# Patient Record
Sex: FEMALE | Race: WHITE | NOT HISPANIC OR LATINO | Employment: PART TIME | URBAN - METROPOLITAN AREA
[De-identification: names, ages, dates, MRNs, and addresses within clinical notes are randomized per-mention and may not be internally consistent; named-entity substitution may affect disease eponyms.]

---

## 2017-01-16 ENCOUNTER — TRANSFERRED RECORDS (OUTPATIENT)
Dept: HEALTH INFORMATION MANAGEMENT | Facility: CLINIC | Age: 47
End: 2017-01-16

## 2017-01-20 ENCOUNTER — TRANSFERRED RECORDS (OUTPATIENT)
Dept: HEALTH INFORMATION MANAGEMENT | Facility: CLINIC | Age: 47
End: 2017-01-20

## 2017-02-27 ENCOUNTER — TRANSFERRED RECORDS (OUTPATIENT)
Dept: HEALTH INFORMATION MANAGEMENT | Facility: CLINIC | Age: 47
End: 2017-02-27

## 2017-03-10 ENCOUNTER — TRANSFERRED RECORDS (OUTPATIENT)
Dept: HEALTH INFORMATION MANAGEMENT | Facility: CLINIC | Age: 47
End: 2017-03-10

## 2017-03-13 ENCOUNTER — TRANSFERRED RECORDS (OUTPATIENT)
Dept: HEALTH INFORMATION MANAGEMENT | Facility: CLINIC | Age: 47
End: 2017-03-13

## 2017-04-21 ENCOUNTER — TRANSFERRED RECORDS (OUTPATIENT)
Dept: HEALTH INFORMATION MANAGEMENT | Facility: CLINIC | Age: 47
End: 2017-04-21

## 2017-05-03 ENCOUNTER — TRANSFERRED RECORDS (OUTPATIENT)
Dept: HEALTH INFORMATION MANAGEMENT | Facility: CLINIC | Age: 47
End: 2017-05-03

## 2017-07-18 ENCOUNTER — TRANSFERRED RECORDS (OUTPATIENT)
Dept: HEALTH INFORMATION MANAGEMENT | Facility: CLINIC | Age: 47
End: 2017-07-18

## 2017-07-20 ENCOUNTER — TRANSFERRED RECORDS (OUTPATIENT)
Dept: HEALTH INFORMATION MANAGEMENT | Facility: CLINIC | Age: 47
End: 2017-07-20

## 2017-08-29 ENCOUNTER — TRANSFERRED RECORDS (OUTPATIENT)
Dept: HEALTH INFORMATION MANAGEMENT | Facility: CLINIC | Age: 47
End: 2017-08-29

## 2017-11-16 ENCOUNTER — TRANSFERRED RECORDS (OUTPATIENT)
Dept: HEALTH INFORMATION MANAGEMENT | Facility: CLINIC | Age: 47
End: 2017-11-16

## 2017-11-27 ENCOUNTER — TRANSFERRED RECORDS (OUTPATIENT)
Dept: HEALTH INFORMATION MANAGEMENT | Facility: CLINIC | Age: 47
End: 2017-11-27

## 2018-07-10 ENCOUNTER — TRANSFERRED RECORDS (OUTPATIENT)
Dept: HEALTH INFORMATION MANAGEMENT | Facility: CLINIC | Age: 48
End: 2018-07-10

## 2018-07-17 ENCOUNTER — TRANSFERRED RECORDS (OUTPATIENT)
Dept: HEALTH INFORMATION MANAGEMENT | Facility: CLINIC | Age: 48
End: 2018-07-17

## 2018-08-03 ENCOUNTER — TRANSFERRED RECORDS (OUTPATIENT)
Dept: HEALTH INFORMATION MANAGEMENT | Facility: CLINIC | Age: 48
End: 2018-08-03

## 2018-08-13 ENCOUNTER — TRANSFERRED RECORDS (OUTPATIENT)
Dept: HEALTH INFORMATION MANAGEMENT | Facility: CLINIC | Age: 48
End: 2018-08-13

## 2018-08-15 ENCOUNTER — TRANSFERRED RECORDS (OUTPATIENT)
Dept: HEALTH INFORMATION MANAGEMENT | Facility: CLINIC | Age: 48
End: 2018-08-15

## 2018-08-17 ENCOUNTER — TRANSFERRED RECORDS (OUTPATIENT)
Dept: HEALTH INFORMATION MANAGEMENT | Facility: CLINIC | Age: 48
End: 2018-08-17

## 2018-08-27 ENCOUNTER — TRANSFERRED RECORDS (OUTPATIENT)
Dept: HEALTH INFORMATION MANAGEMENT | Facility: CLINIC | Age: 48
End: 2018-08-27

## 2018-08-29 NOTE — TELEPHONE ENCOUNTER
Date of appointment: 18 @ 1400   Diagnosis/reason for appointment: 2nd Opinion on Metastasis of Breast Cancer  Referring provider/facility: Dr. Eagle Brand  Who called: Carmen @ clinic  [phone]    Recent Studies  Imagin18 - US Bx Soft Tissue,  - CT Pelvis, 8/3/18 - PET Breast  Pathology: 18    Records requested from:   [facility] - Ph [phone]  Fax [phone]  [facility] - Ph [phone]  Dr. David Redd's Office  [facility] - [phone]

## 2018-08-30 NOTE — TELEPHONE ENCOUNTER
8/30/18 - Records received from [facility] (48 pages) and sent to scanning via [employer] @ 8:12AM

## 2018-08-30 NOTE — TELEPHONE ENCOUNTER
8/30/18 - Records received from [facility]/Anton[facility] Arellano (16 pages) and sent to scanning via [employer] @ 4:34PM.

## 2018-08-31 NOTE — TELEPHONE ENCOUNTER
8/31/18 - Records received from [facility] (48 pages) and sent to scanning via [employer] @ 8:12AM

## 2018-09-04 ENCOUNTER — CARE COORDINATION (OUTPATIENT)
Dept: ONCOLOGY | Facility: CLINIC | Age: 48
End: 2018-09-04

## 2018-09-04 ENCOUNTER — ONCOLOGY VISIT (OUTPATIENT)
Dept: ONCOLOGY | Facility: CLINIC | Age: 48
End: 2018-09-04
Attending: INTERNAL MEDICINE
Payer: COMMERCIAL

## 2018-09-04 ENCOUNTER — PRE VISIT (OUTPATIENT)
Dept: ONCOLOGY | Facility: CLINIC | Age: 48
End: 2018-09-04

## 2018-09-04 ENCOUNTER — ALLIED HEALTH/NURSE VISIT (OUTPATIENT)
Dept: ONCOLOGY | Facility: CLINIC | Age: 48
End: 2018-09-04

## 2018-09-04 VITALS
OXYGEN SATURATION: 92 % | HEART RATE: 71 BPM | DIASTOLIC BLOOD PRESSURE: 74 MMHG | RESPIRATION RATE: 16 BRPM | SYSTOLIC BLOOD PRESSURE: 115 MMHG | TEMPERATURE: 97.9 F | WEIGHT: 270.5 LBS

## 2018-09-04 DIAGNOSIS — E66.01 MORBID OBESITY (H): ICD-10-CM

## 2018-09-04 DIAGNOSIS — H53.2 DOUBLE VISION: ICD-10-CM

## 2018-09-04 DIAGNOSIS — Z71.9 VISIT FOR COUNSELING: Primary | ICD-10-CM

## 2018-09-04 DIAGNOSIS — C50.412 MALIGNANT NEOPLASM OF UPPER-OUTER QUADRANT OF LEFT BREAST IN FEMALE, ESTROGEN RECEPTOR POSITIVE (H): Primary | ICD-10-CM

## 2018-09-04 DIAGNOSIS — Z17.0 MALIGNANT NEOPLASM OF UPPER-OUTER QUADRANT OF LEFT BREAST IN FEMALE, ESTROGEN RECEPTOR POSITIVE (H): Primary | ICD-10-CM

## 2018-09-04 PROCEDURE — 99205 OFFICE O/P NEW HI 60 MIN: CPT | Mod: ZP | Performed by: INTERNAL MEDICINE

## 2018-09-04 PROCEDURE — G0463 HOSPITAL OUTPT CLINIC VISIT: HCPCS | Mod: ZF

## 2018-09-04 RX ORDER — SERTRALINE HYDROCHLORIDE 100 MG/1
TABLET, FILM COATED ORAL
COMMUNITY
Start: 2018-08-28 | End: 2019-04-30

## 2018-09-04 RX ORDER — LEVOTHYROXINE SODIUM 200 UG/1
200 TABLET ORAL
COMMUNITY
Start: 2018-01-24 | End: 2019-05-29

## 2018-09-04 RX ORDER — TRIAMCINOLONE ACETONIDE 55 UG/1
1 SPRAY, METERED NASAL DAILY
COMMUNITY

## 2018-09-04 RX ORDER — ALBUTEROL SULFATE 0.83 MG/ML
2.5 SOLUTION RESPIRATORY (INHALATION)
COMMUNITY
End: 2018-10-16

## 2018-09-04 ASSESSMENT — PAIN SCALES - GENERAL: PAINLEVEL: NO PAIN (0)

## 2018-09-04 NOTE — PROGRESS NOTES
Met with patient and gave business card to contact the Breast Center and discussed signing up for MyChart, but not to use for symptoms. Gave brochures for [employer], [employer] and [employer] for support.  Paged the  to meet with patient as she requested. Answered all patient's questions and verbalized understanding. Farheen Price RN, BSN.

## 2018-09-04 NOTE — PROGRESS NOTES
NEW PATIENT NOTE      HISTORY OF PRESENT ILLNESS:  Jade is a 48-year-old woman with metastatic breast cancer who comes to our clinic for recommendations for further treatment.  She is referred by Dr. Norberto Brand at [facility].  Jade would like to continue her care at the [facility].      Jade was diagnosed with breast cancer with a lump found in the upper-inner quadrant of the left breast.  She was diagnosed in 12/2013 in the [place] area and Dr. David Redd was her medical oncologist.  Biopsy of the tumor showed it to be ER positive, OH positive, and HER-2 negative.  We do not have any of the original pathology and we need to obtain those reports.  She underwent a lumpectomy performed by Dr. Tasha Segura who is a surgeon in the [place] area.  She also had a sentinel lymph node which was noted to be involved with tumor but there was no lymph node dissection done at that time.  TXN1MX.  She subsequently underwent adjuvant chemotherapy with dose-dense AC for 4 cycles and Taxol for 12 weeks, completed 09/11/2014.      She then had radiation therapy post lumpectomy to the left breast, which was completed 11/23/2014 by Dr. Manley.      She was then begun on an aromatase inhibitor, the name of which she does not remember.  The aromatase inhibitor therapy was begun in 11/2014.  She was then switched within about a month or so to anastrozole and remained on anastrozole from 11/2014-02/2017.  She has never received tamoxifen.      She then moved to [place], [place] in 03/2017.  She saw a gynecologist there and underwent a IRIS/BSO by Dr. Zendejas.  She then also saw Dr. Linares in [place], [place], who is an oncologist.  His interpretation of the pathology report was that she had triple-negative breast cancer.  It is possible that she may have had low estrogen receptor positivity, but the anastrozole was then discontinued in 02/2017.  She then underwent the IRIS/BSO in 03/2017.  This was done laparoscopically.       She then remained off of all hormonal therapy and in 09/2017 moved to [place].  She remained off hormonal therapy and did not have Medical Oncology followup initially.      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  She then went to see Dr. Norberto Brand who performed a PET/CT scan and the patient also underwent a brain MRI for staging.  The PET/CT scan performed 08/03/2018 showed in the neck there were several mildly metabolic active and large prominent left posterior cervical lymph nodes.  The largest is located posterior to the left sternocleidomastoid muscle and measures 1.5 x 1 cm in size.  This demonstrate modest FDG uptake.  The other lymph nodes are smaller and demonstrate mild FDG uptake.  The prominent prevascular and aortopulmonary lymph nodes were also seen on the contrast-enhanced scan were less well seen on the PET.  The largest lymph node visualized on the PET scan was 1.1 x 0.8 cm and demonstrated mild FDG uptake.  This was in the periaortic area just anterior to the aortic arch.  No lung nodules.  No lung infiltrates.  No pleural effusion.  There was no uptake of FDG in the bones.  In summary, there were mildly enlarged left posterior cervical lymph nodes, largest measuring 1.5 x 1.0 cm and mildly enlarged aorticopulmonary and prevascular lymph nodes that were noted on contrast-enhanced CT scan seen less well on the PET scan.  No evidence of metastatic disease in the abdomen and pelvis.  She also underwent a brain MRI that showed no evidence of intracranial metastatic disease.  She underwent a biopsy of one of the lymph nodes in the left neck which showed metastatic adenocarcinoma consistent with breast origin, which was estrogen-receptor positive.  The tumor cells were positive for CK7, ER and ME consistent with metastatic breast carcinoma.  Estrogen receptor showed strong average nuclear intensity in 95% of carcinoma cells, progesterone receptor  moderate average nuclear intensity in 70% of the carcinoma.  A GATA3 stain was apparently not performed.      Jade now comes to our clinic for recommendations.  She has mild pain in the left neck, moderate fatigue, moderate depression and has begun on sertraline, and she has moderate anxiety.      REVIEW OF SYSTEMS:  She has had no weight loss.  Diet has not changed.  No loss of energy.  She does not sleep during the day.  She can perform her household chores.  She has no breast lumps noted.  She has no fever, headache, cough, shortness of breath, hemoptysis or loss of appetite.  She does have occasional nonexertional chest pain and some dyspnea on exertion.  She has no nausea, vomiting, abdominal pain.  Occasional constipation, no diarrhea.  She has left hip pain.  She has no back pain, no muscle or joint complaints.  No numbness or tingling in the hands and feet, no hearing loss and she does have some depression.  The remainder of a 12-point review of systems is negative.      FAMILY HISTORY:  Positive for breast cancer in a maternal aunt at age 50.  The aunt is now 80 years old.  She also has a history of breast cancer in a paternal cousin.  There is a history of ovarian cancer in her paternal aunt's daughter and also possible history of ovarian cancer in her paternal grandmother.     PAST MEDICAL HISTORY:  She has had the previous breast surgery as described in the past and history of radiation as described in the past.  She has had no other tumor types.  No history of heart problems, heart attack, breathing problems, blood clots, seizures, peptic ulcer disease, osteoporosis, bone fractures.  She is not currently participating in a clinical trial and has not lost more than 20% of her body weight.  She has arthritis in her hands.  She also has an increased body mass index. History is also positive for hypothyroidism, depression.  History of asthma.      ALLERGIES:  Sulfa and penicillin, both of which cause hives.   No allergy to seafood, iodine or contrast dye.  She does not take aspirin.      PAST MENSTRUAL HISTORY:  Age of menarche was 12.  She has been pregnant once at age 15.  She was on oral contraceptives from age 15 to age 30.  Menopause began with chemotherapy in 2014 at age 44.  No history of hormone replacement therapy.  She was on oral contraceptives from approximately age 15 to age 30.      SOCIAL HISTORY:  She is a never smoker and does not drink alcohol.     TREATMENT HISTORY:  A.  Biopsy of left breast mass 2013 [place].  B.  TXNXMX.  We don't have the pathology reports.  [facility] [phone].  [phone].  C.   Adjuvant chemotherapy with dose-dense AC for 4 cycles and Taxol for 12 weeks, completed 09/11/2014.    D.  Radiation post lumpectomy which was completed 11/23/2014 by Dr. Manley.   E.  The aromatase inhibitor therapy was begun in 11/2014.  She was then switched within about a month or so to anastrozole and remained on anastrozole from 11/2014-02/2017.    F.  IRIS BSO in [place], [place] 03/2017.  Taken off all hormonal therapy because of an interpretation of the original pathology report that we don't have.   G.  Moved to [place] in 9/2017  H.  Presented with left neck lymph nodes July, 2018.  Left neck node biopsy showed adenocarcinoma consistent with breast ER+HER2- by IHC.      PHYSICAL EXAMINATION:   VITAL SIGNS:  Blood pressure 115/74, temperature 97.9, pulse 71, respirations 16, O2 sat 92% on room air, weight 122.7 kg.   GENERAL:  Jade appeared generally well.  She has no alopecia.   HEENT:  Oropharynx is without lesions.   LYMPH:  She has no palpable cervical, supraclavicular, subclavicular or axillary lymphadenopathy with the exception of left posterior cervical lymphadenopathy, largest lymph node measured about 1 cm in size.  These lymph nodes are in the left neck posteriorly at level 3, 4 and 5.     BREASTS:  Right breast reveals no masses.  Examination of the left breast reveals a  well-healed incision in the upper-inner quadrant of the left breast at the 11-o'clock position, 2 fingerbreadths from the nipple-areolar complex.  Incision is well healed without erythema or masses.  No masses in the left breast.  The left axillary incision is well healed without erythema or masses.   LUNGS:  Clear to percussion and auscultation.   HEART:  Regular rate and rhythm, S1, S2.   ABDOMEN:  Soft, nontender, consistent with an increased body mass index.   EXTREMITIES:  Without edema.     PSYCHIATRIC:  Mood and affect were normal.   NEUROLOGIC:  Cranial nerves II through XII are grossly intact.  Finger-to-nose was intact bilaterally. Deep tendon reflexes were 1+ in the upper and lower extremities bilaterally.  Toes were downgoing bilaterally.  Motor exam showed 5/5 strength in the upper and lower extremities bilaterally.      PATHOLOGY REVIEW:  Patient Name: ANAHI DOBSON   MR#: [id number]   Specimen #: [id number]   Collected: 9/6/2018   Received: 9/6/2018   Reported: 9/6/2018 18:20   Ordering Phy(s): NAN ALFARO   Additional Phy(s): [facility], Department of Pathology     For improved result formatting, select 'View Enhanced Report Format' under    Linked Documents section.     TEST(S):   7 slides, case #[id number]     FINAL DIAGNOSIS:   CASE FROM [place], [place] ([id number], OBTAINED   7/17/2018):   LYMPH NODE, LEFT LATERAL CERVICAL, ULTRASOUND-GUIDED NEEDLE BIOPSY:   - METASTATIC CARCINOMA CONSISTENT WITH BREAST PRIMARY.   - Tumor is estrogen receptor positive (>95% of nuclei, strong intensity)   and progesterone receptor positive   (approx. 70% of nuclei, moderate to strong intensity);  HER2/ellen gene   amplification study (by IHC) is negative   (score 0).     I have personally reviewed all specimens and/or slides, including the   listed special stains, and used them   with my medical judgement to determine or confirm the final diagnosis.     Electronically signed out  by:     Steff Phelps M.D., [facility]     CLINICAL HISTORY:   48-year-old woman with history of left breast cancer     GROSS:   Received from [facility] in [place], [place] are 8 stained   slides labeled [id number] (obtained   7/17/2018) and a copy of the referring pathologist's report with patient   identifying information.  All slides   are returned.     MICROSCOPIC:   Microscopic examination was performed. In addition to ER/HI expression,   the neoplastic cells are also   diffusely positive for CK7 (all immunostains performed at the outside   institution).     CPT Codes:   A: 69914-YMH1     TESTING LAB LOCATION:   [facility]   [facility] [place]   [address], [facility] [address]   [place]   [address] [phone]     COLLECTION SITE:   Client:  [facility]   Location:  [facility] (B)          ASSESSMENT AND PLAN:     1.  Jade Collins is a 48-year-old woman with history of a breast cancer of the left breast of unknown pathology TXN1M0 by report,  treated with lumpectomy, adjuvant chemotherapy with AC then taxol followed by radiation, followed by AI,  followed by IRIS BSO and AI was discontinued because, according to the patient, reported interpretation of her breast cancer pathology report in [place] as showing triple negative breast cancer.  Recent diagnosis with recurrent metastatic, ER-positive, HER-2 negative breast cancer involving the left posterior cervical chain of lymph nodes as well as the periaortic area in the chest.  She has no known bone metastases.  I have reviewed the PET/CT scan performed 1 month ago at [facility].  I think that a repeat PET/CT scan would be very helpful because of issues regarding the quality of the scan and questions about whether there could be bone involvement or not.    2.  Restaging.  I would do the following restaging.  I would perform a PET/CT scan at the [facility].  I would  obtain a brain MRI. Justification for the brain MRI is the patient's history of double vision.   3.  Treatment plan for metastatic ER+HER2- breast cancer.  If we find that the extent of disease is as previously described, my plan would be to offer palbociclib and letrozole or to consider a tamoxifen and palbociclib clinical trial which is a [employer] clinical trial.  Jade was given the consent form by Irma Russo, our clinical trials nurse.  Additionally, I would perform the brain MRI for restaging.  I do not recommend a neck dissection or local regional radiation to the left neck.  I do recommend that pathology be reviewed here at the [facility] and that the ER/AK and HER-2 staining be rechecked.   3.  Reported history of a blood clotting disorder in the patient's family.  The patient's father may have had a clot related to cancer diagnosis. The patient has never had a blood clotting disorder and I do not think that evaluation for factor V Leiden or factor II mutation is necessary at this time.   4.  Followup imaging could consist of a PET/CT scan at 8 weeks to determine whether there is a response to CDKI and aromatase inhibitor therapy.   5.  No bone targeting agent is needed at this time because there is no evidence of bone metastases.   6.  Discussion of diet and exercise.  I did recommend a healthful diet, Mediterranean style, and I do recommend 150 minutes of exercise per week.   7.  Bone health.  I recommend calcium and vitamin D to help maintain bone density. I recommend weight bearing exercise.   8.  Genetics referral. She is reported as being BRCA1/2 negative but review would be helpful given her young age and family history of ovarian cancer.   9.  Followup.  We will plan on obtaining records.  We do need to obtain the original pathology report and slides to be reviewed here at the [facility]. PET CT, brain MRI, follow up with me Thursday September 13 with CBC, CMP, CA27.29 and  CEA.       Thank you, Dr. Brand, for referring Jade to our clinic and allowing us to participate in her care.      Wilfredo Bhatt MD        [facility]     ADDENDUM:  September 15.  She reviewed the consent for the BIG10 tamoxifen and palbociclib study and does not want to participate.  We will see her next week to start palbociclib and letrozole.  Discussed in breast conference on September 14.     I spent 70 minutes with the patient more than 50% of which was in counseling and coordination of care.

## 2018-09-04 NOTE — LETTER
9/4/2018       RE: Jade Collins  [address]  [facility] [address]     Dear Colleague,    Thank you for referring your patient, Jade Collins, to the [facility]. Please see a copy of my visit note below.    NEW PATIENT NOTE      HISTORY OF PRESENT ILLNESS:  Jade is a 48-year-old woman with metastatic breast cancer who comes to our clinic for recommendations for further treatment.  She is referred by Dr. Norberto Brand at [facility].  Jade would like to continue her care at the [facility] ER.      Jade was diagnosed with breast cancer with a lump found in the upper-inner quadrant of the left breast.  She was diagnosed in 12/2013 in the [place] area and Dr. David Redd was her medical oncologist.  Biopsy of the tumor showed it to be ER positive, VA positive, and HER-2 negative.  We do not have any of the original pathology and we need to obtain those reports.  She underwent a lumpectomy performed by Dr. Tasha Segura who is a surgeon in the [place] area.  She also had a sentinel lymph node which was noted to be involved with tumor but there was no lymph node dissection done at that time.  TXN1MX.  She subsequently underwent adjuvant chemotherapy with dose-dense AC for 4 cycles and Taxol for 12 weeks, completed 09/11/2014.      She then had radiation therapy post lumpectomy to the left breast, which was completed 11/23/2014 by Dr. Manley.      She was then begun on an aromatase inhibitor, the name of which she does not remember.  The aromatase inhibitor therapy was begun in 11/2014.  She was then switched within about a month or so to anastrozole and remained on anastrozole from 11/2014-02/2017.  She has never received tamoxifen.      She then moved to [place], [place] in 03/2017.  She saw a gynecologist there and underwent a IRIS/BSO by Dr. Zendejas.  She then also saw Dr. Linares in [place], [place], who is an oncologist.  His interpretation of the pathology report was that she had  triple-negative breast cancer.  It is possible that she may have had low estrogen receptor positivity, but the anastrozole was then discontinued in 02/2017.  She then underwent the IRIS/BSO in 03/2017.  This was done laparoscopically.      She then remained off of all hormonal therapy and in 09/2017 moved to [place].  She remained off hormonal therapy and did not have Medical Oncology followup initially.      [place] and noticed lymph nodes in the left neck about 8 weeks ago.  She then went to see Dr. Norberto Brand who performed a PET/CT scan and the patient also underwent a brain MRI for staging.  The PET/CT scan performed 08/03/2018 showed in the neck there were several mildly metabolic active and large prominent left posterior cervical lymph nodes.  The largest is located posterior to the left sternocleidomastoid muscle and measures 1.5 x 1 cm in size.  This demonstrate modest FDG uptake.  The other lymph nodes are smaller and demonstrate mild FDG uptake.  The prominent prevascular and aortopulmonary lymph nodes were also seen on the contrast-enhanced scan were less well seen on the PET.  The largest lymph node visualized on the PET scan was 1.1 x 0.8 cm and demonstrated mild FDG uptake.  This was in the periaortic area just anterior to the aortic arch.  No lung nodules.  No lung infiltrates.  No pleural effusion.  There was no uptake of FDG in the bones.  In summary, there were mildly enlarged left posterior cervical lymph nodes, largest measuring 1.5 x 1.0 cm and mildly enlarged aorticopulmonary and prevascular lymph nodes that were noted on contrast-enhanced CT scan seen less well on the PET scan.  No evidence of metastatic disease in the abdomen and pelvis.  She also underwent a brain MRI that showed no evidence of intracranial metastatic disease.  She underwent a biopsy of one of the lymph nodes in the left neck which showed metastatic adenocarcinoma consistent with breast origin, which was  estrogen-receptor positive.  The tumor cells were positive for CK7, ER and KY consistent with metastatic breast carcinoma.  Estrogen receptor showed strong average nuclear intensity in 95% of carcinoma cells, progesterone receptor moderate average nuclear intensity in 70% of the carcinoma.  A GATA3 stain was apparently not performed.      Jade now comes to our clinic for recommendations.  She has mild pain in the left neck, moderate fatigue, moderate depression and has begun on sertraline, and she has moderate anxiety.      REVIEW OF SYSTEMS:  She has had no weight loss.  Diet has not changed.  No loss of energy.  She does not sleep during the day.  She can perform her household chores.  She has no breast lumps noted.  She has no fever, headache, cough, shortness of breath, hemoptysis or loss of appetite.  She does have occasional nonexertional chest pain and some dyspnea on exertion.  She has no nausea, vomiting, abdominal pain.  Occasional constipation, no diarrhea.  She has left hip pain.  She has no back pain, no muscle or joint complaints.  No numbness or tingling in the hands and feet, no hearing loss and she does have some depression.  The remainder of a 12-point review of systems is negative.      FAMILY HISTORY:  Positive for breast cancer in a maternal aunt at age 50.  The aunt is now 80 years old.  She also has a history of breast cancer in a paternal cousin.  There is a history of ovarian cancer in her paternal aunt's daughter and also possible history of ovarian cancer in her paternal grandmother.     PAST MEDICAL HISTORY:  She has had the previous breast surgery as described in the past and history of radiation as described in the past.  She has had no other tumor types.  No history of heart problems, heart attack, breathing problems, blood clots, seizures, peptic ulcer disease, osteoporosis, bone fractures.  She is not currently participating in a clinical trial and has not lost more than 20% of her  body weight.  She has arthritis in her hands.  She also has an increased body mass index. History is also positive for hypothyroidism, depression.  History of asthma.      ALLERGIES:  Sulfa and penicillin, both of which cause hives.  No allergy to seafood, iodine or contrast dye.  She does not take aspirin.      PAST MENSTRUAL HISTORY:  Age of menarche was 12.  She has been pregnant once at age 15.  She was on oral contraceptives from age 15 to age 30.  Menopause began with chemotherapy in 2014 at age 44.  No history of hormone replacement therapy.  She was on oral contraceptives from approximately age 15 to age 30.      SOCIAL HISTORY:  She is a never smoker and does not drink alcohol.     TREATMENT HISTORY:  A.  Biopsy of left breast mass 2013 [place].  B.  TXNXMX.  We don't have the pathology reports.  [facility] [phone].  [phone].  C.   Adjuvant chemotherapy with dose-dense AC for 4 cycles and Taxol for 12 weeks, completed 09/11/2014.    D.  Radiation post lumpectomy which was completed 11/23/2014 by Dr. Manley.   E.  The aromatase inhibitor therapy was begun in 11/2014.  She was then switched within about a month or so to anastrozole and remained on anastrozole from 11/2014-02/2017.    F.  IRIS BSO in [place], [place] 03/2017.  Taken off all hormonal therapy because of an interpretation of the original pathology report that we don't have.   G.  Moved to [place] in 9/2017  H.  Presented with left neck lymph nodes July, 2018.  Left neck node biopsy showed adenocarcinoma consistent with breast ER+HER2- by IHC.      PHYSICAL EXAMINATION:   VITAL SIGNS:  Blood pressure 115/74, temperature 97.9, pulse 71, respirations 16, O2 sat 92% on room air, weight 122.7 kg.   GENERAL:  Jade appeared generally well.  She has no alopecia.   HEENT:  Oropharynx is without lesions.   LYMPH:  She has no palpable cervical, supraclavicular, subclavicular or axillary lymphadenopathy with the exception of left posterior  cervical lymphadenopathy, largest lymph node measured about 1 cm in size.  These lymph nodes are in the left neck posteriorly at level 3, 4 and 5.     BREASTS:  Right breast reveals no masses.  Examination of the left breast reveals a well-healed incision in the upper-inner quadrant of the left breast at the 11-o'clock position, 2 fingerbreadths from the nipple-areolar complex.  Incision is well healed without erythema or masses.  No masses in the left breast.  The left axillary incision is well healed without erythema or masses.   LUNGS:  Clear to percussion and auscultation.   HEART:  Regular rate and rhythm, S1, S2.   ABDOMEN:  Soft, nontender, consistent with an increased body mass index.   EXTREMITIES:  Without edema.     PSYCHIATRIC:  Mood and affect were normal.   NEUROLOGIC:  Cranial nerves II through XII are grossly intact.  Finger-to-nose was intact bilaterally. Deep tendon reflexes were 1+ in the upper and lower extremities bilaterally.  Toes were downgoing bilaterally.  Motor exam showed 5/5 strength in the upper and lower extremities bilaterally.      PATHOLOGY REVIEW:  Patient Name: ANAHI DOBSON   MR#: [id number]   Specimen #: [id number]   Collected: 9/6/2018   Received: 9/6/2018   Reported: 9/6/2018 18:20   Ordering Phy(s): NAN ALFARO   Additional Phy(s): [facility], Department of Pathology     For improved result formatting, select 'View Enhanced Report Format' under    Linked Documents section.     TEST(S):   7 slides, case #[id number]     FINAL DIAGNOSIS:   CASE FROM [place], [place] ([id number], OBTAINED   7/17/2018):   LYMPH NODE, LEFT LATERAL CERVICAL, ULTRASOUND-GUIDED NEEDLE BIOPSY:   - METASTATIC CARCINOMA CONSISTENT WITH BREAST PRIMARY.   - Tumor is estrogen receptor positive (>95% of nuclei, strong intensity)   and progesterone receptor positive   (approx. 70% of nuclei, moderate to strong intensity);  HER2/ellen gene   amplification study (by IHC) is  negative   (score 0).     I have personally reviewed all specimens and/or slides, including the   listed special stains, and used them   with my medical judgement to determine or confirm the final diagnosis.     Electronically signed out by:     Steff Phelps M.D., [facility]     CLINICAL HISTORY:   48-year-old woman with history of left breast cancer     GROSS:   Received from [facility] in [place], [place] are 8 stained   slides labeled [id number] (obtained   7/17/2018) and a copy of the referring pathologist's report with patient   identifying information.  All slides   are returned.     MICROSCOPIC:   Microscopic examination was performed. In addition to ER/NY expression,   the neoplastic cells are also   diffusely positive for CK7 (all immunostains performed at the outside   institution).     CPT Codes:   A: 86407-ZUA4     TESTING LAB LOCATION:   [address], [address]   [address]   [phone]     COLLECTION SITE:   Client:  [facility]   Location:  [facility] (B)          ASSESSMENT AND PLAN:     1.  Jade Collins is a 48-year-old woman with history of a breast cancer of the left breast of unknown pathology TXN1M0 by report,  treated with lumpectomy, adjuvant chemotherapy with AC then taxol followed by radiation, followed by AI,  followed by IRIS BSO and AI was discontinued because, according to the patient, reported interpretation of her breast cancer pathology report in [place] as showing triple negative breast cancer.  Recent diagnosis with recurrent metastatic, ER-positive, HER-2 negative breast cancer involving the left posterior cervical chain of lymph nodes as well as the periaortic area in the chest.  She has no known bone metastases.  I have reviewed the PET/CT scan performed 1 month ago at [facility].  I think that a repeat PET/CT scan would be very helpful because of  issues regarding the quality of the scan and questions about whether there could be bone involvement or not.    2.  Restaging.  I would do the following restaging.  I would perform a PET/CT scan at the [facility].  I would obtain a brain MRI. Justification for the brain MRI is the patient's history of double vision.   3.  Treatment plan for metastatic ER+HER2- breast cancer.  If we find that the extent of disease is as previously described, my plan would be to offer palbociclib and letrozole or to consider a tamoxifen and palbociclib clinical trial which is a [employer] clinical trial.  Jade was given the consent form by Irma Russo, our clinical trials nurse.  Additionally, I would perform the brain MRI for restaging.  I do not recommend a neck dissection or local regional radiation to the left neck.  I do recommend that pathology be reviewed here at the [facility] and that the ER/NJ and HER-2 staining be rechecked.   3.  Reported history of a blood clotting disorder in the patient's family.  The patient's father may have had a clot related to cancer diagnosis. The patient has never had a blood clotting disorder and I do not think that evaluation for factor V Leiden or factor II mutation is necessary at this time.   4.  Followup imaging could consist of a PET/CT scan at 8 weeks to determine whether there is a response to CDKI and aromatase inhibitor therapy.   5.  No bone targeting agent is needed at this time because there is no evidence of bone metastases.   6.  Discussion of diet and exercise.  I did recommend a healthful diet, Mediterranean style, and I do recommend 150 minutes of exercise per week.   7.  Bone health.  I recommend calcium and vitamin D to help maintain bone density. I recommend weight bearing exercise.   8.  Genetics referral. She is reported as being BRCA1/2 negative but review would be helpful given her young age and family history of ovarian cancer.   9.  Followup.   We will plan on obtaining records.  We do need to obtain the original pathology report and slides to be reviewed here at the [facility]. PET CT, brain MRI, follow up with me Thursday September 13 with CBC, CMP, CA27.29 and CEA.       Thank you, Dr. Brand, for referring Jade to our clinic and allowing us to participate in her care.      Wilfredo Bhatt MD        [facility]     I spent 70 minutes with the patient more than 50% of which was in counseling and coordination of care.     Again, thank you for allowing me to participate in the care of your patient.      Sincerely,    Wilfreod Bhatt MD

## 2018-09-04 NOTE — MR AVS SNAPSHOT
After Visit Summary   9/4/2018    Jade Collins    MRN: [id number]           Patient Information     Date Of Birth          1970        Visit Information        Provider Department      9/4/2018 10:22 AM Han, Soo Yeon, MSW [facility]        Today's Diagnoses     Visit for counseling    -  1       Follow-ups after your visit        Who to contact     If you have questions or need follow up information about today's clinic visit or your schedule please contact [facility]CER [facility] directly at [phone].  Normal or non-critical lab and imaging results will be communicated to you by [employer], letter or phone within 4 business days after the clinic has received the results. If you do not hear from us within 7 days, please contact the clinic through [employer] or phone. If you have a critical or abnormal lab result, we will notify you by phone as soon as possible.  Submit refill requests through [employer] or call your pharmacy and they will forward the refill request to us. Please allow 3 business days for your refill to be completed.          Additional Information About Your Visit        MyChart Information     [employer] gives you secure access to your electronic health record. If you see a primary care provider, you can also send messages to your care team and make appointments. If you have questions, please call your primary care clinic.  If you do not have a primary care provider, please call [phone] and they will assist you.        Care EveryWhere ID     This is your Care EveryWhere ID. This could be used by other organizations to access your [place] medical records  [id number]         Blood Pressure from Last 3 Encounters:   09/04/18 115/74    Weight from Last 3 Encounters:   09/04/18 122.7 kg (270 lb 8 oz)              Today, you had the following     No orders found for display       Primary Care Provider Office Phone # Fax #    Dave CALIXTO Cousins [phone]  [phone]       [facility] [address]  [facility] [address]        Equal Access to Services     ESPERANZA DA SILVA : Hadii jp almeida gena Mayers, wasarahda wendy, catarinata kakeeleyda nando, cody rogeljae raiza. So [facility] [phone].    ATENCIÓN: Si habla español, tiene a ramos disposición servicios gratuitos de asistencia lingüística. Llame al [phone].    We comply with applicable federal civil rights laws and [place] laws. We do not discriminate on the basis of race, color, national origin, age, disability, sex, sexual orientation, or gender identity.            Thank you!     Thank you for choosing [facility]IN[facility]  for your care. Our goal is always to provide you with excellent care. Hearing back from our patients is one way we can continue to improve our services. Please take a few minutes to complete the written survey that you may receive in the mail after your visit with us. Thank you!             Your Updated Medication List - Protect others around you: Learn how to safely use, store and throw away your medicines at www.disposemymeds.org.          This list is accurate as of 9/4/18 11:59 PM.  Always use your most recent med list.                   Brand Name Dispense Instructions for use Diagnosis    albuterol (2.5 MG/3ML) 0.083% neb solution      Inhale 2.5 mg into the lungs        levothyroxine 200 MCG tablet    SYNTHROID/LEVOTHROID     Take 200 mcg by mouth        sertraline 100 MG tablet    ZOLOFT     TAKE 1 AND 1/2 TABLETS(150 MG) BY MOUTH EVERY DAY        triamcinolone 55 MCG/ACT inhaler    NASACORT     Spray 2 sprays into both nostrils daily

## 2018-09-04 NOTE — MR AVS SNAPSHOT
After Visit Summary   9/4/2018    Jade Collins    MRN: [id number]           Patient Information     Date Of Birth          1970        Visit Information        Provider Department      9/4/2018 2:00 PM Wilfredo hBatt MD [facility]        Today's Diagnoses     Malignant neoplasm of upper-outer quadrant of left breast in female, estrogen receptor positive (H)    -  1    Double vision        Morbid obesity (H)           Follow-ups after your visit        Who to contact     If you have questions or need follow up information about today's clinic visit or your schedule please contact [facility] directly at [phone].  Normal or non-critical lab and imaging results will be communicated to you by MyChart, letter or phone within 4 business days after the clinic has received the results. If you do not hear from us within 7 days, please contact the clinic through [employer] or phone. If you have a critical or abnormal lab result, we will notify you by phone as soon as possible.  Submit refill requests through [employer] or call your pharmacy and they will forward the refill request to us. Please allow 3 business days for your refill to be completed.          Additional Information About Your Visit        MyChart Information     [employer] gives you secure access to your electronic health record. If you see a primary care provider, you can also send messages to your care team and make appointments. If you have questions, please call your primary care clinic.  If you do not have a primary care provider, please call [phone] and they will assist you.        Care EveryWhere ID     This is your Care EveryWhere ID. This could be used by other organizations to access your [place] medical records  [id number]        Your Vitals Were     Pulse Temperature Respirations Pulse Oximetry          71 97.9  F (36.6  C) (Oral) 16 92%         Blood Pressure from Last 3  Encounters:   09/04/18 115/74    Weight from Last 3 Encounters:   09/04/18 122.7 kg (270 lb 8 oz)               Primary Care Provider Office Phone # Fax #    Dave Gallegos [phone]       [facility] [address]  [facility] [address]        Equal Access to Services     [facility] : Hadii aad ku hadasho Soomaali, waaxda luqadaha, qaybta kaalmada adeegyada, waxay idiin hayaan adeeg kharash la'aan . So [facility] [phone].    ATENCIÓN: Si habla español, tiene a ramos disposición servicios gratuitos de asistencia lingüística. Llame al [phone].    We comply with applicable federal civil rights laws and [place] laws. We do not discriminate on the basis of race, color, national origin, age, disability, sex, sexual orientation, or gender identity.            Thank you!     Thank you for choosing [facility]CER CLINIC  for your care. Our goal is always to provide you with excellent care. Hearing back from our patients is one way we can continue to improve our services. Please take a few minutes to complete the written survey that you may receive in the mail after your visit with us. Thank you!             Your Updated Medication List - Protect others around you: Learn how to safely use, store and throw away your medicines at www.disposemymeds.org.          This list is accurate as of 9/4/18 11:59 PM.  Always use your most recent med list.                   Brand Name Dispense Instructions for use Diagnosis    albuterol (2.5 MG/3ML) 0.083% neb solution      Inhale 2.5 mg into the lungs        levothyroxine 200 MCG tablet    SYNTHROID/LEVOTHROID     Take 200 mcg by mouth        sertraline 100 MG tablet    ZOLOFT     TAKE 1 AND 1/2 TABLETS(150 MG) BY MOUTH EVERY DAY        triamcinolone 55 MCG/ACT inhaler    NASACORT     Spray 2 sprays into both nostrils daily

## 2018-09-04 NOTE — NURSING NOTE
Oncology Rooming Note    September 4, 2018 2:14 PM   Jade Collins is a 48 year old female who presents for:    Chief Complaint   Patient presents with     Oncology Clinic Visit     new pt 2 nd opinion metastasis of breast cancer      Initial Vitals: /74 (BP Location: Right arm, Patient Position: Sitting, Cuff Size: Adult Regular)  Pulse 71  Temp 97.9  F (36.6  C) (Oral)  Resp 16  Wt 122.7 kg (270 lb 8 oz)  SpO2 92% There is no height or weight on file to calculate BMI. There is no height or weight on file to calculate BSA.  No Pain (0) Comment: Data Unavailable   No LMP recorded.  Allergies reviewed: Yes  Medications reviewed: Yes    Medications: Medication refills not needed today.  Pharmacy name entered into EPIC: Data Unavailable    Clinical concerns: none      8 minutes for nursing intake (face to face time)     Brisa NATY Barfield

## 2018-09-05 ENCOUNTER — HEALTH MAINTENANCE LETTER (OUTPATIENT)
Age: 48
End: 2018-09-05

## 2018-09-06 LAB — COPATH REPORT: NORMAL

## 2018-09-06 PROCEDURE — 00000346 ZZHCL STATISTIC REVIEW OUTSIDE SLIDES TC 88321: Performed by: OBSTETRICS & GYNECOLOGY

## 2018-09-06 NOTE — TELEPHONE ENCOUNTER
Received slides and path reports from [facility] and sent to [facility] path dept.

## 2018-09-06 NOTE — PROGRESS NOTES
"Social Work Follow-Up Encounter Visit  Oncology Clinic    Data/Intervention:  Patient Name:  Jade Collins  /Age:  1970 (48 year old)    Reason for Follow-Up:  Patient requested to meet with social work regarding questions about her job.    Collaborated With:    -Patient     Social work met with patient per her request.  Patient shared that she is currently working part time as a  for [employer].  Patient indicated this had helped her to generate some income but she expressed concerns about \"working too much\" as she is currently on a [place] based health [place] plan and wants to ensure that she is not at risk of losing her benefits.  SW briefly explained income limits on qualifying for medical assistance and provided brochure for Cancer Legal Line to also assist in specific details regarding allowed income.  Patient also asked shared that she is the sole income earner in her household and therefore is concerned about managing finances while not being able to work through treatment.  Patient asked about whether SW had any recommendations regarding specific jobs that \"are good for cancer patients.\" SW encouraged patient to explore different options based upon her individual interests, experience, and schedule availability.  SW explained that there are no jobs specifically advertising for cancer patient applicants but gave information about Cancer and Careers, an online resource center for employment information. Social work also asked about whether patient has applied for SSDI to which patient indicated she has not yet applied.  SW gave education about application process and patient's eligibility through compassionate allowance list based solely on her metastatic breast cancer diagnosis.  SW also discussed [employer], [employer] financial grants and Pay It Forward applications. Patient is not eligible at this time due to not having started treatment but indicated she will have follow up appointments soon to " determine treatment plan.  SW agreed to meet again with patient at future appointment to assist in completing applications. SW also gave information about local metastatic breast cancer support groups.  Patient voiced understanding of information given at today's encounters and all questions answered.  SW contact information provided for any other questions or concerns and plan made to meet again during future appointment.      Resources Provided:  Cancer Legal Line  Cancer and Careers  SSDI information  [facility]  Pay It Forward Fund  Support Groups    Assessment:  Patient appeared to be taking proactive steps to plan for financial needs particularly if she needs to take further time off from work.  Patient engaged in conversation appropriately and reported no other concerns at today's visit.     Plan:  SW provided patient/family with writer's contact information and availability.   SW will continue to follow and plan to see patient at future appointment for grants.    Soo Yeon Han, MSW, [facility] CenterSW  Pager: [phone]  Phone: [phone]

## 2018-09-10 ENCOUNTER — CARE COORDINATION (OUTPATIENT)
Dept: ONCOLOGY | Facility: CLINIC | Age: 48
End: 2018-09-10

## 2018-09-10 DIAGNOSIS — C50.919 METASTATIC BREAST CANCER: Primary | ICD-10-CM

## 2018-09-10 NOTE — PROGRESS NOTES
Faxed MAGDA to [facility] (Fax: [phone]) for the breast diagnostic Pathology slides ([id number]) to be sent to the Pathology Department at the [facility].  Faxed MAGDA to Dr Zendejas (Fax: [phone]) for the [facility] BSO pathology report to be faxed to Dr Humphrieswered all patient's questions and verbalized understanding. Farheen Price RN, BSN.

## 2018-09-13 ENCOUNTER — HOSPITAL ENCOUNTER (OUTPATIENT)
Dept: MRI IMAGING | Facility: CLINIC | Age: 48
Discharge: HOME | End: 2018-09-13
Attending: INTERNAL MEDICINE | Admitting: INTERNAL MEDICINE
Payer: COMMERCIAL

## 2018-09-13 ENCOUNTER — HOSPITAL ENCOUNTER (OUTPATIENT)
Dept: PET IMAGING | Facility: CLINIC | Age: 48
End: 2018-09-13
Attending: INTERNAL MEDICINE
Payer: COMMERCIAL

## 2018-09-13 DIAGNOSIS — H53.2 DOUBLE VISION: ICD-10-CM

## 2018-09-13 DIAGNOSIS — Z17.0 MALIGNANT NEOPLASM OF UPPER-OUTER QUADRANT OF LEFT BREAST IN FEMALE, ESTROGEN RECEPTOR POSITIVE (H): ICD-10-CM

## 2018-09-13 DIAGNOSIS — C50.412 MALIGNANT NEOPLASM OF UPPER-OUTER QUADRANT OF LEFT BREAST IN FEMALE, ESTROGEN RECEPTOR POSITIVE (H): ICD-10-CM

## 2018-09-13 LAB — GLUCOSE BLDC GLUCOMTR-MCNC: 95 MG/DL (ref 70–99)

## 2018-09-13 PROCEDURE — 70553 MRI BRAIN STEM W/O & W/DYE: CPT

## 2018-09-13 PROCEDURE — 36415 COLL VENOUS BLD VENIPUNCTURE: CPT

## 2018-09-13 PROCEDURE — 25000128 H RX IP 250 OP 636: Performed by: INTERNAL MEDICINE

## 2018-09-13 PROCEDURE — 82962 GLUCOSE BLOOD TEST: CPT

## 2018-09-13 PROCEDURE — 25500064 ZZH RX 255 OP 636: Performed by: INTERNAL MEDICINE

## 2018-09-13 PROCEDURE — 71260 CT THORAX DX C+: CPT

## 2018-09-13 PROCEDURE — 70491 CT SOFT TISSUE NECK W/DYE: CPT

## 2018-09-13 PROCEDURE — A9585 GADOBUTROL INJECTION: HCPCS | Performed by: INTERNAL MEDICINE

## 2018-09-13 PROCEDURE — G0463 HOSPITAL OUTPT CLINIC VISIT: HCPCS | Mod: 25

## 2018-09-13 PROCEDURE — A9552 F18 FDG: HCPCS | Performed by: INTERNAL MEDICINE

## 2018-09-13 PROCEDURE — 34300033 ZZH RX 343: Performed by: INTERNAL MEDICINE

## 2018-09-13 RX ORDER — IOPAMIDOL 755 MG/ML
1-135 INJECTION, SOLUTION INTRAVASCULAR ONCE
Status: COMPLETED | OUTPATIENT
Start: 2018-09-13 | End: 2018-09-13

## 2018-09-13 RX ORDER — GADOBUTROL 604.72 MG/ML
10 INJECTION INTRAVENOUS ONCE
Status: COMPLETED | OUTPATIENT
Start: 2018-09-13 | End: 2018-09-13

## 2018-09-13 RX ADMIN — FLUDEOXYGLUCOSE F-18 17 MCI.: 500 INJECTION, SOLUTION INTRAVENOUS at 14:01

## 2018-09-13 RX ADMIN — IOPAMIDOL 135 ML: 755 INJECTION, SOLUTION INTRAVENOUS at 15:04

## 2018-09-13 RX ADMIN — GADOBUTROL 10 ML: 604.72 INJECTION INTRAVENOUS at 17:24

## 2018-09-13 NOTE — TELEPHONE ENCOUNTER
Path reports received from [facility] (4 pages including MAGDA) and sent to scanning via [employer] @ 9:04AM

## 2018-09-14 NOTE — TELEPHONE ENCOUNTER
Path slides and report received from [facility] and sent to [facility] path dept.

## 2018-09-15 ENCOUNTER — TELEPHONE (OUTPATIENT)
Dept: ONCOLOGY | Facility: CLINIC | Age: 48
End: 2018-09-15

## 2018-09-15 NOTE — TELEPHONE ENCOUNTER
I called and left a message for her to call to follow up on results of tests.  We will see her in clinic on Tuesday.     Wilfredo Bhatt MD

## 2018-09-16 NOTE — PROGRESS NOTES
HISTORY OF PRESENT ILLNESS:  Jade is a 48-year-old woman with metastatic breast cancer who comes to our clinic for recommendations for further treatment.  She is referred by Dr. Norberto Brand at [facility].  Jade would like to continue her care at the [facility].       Jade was diagnosed with breast cancer with a lump found in the upper-inner quadrant of the left breast.  She was diagnosed in 12/2013 in the [place] area and Dr. David Redd was her medical oncologist.  Biopsy of the tumor showed it to be ER positive, NE positive, and HER-2 negative.  We do not have any of the original pathology and we need to obtain those reports.  She underwent a lumpectomy performed by Dr. Tasha Segura who is a surgeon in the [place] area.  She also had a sentinel lymph node which was noted to be involved with tumor but there was no lymph node dissection done at that time.  TXN1MX.  She subsequently underwent adjuvant chemotherapy with dose-dense AC for 4 cycles and Taxol for 12 weeks, completed 09/11/2014.       She then had radiation therapy post lumpectomy to the left breast, which was completed 11/23/2014 by Dr. Manley.       She was then begun on an aromatase inhibitor, the name of which she does not remember.  The aromatase inhibitor therapy was begun in 11/2014.  She was then switched within about a month or so to anastrozole and remained on anastrozole from 11/2014-02/2017.  She has never received tamoxifen.       She then moved to [place], [place] in 03/2017.  She saw a gynecologist there and underwent a IRIS/BSO by Dr. Zendejas.  She then also saw Dr. Linares in [place], [place], who is an oncologist.  His interpretation of the pathology report was that she had triple-negative breast cancer.  It is possible that she may have had low estrogen receptor positivity, but the anastrozole was then discontinued in 02/2017.  She then underwent the IRIS/BSO in 03/2017.  This was done laparoscopically.       She then  remained off of all hormonal therapy and in 09/2017 moved to [place].  She remained off hormonal therapy and did not have Medical Oncology followup initially.       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  She then went to see Dr. Norberto Brand who performed a PET/CT scan and the patient also underwent a brain MRI for staging.  The PET/CT scan performed 08/03/2018 showed in the neck there were several mildly metabolic active and large prominent left posterior cervical lymph nodes.  The largest is located posterior to the left sternocleidomastoid muscle and measures 1.5 x 1 cm in size.  This demonstrate modest FDG uptake.  The other lymph nodes are smaller and demonstrate mild FDG uptake.  The prominent prevascular and aortopulmonary lymph nodes were also seen on the contrast-enhanced scan were less well seen on the PET.  The largest lymph node visualized on the PET scan was 1.1 x 0.8 cm and demonstrated mild FDG uptake.  This was in the periaortic area just anterior to the aortic arch.  No lung nodules.  No lung infiltrates.  No pleural effusion.  There was no uptake of FDG in the bones.  In summary, there were mildly enlarged left posterior cervical lymph nodes, largest measuring 1.5 x 1.0 cm and mildly enlarged aorticopulmonary and prevascular lymph nodes that were noted on contrast-enhanced CT scan seen less well on the PET scan.  No evidence of metastatic disease in the abdomen and pelvis.  She also underwent a brain MRI that showed no evidence of intracranial metastatic disease.  She underwent a biopsy of one of the lymph nodes in the left neck which showed metastatic adenocarcinoma consistent with breast origin, which was estrogen-receptor positive.  The tumor cells were positive for CK7, ER and RI consistent with metastatic breast carcinoma.  Estrogen receptor showed strong average nuclear intensity in 95% of carcinoma cells, progesterone receptor moderate average  nuclear intensity in 70% of the carcinoma.  A GATA3 stain was apparently not performed.       Jade now comes to our clinic for recommendations.  She has mild pain in the left neck, moderate fatigue, moderate depression and has begun on sertraline, and she has moderate anxiety.       REVIEW OF SYSTEMS:  She has had no weight loss.  Diet has not changed.  No loss of energy.  She does not sleep during the day.  She can perform her household chores.  She has no breast lumps noted.  She has no fever, headache, cough, shortness of breath, hemoptysis or loss of appetite.  She does have occasional nonexertional chest pain and some dyspnea on exertion.  She has no nausea, vomiting, abdominal pain.  Occasional constipation, no diarrhea.  She has left hip pain.  She has no back pain, no muscle or joint complaints.  No numbness or tingling in the hands and feet, no hearing loss and she does have some depression.  The remainder of a 12-point review of systems is negative.       FAMILY HISTORY:  Positive for breast cancer in a maternal aunt at age 50.  The aunt is now 80 years old.  She also has a history of breast cancer in a paternal cousin.  There is a history of ovarian cancer in her paternal aunt's daughter and also possible history of ovarian cancer in her paternal grandmother. Mother's brother was diagnosed with bladder cancer and had a history of kidney stone.  Grandfather on mom's side had pancreatic cancer and kidney failure and .     GENETICS:  Jade genetic result from [facility] showed no significant mutations were found in a multigene panel which included BRCA1, BRCA2 and PALB2.      PAST MEDICAL HISTORY:  She has had the previous breast surgery as described in the past and history of radiation as described in the past.  She has had no other tumor types.  No history of heart problems, heart attack, breathing problems, blood clots, seizures, peptic ulcer disease, osteoporosis, bone fractures.  She is not currently  participating in a clinical trial and has not lost more than 20% of her body weight.  She has arthritis in her hands.  She also has an increased body mass index. History is also positive for hypothyroidism, depression.  History of asthma.       ALLERGIES:  Sulfa and penicillin, both of which cause hives.  No allergy to seafood, iodine or contrast dye.  She does not take aspirin.       PAST MENSTRUAL HISTORY:  Age of menarche was 12.  She has been pregnant once at age 15.  She was on oral contraceptives from age 15 to age 30.  Menopause began with chemotherapy in 2014 at age 44.  No history of hormone replacement therapy.  She was on oral contraceptives from approximately age 15 to age 30.       SOCIAL HISTORY:  She is a never smoker and does not drink alcohol.      TREATMENT HISTORY:  Revised with new information from Jade on 9-18-18.  A.  Biopsy of left breast mass 2013 [place].  B.  TXNXMX.  We don't have the pathology reports.  [facility] [phone].  [phone].  C.   Adjuvant chemotherapy with dose-dense AC for 4 cycles and Taxol for 12 weeks, completed 09/11/2014.    D.  Radiation post lumpectomy which was completed 11/23/2014 by Dr. Manley.   E.  She was on raloxifene 60 mg daily for two years beginnning in 11/2014.  She was then on anastrozole for 6 months through 02/2017.    F.  IRIS BSO in [place], [place] 03/2017.  Taken off all hormonal therapy because of an interpretation of the original pathology report that we don't have.   G.  Moved to [place] in 9/2017  H.  Presented with left neck lymph nodes July, 2018.  Left neck node biopsy showed adenocarcinoma consistent with breast ER+HER2- by IHC.    I.    9-17-18 ER and TX staining performed on original 2013 biopsy and ER+ in 11-20% of cells with strong staining and TX+ in 7%, HER2 FISH pending.     HISTORY OF PRESENT ILLNESS:  We saw Jade Collins in followup in our clinic to discuss palbociclib and letrozole recommendation and to go over the results  of the PET/CT scan and a brain MRI.  The PET/CT scan was consistent with the prior findings with left neck node involvement as well as anterior mediastinal lymph node involvement in the preaortic area.  She has no bone metastases.      Overall, Jade has been feeling reasonably well.  She does have some soreness about her right hip.  There are no findings on the PET/CT scan that would suggest that this is due to bone or other metastases.  She has no fatigue.  She does have some depression and no anxiety.  She does have a good social support network with friends and family in the [facility].      REVIEW OF SYSTEMS:  A 10-point review of systems is otherwise negative.      PHYSICAL EXAMINATION:   VITAL SIGNS:  Blood pressure 114/75, temperature 97, pulse 87, respirations 16, O2 sat 94% on room air.  Height 1.7 meters and weight 121 kg.   GENERAL:  Jade appears generally well.   HEENT:  She has no alopecia.  Examination of the oropharynx is without lesions.   LYMPH:  There is no palpable cervical, supraclavicular, subclavicular or axillary lymphadenopathy.  Breasts exam is not performed today.   LUNGS:  Clear to percussion and auscultation.   CARDIOVASCULAR:  Regular rate and rhythm, S1, S2.   ABDOMEN:  Soft, nontender, consistent with increased body mass index.   EXTREMITIES:  Without edema.     PSYCHIATRIC:  Mood and affect were normal.      LABORATORY DATA:  CMP is within normal limits.  Hemoglobin A1c was 5.6.  CBC was within normal limits.  Spot glucose 126.      IMAGING:  PET/CT as noted.  Brain MRI is noted.  No evidence of brain metastases.      ASSESSMENT AND PLAN:    1.  Jade Collins is a 48-year-old woman with a history of breast cancer of the left breast of unknown pathology TX N1 M0 by report.  The pathology slides have been requested from [place].  She was treated with lumpectomy, adjuvant chemotherapy with AC and then Taxol followed by radiation followed by raloxifene for 2 years followed by aromatase  inhibitor for 6 months and she then had IRIS and BSO.  The aromatase inhibitor was discontinued in [place], [place].  She was on the aromatase inhibitor for only 6 months.  Going over the history, it sounds that she was on the anastrozole through 01/2017 and then it was discontinued.  This means that she has been off of the aromatase inhibitor for about 18 months.  Therefore, I have no reservations about using palbociclib and letrozole as first-line therapy for recurrent, metastatic ER-positive, HER2 negative breast cancer.   2.  Treatment plan.  I did discuss with Jade palbociclib and letrozole.  We would start with palbociclib 125 mg daily, 21 days on and 7 days off in a 28-day cycle.  The letrozole would be 2.5 mg daily.  I discussed the risks and potential benefits of treatment.  I discussed that neutropenia or low white blood cell counts can happen and we will check the white blood cell counts on day 15 of each of the first 2 cycles.  I also discussed with her that she needs to have a thermometer at home and if she develops a fever to come to the emergency Room within 1 hour.  All her questions were answered.  We will also give her antiemetics as needed.  All of her questions were answered.  With the aromatase inhibitor, Jade does have experience with anastrozole for 6 months and knows about potential for joint discomfort and hot flashes.  All of her questions were answered.   3.  Bone health.  Bone targeting agents are not required because she has no evidence of bone metastases.   4.  Discussion of diet and exercise.  Recommended a diet low in saturated fat, not low in fat with less red and needed more fish and more fruits and vegetables.  We had a 10-minute discussion about a healthful diet and Jade would like a dietary referral and we can do that.  I also discussed with her that I recommend 150 minutes of exercise per week.  I showed her exercise videos as options and also walking is a reasonable option as well.   All of her questions were answered.   5.  Follow up.  We will see Jade in followup in our clinic in 2 weeks. Follow up with Evelina 10-2 with CBC, CMP and with me 10-16 with CBC, CMP, CEA, CA27.29.  HbA1c.        Sincerely,    Sebastian Alfaro M.D.      Division of Hematology, Oncology, Transplant   Department of Medicine   [employer] [facility]   [phone]       ADDENDUM:  9-17-18 ER and OK staining performed on original 2013 biopsy and ER+ in 11-20% of cells with strong staining and OK+ in 7%, HER2 FISH not amplified. I called no answer.      Patient Name: JADE DOBSON   MR#: [id number]   Specimen #: [id number]   Collected: 9/17/2018   Received: 9/17/2018   Reported: 9/24/2018 15:57   Ordering Phy(s): SEBASTIAN ALFARO   Copy To: [facility]   Ph:[phone]   Fax:[phone]     For improved result formatting, select 'View Enhanced Report Format' under    Linked Documents section.     TEST(S):   2 Slides, 1 block, case #[id number]     FINAL DIAGNOSIS:   CASE FROM [facility], [place], [place] ([id number], OBTAINED   12/27/2013):   LEFT BREAST, MASS AT 10:00 POSITION, 7-8 CM FROM THE NIPPLE, BIOPSY:   - INVASIVE MAMMARY CARCINOMA (NST/DUCTAL), Maulik grade 3 (of 3),   measuring at least 1.1 cm in linear   extent.   - DUCTAL CARCINOMA IN-SITU (DCIS), high nuclear grade, solid and   cribriform types.   - Performed in house on the outside material: invasive carcinoma is   estrogen receptor positive (>10% nuclei,   strong intensity) and progesterone receptor positive (approx. 7% of   nuclei, strong intensity); FISH study for   HER2/ellen amplification is in progress and result will be reported   separately.     Report Name: Breast Biomarkers        Status: Submitted Checklist Inst: 1      Last Updated By: Steff Phelps M.D., [facility], 09/24/2018   15:53:47   Part(s) Involved:   A: 2 Slides, 1 block, case #[id number]     Synoptic Report:     TEST(S)  PERFORMED     ER       Estrogen Receptor (ER) Status:           - Positive         Percentage of Cells With Nuclear Positivity:             - 11-20%         Average Intensity of Staining:             - Strong       Test Type:           - Food and Drug Administration (FDA) cleared: [place]       Primary Antibody:           - SP1       Scoring System:           - No separate scoring system used     PgR       Progesterone Receptor (PgR) Status:           - Positive         Percentage of Cells with Nuclear Positivity: 7%         Average Intensity of Staining:             - Strong       Test Type:           - Food and Drug Administration (FDA) cleared: [place]       Primary Antibody:           - 1E2       Scoring System:           - No separate scoring system used     METHODS     Cold Ischemia and Fixation Times:         - Cannot be determined: outside material collected on 12/27/2013     Fixative:         - Formalin     Image Analysis:         - Not performed     2017 June AJCC 8th Edition CAP Annual Release     I have personally reviewed all specimens and/or slides, including the   listed special stains, and used them   with my medical judgement to determine or confirm the final diagnosis.     Electronically signed out by:     Steff Phelps M.D., [facility]     CLINICAL HISTORY:   48-year-old female with history of left breast cancer     GROSS:   Received from [facility] in [place], [place] are 2 stained slides and 1    block labeled [id number] (obtained   12/27/2013) and a copy of the referring pathologist's report with patient   identifying information.  All slides   are returned.     MICROSCOPIC:   Microscopic examination was performed.     CPT Codes:   A: 26190-DQV8, 32644-CV, 89240-TT, HFISH, SOH     TESTING LAB LOCATION:   [address], [address]   [id number] [phone]     COLLECTION SITE:   Client:  [facility]  [facility]   Location:  [facility] ()     Patient Name: JADE COLLINS   MR#: [id number]   Specimen #: [id number]   Collected: 9/17/2018 00:00   Received: 9/24/2018 11:34   Reported: 9/25/2018 21:32   Ordering Phy(s): JOSÉ MIGUEL BATEMAN   Additional Phy(s): SEBASTIAN ALFARO     For improved result formatting, select 'View Enhanced Report Format' under    Linked Documents section.   __________________________________________     TEST(S) REQUESTED:   [employer] HER2 FISH     SPECIMEN DESCRIPTION:   Breast Tissue, Paraffin Embedded     CLINICAL COMMENTS:   [id number]     RESULTS:     Ratio of HER2/SHAAN-17 signals   Jade Collins:  1.2 (EDWARD Negative)                            Avg. number HER2    signals/nucleus:  1.8                                                              Avg. number   SHAAN-17 signals/nucleus:  1.4     **Interpretive guidelines per the American Society of Clinical   Oncology/College of American Pathologists   Clinical Practice Guideline Focused Update (Letha MCCABE et al, 2018, Arch   Pathol Lab Med   doi:10.5858/arpa.2388-6816-EF):     -- Group 1: HER2/SHAAN-17 ratio 2.0 or more -AND- avg. number HER2   signals/nucleus 4.0 or more (EDWARD Positive)   -- Group 2: HER2/SHAAN-17 ratio 2.0 or more -AND- avg. number HER2   signals/nucleus <4.0 (Additional work   required)   -- Group 3: HER2/SHAAN-17 ratio <2.0 -AND- avg. number HER2 signals/nucleus   6.0 or more (Additional work   required)   -- Group 4: HER2/SHAAN-17 ratio <2.0 -AND- avg. number HER2 signals/nucleus   4.0 or more and <6.0 (Additional   work required)   -- Group 5: HER2/SHAAN-17 ratio <2.0 -AND- avg. number HER2 signals/nucleus   <4.0 (EDWARD Negative)     INTERPRETATION:   Per the American Society of Clinical Oncology/College of American   Pathologists Clinical Practice Guideline   Focused Update (Letha MCCABE et al, 2018, Arch Pathol Lab Med     doi:10.5858/arpa.5066-2627-DW), the HER2/SHAAN 17   ratio of 1.2 and average number of HER2  signals/cell of 1.8 places this   specimen in Group 5 (EDWARD Negative).     Specimen:  Case [id number], Block 1   Reported formalin fixation time:  unknown   Number of cells scored: 60   Probe:   [employer]ako HER2/SHAAN-17 IQFISH pharmDx Probe Mix to HER2 (17q12) and to   the centromere region of chromosome   17     ADDITIONAL COMMENTS:   The IQFISH pharmDx test has been approved by the FDA for the evaluation of    HER2 (ERBB2) gene amplification   status in formalin-fixed, paraffin-embedded breast cancer tissue specimens    and gastric or gastroesophageal   junction adenocarcinoma.  It is intended for use as an adjunct to other   existing clinicopathologic information   used to evaluate patients with such tumors. This test was developed and   its performance characteristics   determined by the [facility]   Clinical Laboratories.     Electronically Signed Out By:   Mia Kenney M.D., [facility] Codes:   A: 47119-ZNR7, [id number]     TESTING LAB LOCATION:   [address] [address] [phone]     COLLECTION SITE:   Client:  [facility]   Location:  [facility] ([facility])   I spent 40 minutes with the patient more than 50% of which was in counseling and coordination of care.

## 2018-09-17 ENCOUNTER — HOSPITAL ENCOUNTER (OUTPATIENT)
Facility: CLINIC | Age: 48
Setting detail: SPECIMEN
Discharge: HOME | End: 2018-09-17
Admitting: INTERNAL MEDICINE
Payer: COMMERCIAL

## 2018-09-17 PROCEDURE — 88360 TUMOR IMMUNOHISTOCHEM/MANUAL: CPT | Performed by: INTERNAL MEDICINE

## 2018-09-17 PROCEDURE — 88377 M/PHMTRC ALYS ISHQUANT/SEMIQ: CPT

## 2018-09-18 ENCOUNTER — ONCOLOGY VISIT (OUTPATIENT)
Dept: ONCOLOGY | Facility: CLINIC | Age: 48
End: 2018-09-18

## 2018-09-18 ENCOUNTER — TELEPHONE (OUTPATIENT)
Dept: ONCOLOGY | Facility: CLINIC | Age: 48
End: 2018-09-18

## 2018-09-18 ENCOUNTER — APPOINTMENT (OUTPATIENT)
Dept: LAB | Facility: CLINIC | Age: 48
End: 2018-09-18
Attending: INTERNAL MEDICINE
Payer: COMMERCIAL

## 2018-09-18 ENCOUNTER — ONCOLOGY VISIT (OUTPATIENT)
Dept: ONCOLOGY | Facility: CLINIC | Age: 48
End: 2018-09-18
Attending: INTERNAL MEDICINE
Payer: COMMERCIAL

## 2018-09-18 VITALS
TEMPERATURE: 98.7 F | HEART RATE: 87 BPM | BODY MASS INDEX: 39.77 KG/M2 | RESPIRATION RATE: 16 BRPM | HEIGHT: 69 IN | OXYGEN SATURATION: 94 % | SYSTOLIC BLOOD PRESSURE: 114 MMHG | WEIGHT: 268.5 LBS | DIASTOLIC BLOOD PRESSURE: 75 MMHG

## 2018-09-18 DIAGNOSIS — C50.412 MALIGNANT NEOPLASM OF UPPER-OUTER QUADRANT OF LEFT BREAST IN FEMALE, ESTROGEN RECEPTOR POSITIVE (H): Primary | ICD-10-CM

## 2018-09-18 DIAGNOSIS — Z17.0 MALIGNANT NEOPLASM OF UPPER-OUTER QUADRANT OF LEFT BREAST IN FEMALE, ESTROGEN RECEPTOR POSITIVE (H): Primary | ICD-10-CM

## 2018-09-18 DIAGNOSIS — E66.01 MORBID OBESITY (H): ICD-10-CM

## 2018-09-18 LAB
ALBUMIN SERPL-MCNC: 3.5 G/DL (ref 3.4–5)
ALP SERPL-CCNC: 95 U/L (ref 40–150)
ALT SERPL W P-5'-P-CCNC: 23 U/L (ref 0–50)
ANION GAP SERPL CALCULATED.3IONS-SCNC: 7 MMOL/L (ref 3–14)
AST SERPL W P-5'-P-CCNC: 14 U/L (ref 0–45)
BASOPHILS # BLD AUTO: 0 10E9/L (ref 0–0.2)
BASOPHILS NFR BLD AUTO: 0.6 %
BILIRUB SERPL-MCNC: 0.3 MG/DL (ref 0.2–1.3)
BUN SERPL-MCNC: 13 MG/DL (ref 7–30)
CALCIUM SERPL-MCNC: 9 MG/DL (ref 8.5–10.1)
CANCER AG27-29 SERPL-ACNC: 10 U/ML (ref 0–39)
CEA SERPL-MCNC: <0.5 UG/L (ref 0–2.5)
CHLORIDE SERPL-SCNC: 103 MMOL/L (ref 94–109)
CO2 SERPL-SCNC: 27 MMOL/L (ref 20–32)
CREAT SERPL-MCNC: 0.78 MG/DL (ref 0.52–1.04)
DIFFERENTIAL METHOD BLD: NORMAL
EOSINOPHIL # BLD AUTO: 0.2 10E9/L (ref 0–0.7)
EOSINOPHIL NFR BLD AUTO: 5 %
ERYTHROCYTE [DISTWIDTH] IN BLOOD BY AUTOMATED COUNT: 12.6 % (ref 10–15)
GFR SERPL CREATININE-BSD FRML MDRD: 79 ML/MIN/1.7M2
GLUCOSE SERPL-MCNC: 126 MG/DL (ref 70–99)
HBA1C MFR BLD: 5.6 % (ref 0–5.6)
HCT VFR BLD AUTO: 40.6 % (ref 35–47)
HGB BLD-MCNC: 14 G/DL (ref 11.7–15.7)
IMM GRANULOCYTES # BLD: 0 10E9/L (ref 0–0.4)
IMM GRANULOCYTES NFR BLD: 0.2 %
LYMPHOCYTES # BLD AUTO: 1.3 10E9/L (ref 0.8–5.3)
LYMPHOCYTES NFR BLD AUTO: 27.8 %
MCH RBC QN AUTO: 30.1 PG (ref 26.5–33)
MCHC RBC AUTO-ENTMCNC: 34.5 G/DL (ref 31.5–36.5)
MCV RBC AUTO: 87 FL (ref 78–100)
MONOCYTES # BLD AUTO: 0.4 10E9/L (ref 0–1.3)
MONOCYTES NFR BLD AUTO: 7.7 %
NEUTROPHILS # BLD AUTO: 2.8 10E9/L (ref 1.6–8.3)
NEUTROPHILS NFR BLD AUTO: 58.7 %
NRBC # BLD AUTO: 0 10*3/UL
NRBC BLD AUTO-RTO: 0 /100
PLATELET # BLD AUTO: 188 10E9/L (ref 150–450)
POTASSIUM SERPL-SCNC: 3.7 MMOL/L (ref 3.4–5.3)
PROT SERPL-MCNC: 7.8 G/DL (ref 6.8–8.8)
RBC # BLD AUTO: 4.65 10E12/L (ref 3.8–5.2)
SODIUM SERPL-SCNC: 137 MMOL/L (ref 133–144)
WBC # BLD AUTO: 4.8 10E9/L (ref 4–11)

## 2018-09-18 PROCEDURE — 82378 CARCINOEMBRYONIC ANTIGEN: CPT | Performed by: INTERNAL MEDICINE

## 2018-09-18 PROCEDURE — G0463 HOSPITAL OUTPT CLINIC VISIT: HCPCS | Mod: ZF

## 2018-09-18 PROCEDURE — 86300 IMMUNOASSAY TUMOR CA 15-3: CPT | Performed by: INTERNAL MEDICINE

## 2018-09-18 PROCEDURE — 80053 COMPREHEN METABOLIC PANEL: CPT | Performed by: INTERNAL MEDICINE

## 2018-09-18 PROCEDURE — 83036 HEMOGLOBIN GLYCOSYLATED A1C: CPT | Performed by: INTERNAL MEDICINE

## 2018-09-18 PROCEDURE — 99214 OFFICE O/P EST MOD 30 MIN: CPT | Mod: ZP | Performed by: INTERNAL MEDICINE

## 2018-09-18 PROCEDURE — 85025 COMPLETE CBC W/AUTO DIFF WBC: CPT | Performed by: INTERNAL MEDICINE

## 2018-09-18 ASSESSMENT — PAIN SCALES - GENERAL: PAINLEVEL: EXTREME PAIN (9)

## 2018-09-18 NOTE — ORAL ONC MGMT
"Oral Chemotherapy Monitoring Program    Primary Oncologist: Dr. Bhatt  Primary Oncology Clinic: [facility]  Cancer Diagnosis: Breast Cancer     Drug: Ibrance  Start Date: As soon as available  Dose is appropriate for patient  Expected duration of therapy: Until disease progression or unacceptable toxicity    Drug Interaction Assessment: No drug interactions found at this time.    Drugs checked include: Ibrance -Letrozole -Levothyroxine -Sertraline -Triamcinolone      Lab Monitoring Plan  CBC Q2 weeks for first 2 months, then monthly  CMP monthly  Subjective/Objective:  Jade Collins is a 48 year old female seen in clinic for an initial visit for oral chemotherapy education.      ORAL CHEMOTHERAPY 9/18/2018   Drug Name Ibrance (Palbociclib)   Current Dosage 125mg   Current Schedule Daily   Cycle Details 3 weeks on 1 week off   Any new drug interactions? No   Is the dose as ordered appropriate for the patient? Yes     Vitals:  BP:   BP Readings from Last 1 Encounters:   09/18/18 114/75     Wt Readings from Last 1 Encounters:   09/18/18 121.8 kg (268 lb 8 oz)     Estimated body surface area is 2.43 meters squared as calculated from the following:    Height as of an earlier encounter on 9/18/18: 1.74 m (5' 8.5\").    Weight as of an earlier encounter on 9/18/18: 121.8 kg (268 lb 8 oz).      Labs:  _  Result Component Current Result Ref Range   Sodium 137 (9/18/2018) 133 - 144 mmol/L     _  Result Component Current Result Ref Range   Potassium 3.7 (9/18/2018) 3.4 - 5.3 mmol/L     _  Result Component Current Result Ref Range   Calcium 9.0 (9/18/2018) 8.5 - 10.1 mg/dL     No results found for Mag within last 30 days.     No results found for Phos within last 30 days.     _  Result Component Current Result Ref Range   Albumin 3.5 (9/18/2018) 3.4 - 5.0 g/dL     _  Result Component Current Result Ref Range   Urea Nitrogen 13 (9/18/2018) 7 - 30 mg/dL     _  Result Component Current Result Ref Range   Creatinine 0.78 " (9/18/2018) 0.52 - 1.04 mg/dL       _  Result Component Current Result Ref Range   AST 14 (9/18/2018) 0 - 45 U/L     _  Result Component Current Result Ref Range   ALT 23 (9/18/2018) 0 - 50 U/L     _  Result Component Current Result Ref Range   Bilirubin Total 0.3 (9/18/2018) 0.2 - 1.3 mg/dL       _  Result Component Current Result Ref Range   WBC 4.8 (9/18/2018) 4.0 - 11.0 10e9/L     _  Result Component Current Result Ref Range   Hemoglobin 14.0 (9/18/2018) 11.7 - 15.7 g/dL     _  Result Component Current Result Ref Range   Platelet Count 188 (9/18/2018) 150 - 450 10e9/L     _  Result Component Current Result Ref Range   Absolute Neutrophil 2.8 (9/18/2018) 1.6 - 8.3 10e9/L           Assessment:  Patient is appropriate to start therapy.    Plan:  Basic chemotherapy teaching was reviewed with the patient including indication, start date of therapy, dose, administration, adverse effects, missed doses, food and drug interactions, monitoring, side effect management, office contact information, and safe handling. Written materials were provided and all questions answered.    Follow-Up:  About one week after start of Ibrance.     Case Jade PharmD  [facility]  [phone]  September 18, 2018

## 2018-09-18 NOTE — MR AVS SNAPSHOT
After Visit Summary   9/18/2018    Jade Collins    MRN: [id number]           Patient Information     Date Of Birth          1970        Visit Information        Provider Department      9/18/2018 4:00 PM Wilfredo Bhatt MD [facility]        Today's Diagnoses     Malignant neoplasm of upper-outer quadrant of left breast in female, estrogen receptor positive (H)    -  1    Morbid obesity (H)           Follow-ups after your visit        Follow-up notes from your care team     Return in about 2 weeks (around 10/2/2018).      Your next 10 appointments already scheduled     Oct 01, 2018  3:15 PM CDT   [facility] Lab Draw with  [employer] LAB DRAW   [facility] Draw ([facility])    [address] [place] [address]  [address]  [facility] [address]   [phone]            Oct 01, 2018  3:40 PM CDT   (Arrive by 3:25 PM)   Return Visit with ROMULO Goode   [facility] ([facility])    [address]  [address]  [facility] [address]   [phone]            Oct 16, 2018  2:30 PM CDT   [facility] Lab Draw with  [employer] LAB DRAW   [facility] Lab Draw ([facility])    [address]  [address]  [facility] [address]   [phone]            Oct 16, 2018  3:00 PM CDT   (Arrive by 2:45 PM)   Return Visit with Wilfredo Bhatt MD   [facility] ([facility])    [address] [place] [address] Se  [address]  [facility] [address]   [phone]              Future tests that were ordered for you today     Open Standing Orders        Priority Remaining Interval Expires Ordered    Ca27.29  breast tumor marker Routine 51/52  12/16/2018 9/17/2018    CBC with platelets differential Routine 51/52 9/17/2019 9/17/2018    CEA Routine 51/52  12/16/2018 9/17/2018    Comprehensive metabolic panel Routine 51/52  9/17/2019 9/17/2018        "     Who to contact     If you have questions or need follow up information about today's clinic visit or your schedule please contact [facility] directly at [phone].  Normal or non-critical lab and imaging results will be communicated to you by MyChart, letter or phone within 4 business days after the clinic has received the results. If you do not hear from us within 7 days, please contact the clinic through MyChart or phone. If you have a critical or abnormal lab result, we will notify you by phone as soon as possible.  Submit refill requests through [employer] or call your pharmacy and they will forward the refill request to us. Please allow 3 business days for your refill to be completed.          Additional Information About Your Visit        App[employer]vo Information     [employer] gives you secure access to your electronic health record. If you see a primary care provider, you can also send messages to your care team and make appointments. If you have questions, please call your primary care clinic.  If you do not have a primary care provider, please call [phone] and they will assist you.        Care EveryWhere ID     This is your Care EveryWhere ID. This could be used by other organizations to access your [facility] medical records  [id number]        Your Vitals Were     Pulse Temperature Respirations Height Pulse Oximetry BMI (Body Mass Index)    87 98.7  F (37.1  C) (Oral) 16 1.74 m (5' 8.5\") 94% 40.23 kg/m2       Blood Pressure from Last 3 Encounters:   09/18/18 114/75   09/04/18 115/74    Weight from Last 3 Encounters:   09/18/18 121.8 kg (268 lb 8 oz)   09/04/18 122.7 kg (270 lb 8 oz)              We Performed the Following     Ca27.29  breast tumor marker     CBC with platelets differential     CEA     Comprehensive metabolic panel     Hemoglobin A1c        Primary Care Provider Office Phone # Fax #    Dave Gallegos [phone]       [facility] " [address]  [facility] [address]        Equal Access to Services     TOYABRIANA AVINASH : Hadii jp almeida gena Sostellaali, wasarahda luqadaha, qajesseta kaashish teresaana, cody mcnultyin hayaaopal mckeonlaura ester eastman. So [facility] [phone].    ATENCIÓN: Si habla español, tiene a ramos disposición servicios gratuitos de asistencia lingüística. Llame al [phone].    We comply with applicable federal civil rights laws and [place] laws. We do not discriminate on the basis of race, color, national origin, age, disability, sex, sexual orientation, or gender identity.            Thank you!     Thank you for choosing [facility]  for your care. Our goal is always to provide you with excellent care. Hearing back from our patients is one way we can continue to improve our services. Please take a few minutes to complete the written survey that you may receive in the mail after your visit with us. Thank you!             Your Updated Medication List - Protect others around you: Learn how to safely use, store and throw away your medicines at www.disposemymeds.org.          This list is accurate as of 9/18/18  5:26 PM.  Always use your most recent med list.                   Brand Name Dispense Instructions for use Diagnosis    albuterol (2.5 MG/3ML) 0.083% neb solution      Inhale 2.5 mg into the lungs        levothyroxine 200 MCG tablet    SYNTHROID/LEVOTHROID     Take 200 mcg by mouth        sertraline 100 MG tablet    ZOLOFT     TAKE 1 AND 1/2 TABLETS(150 MG) BY MOUTH EVERY DAY        triamcinolone 55 MCG/ACT inhaler    NASACORT     Spray 2 sprays into both nostrils daily

## 2018-09-18 NOTE — NURSING NOTE
Chief Complaint   Patient presents with     Blood Draw     pt here for venipuncture blood draw done b [place], Vitals completed and checked in for Appt     Brisa Lico BERMUDEZ

## 2018-09-18 NOTE — LETTER
9/18/2018      RE: Jade Collins  [address]  [facility] [address]       See Oral Onc Mgmt note.    Case Jade, [facility]

## 2018-09-18 NOTE — MR AVS SNAPSHOT
After Visit Summary   9/18/2018    Jade Collins    MRN: [id number]           Patient Information     Date Of Birth          1970        Visit Information        Provider Department      9/18/2018 5:19 PM Case Jade RPH [facility]        Today's Diagnoses     Malignant neoplasm of upper-outer quadrant of left breast in female, estrogen receptor positive (H)    -  1       Follow-ups after your visit        Your next 10 appointments already scheduled     Oct 01, 2018  3:15 PM CDT   [facility] Lab Draw with  [employer] LAB DRAW   [facility] ([facility])    [address]  [address]  [facility] [address]   [phone]            Oct 01, 2018  3:40 PM CDT   (Arrive by 3:25 PM)   Return Visit with ROMULO Goode   [facility] ([facility])    [address]  [address]  [facility] [address]   [phone]            Oct 16, 2018  2:30 PM CDT   [facility] Lab Draw with  [employer] LAB DRAW   [facility] Draw ([facility])    [address]  [address]  [facility] [address]   [phone]            Oct 16, 2018  3:00 PM CDT   (Arrive by 2:45 PM)   Return Visit with Wilfredo Bhatt MD   [facility] ([facility])    [address]  [address]  [facility] [address]   [phone]              Future tests that were ordered for you today     Open Standing Orders        Priority Remaining Interval Expires Ordered    Hemoglobin A1c Routine 3/3  9/18/2019 9/18/2018    Ca27.29  breast tumor marker Routine 51/52  12/16/2018 9/17/2018    CBC with platelets differential Routine 51/52  9/17/2019 9/17/2018    CEA Routine 51/52  12/16/2018 9/17/2018    Comprehensive metabolic panel Routine 51/52  9/17/2019 9/17/2018            Who to contact     If you have questions or need follow up  information about today's clinic visit or your schedule please contact [facility] directly at [phone].  Normal or non-critical lab and imaging results will be communicated to you by MyChart, letter or phone within 4 business days after the clinic has received the results. If you do not hear from us within 7 days, please contact the clinic through [employer] or phone. If you have a critical or abnormal lab result, we will notify you by phone as soon as possible.  Submit refill requests through [employer] or call your pharmacy and they will forward the refill request to us. Please allow 3 business days for your refill to be completed.          Additional Information About Your Visit        [employer]rew[employer]art Information     [employer] gives you secure access to your electronic health record. If you see a primary care provider, you can also send messages to your care team and make appointments. If you have questions, please call your primary care clinic.  If you do not have a primary care provider, please call [phone] and they will assist you.        Care EveryWhere ID     This is your Care EveryWhere ID. This could be used by other organizations to access your [facility] medical records  [id number]         Blood Pressure from Last 3 Encounters:   09/18/18 114/75   09/04/18 115/74    Weight from Last 3 Encounters:   09/18/18 121.8 kg (268 lb 8 oz)   09/04/18 122.7 kg (270 lb 8 oz)              Today, you had the following     No orders found for display       Primary Care Provider Office Phone # Fax #    Dave CALIXTO Cousins [phone]       [place] [address] [facility] [address]        Equal Access to Services     John Douglas French CenterMATHEW : Hadii aad ku hadasho Soomaali, waaxda luqadaha, qaybta kaalmada adeana, cody eastman. So [facility] [phone].    ATENCIÓN: Si habla español, tiene a ramos disposición servicios gratuitos de asistencia lingüística. Llame  al [phone].    We comply with applicable federal civil rights laws and [place] laws. We do not discriminate on the basis of race, color, national origin, age, disability, sex, sexual orientation, or gender identity.            Thank you!     Thank you for choosing [facility]  for your care. Our goal is always to provide you with excellent care. Hearing back from our patients is one way we can continue to improve our services. Please take a few minutes to complete the written survey that you may receive in the mail after your visit with us. Thank you!             Your Updated Medication List - Protect others around you: Learn how to safely use, store and throw away your medicines at www.disposemymeds.org.          This list is accurate as of 9/18/18 11:59 PM.  Always use your most recent med list.                   Brand Name Dispense Instructions for use Diagnosis    albuterol (2.5 MG/3ML) 0.083% neb solution      Inhale 2.5 mg into the lungs        levothyroxine 200 MCG tablet    SYNTHROID/LEVOTHROID     Take 200 mcg by mouth        sertraline 100 MG tablet    ZOLOFT     TAKE 1 AND 1/2 TABLETS(150 MG) BY MOUTH EVERY DAY        triamcinolone 55 MCG/ACT inhaler    NASACORT     Spray 2 sprays into both nostrils daily

## 2018-09-18 NOTE — NURSING NOTE
"Oncology Rooming Note    September 18, 2018 4:11 PM   Jade Collins is a 48 year old female who presents for:    Chief Complaint   Patient presents with     Blood Draw     pt here for venipuncture blood draw done ricarda BERMUDEZ, Vitals completed and checked in for Appt     Oncology Clinic Visit     Return : 2nd opinion metastasis of Breast Cancer     Initial Vitals: /75 (BP Location: Right arm, Patient Position: Sitting, Cuff Size: Adult Regular)  Pulse 87  Temp 98.7  F (37.1  C) (Oral)  Resp 16  Ht 1.74 m (5' 8.5\")  Wt 121.8 kg (268 lb 8 oz)  SpO2 94%  BMI 40.23 kg/m2 Estimated body mass index is 40.23 kg/(m^2) as calculated from the following:    Height as of this encounter: 1.74 m (5' 8.5\").    Weight as of this encounter: 121.8 kg (268 lb 8 oz). Body surface area is 2.43 meters squared.  Extreme Pain (9) Comment: lower right back    No LMP recorded. Patient is postmenopausal.  Allergies reviewed: Yes  Medications reviewed: Yes    Medications: Medication refills not needed today.  Pharmacy name entered into In[employer]: [facility] [address] [place] [address] [employer] [address] AT [facility] [address] [employer]    Clinical concerns: Patient states that last Thursday after her PET/CT scans, she started to have some pain in her right hip towards the back; it's an achy pain; heat and cold packs make the pain better, sitting too long, bending and lifting make it worse. She also states that she got a sore throat at the same time which lasted about 2-3 days; it was dry, painful, and felt like she had just had a breathing tube down her throat. At the same time she woke up with red, blood shot, painful eyes that lasted from Thursday until Sunday.    10 minutes for nursing intake (face to face time)     Leigh Miller CMA              "

## 2018-09-18 NOTE — LETTER
9/18/2018       RE: Jade Collins  [address]  [facility] [facility] [address]     Dear Colleague,    Thank you for referring your patient, Jade Collins, to the [facility]. Please see a copy of my visit note below.    See Oral Onc Mgmt note.    Again, thank you for allowing me to participate in the care of your patient.      Sincerely,    Case Jade RP

## 2018-09-18 NOTE — LETTER
9/18/2018       RE: Jade Collins  [address]  [facility] [address]     Dear Colleague,    Thank you for referring your patient, Jade Collins, to the [facility]. Please see a copy of my visit note below.    HISTORY OF PRESENT ILLNESS:  Jade is a 48-year-old woman with metastatic breast cancer who comes to our clinic for recommendations for further treatment.  She is referred by Dr. Norberto Brand at [facility].  Jade would like to continue her care at the [facility].       Jade was diagnosed with breast cancer with a lump found in the upper-inner quadrant of the left breast.  She was diagnosed in 12/2013 in the [place] area and Dr. David Redd was her medical oncologist.  Biopsy of the tumor showed it to be ER positive, ND positive, and HER-2 negative.  We do not have any of the original pathology and we need to obtain those reports.  She underwent a lumpectomy performed by Dr. Tasha Segura who is a surgeon in the [place] area.  She also had a sentinel lymph node which was noted to be involved with tumor but there was no lymph node dissection done at that time.  [id number].  She subsequently underwent adjuvant chemotherapy with dose-dense AC for 4 cycles and Taxol for 12 weeks, completed 09/11/2014.       She then had radiation therapy post lumpectomy to the left breast, which was completed 11/23/2014 by Dr. Manley.       She was then begun on an aromatase inhibitor, the name of which she does not remember.  The aromatase inhibitor therapy was begun in 11/2014.  She was then switched within about a month or so to anastrozole and remained on anastrozole from 11/2014-02/2017.  She has never received tamoxifen.       She then moved to [place], [place] in 03/2017.  She saw a gynecologist there and underwent a IRIS/BSO by Dr. Zendejas.  She then also saw Dr. Linares in [place], [place], who is an oncologist.  His interpretation of the pathology report was that she had triple-negative breast  cancer.  It is possible that she may have had low estrogen receptor positivity, but the anastrozole was then discontinued in 02/2017.  She then underwent the IRIS/BSO in 03/2017.  This was done laparoscopically.       She then remained off of all hormonal therapy and in 09/2017 moved to [place].  She remained off hormonal therapy and did not have Medical Oncology followup initially.       She was driving for [place] and noticed lymph nodes in the left neck about 8 weeks ago.  She then went to see Dr. Norberto Brand who performed a PET/CT scan and the patient also underwent a brain MRI for staging.  The PET/CT scan performed 08/03/2018 showed in the neck there were several mildly metabolic active and large prominent left posterior cervical lymph nodes.  The largest is located posterior to the left sternocleidomastoid muscle and measures 1.5 x 1 cm in size.  This demonstrate modest FDG uptake.  The other lymph nodes are smaller and demonstrate mild FDG uptake.  The prominent prevascular and aortopulmonary lymph nodes were also seen on the contrast-enhanced scan were less well seen on the PET.  The largest lymph node visualized on the PET scan was 1.1 x 0.8 cm and demonstrated mild FDG uptake.  This was in the periaortic area just anterior to the aortic arch.  No lung nodules.  No lung infiltrates.  No pleural effusion.  There was no uptake of FDG in the bones.  In summary, there were mildly enlarged left posterior cervical lymph nodes, largest measuring 1.5 x 1.0 cm and mildly enlarged aorticopulmonary and prevascular lymph nodes that were noted on contrast-enhanced CT scan seen less well on the PET scan.  No evidence of metastatic disease in the abdomen and pelvis.  She also underwent a brain MRI that showed no evidence of intracranial metastatic disease.  She underwent a biopsy of one of the lymph nodes in the left neck which showed metastatic adenocarcinoma consistent with breast origin, which was estrogen-receptor  positive.  The tumor cells were positive for CK7, ER and NJ consistent with metastatic breast carcinoma.  Estrogen receptor showed strong average nuclear intensity in 95% of carcinoma cells, progesterone receptor moderate average nuclear intensity in 70% of the carcinoma.  A GATA3 stain was apparently not performed.       Jade now comes to our clinic for recommendations.  She has mild pain in the left neck, moderate fatigue, moderate depression and has begun on sertraline, and she has moderate anxiety.       REVIEW OF SYSTEMS:  She has had no weight loss.  Diet has not changed.  No loss of energy.  She does not sleep during the day.  She can perform her household chores.  She has no breast lumps noted.  She has no fever, headache, cough, shortness of breath, hemoptysis or loss of appetite.  She does have occasional nonexertional chest pain and some dyspnea on exertion.  She has no nausea, vomiting, abdominal pain.  Occasional constipation, no diarrhea.  She has left hip pain.  She has no back pain, no muscle or joint complaints.  No numbness or tingling in the hands and feet, no hearing loss and she does have some depression.  The remainder of a 12-point review of systems is negative.       FAMILY HISTORY:  Positive for breast cancer in a maternal aunt at age 50.  The aunt is now 80 years old.  She also has a history of breast cancer in a paternal cousin.  There is a history of ovarian cancer in her paternal aunt's daughter and also possible history of ovarian cancer in her paternal grandmother. Mother's brother was diagnosed with bladder cancer and had a history of kidney stone.  Grandfather on mom's side had pancreatic cancer and kidney failure and .     GENETICS:  Jade genetic result from [facility] showed no significant mutations were found in a multigene panel which included BRCA1, BRCA2 and PALB2.      PAST MEDICAL HISTORY:  She has had the previous breast surgery as described in the past and history of  radiation as described in the past.  She has had no other tumor types.  No history of heart problems, heart attack, breathing problems, blood clots, seizures, peptic ulcer disease, osteoporosis, bone fractures.  She is not currently participating in a clinical trial and has not lost more than 20% of her body weight.  She has arthritis in her hands.  She also has an increased body mass index. History is also positive for hypothyroidism, depression.  History of asthma.       ALLERGIES:  Sulfa and penicillin, both of which cause hives.  No allergy to seafood, iodine or contrast dye.  She does not take aspirin.       PAST MENSTRUAL HISTORY:  Age of menarche was 12.  She has been pregnant once at age 15.  She was on oral contraceptives from age 15 to age 30.  Menopause began with chemotherapy in 2014 at age 44.  No history of hormone replacement therapy.  She was on oral contraceptives from approximately age 15 to age 30.       SOCIAL HISTORY:  She is a never smoker and does not drink alcohol.      TREATMENT HISTORY:  Revised with new information from Jade on 9-18-18.  A.  Biopsy of left breast mass 2013 [place].  B.  TXNXMX.  We don't have the pathology reports.  [facility] [phone].  [phone].  C.   Adjuvant chemotherapy with dose-dense AC for 4 cycles and Taxol for 12 weeks, completed 09/11/2014.    D.  Radiation post lumpectomy which was completed 11/23/2014 by Dr. Manley.   E.   She was on raloxifene 60 mg daily for two years beginnning in 11/2014.   She was then on anastrozole for 6 months through 02/2017.    F.  IRIS BSO in [place], [place] 03/2017.  Taken off all hormonal therapy because of an interpretation of the original pathology report that we don't have.   G.  Moved to [place] in 9/2017  H.  Presented with left neck lymph nodes July, 2018.  Left neck node biopsy showed adenocarcinoma consistent with breast ER+HER2- by IHC.      HISTORY OF PRESENT ILLNESS:  We saw Jade Collins in followup in our  clinic to discuss palbociclib and letrozole recommendation and to go over the results of the PET/CT scan and a brain MRI.  The PET/CT scan was consistent with the prior findings with left neck node involvement as well as anterior mediastinal lymph node involvement in the preaortic area.  She has no bone metastases.      Overall, Jade has been feeling reasonably well.  She does have some soreness about her right hip.  There are no findings on the PET/CT scan that would suggest that this is due to bone or other metastases.  She has no fatigue.  She does have some depression and no anxiety.  She does have a good social support network with friends and family in the [facility].      REVIEW OF SYSTEMS:  A 10-point review of systems is otherwise negative.      PHYSICAL EXAMINATION:   VITAL SIGNS:  Blood pressure 114/75, temperature 97, pulse 87, respirations 16, O2 sat 94% on room air.  Height 1.7 meters and weight 121 kg.   GENERAL:  Jade appears generally well.   HEENT:  She has no alopecia.  Examination of the oropharynx is without lesions.   LYMPH:  There is no palpable cervical, supraclavicular, subclavicular or axillary lymphadenopathy.  Breasts exam is not performed today.   LUNGS:  Clear to percussion and auscultation.   CARDIOVASCULAR:  Regular rate and rhythm, S1, S2.   ABDOMEN:  Soft, nontender, consistent with increased body mass index.   EXTREMITIES:  Without edema.     PSYCHIATRIC:  Mood and affect were normal.      LABORATORY DATA:  CMP is within normal limits.  Hemoglobin A1c was 5.6.  CBC was within normal limits.  Spot glucose 126.      IMAGING:  PET/CT as noted.  Brain MRI is noted.  No evidence of brain metastases.      ASSESSMENT AND PLAN:    1.  Jade Collins is a 48-year-old woman with a history of breast cancer of the left breast of unknown pathology TX N1 M0 by report.  The pathology slides have been requested from [place].  She was treated with lumpectomy, adjuvant chemotherapy with AC and then  Taxol followed by radiation followed by raloxifene for 2 years followed by aromatase inhibitor for 6 months and she then had IRIS and BSO.  The aromatase inhibitor was discontinued in [place], [place].  She was on the aromatase inhibitor for only 6 months.  Going over the history, it sounds that she was on the anastrozole through 01/2017 and then it was discontinued.  This means that she has been off of the aromatase inhibitor for about 18 months.  Therefore, I have no reservations about using palbociclib and letrozole as first-line therapy for recurrent, metastatic ER-positive, HER2 negative breast cancer.   2.  Treatment plan.  I did discuss with Jade palbociclib and letrozole.  We would start with palbociclib 125 mg daily, 21 days on and 7 days off in a 28-day cycle.  The letrozole would be 2.5 mg daily.  I discussed the risks and potential benefits of treatment.  I discussed that neutropenia or low white blood cell counts can happen and we will check the white blood cell counts on day 15 of each of the first 2 cycles.  I also discussed with her that she needs to have a thermometer at home and if she develops a fever to come to the emergency Room within 1 hour.  All her questions were answered.  We will also give her antiemetics as needed.  All of her questions were answered.  With the aromatase inhibitor, Jade does have experience with anastrozole for 6 months and knows about potential for joint discomfort and hot flashes.  All of her questions were answered.   3.  Bone health.  Bone targeting agents are not required because she has no evidence of bone metastases.   4.  Discussion of diet and exercise.  Recommended a diet low in saturated fat, not low in fat with less red and needed more fish and more fruits and vegetables.  We had a 10-minute discussion about a healthful diet and Jade would like a dietary referral and we can do that.  I also discussed with her that I recommend 150 minutes of exercise per week.  I  showed her exercise videos as options and also walking is a reasonable option as well.  All of her questions were answered.   5.  Follow up.  We will see Jade in followup in our clinic in 2 weeks. Follow up with Evelina 10-2 with CBC, CMP and with me 10-16 with CBC, CMP, CEA, CA27.29.  HbA1c.        Sincerely,    Wilfredo Bhatt M.D.      Division of Hematology, Oncology, Transplant   Department of Medicine   [facility]   [phone]       I spent 40 minutes with the patient more than 50% of which was in counseling and coordination of care.

## 2018-09-20 DIAGNOSIS — Z17.0 MALIGNANT NEOPLASM OF UPPER-OUTER QUADRANT OF LEFT BREAST IN FEMALE, ESTROGEN RECEPTOR POSITIVE (H): Primary | ICD-10-CM

## 2018-09-20 DIAGNOSIS — C50.412 MALIGNANT NEOPLASM OF UPPER-OUTER QUADRANT OF LEFT BREAST IN FEMALE, ESTROGEN RECEPTOR POSITIVE (H): Primary | ICD-10-CM

## 2018-09-20 RX ORDER — LETROZOLE 2.5 MG/1
2.5 TABLET, FILM COATED ORAL DAILY
Qty: 28 TABLET | Refills: 0 | Status: SHIPPED | OUTPATIENT
Start: 2018-09-20 | End: 2019-01-07

## 2018-09-20 NOTE — TELEPHONE ENCOUNTER
Prior Authorization Approval    Authorization Effective Date: 8/18/2018  Authorization Expiration Date: 3/18/2019  Medication: Ibrance prior authorization approval.  Approved Dose/Quantity: 125mg, 21/28 days  Reference #: [id number]   Insurance Company: [employer] - Phone [phone] Fax [phone]  Expected CoPay: $20     CoPay Card Available: No   Foundation Assistance Needed: N/A  Which Pharmacy is filling the prescription (Not needed for infusion/clinic administered): [facility] - [place], [place] - [facility] [address]   Pharmacy Notified: Yes  Patient Notified: Yes

## 2018-09-24 LAB — COPATH REPORT: NORMAL

## 2018-09-25 ENCOUNTER — TELEPHONE (OUTPATIENT)
Dept: ONCOLOGY | Facility: CLINIC | Age: 48
End: 2018-09-25

## 2018-09-25 LAB — COPATH REPORT: NORMAL

## 2018-09-25 NOTE — ORAL ONC MGMT
"Oral Chemotherapy Monitoring Program    Primary Oncologist: [facility]  [place] [facility]  Cancer Diagnosis: Breast    Therapy History:  Started Palbo/Letrazole on 9/21/18    Drug Interaction Assessment: No new medications    Subjective/Objective:  Jade Collins is a 48 year old female contacted by phone for a follow-up visit for oral chemotherapy.  She reports with joint pain, but no other issues.    ORAL CHEMOTHERAPY 9/18/2018 9/25/2018   Drug Name Ibrance (Palbociclib) Ibrance (Palbociclib)   Current Dosage 125mg 125mg   Current Schedule Daily Daily   Cycle Details 3 weeks on 1 week off 3 weeks on 1 week off   Start Date of Last Cycle - 9/21/2018   Adherence Assessment - Adherent   Adverse Effects - Arthralgias   Arthralgias - Grade 1   Pharmacist Intervention(arthralgias) - Yes   Intervention(s) - OTC recommendation   Any new drug interactions? No No   Is the dose as ordered appropriate for the patient? Yes Yes   Is the patient currently in pain? - Yes   Does the patient feel the pain is currently being managed by a provider? - Yes   Has the patient been assessed within the past 6 months for depression? - No       Last PHQ-2 Score on record:   PHQ-2 ( 1999 [employer]) 9/18/2018   Q1: Little interest or pleasure in doing things 1   Q2: Feeling down, depressed or hopeless 1   PHQ-2 Score 2             Vitals:  BP:   BP Readings from Last 1 Encounters:   09/18/18 114/75     Wt Readings from Last 1 Encounters:   09/18/18 121.8 kg (268 lb 8 oz)     Estimated body surface area is 2.43 meters squared as calculated from the following:    Height as of 9/18/18: 1.74 m (5' 8.5\").    Weight as of 9/18/18: 121.8 kg (268 lb 8 oz).    Labs:  _  Result Component Current Result Ref Range   Sodium 137 (9/18/2018) 133 - 144 mmol/L     _  Result Component Current Result Ref Range   Potassium 3.7 (9/18/2018) 3.4 - 5.3 mmol/L     _  Result Component Current Result Ref Range   Calcium 9.0 (9/18/2018) 8.5 - 10.1 mg/dL     No results " found for Mag within last 30 days.     No results found for Phos within last 30 days.     _  Result Component Current Result Ref Range   Albumin 3.5 (9/18/2018) 3.4 - 5.0 g/dL     _  Result Component Current Result Ref Range   Urea Nitrogen 13 (9/18/2018) 7 - 30 mg/dL     _  Result Component Current Result Ref Range   Creatinine 0.78 (9/18/2018) 0.52 - 1.04 mg/dL       _  Result Component Current Result Ref Range   AST 14 (9/18/2018) 0 - 45 U/L     _  Result Component Current Result Ref Range   ALT 23 (9/18/2018) 0 - 50 U/L     _  Result Component Current Result Ref Range   Bilirubin Total 0.3 (9/18/2018) 0.2 - 1.3 mg/dL       _  Result Component Current Result Ref Range   WBC 4.8 (9/18/2018) 4.0 - 11.0 10e9/L     _  Result Component Current Result Ref Range   Hemoglobin 14.0 (9/18/2018) 11.7 - 15.7 g/dL     _  Result Component Current Result Ref Range   Platelet Count 188 (9/18/2018) 150 - 450 10e9/L     _  Result Component Current Result Ref Range   Absolute Neutrophil 2.8 (9/18/2018) 1.6 - 8.3 10e9/L               Assessment:  Patient is tolerating Palbo/letrazole therapy with the expected joint pain.    Plan:  Recommend Ibuprofen 400mg TID as patient reports more MSK pain than arthritic-type pain.    Follow-Up:  Review next week's appt with Evelina Garcia.    Refill Due:  10/16    Daniele Norirs, Pharm D.  Clinical Oncology Pharmacist- Oral Chemotherapy Monitoring Program  [phone]    September 25, 2018

## 2018-10-01 ENCOUNTER — RADIANT APPOINTMENT (OUTPATIENT)
Dept: ULTRASOUND IMAGING | Facility: CLINIC | Age: 48
End: 2018-10-01
Attending: PHYSICIAN ASSISTANT
Payer: COMMERCIAL

## 2018-10-01 ENCOUNTER — APPOINTMENT (OUTPATIENT)
Dept: LAB | Facility: CLINIC | Age: 48
End: 2018-10-01
Attending: PHYSICIAN ASSISTANT
Payer: COMMERCIAL

## 2018-10-01 ENCOUNTER — ONCOLOGY VISIT (OUTPATIENT)
Dept: ONCOLOGY | Facility: CLINIC | Age: 48
End: 2018-10-01
Attending: INTERNAL MEDICINE
Payer: COMMERCIAL

## 2018-10-01 VITALS
WEIGHT: 268 LBS | SYSTOLIC BLOOD PRESSURE: 121 MMHG | HEART RATE: 62 BPM | TEMPERATURE: 98.2 F | BODY MASS INDEX: 40.16 KG/M2 | OXYGEN SATURATION: 97 % | DIASTOLIC BLOOD PRESSURE: 79 MMHG

## 2018-10-01 DIAGNOSIS — Z17.0 MALIGNANT NEOPLASM OF UPPER-OUTER QUADRANT OF LEFT BREAST IN FEMALE, ESTROGEN RECEPTOR POSITIVE (H): ICD-10-CM

## 2018-10-01 DIAGNOSIS — C50.412 MALIGNANT NEOPLASM OF UPPER-OUTER QUADRANT OF LEFT BREAST IN FEMALE, ESTROGEN RECEPTOR POSITIVE (H): ICD-10-CM

## 2018-10-01 DIAGNOSIS — M79.662 PAIN OF LEFT LOWER LEG: Primary | ICD-10-CM

## 2018-10-01 DIAGNOSIS — E66.01 MORBID OBESITY (H): ICD-10-CM

## 2018-10-01 DIAGNOSIS — M79.662 PAIN OF LEFT LOWER LEG: ICD-10-CM

## 2018-10-01 LAB
ALBUMIN SERPL-MCNC: 3.7 G/DL (ref 3.4–5)
ALP SERPL-CCNC: 81 U/L (ref 40–150)
ALT SERPL W P-5'-P-CCNC: 22 U/L (ref 0–50)
ANION GAP SERPL CALCULATED.3IONS-SCNC: 7 MMOL/L (ref 3–14)
AST SERPL W P-5'-P-CCNC: 16 U/L (ref 0–45)
BASOPHILS # BLD AUTO: 0.1 10E9/L (ref 0–0.2)
BASOPHILS NFR BLD AUTO: 1.7 %
BILIRUB SERPL-MCNC: 0.4 MG/DL (ref 0.2–1.3)
BUN SERPL-MCNC: 16 MG/DL (ref 7–30)
CALCIUM SERPL-MCNC: 9.4 MG/DL (ref 8.5–10.1)
CANCER AG27-29 SERPL-ACNC: 13 U/ML (ref 0–39)
CEA SERPL-MCNC: <0.5 UG/L (ref 0–2.5)
CHLORIDE SERPL-SCNC: 106 MMOL/L (ref 94–109)
CO2 SERPL-SCNC: 26 MMOL/L (ref 20–32)
CREAT SERPL-MCNC: 1.05 MG/DL (ref 0.52–1.04)
DIFFERENTIAL METHOD BLD: ABNORMAL
EOSINOPHIL # BLD AUTO: 0 10E9/L (ref 0–0.7)
EOSINOPHIL NFR BLD AUTO: 0.9 %
ERYTHROCYTE [DISTWIDTH] IN BLOOD BY AUTOMATED COUNT: 12.4 % (ref 10–15)
GFR SERPL CREATININE-BSD FRML MDRD: 56 ML/MIN/1.7M2
GLUCOSE SERPL-MCNC: 85 MG/DL (ref 70–99)
HBA1C MFR BLD: 5.4 % (ref 0–5.6)
HCT VFR BLD AUTO: 39.6 % (ref 35–47)
HGB BLD-MCNC: 13.7 G/DL (ref 11.7–15.7)
LYMPHOCYTES # BLD AUTO: 0.9 10E9/L (ref 0.8–5.3)
LYMPHOCYTES NFR BLD AUTO: 29.6 %
MCH RBC QN AUTO: 30 PG (ref 26.5–33)
MCHC RBC AUTO-ENTMCNC: 34.6 G/DL (ref 31.5–36.5)
MCV RBC AUTO: 87 FL (ref 78–100)
MONOCYTES # BLD AUTO: 0.1 10E9/L (ref 0–1.3)
MONOCYTES NFR BLD AUTO: 3.5 %
NEUTROPHILS # BLD AUTO: 2 10E9/L (ref 1.6–8.3)
NEUTROPHILS NFR BLD AUTO: 64.3 %
PLATELET # BLD AUTO: 197 10E9/L (ref 150–450)
PLATELET # BLD EST: ABNORMAL 10*3/UL
POTASSIUM SERPL-SCNC: 4.2 MMOL/L (ref 3.4–5.3)
PROT SERPL-MCNC: 8 G/DL (ref 6.8–8.8)
RBC # BLD AUTO: 4.57 10E12/L (ref 3.8–5.2)
RBC MORPH BLD: NORMAL
SODIUM SERPL-SCNC: 138 MMOL/L (ref 133–144)
WBC # BLD AUTO: 3.1 10E9/L (ref 4–11)

## 2018-10-01 PROCEDURE — G0008 ADMIN INFLUENZA VIRUS VAC: HCPCS

## 2018-10-01 PROCEDURE — 36415 COLL VENOUS BLD VENIPUNCTURE: CPT

## 2018-10-01 PROCEDURE — G0463 HOSPITAL OUTPT CLINIC VISIT: HCPCS | Mod: ZF

## 2018-10-01 PROCEDURE — 82378 CARCINOEMBRYONIC ANTIGEN: CPT | Performed by: INTERNAL MEDICINE

## 2018-10-01 PROCEDURE — 80053 COMPREHEN METABOLIC PANEL: CPT | Performed by: INTERNAL MEDICINE

## 2018-10-01 PROCEDURE — 99214 OFFICE O/P EST MOD 30 MIN: CPT | Mod: ZP | Performed by: PHYSICIAN ASSISTANT

## 2018-10-01 PROCEDURE — 86300 IMMUNOASSAY TUMOR CA 15-3: CPT | Performed by: INTERNAL MEDICINE

## 2018-10-01 PROCEDURE — 85025 COMPLETE CBC W/AUTO DIFF WBC: CPT | Performed by: INTERNAL MEDICINE

## 2018-10-01 PROCEDURE — 25000128 H RX IP 250 OP 636: Mod: ZF | Performed by: PHYSICIAN ASSISTANT

## 2018-10-01 PROCEDURE — 90686 IIV4 VACC NO PRSV 0.5 ML IM: CPT | Mod: ZF | Performed by: PHYSICIAN ASSISTANT

## 2018-10-01 PROCEDURE — 83036 HEMOGLOBIN GLYCOSYLATED A1C: CPT | Performed by: INTERNAL MEDICINE

## 2018-10-01 RX ADMIN — INFLUENZA A VIRUS A/MICHIGAN/45/2015 X-275 (H1N1) ANTIGEN (FORMALDEHYDE INACTIVATED), INFLUENZA A VIRUS A/SINGAPORE/INFIMH-16-0019/2016 IVR-186 (H3N2) ANTIGEN (FORMALDEHYDE INACTIVATED), INFLUENZA B VIRUS B/PHUKET/3073/2013 ANTIGEN (FORMALDEHYDE INACTIVATED), AND INFLUENZA B VIRUS B/MARYLAND/15/2016 BX-69A ANTIGEN (FORMALDEHYDE INACTIVATED) 0.5 ML: 15; 15; 15; 15 INJECTION, SUSPENSION INTRAMUSCULAR at 17:16

## 2018-10-01 ASSESSMENT — PAIN SCALES - GENERAL: PAINLEVEL: SEVERE PAIN (6)

## 2018-10-01 NOTE — NURSING NOTE
"Oncology Rooming Note    October 1, 2018 3:38 PM   Jade Collins is a 48 year old female who presents for:    Chief Complaint   Patient presents with     Blood Draw     pt here for  blood draw, vitals completed by MA, pt checked in to appt     Oncology Clinic Visit     [facility] RETURN- BREAST CA     Initial Vitals: /79 (BP Location: Right arm, Patient Position: Sitting, Cuff Size: Adult Small)  Pulse 62  Temp 98.2  F (36.8  C) (Oral)  Wt 121.6 kg (268 lb)  SpO2 97%  BMI 40.16 kg/m2 Estimated body mass index is 40.16 kg/(m^2) as calculated from the following:    Height as of 9/18/18: 1.74 m (5' 8.5\").    Weight as of this encounter: 121.6 kg (268 lb). Body surface area is 2.42 meters squared.  Severe Pain (6) Comment: pt states also having left lower leg pain - 9   No LMP recorded. Patient is postmenopausal.  Allergies reviewed: Yes  Medications reviewed: Yes    Medications: Medication refills not needed today.  Pharmacy name entered into [employer]:    [id number] - [place], [place] - [address] AT [facility] [address]  [facility] - [place], [place] - [address]    Clinical concerns: Patient wants to know if they can travel for 5-7 days to [place], left leg below calf area pain 10/10 comes and goes has had pain for the past two years but since starting  Chemo pill the pain has been unbearable. Hot to touch lump in the left calf.  Evelina was notified.    10 minutes for nursing intake (face to face time)     Lauri Vargas LPN            "

## 2018-10-01 NOTE — PROGRESS NOTES
Oncology/Hematology Visit Note  Oct 1, 2018    Reason for Visit: follow up of recurrent metastatic ER-positive HER2 negative breast cancer    History of Present Illness: Jade Collins is a 48 year old female with a history of left breast cancer diagnosed in 12/2013 in [place] area and Dr. David Redd was her medical oncologist. Biopsy showed tumor to be ER positive, SC positive and HER2 negative. She underwent lumpectomy and SLNB by Dr. Tasha Segura in [place]. [place] lymph node was noted to be involved with tumor but no lymph node dissection done at that time. She subsequently underwent adjuvant ddAC x 4 cycles and Taxol x 12 weeks, competed in 9/11/2014. She had post lumpectomy radiation completed in 11/23/2014 by Dr. Manley. She was begun on an AI in 11/2014, but does not recall name.     She was then switched within about a month or so to anastrozole and remained on anastrozole from 11/2014-02/2017.  She has never received tamoxifen. She then moved to [place], [place] in 03/2017.  She saw a gynecologist there and underwent a IRIS/BSO by Dr. Zendejas.  She then also saw Dr. Linares in [place], [place], who is an oncologist.  His interpretation of the pathology report was that she had triple-negative breast cancer.  It is possible that she may have had low estrogen receptor positivity, but the anastrozole was then discontinued in 02/2017.  She then underwent the IRIS/BSO in 03/2017.  This was done laparoscopically.        She then remained off of all hormonal therapy and in 09/2017 moved to [place].  She remained off hormonal therapy and did not have Medical Oncology followup initially.        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  She then went to see Dr. Norberto Brand who performed a PET/CT scan and the patient also underwent a brain MRI for staging.  The PET/CT scan performed 08/03/2018 which showed mildly enlarged left posterior cervical lymph nodes, largest measuring 1.5  x 1.0 cm and mildly enlarged aorticopulmonary and prevascular lymph nodes that were noted on contrast-enhanced CT scan seen less well on the PET scan.  No evidence of metastatic disease in the abdomen and pelvis.  She also underwent a brain MRI that showed no evidence of intracranial metastatic disease.  She underwent a biopsy of one of the lymph nodes in the left neck which showed metastatic adenocarcinoma consistent with breast origin, which was estrogen-receptor positive.  The tumor cells were positive for CK7, ER and AL consistent with metastatic breast carcinoma.  Estrogen receptor showed strong average nuclear intensity in 95% of carcinoma cells, progesterone receptor moderate average nuclear intensity in 70% of the carcinoma.  A GATA3 stain was apparently not performed.     She started Ibrance on 9/21 and letrozole on 9/23. Please see Dr. Bhatt's previous notes for further details on the patient's history. She comes in today for routine follow up.    Interval History:  She started ibrance on 9/21 and letrozole 9/23. She noticed some hotflashes/flushed feeling after taking Ibrance. She also has some nightsweats. She felt some wrist and ankle soreness. She called and was recommended she could ibuprofen. She has taken ibuprofen as needed which is relieving symptoms. She also felt a painful spot on left calf. Leg is not swollen but area on posterior calf is tender to touch. No redness. She tells me she has a bleeding/clotting disorder that was diagnosed on labs somewhere else. She has tried heat packs, ice, elevation without much relief, although tylenol does help. She has some fatigue.     She had a couple days of nausea. She did not take any nausea medication and this resolved. She had one episode of acid reflux, but no vomiting. No fevers, cough. Occasional dizziness at first in AM or at night when getting up. Some numbness/tingling in left foot that is not new and is stable. History of low back pain, but  none recently. Walks 30 minutes 3 times/week.     Review of Systems:  Patient denies any of the following except if noted above: fevers, chills, chest pain, dyspnea, cough, abdominal pain, diarrhea, constipation, dysuria, headaches.    Current Outpatient Prescriptions   Medication Sig Dispense Refill     albuterol (2.5 MG/3ML) 0.083% neb solution Inhale 2.5 mg into the lungs       letrozole (FEMARA) 2.5 MG tablet Take 1 tablet (2.5 mg) by mouth daily for 28 days 28 tablet 0     levothyroxine (SYNTHROID/LEVOTHROID) 200 MCG tablet Take 200 mcg by mouth       palbociclib (IBRANCE) 125 MG capsule CHEMO Take 1 capsule (125 mg) by mouth daily with food Take for 21 days, then 7 days off. Avoid grapefruit. Do not open/crush/chew capsule. 21 capsule 0     sertraline (ZOLOFT) 100 MG tablet TAKE 1 AND 1/2 TABLETS(150 MG) BY MOUTH EVERY DAY       triamcinolone (NASACORT) 55 MCG/ACT inhaler Spray 2 sprays into both nostrils daily       VITAMIN D, CHOLECALCIFEROL, PO Take 1,000 Units by mouth daily         Physical Examination:  General: The patient is a pleasant female in no acute distress.  /79 (BP Location: Right arm, Patient Position: Sitting, Cuff Size: Adult Small)  Pulse 62  Temp 98.2  F (36.8  C) (Oral)  Wt 121.6 kg (268 lb)  SpO2 97%  BMI 40.16 kg/m2  Wt Readings from Last 10 Encounters:   10/01/18 121.6 kg (268 lb)   09/18/18 121.8 kg (268 lb 8 oz)   09/04/18 122.7 kg (270 lb 8 oz)     HEENT: EOMI, PERRL. Sclerae are anicteric. Oral mucosa is pink and moist with no lesions or thrush.   Lymph: she has 3 palpable ~1cm left posterior/supraclavicular nodes. No other palpable cervical, supraclavicular or axillary nodes.  Heart: Regular rate and rhythm.   Lungs: Clear to auscultation bilaterally.   Abdomen: Bowel sounds present, soft, nontender with no palpable hepatosplenomegaly or masses.   Extremities: LLE with some TTP of posterior calf but no erythema, induration or swelling. No lower extremity edema noted  bilaterally.   Neuro: Cranial nerves II through XII are grossly intact.  Skin: No rashes, petechiae, or bruising noted on exposed skin.    Laboratory Data:  Results for orders placed or performed in visit on 10/01/18 (from the past 24 hour(s))   Hemoglobin A1c   Result Value Ref Range    Hemoglobin A1C 5.4 0 - 5.6 %   CBC with platelets differential   Result Value Ref Range    WBC 3.1 (L) 4.0 - 11.0 10e9/L    RBC Count 4.57 3.8 - 5.2 10e12/L    Hemoglobin 13.7 11.7 - 15.7 g/dL    Hematocrit 39.6 35.0 - 47.0 %    MCV 87 78 - 100 fl    MCH 30.0 26.5 - 33.0 pg    MCHC 34.6 31.5 - 36.5 g/dL    RDW 12.4 10.0 - 15.0 %    Platelet Count 197 150 - 450 10e9/L    Diff Method Manual Differential     % Neutrophils 64.3 %    % Lymphocytes 29.6 %    % Monocytes 3.5 %    % Eosinophils 0.9 %    % Basophils 1.7 %    Absolute Neutrophil 2.0 1.6 - 8.3 10e9/L    Absolute Lymphocytes 0.9 0.8 - 5.3 10e9/L    Absolute Monocytes 0.1 0.0 - 1.3 10e9/L    Absolute Eosinophils 0.0 0.0 - 0.7 10e9/L    Absolute Basophils 0.1 0.0 - 0.2 10e9/L    RBC Morphology Normal     Platelet Estimate Confirming automated cell count    Comprehensive metabolic panel   Result Value Ref Range    Sodium 138 133 - 144 mmol/L    Potassium 4.2 3.4 - 5.3 mmol/L    Chloride 106 94 - 109 mmol/L    Carbon Dioxide 26 20 - 32 mmol/L    Anion Gap 7 3 - 14 mmol/L    Glucose 85 70 - 99 mg/dL    Urea Nitrogen 16 7 - 30 mg/dL    Creatinine 1.05 (H) 0.52 - 1.04 mg/dL    GFR Estimate 56 (L) >60 mL/min/1.7m2    GFR Estimate If Black 68 >60 mL/min/1.7m2    Calcium 9.4 8.5 - 10.1 mg/dL    Bilirubin Total 0.4 0.2 - 1.3 mg/dL    Albumin 3.7 3.4 - 5.0 g/dL    Protein Total 8.0 6.8 - 8.8 g/dL    Alkaline Phosphatase 81 40 - 150 U/L    ALT 22 0 - 50 U/L    AST 16 0 - 45 U/L     Imaging:  EXAMINATION: DOPPLER VENOUS ULTRASOUND OF THE LEFT LOWER EXTREMITY,  10/1/2018 4:55 PM      COMPARISON: None available     HISTORY: 48-year-old female with metastatic breast cancer with left  lower leg  pain     TECHNIQUE:  Gray-scale evaluation with compression, spectral flow, and  color Doppler assessment of the deep venous system of the left leg  from groin to knee, and then at the ankle.     FINDINGS:  In the left lower extremity, the common femoral, femoral, popliteal  and posterior tibial veins demonstrate normal compressibility and  blood flow.            IMPRESSION:  1.  No evidence left lower extremity deep venous thrombosis.    Assessment and Plan:    Recurrent, metastatic ER-positive, HER2 negative breast cancer: History of left breast cancer s/p lumpectomy and SNLB in 2014. S/p 4 cycles ddAC and 12 weeks of Taxol. On AI from 11/2014-2/2017. Recurrence on PET/CT scan performed 08/03/2018 which showed mildly enlarged left posterior cervical lymph nodes, largest measuring 1.5 x 1.0 cm and mildly enlarged aorticopulmonary and prevascular lymph nodes that were noted on contrast-enhanced CT scan seen less well on the PET scan.  No evidence of metastatic disease in the abdomen and pelvis.  She also underwent a brain MRI that showed no evidence of intracranial metastatic disease.  She underwent a biopsy of one of the lymph nodes in the left neck which showed metastatic adenocarcinoma consistent with breast origin, which was estrogen-receptor positive.  The tumor cells were positive for CK7, ER and GA consistent with metastatic breast carcinoma.  Started Ibrance and letrozole on 9/21. Tolerating well aside from some joint aches and hotflashes/nightsweats. She is about Day 10 of ibrance. ANC 2.0  --Continue ibrance, letrozole  --Ok to take tylenol prn, but given increase in Cr, advised to avoid NSAIDs and increase oral hydration. She has no urinary symptoms.    LLE pain: US with preliminary report of no DVT.     Bone health: bone targeting agents are not required as she has no evidence of bone metastases    Discussion of diet and exercise: recommend diet low in saturated fat. She is exercising about 90  minutes/week. Encouraged 150 minutes/week.    Note: A1c 5.4 and normal.    Follow up on 10/16 with labs, Dr. Bhatt as scheduled.    Evelina Garcia PA-C  [facility]  [address] [place] [id number] [phone]

## 2018-10-01 NOTE — NURSING NOTE
"Injectable Influenza Immunization Documentation    1.  Has the patient received the information for the injectable influenza vaccine? YES     2. Is the patient 6 months of age or older? YES     3. Does the patient have any of the following contraindications?         Severe allergy to eggs? No     Severe allergic reaction to previous influenza vaccines? No   Severe allergy to latex? No       History of Guillain-Avon syndrome? No     Currently have a temperature greater than 100.4F? No        4.  Severely egg allergic patients should have flu vaccine eligibility assessed by an MD, RN, or pharmacist, and those who received flu vaccine should be observed for 15 min by an MD, RN, Pharmacist, Medical Technician, or member of clinic staff.\": YES    5. Latex-allergic patients should be given latex-free influenza vaccine N/A. Please reference the Vaccine latex table to determine if your clinic s product is latex-containing.       Vaccination given by Niurka Gerber MA          "

## 2018-10-01 NOTE — MR AVS SNAPSHOT
After Visit Summary   10/1/2018    Jade Collins    MRN: [id number]           Patient Information     Date Of Birth          1970        Visit Information        Provider Department      10/1/2018 3:40 PM Evelina Garcia PA [facility]        Today's Diagnoses     Pain of left lower leg    -  1    Morbid obesity (H)        Malignant neoplasm of upper-outer quadrant of left breast in female, estrogen receptor positive (H)           Follow-ups after your visit        Your next 10 appointments already scheduled     Oct 16, 2018  2:30 PM CDT   [facility] Lab Draw with  [employer] LAB DRAW   [facility] Lab Draw ([facility])    [address] [place] [address]  [address]  [facility] [address] [phone]            Oct 16, 2018  3:00 PM CDT   (Arrive by 2:45 PM)   Return Visit with Wilfredo Bhatt MD   [facility] ([facility])    [address] [place] [address]  [address]  [facility] [address] [phone]              Who to contact     If you have questions or need follow up information about today's clinic visit or your schedule please contact [facility] directly at [phone].  Normal or non-critical lab and imaging results will be communicated to you by MyChart, letter or phone within 4 business days after the clinic has received the results. If you do not hear from us within 7 days, please contact the clinic through [employer] or phone. If you have a critical or abnormal lab result, we will notify you by phone as soon as possible.  Submit refill requests through [employer] or call your pharmacy and they will forward the refill request to us. Please allow 3 business days for your refill to be completed.          Additional Information About Your Visit        [employer] Information     [employer] gives you secure access to your electronic health record. If you see a primary care provider, you can also  send messages to your care team and make appointments. If you have questions, please call your primary care clinic.  If you do not have a primary care provider, please call [phone] and they will assist you.        Care EveryWhere ID     This is your Care EveryWhere ID. This could be used by other organizations to access your [place] medical records  [id number]        Your Vitals Were     Pulse Temperature Pulse Oximetry BMI (Body Mass Index)          62 98.2  F (36.8  C) (Oral) 97% 40.16 kg/m2         Blood Pressure from Last 3 Encounters:   10/01/18 121/79   09/18/18 114/75   09/04/18 115/74    Weight from Last 3 Encounters:   10/01/18 121.6 kg (268 lb)   09/18/18 121.8 kg (268 lb 8 oz)   09/04/18 122.7 kg (270 lb 8 oz)              We Performed the Following     Ca27.29  breast tumor marker     CBC with platelets differential     CEA     Comprehensive metabolic panel     Hemoglobin A1c        Primary Care Provider Office Phone # Fax #    Dave Gallegos [phone]       [facility] [address]  [facility] [address]        Equal Access to Services     ESPERANZA DA SILVA : Hadii aad ku hadasho Sostellaali, waaxda luqadaha, qaybta kaalmada adeegyada, cody eastman. So [facility] [phone].    ATENCIÓN: Si habla español, tiene a ramos disposición servicios gratuitos de asistencia lingüística. [facility] [phone].    We comply with applicable federal civil rights laws and [place] laws. We do not discriminate on the basis of race, color, national origin, age, disability, sex, sexual orientation, or gender identity.            Thank you!     Thank you for choosing [facility]  for your care. Our goal is always to provide you with excellent care. Hearing back from our patients is one way we can continue to improve our services. Please take a few minutes to complete the written survey that you may receive in the mail after your visit with us.  Thank you!             Your Updated Medication List - Protect others around you: Learn how to safely use, store and throw away your medicines at www.disposemymeds.org.          This list is accurate as of 10/1/18  5:27 PM.  Always use your most recent med list.                   Brand Name Dispense Instructions for use Diagnosis    albuterol (2.5 MG/3ML) 0.083% neb solution      Inhale 2.5 mg into the lungs        letrozole 2.5 MG tablet    FEMARA    28 tablet    Take 1 tablet (2.5 mg) by mouth daily for 28 days    Malignant neoplasm of upper-outer quadrant of left breast in female, estrogen receptor positive (H)       levothyroxine 200 MCG tablet    SYNTHROID/LEVOTHROID     Take 200 mcg by mouth        palbociclib 125 MG capsule CHEMO    IBRANCE    21 capsule    Take 1 capsule (125 mg) by mouth daily with food Take for 21 days, then 7 days off. Avoid grapefruit. Do not open/crush/chew capsule.    Malignant neoplasm of upper-outer quadrant of left breast in female, estrogen receptor positive (H)       sertraline 100 MG tablet    ZOLOFT     TAKE 1 AND 1/2 TABLETS(150 MG) BY MOUTH EVERY DAY        triamcinolone 55 MCG/ACT inhaler    NASACORT     Spray 2 sprays into both nostrils daily        VITAMIN D (CHOLECALCIFEROL) PO      Take 1,000 Units by mouth daily

## 2018-10-01 NOTE — NURSING NOTE
Chief Complaint   Patient presents with     Blood Draw     pt here for  blood draw, vitals completed by MA, pt checked in to appt     Aruna Hernandez MA

## 2018-10-03 ENCOUNTER — TELEPHONE (OUTPATIENT)
Dept: PHARMACY | Facility: OTHER | Age: 48
End: 2018-10-03

## 2018-10-03 NOTE — TELEPHONE ENCOUNTER
MTM referral from: [facility] visit (referral by provider)    MTM referral outreach attempt #2 on October 3, 2018 at 2:37 PM      Outcome: Patient not reachable after several attempts, will route to MTM Pharmacist/Provider as an FYI. Thank you for the referral.    Pedro Sullivan, MTM Coordinator

## 2018-10-14 NOTE — PROGRESS NOTES
HISTORY OF PRESENT ILLNESS:  Jade is a 48-year-old woman with metastatic breast cancer who comes to our clinic for recommendations for further treatment.  She is referred by Dr. Norberto Brand at [facility].  Jade would like to continue her care at the [facility].       Jade was diagnosed with breast cancer with a lump found in the upper-inner quadrant of the left breast.  She was diagnosed in 12/2013 in the [place] area and Dr. David Redd was her medical oncologist.  Biopsy of the tumor showed it to be ER positive, MT positive, and HER-2 negative.  We do not have any of the original pathology and we need to obtain those reports.  She underwent a lumpectomy performed by Dr. Tasha Segura who is a surgeon in the [place] area.  She also had a sentinel lymph node which was noted to be involved with tumor but there was no lymph node dissection done at that time.  [id number].  She subsequently underwent adjuvant chemotherapy with dose-dense AC for 4 cycles and Taxol for 12 weeks, completed 09/11/2014.        She then had radiation therapy post lumpectomy to the left breast, which was completed 11/23/2014 by Dr. Manley.        She was then begun on an aromatase inhibitor, the name of which she does not remember.  The aromatase inhibitor therapy was begun in 11/2014.  She was then switched within about a month or so to anastrozole and remained on anastrozole from 11/2014-02/2017.  She has never received tamoxifen.        She then moved to [place], [place] in 03/2017.  She saw a gynecologist there and underwent a IRIS/BSO by Dr. Zendejas.  She then also saw Dr. Linares in [place], [place], who is an oncologist.  His interpretation of the pathology report was that she had triple-negative breast cancer.  It is possible that she may have had low estrogen receptor positivity, but the anastrozole was then discontinued in 02/2017.  She then underwent the IRIS/BSO in 03/2017.  This was done laparoscopically.        She then  remained off of all hormonal therapy and in 09/2017 moved to [place].  She remained off hormonal therapy and did not have Medical Oncology followup initially.        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  She then went to see Dr. Norberto Brand who performed a PET/CT scan and the patient also underwent a brain MRI for staging.  The PET/CT scan performed 08/03/2018 showed in the neck there were several mildly metabolic active and large prominent left posterior cervical lymph nodes.  The largest is located posterior to the left sternocleidomastoid muscle and measures 1.5 x 1 cm in size.  This demonstrate modest FDG uptake.  The other lymph nodes are smaller and demonstrate mild FDG uptake.  The prominent prevascular and aortopulmonary lymph nodes were also seen on the contrast-enhanced scan were less well seen on the PET.  The largest lymph node visualized on the PET scan was 1.1 x 0.8 cm and demonstrated mild FDG uptake.  This was in the periaortic area just anterior to the aortic arch.  No lung nodules.  No lung infiltrates.  No pleural effusion.  There was no uptake of FDG in the bones.  In summary, there were mildly enlarged left posterior cervical lymph nodes, largest measuring 1.5 x 1.0 cm and mildly enlarged aorticopulmonary and prevascular lymph nodes that were noted on contrast-enhanced CT scan seen less well on the PET scan.  No evidence of metastatic disease in the abdomen and pelvis.  She also underwent a brain MRI that showed no evidence of intracranial metastatic disease.  She underwent a biopsy of one of the lymph nodes in the left neck which showed metastatic adenocarcinoma consistent with breast origin, which was estrogen-receptor positive.  The tumor cells were positive for CK7, ER and AK consistent with metastatic breast carcinoma.  Estrogen receptor showed strong average nuclear intensity in 95% of carcinoma cells, progesterone receptor moderate average  nuclear intensity in 70% of the carcinoma.  A GATA3 stain was apparently not performed.       Jade came to our clinic for recommendations.  She has mild pain in the left neck, moderate fatigue, moderate depression and has begun on sertraline, and she has moderate anxiety.     FOLLOW-UP NOTE      Jade returns to clinic during her first cycle of palbociclib and letrozole for metastatic ER-positive, HER2-negative breast cancer.  She started the palbociclib 09/21.  She is due to start her next cycle which is cycle 2 on 10/19.  She has an ANC today of 1000 which I think is adequate and I would not dose reduce the palbociclib at this time.  We will recheck the midcycle neutrophil count on the next cycle   Nausea.   Zofran.  Persistent symptoms of depression sertraline was increased to 150 mg per day.       She reports that the pain in her right calf has resolved.  She has no pain, moderate fatigue, moderate depression and anxiety.  She has some stiffness of her ankles and wrists related to the letrozole which improves with movement.      REVIEW OF SYSTEMS:  She denies fevers or chills, cough, chest pain or shortness of breath, nausea or vomiting, constipation or diarrhea, bone pain, back pain or headache.  The remainder of a 10-point review of systems is negative.  She had some mild nausea yesterday and Zofran was initiated by Dr. Jade Miranda.      PHYSICAL EXAMINATION:   VITAL SIGNS:  Blood pressure 114/75, temperature 98.1, pulse 70, respirations 18, O2 sat 96% on room air.  Height 1.74 meters, weight 122.5 kilograms   GENERAL:  Jade appeared generally well.   HEENT:  She has no alopecia.  Examination of the oropharynx is without lesions.   LYMPH:  There is no palpable cervical, supraclavicular, subclavicular or axillary lymphadenopathy.  Notably, the left cervical and supraclavicular lymphadenopathy has resolved.   LUNGS:  Clear to percussion and crackles right upper and lower lung fields.    BREASTS:  Exam not performed  today.   CARDIOVASCULAR:  Regular rate and rhythm, S1, S2.   ABDOMEN:  Soft and nontender without hepatosplenomegaly.   EXTREMITIES:  Without edema.     PSYCH:  Mood is somewhat anxious.  Affect normal.      LABORATORY DATA:  The CMP is within normal limits.  CBC showed a WBC of 2.4, hemoglobin 13.3, platelets 165,000, absolute neutrophil count 1000.        IMAGING:  Ultrasound of the left lower extremity showed no evidence for deep venous thrombosis.      ASSESSMENT AND PLAN:     1.  Jade Collins is a 48-year-old woman with a history of breast cancer of the left breast, initially of unknown pathology, [id number] by report.  The pathology slides were requested from [place].  She did indeed have an ER-positive, HER2-negative breast cancer.  She was treated with primary therapy consisting of lumpectomy, adjuvant chemotherapy with AC and then Taxol, followed by radiation, followed by raloxifene for 2 years and followed by aromatase inhibitor for 6 months.  She then had a IRIS/BSO.  Aromatase inhibitor was discontinued in [place], [place].  She was on the aromatase inhibitor for only 6 months.  The anastrozole was discontinued 01/2017 and she was off of all hormonal therapy for 18 months.  She then came to see us and was diagnosed with recurrent metastatic ER-positive, HER2-negative breast cancer.   2.  We began treatment with palbociclib and letrozole.  This was started on 09/21.  She is now here for a check of her white blood cell count prior to beginning cycle 2 which will start 10/19.  I think it is reasonable to continue with the current dose of the palbociclib 125 mg per day, 21 days on and 7 days off.  We will recheck a midcycle CBC on the next cycle.   3.  Crackles in the right lower and upper lung field.  A chest x-ray will be obtained.   4.  Followup.  We will see Jade in followup in our clinic with our SONIA on 10/20/2018 and with me on 10/27 with CBC, CMP and markers.  CT scan chest, abdomen and pelvis on 11/26/2018.  Follow up with SONIA 10-20-18 with CBC, CMP, CA27.29 and CEA and with me 11-27 with CBC, CMP, CA27.29 and CEA.  CT CAP on 11-26.     Thank you for allowing us to continue to participate in Jade Collins's care.      Wilfredo Bhatt MD      [facility]     ADDENDUM:  Clear lungs on CXR.     Exam: XR CHEST 2 VW, 10/16/2018 4:52 PM     Indication: ; Malignant neoplasm of upper-outer quadrant of left  breast in female, estrogen receptor positive (H); Malignant neoplasm  of upper-outer quadrant of left breast in female, estrogen receptor  positive (H)     Comparison: PET CT 9/13/2018     Findings:   PA and lateral views the chest. Cardiac mediastinal silhouette is  within normal limits. Pulmonary vasculature is distinct. No focal  airspace opacity, pleural effusion, or pneumothorax. Upper abdomen is  unremarkable. No acute osseous abnormality.         Impression: No acute airspace opacity.      I have personally reviewed the examination and initial interpretation  and I agree with the findings.     FADIA BRAVO MD  I spent 35 minutes with the patient more than 50% of which was in counseling and coordination of care.

## 2018-10-16 ENCOUNTER — RADIANT APPOINTMENT (OUTPATIENT)
Dept: GENERAL RADIOLOGY | Facility: CLINIC | Age: 48
End: 2018-10-16
Attending: INTERNAL MEDICINE
Payer: COMMERCIAL

## 2018-10-16 ENCOUNTER — OFFICE VISIT (OUTPATIENT)
Dept: PHARMACY | Facility: CLINIC | Age: 48
End: 2018-10-16
Payer: COMMERCIAL

## 2018-10-16 ENCOUNTER — ONCOLOGY VISIT (OUTPATIENT)
Dept: ONCOLOGY | Facility: CLINIC | Age: 48
End: 2018-10-16
Attending: INTERNAL MEDICINE
Payer: COMMERCIAL

## 2018-10-16 VITALS
SYSTOLIC BLOOD PRESSURE: 114 MMHG | TEMPERATURE: 98.1 F | WEIGHT: 270 LBS | OXYGEN SATURATION: 96 % | RESPIRATION RATE: 18 BRPM | HEART RATE: 70 BPM | BODY MASS INDEX: 39.99 KG/M2 | HEIGHT: 69 IN | DIASTOLIC BLOOD PRESSURE: 75 MMHG

## 2018-10-16 DIAGNOSIS — Z17.0 MALIGNANT NEOPLASM OF UPPER-OUTER QUADRANT OF LEFT BREAST IN FEMALE, ESTROGEN RECEPTOR POSITIVE (H): ICD-10-CM

## 2018-10-16 DIAGNOSIS — C50.412 MALIGNANT NEOPLASM OF UPPER-OUTER QUADRANT OF LEFT BREAST IN FEMALE, ESTROGEN RECEPTOR POSITIVE (H): ICD-10-CM

## 2018-10-16 DIAGNOSIS — C50.412 MALIGNANT NEOPLASM OF UPPER-OUTER QUADRANT OF LEFT BREAST IN FEMALE, ESTROGEN RECEPTOR POSITIVE (H): Primary | ICD-10-CM

## 2018-10-16 DIAGNOSIS — E03.9 HYPOTHYROIDISM, UNSPECIFIED TYPE: ICD-10-CM

## 2018-10-16 DIAGNOSIS — F32.A DEPRESSION, UNSPECIFIED DEPRESSION TYPE: ICD-10-CM

## 2018-10-16 DIAGNOSIS — R63.8 INCREASED BMI: ICD-10-CM

## 2018-10-16 DIAGNOSIS — Z17.0 MALIGNANT NEOPLASM OF UPPER-OUTER QUADRANT OF LEFT BREAST IN FEMALE, ESTROGEN RECEPTOR POSITIVE (H): Primary | ICD-10-CM

## 2018-10-16 DIAGNOSIS — E66.01 MORBID OBESITY (H): ICD-10-CM

## 2018-10-16 DIAGNOSIS — J30.9 ALLERGIC RHINITIS, UNSPECIFIED SEASONALITY, UNSPECIFIED TRIGGER: ICD-10-CM

## 2018-10-16 DIAGNOSIS — E63.9 NUTRITIONAL DEFICIENCY: ICD-10-CM

## 2018-10-16 DIAGNOSIS — F32.0 CURRENT MILD EPISODE OF MAJOR DEPRESSIVE DISORDER, UNSPECIFIED WHETHER RECURRENT (H): ICD-10-CM

## 2018-10-16 LAB
ALBUMIN SERPL-MCNC: 3.8 G/DL (ref 3.4–5)
ALP SERPL-CCNC: 77 U/L (ref 40–150)
ALT SERPL W P-5'-P-CCNC: 22 U/L (ref 0–50)
ANION GAP SERPL CALCULATED.3IONS-SCNC: 6 MMOL/L (ref 3–14)
AST SERPL W P-5'-P-CCNC: 12 U/L (ref 0–45)
BASOPHILS # BLD AUTO: 0 10E9/L (ref 0–0.2)
BASOPHILS NFR BLD AUTO: 1.3 %
BILIRUB SERPL-MCNC: 0.4 MG/DL (ref 0.2–1.3)
BUN SERPL-MCNC: 12 MG/DL (ref 7–30)
CALCIUM SERPL-MCNC: 9.2 MG/DL (ref 8.5–10.1)
CANCER AG27-29 SERPL-ACNC: 13 U/ML (ref 0–39)
CEA SERPL-MCNC: <0.5 UG/L (ref 0–2.5)
CHLORIDE SERPL-SCNC: 105 MMOL/L (ref 94–109)
CO2 SERPL-SCNC: 28 MMOL/L (ref 20–32)
CREAT SERPL-MCNC: 0.92 MG/DL (ref 0.52–1.04)
DIFFERENTIAL METHOD BLD: ABNORMAL
EOSINOPHIL # BLD AUTO: 0 10E9/L (ref 0–0.7)
EOSINOPHIL NFR BLD AUTO: 1.7 %
ERYTHROCYTE [DISTWIDTH] IN BLOOD BY AUTOMATED COUNT: 14 % (ref 10–15)
GFR SERPL CREATININE-BSD FRML MDRD: 65 ML/MIN/1.7M2
GLUCOSE SERPL-MCNC: 98 MG/DL (ref 70–99)
HBA1C MFR BLD: 5.3 % (ref 0–5.6)
HCT VFR BLD AUTO: 38.5 % (ref 35–47)
HGB BLD-MCNC: 13.3 G/DL (ref 11.7–15.7)
IMM GRANULOCYTES # BLD: 0 10E9/L (ref 0–0.4)
IMM GRANULOCYTES NFR BLD: 0 %
LYMPHOCYTES # BLD AUTO: 1.2 10E9/L (ref 0.8–5.3)
LYMPHOCYTES NFR BLD AUTO: 49.2 %
MCH RBC QN AUTO: 30.6 PG (ref 26.5–33)
MCHC RBC AUTO-ENTMCNC: 34.5 G/DL (ref 31.5–36.5)
MCV RBC AUTO: 89 FL (ref 78–100)
MONOCYTES # BLD AUTO: 0.2 10E9/L (ref 0–1.3)
MONOCYTES NFR BLD AUTO: 7.2 %
NEUTROPHILS # BLD AUTO: 1 10E9/L (ref 1.6–8.3)
NEUTROPHILS NFR BLD AUTO: 40.6 %
NRBC # BLD AUTO: 0 10*3/UL
NRBC BLD AUTO-RTO: 0 /100
PLATELET # BLD AUTO: 165 10E9/L (ref 150–450)
POTASSIUM SERPL-SCNC: 3.9 MMOL/L (ref 3.4–5.3)
PROT SERPL-MCNC: 7.9 G/DL (ref 6.8–8.8)
RBC # BLD AUTO: 4.35 10E12/L (ref 3.8–5.2)
SODIUM SERPL-SCNC: 139 MMOL/L (ref 133–144)
WBC # BLD AUTO: 2.4 10E9/L (ref 4–11)

## 2018-10-16 PROCEDURE — 80053 COMPREHEN METABOLIC PANEL: CPT | Performed by: INTERNAL MEDICINE

## 2018-10-16 PROCEDURE — 99214 OFFICE O/P EST MOD 30 MIN: CPT | Mod: ZP | Performed by: INTERNAL MEDICINE

## 2018-10-16 PROCEDURE — 99607 MTMS BY PHARM ADDL 15 MIN: CPT | Performed by: PHARMACIST

## 2018-10-16 PROCEDURE — 86300 IMMUNOASSAY TUMOR CA 15-3: CPT | Performed by: INTERNAL MEDICINE

## 2018-10-16 PROCEDURE — 36415 COLL VENOUS BLD VENIPUNCTURE: CPT

## 2018-10-16 PROCEDURE — 83036 HEMOGLOBIN GLYCOSYLATED A1C: CPT | Performed by: INTERNAL MEDICINE

## 2018-10-16 PROCEDURE — 85025 COMPLETE CBC W/AUTO DIFF WBC: CPT | Performed by: INTERNAL MEDICINE

## 2018-10-16 PROCEDURE — 82378 CARCINOEMBRYONIC ANTIGEN: CPT | Performed by: INTERNAL MEDICINE

## 2018-10-16 PROCEDURE — 99605 MTMS BY PHARM NP 15 MIN: CPT | Performed by: PHARMACIST

## 2018-10-16 PROCEDURE — G0463 HOSPITAL OUTPT CLINIC VISIT: HCPCS | Mod: ZF

## 2018-10-16 RX ORDER — ONDANSETRON 8 MG/1
8 TABLET, ORALLY DISINTEGRATING ORAL 3 TIMES DAILY PRN
Qty: 30 TABLET | Refills: 1 | Status: SHIPPED | OUTPATIENT
Start: 2018-10-16 | End: 2019-08-22

## 2018-10-16 RX ORDER — CALCIUM CARBONATE 500 MG/1
1 TABLET, CHEWABLE ORAL 4 TIMES DAILY PRN
COMMUNITY
End: 2018-10-18

## 2018-10-16 RX ORDER — ALBUTEROL SULFATE 90 UG/1
2 AEROSOL, METERED RESPIRATORY (INHALATION) EVERY 6 HOURS PRN
COMMUNITY
End: 2019-04-30

## 2018-10-16 RX ORDER — LETROZOLE 2.5 MG/1
2.5 TABLET, FILM COATED ORAL DAILY
Qty: 28 TABLET | Refills: 0 | Status: SHIPPED | OUTPATIENT
Start: 2018-10-16 | End: 2019-01-07

## 2018-10-16 ASSESSMENT — PAIN SCALES - GENERAL: PAINLEVEL: NO PAIN (0)

## 2018-10-16 NOTE — MR AVS SNAPSHOT
After Visit Summary   10/16/2018    Jade Collins    MRN: [id number]           Patient Information     Date Of Birth          1970        Visit Information        Provider Department      10/16/2018 3:00 PM Wilfredo Bhatt MD [facility]        Today's Diagnoses     Malignant neoplasm of upper-outer quadrant of left breast in female, estrogen receptor positive (H)    -  1    Current mild episode of major depressive disorder, unspecified whether recurrent (H)        Increased BMI           Follow-ups after your visit        Additional Services     NUTRITION REFERRAL       Your provider has referred you to: [facility]: [facility] (on call location)  - [place] [phone]   [email or web address]    Please be aware that coverage of these services is subject to the terms and limitations of your health insurance plan.  Call member services at your health plan with any benefit or coverage questions.      Please bring the following with you to your appointment:    (1) This referral request  (2) Any documents given to you regarding this referral  (3) Any specific questions you have about diet and/or food choices             REFERRAL       Sari Holliday for counseling                  Follow-up notes from your care team     Return in about 6 weeks (around 11/27/2018).      Your next 10 appointments already scheduled     Nov 13, 2018 12:45 PM CST   [facility] Draw with  [facility] DRAW   [facility] ([facility])    [address] [place] [address]  [address] [place] [id number] [phone]            Nov 13, 2018  1:10 PM CST   (Arrive by 12:55 PM)   Return Visit with ROMULO Goode   [facility] ([facility])    [address] [place] [address]  [address] [place] [id number] [phone]            Nov 26, 2018  1:20 PM CST   CT  CHEST/ABDOMEN/PELVIS W CONTRAST with UCCT2   [facility] ([facility])    [address]  1st Floor  [facility] [address] [phone]           How do I prepare for my exam? (Food and drink instructions) To prepare: Do not eat or drink for 2 hours before your exam. If you need to take medicine, you may take it with small sips of water. (We may ask you to take liquid medicine as well.)  How do I prepare for my exam? (Other instructions) Please arrive 30 minutes early for your CT.  Once in the department you might be asked to drink water 15-20 minutes prior to your exam.  If indicated you may be asked to drink an oral contrast in advance of your CT.  If this is the case, the imaging team will let you know or be in contact with you prior to your appointment  Patients over 70 or patients with diabetes or kidney problems: If you haven t had a blood test (creatinine test) within the last 30 days, the Cardiologist/Radiologist may require you to get this test prior to your exam.  If you have diabetes:  Continue to take your metformin medication on the day of your exam  What should I wear: Please wear loose clothing, such as a sweat suit or jogging clothes. Avoid snaps, zippers and other metal. We may ask you to undress and put on a hospital gown.  How long does the exam take: Most scans take less than 20 minutes.  What should I bring: Please bring any scans or X-rays taken at other hospitals, if similar tests were done. Also bring a list of your medicines, including vitamins, minerals and over-the-counter drugs. It is safest to leave personal items at home.  Do I need a : No  is needed.  What do I need to tell my doctor? Be sure to tell your doctor: * If you have any allergies. * If there s any chance you are pregnant. * If you are breastfeeding.  What should I do after the exam: No restrictions, You may resume normal activities.  What is this test: A CT  (computed tomography) scan is a series of pictures that allows us to look inside your body. The scanner creates images of the body in cross sections, much like slices of bread. This helps us see any problems more clearly. You may receive contrast (X-ray dye) before or during your scan. You will be asked to drink the contrast.  Who should I call with questions: If you have any questions, please call the Imaging Department where you will have your exam. Directions, parking instructions, and other information is available on our website, [email or web address].            Nov 27, 2018 12:30 PM CST   [facility] Draw with  [facility] DRAW   [facility] ([facility])    [address] [place] [address]  [address]  [facility] [address] [phone]            Nov 27, 2018  1:00 PM CST   (Arrive by 12:45 PM)   Return Visit with Wilfredo Bhatt MD   [facility] ([facility])    [address] [place] [address]  [address]  [facility] [address] [phone]              Future tests that were ordered for you today     Open Standing Orders        Priority Remaining Interval Expires Ordered    Ca27.29  breast tumor marker Routine 51/52  1/14/2019 10/16/2018    CEA Routine 51/52  1/14/2019 10/16/2018    Comprehensive metabolic panel Routine 51/52  10/16/2019 10/16/2018    CBC with platelets differential Routine 51/52  10/16/2019 10/16/2018          Open Future Orders        Priority Expected Expires Ordered    X-ray Chest 2 vws* Routine 10/16/2018 10/16/2019 10/16/2018    CT Chest/Abdomen/Pelvis w Contrast Routine  5/14/2019 10/16/2018            Who to contact     If you have questions or need follow up information about today's clinic visit or your schedule please contact [facility] directly at [phone].  Normal or non-critical lab and imaging results will be communicated to you by MyChart, letter or phone within 4 business  "days after the clinic has received the results. If you do not hear from us within 7 days, please contact the clinic through [facility] or phone. If you have a critical or abnormal lab result, we will notify you by phone as soon as possible.  Submit refill requests through [facility]ziy or call your pharmacy and they will forward the refill request to us. Please allow 3 business days for your refill to be completed.          Additional Information About Your Visit        moziy Information     moziy gives you secure access to your electronic health record. If you see a primary care provider, you can also send messages to your care team and make appointments. If you have questions, please call your primary care clinic.  If you do not have a primary care provider, please call [phone] and they will assist you.        Care EveryWhere ID     This is your Care EveryWhere ID. This could be used by other organizations to access your [facility] medical records  [id number]        Your Vitals Were     Pulse Temperature Respirations Height Pulse Oximetry BMI (Body Mass Index)    70 98.1  F (36.7  C) (Oral) 18 1.74 m (5' 8.5\") 96% 40.45 kg/m2       Blood Pressure from Last 3 Encounters:   10/16/18 114/75   10/01/18 121/79   09/18/18 114/75    Weight from Last 3 Encounters:   10/16/18 122.5 kg (270 lb)   10/01/18 121.6 kg (268 lb)   09/18/18 121.8 kg (268 lb 8 oz)              We Performed the Following     NUTRITION REFERRAL      REFERRAL          Today's Medication Changes          These changes are accurate as of 10/16/18  3:59 PM.  If you have any questions, ask your nurse or doctor.               Start taking these medicines.        Dose/Directions    ondansetron 8 MG ODT tab   Commonly known as:  ZOFRAN ODT   Used for:  Malignant neoplasm of upper-outer quadrant of left breast in female, estrogen receptor positive (H), Chemotherapy induced nausea and vomiting   Started by:  Jade Miranda [facility]        Dose:  8 " mg   Take 1 tablet (8 mg) by mouth 3 times daily as needed for nausea   Quantity:  30 tablet   Refills:  1         These medicines have changed or have updated prescriptions.        Dose/Directions    albuterol 108 (90 Base) MCG/ACT inhaler   Commonly known as:  PROAIR HFA/PROVENTIL HFA/VENTOLIN HFA   This may have changed:  Another medication with the same name was removed. Continue taking this medication, and follow the directions you see here.   Used for:  Malignant neoplasm of upper-outer quadrant of left breast in female, estrogen receptor positive (H), Chemotherapy induced nausea and vomiting   Changed by:  Jade Miranda RPH        Dose:  2 puff   Inhale 2 puffs into the lungs every 6 hours as needed for shortness of breath / dyspnea or wheezing   Refills:  0       * letrozole 2.5 MG tablet   Commonly known as:  FEMARA   This may have changed:  Another medication with the same name was added. Make sure you understand how and when to take each.   Used for:  Malignant neoplasm of upper-outer quadrant of left breast in female, estrogen receptor positive (H)   Changed by:  Jade Miranda RPH        Dose:  2.5 mg   Take 1 tablet (2.5 mg) by mouth daily for 28 days   Quantity:  28 tablet   Refills:  0       * letrozole 2.5 MG tablet   Commonly known as:  FEMARA   This may have changed:  You were already taking a medication with the same name, and this prescription was added. Make sure you understand how and when to take each.   Used for:  Malignant neoplasm of upper-outer quadrant of left breast in female, estrogen receptor positive (H)   Changed by:  Jade Miranda RPH        Dose:  2.5 mg   Take 1 tablet (2.5 mg) by mouth daily for 28 days   Quantity:  28 tablet   Refills:  0       * palbociclib 125 MG capsule CHEMO   Commonly known as:  IBRANCE   This may have changed:  Another medication with the same name was added. Make sure you understand how and when to take each.   Used for:  Malignant neoplasm of  upper-outer quadrant of left breast in female, estrogen receptor positive (H)   Changed by:  Jade Miranda RPH        Dose:  125 mg   Take 1 capsule (125 mg) by mouth daily with food Take for 21 days, then 7 days off. Avoid grapefruit. Do not open/crush/chew capsule.   Quantity:  21 capsule   Refills:  0       * palbociclib 125 MG capsule CHEMO   Commonly known as:  IBRANCE   This may have changed:  You were already taking a medication with the same name, and this prescription was added. Make sure you understand how and when to take each.   Used for:  Malignant neoplasm of upper-outer quadrant of left breast in female, estrogen receptor positive (H)   Changed by:  Jade Miranda RPH        Dose:  125 mg   Take 1 capsule (125 mg) by mouth daily with food Take for 21 days, then 7 days off. Avoid grapefruit. Do not open/crush/chew capsule.   Quantity:  21 capsule   Refills:  0       * Notice:  This list has 4 medication(s) that are the same as other medications prescribed for you. Read the directions carefully, and ask your doctor or other care provider to review them with you.         Where to get your medicines      These medications were sent to [facility] - [address]  [address] [place] [id number]    Hours:  Mon-Fri 8:30am-5:00pm Toll Free [phone] Phone:  [phone]     letrozole 2.5 MG tablet    ondansetron 8 MG ODT tab         Call your pharmacy to confirm that your medication is ready for pickup. It may take up to 24 hours for them to receive the prescription. If the prescription is not ready within 3 business days, please contact your clinic or your provider.     We will let you know when these medications are ready. If you don't hear back within 3 business days, please contact us.     palbociclib 125 MG capsule CHEMO                Primary Care Provider Office Phone # Fax #    Dave DURGA Gallegos [phone]       [place]  [facility] [address]  [facility] [address]        Equal Access to Services     ESPERANZA DA SILVA : Hadii jp almeida gena Socurtis, waaxda luqadaha, qaybta kakeeleyda nando, cody pricilain hayaaopal moorelaura dewey ester eastman. So [facility] [phone].    ATENCIÓN: Si habla español, tiene a ramos disposición servicios gratuitos de asistencia lingüística. Llame al [phone].    We comply with applicable federal civil rights laws and [place] laws. We do not discriminate on the basis of race, color, national origin, age, disability, sex, sexual orientation, or gender identity.            Thank you!     Thank you for choosing [facility]  for your care. Our goal is always to provide you with excellent care. Hearing back from our patients is one way we can continue to improve our services. Please take a few minutes to complete the written survey that you may receive in the mail after your visit with us. Thank you!             Your Updated Medication List - Protect others around you: Learn how to safely use, store and throw away your medicines at www.disposemymeds.org.          This list is accurate as of 10/16/18  3:59 PM.  Always use your most recent med list.                   Brand Name Dispense Instructions for use Diagnosis    albuterol 108 (90 Base) MCG/ACT inhaler    PROAIR HFA/PROVENTIL HFA/VENTOLIN HFA     Inhale 2 puffs into the lungs every 6 hours as needed for shortness of breath / dyspnea or wheezing    Malignant neoplasm of upper-outer quadrant of left breast in female, estrogen receptor positive (H), Chemotherapy induced nausea and vomiting       CALCIUM + D PO      Take 500 mg by mouth daily    Malignant neoplasm of upper-outer quadrant of left breast in female, estrogen receptor positive (H), Chemotherapy induced nausea and vomiting       calcium carbonate 500 MG chewable tablet    TUMS     Take 1 chew tab by mouth 4 times daily as needed for heartburn    Malignant neoplasm of  upper-outer quadrant of left breast in female, estrogen receptor positive (H), Chemotherapy induced nausea and vomiting       * letrozole 2.5 MG tablet    FEMARA    28 tablet    Take 1 tablet (2.5 mg) by mouth daily for 28 days    Malignant neoplasm of upper-outer quadrant of left breast in female, estrogen receptor positive (H)       * letrozole 2.5 MG tablet    FEMARA    28 tablet    Take 1 tablet (2.5 mg) by mouth daily for 28 days    Malignant neoplasm of upper-outer quadrant of left breast in female, estrogen receptor positive (H)       levothyroxine 200 MCG tablet    SYNTHROID/LEVOTHROID     Take 200 mcg by mouth        ondansetron 8 MG ODT tab    ZOFRAN ODT    30 tablet    Take 1 tablet (8 mg) by mouth 3 times daily as needed for nausea    Malignant neoplasm of upper-outer quadrant of left breast in female, estrogen receptor positive (H), Chemotherapy induced nausea and vomiting       * palbociclib 125 MG capsule CHEMO    IBRANCE    21 capsule    Take 1 capsule (125 mg) by mouth daily with food Take for 21 days, then 7 days off. Avoid grapefruit. Do not open/crush/chew capsule.    Malignant neoplasm of upper-outer quadrant of left breast in female, estrogen receptor positive (H)       * palbociclib 125 MG capsule CHEMO    IBRANCE    21 capsule    Take 1 capsule (125 mg) by mouth daily with food Take for 21 days, then 7 days off. Avoid grapefruit. Do not open/crush/chew capsule.    Malignant neoplasm of upper-outer quadrant of left breast in female, estrogen receptor positive (H)       sertraline 100 MG tablet    ZOLOFT     TAKE 1 AND 1/2 TABLETS(150 MG) BY MOUTH EVERY DAY        triamcinolone 55 MCG/ACT inhaler    NASACORT     Spray 1 spray into both nostrils daily        VITAMIN D (CHOLECALCIFEROL) PO      Take 2,000 Units by mouth daily        * Notice:  This list has 4 medication(s) that are the same as other medications prescribed for you. Read the directions carefully, and ask your doctor or other care  provider to review them with you.

## 2018-10-16 NOTE — LETTER
10/16/2018       RE: Jade Collins  [address]  [facility] [address]     Dear Colleague,    Thank you for referring your patient, Jade Collins, to the [facility]. Please see a copy of my visit note below.    HISTORY OF PRESENT ILLNESS:  Jade is a 48-year-old woman with metastatic breast cancer who comes to our clinic for recommendations for further treatment.  She is referred by Dr. Norberto Brand at [facility].  Jade would like to continue her care at the [facility].       Jade was diagnosed with breast cancer with a lump found in the upper-inner quadrant of the left breast.  She was diagnosed in 12/2013 in the [place] area and Dr. David Redd was her medical oncologist.  Biopsy of the tumor showed it to be ER positive, MN positive, and HER-2 negative.  We do not have any of the original pathology and we need to obtain those reports.  She underwent a lumpectomy performed by Dr. Tasha Segura who is a surgeon in the [place] area.  She also had a sentinel lymph node which was noted to be involved with tumor but there was no lymph node dissection done at that time.  [id number].  She subsequently underwent adjuvant chemotherapy with dose-dense AC for 4 cycles and Taxol for 12 weeks, completed 09/11/2014.        She then had radiation therapy post lumpectomy to the left breast, which was completed 11/23/2014 by Dr. Manley.        She was then begun on an aromatase inhibitor, the name of which she does not remember.  The aromatase inhibitor therapy was begun in 11/2014.  She was then switched within about a month or so to anastrozole and remained on anastrozole from 11/2014-02/2017.  She has never received tamoxifen.        She then moved to [place], [place] in 03/2017.  She saw a gynecologist there and underwent a IRIS/BSO by Dr. Zendejas.  She then also saw Dr. Linares in [place], [place], who is an oncologist.  His interpretation of the pathology report was that she had triple-negative  breast cancer.  It is possible that she may have had low estrogen receptor positivity, but the anastrozole was then discontinued in 02/2017.  She then underwent the IRIS/BSO in 03/2017.  This was done laparoscopically.        She then remained off of all hormonal therapy and in 09/2017 moved to [place].  She remained off hormonal therapy and did not have Medical Oncology followup initially.        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  She then went to see Dr. Norberto Brand who performed a PET/CT scan and the patient also underwent a brain MRI for staging.  The PET/CT scan performed 08/03/2018 showed in the neck there were several mildly metabolic active and large prominent left posterior cervical lymph nodes.  The largest is located posterior to the left sternocleidomastoid muscle and measures 1.5 x 1 cm in size.  This demonstrate modest FDG uptake.  The other lymph nodes are smaller and demonstrate mild FDG uptake.  The prominent prevascular and aortopulmonary lymph nodes were also seen on the contrast-enhanced scan were less well seen on the PET.  The largest lymph node visualized on the PET scan was 1.1 x 0.8 cm and demonstrated mild FDG uptake.  This was in the periaortic area just anterior to the aortic arch.  No lung nodules.  No lung infiltrates.  No pleural effusion.  There was no uptake of FDG in the bones.  In summary, there were mildly enlarged left posterior cervical lymph nodes, largest measuring 1.5 x 1.0 cm and mildly enlarged aorticopulmonary and prevascular lymph nodes that were noted on contrast-enhanced CT scan seen less well on the PET scan.  No evidence of metastatic disease in the abdomen and pelvis.  She also underwent a brain MRI that showed no evidence of intracranial metastatic disease.  She underwent a biopsy of one of the lymph nodes in the left neck which showed metastatic adenocarcinoma consistent with breast origin, which was  estrogen-receptor positive.  The tumor cells were positive for CK7, ER and RI consistent with metastatic breast carcinoma.  Estrogen receptor showed strong average nuclear intensity in 95% of carcinoma cells, progesterone receptor moderate average nuclear intensity in 70% of the carcinoma.  A GATA3 stain was apparently not performed.       Jade now comes to our clinic for recommendations.  She has mild pain in the left neck, moderate fatigue, moderate depression and has begun on sertraline, and she has moderate anxiety.     Physical exam:  Crackles right upper and lower lung fields.          Nausea.   Zofran.  Persistent symptoms of depression sertraline was increased to 150 mg per day.      FOLLOW-UP NOTE      Jade returns to clinic during her first cycle of palbociclib and letrozole for metastatic ER-positive, HER2-negative breast cancer.  She started the palbociclib 09/21.  She is due to start her next cycle which is cycle 2 on 10/19.  She has an ANC today of 1000 which I think is adequate and I would not dose reduce the palbociclib at this time.  We will recheck the midcycle neutrophil count on the next cycle        She reports that the pain in her right calf has resolved.  She has no pain, moderate fatigue, moderate depression and anxiety.  She has some stiffness of her ankles and wrists related to the letrozole which improves with movement.      REVIEW OF SYSTEMS:  She denies fevers or chills, cough, chest pain or shortness of breath, nausea or vomiting, constipation or diarrhea, bone pain, back pain or headache.  The remainder of a 10-point review of systems is negative.  She had some mild nausea yesterday and Zofran was initiated by Dr. Jade Miranda.      PHYSICAL EXAMINATION:   VITAL SIGNS:  Blood pressure 114/75, temperature 98.1, pulse 70, respirations 18, O2 sat 96% on room air.  Height 1.74 meters, weight 122.5 kilograms   GENERAL:  Jade appeared generally well.   HEENT:  She has no alopecia.  Examination  of the oropharynx is without lesions.   LYMPH:  There is no palpable cervical, supraclavicular, subclavicular or axillary lymphadenopathy.  Notably, the left cervical and supraclavicular lymphadenopathy has resolved.   LUNGS:  Clear to percussion and auscultation.     BREASTS:  Exam not performed today.   CARDIOVASCULAR:  Regular rate and rhythm, S1, S2.   ABDOMEN:  Soft and nontender without hepatosplenomegaly.   EXTREMITIES:  Without edema.     PSYCH:  Mood is somewhat anxious.  Affect normal.      LABORATORY DATA:  The CMP is within normal limits.  CBC showed a WBC of 2.4, hemoglobin 13.3, platelets 165,000, absolute neutrophil count 1000.        IMAGING:  Ultrasound of the left lower extremity showed no evidence for deep venous thrombosis.      Chest x-ray is pending.      ASSESSMENT AND PLAN:     1.  Jade Collins is a 48-year-old woman with a history of breast cancer of the left breast, initially of unknown pathology, [id number] by report.  The pathology slides were requested from [place].  She did indeed have an ER-positive, HER2-negative breast cancer.  She was treated with primary therapy consisting of lumpectomy, adjuvant chemotherapy with AC and then Taxol, followed by radiation, followed by raloxifene for 2 years and followed by aromatase inhibitor for 6 months.  She then had a IRIS/BSO.  Aromatase inhibitor was discontinued in [place], [place].  She was on the aromatase inhibitor for only 6 months.  The anastrozole was discontinued 01/2017 and she was off of all hormonal therapy for 18 months.  She then came to see us and was diagnosed with recurrent metastatic ER-positive, HER2-negative breast cancer.   2.  We began treatment with palbociclib and letrozole.  This was started on 09/21.  She is now here for a check of her white blood cell count prior to beginning cycle 2 which will start 10/19.  I think it is reasonable to continue with the current dose of the palbociclib 25 mg per day, 21 days on and 7 days off.  We  will recheck a midcycle CBC on the next cycle.   3.  Crackles in the right lower and upper lung field.  A chest x-ray will be obtained.   4.  Followup.  We will see Jade in followup in our clinic with our SONIA on 10/20/2018 and with me on 10/27 with CBC, CMP and markers.  CT scan chest, abdomen and pelvis on 11/26/2018. Follow up with SONIA 10-20-18 with CBC, CMP, CA27.29 and CEA and with me 11-27 with CBC, CMP, CA27.29 and CEA.  CT CAP on 11-26.     Thank you for allowing us to continue to participate in Jade Collins's care.      Wilfredo Bhatt MD      [facility]       I spent 35 minutes with the patient more than 50% of which was in counseling and coordination of care.     Again, thank you for allowing me to participate in the care of your patient.      Sincerely,    Wilfredo Bhatt MD

## 2018-10-16 NOTE — NURSING NOTE
Chief Complaint   Patient presents with     Blood Draw     Labs only     Vitals done, labs drawn by venipuncture.  See doc flow sheets for details.  Dorie Bacon CMA

## 2018-10-16 NOTE — NURSING NOTE
"Oncology Rooming Note    October 16, 2018 2:56 PM   Jade Collins is a 48 year old female who presents for:    Chief Complaint   Patient presents with     Oncology Clinic Visit     [facility] RETURN- BREAST CA     Initial Vitals: /75 (BP Location: Right arm, Patient Position: Sitting, Cuff Size: Adult Large)  Pulse 70  Temp 98.1  F (36.7  C) (Oral)  Resp 18  Ht 1.74 m (5' 8.5\")  Wt 122.5 kg (270 lb)  SpO2 96%  BMI 40.45 kg/m2 Estimated body mass index is 40.45 kg/(m^2) as calculated from the following:    Height as of this encounter: 1.74 m (5' 8.5\").    Weight as of this encounter: 122.5 kg (270 lb). Body surface area is 2.43 meters squared.  No Pain (0) Comment: Data Unavailable   No LMP recorded. Patient is postmenopausal.  Allergies reviewed: Yes  Medications reviewed: Yes    Medications: MEDICATION REFILLS NEEDED TODAY. Provider was notified.  Pharmacy name entered into [facility]:    [id number] [address] AT [address] [employer] [facility] - [place], [place] - [address]    Clinical concerns: Ibrance and Letrozole refill.  [facility] was notified.    10 minutes for nursing intake (face to face time)     Lauri Vargas LPN            "

## 2018-10-17 ASSESSMENT — PATIENT HEALTH QUESTIONNAIRE - PHQ9: SUM OF ALL RESPONSES TO PHQ QUESTIONS 1-9: 20

## 2018-10-18 RX ORDER — CALCIUM CARBONATE 500 MG/1
1 TABLET, CHEWABLE ORAL PRN
COMMUNITY
End: 2021-07-15

## 2018-10-18 NOTE — PROGRESS NOTES
SUBJECTIVE/OBJECTIVE:                           Jade Collins is a 48 year old female coming in for an initial visit for Medication Therapy Management.  She was referred to me from Dr Wilfredo Bhatt.    Chief Complaint: medication review, oral chemotherapy monitoring.    The primary oncologist for this patient is Dr Wilfredo Bhatt.  The PCP for this patient is Dr Dave Gallegos.    Cancer diagnosis: Breast cancer    Allergies/ADRs: Reviewed in Epic  Tobacco: No tobacco use  Alcohol: not currently using    PMH: Reviewed in Epic    Medication Adherence/Access:  Patient takes medications directly from bottles.  Patient takes medications 2-3 time(s) per day.   Per patient, misses medication 1 times per week, but is never misses the chemotherapy doses.    Breast cancer:  Current medications include: Ibrance 125mg PO daily x 21 days then 7 days off, and letrozole 2.5mg daily continuously.  Her last cycle started on 9/21/18 and next cycle should start 10/19/18.  She reports some nausea and would like a nausea medication.  She also reports some diarrhea (up to 3/day) at the end of her Ibrance cycles.  This is controlled with Imodium.  She is taking PRN acetaminophen and ibuprofen to control the arthralgias/myalgias from letrozole.  She still does have some hot flashes, but not really worse than before starting the letrozole.       ORAL CHEMOTHERAPY 9/18/2018 9/25/2018 10/16/2018   Drug Name Ibrance (Palbociclib) Ibrance (Palbociclib) Ibrance (Palbociclib)   Current Dosage 125mg 125mg 125mg   Current Schedule Daily Daily Daily   Cycle Details 3 weeks on 1 week off 3 weeks on 1 week off 3 weeks on 1 week off   Start Date of Last Cycle - 9/21/2018 9/21/2018   Planned next cycle start date - - 10/19/2018   Doses missed in last 2 weeks - - 0   Adherence Assessment - Adherent Adherent   Adverse Effects - Arthralgias Nausea;Diarrhea   Nausea - - Grade 2   Pharmacist Intervention(nausea) - - Yes   Intervention(s) - - Rx medication  "recommendation   Diarrhea - - Grade 2   Pharmacist Intervention(diarrhea) - - No   Arthralgias - Grade 1 -   Pharmacist Intervention(arthralgias) - Yes -   Intervention(s) - OTC recommendation -   Any new drug interactions? No No -   Is the dose as ordered appropriate for the patient? Yes Yes Yes   Is the patient currently in pain? - Yes -   Does the patient feel the   pain is currently being managed by a provider? - Yes -   Has the patient been assessed within the past 6 months for depression? - No -       Hypothyroidism: Patient is taking levothyroxine 200 mcg daily. Patient is having the following symptoms: none.   TSH from outside lab = 1.11 on 7/6/18.      Depression:  Current medications include: Sertraline 150mg daily.  This was increase a while ago from 100mg.  She denied side effects.  Her PHQ9 done today showed some worrisome symptoms.  Patient was a little weepy and expressed thanks for bringing it up because it allowed her to talk about her symptoms.  PHQ-9 SCORE 10/16/2018   Total Score 20     Allergic rhinitis: Current medications include triamcinolone nasal spray 1-2 spray(s) twice daily. Pt feels that current therapy is effective.     Vitamin/supplement:  Current medications include: vitamin D 1000 units, calcium/vitamin D (2 gummies provide calcium 500mg and 1000units vitamin D, but patient has been taking only 1 tab BID).  No problems reported, and patient states it has a good taste.    Latest Ht and Wt:  Wt Readings from Last 2 Encounters:   10/16/18 270 lb (122.5 kg)   10/01/18 268 lb (121.6 kg)     Ht Readings from Last 2 Encounters:   10/16/18 5' 8.5\" (1.74 m)   09/18/18 5' 8.5\" (1.74 m)      BP Readings from Last 3 Encounters:   10/16/18 114/75   10/01/18 121/79   09/18/18 114/75       Current labs include:  Lab Results   Component Value Date    BUN 12 10/16/2018     Lab Results   Component Value Date    CR 0.92 10/16/2018     Lab Results   Component Value Date    POTASSIUM 3.9 10/16/2018 "     Lab Results   Component Value Date    ALT 22 10/16/2018     Lab Results   Component Value Date    AST 12 10/16/2018     Lab Results   Component Value Date    BILITOTAL 0.4 10/16/2018     Lab Results   Component Value Date    ALBUMIN 3.8 10/16/2018     Lab Results   Component Value Date    WBC 2.4 10/16/2018     Lab Results   Component Value Date    HGB 13.3 10/16/2018     Lab Results   Component Value Date     10/16/2018     Absolute Neutrophil   Date Value Ref Range Status   10/16/2018 1.0 (L) 1.6 - 8.3 10e9/L Final       ASSESSMENT:                             Current medications were reviewed today.     Medication Adherence: excellent, no issues identified    Breast cancer: Stable.  Patient is tolerating moderately well.  The diarrhea is controlled with Imodium.  I sent a prescription to the pharmacy for ondansetron ODT PRN.  Patient's labs today are adequate to start next cycle of Ibrance.      Hypothyroidism: Stable. Last TSH is within normal limits.       Depression: Needs Improvement. Patient thinks, and I agree, that further sertraline dose adjustments would probably be beneficial.  I described our Palliative Care program and the therapist Sari Holliday.  Patient seem enthusiastic about this service, and I put in a referral.    Allergic rhinitis: Stable.  No changes needed.    Vitamins/supplements: Needs Improvement. I pointed out that to get the recommended amount of calcium/vitamin from the actual product she uses, she needs to take 2 gummies BID.          PLAN:                            1.  I sent the ondansetron ODT prescription to the pharmacy.    2.  I put in the referral order to the Palliative Care program.  3.  Take the calcium/vitamin D gummies as 2 gummies BID.    I spent 45 minutes with this patient today. All changes were made via collaborative practice agreement with Dr Wilfredo Bhatt.  A copy of the visit note was provided to the patient's primary care and referring provider.    Will  follow up in 1-2 months.    The patient was sent via [employer] a summary of these recommendations as an after visit summary.     Jade Miranda, PharmD, BCOP, BCPS  Clinical Pharmacy Specialist/  Oncology Medication Therapy Management Pharmacist  [facility]  Pager [phone]  Phone [phone]

## 2018-10-20 NOTE — PATIENT INSTRUCTIONS
Recommendations from today's MTM visit:                                                    MTM (medication therapy management) is a service provided by a clinical pharmacist designed to help you get the most of out of your medicines.   Today we reviewed what your medicines are for, how to know if they are working, that your medicines are safe and how to make your medicine regimen as easy as possible.     1. I've sent the prescriptions for Ibrance, letrozole and ondansetron to the pharmacy.    2. I've put through a referral to Sari Holliday on the Palliative Care Team.  She is an excellent therapist.    3. To get the proper amount of calcium and vitamin D, you need to take 2 gummies 2 times per day.    Next MTM visit: 1-2 months    To schedule another MTM appointment, please call the clinic directly or you may call the MTM scheduling line at [phone] or toll-free at [phone].     My Clinical Pharmacist's contact information:                                                      It was a pleasure seeing you today!  Please feel free to contact me with any questions or concerns you have.      Jade Miranda, PharmD, BCOP, BCPS  Clinical Pharmacy Specialist/  Oncology Medication Therapy Management Pharmacist  [facility]  Pager [phone]  Phone [phone]          You may receive a survey about the MTM services you received.  I would appreciate your feedback to help me serve you better in the future. Please fill it out and return it when you can. Your comments will be anonymous.

## 2018-10-25 ENCOUNTER — CARE COORDINATION (OUTPATIENT)
Dept: ONCOLOGY | Facility: CLINIC | Age: 48
End: 2018-10-25

## 2018-10-25 NOTE — PROGRESS NOTES
"Patient c/o cough with clear mucus with \"streaks\" of blood in sputum and nasal drainage, but is afebrile. Patient also c/o constipation and straining to have a BM having blood in her stool after finally passing stool. Instructed patient to see her PMD today for lab work and evaluate. CXR results given to patient and released to [facility]. Dr Bhatt updated of patient's symptoms and recommended being seen with being on Ibrance and bloody sputum/stool. Answered all patient's questions and verbalized understanding. Farheen Price RN, BSN.   "

## 2018-11-08 ENCOUNTER — TELEPHONE (OUTPATIENT)
Dept: PALLIATIVE CARE | Facility: CLINIC | Age: 48
End: 2018-11-08

## 2018-11-08 NOTE — TELEPHONE ENCOUNTER
Palliative Care Counseling Contact    Data: Received referral from Jade Miranda, [facility] re: depression and illness related stressors.    Intervention: Called and left message for Jade introducing self, role, and mentioning colleague Krista Jacobo as well. Provided my direct contact number as well as scheduling number if interested. Per chart review, oncology appts here are on  and  so may be best for Krista Jacobo to follow for patient's convenience if she chooses to initiate palliative care counseling services.    Response/Assessment: Unable to assess today. Per chart review, information has also been shared about [facility].    Plan: Remain available. No further outreach is planned.    ROMULO Suazo, [facility]  Palliative Care Counseling Services  [facility]  Office Phone: [phone]  [phone] ([facility] or [phone] ([facility])

## 2018-11-13 ENCOUNTER — ONCOLOGY VISIT (OUTPATIENT)
Dept: ONCOLOGY | Facility: CLINIC | Age: 48
End: 2018-11-13
Attending: INTERNAL MEDICINE
Payer: COMMERCIAL

## 2018-11-13 VITALS
RESPIRATION RATE: 18 BRPM | TEMPERATURE: 97.9 F | HEART RATE: 72 BPM | BODY MASS INDEX: 40.58 KG/M2 | WEIGHT: 274 LBS | HEIGHT: 69 IN | OXYGEN SATURATION: 95 % | DIASTOLIC BLOOD PRESSURE: 74 MMHG | SYSTOLIC BLOOD PRESSURE: 106 MMHG

## 2018-11-13 DIAGNOSIS — Z17.0 MALIGNANT NEOPLASM OF UPPER-OUTER QUADRANT OF LEFT BREAST IN FEMALE, ESTROGEN RECEPTOR POSITIVE (H): ICD-10-CM

## 2018-11-13 DIAGNOSIS — E66.01 MORBID OBESITY (H): ICD-10-CM

## 2018-11-13 DIAGNOSIS — C50.412 MALIGNANT NEOPLASM OF UPPER-OUTER QUADRANT OF LEFT BREAST IN FEMALE, ESTROGEN RECEPTOR POSITIVE (H): ICD-10-CM

## 2018-11-13 DIAGNOSIS — R09.81 NASAL CONGESTION WITH RHINORRHEA: ICD-10-CM

## 2018-11-13 DIAGNOSIS — J01.01 ACUTE RECURRENT MAXILLARY SINUSITIS: Primary | ICD-10-CM

## 2018-11-13 DIAGNOSIS — J34.89 NASAL CONGESTION WITH RHINORRHEA: ICD-10-CM

## 2018-11-13 DIAGNOSIS — T50.8X5D ALLERGIC REACTION TO CONTRAST MATERIAL, SUBSEQUENT ENCOUNTER: ICD-10-CM

## 2018-11-13 DIAGNOSIS — R11.0 NAUSEA: ICD-10-CM

## 2018-11-13 LAB
ALBUMIN SERPL-MCNC: 3.8 G/DL (ref 3.4–5)
ALP SERPL-CCNC: 70 U/L (ref 40–150)
ALT SERPL W P-5'-P-CCNC: 22 U/L (ref 0–50)
ANION GAP SERPL CALCULATED.3IONS-SCNC: 4 MMOL/L (ref 3–14)
AST SERPL W P-5'-P-CCNC: 13 U/L (ref 0–45)
BASOPHILS # BLD AUTO: 0.1 10E9/L (ref 0–0.2)
BASOPHILS NFR BLD AUTO: 2.1 %
BILIRUB SERPL-MCNC: 0.4 MG/DL (ref 0.2–1.3)
BUN SERPL-MCNC: 12 MG/DL (ref 7–30)
CALCIUM SERPL-MCNC: 9.4 MG/DL (ref 8.5–10.1)
CANCER AG27-29 SERPL-ACNC: 14 U/ML (ref 0–39)
CEA SERPL-MCNC: <0.5 UG/L (ref 0–2.5)
CHLORIDE SERPL-SCNC: 108 MMOL/L (ref 94–109)
CO2 SERPL-SCNC: 26 MMOL/L (ref 20–32)
CREAT SERPL-MCNC: 0.97 MG/DL (ref 0.52–1.04)
DIFFERENTIAL METHOD BLD: ABNORMAL
EOSINOPHIL # BLD AUTO: 0.1 10E9/L (ref 0–0.7)
EOSINOPHIL NFR BLD AUTO: 2.1 %
ERYTHROCYTE [DISTWIDTH] IN BLOOD BY AUTOMATED COUNT: 15.7 % (ref 10–15)
GFR SERPL CREATININE-BSD FRML MDRD: 61 ML/MIN/1.7M2
GLUCOSE SERPL-MCNC: 93 MG/DL (ref 70–99)
HBA1C MFR BLD: 5.4 % (ref 0–5.6)
HCT VFR BLD AUTO: 37.6 % (ref 35–47)
HGB BLD-MCNC: 13.1 G/DL (ref 11.7–15.7)
IMM GRANULOCYTES # BLD: 0 10E9/L (ref 0–0.4)
IMM GRANULOCYTES NFR BLD: 0 %
LYMPHOCYTES # BLD AUTO: 1.1 10E9/L (ref 0.8–5.3)
LYMPHOCYTES NFR BLD AUTO: 44.5 %
MCH RBC QN AUTO: 31.5 PG (ref 26.5–33)
MCHC RBC AUTO-ENTMCNC: 34.8 G/DL (ref 31.5–36.5)
MCV RBC AUTO: 90 FL (ref 78–100)
MONOCYTES # BLD AUTO: 0.2 10E9/L (ref 0–1.3)
MONOCYTES NFR BLD AUTO: 8.4 %
NEUTROPHILS # BLD AUTO: 1 10E9/L (ref 1.6–8.3)
NEUTROPHILS NFR BLD AUTO: 42.9 %
NRBC # BLD AUTO: 0 10*3/UL
NRBC BLD AUTO-RTO: 0 /100
PLATELET # BLD AUTO: 147 10E9/L (ref 150–450)
POTASSIUM SERPL-SCNC: 4.4 MMOL/L (ref 3.4–5.3)
PROT SERPL-MCNC: 7.6 G/DL (ref 6.8–8.8)
RBC # BLD AUTO: 4.16 10E12/L (ref 3.8–5.2)
SODIUM SERPL-SCNC: 138 MMOL/L (ref 133–144)
WBC # BLD AUTO: 2.4 10E9/L (ref 4–11)

## 2018-11-13 PROCEDURE — 86300 IMMUNOASSAY TUMOR CA 15-3: CPT | Performed by: INTERNAL MEDICINE

## 2018-11-13 PROCEDURE — 83036 HEMOGLOBIN GLYCOSYLATED A1C: CPT | Performed by: INTERNAL MEDICINE

## 2018-11-13 PROCEDURE — 82378 CARCINOEMBRYONIC ANTIGEN: CPT | Performed by: INTERNAL MEDICINE

## 2018-11-13 PROCEDURE — 80053 COMPREHEN METABOLIC PANEL: CPT | Performed by: INTERNAL MEDICINE

## 2018-11-13 PROCEDURE — 36415 COLL VENOUS BLD VENIPUNCTURE: CPT

## 2018-11-13 PROCEDURE — 85025 COMPLETE CBC W/AUTO DIFF WBC: CPT | Performed by: INTERNAL MEDICINE

## 2018-11-13 PROCEDURE — G0463 HOSPITAL OUTPT CLINIC VISIT: HCPCS | Mod: ZF

## 2018-11-13 PROCEDURE — 99214 OFFICE O/P EST MOD 30 MIN: CPT | Mod: ZP | Performed by: PHYSICIAN ASSISTANT

## 2018-11-13 RX ORDER — PROCHLORPERAZINE MALEATE 10 MG
10 TABLET ORAL EVERY 6 HOURS PRN
Qty: 30 TABLET | Refills: 0 | Status: SHIPPED | OUTPATIENT
Start: 2018-11-13 | End: 2020-10-22

## 2018-11-13 RX ORDER — LETROZOLE 2.5 MG/1
2.5 TABLET, FILM COATED ORAL DAILY
Qty: 28 TABLET | Refills: 0 | Status: SHIPPED | OUTPATIENT
Start: 2018-11-13 | End: 2019-01-07

## 2018-11-13 RX ORDER — FLUTICASONE PROPIONATE 50 MCG
1-2 SPRAY, SUSPENSION (ML) NASAL DAILY
Qty: 1 BOTTLE | Refills: 3 | Status: SHIPPED | OUTPATIENT
Start: 2018-11-13 | End: 2019-04-30

## 2018-11-13 RX ORDER — AZITHROMYCIN 250 MG/1
TABLET, FILM COATED ORAL
Qty: 6 TABLET | Refills: 0 | Status: SHIPPED | OUTPATIENT
Start: 2018-11-13 | End: 2018-11-27

## 2018-11-13 ASSESSMENT — PAIN SCALES - GENERAL: PAINLEVEL: MODERATE PAIN (5)

## 2018-11-13 NOTE — PROGRESS NOTES
Oncology/Hematology Visit Note  Nov 13, 2018    Reason for Visit: follow up of recurrent metastatic ER-positive HER2 negative breast cancer    History of Present Illness: Jade Collins is a 48 year old female with a history of left breast cancer diagnosed in 12/2013 in [place] area and Dr. David Redd was her medical oncologist. Biopsy showed tumor to be ER positive, GA positive and HER2 negative. She underwent lumpectomy and SLNB by Dr. Tasha Segura in [place]. [place] lymph node was noted to be involved with tumor but no lymph node dissection done at that time. She subsequently underwent adjuvant ddAC x 4 cycles and Taxol x 12 weeks, competed in 9/11/2014. She had post lumpectomy radiation completed in 11/23/2014 by Dr. Manley. She was begun on an AI in 11/2014, but does not recall name.     She was then switched within about a month or so to anastrozole and remained on anastrozole from 11/2014-02/2017.  She has never received tamoxifen. She then moved to [place], [place] in 03/2017.  She saw a gynecologist there and underwent a IRIS/BSO by Dr. Zendejas.  She then also saw Dr. Linares in [place], [place], who is an oncologist.  His interpretation of the pathology report was that she had triple-negative breast cancer.  It is possible that she may have had low estrogen receptor positivity, but the anastrozole was then discontinued in 02/2017.  She then underwent the IRIS/BSO in 03/2017.  This was done laparoscopically.        She then remained off of all hormonal therapy and in 09/2017 moved to [place].  She remained off hormonal therapy and did not have Medical Oncology followup initially.        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  She then went to see Dr. Norberto Brand who performed a PET/CT scan and the patient also underwent a brain MRI for staging.  The PET/CT scan performed 08/03/2018 which showed mildly enlarged left posterior cervical lymph nodes, largest measuring  1.5 x 1.0 cm and mildly enlarged aorticopulmonary and prevascular lymph nodes that were noted on contrast-enhanced CT scan seen less well on the PET scan.  No evidence of metastatic disease in the abdomen and pelvis.  She also underwent a brain MRI that showed no evidence of intracranial metastatic disease.  She underwent a biopsy of one of the lymph nodes in the left neck which showed metastatic adenocarcinoma consistent with breast origin, which was estrogen-receptor positive.  The tumor cells were positive for CK7, ER and SD consistent with metastatic breast carcinoma.  Estrogen receptor showed strong average nuclear intensity in 95% of carcinoma cells, progesterone receptor moderate average nuclear intensity in 70% of the carcinoma.  A GATA3 stain was apparently not performed.     She started Ibrance on 9/21 and letrozole on 9/23. Please see Dr. Bhatt's previous notes for further details on the patient's history. She comes in today for routine follow up.    Interval History:  Jade is here today prior to cycle 3 ibrane/letrozole. She has had some rectal bleeding. She states she has history of hemorrhoids that have bled in past. About 2 weeks ago she started having more constipation with very hard stools. She then noticed some blood on toilet tissue and discomfort. She took 1-2 Senna and a smoothie that resolved constipation. But she did have blood with stool for about 2 weeks. Currently if she wipes, there is bleeding and some discomfort. Denies any fevers. She has hotflashes and then feels cold afterwards, but no rigors.     She also has post-nasal drip that causes her to cough. She has coughed up some blood streaked sputum, but she thinks this is from the postnasal drip. This has not worsened recently. She did have a CXR in October that was clear.    She continues to have ongoing clear rhinorrhea despite nasal saline spray. She was using nasocort but now using nasal allergy spray OTC with triamcinolone  "acetate. She also neti pot for past 2 days due to a \"moth ball\" taste in her mouth. She also has some sinus pressure/pain for 2 weeks. She has had sinus infections in past.     She is also having nausea. She takes Zofran 8mg ODT about once daily. She does not take more than 1 as causing constipation. No vomiting.     Her left breast has been \"achy\" in past 4 days. Pain is un upper outer quadrant mostly and is intermittent. No skin changes. She has not felt any new breast lumps. No swelling in left arm    Her last dose of Ibrance was 11/9. Has some hotflashes but otherwise tolerating letrozole. She has not been walking for exercise much recently due to cold weather.     Review of Systems:  Patient denies any of the following except if noted above: fevers, chills, chest pain, dyspnea,  abdominal pain, dysuria.    Current Outpatient Prescriptions   Medication Sig Dispense Refill     ACETAMINOPHEN PO Take 650 mg by mouth every 4 hours as needed for pain       albuterol (PROAIR HFA/PROVENTIL HFA/VENTOLIN HFA) 108 (90 Base) MCG/ACT inhaler Inhale 2 puffs into the lungs every 6 hours as needed for shortness of breath / dyspnea or wheezing       calcium carbonate (TUMS) 500 MG chewable tablet Take 1 chew tab by mouth as needed for heartburn       Calcium Citrate-Vitamin D (CALCIUM + D PO) Take 250 mg by mouth 2 times daily        Calcium-Vitamin D-Vitamin K (CALCIUM + D) 500-1000-40 MG-UNT-MCG CHEW Take 1 chew tab by mouth 2 times daily       IBUPROFEN PO Take 200 mg by mouth every 4 hours as needed for moderate pain       letrozole (FEMARA) 2.5 MG tablet Take 1 tablet (2.5 mg) by mouth daily for 28 days 28 tablet 0     levothyroxine (SYNTHROID/LEVOTHROID) 200 MCG tablet Take 200 mcg by mouth       ondansetron (ZOFRAN ODT) 8 MG ODT tab Take 1 tablet (8 mg) by mouth 3 times daily as needed for nausea 30 tablet 1     palbociclib (IBRANCE) 125 MG capsule CHEMO Take 1 capsule (125 mg) by mouth daily with food Take for 21 days, " "then 7 days off. Avoid grapefruit. Do not open/crush/chew capsule. 21 capsule 0     palbociclib (IBRANCE) 125 MG capsule CHEMO Take 1 capsule (125 mg) by mouth daily with food Take for 21 days, then 7 days off. Avoid grapefruit. Do not open/crush/chew capsule. 21 capsule 0     sertraline (ZOLOFT) 100 MG tablet TAKE 1 AND 1/2 TABLETS(150 MG) BY MOUTH EVERY DAY       triamcinolone (NASACORT) 55 MCG/ACT inhaler Spray 1 spray into both nostrils daily        VITAMIN D, CHOLECALCIFEROL, PO Take 2,000 Units by mouth daily          Physical Examination:  General: The patient is a pleasant female in no acute distress.  /74 (BP Location: Right arm, Patient Position: Sitting, Cuff Size: Adult Large)  Pulse 72  Temp 97.9  F (36.6  C) (Oral)  Resp 18  Ht 1.74 m (5' 8.5\")  Wt 124.3 kg (274 lb)  SpO2 95%  BMI 41.05 kg/m2  Wt Readings from Last 10 Encounters:   11/13/18 124.3 kg (274 lb)   10/16/18 122.5 kg (270 lb)   10/01/18 121.6 kg (268 lb)   09/18/18 121.8 kg (268 lb 8 oz)   09/04/18 122.7 kg (270 lb 8 oz)     HEENT: EOMI, PERRL. Sclerae are anicteric. Oral mucosa is pink and moist with no lesions or thrush.   Lymph: No palpable cervical, supraclavicular, subclavicular or axillary lymphadenopathy.  Heart: Regular rate and rhythm.   Lungs: Clear to auscultation bilaterally.   Breast: left breast with well healed incision in 11 o'clock position. Some palpable scar tissue. No suspicious masses.  Abdomen: Bowel sounds present, soft, nontender with no palpable hepatosplenomegaly or masses. Karime-rectal area examined. No visible hemorrhoids. No erythema, induration, fissures.  Extremities: No lower extremity edema noted bilaterally.   Neuro: Cranial nerves II through XII are grossly intact.  Skin: No rashes, petechiae, or bruising noted on exposed skin.    Laboratory Data:  Results for ANAHI DOBSON (MRN [id number]) as of 11/13/2018 15:18   11/13/2018 12:54   Sodium 138   Potassium 4.4   Chloride 108   Carbon Dioxide 26 "   Urea Nitrogen 12   Creatinine 0.97   GFR Estimate 61   GFR Estimate If Black 74   Calcium 9.4   Anion Gap 4   Albumin 3.8   Protein Total 7.6   Bilirubin Total 0.4   Alkaline Phosphatase 70   ALT 22   AST 13   Hemoglobin A1C 5.4   Glucose 93   WBC 2.4 (L)   Hemoglobin 13.1   Hematocrit 37.6   Platelet Count 147 (L)   RBC Count 4.16   MCV 90   MCH 31.5   MCHC 34.8   RDW 15.7 (H)   Diff Method Automated Method   % Neutrophils 42.9   % Lymphocytes 44.5   % Monocytes 8.4   % Eosinophils 2.1   % Basophils 2.1   % Immature Granulocytes 0.0   Nucleated RBCs 0   Absolute Neutrophil 1.0 (L)   Absolute Lymphocytes 1.1   Absolute Monocytes 0.2   Absolute Eosinophils 0.1   Absolute Basophils 0.1   Abs Immature Granulocytes 0.0   Absolute Nucleated RBC 0.0       Assessment and Plan:    Recurrent, metastatic ER-positive, HER2 negative breast cancer: History of left breast cancer s/p lumpectomy and SNLB in 2014. S/p 4 cycles ddAC and 12 weeks of Taxol. On AI from 11/2014-2/2017. Recurrence on PET/CT scan performed 08/03/2018 which showed mildly enlarged left posterior cervical lymph nodes, largest measuring 1.5 x 1.0 cm and mildly enlarged aorticopulmonary and prevascular lymph nodes that were noted on contrast-enhanced CT scan seen less well on the PET scan.  No evidence of metastatic disease in the abdomen and pelvis.  She also underwent a brain MRI that showed no evidence of intracranial metastatic disease.  She underwent a biopsy of one of the lymph nodes in the left neck which showed metastatic adenocarcinoma consistent with breast origin, which was estrogen-receptor positive.  The tumor cells were positive for CK7, ER and [place] consistent with metastatic breast carcinoma.  Started Ibrance and letrozole on 9/21. Tolerating well aside from some hotflashes/nightsweats and nausea. She is about Day 26 of ibrance. ANC 1.0. Having some left breast discomfort but nothing palpable on left breast exam. Asked her to let us know if pain  increases and we can consider moving CT scan up.  --Continue ibrance, letrozole    Nausea: likely 2/2 ibrance. She has been using Zofran 8mg ODT but only taking 1 daily and having problems with constipation. Will change to compazine 10 mg q6h prn.     Rectal bleeding, constipation: likely 2/2 to internal hemorrhoids. No visible hemorrhoids on exam, and no induration, erythema, fissures. Recommend to continue senna and to add miralax as needed to keep stools soft. Call if increased pain, fever with temp >100.4F.    LLE pain: US 10/01 with no DVT. Pain has resolved    Bone health: bone targeting agents are not required as she has no evidence of bone metastases. Continue calcium and vitamin D.     Discussion of diet and exercise: recommend diet low in saturated fat. Encouraged 150 minutes exercise/week.    Follow up: CT CAP scheduled 11/26 and follow up with Dr. Bhatt on 11/27    Evelina Garcia PA-C  [facility]  [address], [place] [address] [phone]

## 2018-11-13 NOTE — LETTER
11/13/2018      RE: Jade Collins  [address]  [facility] [address]       Oncology/Hematology Visit Note  Nov 13, 2018    Reason for Visit: follow up of recurrent metastatic ER-positive HER2 negative breast cancer    History of Present Illness: Jade Collins is a 48 year old female with a history of left breast cancer diagnosed in 12/2013 in [place] area and Dr. David Redd was her medical oncologist. Biopsy showed tumor to be ER positive, NC positive and HER2 negative. She underwent lumpectomy and SLNB by Dr. Tasha Segura in [place]. [place] lymph node was noted to be involved with tumor but no lymph node dissection done at that time. She subsequently underwent adjuvant ddAC x 4 cycles and Taxol x 12 weeks, competed in 9/11/2014. She had post lumpectomy radiation completed in 11/23/2014 by Dr. Manley. She was begun on an AI in 11/2014, but does not recall name.     She was then switched within about a month or so to anastrozole and remained on anastrozole from 11/2014-02/2017.  She has never received tamoxifen. She then moved to [place], [place] in 03/2017.  She saw a gynecologist there and underwent a IRIS/BSO by Dr. Zendejas.  She then also saw Dr. Linares in [place], [place], who is an oncologist.  His interpretation of the pathology report was that she had triple-negative breast cancer.  It is possible that she may have had low estrogen receptor positivity, but the anastrozole was then discontinued in 02/2017.  She then underwent the IRIS/BSO in 03/2017.  This was done laparoscopically.        She then remained off of all hormonal therapy and in 09/2017 moved to [place].  She remained off hormonal therapy and did not have Medical Oncology followup initially.        She was driving for [employer] and noticed lymph nodes in the left neck about 8 weeks ago.  She then went to see Dr. Norberto Brand who performed a PET/CT scan and the patient also underwent a brain MRI for staging.  The PET/CT scan performed 08/03/2018 which  showed mildly enlarged left posterior cervical lymph nodes, largest measuring 1.5 x 1.0 cm and mildly enlarged aorticopulmonary and prevascular lymph nodes that were noted on contrast-enhanced CT scan seen less well on the PET scan.  No evidence of metastatic disease in the abdomen and pelvis.  She also underwent a brain MRI that showed no evidence of intracranial metastatic disease.  She underwent a biopsy of one of the lymph nodes in the left neck which showed metastatic adenocarcinoma consistent with breast origin, which was estrogen-receptor positive.  The tumor cells were positive for CK7, ER and [place] consistent with metastatic breast carcinoma.  Estrogen receptor showed strong average nuclear intensity in 95% of carcinoma cells, progesterone receptor moderate average nuclear intensity in 70% of the carcinoma.  A GATA3 stain was apparently not performed.     She started Ibrance on 9/21 and letrozole on 9/23. Please see Dr. Bhatt's previous notes for further details on the patient's history. She comes in today for routine follow up.    Interval History:  Jade is here today prior to cycle 3 ibrane/letrozole. She has had some rectal bleeding. She states she has history of hemorrhoids that have bled in past. About 2 weeks ago she started having more constipation with very hard stools. She then noticed some blood on toilet tissue and discomfort. She took 1-2 Senna and a smoothie that resolved constipation. But she did have blood with stool for about 2 weeks. Currently if she wipes, there is bleeding and some discomfort. Denies any fevers. She has hotflashes and then feels cold afterwards, but no rigors.     She also has post-nasal drip that causes her to cough. She has coughed up some blood streaked sputum, but she thinks this is from the postnasal drip. This has not worsened recently. She did have a CXR in October that was clear.    She continues to have ongoing clear rhinorrhea despite nasal saline spray. She was  "using nasocort but now using nasal allergy spray OTC with triamcinolone acetate. She also neti pot for past 2 days due to a \"moth ball\" taste in her mouth. She also has some sinus pressure/pain for 2 weeks. She has had sinus infections in past.     She is also having nausea. She takes Zofran 8mg ODT about once daily. She does not take more than 1 as causing constipation. No vomiting.     Her left breast has been \"achy\" in past 4 days. Pain is un upper outer quadrant mostly and is intermittent. No skin changes. She has not felt any new breast lumps. No swelling in left arm    Her last dose of Ibrance was 11/9. Has some hotflashes but otherwise tolerating letrozole. She has not been walking for exercise much recently due to cold weather.     Review of Systems:  Patient denies any of the following except if noted above: fevers, chills, chest pain, dyspnea,  abdominal pain, dysuria.    Current Outpatient Prescriptions   Medication Sig Dispense Refill     ACETAMINOPHEN PO Take 650 mg by mouth every 4 hours as needed for pain       albuterol (PROAIR HFA/PROVENTIL HFA/VENTOLIN HFA) 108 (90 Base) MCG/ACT inhaler Inhale 2 puffs into the lungs every 6 hours as needed for shortness of breath / dyspnea or wheezing       calcium carbonate (TUMS) 500 MG chewable tablet Take 1 chew tab by mouth as needed for heartburn       Calcium Citrate-Vitamin D (CALCIUM + D PO) Take 250 mg by mouth 2 times daily        Calcium-Vitamin D-Vitamin K (CALCIUM + D) 500-1000-40 MG-UNT-MCG CHEW Take 1 chew tab by mouth 2 times daily       IBUPROFEN PO Take 200 mg by mouth every 4 hours as needed for moderate pain       letrozole (FEMARA) 2.5 MG tablet Take 1 tablet (2.5 mg) by mouth daily for 28 days 28 tablet 0     levothyroxine (SYNTHROID/LEVOTHROID) 200 MCG tablet Take 200 mcg by mouth       ondansetron (ZOFRAN ODT) 8 MG ODT tab Take 1 tablet (8 mg) by mouth 3 times daily as needed for nausea 30 tablet 1     palbociclib (IBRANCE) 125 MG capsule " "CHEMO Take 1 capsule (125 mg) by mouth daily with food Take for 21 days, then 7 days off. Avoid grapefruit. Do not open/crush/chew capsule. 21 capsule 0     palbociclib (IBRANCE) 125 MG capsule CHEMO Take 1 capsule (125 mg) by mouth daily with food Take for 21 days, then 7 days off. Avoid grapefruit. Do not open/crush/chew capsule. 21 capsule 0     sertraline (ZOLOFT) 100 MG tablet TAKE 1 AND 1/2 TABLETS(150 MG) BY MOUTH EVERY DAY       triamcinolone (NASACORT) 55 MCG/ACT inhaler Spray 1 spray into both nostrils daily        VITAMIN D, CHOLECALCIFEROL, PO Take 2,000 Units by mouth daily          Physical Examination:  General: The patient is a pleasant female in no acute distress.  /74 (BP Location: Right arm, Patient Position: Sitting, Cuff Size: Adult Large)  Pulse 72  Temp 97.9  F (36.6  C) (Oral)  Resp 18  Ht 1.74 m (5' 8.5\")  Wt 124.3 kg (274 lb)  SpO2 95%  BMI 41.05 kg/m2  Wt Readings from Last 10 Encounters:   11/13/18 124.3 kg (274 lb)   10/16/18 122.5 kg (270 lb)   10/01/18 121.6 kg (268 lb)   09/18/18 121.8 kg (268 lb 8 oz)   09/04/18 122.7 kg (270 lb 8 oz)     HEENT: EOMI, PERRL. Sclerae are anicteric. Oral mucosa is pink and moist with no lesions or thrush.   Lymph: No palpable cervical, supraclavicular, subclavicular or axillary lymphadenopathy.  Heart: Regular rate and rhythm.   Lungs: Clear to auscultation bilaterally.   Breast: left breast with well healed incision in 11 o'clock position. Some palpable scar tissue. No suspicious masses.  Abdomen: Bowel sounds present, soft, nontender with no palpable hepatosplenomegaly or masses. Karime-rectal area examined. No visible hemorrhoids. No erythema, induration, fissures.  Extremities: No lower extremity edema noted bilaterally.   Neuro: Cranial nerves II through XII are grossly intact.  Skin: No rashes, petechiae, or bruising noted on exposed skin.    Laboratory Data:  Results for ANAHI DOBSON (MRN [id number]) as of 11/13/2018 15:18   11/13/2018 " 12:54   Sodium 138   Potassium 4.4   Chloride 108   Carbon Dioxide 26   Urea Nitrogen 12   Creatinine 0.97   GFR Estimate 61   GFR Estimate If Black 74   Calcium 9.4   Anion Gap 4   Albumin 3.8   Protein Total 7.6   Bilirubin Total 0.4   Alkaline Phosphatase 70   ALT 22   AST 13   Hemoglobin A1C 5.4   Glucose 93   WBC 2.4 (L)   Hemoglobin 13.1   Hematocrit 37.6   Platelet Count 147 (L)   RBC Count 4.16   MCV 90   MCH 31.5   MCHC 34.8   RDW 15.7 (H)   Diff Method Automated Method   % Neutrophils 42.9   % Lymphocytes 44.5   % Monocytes 8.4   % Eosinophils 2.1   % Basophils 2.1   % Immature Granulocytes 0.0   Nucleated RBCs 0   Absolute Neutrophil 1.0 (L)   Absolute Lymphocytes 1.1   Absolute Monocytes 0.2   Absolute Eosinophils 0.1   Absolute Basophils 0.1   Abs Immature Granulocytes 0.0   Absolute Nucleated RBC 0.0       Assessment and Plan:    Recurrent, metastatic ER-positive, HER2 negative breast cancer: History of left breast cancer s/p lumpectomy and SNLB in 2014. S/p 4 cycles ddAC and 12 weeks of Taxol. On AI from 11/2014-2/2017. Recurrence on PET/CT scan performed 08/03/2018 which showed mildly enlarged left posterior cervical lymph nodes, largest measuring 1.5 x 1.0 cm and mildly enlarged aorticopulmonary and prevascular lymph nodes that were noted on contrast-enhanced CT scan seen less well on the PET scan.  No evidence of metastatic disease in the abdomen and pelvis.  She also underwent a brain MRI that showed no evidence of intracranial metastatic disease.  She underwent a biopsy of one of the lymph nodes in the left neck which showed metastatic adenocarcinoma consistent with breast origin, which was estrogen-receptor positive.  The tumor cells were positive for CK7, ER and [place] consistent with metastatic breast carcinoma.  Started Ibrance and letrozole on 9/21. Tolerating well aside from some hotflashes/nightsweats and nausea. She is about Day 26 of ibrance. ANC 1.0. Having some left breast discomfort but  nothing palpable on left breast exam. Asked her to let us know if pain increases and we can consider moving CT scan up.  --Continue ibrance, letrozole    Nausea: likely 2/2 ibrance. She has been using Zofran 8mg ODT but only taking 1 daily and having problems with constipation. Will change to compazine 10 mg q6h prn.     Rectal bleeding, constipation: likely 2/2 to internal hemorrhoids. No visible hemorrhoids on exam, and no induration, erythema, fissures. Recommend to continue senna and to add miralax as needed to keep stools soft. Call if increased pain, fever with temp >100.4F.    LLE pain: US 10/01 with no DVT. Pain has resolved    Bone health: bone targeting agents are not required as she has no evidence of bone metastases. Continue calcium and vitamin D.     Discussion of diet and exercise: recommend diet low in saturated fat. Encouraged 150 minutes exercise/week.    Follow up: CT CAP scheduled 11/26 and follow up with Dr. Bhatt on 11/27    Evelina Garcia PA-C  [facility]  [address] [place] [id number] [phone]

## 2018-11-13 NOTE — NURSING NOTE
"Oncology Rooming Note    November 13, 2018 1:00 PM   Jade Collins is a 48 year old female who presents for:    Chief Complaint   Patient presents with     Oncology Clinic Visit     Breast Cancer     Initial Vitals: /74 (BP Location: Right arm, Patient Position: Sitting, Cuff Size: Adult Large)  Pulse 72  Temp 97.9  F (36.6  C) (Oral)  Resp 18  Ht 1.74 m (5' 8.5\")  Wt 124.3 kg (274 lb)  SpO2 95%  BMI 41.05 kg/m2 Estimated body mass index is 41.05 kg/(m^2) as calculated from the following:    Height as of this encounter: 1.74 m (5' 8.5\").    Weight as of this encounter: 124.3 kg (274 lb). Body surface area is 2.45 meters squared.  Moderate Pain (5) Comment: Data Unavailable   No LMP recorded. Patient is postmenopausal.  Allergies reviewed: Yes  Medications reviewed: Yes    Medications: Medication refills not needed today.  Pharmacy name entered into [facility]:    [id number] - [facility] [address] [facility] [address] [facility] [address]  [facility] - [place], [place] - [address]    Clinical concerns: No New Concerns    5 minutes for nursing intake (face to face time)     KARENA Dominguez      "

## 2018-11-13 NOTE — MR AVS SNAPSHOT
After Visit Summary   11/13/2018    Jade Collins    MRN: [id number]           Patient Information     Date Of Birth          1970        Visit Information        Provider Department      11/13/2018 1:10 PM Evelina Garcia PA [facility]        Today's Diagnoses     Acute recurrent maxillary sinusitis    -  1    Nausea        Nasal congestion with rhinorrhea        Malignant neoplasm of upper-outer quadrant of left breast in female, estrogen receptor positive (H)           Follow-ups after your visit        Your next 10 appointments already scheduled     Nov 26, 2018  1:20 PM CST   CT CHEST/ABDOMEN/PELVIS W CONTRAST with UCCT2   [facility] CT ([facility])    [address]  1st Floor  [facility] [address] [phone]           How do I prepare for my exam? (Food and drink instructions) To prepare: Do not eat or drink for 2 hours before your exam. If you need to take medicine, you may take it with small sips of water. (We may ask you to take liquid medicine as well.)  How do I prepare for my exam? (Other instructions) Please arrive 30 minutes early for your CT.  Once in the department you might be asked to drink water 15-20 minutes prior to your exam.  If indicated you may be asked to drink an oral contrast in advance of your CT.  If this is the case, the imaging team will let you know or be in contact with you prior to your appointment  Patients over 70 or patients with diabetes or kidney problems: If you haven t had a blood test (creatinine test) within the last 30 days, the Cardiologist/Radiologist may require you to get this test prior to your exam.  If you have diabetes:  Continue to take your metformin medication on the day of your exam  What should I wear: Please wear loose clothing, such as a sweat suit or jogging clothes. Avoid snaps, zippers and other metal. We may ask you to undress and put on a hospital gown.  How  long does the exam take: Most scans take less than 20 minutes.  What should I bring: Please bring any scans or X-rays taken at other hospitals, if similar tests were done. Also bring a list of your medicines, including vitamins, minerals and over-the-counter drugs. It is safest to leave personal items at home.  Do I need a : No  is needed.  What do I need to tell my doctor? Be sure to tell your doctor: * If you have any allergies. * If there s any chance you are pregnant. * If you are breastfeeding.  What should I do after the exam: No restrictions, You may resume normal activities.  What is this test: A CT (computed tomography) scan is a series of pictures that allows us to look inside your body. The scanner creates images of the body in cross sections, much like slices of bread. This helps us see any problems more clearly. You may receive contrast (X-ray dye) before or during your scan. You will be asked to drink the contrast.  Who should I call with questions: If you have any questions, please call the Imaging Department where you will have your exam. Directions, parking instructions, and other information is available on our website, [email or web address].            Nov 27, 2018 12:30 PM CST   [facility] Draw with  [facility] DRAW   [facility] Lab Draw ([facility])    [address] [place] [address]  [address] [place] [address] [phone]            Nov 27, 2018  1:00 PM CST   (Arrive by 12:45 PM)   Return Visit with Wilfredo Bhatt MD   [facility] ([facility])    [address] [place] [address]  [address] [place] [address] [phone]              Who to contact     If you have questions or need follow up information about today's clinic visit or your schedule please contact [facility] directly at [phone].  Normal or non-critical lab and imaging results will be communicated to you by  "MyChart, letter or phone within 4 business days after the clinic has received the results. If you do not hear from us within 7 days, please contact the clinic through naaptolhart or phone. If you have a critical or abnormal lab result, we will notify you by phone as soon as possible.  Submit refill requests through [employer] or call your pharmacy and they will forward the refill request to us. Please allow 3 business days for your refill to be completed.          Additional Information About Your Visit        naaptolhart Information     [employer] gives you secure access to your electronic health record. If you see a primary care provider, you can also send messages to your care team and make appointments. If you have questions, please call your primary care clinic.  If you do not have a primary care provider, please call [phone] and they will assist you.        Care EveryWhere ID     This is your Care EveryWhere ID. This could be used by other organizations to access your [facility] medical records  [id number]        Your Vitals Were     Pulse Temperature Respirations Height Pulse Oximetry BMI (Body Mass Index)    72 97.9  F (36.6  C) (Oral) 18 1.74 m (5' 8.5\") 95% 41.05 kg/m2       Blood Pressure from Last 3 Encounters:   11/13/18 106/74   10/16/18 114/75   10/01/18 121/79    Weight from Last 3 Encounters:   11/13/18 124.3 kg (274 lb)   10/16/18 122.5 kg (270 lb)   10/01/18 121.6 kg (268 lb)              Today, you had the following     No orders found for display         Today's Medication Changes          These changes are accurate as of 11/13/18  3:28 PM.  If you have any questions, ask your nurse or doctor.               Start taking these medicines.        Dose/Directions    azithromycin 250 MG tablet   Commonly known as:  ZITHROMAX   Used for:  Acute recurrent maxillary sinusitis   Started by:  Evelina Garcia PA        Two tablets first day, then one tablet daily for four days.   Quantity:  6 tablet   Refills:  " 0       fluticasone 50 MCG/ACT spray   Commonly known as:  FLONASE   Used for:  Nasal congestion with rhinorrhea   Started by:  Evelina Garcia PA        Dose:  1-2 spray   Spray 1-2 sprays into both nostrils daily   Quantity:  1 Bottle   Refills:  3       prochlorperazine 10 MG tablet   Commonly known as:  COMPAZINE   Used for:  Nausea   Started by:  Evelina Garcia PA        Dose:  10 mg   Take 1 tablet (10 mg) by mouth every 6 hours as needed for nausea or vomiting   Quantity:  30 tablet   Refills:  0         These medicines have changed or have updated prescriptions.        Dose/Directions    * letrozole 2.5 MG tablet   Commonly known as:  FEMARA   This may have changed:  Another medication with the same name was added. Make sure you understand how and when to take each.   Used for:  Malignant neoplasm of upper-outer quadrant of left breast in female, estrogen receptor positive (H)   Changed by:  Evelina Garcia PA        Dose:  2.5 mg   Take 1 tablet (2.5 mg) by mouth daily for 28 days   Quantity:  28 tablet   Refills:  0       * letrozole 2.5 MG tablet   Commonly known as:  FEMARA   This may have changed:  You were already taking a medication with the same name, and this prescription was added. Make sure you understand how and when to take each.   Used for:  Malignant neoplasm of upper-outer quadrant of left breast in female, estrogen receptor positive (H)   Changed by:  Evelina Garcia PA        Dose:  2.5 mg   Take 1 tablet (2.5 mg) by mouth daily for 28 days   Quantity:  28 tablet   Refills:  0       * palbociclib 125 MG capsule CHEMO   Commonly known as:  IBRANCE   This may have changed:  Another medication with the same name was added. Make sure you understand how and when to take each.   Used for:  Malignant neoplasm of upper-outer quadrant of left breast in female, estrogen receptor positive (H)   Changed by:  Evelina Garcia PA        Dose:  125 mg   Take 1 capsule (125 mg) by mouth  daily with food Take for 21 days, then 7 days off. Avoid grapefruit. Do not open/crush/chew capsule.   Quantity:  21 capsule   Refills:  0       * palbociclib 125 MG capsule CHEMO   Commonly known as:  IBRANCE   This may have changed:  Another medication with the same name was added. Make sure you understand how and when to take each.   Used for:  Malignant neoplasm of upper-outer quadrant of left breast in female, estrogen receptor positive (H)   Changed by:  Evelina Garcia PA        Dose:  125 mg   Take 1 capsule (125 mg) by mouth daily with food Take for 21 days, then 7 days off. Avoid grapefruit. Do not open/crush/chew capsule.   Quantity:  21 capsule   Refills:  0       * palbociclib 125 MG capsule CHEMO   Commonly known as:  IBRANCE   This may have changed:  You were already taking a medication with the same name, and this prescription was added. Make sure you understand how and when to take each.   Used for:  Malignant neoplasm of upper-outer quadrant of left breast in female, estrogen receptor positive (H)   Changed by:  Evelina Garcia PA        Dose:  125 mg   Take 1 capsule (125 mg) by mouth daily with food Take for 21 days, then 7 days off. Avoid grapefruit. Do not open/crush/chew capsule.   Quantity:  21 capsule   Refills:  0       * Notice:  This list has 5 medication(s) that are the same as other medications prescribed for you. Read the directions carefully, and ask your doctor or other care provider to review them with you.         Where to get your medicines      These medications were sent to [facility] - [address]  [address] [facility] [address]    Hours:  Mon-Fri 8:30am-5:00pm Toll Free [phone] Phone:  [phone]     letrozole 2.5 MG tablet         These medications were sent to [facility] [id number]  [address]  [address] [facility] [address]  [address] [facility]  [address]     Phone:  [phone]     azithromycin 250 MG tablet    fluticasone 50 MCG/ACT spray    prochlorperazine 10 MG tablet         Call your pharmacy to confirm that your medication is ready for pickup. It may take up to 24 hours for them to receive the prescription. If the prescription is not ready within 3 business days, please contact your clinic or your provider.     We will let you know when these medications are ready. If you don't hear back within 3 business days, please contact us.     palbociclib 125 MG capsule CHEMO                Primary Care Provider Office Phone # Fax #    Dave Gallegos [phone]       [facility] [address]  [facility] [address]        Equal Access to Services     ESPERANZA DA SILVA : Hadii aad ku hadasho Soomaali, waaxda luqadaha, qaybta kaalmada adeegyada, cody norman . So [facility] [phone].    ATENCIÓN: Si habla español, tiene a ramos disposición servicios gratuitos de asistencia lingüística. [facility] [phone].    We comply with applicable federal civil rights laws and [place] laws. We do not discriminate on the basis of race, color, national origin, age, disability, sex, sexual orientation, or gender identity.            Thank you!     Thank you for choosing [facility]  for your care. Our goal is always to provide you with excellent care. Hearing back from our patients is one way we can continue to improve our services. Please take a few minutes to complete the written survey that you may receive in the mail after your visit with us. Thank you!             Your Updated Medication List - Protect others around you: Learn how to safely use, store and throw away your medicines at www.disposemymeds.org.          This list is accurate as of 11/13/18  3:28 PM.  Always use your most recent med list.                   Brand Name Dispense Instructions for use Diagnosis    ACETAMINOPHEN PO      Take  650 mg by mouth every 4 hours as needed for pain        albuterol 108 (90 Base) MCG/ACT inhaler    PROAIR HFA/PROVENTIL HFA/VENTOLIN HFA     Inhale 2 puffs into the lungs every 6 hours as needed for shortness of breath / dyspnea or wheezing    Malignant neoplasm of upper-outer quadrant of left breast in female, estrogen receptor positive (H)       azithromycin 250 MG tablet    ZITHROMAX    6 tablet    Two tablets first day, then one tablet daily for four days.    Acute recurrent maxillary sinusitis       CALCIUM + D 500-1000-40 MG-UNT-MCG Chew   Generic drug:  Calcium-Vitamin D-Vitamin K      Take 1 chew tab by mouth 2 times daily        CALCIUM + D PO      Take 250 mg by mouth 2 times daily    Malignant neoplasm of upper-outer quadrant of left breast in female, estrogen receptor positive (H)       calcium carbonate 500 MG chewable tablet    TUMS     Take 1 chew tab by mouth as needed for heartburn        fluticasone 50 MCG/ACT spray    FLONASE    1 Bottle    Spray 1-2 sprays into both nostrils daily    Nasal congestion with rhinorrhea       IBUPROFEN PO      Take 200 mg by mouth every 4 hours as needed for moderate pain        * letrozole 2.5 MG tablet    FEMARA    28 tablet    Take 1 tablet (2.5 mg) by mouth daily for 28 days    Malignant neoplasm of upper-outer quadrant of left breast in female, estrogen receptor positive (H)       * letrozole 2.5 MG tablet    FEMARA    28 tablet    Take 1 tablet (2.5 mg) by mouth daily for 28 days    Malignant neoplasm of upper-outer quadrant of left breast in female, estrogen receptor positive (H)       levothyroxine 200 MCG tablet    SYNTHROID/LEVOTHROID     Take 200 mcg by mouth        ondansetron 8 MG ODT tab    ZOFRAN ODT    30 tablet    Take 1 tablet (8 mg) by mouth 3 times daily as needed for nausea    Malignant neoplasm of upper-outer quadrant of left breast in female, estrogen receptor positive (H)       * palbociclib 125 MG capsule CHEMO    IBRANCE    21 capsule     Take 1 capsule (125 mg) by mouth daily with food Take for 21 days, then 7 days off. Avoid grapefruit. Do not open/crush/chew capsule.    Malignant neoplasm of upper-outer quadrant of left breast in female, estrogen receptor positive (H)       * palbociclib 125 MG capsule CHEMO    IBRANCE    21 capsule    Take 1 capsule (125 mg) by mouth daily with food Take for 21 days, then 7 days off. Avoid grapefruit. Do not open/crush/chew capsule.    Malignant neoplasm of upper-outer quadrant of left breast in female, estrogen receptor positive (H)       * palbociclib 125 MG capsule CHEMO    IBRANCE    21 capsule    Take 1 capsule (125 mg) by mouth daily with food Take for 21 days, then 7 days off. Avoid grapefruit. Do not open/crush/chew capsule.    Malignant neoplasm of upper-outer quadrant of left breast in female, estrogen receptor positive (H)       prochlorperazine 10 MG tablet    COMPAZINE    30 tablet    Take 1 tablet (10 mg) by mouth every 6 hours as needed for nausea or vomiting    Nausea       sertraline 100 MG tablet    ZOLOFT     TAKE 1 AND 1/2 TABLETS(150 MG) BY MOUTH EVERY DAY        triamcinolone 55 MCG/ACT inhaler    NASACORT     Spray 1 spray into both nostrils daily        VITAMIN D (CHOLECALCIFEROL) PO      Take 2,000 Units by mouth daily        * Notice:  This list has 5 medication(s) that are the same as other medications prescribed for you. Read the directions carefully, and ask your doctor or other care provider to review them with you.

## 2018-11-16 ENCOUNTER — TELEPHONE (OUTPATIENT)
Dept: PALLIATIVE CARE | Facility: CLINIC | Age: 48
End: 2018-11-16

## 2018-11-16 NOTE — TELEPHONE ENCOUNTER
Palliative Care Counseling Contact    Data: Message received from Jade requesting to schedule. Notes she was referred by pharmacist Jade Miranda, is struggling emotionally related to having stage 4 cancer.    Intervention: Called and discussed role with patient, assisted with scheduling.    Response/Assessment: She was receptive to meeting for psychotherapy intake, expressed that she is struggling with feelings of unreality re: having stage 4 cancer, has been attending art and other classes and groups at [email or web address], including an improv class she is loving. She is trying to decide whether to try to keep working or go on disability. She is struggling with low motivation and just wanting to do nothing. She has a cat.    Plan: See on Thurs Dec 6 at 3pm at [facility] for initial counseling assessment.    ROMULO Suazo, [facility]  Palliative Care Counseling Services  [facility]  Office Phone: [phone]  [phone] ([facility]) or [phone] ([facility])

## 2018-11-25 NOTE — PROGRESS NOTES
HISTORY OF PRESENT ILLNESS:  Jade is a 48-year-old woman with metastatic breast cancer who comes to our clinic for recommendations for further treatment.  She is referred by Dr. Norberto Brand at [facility].  Jade would like to continue her care at the [facility].       Jade was diagnosed with breast cancer with a lump found in the upper-inner quadrant of the left breast.  She was diagnosed in 12/2013 in the [place] area and Dr. David Redd was her medical oncologist.  Biopsy of the tumor showed it to be ER positive, OK positive, and HER-2 negative.  We do not have any of the original pathology and we need to obtain those reports.  She underwent a lumpectomy performed by Dr. Tasha Segura who is a surgeon in the [place] area.  She also had a sentinel lymph node which was noted to be involved with tumor but there was no lymph node dissection done at that time.  [id number].  She subsequently underwent adjuvant chemotherapy with dose-dense AC for 4 cycles and Taxol for 12 weeks, completed 09/11/2014.        She then had radiation therapy post lumpectomy to the left breast, which was completed 11/23/2014 by Dr. Manley.        She was then begun on an aromatase inhibitor, the name of which she does not remember.  The aromatase inhibitor therapy was begun in 11/2014.  She was then switched within about a month or so to anastrozole and remained on anastrozole from 11/2014-02/2017.  She has never received tamoxifen.        She then moved to [place], [place] in 03/2017.  She saw a gynecologist there and underwent a IRIS/BSO by Dr. Zendejas.  She then also saw Dr. Linares in [place], [place], who is an oncologist.  His interpretation of the pathology report was that she had triple-negative breast cancer.  It is possible that she may have had low estrogen receptor positivity, but the anastrozole was then discontinued in 02/2017.  She then underwent the IRIS/BSO in 03/2017.  This was done laparoscopically.        She then  remained off of all hormonal therapy and in 09/2017 moved to [place].  She remained off hormonal therapy and did not have Medical Oncology followup initially.        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  She then went to see Dr. Norberto Brand who performed a PET/CT scan and the patient also underwent a brain MRI for staging.  The PET/CT scan performed 08/03/2018 showed in the neck there were several mildly metabolic active and large prominent left posterior cervical lymph nodes.  The largest is located posterior to the left sternocleidomastoid muscle and measures 1.5 x 1 cm in size.  This demonstrate modest FDG uptake.  The other lymph nodes are smaller and demonstrate mild FDG uptake.  The prominent prevascular and aortopulmonary lymph nodes were also seen on the contrast-enhanced scan were less well seen on the PET.  The largest lymph node visualized on the PET scan was 1.1 x 0.8 cm and demonstrated mild FDG uptake.  This was in the periaortic area just anterior to the aortic arch.  No lung nodules.  No lung infiltrates.  No pleural effusion.  There was no uptake of FDG in the bones.  In summary, there were mildly enlarged left posterior cervical lymph nodes, largest measuring 1.5 x 1.0 cm and mildly enlarged aorticopulmonary and prevascular lymph nodes that were noted on contrast-enhanced CT scan seen less well on the PET scan.  No evidence of metastatic disease in the abdomen and pelvis.  She also underwent a brain MRI that showed no evidence of intracranial metastatic disease.  She underwent a biopsy of one of the lymph nodes in the left neck which showed metastatic adenocarcinoma consistent with breast origin, which was estrogen-receptor positive.  The tumor cells were positive for CK7, ER and WV consistent with metastatic breast carcinoma.  Estrogen receptor showed strong average nuclear intensity in 95% of carcinoma cells, progesterone receptor moderate average  nuclear intensity in 70% of the carcinoma.  A GATA3 stain was apparently not performed.       Jade came to our clinic for recommendations.  She has mild pain in the left neck, moderate fatigue, moderate depression and has begun on sertraline, and she has moderate anxiety.      Nausea.   Zofran.  Persistent symptoms of depression sertraline was increased to 150 mg per day.       FOLLOW-UP NOTE     No bone lesions.     FOLLOWUP NOTE       HISTORY OF PRESENT ILLNESS:  Jade returns to clinic and is doing quite well.  She did have an allergy reaction to IV contrast with some swelling, and she was subsequently premedicated with 32 mg of methylprednisolone 12 and 2 hours before her next CT scan which was done the next day.  Overall, the CT scan of the chest, abdomen and pelvis as well as the neck showed response to therapy in the neck lymph nodes and in the mediastinal lymph nodes.  There was no disease in the abdomen and pelvis.  She has no bone lesions.  She has no pain, mild fatigue and some depression and anxiety.  She is treated with sertraline for this problem 150 mg per day.  She does have asthma which is not well controlled, and we are referring her to Dr. Facundo Hay in Pulmonary for that problem.      REVIEW OF SYSTEMS:  A 10-point review of systems is entirely negative except for some mild nausea.      She is taking calcium, vitamin D, and she is eating a Mediterranean-style diet and is exercising 3 times a week.      PHYSICAL EXAMINATION:   VITAL SIGNS:  Blood pressure 130/65, temperature 98.4, pulse 81, respirations 20, O2 sat 95% on room air.  Weight 125 kg.   GENERAL:  Jade appeared generally well.  No alopecia.   HEENT:  Oropharynx is without lesions.   LYMPH:  There is no palpable cervical, supraclavicular, subclavicular or axillary lymphadenopathy.   BREASTS:  Exam was not performed today.   LUNGS:  Clear to percussion and auscultation.   CARDIOVASCULAR:  There is a regular rate and rhythm, S1, S2.    ABDOMEN:  Soft, nontender, consistent with increased body mass index.   EXTREMITIES:  Without edema.     PSYCHIATRIC:  Mood and affect were normal.      LABORATORY DATA:  CMP was within normal limits except for a glucose of 154.  CBC showed a WBC of 3.4, hemoglobin 13.1, platelets 283,000, and absolute neutrophil count 3000.      IMAGING:  CT scan of the chest, abdomen and pelvis showed no convincing evidence for metastatic disease within the abdomen, pelvis and bones.  Hepatic steatosis was noted.  Stable but not enlarged mediastinal lymph nodes were previously demonstrating hypermetabolic activity.  Notably, one mediastinal lymph node decreased from 9 to 4 mm, another from 11 to 5 mm compared with the CT component of the PET/CT from 09/13/2018.  The neck scan also showed a reduction in size of lymph nodes, and compared with the CT component of the PET/CT 09/13/2018, the next scan from 11/26 showed reduction of 1 neck node from 1.8 to 1.2 cm and another from 1.3 to 1.0 cm indicating a response to therapy.      ASSESSMENT AND PLAN:     1.  Jade Collins is a 48-year-old woman with a history of breast cancer of the left breast, initially of unknown pathology, [id number] by report.  Pathology slides were requested from [place].  She did indeed have an ER-positive, HER2-negative breast cancer.  She was treated with primary therapy consisting of lumpectomy and adjuvant chemotherapy with AC and then Taxol followed by radiation followed by raloxifene for 2 years followed by an aromatase inhibitor for 6 months.  She then had a IRIS/BSO.  Aromatase inhibitor was discontinued in [place], [place].  She was therefore on an aromatase inhibitor for only 6 months.  The anastrozole was discontinued 01/2017, and she was off all hormonal therapy for 18 months.  She then came to see us with a new diagnosis of recurrent metastatic ER-positive, HER2-negative breast cancer.   2.  She began palbociclib and letrozole 09/21/2018.  She comes back to  review the results of her CT scan of neck, chest, abdomen and pelvis which shows a response to therapy.  She is very pleased with this result as are we.  We will continue with the current palbociclib dose of 125 mg per day, 21 days on and 7 days off in combination with letrozole.   3.  Lungs were clear today.  She does have a history of asthma, and we will refer her to Dr. Facundo Hay for management of asthma.   4.  Followup.  We will see Jade in followup in our clinic with an SONIA in 4 weeks and then with me in 8 weeks with a CT of chest, abdomen and pelvis. Refer to Dr. Facundo Hay for asthma.  Premedication needed for future CTs.  Follow up with Evelina December 24 CBC, CMP, CA27.29 and CEA and with me January 22 with me with CBC, CMP, CA27.29 and CEA.  CT CAP and neck on January 21.       Thank you for allowing us to continue to participate in Jade Collins's care.      Wilfredo Bhatt MD        [facility]         I spent 45 minutes with the patient more than 50% of which was in counseling and coordination of care.

## 2018-11-26 ENCOUNTER — RADIANT APPOINTMENT (OUTPATIENT)
Dept: CT IMAGING | Facility: CLINIC | Age: 48
End: 2018-11-26
Attending: INTERNAL MEDICINE
Payer: COMMERCIAL

## 2018-11-26 DIAGNOSIS — Z17.0 MALIGNANT NEOPLASM OF UPPER-OUTER QUADRANT OF LEFT BREAST IN FEMALE, ESTROGEN RECEPTOR POSITIVE (H): ICD-10-CM

## 2018-11-26 DIAGNOSIS — C50.412 MALIGNANT NEOPLASM OF UPPER-OUTER QUADRANT OF LEFT BREAST IN FEMALE, ESTROGEN RECEPTOR POSITIVE (H): ICD-10-CM

## 2018-11-26 RX ORDER — METHYLPREDNISOLONE 16 MG/1
TABLET ORAL
Qty: 4 TABLET | Refills: 0 | Status: SHIPPED | OUTPATIENT
Start: 2018-11-26 | End: 2018-11-27

## 2018-11-26 RX ORDER — IOPAMIDOL 755 MG/ML
135 INJECTION, SOLUTION INTRAVASCULAR ONCE
Status: COMPLETED | OUTPATIENT
Start: 2018-11-26 | End: 2018-11-26

## 2018-11-26 RX ADMIN — IOPAMIDOL 135 ML: 755 INJECTION, SOLUTION INTRAVASCULAR at 13:48

## 2018-11-26 NOTE — DISCHARGE INSTRUCTIONS

## 2018-11-27 ENCOUNTER — ONCOLOGY VISIT (OUTPATIENT)
Dept: ONCOLOGY | Facility: CLINIC | Age: 48
End: 2018-11-27
Attending: INTERNAL MEDICINE
Payer: COMMERCIAL

## 2018-11-27 ENCOUNTER — APPOINTMENT (OUTPATIENT)
Dept: LAB | Facility: CLINIC | Age: 48
End: 2018-11-27
Attending: INTERNAL MEDICINE
Payer: COMMERCIAL

## 2018-11-27 ENCOUNTER — HOSPITAL ENCOUNTER (OUTPATIENT)
Dept: CT IMAGING | Facility: CLINIC | Age: 48
Discharge: HOME | End: 2018-11-27
Attending: PHYSICIAN ASSISTANT | Admitting: PHYSICIAN ASSISTANT
Payer: COMMERCIAL

## 2018-11-27 VITALS
DIASTOLIC BLOOD PRESSURE: 65 MMHG | HEART RATE: 81 BPM | TEMPERATURE: 98.4 F | WEIGHT: 276.9 LBS | OXYGEN SATURATION: 95 % | BODY MASS INDEX: 41.49 KG/M2 | RESPIRATION RATE: 20 BRPM | SYSTOLIC BLOOD PRESSURE: 130 MMHG

## 2018-11-27 DIAGNOSIS — Z17.0 MALIGNANT NEOPLASM OF UPPER-OUTER QUADRANT OF LEFT BREAST IN FEMALE, ESTROGEN RECEPTOR POSITIVE (H): ICD-10-CM

## 2018-11-27 DIAGNOSIS — J45.40 MODERATE PERSISTENT ASTHMA, UNSPECIFIED WHETHER COMPLICATED: Primary | ICD-10-CM

## 2018-11-27 DIAGNOSIS — C50.412 MALIGNANT NEOPLASM OF UPPER-OUTER QUADRANT OF LEFT BREAST IN FEMALE, ESTROGEN RECEPTOR POSITIVE (H): ICD-10-CM

## 2018-11-27 DIAGNOSIS — T50.8X5D ALLERGIC REACTION TO CONTRAST MATERIAL, SUBSEQUENT ENCOUNTER: ICD-10-CM

## 2018-11-27 LAB
ALBUMIN SERPL-MCNC: 3.9 G/DL (ref 3.4–5)
ALP SERPL-CCNC: 57 U/L (ref 40–150)
ALT SERPL W P-5'-P-CCNC: 26 U/L (ref 0–50)
ANION GAP SERPL CALCULATED.3IONS-SCNC: 10 MMOL/L (ref 3–14)
ANISOCYTOSIS BLD QL SMEAR: SLIGHT
AST SERPL W P-5'-P-CCNC: 16 U/L (ref 0–45)
BASOPHILS # BLD AUTO: 0 10E9/L (ref 0–0.2)
BASOPHILS NFR BLD AUTO: 0 %
BILIRUB SERPL-MCNC: 0.2 MG/DL (ref 0.2–1.3)
BUN SERPL-MCNC: 12 MG/DL (ref 7–30)
CALCIUM SERPL-MCNC: 9 MG/DL (ref 8.5–10.1)
CHLORIDE SERPL-SCNC: 106 MMOL/L (ref 94–109)
CO2 SERPL-SCNC: 21 MMOL/L (ref 20–32)
CREAT SERPL-MCNC: 0.9 MG/DL (ref 0.52–1.04)
DIFFERENTIAL METHOD BLD: ABNORMAL
EOSINOPHIL # BLD AUTO: 0 10E9/L (ref 0–0.7)
EOSINOPHIL NFR BLD AUTO: 0 %
ERYTHROCYTE [DISTWIDTH] IN BLOOD BY AUTOMATED COUNT: 15.4 % (ref 10–15)
GFR SERPL CREATININE-BSD FRML MDRD: 67 ML/MIN/1.7M2
GLUCOSE SERPL-MCNC: 154 MG/DL (ref 70–99)
HCT VFR BLD AUTO: 38.1 % (ref 35–47)
HGB BLD-MCNC: 13.1 G/DL (ref 11.7–15.7)
LYMPHOCYTES # BLD AUTO: 0.4 10E9/L (ref 0.8–5.3)
LYMPHOCYTES NFR BLD AUTO: 12.2 %
MCH RBC QN AUTO: 31.5 PG (ref 26.5–33)
MCHC RBC AUTO-ENTMCNC: 34.4 G/DL (ref 31.5–36.5)
MCV RBC AUTO: 92 FL (ref 78–100)
MONOCYTES # BLD AUTO: 0 10E9/L (ref 0–1.3)
MONOCYTES NFR BLD AUTO: 0 %
NEUTROPHILS # BLD AUTO: 3 10E9/L (ref 1.6–8.3)
NEUTROPHILS NFR BLD AUTO: 87.8 %
PLATELET # BLD AUTO: 283 10E9/L (ref 150–450)
PLATELET # BLD EST: ABNORMAL 10*3/UL
POTASSIUM SERPL-SCNC: 4.2 MMOL/L (ref 3.4–5.3)
PROT SERPL-MCNC: 8 G/DL (ref 6.8–8.8)
RBC # BLD AUTO: 4.16 10E12/L (ref 3.8–5.2)
SODIUM SERPL-SCNC: 138 MMOL/L (ref 133–144)
WBC # BLD AUTO: 3.4 10E9/L (ref 4–11)

## 2018-11-27 PROCEDURE — 70491 CT SOFT TISSUE NECK W/DYE: CPT

## 2018-11-27 PROCEDURE — 25000125 ZZHC RX 250: Performed by: STUDENT IN AN ORGANIZED HEALTH CARE EDUCATION/TRAINING PROGRAM

## 2018-11-27 PROCEDURE — G0463 HOSPITAL OUTPT CLINIC VISIT: HCPCS | Mod: 25

## 2018-11-27 PROCEDURE — 85025 COMPLETE CBC W/AUTO DIFF WBC: CPT | Performed by: INTERNAL MEDICINE

## 2018-11-27 PROCEDURE — 99214 OFFICE O/P EST MOD 30 MIN: CPT | Mod: ZP | Performed by: INTERNAL MEDICINE

## 2018-11-27 PROCEDURE — 36415 COLL VENOUS BLD VENIPUNCTURE: CPT

## 2018-11-27 PROCEDURE — 80053 COMPREHEN METABOLIC PANEL: CPT | Performed by: INTERNAL MEDICINE

## 2018-11-27 PROCEDURE — 25000128 H RX IP 250 OP 636: Performed by: STUDENT IN AN ORGANIZED HEALTH CARE EDUCATION/TRAINING PROGRAM

## 2018-11-27 RX ORDER — IOPAMIDOL 755 MG/ML
100 INJECTION, SOLUTION INTRAVASCULAR ONCE
Status: COMPLETED | OUTPATIENT
Start: 2018-11-27 | End: 2018-11-27

## 2018-11-27 RX ORDER — METHYLPREDNISOLONE 16 MG/1
TABLET ORAL
Qty: 4 TABLET | Refills: 0 | Status: SHIPPED | OUTPATIENT
Start: 2018-11-27 | End: 2019-01-22

## 2018-11-27 RX ADMIN — SODIUM CHLORIDE, PRESERVATIVE FREE 60 ML: 5 INJECTION INTRAVENOUS at 09:58

## 2018-11-27 RX ADMIN — IOPAMIDOL 100 ML: 755 INJECTION, SOLUTION INTRAVENOUS at 09:58

## 2018-11-27 ASSESSMENT — PAIN SCALES - GENERAL: PAINLEVEL: NO PAIN (0)

## 2018-11-27 NOTE — MR AVS SNAPSHOT
After Visit Summary   11/27/2018    Jade Collins    MRN: [id number]           Patient Information     Date Of Birth          1970        Visit Information        Provider Department      11/27/2018 1:00 PM Wilfredo Bhatt MD [facility]        Today's Diagnoses     Malignant neoplasm of upper-outer quadrant of left breast in female, estrogen receptor positive (H)        Allergic reaction to contrast material, subsequent encounter           Follow-ups after your visit        Follow-up notes from your care team     Return in about 8 weeks (around 1/22/2019).      Your next 10 appointments already scheduled     Dec 06, 2018  3:00 PM CST   (Arrive by 2:45 PM)   NEW WITH ROOM with Lisa Holliday [facility],  2 116 CONSULT RM   [facility] ([facility])    [address] [place] [address]  [address]  [facility] [address]   [phone]            Dec 24, 2018 10:30 AM CST   [facility] Draw with  [facility] DRAW   [facility] ([facility])    [address] [place] [address]  [address]  [facility] [address]   [phone]            Dec 24, 2018 11:10 AM CST   (Arrive by 10:55 AM)   Return Visit with Cecilia Goins PA-C   [facility] ([facility])    [address] [place] [address]  [address]  [facility] [address]   [phone]            Jan 22, 2019 10:00 AM CST   [facility] Lab Draw with  [facility] DRAW   [facility] ([facility])    [address] [place] [address]  [address]  [facility] [address]   [phone]            Jan 22, 2019 11:00 AM CST   CT CHEST/ABDOMEN/PELVIS W CONTRAST with UUCT1   [facility], [place], [place] ([facility] [place] [facility])    [address] [facility] [address]   [phone]           How do I prepare for my exam? (Food and drink instructions) To prepare: Do  not eat or drink for 2 hours before your exam. If you need to take medicine, you may take it with small sips of water. (We may ask you to take liquid medicine as well.)  How do I prepare for my exam? (Other instructions) Please arrive 30 minutes early for your CT.  Once in the department you might be asked to drink water 15-20 minutes prior to your exam.  If indicated you may be asked to drink an oral contrast in advance of your CT.  If this is the case, the imaging team will let you know or be in contact with you prior to your appointment  Patients over 70 or patients with diabetes or kidney problems: If you haven t had a blood test (creatinine test) within the last 30 days, the Cardiologist/Radiologist may require you to get this test prior to your exam.  If you have diabetes:  Continue to take your metformin medication on the day of your exam  What should I wear: Please wear loose clothing, such as a sweat suit or jogging clothes. Avoid snaps, zippers and other metal. We may ask you to undress and put on a hospital gown.  How long does the exam take: Most scans take less than 20 minutes.  What should I bring: Please bring any scans or X-rays taken at other hospitals, if similar tests were done. Also bring a list of your medicines, including vitamins, minerals and over-the-counter drugs. It is safest to leave personal items at home.  Do I need a : No  is needed.  What do I need to tell my doctor? Be sure to tell your doctor: * If you have any allergies. * If there s any chance you are pregnant. * If you are breastfeeding.  What should I do after the exam: No restrictions, You may resume normal activities.  What is this test: A CT (computed tomography) scan is a series of pictures that allows us to look inside your body. The scanner creates images of the body in cross sections, much like slices of bread. This helps us see any problems more clearly. You may receive contrast (X-ray dye) before or during  your scan. You will be asked to drink the contrast.  Who should I call with questions: If you have any questions, please call the Imaging Department where you will have your exam. Directions, parking instructions, and other information is available on our website, [email or web address].            Jan 22, 2019 11:20 AM CST   CT SOFT TISSUE NECK W/O & W CONTRAST with UUCT1   [facility], [place], [place] ([facility])    [address] [facility] [address] [phone]           How do I prepare for my exam? (Food and drink instructions) **You will have contrast for this exam.** Do not eat or drink for 2 hours before your exam. If you need to take medicine, you may take it with small sips of water. (We may ask you to take liquid medicine as well.)  The day before your exam, drink extra fluids at least six 8-ounce glasses (unless your doctor tells you to restrict your fluids).  How do I prepare for my exam? (Other instructions) Patients over 70 or patients with diabetes or kidney problems: If you haven t had a blood test (creatinine test) within the last 30 days, the Cardiologist/Radiologist may require you to get this test prior to your exam.  What should I wear: Please wear loose clothing, such as a sweat suit or jogging clothes.  Avoid snaps, zippers and other metal. We may ask you to undress and put on a hospital gown.  How long does the exam take: Most scans take less than 20 minutes.  What should I bring: Please bring any scans or X-rays taken at other hospitals, if similar tests were done. Also bring a list of your medicines, including vitamins, minerals and over-the-counter drugs. It is safest to leave personal items at home.  Do I need a :  No  is needed.  What do I need to tell my doctor? Be sure to tell your doctor: * If you have any allergies. * If there s any chance you are pregnant. * If you are breastfeeding. * If you have diabetes as your  medication may need to be adjusted for this exam.  What should I do after the exam: No restrictions, You may resume normal activities.  What is this test: A CT (computed tomography) scan is a series of pictures that allows us to look inside your body. The scanner creates images of the body in cross sections, much like slices of bread. This helps us see any problems more clearly. You may receive contrast (X-ray dye) before or during your scan. Contrast is given through an IV (small needle in your arm).  Who should I call with questions: If you have any questions, please call the Imaging Department where you will have your exam. Directions, parking instructions, and other information is available on our website, [email or web address].            Jan 22, 2019  2:00 PM CST   (Arrive by 1:45 PM)   Return Visit with Wilfredo Bhatt MD   [facility] ([facility])    [address] [place] [address]  [address] [place] [address] [phone]              Future tests that were ordered for you today     Open Standing Orders        Priority Remaining Interval Expires Ordered    Ca27.29  breast tumor marker Routine 52/52 2/25/2019 11/27/2018    CBC with platelets differential Routine 52/52 11/27/2019 11/27/2018    CEA Routine 52/52 2/25/2019 11/27/2018    Comprehensive metabolic panel Routine 52/52 11/27/2019 11/27/2018          Open Future Orders        Priority Expected Expires Ordered    CT Soft tissue neck w & w/o contrast Routine  11/27/2019 11/27/2018    CT Chest/Abdomen/Pelvis w Contrast Routine  6/25/2019 11/27/2018            Who to contact     If you have questions or need follow up information about today's clinic visit or your schedule please contact [facility] directly at [phone].  Normal or non-critical lab and imaging results will be communicated to you by MyChart, letter or phone within 4 business days after the clinic has received the  results. If you do not hear from us within 7 days, please contact the clinic through [employer] or phone. If you have a critical or abnormal lab result, we will notify you by phone as soon as possible.  Submit refill requests through [employer] or call your pharmacy and they will forward the refill request to us. Please allow 3 business days for your refill to be completed.          Additional Information About Your Visit        [employer] Information     [employer] gives you secure access to your electronic health record. If you see a primary care provider, you can also send messages to your care team and make appointments. If you have questions, please call your primary care clinic.  If you do not have a primary care provider, please call [phone] and they will assist you.        Care EveryWhere ID     This is your Care EveryWhere ID. This could be used by other organizations to access your [facility] records  [id number]        Your Vitals Were     Pulse Temperature Respirations Pulse Oximetry BMI (Body Mass Index)       81 98.4  F (36.9  C) (Oral) 20 95% 41.49 kg/m2        Blood Pressure from Last 3 Encounters:   11/27/18 130/65   11/13/18 106/74   10/16/18 114/75    Weight from Last 3 Encounters:   11/27/18 125.6 kg (276 lb 14.4 oz)   11/13/18 124.3 kg (274 lb)   10/16/18 122.5 kg (270 lb)              We Performed the Following     CBC with platelets differential     Comprehensive metabolic panel          Today's Medication Changes          These changes are accurate as of 11/27/18  3:29 PM.  If you have any questions, ask your nurse or doctor.               Stop taking these medicines if you haven't already. Please contact your care team if you have questions.     azithromycin 250 MG tablet   Commonly known as:  ZITHROMAX   Stopped by:  Wilfredo Bhatt MD                Where to get your medicines      [facility] [address]  [place], [place] - [address] [place] [address] [facility]  OF  & [facility]  [address], [place] [address]     Phone:  [phone]     methylPREDNISolone 16 MG tablet                Primary Care Provider Office Phone # Fax #    Dave Gallegos [phone]       [facility] [address]  [facility] [address]        Equal Access to Services     ESPERANZA DA SILVA : Hadii aad ku hadasho Soomaali, waaxda luqadaha, qaybta kaalmada adeegyada, waxay idiin hayaan adeeg kharash la'aan ah. So [facility] [phone].    ATENCIÓN: Si katherin santillan, tiene a ramos disposición servicios gratuitos de asistencia lingüística. Llame al [phone].    We comply with applicable federal civil rights laws and [place] laws. We do not discriminate on the basis of race, color, national origin, age, disability, sex, sexual orientation, or gender identity.            Thank you!     Thank you for choosing [facility]  for your care. Our goal is always to provide you with excellent care. Hearing back from our patients is one way we can continue to improve our services. Please take a few minutes to complete the written survey that you may receive in the mail after your visit with us. Thank you!             Your Updated Medication List - Protect others around you: Learn how to safely use, store and throw away your medicines at www.disposemymeds.org.          This list is accurate as of 11/27/18  3:29 PM.  Always use your most recent med list.                   Brand Name Dispense Instructions for use Diagnosis    ACETAMINOPHEN PO      Take 650 mg by mouth every 4 hours as needed for pain        albuterol 108 (90 Base) MCG/ACT inhaler    PROAIR HFA/PROVENTIL HFA/VENTOLIN HFA     Inhale 2 puffs into the lungs every 6 hours as needed for shortness of breath / dyspnea or wheezing    Malignant neoplasm of upper-outer quadrant of left breast in female, estrogen receptor positive (H)       CALCIUM + D 500-1000-40 MG-UNT-MCG Chew   Generic drug:   Calcium-Vitamin D-Vitamin K      Take 1 chew tab by mouth 2 times daily        CALCIUM + D PO      Take 250 mg by mouth 2 times daily    Malignant neoplasm of upper-outer quadrant of left breast in female, estrogen receptor positive (H)       calcium carbonate 500 MG chewable tablet    TUMS     Take 1 chew tab by mouth as needed for heartburn        fluticasone 50 MCG/ACT nasal spray    FLONASE    1 Bottle    Spray 1-2 sprays into both nostrils daily    Nasal congestion with rhinorrhea       IBUPROFEN PO      Take 200 mg by mouth every 4 hours as needed for moderate pain        letrozole 2.5 MG tablet    FEMARA    28 tablet    Take 1 tablet (2.5 mg) by mouth daily for 28 days    Malignant neoplasm of upper-outer quadrant of left breast in female, estrogen receptor positive (H)       levothyroxine 200 MCG tablet    SYNTHROID/LEVOTHROID     Take 200 mcg by mouth        methylPREDNISolone 16 MG tablet    MEDROL    4 tablet    Take 32 mg by mouth 12 hours before the procedure and repeat 32 mg by mouth 2 hours before the procedure to prevent contrast reaction.    Malignant neoplasm of upper-outer quadrant of left breast in female, estrogen receptor positive (H), Allergic reaction to contrast material, subsequent encounter       ondansetron 8 MG ODT tab    ZOFRAN ODT    30 tablet    Take 1 tablet (8 mg) by mouth 3 times daily as needed for nausea    Malignant neoplasm of upper-outer quadrant of left breast in female, estrogen receptor positive (H)       * palbociclib 125 MG capsule CHEMO    IBRANCE    21 capsule    Take 1 capsule (125 mg) by mouth daily with food Take for 21 days, then 7 days off. Avoid grapefruit. Do not open/crush/chew capsule.    Malignant neoplasm of upper-outer quadrant of left breast in female, estrogen receptor positive (H)       * palbociclib 125 MG capsule CHEMO    IBRANCE    21 capsule    Take 1 capsule (125 mg) by mouth daily with food Take for 21 days, then 7 days off. Avoid grapefruit. Do not  open/crush/chew capsule.    Malignant neoplasm of upper-outer quadrant of left breast in female, estrogen receptor positive (H)       * palbociclib 125 MG capsule CHEMO    IBRANCE    21 capsule    Take 1 capsule (125 mg) by mouth daily with food Take for 21 days, then 7 days off. Avoid grapefruit. Do not open/crush/chew capsule.    Malignant neoplasm of upper-outer quadrant of left breast in female, estrogen receptor positive (H)       prochlorperazine 10 MG tablet    COMPAZINE    30 tablet    Take 1 tablet (10 mg) by mouth every 6 hours as needed for nausea or vomiting    Nausea       sertraline 100 MG tablet    ZOLOFT     TAKE 1 AND 1/2 TABLETS(150 MG) BY MOUTH EVERY DAY        triamcinolone 55 MCG/ACT nasal aerosol    NASACORT     [place] 1 spray into both nostrils daily        VITAMIN D (CHOLECALCIFEROL) PO      Take 2,000 Units by mouth daily        * Notice:  This list has 3 medication(s) that are the same as other medications prescribed for you. Read the directions carefully, and ask your doctor or other care provider to review them with you.

## 2018-11-27 NOTE — LETTER
11/27/2018       RE: Jade Collins  [address]  [facility] [address]     Dear Colleague,    Thank you for referring your patient, Jade Collins, to the [facility]. Please see a copy of my visit note below.    HISTORY OF PRESENT ILLNESS:  Jade is a 48-year-old woman with metastatic breast cancer who comes to our clinic for recommendations for further treatment.  She is referred by Dr. Norberto Brand at [facility].  Jade would like to continue her care at the [facility].       Jade was diagnosed with breast cancer with a lump found in the upper-inner quadrant of the left breast.  She was diagnosed in 12/2013 in the [place] area and Dr. David Redd was her medical oncologist.  Biopsy of the tumor showed it to be ER positive, SC positive, and HER-2 negative.  We do not have any of the original pathology and we need to obtain those reports.  She underwent a lumpectomy performed by Dr. Tasha Segura who is a surgeon in the [place] area.  She also had a sentinel lymph node which was noted to be involved with tumor but there was no lymph node dissection done at that time.  [id number].  She subsequently underwent adjuvant chemotherapy with dose-dense AC for 4 cycles and Taxol for 12 weeks, completed 09/11/2014.        She then had radiation therapy post lumpectomy to the left breast, which was completed 11/23/2014 by Dr. Manley.        She was then begun on an aromatase inhibitor, the name of which she does not remember.  The aromatase inhibitor therapy was begun in 11/2014.  She was then switched within about a month or so to anastrozole and remained on anastrozole from 11/2014-02/2017.  She has never received tamoxifen.        She then moved to [place], [place] in 03/2017.  She saw a gynecologist there and underwent a IRIS/BSO by Dr. Zendejas.  She then also saw Dr. Linares in [place], [place], who is an oncologist.  His interpretation of the pathology report was that she had triple-negative  breast cancer.  It is possible that she may have had low estrogen receptor positivity, but the anastrozole was then discontinued in 02/2017.  She then underwent the IRIS/BSO in 03/2017.  This was done laparoscopically.        She then remained off of all hormonal therapy and in 09/2017 moved to [place].  She remained off hormonal therapy and did not have Medical Oncology followup initially.        She was driving for [place] and noticed lymph nodes in the left neck about 8 weeks ago.  She then went to see Dr. Norberto Brand who performed a PET/CT scan and the patient also underwent a brain MRI for staging.  The PET/CT scan performed 08/03/2018 showed in the neck there were several mildly metabolic active and large prominent left posterior cervical lymph nodes.  The largest is located posterior to the left sternocleidomastoid muscle and measures 1.5 x 1 cm in size.  This demonstrate modest FDG uptake.  The other lymph nodes are smaller and demonstrate mild FDG uptake.  The prominent prevascular and aortopulmonary lymph nodes were also seen on the contrast-enhanced scan were less well seen on the PET.  The largest lymph node visualized on the PET scan was 1.1 x 0.8 cm and demonstrated mild FDG uptake.  This was in the periaortic area just anterior to the aortic arch.  No lung nodules.  No lung infiltrates.  No pleural effusion.  There was no uptake of FDG in the bones.  In summary, there were mildly enlarged left posterior cervical lymph nodes, largest measuring 1.5 x 1.0 cm and mildly enlarged aorticopulmonary and prevascular lymph nodes that were noted on contrast-enhanced CT scan seen less well on the PET scan.  No evidence of metastatic disease in the abdomen and pelvis.  She also underwent a brain MRI that showed no evidence of intracranial metastatic disease.  She underwent a biopsy of one of the lymph nodes in the left neck which showed metastatic adenocarcinoma consistent with breast origin, which was  estrogen-receptor positive.  The tumor cells were positive for CK7, ER and LA consistent with metastatic breast carcinoma.  Estrogen receptor showed strong average nuclear intensity in 95% of carcinoma cells, progesterone receptor moderate average nuclear intensity in 70% of the carcinoma.  A GATA3 stain was apparently not performed.       Jade came to our clinic for recommendations.  She has mild pain in the left neck, moderate fatigue, moderate depression and has begun on sertraline, and she has moderate anxiety.      Nausea.   Zofran.  Persistent symptoms of depression sertraline was increased to 150 mg per day.       FOLLOW-UP NOTE     No bone lesions.     FOLLOWUP NOTE       HISTORY OF PRESENT ILLNESS:  Jade returns to clinic and is doing quite well.  She did have an allergy reaction to IV contrast with some swelling, and she was subsequently premedicated with 32 mg of methylprednisolone 12 and 2 hours before her next CT scan which was done the next day.  Overall, the CT scan of the chest, abdomen and pelvis as well as the neck showed response to therapy in the neck lymph nodes and in the mediastinal lymph nodes.  There was no disease in the abdomen and pelvis.  She has no bone lesions.  She has no pain, mild fatigue and some depression and anxiety.  She is treated with sertraline for this problem 150 mg per day.  She does have asthma which is not well controlled, and we are referring her to Dr. Facundo Hay in Pulmonary for that problem.      REVIEW OF SYSTEMS:  A 10-point review of systems is entirely negative except for some mild nausea.      She is taking calcium, vitamin D, and she is eating a Mediterranean-style diet and is exercising 3 times a week.      PHYSICAL EXAMINATION:   VITAL SIGNS:  Blood pressure 130/65, temperature 98.4, pulse 81, respirations 20, O2 sat 95% on room air.  Weight 125 kg.   GENERAL:  Jade appeared generally well.  No alopecia.   HEENT:  Oropharynx is without lesions.   LYMPH:   There is no palpable cervical, supraclavicular, subclavicular or axillary lymphadenopathy.   BREASTS:  Exam was not performed today.   LUNGS:  Clear to percussion and auscultation.   CARDIOVASCULAR:  There is a regular rate and rhythm, S1, S2.   ABDOMEN:  Soft, nontender, consistent with increased body mass index.   EXTREMITIES:  Without edema.     PSYCHIATRIC:  Mood and affect were normal.      LABORATORY DATA:  CMP was within normal limits except for a glucose of 154.  CBC showed a WBC of 3.4, hemoglobin 13.1, platelets 283,000, and absolute neutrophil count 3000.      IMAGING:  CT scan of the chest, abdomen and pelvis showed no convincing evidence for metastatic disease within the abdomen, pelvis and bones.  Hepatic steatosis was noted.  Stable but not enlarged mediastinal lymph nodes were previously demonstrating hypermetabolic activity.  Notably, one mediastinal lymph node decreased from 9 to 4 mm, another from 11 to 5 mm compared with the CT component of the PET/CT from 09/13/2018.  The neck scan also showed a reduction in size of lymph nodes, and compared with the CT component of the PET/CT 09/13/2018, the next scan from 11/26 showed reduction of 1 neck node from 1.8 to 1.2 cm and another from 1.3 to 1.0 cm indicating a response to therapy.      ASSESSMENT AND PLAN:     1.  Jade Collins is a 48-year-old woman with a history of breast cancer of the left breast, initially of unknown pathology, [id number] by report.  Pathology slides were requested from [place].  She did indeed have an ER-positive, HER2-negative breast cancer.  She was treated with primary therapy consisting of lumpectomy and adjuvant chemotherapy with AC and then Taxol followed by radiation followed by raloxifene for 2 years followed by an aromatase inhibitor for 6 months.  She then had a IRIS/BSO.  Aromatase inhibitor was discontinued in [place], [place].  She was therefore on an aromatase inhibitor for only 6 months.  The anastrozole was discontinued  01/2017, and she was off all hormonal therapy for 18 months.  She then came to see us with a new diagnosis of recurrent metastatic ER-positive, HER2-negative breast cancer.   2.  She began palbociclib and letrozole 09/21/2018.  She comes back to review the results of her CT scan of neck, chest, abdomen and pelvis which shows a response to therapy.  She is very pleased with this result as are we.  We will continue with the current palbociclib dose of 125 mg per day, 21 days on and 7 days off in combination with letrozole.   3.  Lungs were clear today.  She does have a history of asthma, and we will refer her to Dr. Facundo Hay for management of asthma.   4.  Followup.  We will see Jade in followup in our clinic with an SONIA in 4 weeks and then with me in 8 weeks with a CT of chest, abdomen and pelvis. Refer to Dr. Facundo Hay for asthma.  Premedication needed for future CTs.  Follow up with Evelina December 24 CBC, CMP, CA27.29 and CEA and with me January 22 with me with CBC, CMP, CA27.29 and CEA.  CT CAP and neck on January 21.       Thank you for allowing us to continue to participate in Jade Collins's care.      Wilfredo Bhatt MD        [facility]         I spent 45 minutes with the patient more than 50% of which was in counseling and coordination of care.     Again, thank you for allowing me to participate in the care of your patient.      Sincerely,    Wilfredo Bhatt MD

## 2018-11-27 NOTE — NURSING NOTE
"Oncology Rooming Note    November 27, 2018 1:25 PM   Jade Collins is a 48 year old female who presents for:    Chief Complaint   Patient presents with     Blood Draw     Venipuncture labs collected by RN.      Oncology Clinic Visit     [facility] RETURN- BREAST CA     Initial Vitals: /65 (BP Location: Right arm, Patient Position: Sitting)  Pulse 81  Temp 98.4  F (36.9  C) (Oral)  Resp 20  Wt 276 lb 14.4 oz  SpO2 95%  BMI 41.49 kg/m2 Estimated body mass index is 41.49 kg/(m^2) as calculated from the following:    Height as of 11/13/18: 5' 8.5\".    Weight as of this encounter: 276 lb 14.4 oz. Body surface area is 2.46 meters squared.  No Pain (0) Comment: Data Unavailable   No LMP recorded. Patient is postmenopausal.  Allergies reviewed: Yes  Medications reviewed: Yes    Medications: Medication refills not needed today.  Pharmacy name entered into [facility]:    [facility] [address] [place] [address] AT [facility] [address] [facility] - [place], [place] - [address]    Clinical concerns: Had allergic reaction to contrast dye yesterday shortness of breath, swelling of eyes, coughing, headache. Asthma is not under control needing pro air more often up to 3x a day.  [facility] was notified.    10 minutes for nursing intake (face to face time)     Lauri Vargas LPN            "

## 2018-12-03 DIAGNOSIS — J45.909 ASTHMA: Primary | ICD-10-CM

## 2018-12-06 ENCOUNTER — OFFICE VISIT (OUTPATIENT)
Dept: PALLIATIVE CARE | Facility: CLINIC | Age: 48
End: 2018-12-06
Attending: SOCIAL WORKER
Payer: COMMERCIAL

## 2018-12-06 DIAGNOSIS — F43.10 POSTTRAUMATIC STRESS DISORDER: ICD-10-CM

## 2018-12-06 DIAGNOSIS — Z51.5 ENCOUNTER FOR PALLIATIVE CARE: ICD-10-CM

## 2018-12-06 DIAGNOSIS — F33.1 MODERATE EPISODE OF RECURRENT MAJOR DEPRESSIVE DISORDER (H): Primary | ICD-10-CM

## 2018-12-06 PROCEDURE — 90791 PSYCH DIAGNOSTIC EVALUATION: CPT | Performed by: SOCIAL WORKER

## 2018-12-06 NOTE — LETTER
Date:January 3, 2019      Provider requested that no letter be sent. Do not send.       [facility] Health Information
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
## 2018-12-11 DIAGNOSIS — C50.412 MALIGNANT NEOPLASM OF UPPER-OUTER QUADRANT OF LEFT BREAST IN FEMALE, ESTROGEN RECEPTOR POSITIVE (H): Primary | ICD-10-CM

## 2018-12-11 DIAGNOSIS — Z17.0 MALIGNANT NEOPLASM OF UPPER-OUTER QUADRANT OF LEFT BREAST IN FEMALE, ESTROGEN RECEPTOR POSITIVE (H): Primary | ICD-10-CM

## 2018-12-11 RX ORDER — LETROZOLE 2.5 MG/1
2.5 TABLET, FILM COATED ORAL DAILY
Qty: 28 TABLET | Refills: 0 | Status: CANCELLED | OUTPATIENT
Start: 2018-12-11 | End: 2019-01-08

## 2018-12-11 RX ORDER — LETROZOLE 2.5 MG/1
2.5 TABLET, FILM COATED ORAL DAILY
Qty: 28 TABLET | Refills: 0 | Status: SHIPPED | OUTPATIENT
Start: 2018-12-11 | End: 2019-01-07

## 2018-12-11 NOTE — ORAL ONC MGMT
Dr. Bhatt okayed refilling Ibrance without current labs.  Dr. Bhatt saw patient and got labs on 11/27 and said okay to use those.    Jade Fatima, Pharm.D., [facility]  [phone]  12/11/18
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
## 2018-12-20 ENCOUNTER — OFFICE VISIT (OUTPATIENT)
Dept: PALLIATIVE CARE | Facility: CLINIC | Age: 48
End: 2018-12-20
Attending: SOCIAL WORKER
Payer: COMMERCIAL

## 2018-12-20 DIAGNOSIS — F33.1 MODERATE EPISODE OF RECURRENT MAJOR DEPRESSIVE DISORDER (H): ICD-10-CM

## 2018-12-20 DIAGNOSIS — F43.10 POSTTRAUMATIC STRESS DISORDER: Primary | ICD-10-CM

## 2018-12-20 DIAGNOSIS — Z51.5 ENCOUNTER FOR PALLIATIVE CARE: ICD-10-CM

## 2018-12-20 PROCEDURE — 90834 PSYTX W PT 45 MINUTES: CPT | Performed by: SOCIAL WORKER
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
## 2018-12-20 NOTE — LETTER
Date:January 14, 2019      Provider requested that no letter be sent. Do not send.       [facility] Health Information

## 2018-12-24 ENCOUNTER — ONCOLOGY VISIT (OUTPATIENT)
Dept: ONCOLOGY | Facility: CLINIC | Age: 48
End: 2018-12-24
Attending: INTERNAL MEDICINE
Payer: COMMERCIAL

## 2018-12-24 ENCOUNTER — APPOINTMENT (OUTPATIENT)
Dept: LAB | Facility: CLINIC | Age: 48
End: 2018-12-24
Attending: INTERNAL MEDICINE
Payer: COMMERCIAL

## 2018-12-24 VITALS
DIASTOLIC BLOOD PRESSURE: 67 MMHG | SYSTOLIC BLOOD PRESSURE: 117 MMHG | BODY MASS INDEX: 41.46 KG/M2 | RESPIRATION RATE: 16 BRPM | HEART RATE: 75 BPM | OXYGEN SATURATION: 98 % | WEIGHT: 276.7 LBS | TEMPERATURE: 97.7 F

## 2018-12-24 DIAGNOSIS — C50.412 MALIGNANT NEOPLASM OF UPPER-OUTER QUADRANT OF LEFT BREAST IN FEMALE, ESTROGEN RECEPTOR POSITIVE (H): ICD-10-CM

## 2018-12-24 DIAGNOSIS — J45.909 UNCOMPLICATED ASTHMA, UNSPECIFIED ASTHMA SEVERITY, UNSPECIFIED WHETHER PERSISTENT: Primary | ICD-10-CM

## 2018-12-24 DIAGNOSIS — Z17.0 MALIGNANT NEOPLASM OF UPPER-OUTER QUADRANT OF LEFT BREAST IN FEMALE, ESTROGEN RECEPTOR POSITIVE (H): ICD-10-CM

## 2018-12-24 LAB
ALBUMIN SERPL-MCNC: 3.6 G/DL (ref 3.4–5)
ALP SERPL-CCNC: 71 U/L (ref 40–150)
ALT SERPL W P-5'-P-CCNC: 28 U/L (ref 0–50)
ANION GAP SERPL CALCULATED.3IONS-SCNC: 5 MMOL/L (ref 3–14)
AST SERPL W P-5'-P-CCNC: 18 U/L (ref 0–45)
BILIRUB SERPL-MCNC: 0.3 MG/DL (ref 0.2–1.3)
BUN SERPL-MCNC: 12 MG/DL (ref 7–30)
CALCIUM SERPL-MCNC: 8.7 MG/DL (ref 8.5–10.1)
CANCER AG27-29 SERPL-ACNC: 17 U/ML (ref 0–39)
CEA SERPL-MCNC: <0.5 UG/L (ref 0–2.5)
CHLORIDE SERPL-SCNC: 109 MMOL/L (ref 94–109)
CO2 SERPL-SCNC: 26 MMOL/L (ref 20–32)
CREAT SERPL-MCNC: 0.92 MG/DL (ref 0.52–1.04)
DIFFERENTIAL METHOD BLD: ABNORMAL
ERYTHROCYTE [DISTWIDTH] IN BLOOD BY AUTOMATED COUNT: 14.6 % (ref 10–15)
GFR SERPL CREATININE-BSD FRML MDRD: 74 ML/MIN/{1.73_M2}
GLUCOSE SERPL-MCNC: 82 MG/DL (ref 70–99)
HCT VFR BLD AUTO: 37.7 % (ref 35–47)
HGB BLD-MCNC: 13 G/DL (ref 11.7–15.7)
MCH RBC QN AUTO: 32.4 PG (ref 26.5–33)
MCHC RBC AUTO-ENTMCNC: 34.5 G/DL (ref 31.5–36.5)
MCV RBC AUTO: 94 FL (ref 78–100)
PLATELET # BLD AUTO: 234 10E9/L (ref 150–450)
POTASSIUM SERPL-SCNC: 3.9 MMOL/L (ref 3.4–5.3)
PROT SERPL-MCNC: 7.5 G/DL (ref 6.8–8.8)
RBC # BLD AUTO: 4.01 10E12/L (ref 3.8–5.2)
SODIUM SERPL-SCNC: 141 MMOL/L (ref 133–144)
WBC # BLD AUTO: 2 10E9/L (ref 4–11)

## 2018-12-24 PROCEDURE — 36415 COLL VENOUS BLD VENIPUNCTURE: CPT

## 2018-12-24 PROCEDURE — 86300 IMMUNOASSAY TUMOR CA 15-3: CPT | Performed by: INTERNAL MEDICINE

## 2018-12-24 PROCEDURE — G0463 HOSPITAL OUTPT CLINIC VISIT: HCPCS | Mod: ZF

## 2018-12-24 PROCEDURE — 85025 COMPLETE CBC W/AUTO DIFF WBC: CPT | Performed by: INTERNAL MEDICINE

## 2018-12-24 PROCEDURE — 80053 COMPREHEN METABOLIC PANEL: CPT | Performed by: INTERNAL MEDICINE

## 2018-12-24 PROCEDURE — 82378 CARCINOEMBRYONIC ANTIGEN: CPT | Performed by: INTERNAL MEDICINE

## 2018-12-24 PROCEDURE — 99214 OFFICE O/P EST MOD 30 MIN: CPT | Mod: ZP | Performed by: PHYSICIAN ASSISTANT

## 2018-12-24 RX ORDER — ALBUTEROL SULFATE 0.83 MG/ML
2.5 SOLUTION RESPIRATORY (INHALATION) EVERY 6 HOURS PRN
Qty: 30 VIAL | Refills: 0 | Status: SHIPPED | OUTPATIENT
Start: 2018-12-24 | End: 2019-06-14

## 2018-12-24 RX ORDER — LORATADINE 10 MG/1
10 TABLET ORAL DAILY
Qty: 90 TABLET | Refills: 3 | Status: SHIPPED | OUTPATIENT
Start: 2018-12-24 | End: 2019-05-29

## 2018-12-24 ASSESSMENT — PAIN SCALES - GENERAL: PAINLEVEL: NO PAIN (0)

## 2018-12-24 NOTE — LETTER
12/24/2018      RE: Jade Collins  [address]  [facility] [address]       Oncology/Hematology Visit Note  Dec 24, 2018    Reason for Visit: follow up of recurrent metastatic ER-positive HER2 negative breast cancer    History of Present Illness: Jade Collins is a 48 year old female with a history of left breast cancer diagnosed in 12/2013 in [place] area and Dr. David Redd was her medical oncologist. Biopsy showed tumor to be ER positive, MT positive and HER2 negative. She underwent lumpectomy and SLNB by Dr. Tasha Segura in [place]. [place] lymph node was noted to be involved with tumor but no lymph node dissection done at that time. She subsequently underwent adjuvant ddAC x 4 cycles and Taxol x 12 weeks, competed in 9/11/2014. She had post lumpectomy radiation completed in 11/23/2014 by Dr. Manley. She was begun on an AI in 11/2014, but does not recall name.     She was then switched within about a month or so to anastrozole and remained on anastrozole from 11/2014-02/2017.  She has never received tamoxifen. She then moved to [place], [place] in 03/2017.  She saw a gynecologist there and underwent a IRIS/BSO by Dr. Zendejas.  She then also saw Dr. Linares in [place], [place], who is an oncologist.  His interpretation of the pathology report was that she had triple-negative breast cancer.  It is possible that she may have had low estrogen receptor positivity, but the anastrozole was then discontinued in 02/2017.  She then underwent the IRIS/BSO in 03/2017.  This was done laparoscopically.        She then remained off of all hormonal therapy and in 09/2017 moved to [place].  She remained off hormonal therapy and did not have Medical Oncology followup initially.        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  She then went to see Dr. Norberto Brand who performed a PET/CT scan and the patient also underwent a brain MRI for staging.  The PET/CT scan performed 08/03/2018 which  showed mildly enlarged left posterior cervical lymph nodes, largest measuring 1.5 x 1.0 cm and mildly enlarged aorticopulmonary and prevascular lymph nodes that were noted on contrast-enhanced CT scan seen less well on the PET scan.  No evidence of metastatic disease in the abdomen and pelvis.  She also underwent a brain MRI that showed no evidence of intracranial metastatic disease.  She underwent a biopsy of one of the lymph nodes in the left neck which showed metastatic adenocarcinoma consistent with breast origin, which was estrogen-receptor positive.  The tumor cells were positive for CK7, ER and NE consistent with metastatic breast carcinoma.  Estrogen receptor showed strong average nuclear intensity in 95% of carcinoma cells, progesterone receptor moderate average nuclear intensity in 70% of the carcinoma.  A GATA3 stain was apparently not performed.     She started Ibrance on 9/21 and letrozole on 9/23. She had improved disease on restaging on 11/26. Please see Dr. Bhatt's previous notes for further details on the patient's history. She comes in today for routine follow up.    Interval History:  Jade is here today mid-cycle 4 of ibrane/letrozole. She started cycle 4 of 12/14. She is tolerating medication well. She has some hot flashes and mild joint stiffness. Very rarely has nausea now. She denies any constipation or diarrhea.    She has had chronic post-nasal drainage and congestion from allergies. She has some chest congestion now and wheezing. No fevers/chills. No sinus pressure or headaches.     She has had some diffuse pruritis and will pick at skin. Skin is dry. Using a new body wash.     Has nailbed tenderness and some horizontal ridging to nails. No pus or yellowing of nailbed.     Review of Systems:  Patient denies any of the following except if noted above: fevers, chills, chest pain, dyspnea,  abdominal pain, dysuria.    Current Outpatient Medications   Medication Sig Dispense Refill      ACETAMINOPHEN PO Take 650 mg by mouth every 4 hours as needed for pain       albuterol (PROAIR HFA/PROVENTIL HFA/VENTOLIN HFA) 108 (90 Base) MCG/ACT inhaler Inhale 2 puffs into the lungs every 6 hours as needed for shortness of breath / dyspnea or wheezing       calcium carbonate (TUMS) 500 MG chewable tablet Take 1 chew tab by mouth as needed for heartburn       Calcium Citrate-Vitamin D (CALCIUM + D PO) Take 250 mg by mouth 2 times daily        Calcium-Vitamin D-Vitamin K (CALCIUM + D) 500-1000-40 MG-UNT-MCG CHEW Take 1 chew tab by mouth 2 times daily       fluticasone (FLONASE) 50 MCG/ACT spray Spray 1-2 sprays into both nostrils daily 1 Bottle 3     IBUPROFEN PO Take 200 mg by mouth every 4 hours as needed for moderate pain       letrozole (FEMARA) 2.5 MG tablet Take 1 tablet (2.5 mg) by mouth daily for 28 days 28 tablet 0     letrozole (FEMARA) 2.5 MG tablet Take 1 tablet (2.5 mg) by mouth daily for 28 days 28 tablet 0     letrozole (FEMARA) 2.5 MG tablet Take 1 tablet (2.5 mg) by mouth daily for 28 days 28 tablet 0     letrozole (FEMARA) 2.5 MG tablet Take 1 tablet (2.5 mg) by mouth daily for 28 days 28 tablet 0     levothyroxine (SYNTHROID/LEVOTHROID) 200 MCG tablet Take 200 mcg by mouth       methylPREDNISolone (MEDROL) 16 MG tablet Take 32 mg by mouth 12 hours before the procedure and repeat 32 mg by mouth 2 hours before the procedure to prevent contrast reaction. 4 tablet 0     ondansetron (ZOFRAN ODT) 8 MG ODT tab Take 1 tablet (8 mg) by mouth 3 times daily as needed for nausea 30 tablet 1     palbociclib (IBRANCE) 125 MG capsule CHEMO Take 1 capsule (125 mg) by mouth daily with food Take for 21 days, then 7 days off. Avoid grapefruit. Do not open/crush/chew capsule. 21 capsule 0     palbociclib (IBRANCE) 125 MG capsule CHEMO Take 1 capsule (125 mg) by mouth daily with food Take for 21 days, then 7 days off. Avoid grapefruit. Do not open/crush/chew capsule. (Patient not taking: Reported on 11/27/2018) 21  capsule 0     palbociclib (IBRANCE) 125 MG capsule CHEMO Take 1 capsule (125 mg) by mouth daily with food Take for 21 days, then 7 days off. Avoid grapefruit. Do not open/crush/chew capsule. (Patient not taking: Reported on 11/27/2018) 21 capsule 0     palbociclib (IBRANCE) 125 MG capsule CHEMO Take 1 capsule (125 mg) by mouth daily with food Take for 21 days, then 7 days off. Avoid grapefruit. Do not open/crush/chew capsule. 21 capsule 0     prochlorperazine (COMPAZINE) 10 MG tablet Take 1 tablet (10 mg) by mouth every 6 hours as needed for nausea or vomiting 30 tablet 0     sertraline (ZOLOFT) 100 MG tablet TAKE 1 AND 1/2 TABLETS(150 MG) BY MOUTH EVERY DAY       triamcinolone (NASACORT) 55 MCG/ACT inhaler Spray 1 spray into both nostrils daily        VITAMIN D, CHOLECALCIFEROL, PO Take 2,000 Units by mouth daily          Physical Examination:  General: The patient is a pleasant female in no acute distress.  /67 (BP Location: Right arm, Patient Position: Sitting, Cuff Size: Adult Large)   Pulse 75   Temp 97.7  F (36.5  C) (Oral)   Resp 16   Wt 125.5 kg (276 lb 11.2 oz)   SpO2 98%   BMI 41.46 kg/m     Wt Readings from Last 10 Encounters:   12/24/18 125.5 kg (276 lb 11.2 oz)   11/27/18 125.6 kg (276 lb 14.4 oz)   11/13/18 124.3 kg (274 lb)   10/16/18 122.5 kg (270 lb)   10/01/18 121.6 kg (268 lb)   09/18/18 121.8 kg (268 lb 8 oz)   09/04/18 122.7 kg (270 lb 8 oz)     HEENT: EOMI, PERRL. Sclerae are anicteric. Oral mucosa is pink and moist with no lesions or thrush.   Lymph: No palpable cervical, supraclavicular, subclavicular or axillary lymphadenopathy.  Heart: Regular rate and rhythm.   Lungs: Clear to auscultation bilaterally. Rare expiratory wheezing at base. Normal work of breathing.   Breast: Deferred today   Abdomen: Bowel sounds present, soft, nontender with no palpable hepatosplenomegaly or masses.   Extremities: No lower extremity edema noted bilaterally.   Neuro: Cranial nerves II through XII  are grossly intact.  Skin: No rashes, petechiae, or bruising noted on exposed skin. Some horizontal ridges with darker coloring at base of nails.     Laboratory Data:    12/24/2018 11:08   Sodium 141   Potassium 3.9   Chloride 109   Carbon Dioxide 26   Urea Nitrogen 12   Creatinine 0.92   GFR Estimate 74   GFR Estimate If Black 85   Calcium 8.7   Anion Gap 5   Albumin 3.6   Protein Total 7.5   Bilirubin Total 0.3   Alkaline Phosphatase 71   ALT 28   AST 18   Glucose 82   WBC 2.0 (L)   Hemoglobin 13.0   Hematocrit 37.7   Platelet Count 234   RBC Count 4.01   MCV 94   MCH 32.4   MCHC 34.5   RDW 14.6   Diff Method Automated Method          Assessment and Plan:    Recurrent, metastatic ER-positive, HER2 negative breast cancer: History of left breast cancer s/p lumpectomy and SNLB in 2014. S/p 4 cycles ddAC and 12 weeks of Taxol. On AI from 11/2014-2/2017. Recurrence on PET/CT scan performed 08/03/2018 and then biopsy to left posterior cervical lymph nodes, aorticopulmonary, and prevascular lymph nodes. No evidence of metastatic disease in the abdomen and pelvis or brain. Started Ibrance and letrozole on 9/21/18. Tolerating well aside from some hot flashes and mild joint stiffness. Re imaging last month showed improved disease. Now mid-cycle 4. Her ANC is 0.9. Okay to continue as she is mid-cycle. Will schedule labs on 1/9 prior to when she is due to start cycle 5 on 1/11. Orders are signed. Follow-up with Dr. Bhatt mid-to-late January with restaging. She has methylpred to take prior due to history of reaction to IV contrast.     Post-nasal drip, congestion: Rx for loratadine once daily. Get back on steroid nasal spray once daily. Refilled her albuterol nebs. Call with worsening cough or fevers.     Nausea: Improved. Antiemetics prn.    Dry skin, itching: Change body wash to unscented, hypoallergenic wash. Use a good moisturizer daily.     Nail changes: Should not be from treatment. Follow-up with PCP or Derm.      Bone health: bone targeting agents are not required as she has no evidence of bone metastases. Continue calcium and vitamin D.     Cecilia Goins PA-C    [facility] [address] [address] [phone]

## 2018-12-24 NOTE — PROGRESS NOTES
Oncology/Hematology Visit Note  Dec 24, 2018    Reason for Visit: follow up of recurrent metastatic ER-positive HER2 negative breast cancer    History of Present Illness: Jade Collins is a 48 year old female with a history of left breast cancer diagnosed in 12/2013 in [place] area and Dr. David Redd was her medical oncologist. Biopsy showed tumor to be ER positive, DC positive and HER2 negative. She underwent lumpectomy and SLNB by Dr. Tasha Segura in [place]. [place] lymph node was noted to be involved with tumor but no lymph node dissection done at that time. She subsequently underwent adjuvant ddAC x 4 cycles and Taxol x 12 weeks, competed in 9/11/2014. She had post lumpectomy radiation completed in 11/23/2014 by Dr. Manley. She was begun on an AI in 11/2014, but does not recall name.     She was then switched within about a month or so to anastrozole and remained on anastrozole from 11/2014-02/2017.  She has never received tamoxifen. She then moved to [place], [place] in 03/2017.  She saw a gynecologist there and underwent a IRIS/BSO by Dr. Zendejas.  She then also saw Dr. Linares in [place], [place], who is an oncologist.  His interpretation of the pathology report was that she had triple-negative breast cancer.  It is possible that she may have had low estrogen receptor positivity, but the anastrozole was then discontinued in 02/2017.  She then underwent the IRIS/BSO in 03/2017.  This was done laparoscopically.        She then remained off of all hormonal therapy and in 09/2017 moved to [place].  She remained off hormonal therapy and did not have Medical Oncology followup initially.        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  She then went to see Dr. Norberto Brand who performed a PET/CT scan and the patient also underwent a brain MRI for staging.  The PET/CT scan performed 08/03/2018 which showed mildly enlarged left posterior cervical lymph nodes, largest measuring  1.5 x 1.0 cm and mildly enlarged aorticopulmonary and prevascular lymph nodes that were noted on contrast-enhanced CT scan seen less well on the PET scan.  No evidence of metastatic disease in the abdomen and pelvis.  She also underwent a brain MRI that showed no evidence of intracranial metastatic disease.  She underwent a biopsy of one of the lymph nodes in the left neck which showed metastatic adenocarcinoma consistent with breast origin, which was estrogen-receptor positive.  The tumor cells were positive for CK7, ER and [place] consistent with metastatic breast carcinoma.  Estrogen receptor showed strong average nuclear intensity in 95% of carcinoma cells, progesterone receptor moderate average nuclear intensity in 70% of the carcinoma.  A GATA3 stain was apparently not performed.     She started Ibrance on 9/21 and letrozole on 9/23. She had improved disease on restaging on 11/26. Please see Dr. Bhatt's previous notes for further details on the patient's history. She comes in today for routine follow up.    Interval History:  Jade is here today mid-cycle 4 of ibrane/letrozole. She started cycle 4 of 12/14. She is tolerating medication well. She has some hot flashes and mild joint stiffness. Very rarely has nausea now. She denies any constipation or diarrhea.    She has had chronic post-nasal drainage and congestion from allergies. She has some chest congestion now and wheezing. No fevers/chills. No sinus pressure or headaches.     She has had some diffuse pruritis and will pick at skin. Skin is dry. Using a new body wash.     Has nailbed tenderness and some horizontal ridging to nails. No pus or yellowing of nailbed.     Review of Systems:  Patient denies any of the following except if noted above: fevers, chills, chest pain, dyspnea,  abdominal pain, dysuria.    Current Outpatient Medications   Medication Sig Dispense Refill     ACETAMINOPHEN PO Take 650 mg by mouth every 4 hours as needed for pain        albuterol (PROAIR HFA/PROVENTIL HFA/VENTOLIN HFA) 108 (90 Base) MCG/ACT inhaler Inhale 2 puffs into the lungs every 6 hours as needed for shortness of breath / dyspnea or wheezing       calcium carbonate (TUMS) 500 MG chewable tablet Take 1 chew tab by mouth as needed for heartburn       Calcium Citrate-Vitamin D (CALCIUM + D PO) Take 250 mg by mouth 2 times daily        Calcium-Vitamin D-Vitamin K (CALCIUM + D) 500-1000-40 MG-UNT-MCG CHEW Take 1 chew tab by mouth 2 times daily       fluticasone (FLONASE) 50 MCG/ACT spray Spray 1-2 sprays into both nostrils daily 1 Bottle 3     IBUPROFEN PO Take 200 mg by mouth every 4 hours as needed for moderate pain       letrozole (FEMARA) 2.5 MG tablet Take 1 tablet (2.5 mg) by mouth daily for 28 days 28 tablet 0     letrozole (FEMARA) 2.5 MG tablet Take 1 tablet (2.5 mg) by mouth daily for 28 days 28 tablet 0     letrozole (FEMARA) 2.5 MG tablet Take 1 tablet (2.5 mg) by mouth daily for 28 days 28 tablet 0     letrozole (FEMARA) 2.5 MG tablet Take 1 tablet (2.5 mg) by mouth daily for 28 days 28 tablet 0     levothyroxine (SYNTHROID/LEVOTHROID) 200 MCG tablet Take 200 mcg by mouth       methylPREDNISolone (MEDROL) 16 MG tablet Take 32 mg by mouth 12 hours before the procedure and repeat 32 mg by mouth 2 hours before the procedure to prevent contrast reaction. 4 tablet 0     ondansetron (ZOFRAN ODT) 8 MG ODT tab Take 1 tablet (8 mg) by mouth 3 times daily as needed for nausea 30 tablet 1     palbociclib (IBRANCE) 125 MG capsule CHEMO Take 1 capsule (125 mg) by mouth daily with food Take for 21 days, then 7 days off. Avoid grapefruit. Do not open/crush/chew capsule. 21 capsule 0     palbociclib (IBRANCE) 125 MG capsule CHEMO Take 1 capsule (125 mg) by mouth daily with food Take for 21 days, then 7 days off. Avoid grapefruit. Do not open/crush/chew capsule. (Patient not taking: Reported on 11/27/2018) 21 capsule 0     palbociclib (IBRANCE) 125 MG capsule CHEMO Take 1 capsule (125  mg) by mouth daily with food Take for 21 days, then 7 days off. Avoid grapefruit. Do not open/crush/chew capsule. (Patient not taking: Reported on 11/27/2018) 21 capsule 0     palbociclib (IBRANCE) 125 MG capsule CHEMO Take 1 capsule (125 mg) by mouth daily with food Take for 21 days, then 7 days off. Avoid grapefruit. Do not open/crush/chew capsule. 21 capsule 0     prochlorperazine (COMPAZINE) 10 MG tablet Take 1 tablet (10 mg) by mouth every 6 hours as needed for nausea or vomiting 30 tablet 0     sertraline (ZOLOFT) 100 MG tablet TAKE 1 AND 1/2 TABLETS(150 MG) BY MOUTH EVERY DAY       triamcinolone (NASACORT) 55 MCG/ACT inhaler Spray 1 spray into both nostrils daily        VITAMIN D, CHOLECALCIFEROL, PO Take 2,000 Units by mouth daily          Physical Examination:  General: The patient is a pleasant female in no acute distress.  /67 (BP Location: Right arm, Patient Position: Sitting, Cuff Size: Adult Large)   Pulse 75   Temp 97.7  F (36.5  C) (Oral)   Resp 16   Wt 125.5 kg (276 lb 11.2 oz)   SpO2 98%   BMI 41.46 kg/m    Wt Readings from Last 10 Encounters:   12/24/18 125.5 kg (276 lb 11.2 oz)   11/27/18 125.6 kg (276 lb 14.4 oz)   11/13/18 124.3 kg (274 lb)   10/16/18 122.5 kg (270 lb)   10/01/18 121.6 kg (268 lb)   09/18/18 121.8 kg (268 lb 8 oz)   09/04/18 122.7 kg (270 lb 8 oz)     HEENT: EOMI, PERRL. Sclerae are anicteric. Oral mucosa is pink and moist with no lesions or thrush.   Lymph: No palpable cervical, supraclavicular, subclavicular or axillary lymphadenopathy.  Heart: Regular rate and rhythm.   Lungs: Clear to auscultation bilaterally. Rare expiratory wheezing at base. Normal work of breathing.   Breast: Deferred today   Abdomen: Bowel sounds present, soft, nontender with no palpable hepatosplenomegaly or masses.   Extremities: No lower extremity edema noted bilaterally.   Neuro: Cranial nerves II through XII are grossly intact.  Skin: No rashes, petechiae, or bruising noted on exposed  skin. Some horizontal ridges with darker coloring at base of nails.     Laboratory Data:    12/24/2018 11:08   Sodium 141   Potassium 3.9   Chloride 109   Carbon Dioxide 26   Urea Nitrogen 12   Creatinine 0.92   GFR Estimate 74   GFR Estimate If Black 85   Calcium 8.7   Anion Gap 5   Albumin 3.6   Protein Total 7.5   Bilirubin Total 0.3   Alkaline Phosphatase 71   ALT 28   AST 18   Glucose 82   WBC 2.0 (L)   Hemoglobin 13.0   Hematocrit 37.7   Platelet Count 234   RBC Count 4.01   MCV 94   MCH 32.4   MCHC 34.5   RDW 14.6   Diff Method Automated Method          Assessment and Plan:    Recurrent, metastatic ER-positive, HER2 negative breast cancer: History of left breast cancer s/p lumpectomy and SNLB in 2014. S/p 4 cycles ddAC and 12 weeks of Taxol. On AI from 11/2014-2/2017. Recurrence on PET/CT scan performed 08/03/2018 and then biopsy to left posterior cervical lymph nodes, aorticopulmonary, and prevascular lymph nodes. No evidence of metastatic disease in the abdomen and pelvis or brain. Started Ibrance and letrozole on 9/21/18. Tolerating well aside from some hot flashes and mild joint stiffness. Re imaging last month showed improved disease. Now mid-cycle 4. Her ANC is 0.9. Okay to continue as she is mid-cycle. Will schedule labs on 1/9 prior to when she is due to start cycle 5 on 1/11. Orders are signed. Follow-up with Dr. Bhatt mid-to-late January with restaging. She has methylpred to take prior due to history of reaction to IV contrast.     Post-nasal drip, congestion: Rx for loratadine once daily. Get back on steroid nasal spray once daily. Refilled her albuterol nebs. Call with worsening cough or fevers.     Nausea: Improved. Antiemetics prn.    Dry skin, itching: Change body wash to unscented, hypoallergenic wash. Use a good moisturizer daily.     Nail changes: Should not be from treatment. Follow-up with PCP or Derm.     Bone health: bone targeting agents are not required as she has no evidence  of bone metastases. Continue calcium and vitamin D.     Cecilia Goins PA-C    [facility] [address] [address] [phone]

## 2018-12-24 NOTE — NURSING NOTE
"Oncology Rooming Note    December 24, 2018 11:01 AM   Jade Collins is a 48 year old female who presents for:    Chief Complaint   Patient presents with     Blood Draw     labs drawn with vpt by rn.  vs taken     Oncology Clinic Visit     Breast Ca f\u      Initial Vitals: /67 (BP Location: Right arm, Patient Position: Sitting, Cuff Size: Adult Large)   Pulse 75   Temp 97.7  F (36.5  C) (Oral)   Resp 16   Wt 125.5 kg (276 lb 11.2 oz)   SpO2 98%   BMI 41.46 kg/m   Estimated body mass index is 41.46 kg/m  as calculated from the following:    Height as of 11/13/18: 1.74 m (5' 8.5\").    Weight as of this encounter: 125.5 kg (276 lb 11.2 oz). Body surface area is 2.46 meters squared.  No Pain (0) Comment: Data Unavailable   No LMP recorded. Patient is postmenopausal.  Allergies reviewed: Yes  Medications reviewed: Yes    Medications: Medication refills not needed today.  Pharmacy name entered into [facility]:    [id number] [address] [place] AT [facility] OF [facility] [address] & [employer] [facility] - [place], [place] - [address]    Clinical concerns: Breathing  Akkerman  was notified.    5  minutes for nursing intake (face to face time)     Danii Salazar MA                "

## 2018-12-24 NOTE — NURSING NOTE
Chief Complaint   Patient presents with     Blood Draw     labs drawn with vpt by rn.  vs taken     Labs drawn with vpt by rn.  Pt tolerated well.  VS taken.  Pt checked in for next appt.    Loida Aviles RN

## 2018-12-26 ENCOUNTER — DOCUMENTATION ONLY (OUTPATIENT)
Dept: PHARMACY | Facility: CLINIC | Age: 48
End: 2018-12-26

## 2019-01-07 DIAGNOSIS — C50.412 MALIGNANT NEOPLASM OF UPPER-OUTER QUADRANT OF LEFT BREAST IN FEMALE, ESTROGEN RECEPTOR POSITIVE (H): Primary | ICD-10-CM

## 2019-01-07 DIAGNOSIS — Z17.0 MALIGNANT NEOPLASM OF UPPER-OUTER QUADRANT OF LEFT BREAST IN FEMALE, ESTROGEN RECEPTOR POSITIVE (H): Primary | ICD-10-CM

## 2019-01-07 RX ORDER — LETROZOLE 2.5 MG/1
2.5 TABLET, FILM COATED ORAL DAILY
Qty: 28 TABLET | Refills: 0 | Status: SHIPPED | OUTPATIENT
Start: 2019-01-07 | End: 2019-04-30

## 2019-01-08 ENCOUNTER — TELEPHONE (OUTPATIENT)
Dept: ONCOLOGY | Facility: CLINIC | Age: 49
End: 2019-01-08

## 2019-01-08 NOTE — TELEPHONE ENCOUNTER
Oral Chemotherapy Monitoring Program    Primary Oncologist: Nova  Primary Oncology Clinic: [facility]  Cancer Diagnosis: Breast     Therapy History:  Started Palbo/Letrazole on 9/21/18     Drug Interaction Assessment: No new medications  Subjective/Objective:  Jade Collins is a 48 year old female contacted by phone for a follow-up visit for oral chemotherapy.  Jade called in for Ibrance refill. Patient stated she is on day 3 off Ibrance. Will start next cycle on 1/11/19. Complaints of diarrhea/ constipation, fatigue, and nail bed tenderness but this is unchanged from previous months. Has MD appt. in 2 weeks and will follow up then.     ORAL CHEMOTHERAPY 9/18/2018 9/25/2018 10/16/2018 1/8/2019   Drug Name Ibrance (Palbociclib) Ibrance (Palbociclib) Ibrance (Palbociclib) Ibrance (Palbociclib)   Current Dosage 125mg 125mg 125mg 125mg   Current Schedule Daily Daily Daily Daily   Cycle Details 3 weeks on 1 week off 3 weeks on 1 week off 3 weeks on 1 week off 3 weeks on 1 week off   Start Date of Last Cycle - 9/21/2018 9/21/2018 12/14/2018   Planned next cycle start date - - 10/19/2018 1/11/2019   Doses missed in last 2 weeks - - 0 0   Adherence Assessment - Adherent Adherent Adherent   Adverse Effects - Arthralgias Nausea;Diarrhea Diarrhea   Nausea - - Grade 2 -   Pharmacist Intervention(nausea) - - Yes -   Intervention(s) - - Rx medication recommendation -   Diarrhea - - Grade 2 Grade 2   Pharmacist Intervention(diarrhea) - - No No   Arthralgias - Grade 1 - -   Pharmacist Intervention(arthralgias) - Yes - -   Intervention(s) - OTC recommendation - -   Any new drug interactions? No No - -   Is the dose as ordered appropriate for the patient? Yes Yes Yes -   Is the patient currently in pain? - Yes - -   Does the patient feel the pain is currently being managed by a provider? - Yes - -   Has the patient been assessed within the past 6 months for depression? - No - -       Last PHQ-2 Score on record:   PHQ-2 ( 1999 [employer])  "9/18/2018   Q1: Little interest or pleasure in doing things 1   Q2: Feeling down, depressed or hopeless 1   PHQ-2 Score 2       Patient does not report depression symptoms.      Vitals:  BP:   BP Readings from Last 1 Encounters:   12/24/18 117/67     Wt Readings from Last 1 Encounters:   12/24/18 125.5 kg (276 lb 11.2 oz)     Estimated body surface area is 2.46 meters squared as calculated from the following:    Height as of 11/13/18: 1.74 m (5' 8.5\").    Weight as of 12/24/18: 125.5 kg (276 lb 11.2 oz).    Labs:  _  Result Component Current Result Ref Range   Sodium 141 (12/24/2018) 133 - 144 mmol/L     _  Result Component Current Result Ref Range   Potassium 3.9 (12/24/2018) 3.4 - 5.3 mmol/L     _  Result Component Current Result Ref Range   Calcium 8.7 (12/24/2018) 8.5 - 10.1 mg/dL     No results found for Mag within last 30 days.     No results found for Phos within last 30 days.     _  Result Component Current Result Ref Range   Albumin 3.6 (12/24/2018) 3.4 - 5.0 g/dL     _  Result Component Current Result Ref Range   Urea Nitrogen 12 (12/24/2018) 7 - 30 mg/dL     _  Result Component Current Result Ref Range   Creatinine 0.92 (12/24/2018) 0.52 - 1.04 mg/dL       _  Result Component Current Result Ref Range   AST 18 (12/24/2018) 0 - 45 U/L     _  Result Component Current Result Ref Range   ALT 28 (12/24/2018) 0 - 50 U/L     _  Result Component Current Result Ref Range   Bilirubin Total 0.3 (12/24/2018) 0.2 - 1.3 mg/dL       _  Result Component Current Result Ref Range   WBC 2.0 (L) (12/24/2018) 4.0 - 11.0 10e9/L     _  Result Component Current Result Ref Range   Hemoglobin 13.0 (12/24/2018) 11.7 - 15.7 g/dL     _  Result Component Current Result Ref Range   Platelet Count 234 (12/24/2018) 150 - 450 10e9/L     No results found for ANC within last 30 days.               Assessment:  Pt with Grade 2 diarrhea/constipation.     Plan:  Continue with current therapy.     Follow-Up:  4 weeks for TM assessment and " refill.     Refill Due:  Pt to  1/10. Next due by 2/8    Veronika Carrera RN  [facility]-Therapy Management  [phone]

## 2019-01-09 ENCOUNTER — OFFICE VISIT (OUTPATIENT)
Dept: PALLIATIVE CARE | Facility: CLINIC | Age: 49
End: 2019-01-09
Attending: SOCIAL WORKER
Payer: COMMERCIAL

## 2019-01-09 VITALS
WEIGHT: 277.1 LBS | RESPIRATION RATE: 16 BRPM | SYSTOLIC BLOOD PRESSURE: 133 MMHG | DIASTOLIC BLOOD PRESSURE: 73 MMHG | BODY MASS INDEX: 41.52 KG/M2 | HEART RATE: 77 BPM | TEMPERATURE: 97.9 F | OXYGEN SATURATION: 97 %

## 2019-01-09 DIAGNOSIS — F33.1 MODERATE EPISODE OF RECURRENT MAJOR DEPRESSIVE DISORDER (H): ICD-10-CM

## 2019-01-09 DIAGNOSIS — Z51.5 ENCOUNTER FOR PALLIATIVE CARE: ICD-10-CM

## 2019-01-09 DIAGNOSIS — F43.10 POSTTRAUMATIC STRESS DISORDER: Primary | ICD-10-CM

## 2019-01-09 DIAGNOSIS — Z17.0 MALIGNANT NEOPLASM OF UPPER-OUTER QUADRANT OF LEFT BREAST IN FEMALE, ESTROGEN RECEPTOR POSITIVE (H): ICD-10-CM

## 2019-01-09 DIAGNOSIS — C50.412 MALIGNANT NEOPLASM OF UPPER-OUTER QUADRANT OF LEFT BREAST IN FEMALE, ESTROGEN RECEPTOR POSITIVE (H): ICD-10-CM

## 2019-01-09 LAB
ALBUMIN SERPL-MCNC: 3.9 G/DL (ref 3.4–5)
ALP SERPL-CCNC: 69 U/L (ref 40–150)
ALT SERPL W P-5'-P-CCNC: 20 U/L (ref 0–50)
ANION GAP SERPL CALCULATED.3IONS-SCNC: 7 MMOL/L (ref 3–14)
AST SERPL W P-5'-P-CCNC: 17 U/L (ref 0–45)
BASOPHILS # BLD AUTO: 0 10E9/L (ref 0–0.2)
BASOPHILS NFR BLD AUTO: 1.7 %
BILIRUB SERPL-MCNC: 0.6 MG/DL (ref 0.2–1.3)
BUN SERPL-MCNC: 14 MG/DL (ref 7–30)
CALCIUM SERPL-MCNC: 9.6 MG/DL (ref 8.5–10.1)
CANCER AG27-29 SERPL-ACNC: 19 U/ML (ref 0–39)
CEA SERPL-MCNC: 0.7 UG/L (ref 0–2.5)
CHLORIDE SERPL-SCNC: 104 MMOL/L (ref 94–109)
CO2 SERPL-SCNC: 26 MMOL/L (ref 20–32)
CREAT SERPL-MCNC: 0.91 MG/DL (ref 0.52–1.04)
DIFFERENTIAL METHOD BLD: ABNORMAL
EOSINOPHIL # BLD AUTO: 0.1 10E9/L (ref 0–0.7)
EOSINOPHIL NFR BLD AUTO: 2.5 %
ERYTHROCYTE [DISTWIDTH] IN BLOOD BY AUTOMATED COUNT: 13.9 % (ref 10–15)
GFR SERPL CREATININE-BSD FRML MDRD: 74 ML/MIN/{1.73_M2}
GLUCOSE SERPL-MCNC: 94 MG/DL (ref 70–99)
HCT VFR BLD AUTO: 37.8 % (ref 35–47)
HGB BLD-MCNC: 13.2 G/DL (ref 11.7–15.7)
IMM GRANULOCYTES # BLD: 0 10E9/L (ref 0–0.4)
IMM GRANULOCYTES NFR BLD: 0.4 %
LYMPHOCYTES # BLD AUTO: 1.1 10E9/L (ref 0.8–5.3)
LYMPHOCYTES NFR BLD AUTO: 46 %
MCH RBC QN AUTO: 33.3 PG (ref 26.5–33)
MCHC RBC AUTO-ENTMCNC: 34.9 G/DL (ref 31.5–36.5)
MCV RBC AUTO: 96 FL (ref 78–100)
MONOCYTES # BLD AUTO: 0.2 10E9/L (ref 0–1.3)
MONOCYTES NFR BLD AUTO: 9.6 %
NEUTROPHILS # BLD AUTO: 1 10E9/L (ref 1.6–8.3)
NEUTROPHILS NFR BLD AUTO: 39.8 %
NRBC # BLD AUTO: 0 10*3/UL
NRBC BLD AUTO-RTO: 0 /100
PLATELET # BLD AUTO: 139 10E9/L (ref 150–450)
POTASSIUM SERPL-SCNC: 4.2 MMOL/L (ref 3.4–5.3)
PROT SERPL-MCNC: 7.8 G/DL (ref 6.8–8.8)
RBC # BLD AUTO: 3.96 10E12/L (ref 3.8–5.2)
SODIUM SERPL-SCNC: 138 MMOL/L (ref 133–144)
WBC # BLD AUTO: 2.4 10E9/L (ref 4–11)

## 2019-01-09 PROCEDURE — 85025 COMPLETE CBC W/AUTO DIFF WBC: CPT | Performed by: PHYSICIAN ASSISTANT

## 2019-01-09 PROCEDURE — 36415 COLL VENOUS BLD VENIPUNCTURE: CPT

## 2019-01-09 PROCEDURE — 82378 CARCINOEMBRYONIC ANTIGEN: CPT | Performed by: PHYSICIAN ASSISTANT

## 2019-01-09 PROCEDURE — 80053 COMPREHEN METABOLIC PANEL: CPT | Performed by: PHYSICIAN ASSISTANT

## 2019-01-09 PROCEDURE — 86300 IMMUNOASSAY TUMOR CA 15-3: CPT | Performed by: PHYSICIAN ASSISTANT

## 2019-01-09 PROCEDURE — 90837 PSYTX W PT 60 MINUTES: CPT | Performed by: SOCIAL WORKER

## 2019-01-09 ASSESSMENT — PAIN SCALES - GENERAL: PAINLEVEL: NO PAIN (0)

## 2019-01-09 NOTE — NURSING NOTE
Chief Complaint   Patient presents with     Blood Draw     Labs drawn via VPT by RN in lab. VS taken. Pt checked in for next appt     Labs collected from venipuncture by RN. Vitals taken. Checked in for appointment(s).    Caren LOZOYA RN PHN BSN  BMT/Oncology Lab
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
## 2019-01-10 ENCOUNTER — TELEPHONE (OUTPATIENT)
Dept: PHARMACY | Facility: CLINIC | Age: 49
End: 2019-01-10

## 2019-01-10 ENCOUNTER — TELEPHONE (OUTPATIENT)
Dept: ONCOLOGY | Facility: CLINIC | Age: 49
End: 2019-01-10

## 2019-01-10 NOTE — ORAL ONC MGMT
Oral Chemotherapy Monitoring Program     Placed call to patient in follow up of Ibrance therapy.     Left message to please call back in follow-up of therapy. No patient or drug names were mentioned.     Case Jade PharmD  [facility]  [phone]  January 10, 2019
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
## 2019-01-10 NOTE — PROGRESS NOTES
Oral Chemotherapy Monitoring Program    Placed call to patient regarding labs done 1/9 PM: WBC 2.4, Hgb 13.2, , ANC 1.0.    Will consult with ROMULO Hugo, and Dr Bhatt to determine whether Ibrance 125mg cycle is safe to start on time (1/11 pm).    Will call patient back on 1/11.       Jade Miranda PharmD, BCPS, BCOP  Oncology Clinical Pharmacy Specialist  [facility]

## 2019-01-11 ENCOUNTER — TELEPHONE (OUTPATIENT)
Dept: ONCOLOGY | Facility: CLINIC | Age: 49
End: 2019-01-11

## 2019-01-11 NOTE — ORAL ONC MGMT
Oral Chemotherapy Monitoring Program     Placed call to patient in follow up of Ibrance therapy. Discussed ANC=1.0 in labs this week. Jade said she has had some diarrhea and fatigue this past week. She has not been exercising but she said she does feel she can start, as this will help keep her energy up. She said she drinks at least 64 ounces of water per day. She said that yesterday she had one loose watery stool. The worst day last week for diarrhea she had 3-4 watery stools. She has not tried any medications or other interventions for diarrhea. She said she is sleeping a lot and has noticed that when she gets a skin scratch it takes longer to heal. She denied any fevers or signs of infections.    I recommended that she take Imodium as needed for diarrhea according to package instructions. Discussed neutropenic precautions. Relayed message from Dr. Bhatt to start Ibrance cycle today (he is aware ANC = 1.0 and wants her to continue with Ibrance). Plan is for follow up labs and office visit on 1/22/2019. Jade will call with interim questions or concerns or if diarrhea does not improve. She thanked me for the call and care.    Case Jade PharmD  [facility]  [phone]  January 11, 2019

## 2019-01-14 ENCOUNTER — TELEPHONE (OUTPATIENT)
Dept: ONCOLOGY | Facility: CLINIC | Age: 49
End: 2019-01-14

## 2019-01-14 NOTE — TELEPHONE ENCOUNTER
I called Bib Jade in oral pharmacy and we discussed that I feel comfortable going ahead with palbociclib start with ANC 1000.  Her ANC has remained above 1000 since she started in September, 2018.  We'll check her ANC on January 22.     Wilfredo Bhatt MD

## 2019-01-22 ENCOUNTER — ONCOLOGY VISIT (OUTPATIENT)
Dept: ONCOLOGY | Facility: CLINIC | Age: 49
End: 2019-01-22
Attending: INTERNAL MEDICINE
Payer: COMMERCIAL

## 2019-01-22 ENCOUNTER — HOSPITAL ENCOUNTER (OUTPATIENT)
Dept: CT IMAGING | Facility: CLINIC | Age: 49
Discharge: HOME | End: 2019-01-22
Attending: INTERNAL MEDICINE | Admitting: INTERNAL MEDICINE
Payer: COMMERCIAL

## 2019-01-22 ENCOUNTER — HOSPITAL ENCOUNTER (OUTPATIENT)
Dept: CT IMAGING | Facility: CLINIC | Age: 49
End: 2019-01-22
Attending: INTERNAL MEDICINE
Payer: COMMERCIAL

## 2019-01-22 VITALS
TEMPERATURE: 97.4 F | HEIGHT: 69 IN | WEIGHT: 282.3 LBS | SYSTOLIC BLOOD PRESSURE: 138 MMHG | BODY MASS INDEX: 41.81 KG/M2 | OXYGEN SATURATION: 93 % | HEART RATE: 107 BPM | DIASTOLIC BLOOD PRESSURE: 75 MMHG | RESPIRATION RATE: 16 BRPM

## 2019-01-22 DIAGNOSIS — C50.412 MALIGNANT NEOPLASM OF UPPER-OUTER QUADRANT OF LEFT BREAST IN FEMALE, ESTROGEN RECEPTOR POSITIVE (H): ICD-10-CM

## 2019-01-22 DIAGNOSIS — Z17.0 MALIGNANT NEOPLASM OF UPPER-OUTER QUADRANT OF LEFT BREAST IN FEMALE, ESTROGEN RECEPTOR POSITIVE (H): ICD-10-CM

## 2019-01-22 DIAGNOSIS — G47.9 SLEEP DISORDER: ICD-10-CM

## 2019-01-22 DIAGNOSIS — F32.A DEPRESSION, UNSPECIFIED DEPRESSION TYPE: Primary | ICD-10-CM

## 2019-01-22 DIAGNOSIS — J45.909 ASTHMA: ICD-10-CM

## 2019-01-22 DIAGNOSIS — T50.8X5D ALLERGIC REACTION TO CONTRAST MATERIAL, SUBSEQUENT ENCOUNTER: ICD-10-CM

## 2019-01-22 LAB
ALBUMIN SERPL-MCNC: 4 G/DL (ref 3.4–5)
ALP SERPL-CCNC: 66 U/L (ref 40–150)
ALT SERPL W P-5'-P-CCNC: 27 U/L (ref 0–50)
ANION GAP SERPL CALCULATED.3IONS-SCNC: 9 MMOL/L (ref 3–14)
AST SERPL W P-5'-P-CCNC: 16 U/L (ref 0–45)
BASOPHILS # BLD AUTO: 0 10E9/L (ref 0–0.2)
BASOPHILS NFR BLD AUTO: 1 %
BILIRUB SERPL-MCNC: 0.4 MG/DL (ref 0.2–1.3)
BUN SERPL-MCNC: 15 MG/DL (ref 7–30)
CALCIUM SERPL-MCNC: 9.6 MG/DL (ref 8.5–10.1)
CANCER AG27-29 SERPL-ACNC: 21 U/ML (ref 0–39)
CEA SERPL-MCNC: 0.6 UG/L (ref 0–2.5)
CHLORIDE SERPL-SCNC: 104 MMOL/L (ref 94–109)
CO2 SERPL-SCNC: 24 MMOL/L (ref 20–32)
CREAT SERPL-MCNC: 1 MG/DL (ref 0.52–1.04)
DIFFERENTIAL METHOD BLD: ABNORMAL
EOSINOPHIL # BLD AUTO: 0 10E9/L (ref 0–0.7)
EOSINOPHIL NFR BLD AUTO: 0 %
ERYTHROCYTE [DISTWIDTH] IN BLOOD BY AUTOMATED COUNT: 13.2 % (ref 10–15)
GFR SERPL CREATININE-BSD FRML MDRD: 67 ML/MIN/{1.73_M2}
GLUCOSE SERPL-MCNC: 119 MG/DL (ref 70–99)
HCT VFR BLD AUTO: 37.9 % (ref 35–47)
HGB BLD-MCNC: 13.1 G/DL (ref 11.7–15.7)
LYMPHOCYTES # BLD AUTO: 0.2 10E9/L (ref 0.8–5.3)
LYMPHOCYTES NFR BLD AUTO: 11 %
MCH RBC QN AUTO: 32.9 PG (ref 26.5–33)
MCHC RBC AUTO-ENTMCNC: 34.6 G/DL (ref 31.5–36.5)
MCV RBC AUTO: 95 FL (ref 78–100)
MONOCYTES # BLD AUTO: 0.1 10E9/L (ref 0–1.3)
MONOCYTES NFR BLD AUTO: 3 %
NEUTROPHILS # BLD AUTO: 1.8 10E9/L (ref 1.6–8.3)
NEUTROPHILS NFR BLD AUTO: 85 %
PLATELET # BLD AUTO: 277 10E9/L (ref 150–450)
PLATELET # BLD EST: ABNORMAL 10*3/UL
POTASSIUM SERPL-SCNC: 4.5 MMOL/L (ref 3.4–5.3)
PROT SERPL-MCNC: 8 G/DL (ref 6.8–8.8)
RBC # BLD AUTO: 3.98 10E12/L (ref 3.8–5.2)
RBC MORPH BLD: NORMAL
SODIUM SERPL-SCNC: 136 MMOL/L (ref 133–144)
WBC # BLD AUTO: 2.1 10E9/L (ref 4–11)

## 2019-01-22 PROCEDURE — G0463 HOSPITAL OUTPT CLINIC VISIT: HCPCS | Mod: ZF

## 2019-01-22 PROCEDURE — 74177 CT ABD & PELVIS W/CONTRAST: CPT

## 2019-01-22 PROCEDURE — 71260 CT THORAX DX C+: CPT

## 2019-01-22 PROCEDURE — 85025 COMPLETE CBC W/AUTO DIFF WBC: CPT | Performed by: INTERNAL MEDICINE

## 2019-01-22 PROCEDURE — 86300 IMMUNOASSAY TUMOR CA 15-3: CPT | Performed by: INTERNAL MEDICINE

## 2019-01-22 PROCEDURE — 80053 COMPREHEN METABOLIC PANEL: CPT | Performed by: INTERNAL MEDICINE

## 2019-01-22 PROCEDURE — 99214 OFFICE O/P EST MOD 30 MIN: CPT | Mod: ZP | Performed by: INTERNAL MEDICINE

## 2019-01-22 PROCEDURE — 25000128 H RX IP 250 OP 636: Performed by: STUDENT IN AN ORGANIZED HEALTH CARE EDUCATION/TRAINING PROGRAM

## 2019-01-22 PROCEDURE — 70491 CT SOFT TISSUE NECK W/DYE: CPT

## 2019-01-22 PROCEDURE — 82378 CARCINOEMBRYONIC ANTIGEN: CPT | Performed by: INTERNAL MEDICINE

## 2019-01-22 RX ORDER — MIRTAZAPINE 15 MG/1
15 TABLET, FILM COATED ORAL AT BEDTIME
Qty: 30 TABLET | Refills: 0 | Status: SHIPPED | OUTPATIENT
Start: 2019-01-22 | End: 2019-02-05

## 2019-01-22 RX ORDER — METHYLPREDNISOLONE 16 MG/1
TABLET ORAL
Qty: 4 TABLET | Refills: 0 | Status: SHIPPED | OUTPATIENT
Start: 2019-01-22 | End: 2019-08-22

## 2019-01-22 RX ORDER — IOPAMIDOL 755 MG/ML
135 INJECTION, SOLUTION INTRAVASCULAR ONCE
Status: COMPLETED | OUTPATIENT
Start: 2019-01-22 | End: 2019-01-22

## 2019-01-22 RX ADMIN — IOPAMIDOL 135 ML: 755 INJECTION, SOLUTION INTRAVENOUS at 10:38

## 2019-01-22 ASSESSMENT — MIFFLIN-ST. JEOR: SCORE: 1967

## 2019-01-22 ASSESSMENT — PAIN SCALES - GENERAL: PAINLEVEL: MODERATE PAIN (4)

## 2019-01-22 NOTE — PROGRESS NOTES
HISTORY OF PRESENT ILLNESS:  Jade is a 48-year-old woman with metastatic breast cancer who comes to our clinic for recommendations for further treatment.  She is referred by Dr. Norberto Brand at [facility].  Jade would like to continue her care at the [facility].       Jade was diagnosed with breast cancer with a lump found in the upper-inner quadrant of the left breast.  She was diagnosed in 12/2013 in the [place] area and Dr. David Redd was her medical oncologist.  Biopsy of the tumor showed it to be ER positive, NC positive, and HER-2 negative.  We do not have any of the original pathology and we need to obtain those reports.  She underwent a lumpectomy performed by Dr. Tasha Segura who is a surgeon in the [place] area.  She also had a sentinel lymph node which was noted to be involved with tumor but there was no lymph node dissection done at that time.  [id number].  She subsequently underwent adjuvant chemotherapy with dose-dense AC for 4 cycles and Taxol for 12 weeks, completed 09/11/2014.        She then had radiation therapy post lumpectomy to the left breast, which was completed 11/23/2014 by Dr. Manley.        She was then begun on an aromatase inhibitor, the name of which she does not remember.  The aromatase inhibitor therapy was begun in 11/2014.  She was then switched within about a month or so to anastrozole and remained on anastrozole from 11/2014-02/2017.  She has never received tamoxifen.        She then moved to [place], [place] in 03/2017.  She saw a gynecologist there and underwent a IRIS/BSO by Dr. Zendejas.  She then also saw Dr. Linares in [place], [place], who is an oncologist.  His interpretation of the pathology report was that she had triple-negative breast cancer.  It is possible that she may have had low estrogen receptor positivity, but the anastrozole was then discontinued in 02/2017.  She then underwent the IRIS/BSO in 03/2017.  This was done laparoscopically.        She then  remained off of all hormonal therapy and in 09/2017 moved to [place].  She remained off hormonal therapy and did not have Medical Oncology followup initially.        She was driving for [place] and noticed lymph nodes in the left neck about 8 weeks ago.  She then went to see Dr. Norberto Brand who performed a PET/CT scan and the patient also underwent a brain MRI for staging.  The PET/CT scan performed 08/03/2018 showed in the neck there were several mildly metabolic active and large prominent left posterior cervical lymph nodes.  The largest is located posterior to the left sternocleidomastoid muscle and measures 1.5 x 1 cm in size.  This demonstrate modest FDG uptake.  The other lymph nodes are smaller and demonstrate mild FDG uptake.  The prominent prevascular and aortopulmonary lymph nodes were also seen on the contrast-enhanced scan were less well seen on the PET.  The largest lymph node visualized on the PET scan was 1.1 x 0.8 cm and demonstrated mild FDG uptake.  This was in the periaortic area just anterior to the aortic arch.  No lung nodules.  No lung infiltrates.  No pleural effusion.  There was no uptake of FDG in the bones.  In summary, there were mildly enlarged left posterior cervical lymph nodes, largest measuring 1.5 x 1.0 cm and mildly enlarged aorticopulmonary and prevascular lymph nodes that were noted on contrast-enhanced CT scan seen less well on the PET scan.  No evidence of metastatic disease in the abdomen and pelvis.  She also underwent a brain MRI that showed no evidence of intracranial metastatic disease.  She underwent a biopsy of one of the lymph nodes in the left neck which showed metastatic adenocarcinoma consistent with breast origin, which was estrogen-receptor positive.  The tumor cells were positive for CK7, ER and W[place] consistent with metastatic breast carcinoma.  Estrogen receptor showed strong average nuclear intensity in 95% of carcinoma cells, progesterone receptor moderate average  nuclear intensity in 70% of the carcinoma.  A GATA3 stain was apparently not performed.       Jade came to our clinic for recommendations.  She has mild pain in the left neck, moderate fatigue, moderate depression and has begun on sertraline, and she has moderate anxiety.      Nausea.   Zofran.  Persistent symptoms of depression sertraline was increased to 150 mg per day.       FOLLOW-UP NOTE      INTERVAL HISTORY:  Jade returns to clinic, and she has a complaint of some itching in her left hip.  Examination of this area does not reveal shingles, which was her concern.  She also has some discomfort at the 11 o'clock position of the left nipple areolar complex, and feeling this area, there were no suspicious findings.  She does complain of occasional pains in the left breast which are sharp and occur about once a week and she correlates this with since she began the palbociclib.  Otherwise she has no pain, no fatigue, no depression, no anxiety.  She has been working full-time as a .  She started her Ibrance 01/11.  This is cycle 5.  She does have symptoms of an upper respiratory infection.  Her sister was visiting her from [place] and accompanied her on the visit today.      REVIEW OF SYSTEMS:  She had some chills last night and discomfort associated with the prednisone, but otherwise has been doing well.  This morning, she denies any fevers that she is measured and did not have a fever this morning.  No cough, chest pain or shortness of breath, nausea or vomiting or constipation.  She has occasional loose stools about 3 times a week.  No bone pain, back pain or headache.  The remainder of a 10-point review of systems is negative.      She is taking calcium and vitamin D.      She does have hot flashes on letrozole.      PHYSICAL EXAMINATION:   VITAL SIGNS:  Blood pressure 138/75, temperature 97.4, pulse 107, respirations 16, O2 sat 93% on room air.  Height 1.7 meters, weight 128 kg.   GENERAL:  Jade  appeared generally well.  She has no alopecia.   HEENT:  Oropharynx is without lesions.   LYMPH:  There is no palpable cervical, supraclavicular, subclavicular or axillary lymphadenopathy.  Examination right breast reveals no masses.  Examination of the left breast reveals no masses.  Well-healed lumpectomy incision at the 11:30 o'clock position, 1 fingerbreadth from the nipple-areolar complex.  There is also a left breast axillary incision located in the anterior axillary line, but still over the breast without erythema or masses.  No masses in the left breast.   LUNGS:  Clear to percussion and auscultation.   CARDIOVASCULAR:  Regular rate and rhythm, S1, S2.   ABDOMEN:  Soft, nontender with increased body mass index.   EXTREMITIES:  With no edema.  Mood and affect are normal.     SKIN:  Examination of skin reveals some scratches at the left hip area, but no evidence of vesicular lesions or crusted lesions that would suggest shingles.  There are also some scratches in the right hip and again no signs or symptoms of shingles.      LABORATORY DATA:  The CBC and CMP were within normal limits except for low white blood cell count of 2100, absolute neutrophil counts are normal at 1800, absolute lymphocytes were low at 200.  The CEA and CA 27-29 were both in the normal range.      IMAGING:  A CT scan of the chest, abdomen and pelvis showed no definite evidence of metastatic disease within the chest, abdomen, pelvis or bones.  Hepatic steatosis was noted.  The CT of the neck when compared with 11/27/2018, there is no change in the size of bilateral level IIA enhancing lymph nodes, no new cervical lymphadenopathy.      ASSESSMENT AND PLAN:    1Jade is a 48-year-old woman with a history of breast cancer of the left breast, initially unknown pathology TXN1M0 by report.  The pathology slides have been were requested from [place] but apparently could not be obtained.  She did indeed have an ER-positive, HER2 negative  breast cancer by report.  She was treated with primary therapy consisting of lumpectomy and adjuvant chemotherapy with AC and then Taxol followed by radiation followed by raloxifene for 2 years followed by an aromatase inhibitor for 6 months.  She then had a IRIS/BSO.  The aromatase inhibitor was discontinued in [place], [place].  She was therefore an aromatase inhibitor for only 6 months.  Anastrozole was discontinued in 01/2017.  She was off all hormonal therapy for 18 months.  She then came to see us with a new diagnosis of recurrent metastatic ER-positive, HER2 negative breast cancer.   2.  She began palbociclib and letrozole on 09/21/2018.  She comes back to review results of CT scan of chest, abdomen and pelvis.  The CT scan of chest, abdomen, pelvis and bones shows no evidence of metastatic disease.  CT scan of the neck shows bilateral lymphadenopathy, which is stable.   On exam today, her previously palpable left cervical lymphadenopathy is no longer palpable.  Our plan is to continue the current palbociclib at dosing of 125 mg per day, 21 days on and 7 days off in combination with letrozole.  She has tolerated this combination quite well.  I think it would be reasonable to wait 3 months for restaging and Jade is in agreement with this plan.   3.  Stage of palbociclib.  She is currently on cycle 5 day 11 of the palbociclib.   4.  Insomnia and sleep disorder.  I did recommend trying mirtazapine 15 mg daily.  I did discuss the side effects of mirtazapine including weight gain and serotonin syndrome.  She is aware that if there are side effects, she should stop the mirtazapine immediately and call us.   5.  Lungs were clear today.  She does have a history of asthma and she has been referred to Dr. Facundo Hay for management of asthma.   6.  Followup.  We will see Jade in followup in our clinic in 1 month and perform followup imaging in 3 months. Follow up with SONIA February 19 and March 19.  Follow up with me April  16.  CBC, CMP, CA27.29 and CEA all visits.  CT CAP with premedication April 15.     Thank you for allowing us to continue to participate in Jade Collins's care.      Wilfredo Bhatt MD      [facility]       I spent 35 minutes with the patient more than 50% of which was in counseling and coordination of care.

## 2019-01-22 NOTE — NURSING NOTE
"Oncology Rooming Note    January 22, 2019 2:29 PM   Jade Collins is a 48 year old female who presents for:    Chief Complaint   Patient presents with     RECHECK     ONc Breast CA      Initial Vitals: /75 (BP Location: Right arm, Patient Position: Sitting, Cuff Size: Adult Regular)   Pulse 107   Temp 97.4  F (36.3  C) (Oral)   Resp 16   Ht 1.74 m (5' 8.5\")   Wt 128.1 kg (282 lb 4.8 oz)   SpO2 93%   BMI 42.29 kg/m   Estimated body mass index is 42.29 kg/m  as calculated from the following:    Height as of this encounter: 1.74 m (5' 8.5\").    Weight as of this encounter: 128.1 kg (282 lb 4.8 oz). Body surface area is 2.49 meters squared.  Moderate Pain (4) Comment: Data Unavailable   No LMP recorded. Patient is postmenopausal.  Allergies reviewed: Yes  Medications reviewed: Yes    Medications: MEDICATION REFILLS NEEDED TODAY. Provider was notified.  Pharmacy name entered into [facility]:    [id number] - [facility] [address] [facility] [address] [facility] - [place] - [address]    Clinical concerns: none      8 minutes for nursing intake (face to face time)     Brisa NATY Barfield              "

## 2019-01-22 NOTE — NURSING NOTE
Chief Complaint   Patient presents with     Blood Draw     Labs drawn via PIV by RN in lab.      Ana Byrd RN

## 2019-01-22 NOTE — LETTER
1/22/2019       RE: Jade Collins  [address]  [place] [id number]     Dear Colleague,    Thank you for referring your patient, Jade Collins, to the [facility]. Please see a copy of my visit note below.    HISTORY OF PRESENT ILLNESS:  Jade is a 48-year-old woman with metastatic breast cancer who comes to our clinic for recommendations for further treatment.  She is referred by Dr. Norberto Brand at [facility].  Jade would like to continue her care at the [facility].       Jade was diagnosed with breast cancer with a lump found in the upper-inner quadrant of the left breast.  She was diagnosed in 12/2013 in the [place] area and Dr. David Redd was her medical oncologist.  Biopsy of the tumor showed it to be ER positive, TN positive, and HER-2 negative.  We do not have any of the original pathology and we need to obtain those reports.  She underwent a lumpectomy performed by Dr. Tasha Segura who is a surgeon in the [place] area.  She also had a sentinel lymph node which was noted to be involved with tumor but there was no lymph node dissection done at that time.  TXN1MX.  She subsequently underwent adjuvant chemotherapy with dose-dense AC for 4 cycles and Taxol for 12 weeks, completed 09/11/2014.        She then had radiation therapy post lumpectomy to the left breast, which was completed 11/23/2014 by Dr. Manley.        She was then begun on an aromatase inhibitor, the name of which she does not remember.  The aromatase inhibitor therapy was begun in 11/2014.  She was then switched within about a month or so to anastrozole and remained on anastrozole from 11/2014-02/2017.  She has never received tamoxifen.        She then moved to [place], [place] in 03/2017.  She saw a gynecologist there and underwent a IRIS/BSO by Dr. Zendejas.  She then also saw Dr. Linares in [place], [place], who is an oncologist.  His interpretation of the pathology report was that she had triple-negative  breast cancer.  It is possible that she may have had low estrogen receptor positivity, but the anastrozole was then discontinued in 02/2017.  She then underwent the IRIS/BSO in 03/2017.  This was done laparoscopically.        She then remained off of all hormonal therapy and in 09/2017 moved to [place].  She remained off hormonal therapy and did not have Medical Oncology followup initially.        [email or web address] and noticed lymph nodes in the left neck about 8 weeks ago.  She then went to see Dr. Norberto Brand who performed a PET/CT scan and the patient also underwent a brain MRI for staging.  The PET/CT scan performed 08/03/2018 showed in the neck there were several mildly metabolic active and large prominent left posterior cervical lymph nodes.  The largest is located posterior to the left sternocleidomastoid muscle and measures 1.5 x 1 cm in size.  This demonstrate modest FDG uptake.  The other lymph nodes are smaller and demonstrate mild FDG uptake.  The prominent prevascular and aortopulmonary lymph nodes were also seen on the contrast-enhanced scan were less well seen on the PET.  The largest lymph node visualized on the PET scan was 1.1 x 0.8 cm and demonstrated mild FDG uptake.  This was in the periaortic area just anterior to the aortic arch.  No lung nodules.  No lung infiltrates.  No pleural effusion.  There was no uptake of FDG in the bones.  In summary, there were mildly enlarged left posterior cervical lymph nodes, largest measuring 1.5 x 1.0 cm and mildly enlarged aorticopulmonary and prevascular lymph nodes that were noted on contrast-enhanced CT scan seen less well on the PET scan.  No evidence of metastatic disease in the abdomen and pelvis.  She also underwent a brain MRI that showed no evidence of intracranial metastatic disease.  She underwent a biopsy of one of the lymph nodes in the left neck which showed metastatic adenocarcinoma consistent with breast origin, which was  estrogen-receptor positive.  The tumor cells were positive for CK7, ER and KS consistent with metastatic breast carcinoma.  Estrogen receptor showed strong average nuclear intensity in 95% of carcinoma cells, progesterone receptor moderate average nuclear intensity in 70% of the carcinoma.  A GATA3 stain was apparently not performed.       Jade came to our clinic for recommendations.  She has mild pain in the left neck, moderate fatigue, moderate depression and has begun on sertraline, and she has moderate anxiety.      Nausea.   Zofran.  Persistent symptoms of depression sertraline was increased to 150 mg per day.       FOLLOW-UP NOTE      INTERVAL HISTORY:  Jade returns to clinic, and she has a complaint of some itching in her left hip.  Examination of this area does not reveal shingles, which was her concern.  She also has some discomfort at the 11 o'clock position of the left nipple areolar complex, and feeling this area, there were no suspicious findings.  She does complain of occasional pains in the left breast which are sharp and occur about once a week and she correlates this with since she began the palbociclib.  Otherwise she has no pain, no fatigue, no depression, no anxiety.  She has been working full-time as a .  She started her Ibrance 01/11.  This is cycle 5.  She does have symptoms of an upper respiratory infection.  Her sister was visiting her from [place] and accompanied her on the visit today.      REVIEW OF SYSTEMS:  She had some chills last night and discomfort associated with the prednisone, but otherwise has been doing well.  This morning, she denies any fevers that she is measured and did not have a fever this morning.  No cough, chest pain or shortness of breath, nausea or vomiting or constipation.  She has occasional loose stools about 3 times a week.  No bone pain, back pain or headache.  The remainder of a 10-point review of systems is negative.      She is taking calcium  and vitamin D.      She does have hot flashes on letrozole.      PHYSICAL EXAMINATION:   VITAL SIGNS:  Blood pressure 138/75, temperature 97.4, pulse 107, respirations 16, O2 sat 93% on room air.  Height 1.7 meters, weight 128 kg.   GENERAL:  Jade appeared generally well.  She has no alopecia.   HEENT:  Oropharynx is without lesions.   LYMPH:  There is no palpable cervical, supraclavicular, subclavicular or axillary lymphadenopathy.  Examination right breast reveals no masses.  Examination of the left breast reveals no masses.  Well-healed lumpectomy incision at the 11:30 o'clock position, 1 fingerbreadth from the nipple-areolar complex.  There is also a left breast axillary incision located in the anterior axillary line, but still over the breast without erythema or masses.  No masses in the left breast.   LUNGS:  Clear to percussion and auscultation.   CARDIOVASCULAR:  Regular rate and rhythm, S1, S2.   ABDOMEN:  Soft, nontender with increased body mass index.   EXTREMITIES:  With no edema.  Mood and affect are normal.     SKIN:  Examination of skin reveals some scratches at the left hip area, but no evidence of vesicular lesions or crusted lesions that would suggest shingles.  There are also some scratches in the right hip and again no signs or symptoms of shingles.      LABORATORY DATA:  The CBC and CMP were within normal limits except for low white blood cell count of 2100, absolute neutrophil counts are normal at 1800, absolute lymphocytes were low at 200.  The CEA and CA 27-29 were both in the normal range.      IMAGING:  A CT scan of the chest, abdomen and pelvis showed no definite evidence of metastatic disease within the chest, abdomen, pelvis or bones.  Hepatic steatosis was noted.  The CT of the neck when compared with 11/27/2018, there is no change in the size of bilateral level IIA enhancing lymph nodes, no new cervical lymphadenopathy.      ASSESSMENT AND PLAN:    1Jade is a 48-year-old woman  with a history of breast cancer of the left breast, initially unknown pathology TXN1M0 by report.  The pathology slides have been were requested from [place] but apparently could not be obtained.  She did indeed have an ER-positive, HER2 negative breast cancer by report.  She was treated with primary therapy consisting of lumpectomy and adjuvant chemotherapy with AC and then Taxol followed by radiation followed by raloxifene for 2 years followed by an aromatase inhibitor for 6 months.  She then had a IRIS/BSO.  The aromatase inhibitor was discontinued in [place], [place].  She was therefore an aromatase inhibitor for only 6 months.  Anastrozole was discontinued in 01/2017.  She was off all hormonal therapy for 18 months.  She then came to see us with a new diagnosis of recurrent metastatic ER-positive, HER2 negative breast cancer.   2.  She began palbociclib and letrozole on 09/21/2018.  She comes back to review results of CT scan of chest, abdomen and pelvis.  The CT scan of chest, abdomen, pelvis and bones shows no evidence of metastatic disease.  CT scan of the neck shows bilateral lymphadenopathy, which is stable.   On exam today, her previously palpable left cervical lymphadenopathy is no longer palpable.  Our plan is to continue the current palbociclib at dosing of 125 mg per day, 21 days on and 7 days off in combination with letrozole.  She has tolerated this combination quite well.  I think it would be reasonable to wait 3 months for restaging and Jade is in agreement with this plan.   3.  Stage of palbociclib.  She is currently on cycle 5 day 11 of the palbociclib.   4.  Insomnia and sleep disorder.  I did recommend trying mirtazapine 15 mg daily.  I did discuss the side effects of mirtazapine including weight gain and serotonin syndrome.  She is aware that if there are side effects, she should stop the mirtazapine immediately and call us.   5.  Lungs were clear today.  She does have a history of asthma and she  has been referred to Dr. Facundo Hay for management of asthma.   6.  Followup.  We will see Jade in followup in our clinic in 1 month and perform followup imaging in 3 months. Follow up with SONIA February 19 and March 19.  Follow up with me April 16.  CBC, CMP, CA27.29 and CEA all visits.  CT CAP with premedication April 15.     Thank you for allowing us to continue to participate in Jade Collins's care.      Wilfredo Bhatt MD      [facility]       I spent 35 minutes with the patient more than 50% of which was in counseling and coordination of care.     Again, thank you for allowing me to participate in the care of your patient.      Sincerely,    Wilfredo Bhatt MD

## 2019-01-23 LAB
DLCOCOR-%PRED-PRE: 132 %
DLCOCOR-PRE: 32.72 ML/MIN/MMHG
DLCOUNC-%PRED-PRE: 131 %
DLCOUNC-PRE: 32.52 ML/MIN/MMHG
DLCOUNC-PRED: 24.7 ML/MIN/MMHG
ERV-%PRED-PRE: 78 %
ERV-PRE: 0.43 L
ERV-PRED: 0.55 L
EXPTIME-PRE: 9.71 SEC
FEF2575-%PRED-PRE: 24 %
FEF2575-PRE: 0.75 L/SEC
FEF2575-PRED: 3.06 L/SEC
FEFMAX-%PRED-PRE: 77 %
FEFMAX-PRE: 5.78 L/SEC
FEFMAX-PRED: 7.49 L/SEC
FEV1-%PRED-PRE: 59 %
FEV1-PRE: 1.91 L
FEV1FEV6-PRE: 65 %
FEV1FEV6-PRED: 82 %
FEV1FVC-PRE: 61 %
FEV1FVC-PRED: 80 %
FEV1SVC-PRE: 55 %
FEV1SVC-PRED: 79 %
FIFMAX-PRE: 5.49 L/SEC
FRCPLETH-%PRED-PRE: 123 %
FRCPLETH-PRE: 3.64 L
FRCPLETH-PRED: 2.95 L
FVC-%PRED-PRE: 76 %
FVC-PRE: 3.12 L
FVC-PRED: 4.06 L
IC-%PRED-PRE: 86 %
IC-PRE: 3.06 L
IC-PRED: 3.53 L
RVPLETH-%PRED-PRE: 166 %
RVPLETH-PRE: 3.21 L
RVPLETH-PRED: 1.93 L
TLCPLETH-%PRED-PRE: 117 %
TLCPLETH-PRE: 6.7 L
TLCPLETH-PRED: 5.69 L
VA-%PRED-PRE: 95 %
VA-PRE: 5.57 L
VC-%PRED-PRE: 85 %
VC-PRE: 3.49 L
VC-PRED: 4.08 L

## 2019-01-30 NOTE — TELEPHONE ENCOUNTER
FUTURE VISIT INFORMATION      FUTURE VISIT INFORMATION:    Date: 2.5.19    Time: 7:00 Am    Location: [facility]  REFERRAL INFORMATION:    Referring provider:  Dr. Bhatt    Referring providers clinic:  [facility]    Reason for visit/diagnosis  Asthma    RECORDS REQUESTED FROM:       Clinic name Comments Records Status Imaging Status   [facility] Referral placed 11.27.18 EPIC EPIC                                     RECORDS RECEIVED FROM: [facility]   DATE RECEIVED: 2.5.19   NOTES STATUS DETAILS   OFFICE NOTE from referring provider Internal 11.27.18   OFFICE NOTE from other specialist Internal 12.24.18   DISCHARGE SUMMARY from hospital N/A    DISCHARGE REPORT from the ER N/A    OPERATIVE REPORT N/A    MEDICATION LIST Internal    IMAGING  (NEED IMAGES AND REPORTS)     CT SCAN Internal 1.22.19   CHEST XRAY (CXR) Internal 10.16.18   TESTS     PULMONARY FUNCTION TESTING (PFT) In process 1.22.19   FLOW LOOP VOLUME (FVL) In process    CYSTIC FIBROSIS     CF SPUTUM CULTURE N/A

## 2019-02-03 DIAGNOSIS — C50.412 MALIGNANT NEOPLASM OF UPPER-OUTER QUADRANT OF LEFT BREAST IN FEMALE, ESTROGEN RECEPTOR POSITIVE (H): ICD-10-CM

## 2019-02-03 DIAGNOSIS — Z17.0 MALIGNANT NEOPLASM OF UPPER-OUTER QUADRANT OF LEFT BREAST IN FEMALE, ESTROGEN RECEPTOR POSITIVE (H): ICD-10-CM

## 2019-02-04 DIAGNOSIS — Z17.0 MALIGNANT NEOPLASM OF UPPER-OUTER QUADRANT OF LEFT BREAST IN FEMALE, ESTROGEN RECEPTOR POSITIVE (H): Primary | ICD-10-CM

## 2019-02-04 DIAGNOSIS — C50.412 MALIGNANT NEOPLASM OF UPPER-OUTER QUADRANT OF LEFT BREAST IN FEMALE, ESTROGEN RECEPTOR POSITIVE (H): Primary | ICD-10-CM

## 2019-02-04 RX ORDER — LETROZOLE 2.5 MG/1
2.5 TABLET, FILM COATED ORAL DAILY
Qty: 28 TABLET | Refills: 0 | Status: SHIPPED | OUTPATIENT
Start: 2019-02-04 | End: 2019-04-30

## 2019-02-05 ENCOUNTER — PRE VISIT (OUTPATIENT)
Dept: PULMONOLOGY | Facility: CLINIC | Age: 49
End: 2019-02-05

## 2019-02-05 ENCOUNTER — TELEPHONE (OUTPATIENT)
Dept: PULMONOLOGY | Facility: CLINIC | Age: 49
End: 2019-02-05

## 2019-02-05 ENCOUNTER — OFFICE VISIT (OUTPATIENT)
Dept: PULMONOLOGY | Facility: CLINIC | Age: 49
End: 2019-02-05
Attending: INTERNAL MEDICINE
Payer: COMMERCIAL

## 2019-02-05 VITALS
HEART RATE: 87 BPM | RESPIRATION RATE: 18 BRPM | HEIGHT: 69 IN | OXYGEN SATURATION: 94 % | WEIGHT: 282 LBS | SYSTOLIC BLOOD PRESSURE: 120 MMHG | BODY MASS INDEX: 41.77 KG/M2 | DIASTOLIC BLOOD PRESSURE: 80 MMHG

## 2019-02-05 DIAGNOSIS — J45.20 MILD INTERMITTENT ASTHMA WITHOUT COMPLICATION: Primary | ICD-10-CM

## 2019-02-05 DIAGNOSIS — J45.40 MODERATE PERSISTENT ASTHMA, UNSPECIFIED WHETHER COMPLICATED: ICD-10-CM

## 2019-02-05 PROCEDURE — G0463 HOSPITAL OUTPT CLINIC VISIT: HCPCS | Mod: ZF

## 2019-02-05 RX ORDER — ALBUTEROL SULFATE 90 UG/1
2 AEROSOL, METERED RESPIRATORY (INHALATION) EVERY 6 HOURS
Qty: 1 INHALER | Refills: 4 | Status: SHIPPED | OUTPATIENT
Start: 2019-02-05 | End: 2020-02-24

## 2019-02-05 ASSESSMENT — PAIN SCALES - GENERAL: PAINLEVEL: NO PAIN (0)

## 2019-02-05 ASSESSMENT — MIFFLIN-ST. JEOR: SCORE: 1965.58

## 2019-02-05 NOTE — PROGRESS NOTES
Reason for Visit  Jade Collins is a 48 year old year old female who is being seen for No chief complaint on file.    Pulmonary HPI  47 YO with history of metastatic breast cancer with metastasis to the cervical lymph nodes but no evidence of metastatic disease in her chest, currently on palbociclib.  History of asthma since 1998.  States had no symptoms of asthma as child, born and raised in [place].  Moved to [place] in 1994 and noticed developing increased sinus symptoms and asthma symptoms in 1998. Triggers were dust in air and seasonal variation in Spring with flowering of cacti and orange trees.  Had 2-3 flares per year, sometime requiring prednisone.  Hospitalized twice- once in 2001 and then in 2014, each time was for ~ 24 hours, usually responded quickly to nebulizers.  Also has allergies to cats and dogs.  Moved back to [place] in Fall of 2017; since the move she states that her allergies and asthma have been under better control.  Using nasal steroids daily- has noted some nasal irritation with Flonase, is thinking about switching back to Nasocort.  Uses Claritin on a daily basis.  Does not use nasal saline rinses.  Does notice some exertional component, cold air also triggers an increase in difficulty breathing.  Was on Advair in the past but has not taken for some time.  Uses albuterol 2-3 times per week. Recent URI with some increase in nasal congestion but this is resolving.  Received flu vaccination this Fall.  No Tobacco use but second hand smoke exposure.  + FH of asthma in her father.    The patient was seen and examined by Facundo Hay           Current Outpatient Medications   Medication     ACETAMINOPHEN PO     albuterol (PROAIR HFA/PROVENTIL HFA/VENTOLIN HFA) 108 (90 Base) MCG/ACT inhaler     albuterol (PROVENTIL) (2.5 MG/3ML) 0.083% neb solution     calcium carbonate (TUMS) 500 MG chewable tablet     Calcium Citrate-Vitamin D (CALCIUM + D PO)     Calcium-Vitamin D-Vitamin K (CALCIUM + D) 500-1000-40  MG-UNT-MCG CHEW     fluticasone (FLONASE) 50 MCG/ACT spray     IBUPROFEN PO     letrozole (FEMARA) 2.5 MG tablet     letrozole (FEMARA) 2.5 MG tablet     levothyroxine (SYNTHROID/LEVOTHROID) 200 MCG tablet     loratadine (CLARITIN) 10 MG tablet     methylPREDNISolone (MEDROL) 16 MG tablet     mirtazapine (REMERON) 15 MG tablet     ondansetron (ZOFRAN ODT) 8 MG ODT tab     palbociclib (IBRANCE) 125 MG capsule CHEMO     palbociclib (IBRANCE) 125 MG capsule CHEMO     prochlorperazine (COMPAZINE) 10 MG tablet     sertraline (ZOLOFT) 100 MG tablet     triamcinolone (NASACORT) 55 MCG/ACT inhaler     VITAMIN D, CHOLECALCIFEROL, PO     No current facility-administered medications for this visit.      Allergies   Allergen Reactions     Contrast Dye Shortness Of Breath     Swollen eyes, coughing, headache     Sulfa Drugs Shortness Of Breath     Penicillins      No past medical history on file.    No past surgical history on file.    Social History     Socioeconomic History     Marital status: Single     Spouse name: Not on file     Number of children: Not on file     Years of education: Not on file     Highest education level: Not on file   Social Needs     Financial resource strain: Not on file     Food insecurity - worry: Not on file     Food insecurity - inability: Not on file     Transportation needs - medical: Not on file     Transportation needs - non-medical: Not on file   Occupational History     Not on file   Tobacco Use     Smoking status: Never Smoker     Smokeless tobacco: Never Used   Substance and Sexual Activity     Alcohol use: Not on file     Drug use: Not on file     Sexual activity: Not on file   Other Topics Concern     Not on file   Social History Narrative     Not on file       FH:  Father- Asthma  ROS Pulmonary  A complete ROS was otherwise negative except as noted in the HPI.  There were no vitals taken for this visit.  Exam:   GENERAL APPEARANCE: Well developed, well nourished, alert, and in no  apparent distress.  EYES: PERRL, EOMI  HENT: Nasal mucosa with no edema and no hyperemia. No nasal polyps.  EARS: Canals clear, TMs normal  MOUTH: Oral mucosa is moist, without any lesions, no tonsillar enlargement, no oropharyngeal exudate.  NECK: supple, no masses, no thyromegaly.  LYMPHATICS: No significant axillary, cervical, or supraclavicular nodes.  RESP:  Good air flow throughout.  No crackles. No rhonchi. Mild expiratory wheezing on forced expiration.  CV: Normal S1, S2, regular rhythm, normal rate. No murmur.  No rub. No gallop. No LE edema.   ABDOMEN:  Bowel sounds normal, soft, nontender, no HSM or masses.   MS: extremities normal. No clubbing. No cyanosis.  SKIN: no rash on limited exam  NEURO: Mentation intact, speech normal, normal strength and tone, normal gait and stance  PSYCH: mentation appears normal. and affect normal/bright  Results:  PFT's from 1-22-19 were personally reviewed in clinic  FVC 3.12 (76%), FEV-1 1.91 (59%), FEV-1/FVC 61%  %, %, %  DLCO 132%  Moderate airflow obstruction.  Over distended lung volumes with air trapping.  Normal diffusion capacity.  No results found for this or any previous visit (from the past 168 hour(s)).    Assessment and plan:   49 YO with onset of asthma later in life in late 20's after environmental and allergen change with move to [place].  On spirometry has mild to moderate obstruction, unclear at present if this is fixed obstruction. Fixed obstruction would be more consistent with sub-optimally controlled asthma that has been chronic in nature.  Sinus symptoms may be triggering more asthma symptoms- needs improved control.  Recent viral illness with increased SOB and cough but now feels at baseline.    1. Start Advair 250-50 BID  2. Continue albuterol prn  3. Will switch to Nasocort  4. Advised to use nasal saline washes at least daily, preferably twice a day particularly during times of increased nasal drainage  5. Will monitor reflux  symptoms at present    RTC in 2 months with repeat spirometry.  Patient to call if she has any questions or concerns prior to her next clinic appointment

## 2019-02-05 NOTE — LETTER
2/5/2019       RE: Jade Collins  [address]  [facility] [address]     Dear Colleague,    Thank you for referring your patient, Jade Collins, to the [facility] at [facility]. Please see a copy of my visit note below.    Reason for Visit  Jade Collins is a 48 year old year old female who is being seen for No chief complaint on file.    Pulmonary HPI  47 YO with history of metastatic breast cancer with metastasis to the cervical lymph nodes but no evidence of metastatic disease in her chest, currently on palbociclib.  History of asthma since 1998.  States had no symptoms of asthma as child, born and raised in [place].  Moved to [place] in 1994 and noticed developing increased sinus symptoms and asthma symptoms in 1998. Triggers were dust in air and seasonal variation in Spring with flowering of cacti and orange trees.  Had 2-3 flares per year, sometime requiring prednisone.  Hospitalized twice- once in 2001 and then in 2014, each time was for ~ 24 hours, usually responded quickly to nebulizers.  Also has allergies to cats and dogs.  Moved back to [place] in Fall of 2017; since the move she states that her allergies and asthma have been under better control.  Using nasal steroids daily- has noted some nasal irritation with Flonase, is thinking about switching back to Nasocort.  Uses Claritin on a daily basis.  Does not use nasal saline rinses.  Does notice some exertional component, cold air also triggers an increase in difficulty breathing.  Was on Advair in the past but has not taken for some time.  Uses albuterol 2-3 times per week. Recent URI with some increase in nasal congestion but this is resolving.  Received flu vaccination this Fall.  No Tobacco use but second hand smoke exposure.  + FH of asthma in her father.    The patient was seen and examined by Facundo Hay           Current Outpatient Medications   Medication     ACETAMINOPHEN PO      albuterol (PROAIR HFA/PROVENTIL HFA/VENTOLIN HFA) 108 (90 Base) MCG/ACT inhaler     albuterol (PROVENTIL) (2.5 MG/3ML) 0.083% neb solution     calcium carbonate (TUMS) 500 MG chewable tablet     Calcium Citrate-Vitamin D (CALCIUM + D PO)     Calcium-Vitamin D-Vitamin K (CALCIUM + D) 500-1000-40 MG-UNT-MCG CHEW     fluticasone (FLONASE) 50 MCG/ACT spray     IBUPROFEN PO     letrozole (FEMARA) 2.5 MG tablet     letrozole (FEMARA) 2.5 MG tablet     levothyroxine (SYNTHROID/LEVOTHROID) 200 MCG tablet     loratadine (CLARITIN) 10 MG tablet     methylPREDNISolone (MEDROL) 16 MG tablet     mirtazapine (REMERON) 15 MG tablet     ondansetron (ZOFRAN ODT) 8 MG ODT tab     palbociclib (IBRANCE) 125 MG capsule CHEMO     palbociclib (IBRANCE) 125 MG capsule CHEMO     prochlorperazine (COMPAZINE) 10 MG tablet     sertraline (ZOLOFT) 100 MG tablet     triamcinolone (NASACORT) 55 MCG/ACT inhaler     VITAMIN D, CHOLECALCIFEROL, PO     No current facility-administered medications for this visit.      Allergies   Allergen Reactions     Contrast Dye Shortness Of Breath     Swollen eyes, coughing, headache     Sulfa Drugs Shortness Of Breath     Penicillins      No past medical history on file.    No past surgical history on file.    Social History     Socioeconomic History     Marital status: Single     Spouse name: Not on file     Number of children: Not on file     Years of education: Not on file     Highest education level: Not on file   Social Needs     Financial resource strain: Not on file     Food insecurity - worry: Not on file     Food insecurity - inability: Not on file     Transportation needs - medical: Not on file     Transportation needs - non-medical: Not on file   Occupational History     Not on file   Tobacco Use     Smoking status: Never Smoker     Smokeless tobacco: Never Used   Substance and Sexual Activity     Alcohol use: Not on file     Drug use: Not on file     Sexual activity: Not on file   Other Topics Concern      Not on file   Social History Narrative     Not on file       FH:  Father- Asthma  ROS Pulmonary  A complete ROS was otherwise negative except as noted in the HPI.  There were no vitals taken for this visit.  Exam:   GENERAL APPEARANCE: Well developed, well nourished, alert, and in no apparent distress.  EYES: PERRL, EOMI  HENT: Nasal mucosa with no edema and no hyperemia. No nasal polyps.  EARS: Canals clear, TMs normal  MOUTH: Oral mucosa is moist, without any lesions, no tonsillar enlargement, no oropharyngeal exudate.  NECK: supple, no masses, no thyromegaly.  LYMPHATICS: No significant axillary, cervical, or supraclavicular nodes.  RESP:  Good air flow throughout.  No crackles. No rhonchi. Mild expiratory wheezing on forced expiration.  CV: Normal S1, S2, regular rhythm, normal rate. No murmur.  No rub. No gallop. No LE edema.   ABDOMEN:  Bowel sounds normal, soft, nontender, no HSM or masses.   MS: extremities normal. No clubbing. No cyanosis.  SKIN: no rash on limited exam  NEURO: Mentation intact, speech normal, normal strength and tone, normal gait and stance  PSYCH: mentation appears normal. and affect normal/bright  Results:  PFT's from 1-22-19 were personally reviewed in clinic  FVC 3.12 (76%), FEV-1 1.91 (59%), FEV-1/FVC 61%  %, %, %  DLCO 132%  Moderate airflow obstruction.  Over distended lung volumes with air trapping.  Normal diffusion capacity.  No results found for this or any previous visit (from the past 168 hour(s)).    Assessment and plan:   49 YO with onset of asthma later in life in late 20's after environmental and allergen change with move to [place].  On spirometry has mild to moderate obstruction, unclear at present if this is fixed obstruction. Fixed obstruction would be more consistent with sub-optimally controlled asthma that has been chronic in nature.  Sinus symptoms may be triggering more asthma symptoms- needs improved control.  Recent viral illness with  increased SOB and cough but now feels at baseline.    1. Start Advair 250-50 BID  2. Continue albuterol prn  3. Will switch to Nasocort  4. Advised to use nasal saline washes at least daily, preferably twice a day particularly during times of increased nasal drainage  5. Will monitor reflux symptoms at present    RTC in 2 months with repeat spirometry.  Patient to call if she has any questions or concerns prior to her next clinic appointment          Again, thank you for allowing me to participate in the care of your patient.      Sincerely,    Facundo Hay MD

## 2019-02-05 NOTE — TELEPHONE ENCOUNTER
Prior Authorization Retail Medication Request    Medication/Dose: fluticasone-salmeterol (ADVAIR) 250-50 MCG/DOSE inhaler  ICD code (if different than what is on RX):  Mild intermittent asthma without complication [J45.20]   Previously Tried and Failed:    Rationale:  Using nasal steroids daily- has noted some nasal irritation with Flonase, is thinking about switching back to Nasocort.  Uses Claritin on a daily basis.  Does not use nasal saline rinses.  Does notice some exertional component, cold air also triggers an increase in difficulty breathing.  Was on Advair in the past but has not taken for some time.  Uses albuterol 2-3 times per week. Recent URI with some increase in nasal congestion but this is resolving    Insurance Name:  [facility]  Insurance ID: [id number]

## 2019-02-06 ENCOUNTER — OFFICE VISIT (OUTPATIENT)
Dept: PALLIATIVE CARE | Facility: CLINIC | Age: 49
End: 2019-02-06
Attending: SOCIAL WORKER
Payer: COMMERCIAL

## 2019-02-06 DIAGNOSIS — F33.1 MODERATE EPISODE OF RECURRENT MAJOR DEPRESSIVE DISORDER (H): ICD-10-CM

## 2019-02-06 DIAGNOSIS — Z51.5 ENCOUNTER FOR PALLIATIVE CARE: ICD-10-CM

## 2019-02-06 DIAGNOSIS — F43.10 POSTTRAUMATIC STRESS DISORDER: Primary | ICD-10-CM

## 2019-02-06 PROCEDURE — 90834 PSYTX W PT 45 MINUTES: CPT | Performed by: SOCIAL WORKER
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
## 2019-02-08 ENCOUNTER — TELEPHONE (OUTPATIENT)
Dept: ONCOLOGY | Facility: CLINIC | Age: 49
End: 2019-02-08

## 2019-02-08 NOTE — ORAL ONC MGMT
Oral Chemotherapy Monitoring Program     Placed call to patient in follow up of Ibrance (palbociclib) therapy.   Left message requesting call back.   No drug names were mentioned.      Thank you for the opportunity to participate in the care of the above patient,  Jose Swain, PharmD  Hematology/Oncology Clinical Pharmacist  [facility] and [facility]   [phone]

## 2019-02-12 NOTE — TELEPHONE ENCOUNTER
Central Prior Authorization Team   Phone: [phone]    In the process of working on P/A request. One of the questions asks to explain if there is a reason why generic AirDuo could not be used. Please advise. I will hold P/A request and wait to submit until response.

## 2019-02-12 NOTE — TELEPHONE ENCOUNTER
Per Juany Nur - the generic AirDuo is on back order.    PA Initiation    Medication: fluticasone-salmeterol (ADVAIR) 250-50 MCG/DOSE inhaler  Insurance Company: [employer] - Phone [phone] Fax [phone]  [facility] the [facility] [address] [place] [address] [address] AT [facility] OF  [address]  Filling Pharmacy Phone: [phone]  Filling Pharmacy Fax: [phone]  Start Date: 2/12/2019

## 2019-02-13 ENCOUNTER — OFFICE VISIT (OUTPATIENT)
Dept: PALLIATIVE CARE | Facility: CLINIC | Age: 49
End: 2019-02-13
Attending: SOCIAL WORKER
Payer: COMMERCIAL

## 2019-02-13 DIAGNOSIS — Z51.5 ENCOUNTER FOR PALLIATIVE CARE: ICD-10-CM

## 2019-02-13 DIAGNOSIS — F43.10 POSTTRAUMATIC STRESS DISORDER: Primary | ICD-10-CM

## 2019-02-13 DIAGNOSIS — F33.1 MODERATE EPISODE OF RECURRENT MAJOR DEPRESSIVE DISORDER (H): ICD-10-CM

## 2019-02-13 PROCEDURE — 90837 PSYTX W PT 60 MINUTES: CPT | Performed by: SOCIAL WORKER

## 2019-02-13 PROCEDURE — 40000114 ZZH STATISTIC NO CHARGE CLINIC VISIT

## 2019-02-13 NOTE — LETTER
"2/13/2019       RE: Jade Collins  [address]  [facility] [address]     Dear Colleague,    Thank you for referring your patient, Jade Collins, to the [facility] at [facility]. Please see a copy of my visit note below.    Palliative Care Clinical Social Work Return Appointment    PLEASE NOTE:  THIS IS A MENTAL HEALTH NOTE.  OTHER PROVIDERS VIEWING THIS NOTE SHOULD USE THIS ONLY FOR UNDERSTANDING THE CONTEXT OF THE PATIENT S EXPERIENCE.  TOPICS DESCRIBED IN THIS NOTE SHOULD NOT BE REFERENCED TO THE PATIENT BY MEDICAL PROVIDERS.    Jade is a 48 year old woman with metastatic breast cancer, seen today at [facility] for a return psychotherapy appointment to address PTSD, recurrent major depression, and anxiety as these relate to coping with illness and treatment.    Mental Status Exam:(List all that apply)      Appearance: Appropriate      Eye Contact: Good       Orientation: Yes, x4      Mood: Anxious, engaged, reflective      Affect: Appropriate      Thought Content: Clear         Thought Form: Logical      Psychomotor Behavior: Normal    Visit themes:   - NET Lifeline session, initiating NET treatment    Adjustment to Illness:   Jade is appreciative of new medication for asthma. Continues regular follow up with oncology.    Mental Health (thoughts, feelings, actions, coping, and symptoms):   Completed NET Lifeline today. Jade was active, engaged and expressed enjoying taking charge of the process. \"Headlines\" listed in visual and interactive Lifeline process including listing traumas, positive life events, important headlines regarding location, education, employment, and key relationships. Inclusive of review of significant impact of cancer diagnosis and treatment on her life during past 5 years. Jade expresses continued motivation and engagement regarding NET process and desire to address and reduce PTSD symptoms through this work as well as increasing her " "clarity about her life as a whole.      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    Relationships: Focus during Lifeline process.    End of Life and Advance Care Planning: Not focus today.     Main therapeutic interventions provided this session include:  Psychoeducation, facilitating processing of feelings and thoughts, NET Lifeline session facilitation    Plan:  Will return for psychotherapy with palliative care focus in 1 week at [facility] [place] [place].  NET exposure session #1 is next step      Time spent with patient/family:  75 minutes (Start 3:05pm, end 4:20pm)  Extended session of Narrative Exposure Therapy, PTSD-specific treatment.    Palliative Care Counseling Treatment Plan    Client's Name: Jade Collins  YOB: 1970    Date: 1/9/2019    Initial DSM5 Diagnoses:   296.32 Major Depressive Disorder, Recurrent Episode, Moderate With anxious distress  309.81 (F43.10) Posttraumatic Stress Disorder (includes Posttraumatic Stress Dsiorder for Children 6 Years and Younger)  With dissociative symptoms      Date to review plan (90 days usually): 3/20/19    Referral / Collaboration:  .Referral to another professional/service is not indicated " at this time.    Anticipated number of sessions to complete episode of care:  10         Treatment Goal(s)  Date Goal Dates  Reviewed Status   12/20/2018   Goal 1:  Client will decrease PTSD symptoms.      Continued   12/20/2018   Objective #1A (Client Action)  Client will Increase understanding of PTSD/ASD and Process trauma history in safe counseling environment using Narrative Exposure Therapy.    Intervention(s)  Therapist will Provide psychoeducation and Narrative Exposure Therapy .    Continued                   Date Goal Dates  Reviewed Status   12/20/2018   Goal 2:  Client will address illness related stressors.    Continued   12/20/2018   Objective #2A (Client Action)  Client will Communicate effectively with family/friends re needs and Communicate effectively with medical provider re needed information.    Intervention(s) (Therapist Action)  Therapist will Provide psychoeducation, Facilitate couples/family therapy session(s) and Facilitate structured problem solving.    Continued   12/20/2018   Objective #2B  Client will Identify effective coping strategies and Use/practice relaxation strategies.    Intervention(s)  Therapist will Provide psychoeducation, Provide behavioral intervention training, Facilitate processing of thoughts and feelings and Facilitate structured problem solving.    Continued   12/20/2018     Objective #2C  Client will Complete health care directive and/or POLST form.    Intervention(s)  Therapist will Provide advance care planning education.    Continued       Date Goal Dates  Reviewed Status   1/9/2019   Goal 3:  Client will effectively manage recurrent major depressive disorder in light of increased stressors.    Continued   1/9/2019   Objective #3A (Client Action)  Client will Identify activities that provide comfort and/or pleasure, Participate in activities that provide comfort and/or pleasure and Identify an activity that would provide meaning/contribute to feeling of self  worth.    Intervention(s) (Therapist Action)  Therapist will Provide psychoeducation, Facilitate processing of thoughts and feelings and Facilitate structured problem solving.    Continued   1/9/2019   Objective #3B  Client will Effectively communicate needs to others, Identify a crisis plan for suicidal ideation and Identify triggers/early signs of depressed mood.    Intervention(s)  Therapist will Provide psychoeducation, Facilitate processing of thoughts and feelings, Facilitate structured problem solving and facilitate safety planning as needed..    Continued               Client has contributed regarding goals and concerns, but has not reviewed the treatment plan. Plan to review at future session.    ROMULO Ulloa, [facility] Center[facility]      DO NOT SEND ANY LETTERS          Again, thank you for allowing me to participate in the care of your patient.      Sincerely,    SANDRA CottrellSW

## 2019-02-13 NOTE — TELEPHONE ENCOUNTER
Temporary 13 day Override allowed (while preferred product is off of backorder - generic AirDuo)    Authorization Effective Date: 2/13/2019  Authorization Expiration Date: 2/26/2019  Medication: fluticasone-salmeterol (ADVAIR) 250-50 MCG/DOSE inhaler - temporary override  Approved Dose/Quantity: 1  Reference #: CASE# [id number]   Insurance Company: [employer] - Phone [phone] Fax [phone]  Expected CoPay:       CoPay Card Available:      Foundation Assistance Needed:    Which Pharmacy is filling the prescription (Not needed for infusion/clinic administered): [facility] [address] [place] [address] [address] AT [facility] OF [facility] [address] & [address] [facility]  Pharmacy Notified: Yes - spoke to Kusum, she was able to process rx while on the phone with me  Patient Notified:  Yes - via voicemail

## 2019-02-18 DIAGNOSIS — J45.909 ASTHMA: Primary | ICD-10-CM

## 2019-02-18 RX ORDER — FLUTICASONE PROPIONATE AND SALMETEROL 113; 14 UG/1; UG/1
1 POWDER, METERED RESPIRATORY (INHALATION) 2 TIMES DAILY
Qty: 1 INHALER | Refills: 11 | Status: SHIPPED | OUTPATIENT
Start: 2019-02-18 | End: 2020-02-19

## 2019-02-18 NOTE — TELEPHONE ENCOUNTER
Contacted patient to let her know that pharmacy has airduo back in stock- she can pick it up once she is done with the advair.

## 2019-02-19 ENCOUNTER — ONCOLOGY VISIT (OUTPATIENT)
Dept: ONCOLOGY | Facility: CLINIC | Age: 49
End: 2019-02-19
Attending: PHYSICIAN ASSISTANT
Payer: COMMERCIAL

## 2019-02-19 VITALS
HEART RATE: 72 BPM | BODY MASS INDEX: 42.78 KG/M2 | TEMPERATURE: 97.6 F | OXYGEN SATURATION: 94 % | SYSTOLIC BLOOD PRESSURE: 131 MMHG | DIASTOLIC BLOOD PRESSURE: 72 MMHG | RESPIRATION RATE: 16 BRPM | WEIGHT: 285.5 LBS

## 2019-02-19 DIAGNOSIS — C50.412 MALIGNANT NEOPLASM OF UPPER-OUTER QUADRANT OF LEFT BREAST IN FEMALE, ESTROGEN RECEPTOR POSITIVE (H): ICD-10-CM

## 2019-02-19 DIAGNOSIS — Z17.0 MALIGNANT NEOPLASM OF UPPER-OUTER QUADRANT OF LEFT BREAST IN FEMALE, ESTROGEN RECEPTOR POSITIVE (H): ICD-10-CM

## 2019-02-19 LAB
ALBUMIN SERPL-MCNC: 3.7 G/DL (ref 3.4–5)
ALP SERPL-CCNC: 67 U/L (ref 40–150)
ALT SERPL W P-5'-P-CCNC: 26 U/L (ref 0–50)
ANION GAP SERPL CALCULATED.3IONS-SCNC: 8 MMOL/L (ref 3–14)
AST SERPL W P-5'-P-CCNC: 20 U/L (ref 0–45)
BASOPHILS # BLD AUTO: 0.1 10E9/L (ref 0–0.2)
BASOPHILS NFR BLD AUTO: 1.8 %
BILIRUB SERPL-MCNC: 0.4 MG/DL (ref 0.2–1.3)
BUN SERPL-MCNC: 15 MG/DL (ref 7–30)
CALCIUM SERPL-MCNC: 8.9 MG/DL (ref 8.5–10.1)
CANCER AG27-29 SERPL-ACNC: 21 U/ML (ref 0–39)
CEA SERPL-MCNC: 0.6 UG/L (ref 0–2.5)
CHLORIDE SERPL-SCNC: 106 MMOL/L (ref 94–109)
CO2 SERPL-SCNC: 23 MMOL/L (ref 20–32)
CREAT SERPL-MCNC: 1.06 MG/DL (ref 0.52–1.04)
DIFFERENTIAL METHOD BLD: ABNORMAL
EOSINOPHIL # BLD AUTO: 0.2 10E9/L (ref 0–0.7)
EOSINOPHIL NFR BLD AUTO: 5.3 %
ERYTHROCYTE [DISTWIDTH] IN BLOOD BY AUTOMATED COUNT: 13.7 % (ref 10–15)
GFR SERPL CREATININE-BSD FRML MDRD: 62 ML/MIN/{1.73_M2}
GLUCOSE SERPL-MCNC: 111 MG/DL (ref 70–99)
HCT VFR BLD AUTO: 36.9 % (ref 35–47)
HGB BLD-MCNC: 12.5 G/DL (ref 11.7–15.7)
LYMPHOCYTES # BLD AUTO: 0.9 10E9/L (ref 0.8–5.3)
LYMPHOCYTES NFR BLD AUTO: 31 %
MCH RBC QN AUTO: 32.8 PG (ref 26.5–33)
MCHC RBC AUTO-ENTMCNC: 33.9 G/DL (ref 31.5–36.5)
MCV RBC AUTO: 97 FL (ref 78–100)
MONOCYTES # BLD AUTO: 0.2 10E9/L (ref 0–1.3)
MONOCYTES NFR BLD AUTO: 5.3 %
NEUTROPHILS # BLD AUTO: 1.6 10E9/L (ref 1.6–8.3)
NEUTROPHILS NFR BLD AUTO: 56.6 %
PLATELET # BLD AUTO: 225 10E9/L (ref 150–450)
PLATELET # BLD EST: ABNORMAL 10*3/UL
POTASSIUM SERPL-SCNC: 4.2 MMOL/L (ref 3.4–5.3)
PROT SERPL-MCNC: 7.4 G/DL (ref 6.8–8.8)
RBC # BLD AUTO: 3.81 10E12/L (ref 3.8–5.2)
RBC MORPH BLD: NORMAL
SODIUM SERPL-SCNC: 137 MMOL/L (ref 133–144)
WBC # BLD AUTO: 2.9 10E9/L (ref 4–11)

## 2019-02-19 PROCEDURE — 85025 COMPLETE CBC W/AUTO DIFF WBC: CPT | Performed by: INTERNAL MEDICINE

## 2019-02-19 PROCEDURE — 99214 OFFICE O/P EST MOD 30 MIN: CPT | Mod: ZP | Performed by: PHYSICIAN ASSISTANT

## 2019-02-19 PROCEDURE — 86300 IMMUNOASSAY TUMOR CA 15-3: CPT | Performed by: INTERNAL MEDICINE

## 2019-02-19 PROCEDURE — 36415 COLL VENOUS BLD VENIPUNCTURE: CPT

## 2019-02-19 PROCEDURE — 82378 CARCINOEMBRYONIC ANTIGEN: CPT | Performed by: INTERNAL MEDICINE

## 2019-02-19 PROCEDURE — G0463 HOSPITAL OUTPT CLINIC VISIT: HCPCS

## 2019-02-19 PROCEDURE — 80053 COMPREHEN METABOLIC PANEL: CPT | Performed by: INTERNAL MEDICINE

## 2019-02-19 ASSESSMENT — PAIN SCALES - GENERAL: PAINLEVEL: NO PAIN (0)

## 2019-02-19 NOTE — NURSING NOTE
Chief Complaint   Patient presents with     Blood Draw     labs drawn via vpt by rn. vs taken      Blood drawn via vpt by RN in lab. VS taken. Pt checked into next appointment.   Chaya Parks RN

## 2019-02-19 NOTE — LETTER
2/19/2019    RE: Jade Collins  [address]  [facility] [address]     Oncology/Hematology Visit Note  Feb 19, 2019    Reason for Visit: follow up of recurrent metastatic ER-positive HER2 negative breast cancer    History of Present Illness: Jade Collins is a 48 year old female with a history of left breast cancer diagnosed in 12/2013 in [place] area and Dr. David Redd was her medical oncologist. Biopsy showed tumor to be ER positive, KY positive and HER2 negative. She underwent lumpectomy and SLNB by Dr. Tasha Segura in [place]. [place] lymph node was noted to be involved with tumor but no lymph node dissection done at that time. She subsequently underwent adjuvant ddAC x 4 cycles and Taxol x 12 weeks, competed in 9/11/2014. She had post lumpectomy radiation completed in 11/23/2014 by Dr. Manley. She was begun on an AI in 11/2014, but does not recall name.     She was then switched within about a month or so to anastrozole and remained on anastrozole from 11/2014-02/2017.  She has never received tamoxifen. She then moved to [place], [place] in 03/2017.  She saw a gynecologist there and underwent a IRIS/BSO by Dr. Zendejas.  She then also saw Dr. Linares in [place], [place], who is an oncologist.  His interpretation of the pathology report was that she had triple-negative breast cancer.  It is possible that she may have had low estrogen receptor positivity, but the anastrozole was then discontinued in 02/2017.  She then underwent the IRIS/BSO in 03/2017.  This was done laparoscopically.        She then remained off of all hormonal therapy and in 09/2017 moved to [place].  She remained off hormonal therapy and did not have Medical Oncology followup initially.        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  She then went to see Dr. Norberto Brand who performed a PET/CT scan and the patient also underwent a brain MRI for staging.  The PET/CT scan performed 08/03/2018 which  showed mildly enlarged left posterior cervical lymph nodes, largest measuring 1.5 x 1.0 cm and mildly enlarged aorticopulmonary and prevascular lymph nodes that were noted on contrast-enhanced CT scan seen less well on the PET scan.  No evidence of metastatic disease in the abdomen and pelvis.  She also underwent a brain MRI that showed no evidence of intracranial metastatic disease.  She underwent a biopsy of one of the lymph nodes in the left neck which showed metastatic adenocarcinoma consistent with breast origin, which was estrogen-receptor positive.  The tumor cells were positive for CK7, ER and NJ consistent with metastatic breast carcinoma.  Estrogen receptor showed strong average nuclear intensity in 95% of carcinoma cells, progesterone receptor moderate average nuclear intensity in 70% of the carcinoma.  A GATA3 stain was apparently not performed.     She started Ibrance on 9/21 and letrozole on 9/23. She had improved disease on restaging on 11/26. Please see Dr. Bhatt's previous notes for further details on the patient's history. She comes in today for routine follow up.    Interval History:  Jade returns to clinic today. She saw Dr. Hay and was started on Nasocort and Advair. Advair has been helping and she has less wheezing. She has an intermittent, productive cough and some sinus congestion. No fevers, chills or infectious concerns.     No bone aches recently. Continues to have some hotflashes and night sweats on letrozole. Also has some vaginal dryness. Itching in left hip resolved. She gets occasional pains in left breast when she eats sugar or caffeine. She does have some fatigue. She sometimes takes naps or drives less for LYFT due to fatigue. She is on Zoloft for depression and anxiety and seeing Sari Holliday. She states she has about episodes of anxiety--palpitations, shakiness, anger, about twice weekly.    No rashes. No nausea, appetite good. Alternating constipation/diarrhea. No urinary  concerns.    She restarted Ibrance on 2/8. She is currently day 12 of Ibrance.    Review of Systems:  Patient denies any of the following except if noted above: fevers, chills, chest pain, dyspnea,  abdominal pain, dysuria.    Current Outpatient Medications   Medication Sig Dispense Refill     ACETAMINOPHEN PO Take 650 mg by mouth every 4 hours as needed for pain       albuterol (PROAIR HFA/PROVENTIL HFA/VENTOLIN HFA) 108 (90 Base) MCG/ACT inhaler Inhale 2 puffs into the lungs every 6 hours 1 Inhaler 4     albuterol (PROAIR HFA/PROVENTIL HFA/VENTOLIN HFA) 108 (90 Base) MCG/ACT inhaler Inhale 2 puffs into the lungs every 6 hours as needed for shortness of breath / dyspnea or wheezing       albuterol (PROVENTIL) (2.5 MG/3ML) 0.083% neb solution Take 1 vial (2.5 mg) by nebulization every 6 hours as needed for shortness of breath / dyspnea or wheezing 30 vial 0     calcium carbonate (TUMS) 500 MG chewable tablet Take 1 chew tab by mouth as needed for heartburn       Calcium Citrate-Vitamin D (CALCIUM + D PO) Take 250 mg by mouth 2 times daily        fluticasone (FLONASE) 50 MCG/ACT spray Spray 1-2 sprays into both nostrils daily (Patient not taking: Reported on 2/5/2019) 1 Bottle 3     fluticasone-salmeterol (AIRDUO RESPICLICK) 113-14 MCG/ACT inhaler Inhale 1 puff into the lungs 2 times daily 1 Inhaler 11     IBUPROFEN PO Take 200 mg by mouth every 4 hours as needed for moderate pain       letrozole (FEMARA) 2.5 MG tablet Take 1 tablet (2.5 mg) by mouth daily for 28 days 28 tablet 0     letrozole (FEMARA) 2.5 MG tablet Take 1 tablet (2.5 mg) by mouth daily for 28 days 28 tablet 0     levothyroxine (SYNTHROID/LEVOTHROID) 200 MCG tablet Take 200 mcg by mouth       loratadine (CLARITIN) 10 MG tablet Take 1 tablet (10 mg) by mouth daily 90 tablet 3     methylPREDNISolone (MEDROL) 16 MG tablet Take 32 mg by mouth 12 hours before the procedure and repeat 32 mg by mouth 2 hours before the procedure to prevent contrast  reaction. (Patient not taking: Reported on 2/5/2019) 4 tablet 0     Multiple Vitamins-Minerals (MULTIVITAMIN ADULT PO)        ondansetron (ZOFRAN ODT) 8 MG ODT tab Take 1 tablet (8 mg) by mouth 3 times daily as needed for nausea 30 tablet 1     palbociclib (IBRANCE) 125 MG capsule CHEMO Take 1 capsule (125 mg) by mouth daily with food Take for 21 days, then 7 days off. Avoid grapefruit. Do not open/crush/chew capsule. 21 capsule 0     prochlorperazine (COMPAZINE) 10 MG tablet Take 1 tablet (10 mg) by mouth every 6 hours as needed for nausea or vomiting 30 tablet 0     sertraline (ZOLOFT) 100 MG tablet TAKE 1 AND 1/2 TABLETS(150 MG) BY MOUTH EVERY DAY       triamcinolone (NASACORT) 55 MCG/ACT inhaler Spray 1 spray into both nostrils daily        VITAMIN D, CHOLECALCIFEROL, PO Take 2,000 Units by mouth daily          Physical Examination:  General: The patient is a pleasant female in no acute distress.  /72 (BP Location: Right arm, Patient Position: Sitting, Cuff Size: Adult Large)   Pulse 72   Temp 97.6  F (36.4  C) (Oral)   Resp 16   Wt 129.5 kg (285 lb 8 oz)   SpO2 94%   BMI 42.78 kg/m     Wt Readings from Last 10 Encounters:   02/19/19 129.5 kg (285 lb 8 oz)   02/05/19 127.9 kg (282 lb)   01/22/19 128.1 kg (282 lb 4.8 oz)   01/09/19 125.7 kg (277 lb 1.6 oz)   12/24/18 125.5 kg (276 lb 11.2 oz)   11/27/18 125.6 kg (276 lb 14.4 oz)   11/13/18 124.3 kg (274 lb)   10/16/18 122.5 kg (270 lb)   10/01/18 121.6 kg (268 lb)   09/18/18 121.8 kg (268 lb 8 oz)     HEENT: EOMI, PERRL. Sclerae are anicteric. Oral mucosa is pink and moist with no lesions or thrush.   Lymph: No palpable cervical, supraclavicular, subclavicular or axillary lymphadenopathy.  Heart: Regular rate and rhythm.   Lungs: Clear to auscultation bilaterally. Rare expiratory wheezing at base. Normal work of breathing.   Breast: Deferred today   Abdomen: Bowel sounds present, soft, nontender with no palpable hepatosplenomegaly or masses.    Extremities: No lower extremity edema noted bilaterally.   Neuro: Cranial nerves II through XII are grossly intact.  Skin: No rashes, petechiae, or bruising noted on exposed skin. Some horizontal ridges with darker coloring at base of nails.     Laboratory Data:   Results for ANAHI DOBSON (MRN [id number]) as of 2/19/2019 13:49   2/19/2019 12:53   Sodium 137   Potassium 4.2   Chloride 106   Carbon Dioxide 23   Urea Nitrogen 15   Creatinine 1.06 (H)   GFR Estimate 62   GFR Estimate If Black 71   Calcium 8.9   Anion Gap 8   Albumin 3.7   Protein Total 7.4   Bilirubin Total 0.4   Alkaline Phosphatase 67   ALT 26   AST 20   Glucose 111 (H)   WBC 2.9 (L)   Hemoglobin 12.5   Hematocrit 36.9   Platelet Count 225   RBC Count 3.81   MCV 97   MCH 32.8   MCHC 33.9   RDW 13.7   Diff Method Manual Differential   % Neutrophils 56.6   % Lymphocytes 31.0   % Monocytes 5.3   % Eosinophils 5.3   % Basophils 1.8   Absolute Neutrophil 1.6   Absolute Lymphocytes 0.9   Absolute Monocytes 0.2   Absolute Eosinophils 0.2   Absolute Basophils 0.1       Assessment and Plan:    Recurrent, metastatic ER-positive, HER2 negative breast cancer: History of left breast cancer s/p lumpectomy and SNLB in 2014. S/p 4 cycles ddAC and 12 weeks of Taxol. On AI from 11/2014-2/2017. Recurrence on PET/CT scan performed 08/03/2018 and then biopsy to left posterior cervical lymph nodes, aorticopulmonary, and prevascular lymph nodes. No evidence of metastatic disease in the abdomen and pelvis or brain. Started Ibrance and letrozole on 9/21/18. Tolerating well aside from some hot flashes and mild joint stiffness. Re imaging in January showed improved disease. Now mid-cycle 6. Her ANC is 1.6. Okay to continue as she is mid-cycle. Will schedule labs on 3/6 during her week off while she is here for another appointment. She will be due to start C7 on 3/8. Will adjust follow up to be during her week off and move CT accordingly. She has methylpred to take prior due to  history of reaction to IV contrast.     Post-nasal drip, congestion: Now on Nasacort per Dr. Hay. Also recommended nasal saline washes daily to BID.     Asthma: Seen by Dr. Hay and started on Advair with improvement of symptoms. Also has albuterol prn    Nausea: Improved. Antiemetics prn.    Bone health: bone targeting agents are not required as she has no evidence of bone metastases. Continue calcium and vitamin D.     Evelina Garcia PA-C  [facility]  [address] [place] [id number] [phone]

## 2019-02-19 NOTE — NURSING NOTE
"Oncology Rooming Note    February 19, 2019 1:11 PM   Jade Collins is a 48 year old female who presents for:    Chief Complaint   Patient presents with     Blood Draw     labs drawn via vpt by rn. vs taken      Oncology Clinic Visit     Breast Ca; Active Tx     Initial Vitals: /72 (BP Location: Right arm, Patient Position: Sitting, Cuff Size: Adult Large)   Pulse 72   Temp 97.6  F (36.4  C) (Oral)   Resp 16   Wt 129.5 kg (285 lb 8 oz)   SpO2 94%   BMI 42.78 kg/m   Estimated body mass index is 42.78 kg/m  as calculated from the following:    Height as of 2/5/19: 1.74 m (5' 8.5\").    Weight as of this encounter: 129.5 kg (285 lb 8 oz). Body surface area is 2.5 meters squared.  No Pain (0) Comment: Data Unavailable   No LMP recorded. Patient is postmenopausal.  Allergies reviewed: Yes  Medications reviewed: Yes    Medications: Medication refills not needed today.  Pharmacy name entered into [facility]:    [id number] - [facility] [address] AT [facility] OF [facility] [address]  [facility] [place], [place] - [address]              "

## 2019-02-20 ENCOUNTER — TELEPHONE (OUTPATIENT)
Dept: PALLIATIVE CARE | Facility: CLINIC | Age: 49
End: 2019-02-20

## 2019-02-20 NOTE — TELEPHONE ENCOUNTER
Palliative Care Counseling Contact    Data: Jade was scheduled for psychotherapy session today but cancelled due to snowy conditions. NET exposure session #1 was scheduled for today.    Intervention: Left message for Jade checking in, and offering support by phone if needed sooner than appt next Wednesday.    Response/Assessment: Not able to assess today. Patient making realistic choice to avoid driving in very snowy conditions today.    Plan: See in 1 week, continue with NET exposure session #1. Remain available.    ROMULO Suazo, [facility]  Palliative Care Counseling Services  [facility]  Office Phone: [phone]  [phone] ([facility]) or [phone] ([facility])

## 2019-02-22 ENCOUNTER — TELEPHONE (OUTPATIENT)
Dept: ONCOLOGY | Facility: CLINIC | Age: 49
End: 2019-02-22

## 2019-02-22 RX ORDER — MIRTAZAPINE 15 MG/1
15 TABLET, FILM COATED ORAL AT BEDTIME
Qty: 30 TABLET | Refills: 0 | OUTPATIENT
Start: 2019-02-22 | End: 2019-03-24

## 2019-02-22 NOTE — TELEPHONE ENCOUNTER
----- Message from ROMULO Goode sent at 2/22/2019  1:30 PM CST -----  Regarding: RE: Refill Mirtazapine 15mg?   Ok to refill  ----- Message -----  From: Vanna Mathews RN  Sent: 2/22/2019  12:53 PM  To: ROMULO Goode  Subject: Refill Mirtazapine 15mg?                         Evelina-   Refill request received via fax for Mirazapine 15mg tablets. You saw pt 2/19/19, I do not see note if pt should continue Mirtazapine.    Are you comfortable refilling Rx? Or should I check with Dr. Bhatt? Please advise and I can refill if appropriate. Thank you!    Vanna Mathews RN   [facility]

## 2019-02-22 NOTE — TELEPHONE ENCOUNTER
Writer contacted patient regarding refill request. Patient reports she is NOT using this medication any longer and does not need a refill. Pharmacy updated.     Vanna Mathews RN   [facility]

## 2019-02-27 ENCOUNTER — OFFICE VISIT (OUTPATIENT)
Dept: PALLIATIVE CARE | Facility: CLINIC | Age: 49
End: 2019-02-27
Attending: SOCIAL WORKER
Payer: COMMERCIAL

## 2019-02-27 DIAGNOSIS — F43.10 POSTTRAUMATIC STRESS DISORDER: Primary | ICD-10-CM

## 2019-02-27 DIAGNOSIS — Z51.5 ENCOUNTER FOR PALLIATIVE CARE: ICD-10-CM

## 2019-02-27 PROCEDURE — 40000114 ZZH STATISTIC NO CHARGE CLINIC VISIT

## 2019-02-27 PROCEDURE — 90837 PSYTX W PT 60 MINUTES: CPT | Performed by: SOCIAL WORKER

## 2019-02-27 NOTE — LETTER
Date:February 28, 2019      Patient was self referred, no letter generated. Do not send.        [facility] Health Information

## 2019-02-27 NOTE — PROGRESS NOTES
"Palliative Care Clinical Social Work Return Appointment    PLEASE NOTE:  THIS IS A MENTAL HEALTH NOTE.  OTHER PROVIDERS VIEWING THIS NOTE SHOULD USE THIS ONLY FOR UNDERSTANDING THE CONTEXT OF THE PATIENT S EXPERIENCE.  TOPICS DESCRIBED IN THIS NOTE SHOULD NOT BE REFERENCED TO THE PATIENT BY MEDICAL PROVIDERS.    Jade is a 48 year old woman with metastatic breast cancer, seen today at [facility] for a return psychotherapy appointment to address PTSD, recurrent major depression, and anxiety as these relate to coping with illness and treatment.    Mental Status Exam:(List all that apply)      Appearance: Appropriate      Eye Contact: Good       Orientation: Yes, x4      Mood: engaged, reflective      Affect: Appropriate, full range, expressive      Thought Content: Clear         Thought Form: Logical      Psychomotor Behavior: Normal    Visit themes:   - Narrative Exposure Therapy exposure session #1    Adjustment to Illness: Doing well overall medically. Processes a recent text exchange with her mom in which her mom expressed not realizing she had poor prognosis (Jade told her mom average survival of 33 months given her diagnosis). Review of how her mother has spoken about money and about her cancer and how this has been challenging and triggering for Jade in recent years.    Mental Health (thoughts, feelings, actions, coping, and symptoms): Psychoeducation reviewed in more detail re: fear webs/networks of triggers including thoughts, emotions, sensory, physical. Reviewed purpose of exposure for traumatic events and process of slowing down to go \"frame by frame\" during exposure.     Life review including re: death of brother from SIDS at age 11 months, her family's silence about it, her perceptions as a kid including fear this could happen to her, and her parents divorce shortly after, with pattern of family moving starting and distance from dad developing.     Specific memories processed including a positive one from " " of being fed with close attentive eye contact, and of riding in a taxi alone age 3 on a day when it was very rainy to grandma's house. Based on her description of day to day reactions she has had to seeing kids in situations she perceived as unsafe, we determine this memory is good target for exposure and complete exposure connected with this specific event, which ended safely at her grandma's house on her grandma's lap. Feelings of not being safe vs feelings of being safe, home, never wanting to leave her grandma.    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 Good options discussed include \"take a moment\", remembering moments of safety (ie with grandma), journaling, doing art, \"letting the emotions or images move through\"    Relationships: Focus during NET exposure work and life review. Processing re: relationships with mother, sister, grandmother,  younger brother, and father.    End of Life and Advance Care Planning: Not focus today.     Main therapeutic interventions provided this session include:  Psychoeducation, facilitating processing of feelings and thoughts, review of NET process as we enter exposure phase; exposure session " #1    Plan:  Will return for psychotherapy with palliative care focus in 1 week at [facility].      Time spent with patient/family:  60 minutes (Start 3:05pm, end 4:05pm)  Longer session initiating Narrative Exposure Therapy    Palliative Care Counseling Treatment Plan    Client's Name: Jade Collins  YOB: 1970    Date: 1/9/2019    Initial DSM5 Diagnoses:   296.32 Major Depressive Disorder, Recurrent Episode, Moderate With anxious distress  309.81 (F43.10) Posttraumatic Stress Disorder (includes Posttraumatic Stress Dsiorder for Children 6 Years and Younger)  With dissociative symptoms      Date to review plan (90 days usually): 3/20/19    Referral / Collaboration:  .Referral to another professional/service is not indicated at this time.    Anticipated number of sessions to complete episode of care:  10         Treatment Goal(s)  Date Goal Dates  Reviewed Status   12/20/2018   Goal 1:  Client will decrease PTSD symptoms.      Continued   12/20/2018   Objective #1A (Client Action)  Client will Increase understanding of PTSD/ASD and Process trauma history in safe counseling environment using Narrative Exposure Therapy.    Intervention(s)  Therapist will Provide psychoeducation and Narrative Exposure Therapy .    Continued                   Date Goal Dates  Reviewed Status   12/20/2018   Goal 2:  Client will address illness related stressors.    Continued   12/20/2018   Objective #2A (Client Action)  Client will Communicate effectively with family/friends re needs and Communicate effectively with medical provider re needed information.    Intervention(s) (Therapist Action)  Therapist will Provide psychoeducation, Facilitate couples/family therapy session(s) and Facilitate structured problem solving.    Continued   12/20/2018   Objective #2B  Client will Identify effective coping strategies and Use/practice relaxation strategies.    Intervention(s)  Therapist will Provide psychoeducation, Provide behavioral  intervention training, Facilitate processing of thoughts and feelings and Facilitate structured problem solving.    Continued   12/20/2018     Objective #2C  Client will Complete health care directive and/or POLST form.    Intervention(s)  Therapist will Provide advance care planning education.    Continued       Date Goal Dates  Reviewed Status   1/9/2019   Goal 3:  Client will effectively manage recurrent major depressive disorder in light of increased stressors.    Continued   1/9/2019   Objective #3A (Client Action)  Client will Identify activities that provide comfort and/or pleasure, Participate in activities that provide comfort and/or pleasure and Identify an activity that would provide meaning/contribute to feeling of self worth.    Intervention(s) (Therapist Action)  Therapist will Provide psychoeducation, Facilitate processing of thoughts and feelings and Facilitate structured problem solving.    Contined   1/9/2019   Objective #3B  Client will Effectively communicate needs to others, Identify a crisis plan for suicidal ideation and Identify triggers/early signs of depressed mood.    Intervention(s)  Therapist will Provide psychoeducation, Facilitate processing of thoughts and feelings, Facilitate structured problem solving and facilitate safety planning as needed..    Continued               Client has contributed regarding goals and concerns, but has not reviewed the treatment plan. Plan to review at future session.    Lisa Holliday, MSW, LICSW      DO NOT SEND ANY LETTERS

## 2019-02-27 NOTE — LETTER
"2/27/2019       RE: Jade Collins  [address]  [place] [place] [address]     Dear Colleague,    Thank you for referring your patient, Jade Collins, to the [facility] at [facility]. Please see a copy of my visit note below.    Palliative Care Clinical Social Work Return Appointment    PLEASE NOTE:  THIS IS A MENTAL HEALTH NOTE.  OTHER PROVIDERS VIEWING THIS NOTE SHOULD USE THIS ONLY FOR UNDERSTANDING THE CONTEXT OF THE PATIENT S EXPERIENCE.  TOPICS DESCRIBED IN THIS NOTE SHOULD NOT BE REFERENCED TO THE PATIENT BY MEDICAL PROVIDERS.    Jade is a 48 year old woman with metastatic breast cancer, seen today at [facility] for a return psychotherapy appointment to address PTSD, recurrent major depression, and anxiety as these relate to coping with illness and treatment.    Mental Status Exam:(List all that apply)      Appearance: Appropriate      Eye Contact: Good       Orientation: Yes, x4      Mood: engaged, reflective      Affect: Appropriate, full range, expressive      Thought Content: Clear         Thought Form: Logical      Psychomotor Behavior: Normal    Visit themes:   - Narrative Exposure Therapy exposure session #1    Adjustment to Illness: Doing well overall medically. Processes a recent text exchange with her mom in which her mom expressed not realizing she had poor prognosis (Jade told her mom average survival of 33 months given her diagnosis). Review of how her mother has spoken about money and about her cancer and how this has been challenging and triggering for Jade in recent years.    Mental Health (thoughts, feelings, actions, coping, and symptoms): Psychoeducation reviewed in more detail re: fear webs/networks of triggers including thoughts, emotions, sensory, physical. Reviewed purpose of exposure for traumatic events and process of slowing down to go \"frame by frame\" during exposure.     Life review including re: death of brother from SIDS at age " "11 months, her family's silence about it, her perceptions as a kid including fear this could happen to her, and her parents divorce shortly after, with pattern of family moving starting and distance from dad developing.     Specific memories processed including a positive one from  of being fed with close attentive eye contact, and of riding in a taxi alone age 3 on a day when it was very rainy to grandma's house. Based on her description of day to day reactions she has had to seeing kids in situations she perceived as unsafe, we determine this memory is good target for exposure and complete exposure connected with this specific event, which ended safely at her grandma's house on her grandma's lap. Feelings of not being safe vs feelings of being safe, home, never wanting to leave her grandma.    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 Good options discussed include \"take a moment\", remembering moments of safety (ie with grandma), journaling, doing art, \"letting the emotions or images move through\"    Relationships: Focus during NET exposure work and life review. Processing re: relationships with mother, sister, grandmother,  younger brother, " and father.    End of Life and Advance Care Planning: Not focus today.     Main therapeutic interventions provided this session include:  Psychoeducation, facilitating processing of feelings and thoughts, review of NET process as we enter exposure phase; exposure session #1    Plan:  Will return for psychotherapy with palliative care focus in 1 week at [facility].      Time spent with patient/family:  60 minutes (Start 3:05pm, end 4:05pm)  Longer session initiating Narrative Exposure Therapy    Palliative Care Counseling Treatment Plan    Client's Name: Jade Collins  YOB: 1970    Date: 1/9/2019    Initial DSM5 Diagnoses:   296.32 Major Depressive Disorder, Recurrent Episode, Moderate With anxious distress  309.81 (F43.10) Posttraumatic Stress Disorder (includes Posttraumatic Stress Dsiorder for Children 6 Years and Younger)  With dissociative symptoms      Date to review plan (90 days usually): 3/20/19    Referral / Collaboration:  .Referral to another professional/service is not indicated at this time.    Anticipated number of sessions to complete episode of care:  10         Treatment Goal(s)  Date Goal Dates  Reviewed Status   12/20/2018   Goal 1:  Client will decrease PTSD symptoms.      Continued   12/20/2018   Objective #1A (Client Action)  Client will Increase understanding of PTSD/ASD and Process trauma history in safe counseling environment using Narrative Exposure Therapy.    Intervention(s)  Therapist will Provide psychoeducation and Narrative Exposure Therapy .    Continued                   Date Goal Dates  Reviewed Status   12/20/2018   Goal 2:  Client will address illness related stressors.    Continued   12/20/2018   Objective #2A (Client Action)  Client will Communicate effectively with family/friends re needs and Communicate effectively with medical provider re needed information.    Intervention(s) (Therapist Action)  Therapist will Provide psychoeducation, Facilitate couples/family therapy  session(s) and Facilitate structured problem solving.    Continued   12/20/2018   Objective #2B  Client will Identify effective coping strategies and Use/practice relaxation strategies.    Intervention(s)  Therapist will Provide psychoeducation, Provide behavioral intervention training, Facilitate processing of thoughts and feelings and Facilitate structured problem solving.    Continued   12/20/2018     Objective #2C  Client will Complete health care directive and/or POLST form.    Intervention(s)  Therapist will Provide advance care planning education.    Continued       Date Goal Dates  Reviewed Status   1/9/2019   Goal 3:  Client will effectively manage recurrent major depressive disorder in light of increased stressors.    Continued   1/9/2019   Objective #3A (Client Action)  Client will Identify activities that provide comfort and/or pleasure, Participate in activities that provide comfort and/or pleasure and Identify an activity that would provide meaning/contribute to feeling of self worth.    Intervention(s) (Therapist Action)  Therapist will Provide psychoeducation, Facilitate processing of thoughts and feelings and Facilitate structured problem solving.    Contined   1/9/2019   Objective #3B  Client will Effectively communicate needs to others, Identify a crisis plan for suicidal ideation and Identify triggers/early signs of depressed mood.    Intervention(s)  Therapist will Provide psychoeducation, Facilitate processing of thoughts and feelings, Facilitate structured problem solving and facilitate safety planning as needed..    Continued               Client has contributed regarding goals and concerns, but has not reviewed the treatment plan. Plan to review at future session.    ROMULO Ulloa, [facility]      DO NOT SEND ANY LETTERS            Again, thank you for allowing me to participate in the care of your patient.      Sincerely,    Lisa Holliday LICSW

## 2019-03-06 ENCOUNTER — OFFICE VISIT (OUTPATIENT)
Dept: PALLIATIVE CARE | Facility: CLINIC | Age: 49
End: 2019-03-06
Attending: SOCIAL WORKER
Payer: COMMERCIAL

## 2019-03-06 VITALS
RESPIRATION RATE: 16 BRPM | TEMPERATURE: 98 F | SYSTOLIC BLOOD PRESSURE: 127 MMHG | WEIGHT: 289 LBS | HEIGHT: 69 IN | BODY MASS INDEX: 42.8 KG/M2 | OXYGEN SATURATION: 96 % | DIASTOLIC BLOOD PRESSURE: 80 MMHG | HEART RATE: 98 BPM

## 2019-03-06 DIAGNOSIS — C50.412 MALIGNANT NEOPLASM OF UPPER-OUTER QUADRANT OF LEFT BREAST IN FEMALE, ESTROGEN RECEPTOR POSITIVE (H): ICD-10-CM

## 2019-03-06 DIAGNOSIS — Z17.0 MALIGNANT NEOPLASM OF UPPER-OUTER QUADRANT OF LEFT BREAST IN FEMALE, ESTROGEN RECEPTOR POSITIVE (H): ICD-10-CM

## 2019-03-06 DIAGNOSIS — F43.10 POSTTRAUMATIC STRESS DISORDER: Primary | ICD-10-CM

## 2019-03-06 DIAGNOSIS — Z51.5 ENCOUNTER FOR PALLIATIVE CARE: ICD-10-CM

## 2019-03-06 LAB
ALBUMIN SERPL-MCNC: 3.8 G/DL (ref 3.4–5)
ALP SERPL-CCNC: 70 U/L (ref 40–150)
ALT SERPL W P-5'-P-CCNC: 26 U/L (ref 0–50)
ANION GAP SERPL CALCULATED.3IONS-SCNC: 7 MMOL/L (ref 3–14)
AST SERPL W P-5'-P-CCNC: 18 U/L (ref 0–45)
BASOPHILS # BLD AUTO: 0.1 10E9/L (ref 0–0.2)
BASOPHILS NFR BLD AUTO: 2 %
BILIRUB SERPL-MCNC: 0.3 MG/DL (ref 0.2–1.3)
BUN SERPL-MCNC: 12 MG/DL (ref 7–30)
CALCIUM SERPL-MCNC: 8.9 MG/DL (ref 8.5–10.1)
CANCER AG27-29 SERPL-ACNC: 23 U/ML (ref 0–39)
CEA SERPL-MCNC: 0.7 UG/L (ref 0–2.5)
CHLORIDE SERPL-SCNC: 104 MMOL/L (ref 94–109)
CO2 SERPL-SCNC: 25 MMOL/L (ref 20–32)
CREAT SERPL-MCNC: 0.99 MG/DL (ref 0.52–1.04)
DIFFERENTIAL METHOD BLD: ABNORMAL
EOSINOPHIL # BLD AUTO: 0.1 10E9/L (ref 0–0.7)
EOSINOPHIL NFR BLD AUTO: 4.4 %
ERYTHROCYTE [DISTWIDTH] IN BLOOD BY AUTOMATED COUNT: 13.8 % (ref 10–15)
GFR SERPL CREATININE-BSD FRML MDRD: 67 ML/MIN/{1.73_M2}
GLUCOSE SERPL-MCNC: 101 MG/DL (ref 70–99)
HCT VFR BLD AUTO: 37.2 % (ref 35–47)
HGB BLD-MCNC: 12.8 G/DL (ref 11.7–15.7)
IMM GRANULOCYTES # BLD: 0 10E9/L (ref 0–0.4)
IMM GRANULOCYTES NFR BLD: 0 %
LYMPHOCYTES # BLD AUTO: 0.9 10E9/L (ref 0.8–5.3)
LYMPHOCYTES NFR BLD AUTO: 35.3 %
MCH RBC QN AUTO: 33 PG (ref 26.5–33)
MCHC RBC AUTO-ENTMCNC: 34.4 G/DL (ref 31.5–36.5)
MCV RBC AUTO: 96 FL (ref 78–100)
MONOCYTES # BLD AUTO: 0.3 10E9/L (ref 0–1.3)
MONOCYTES NFR BLD AUTO: 10.7 %
NEUTROPHILS # BLD AUTO: 1.2 10E9/L (ref 1.6–8.3)
NEUTROPHILS NFR BLD AUTO: 47.6 %
NRBC # BLD AUTO: 0 10*3/UL
NRBC BLD AUTO-RTO: 0 /100
PLATELET # BLD AUTO: 159 10E9/L (ref 150–450)
POTASSIUM SERPL-SCNC: 3.7 MMOL/L (ref 3.4–5.3)
PROT SERPL-MCNC: 7.8 G/DL (ref 6.8–8.8)
RBC # BLD AUTO: 3.88 10E12/L (ref 3.8–5.2)
SODIUM SERPL-SCNC: 136 MMOL/L (ref 133–144)
WBC # BLD AUTO: 2.5 10E9/L (ref 4–11)

## 2019-03-06 PROCEDURE — 80053 COMPREHEN METABOLIC PANEL: CPT | Performed by: INTERNAL MEDICINE

## 2019-03-06 PROCEDURE — G0463 HOSPITAL OUTPT CLINIC VISIT: HCPCS

## 2019-03-06 PROCEDURE — 36415 COLL VENOUS BLD VENIPUNCTURE: CPT

## 2019-03-06 PROCEDURE — 86300 IMMUNOASSAY TUMOR CA 15-3: CPT | Performed by: INTERNAL MEDICINE

## 2019-03-06 PROCEDURE — 82378 CARCINOEMBRYONIC ANTIGEN: CPT | Performed by: INTERNAL MEDICINE

## 2019-03-06 PROCEDURE — 85025 COMPLETE CBC W/AUTO DIFF WBC: CPT | Performed by: INTERNAL MEDICINE

## 2019-03-06 PROCEDURE — 90837 PSYTX W PT 60 MINUTES: CPT | Performed by: SOCIAL WORKER

## 2019-03-06 ASSESSMENT — MIFFLIN-ST. JEOR: SCORE: 1997.34

## 2019-03-06 ASSESSMENT — PAIN SCALES - GENERAL: PAINLEVEL: NO PAIN (0)

## 2019-03-06 NOTE — LETTER
"3/6/2019       RE: Jade Collins  [address]  [place] [address]     Dear Colleague,    Thank you for referring your patient, Jade Collins, to the [facility] at [facility]. Please see a copy of my visit note below.    Palliative Care Clinical Social Work Return Appointment    PLEASE NOTE:  THIS IS A MENTAL HEALTH NOTE.  OTHER PROVIDERS VIEWING THIS NOTE SHOULD USE THIS ONLY FOR UNDERSTANDING THE CONTEXT OF THE PATIENT S EXPERIENCE.  TOPICS DESCRIBED IN THIS NOTE SHOULD NOT BE REFERENCED TO THE PATIENT BY MEDICAL PROVIDERS.    Jade is a 48 year old woman with metastatic breast cancer, seen today at [facility] [place] for a return psychotherapy appointment to address PTSD, recurrent major depression, and anxiety as these relate to coping with illness and treatment.    Mental Status Exam:(List all that apply)      Appearance: Appropriate      Eye Contact: Good       Orientation: Yes, x4      Mood: engaged, reflective      Affect: Appropriate, full range, expressive      Thought Content: Clear         Thought Form: Logical      Psychomotor Behavior: Normal    Visit themes:   - Narrative Exposure Therapy exposure session #2    Adjustment to Illness: Not focus today.    Mental Health (thoughts, feelings, actions, coping, and symptoms): Life review and NET exposure work continued.      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 Processing re: relationships with mother, sister, grandmother,  providers, peers.    End of Life and Advance Care Planning: Not focus today.     Main therapeutic interventions provided this session include:  Psychoeducation, facilitating processing of feelings and thoughts, NET exposure session #2    Plan:  Will return for psychotherapy with palliative care focus in 1 week at [facility].      Time spent with patient/family:  65 minutes (Start 3:05pm, end 4:10pm)  Longer session, Narrative Exposure Therapy    Palliative Care Counseling Treatment Plan    Client's Name: Jade Collins  YOB: 1970    Date: 1/9/2019    Initial DSM5 Diagnoses:   296.32 Major Depressive Disorder, Recurrent Episode, Moderate With anxious distress  309.81 (F43.10) Posttraumatic Stress Disorder (includes Posttraumatic Stress Dsiorder for Children 6 Years and Younger)  With dissociative symptoms      Date to review plan (90 days usually): 3/20/19    Referral / Collaboration:  .Referral to another professional/service is not indicated at this time.    Anticipated number of sessions to complete episode of care:  10         Treatment Goal(s)  Date Goal Dates  Reviewed Status   12/20/2018   Goal 1:  Client will decrease PTSD symptoms.      Continued   12/20/2018   Objective #1A (Client Action)  Client will Increase understanding of PTSD/ASD and Process trauma history in safe counseling environment using Narrative Exposure Therapy.    Intervention(s)  Therapist will Provide psychoeducation and Narrative Exposure Therapy .    Continued                   Date Goal Dates  Reviewed Status   12/20/2018   Goal 2:  Client will address illness related stressors.    Continued   12/20/2018   Objective #2A (Client Action)  Client will Communicate effectively with family/friends re  needs and Communicate effectively with medical provider re needed information.    Intervention(s) (Therapist Action)  Therapist will Provide psychoeducation, Facilitate couples/family therapy session(s) and Facilitate structured problem solving.    Continued   12/20/2018   Objective #2B  Client will Identify effective coping strategies and Use/practice relaxation strategies.    Intervention(s)  Therapist will Provide psychoeducation, Provide behavioral intervention training, Facilitate processing of thoughts and feelings and Facilitate structured problem solving.    Continued   12/20/2018     Objective #2C  Client will Complete health care directive and/or POLST form.    Intervention(s)  Therapist will Provide advance care planning education.    Continued       Date Goal Dates  Reviewed Status   1/9/2019   Goal 3:  Client will effectively manage recurrent major depressive disorder in light of increased stressors.    Continued   1/9/2019   Objective #3A (Client Action)  Client will Identify activities that provide comfort and/or pleasure, Participate in activities that provide comfort and/or pleasure and Identify an activity that would provide meaning/contribute to feeling of self worth.    Intervention(s) (Therapist Action)  Therapist will Provide psychoeducation, Facilitate processing of thoughts and feelings and Facilitate structured problem solving.    Contined   1/9/2019   Objective #3B  Client will Effectively communicate needs to others, Identify a crisis plan for suicidal ideation and Identify triggers/early signs of depressed mood.    Intervention(s)  Therapist will Provide psychoeducation, Facilitate processing of thoughts and feelings, Facilitate structured problem solving and facilitate safety planning as needed..    Continued               Client has contributed regarding goals and concerns, but has not reviewed the treatment plan. Plan to review at future session.    ROMULO Ulloa,  [facility]      DO NOT SEND ANY LETTERS          Again, thank you for allowing me to participate in the care of your patient.      Sincerely,    Lisa Holliday, [facility]

## 2019-03-06 NOTE — NURSING NOTE
Chief Complaint   Patient presents with     Blood Draw      labs drawn; vitals taken     Issa Greenberg CMA ([facility])

## 2019-03-07 DIAGNOSIS — Z17.0 MALIGNANT NEOPLASM OF UPPER-OUTER QUADRANT OF LEFT BREAST IN FEMALE, ESTROGEN RECEPTOR POSITIVE (H): ICD-10-CM

## 2019-03-07 DIAGNOSIS — C50.412 MALIGNANT NEOPLASM OF UPPER-OUTER QUADRANT OF LEFT BREAST IN FEMALE, ESTROGEN RECEPTOR POSITIVE (H): ICD-10-CM

## 2019-03-07 DIAGNOSIS — Z17.0 MALIGNANT NEOPLASM OF UPPER-OUTER QUADRANT OF LEFT BREAST IN FEMALE, ESTROGEN RECEPTOR POSITIVE (H): Primary | ICD-10-CM

## 2019-03-07 DIAGNOSIS — C50.412 MALIGNANT NEOPLASM OF UPPER-OUTER QUADRANT OF LEFT BREAST IN FEMALE, ESTROGEN RECEPTOR POSITIVE (H): Primary | ICD-10-CM

## 2019-03-07 RX ORDER — LETROZOLE 2.5 MG/1
2.5 TABLET, FILM COATED ORAL DAILY
Qty: 28 TABLET | Refills: 3 | Status: SHIPPED | OUTPATIENT
Start: 2019-03-07 | End: 2019-07-25

## 2019-03-07 RX ORDER — LETROZOLE 2.5 MG/1
2.5 TABLET, FILM COATED ORAL DAILY
Qty: 28 TABLET | Refills: 0 | Status: SHIPPED | OUTPATIENT
Start: 2019-03-07 | End: 2019-03-07

## 2019-03-08 ENCOUNTER — TELEPHONE (OUTPATIENT)
Dept: ONCOLOGY | Facility: CLINIC | Age: 49
End: 2019-03-08

## 2019-03-08 NOTE — ORAL ONC MGMT
Oral Chemotherapy Monitoring Program     Placed call to patient in follow up of Ibrance (palbociclib) therapy.   Left message requesting call back.   No drug names were mentioned.     Jose Swain, PharmD  Hematology/Oncology Clinical Pharmacist  [facility]
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
## 2019-03-11 NOTE — PROGRESS NOTES
"Palliative Care Clinical Social Work Return Appointment    PLEASE NOTE:  THIS IS A MENTAL HEALTH NOTE.  OTHER PROVIDERS VIEWING THIS NOTE SHOULD USE THIS ONLY FOR UNDERSTANDING THE CONTEXT OF THE PATIENT S EXPERIENCE.  TOPICS DESCRIBED IN THIS NOTE SHOULD NOT BE REFERENCED TO THE PATIENT BY MEDICAL PROVIDERS.    Jade is a 48 year old woman with metastatic breast cancer, [place] [facility] [place] return psychotherapy appointment to address PTSD, recurrent major depression, and anxiety as these relate to coping with illness and treatment.    Mental Status Exam:(List all that apply)      Appearance: Appropriate      Eye Contact: Good       Orientation: Yes, x4      Mood: Anxious, engaged, reflective      Affect: Appropriate      Thought Content: Clear         Thought Form: Logical      Psychomotor Behavior: Normal    Visit themes:   - NET Lifeline session, initiating NET treatment    Adjustment to Illness:   Jade is appreciative of new medication for asthma. Continues regular follow up with oncology.    Mental Health (thoughts, feelings, actions, coping, and symptoms):   Completed NET Lifeline today. Jade was active, engaged and expressed enjoying taking charge of the process. \"Headlines\" listed in visual and interactive Lifeline process including listing traumas, positive life events, important headlines regarding location, education, employment, and key relationships. Inclusive of review of significant impact of cancer diagnosis and treatment on her life during past 5 years. Jade expresses continued motivation and engagement regarding NET process and desire to address and reduce PTSD symptoms through this work as well as increasing her clarity about her life as a whole.      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    Relationships: Focus during Lifeline process.    End of Life and Advance Care Planning: Not focus today.     Main therapeutic interventions provided this session include:  Psychoeducation, facilitating processing of feelings and thoughts, NET Lifeline session facilitation    Plan:  Will return for psychotherapy with palliative care focus in 1 week at [facility] [place].  NET exposure session #1 is next step      Time spent with patient/family:  75 minutes (Start 3:05pm, end 4:20pm)  Extended session of Narrative Exposure Therapy, PTSD-specific treatment.    Palliative Care Counseling Treatment Plan    Client's Name: Jade Collins  YOB: 1970    Date: 1/9/2019    Initial DSM5 Diagnoses:   296.32 Major Depressive Disorder, Recurrent Episode, Moderate With anxious distress  309.81 (F43.10) Posttraumatic Stress Disorder (includes Posttraumatic Stress Dsiorder for Children 6 Years and Younger)  With dissociative symptoms      Date to review plan (90 days usually): 3/20/19    Referral / Collaboration:  .Referral to another professional/service is not indicated at this time.    Anticipated number of sessions to complete episode of care:  10         Treatment Goal(s)  Date Goal Dates  Reviewed Status   12/20/2018   Goal 1:  Client will decrease PTSD symptoms.      Continued   12/20/2018   Objective #1A (Client Action)  Client will Increase " understanding of PTSD/ASD and Process trauma history in safe counseling environment using Narrative Exposure Therapy.    Intervention(s)  Therapist will Provide psychoeducation and Narrative Exposure Therapy .    Continued                   Date Goal Dates  Reviewed Status   12/20/2018   Goal 2:  Client will address illness related stressors.    Continued   12/20/2018   Objective #2A (Client Action)  Client will Communicate effectively with family/friends re needs and Communicate effectively with medical provider re needed information.    Intervention(s) (Therapist Action)  Therapist will Provide psychoeducation, Facilitate couples/family therapy session(s) and Facilitate structured problem solving.    Continued   12/20/2018   Objective #2B  Client will Identify effective coping strategies and Use/practice relaxation strategies.    Intervention(s)  Therapist will Provide psychoeducation, Provide behavioral intervention training, Facilitate processing of thoughts and feelings and Facilitate structured problem solving.    Continued   12/20/2018     Objective #2C  Client will Complete health care directive and/or POLST form.    Intervention(s)  Therapist will Provide advance care planning education.    Continued       Date Goal Dates  Reviewed Status   1/9/2019   Goal 3:  Client will effectively manage recurrent major depressive disorder in light of increased stressors.    Continued   1/9/2019   Objective #3A (Client Action)  Client will Identify activities that provide comfort and/or pleasure, Participate in activities that provide comfort and/or pleasure and Identify an activity that would provide meaning/contribute to feeling of self worth.    Intervention(s) (Therapist Action)  Therapist will Provide psychoeducation, Facilitate processing of thoughts and feelings and Facilitate structured problem solving.    Continued   1/9/2019   Objective #3B  Client will Effectively communicate needs to others, Identify a crisis  plan for suicidal ideation and Identify triggers/early signs of depressed mood.    Intervention(s)  Therapist will Provide psychoeducation, Facilitate processing of thoughts and feelings, Facilitate structured problem solving and facilitate safety planning as needed..    Continued               Client has contributed regarding goals and concerns, but has not reviewed the treatment plan. Plan to review at future session.    ROMULO Ulloa, LICSW      DO NOT SEND ANY LETTERS

## 2019-03-13 ENCOUNTER — OFFICE VISIT (OUTPATIENT)
Dept: PALLIATIVE CARE | Facility: CLINIC | Age: 49
End: 2019-03-13
Attending: SOCIAL WORKER
Payer: COMMERCIAL

## 2019-03-13 DIAGNOSIS — F43.10 POSTTRAUMATIC STRESS DISORDER: Primary | ICD-10-CM

## 2019-03-13 DIAGNOSIS — F33.1 MODERATE EPISODE OF RECURRENT MAJOR DEPRESSIVE DISORDER (H): ICD-10-CM

## 2019-03-13 DIAGNOSIS — Z51.5 ENCOUNTER FOR PALLIATIVE CARE: ICD-10-CM

## 2019-03-13 PROCEDURE — 40000114 ZZH STATISTIC NO CHARGE CLINIC VISIT

## 2019-03-13 PROCEDURE — 90837 PSYTX W PT 60 MINUTES: CPT | Performed by: SOCIAL WORKER

## 2019-03-13 NOTE — LETTER
3/13/2019       RE: Jade Collins  [address]  [facility] [address]     Dear Colleague,    Thank you for referring your patient, Jade Collins, to the [facility] at [facility]. Please see a copy of my visit note below.    Palliative Care Clinical Social Work Return Appointment    PLEASE NOTE:  THIS IS A MENTAL HEALTH NOTE.  OTHER PROVIDERS VIEWING THIS NOTE SHOULD USE THIS ONLY FOR UNDERSTANDING THE CONTEXT OF THE PATIENT S EXPERIENCE.  TOPICS DESCRIBED IN THIS NOTE SHOULD NOT BE REFERENCED TO THE PATIENT BY MEDICAL PROVIDERS.    Jade is a 48 year old woman with metastatic breast cancer, seen today at [facility] for a return psychotherapy appointment to address PTSD, recurrent major depression, and anxiety as these relate to coping with illness and treatment.    Mental Status Exam:(List all that apply)      Appearance: Appropriate      Eye Contact: Good       Orientation: Yes, x4      Mood: expressive, angry, overwhelmed      Affect: Appropriate, full range, expressive      Thought Content: Clear         Thought Form: Logical      Psychomotor Behavior: Normal    Visit themes:   - Coping with intensified anger related to content of NET exposure work    Adjustment to Illness: Not focus today.    Mental Health (thoughts, feelings, actions, coping, and symptoms): Jade describes overwhelming anger several times during the past week. We discuss ways this may be connected with memories processed during past NET exposure session. She describes an experience of anger and frustration at work, and feeling out of control as she tried to resolve the situation. She notes she had fear about her emotions getting out of control, but was able to rein in their expression when needed. Similar intense anger and fear responses as she is trying to complete SSDI application process. Last steps seem finally to be completed. She identifies feeling things intensely as part of her as  "long as she can remember - as well as anger challenges for both her parents that she experienced and observed growing up. We discuss DBT strategies as useful addition as NET is stirring emotions up. Due to her need for emotional processing and related strategy focus today, we did not complete additional exposure. She brought an art project from [employer] re: her relationship with her dad.    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 She finds it useful to yell out expressively in a private location. She has also found it useful to move through the intense emotion by hitting pillows etc. Physical movement is useful in general, and we discuss options for walking more as weather warms up. We also discuss Livier Sullivan \"palms up\" practice or \"sensing the temperature of the air\" mindful pause/ re-orienting. I also email her DBT emotional regulation strategy options list.    Relationships: Has been dating Osvaldo for a couple weeks. He has been needing rides to work and she has been helping. He has expressed interest in moving in with her and she is considering. She voices how significant her desire is to be loved and connected, while also feeling caution about moving so fast or getting involved with someone with a lot of needs for support. " She did break up with him but now is reconsidering. We adjust our meeting schedule to allow her to be available to pick him up from work in coming weeks.     She also identifies being able to call her sister and gain support and calming in moments of intense emotion. She identifies her sister having stressors as well re: their dad and her son not getting along recently.    We discuss themes of feeling angry with others yet also intensely empathizing with others and thus finding ways to care and be kind. We discuss risks to her of accommodating others' wishes to her own detriment, as well as risk of developing frustration if she is regularly discounting her own needs or wants. She describes related challenges in long term relationship and related expressions of anger in the past. Feelings of vulnerability when she is not able to tell that she is loved or valued.    End of Life and Advance Care Planning: Not focus today.     Main therapeutic interventions provided this session include:  Psychoeducation, facilitating processing of feelings and thoughts, structured problem solving, discussion of emotional regulation strategies in moment    Plan:  Will return for psychotherapy with palliative care focus in 1 week at [facility] (March 22 at 10am).      Time spent with patient/family:  60 minutes (Start 3:25pm, end 4:25pm)  Longer session related to coping with emotional intensity connected with Narrative Exposure Therapy    Palliative Care Counseling Treatment Plan    Client's Name: Jade Collins  YOB: 1970    Date: 1/9/2019    Initial DSM5 Diagnoses:   296.32 Major Depressive Disorder, Recurrent Episode, Moderate With anxious distress  309.81 (F43.10) Posttraumatic Stress Disorder (includes Posttraumatic Stress Dsiorder for Children 6 Years and Younger)  With dissociative symptoms      Date to review plan (90 days usually): 3/20/19    Referral / Collaboration:  .Referral to another  professional/service is not indicated at this time.    Anticipated number of sessions to complete episode of care:  10         Treatment Goal(s)  Date Goal Dates  Reviewed Status   12/20/2018   Goal 1:  Client will decrease PTSD symptoms.      Continued   12/20/2018   Objective #1A (Client Action)  Client will Increase understanding of PTSD/ASD and Process trauma history in safe counseling environment using Narrative Exposure Therapy.    Intervention(s)  Therapist will Provide psychoeducation and Narrative Exposure Therapy .    Continued                   Date Goal Dates  Reviewed Status   12/20/2018   Goal 2:  Client will address illness related stressors.    Continued   12/20/2018   Objective #2A (Client Action)  Client will Communicate effectively with family/friends re needs and Communicate effectively with medical provider re needed information.    Intervention(s) (Therapist Action)  Therapist will Provide psychoeducation, Facilitate couples/family therapy session(s) and Facilitate structured problem solving.    Continued   12/20/2018   Objective #2B  Client will Identify effective coping strategies and Use/practice relaxation strategies.    Intervention(s)  Therapist will Provide psychoeducation, Provide behavioral intervention training, Facilitate processing of thoughts and feelings and Facilitate structured problem solving.    Continued   12/20/2018     Objective #2C  Client will Complete health care directive and/or POLST form.    Intervention(s)  Therapist will Provide advance care planning education.    Continued       Date Goal Dates  Reviewed Status   1/9/2019   Goal 3:  Client will effectively manage recurrent major depressive disorder in light of increased stressors.    Continued   1/9/2019   Objective #3A (Client Action)  Client will Identify activities that provide comfort and/or pleasure, Participate in activities that provide comfort and/or pleasure and Identify an activity that would provide  meaning/contribute to feeling of self worth.    Intervention(s) (Therapist Action)  Therapist will Provide psychoeducation, Facilitate processing of thoughts and feelings and Facilitate structured problem solving.    Contined   1/9/2019   Objective #3B  Client will Effectively communicate needs to others, Identify a crisis plan for suicidal ideation and Identify triggers/early signs of depressed mood.    Intervention(s)  Therapist will Provide psychoeducation, Facilitate processing of thoughts and feelings, Facilitate structured problem solving and facilitate safety planning as needed..    Continued               Client has contributed regarding goals and concerns, but has not reviewed the treatment plan. Plan to review at future session.    ROMULO Ulloa, LICSW      DO NOT SEND ANY LETTERS          Again, thank you for allowing me to participate in the care of your patient.      Sincerely,    Lisa Holliday LICSW

## 2019-03-15 NOTE — PROGRESS NOTES
Palliative Care Clinical Social Work Return Appointment    PLEASE NOTE:  THIS IS A MENTAL HEALTH NOTE.  OTHER PROVIDERS VIEWING THIS NOTE SHOULD USE THIS ONLY FOR UNDERSTANDING THE CONTEXT OF THE PATIENT S EXPERIENCE.  TOPICS DESCRIBED IN THIS NOTE SHOULD NOT BE REFERENCED TO THE PATIENT BY MEDICAL PROVIDERS.    Jade is a 48 year old woman with metastatic breast cancer, seen today at [facility] for a return psychotherapy appointment to address PTSD, recurrent major depression, and anxiety as these relate to coping with illness and treatment.    Mental Status Exam:(List all that apply)      Appearance: Appropriate      Eye Contact: Good       Orientation: Yes, x4      Mood: expressive, angry, overwhelmed      Affect: Appropriate, full range, expressive      Thought Content: Clear         Thought Form: Logical      Psychomotor Behavior: Normal    Visit themes:   - Coping with intensified anger related to content of NET exposure work    Adjustment to Illness: Not focus today.    Mental Health (thoughts, feelings, actions, coping, and symptoms): Jade describes overwhelming anger several times during the past week. We discuss ways this may be connected with memories processed during past NET exposure session. She describes an experience of anger and frustration at work, and feeling out of control as she tried to resolve the situation. She notes she had fear about her emotions getting out of control, but was able to rein in their expression when needed. Similar intense anger and fear responses as she is trying to complete SSDI application process. Last steps seem finally to be completed. She identifies feeling things intensely as part of her as long as she can remember - as well as anger challenges for both her parents that she experienced and observed growing up. We discuss DBT strategies as useful addition as NET is stirring emotions up. Due to her need for emotional processing and related strategy focus today, we did not  "complete additional exposure. She brought an art project from [employer] re: her relationship with her dad.    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 She finds it useful to yell out expressively in a private location. She has also found it useful to move through the intense emotion by hitting pillows etc. Physical movement is useful in general, and we discuss options for walking more as weather warms up. We also discuss Livier Sullivan \"palms up\" practice or \"sensing the temperature of the air\" mindful pause/ re-orienting. I also email her DBT emotional regulation strategy options list.    Relationships: Has been dating Osvaldo for a couple weeks. He has been needing rides to work and she has been helping. He has expressed interest in moving in with her and she is considering. She voices how significant her desire is to be loved and connected, while also feeling caution about moving so fast or getting involved with someone with a lot of needs for support. She did break up with him but now is reconsidering. We adjust our meeting schedule to allow her to be available to pick him up from work in coming weeks.     She also identifies being able to call her sister and gain support and calming in moments of intense emotion. She identifies her " sister having stressors as well re: their dad and her son not getting along recently.    We discuss themes of feeling angry with others yet also intensely empathizing with others and thus finding ways to care and be kind. We discuss risks to her of accommodating others' wishes to her own detriment, as well as risk of developing frustration if she is regularly discounting her own needs or wants. She describes related challenges in long term relationship and related expressions of anger in the past. Feelings of vulnerability when she is not able to tell that she is loved or valued.    End of Life and Advance Care Planning: Not focus today.     Main therapeutic interventions provided this session include:  Psychoeducation, facilitating processing of feelings and thoughts, structured problem solving, discussion of emotional regulation strategies in moment    Plan:  Will return for psychotherapy with palliative care focus in 1 week at [facility] (March 22 at 10am).      Time spent with patient/family:  60 minutes (Start 3:25pm, end 4:25pm)  Longer session related to coping with emotional intensity connected with Narrative Exposure Therapy    Palliative Care Counseling Treatment Plan    Client's Name: Jade Collins  YOB: 1970    Date: 1/9/2019    Initial DSM5 Diagnoses:   296.32 Major Depressive Disorder, Recurrent Episode, Moderate With anxious distress  309.81 (F43.10) Posttraumatic Stress Disorder (includes Posttraumatic Stress Dsiorder for Children 6 Years and Younger)  With dissociative symptoms      Date to review plan (90 days usually): 3/20/19    Referral / Collaboration:  .Referral to another professional/service is not indicated at this time.    Anticipated number of sessions to complete episode of care:  10         Treatment Goal(s)  Date Goal Dates  Reviewed Status   12/20/2018   Goal 1:  Client will decrease PTSD symptoms.      Continued   12/20/2018   Objective #1A (Client  Action)  Client will Increase understanding of PTSD/ASD and Process trauma history in safe counseling environment using Narrative Exposure Therapy.    Intervention(s)  Therapist will Provide psychoeducation and Narrative Exposure Therapy .    Continued                   Date Goal Dates  Reviewed Status   12/20/2018   Goal 2:  Client will address illness related stressors.    Continued   12/20/2018   Objective #2A (Client Action)  Client will Communicate effectively with family/friends re needs and Communicate effectively with medical provider re needed information.    Intervention(s) (Therapist Action)  Therapist will Provide psychoeducation, Facilitate couples/family therapy session(s) and Facilitate structured problem solving.    Continued   12/20/2018   Objective #2B  Client will Identify effective coping strategies and Use/practice relaxation strategies.    Intervention(s)  Therapist will Provide psychoeducation, Provide behavioral intervention training, Facilitate processing of thoughts and feelings and Facilitate structured problem solving.    Continued   12/20/2018     Objective #2C  Client will Complete health care directive and/or POLST form.    Intervention(s)  Therapist will Provide advance care planning education.    Continued       Date Goal Dates  Reviewed Status   1/9/2019   Goal 3:  Client will effectively manage recurrent major depressive disorder in light of increased stressors.    Continued   1/9/2019   Objective #3A (Client Action)  Client will Identify activities that provide comfort and/or pleasure, Participate in activities that provide comfort and/or pleasure and Identify an activity that would provide meaning/contribute to feeling of self worth.    Intervention(s) (Therapist Action)  Therapist will Provide psychoeducation, Facilitate processing of thoughts and feelings and Facilitate structured problem solving.    Contined   1/9/2019   Objective #3B  Client will Effectively communicate needs  to others, Identify a crisis plan for suicidal ideation and Identify triggers/early signs of depressed mood.    Intervention(s)  Therapist will Provide psychoeducation, Facilitate processing of thoughts and feelings, Facilitate structured problem solving and facilitate safety planning as needed..    Continued               Client has contributed regarding goals and concerns, but has not reviewed the treatment plan. Plan to review at future session.    Lisa Holliday, MSW, [facility] Hospital[facility]      DO NOT SEND ANY LETTERS

## 2019-03-22 ENCOUNTER — ONCOLOGY VISIT (OUTPATIENT)
Dept: ONCOLOGY | Facility: CLINIC | Age: 49
End: 2019-03-22
Attending: SOCIAL WORKER
Payer: COMMERCIAL

## 2019-03-22 DIAGNOSIS — F43.10 POSTTRAUMATIC STRESS DISORDER: Primary | ICD-10-CM

## 2019-03-22 DIAGNOSIS — Z51.5 ENCOUNTER FOR PALLIATIVE CARE: ICD-10-CM

## 2019-03-22 PROCEDURE — 90834 PSYTX W PT 45 MINUTES: CPT | Performed by: SOCIAL WORKER

## 2019-03-22 PROCEDURE — 40000268 ZZH STATISTIC NO CHARGES
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
## 2019-03-27 ENCOUNTER — OFFICE VISIT (OUTPATIENT)
Dept: PALLIATIVE CARE | Facility: CLINIC | Age: 49
End: 2019-03-27
Attending: SOCIAL WORKER
Payer: COMMERCIAL

## 2019-03-27 DIAGNOSIS — F33.1 MODERATE EPISODE OF RECURRENT MAJOR DEPRESSIVE DISORDER (H): ICD-10-CM

## 2019-03-27 DIAGNOSIS — Z51.5 ENCOUNTER FOR PALLIATIVE CARE: ICD-10-CM

## 2019-03-27 DIAGNOSIS — F43.10 POSTTRAUMATIC STRESS DISORDER: Primary | ICD-10-CM

## 2019-03-27 PROCEDURE — 40000114 ZZH STATISTIC NO CHARGE CLINIC VISIT

## 2019-03-27 PROCEDURE — 90837 PSYTX W PT 60 MINUTES: CPT | Performed by: SOCIAL WORKER
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
## 2019-04-02 ENCOUNTER — APPOINTMENT (OUTPATIENT)
Dept: LAB | Facility: CLINIC | Age: 49
End: 2019-04-02
Attending: INTERNAL MEDICINE
Payer: COMMERCIAL

## 2019-04-02 ENCOUNTER — ONCOLOGY VISIT (OUTPATIENT)
Dept: ONCOLOGY | Facility: CLINIC | Age: 49
End: 2019-04-02
Attending: PHYSICIAN ASSISTANT
Payer: COMMERCIAL

## 2019-04-02 VITALS
BODY MASS INDEX: 44.41 KG/M2 | SYSTOLIC BLOOD PRESSURE: 104 MMHG | DIASTOLIC BLOOD PRESSURE: 70 MMHG | RESPIRATION RATE: 18 BRPM | WEIGHT: 293 LBS | HEIGHT: 68 IN | TEMPERATURE: 98.3 F | OXYGEN SATURATION: 96 % | HEART RATE: 84 BPM

## 2019-04-02 DIAGNOSIS — Z17.0 MALIGNANT NEOPLASM OF UPPER-OUTER QUADRANT OF LEFT BREAST IN FEMALE, ESTROGEN RECEPTOR POSITIVE (H): Primary | ICD-10-CM

## 2019-04-02 DIAGNOSIS — C50.412 MALIGNANT NEOPLASM OF UPPER-OUTER QUADRANT OF LEFT BREAST IN FEMALE, ESTROGEN RECEPTOR POSITIVE (H): ICD-10-CM

## 2019-04-02 DIAGNOSIS — C50.412 MALIGNANT NEOPLASM OF UPPER-OUTER QUADRANT OF LEFT BREAST IN FEMALE, ESTROGEN RECEPTOR POSITIVE (H): Primary | ICD-10-CM

## 2019-04-02 DIAGNOSIS — Z17.0 MALIGNANT NEOPLASM OF UPPER-OUTER QUADRANT OF LEFT BREAST IN FEMALE, ESTROGEN RECEPTOR POSITIVE (H): ICD-10-CM

## 2019-04-02 LAB
ALBUMIN SERPL-MCNC: 3.9 G/DL (ref 3.4–5)
ALP SERPL-CCNC: 69 U/L (ref 40–150)
ALT SERPL W P-5'-P-CCNC: 27 U/L (ref 0–50)
ANION GAP SERPL CALCULATED.3IONS-SCNC: 4 MMOL/L (ref 3–14)
AST SERPL W P-5'-P-CCNC: 21 U/L (ref 0–45)
BASOPHILS # BLD AUTO: 0.1 10E9/L (ref 0–0.2)
BASOPHILS NFR BLD AUTO: 1.9 %
BILIRUB SERPL-MCNC: 0.4 MG/DL (ref 0.2–1.3)
BUN SERPL-MCNC: 16 MG/DL (ref 7–30)
CALCIUM SERPL-MCNC: 9.4 MG/DL (ref 8.5–10.1)
CHLORIDE SERPL-SCNC: 107 MMOL/L (ref 94–109)
CO2 SERPL-SCNC: 27 MMOL/L (ref 20–32)
CREAT SERPL-MCNC: 0.94 MG/DL (ref 0.52–1.04)
DIFFERENTIAL METHOD BLD: ABNORMAL
EOSINOPHIL # BLD AUTO: 0.2 10E9/L (ref 0–0.7)
EOSINOPHIL NFR BLD AUTO: 8.4 %
ERYTHROCYTE [DISTWIDTH] IN BLOOD BY AUTOMATED COUNT: 13.6 % (ref 10–15)
GFR SERPL CREATININE-BSD FRML MDRD: 71 ML/MIN/{1.73_M2}
GLUCOSE SERPL-MCNC: 115 MG/DL (ref 70–99)
HCT VFR BLD AUTO: 37.4 % (ref 35–47)
HGB BLD-MCNC: 12.7 G/DL (ref 11.7–15.7)
IMM GRANULOCYTES # BLD: 0 10E9/L (ref 0–0.4)
IMM GRANULOCYTES NFR BLD: 0 %
LYMPHOCYTES # BLD AUTO: 1 10E9/L (ref 0.8–5.3)
LYMPHOCYTES NFR BLD AUTO: 39.2 %
MCH RBC QN AUTO: 32.8 PG (ref 26.5–33)
MCHC RBC AUTO-ENTMCNC: 34 G/DL (ref 31.5–36.5)
MCV RBC AUTO: 97 FL (ref 78–100)
MONOCYTES # BLD AUTO: 0.2 10E9/L (ref 0–1.3)
MONOCYTES NFR BLD AUTO: 8.4 %
NEUTROPHILS # BLD AUTO: 1.1 10E9/L (ref 1.6–8.3)
NEUTROPHILS NFR BLD AUTO: 42.1 %
NRBC # BLD AUTO: 0 10*3/UL
NRBC BLD AUTO-RTO: 0 /100
PLATELET # BLD AUTO: 142 10E9/L (ref 150–450)
POTASSIUM SERPL-SCNC: 3.8 MMOL/L (ref 3.4–5.3)
PROT SERPL-MCNC: 7.6 G/DL (ref 6.8–8.8)
RBC # BLD AUTO: 3.87 10E12/L (ref 3.8–5.2)
SODIUM SERPL-SCNC: 139 MMOL/L (ref 133–144)
WBC # BLD AUTO: 2.6 10E9/L (ref 4–11)

## 2019-04-02 PROCEDURE — 36415 COLL VENOUS BLD VENIPUNCTURE: CPT

## 2019-04-02 PROCEDURE — 85025 COMPLETE CBC W/AUTO DIFF WBC: CPT | Performed by: INTERNAL MEDICINE

## 2019-04-02 PROCEDURE — G0463 HOSPITAL OUTPT CLINIC VISIT: HCPCS | Mod: ZF

## 2019-04-02 PROCEDURE — 99214 OFFICE O/P EST MOD 30 MIN: CPT | Mod: ZP | Performed by: PHYSICIAN ASSISTANT

## 2019-04-02 PROCEDURE — 80053 COMPREHEN METABOLIC PANEL: CPT | Performed by: INTERNAL MEDICINE

## 2019-04-02 RX ORDER — MIRTAZAPINE 15 MG/1
TABLET, FILM COATED ORAL
Refills: 0 | COMMUNITY
Start: 2019-01-22 | End: 2019-06-25

## 2019-04-02 ASSESSMENT — PAIN SCALES - GENERAL: PAINLEVEL: SEVERE PAIN (6)

## 2019-04-02 ASSESSMENT — MIFFLIN-ST. JEOR: SCORE: 2012.52

## 2019-04-02 NOTE — NURSING NOTE
Chief Complaint   Patient presents with     Blood Draw     Labs drawn via  by RN in lab. VS taken.     Uzma Oneill RN

## 2019-04-02 NOTE — NURSING NOTE
"Oncology Rooming Note    April 2, 2019 1:07 PM   Jade Collins is a 49 year old female who presents for:    Chief Complaint   Patient presents with     Blood Draw     Labs drawn via  by RN in lab. VS taken.     Oncology Clinic Visit     Breast Cancer     Initial Vitals: /70 (BP Location: Right arm, Patient Position: Sitting, Cuff Size: Adult Large)   Pulse 84   Temp 98.3  F (36.8  C) (Oral)   Resp 18   Ht 1.727 m (5' 8\")   Wt 133.9 kg (295 lb 3.2 oz)   SpO2 96%   BMI 44.89 kg/m   Estimated body mass index is 44.89 kg/m  as calculated from the following:    Height as of this encounter: 1.727 m (5' 8\").    Weight as of this encounter: 133.9 kg (295 lb 3.2 oz). Body surface area is 2.53 meters squared.  Severe Pain (6) Comment: Data Unavailable   No LMP recorded. Patient is postmenopausal.  Allergies reviewed: Yes  Medications reviewed: Yes    Medications: Medication refills not needed today.  Pharmacy name entered into [employer]:    [id number] - [place], [place] - [address] AT [facility]  [address]  [facility] - [place], [place] - [address]    Clinical concerns: No New Concerns    KARENA Dominguez      "

## 2019-04-02 NOTE — LETTER
4/2/2019      RE: Jade Collins  [address]  [facility] [address]       Oncology/Hematology Visit Note  Apr 2, 2019    Reason for Visit: follow up of recurrent metastatic ER-positive HER2 negative breast cancer    History of Present Illness: Jade Collins is a 49 year old female with a history of left breast cancer diagnosed in 12/2013 in [place] area and Dr. David Redd was her medical oncologist. Biopsy showed tumor to be ER positive, HI positive and HER2 negative. She underwent lumpectomy and SLNB by Dr. Tasha Segura in [place]. [place] lymph node was noted to be involved with tumor but no lymph node dissection done at that time. She subsequently underwent adjuvant ddAC x 4 cycles and Taxol x 12 weeks, competed in 9/11/2014. She had post lumpectomy radiation completed in 11/23/2014 by Dr. Manley. She was begun on an AI in 11/2014, but does not recall name.     She was then switched within about a month or so to anastrozole and remained on anastrozole from 11/2014-02/2017.  She has never received tamoxifen. She then moved to [place], [place] in 03/2017.  She saw a gynecologist there and underwent a IRIS/BSO by Dr. Zendejas.  She then also saw Dr. Linares in [place], [place], who is an oncologist.  His interpretation of the pathology report was that she had triple-negative breast cancer.  It is possible that she may have had low estrogen receptor positivity, but the anastrozole was then discontinued in 02/2017.  She then underwent the IRIS/BSO in 03/2017.  This was done laparoscopically.        She then remained off of all hormonal therapy and in 09/2017 moved to [place].  She remained off hormonal therapy and did not have Medical Oncology followup initially.        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  She then went to see Dr. Norberto Brand who performed a PET/CT scan and the patient also underwent a brain MRI for staging.  The PET/CT scan performed 08/03/2018  which showed mildly enlarged left posterior cervical lymph nodes, largest measuring 1.5 x 1.0 cm and mildly enlarged aorticopulmonary and prevascular lymph nodes that were noted on contrast-enhanced CT scan seen less well on the PET scan.  No evidence of metastatic disease in the abdomen and pelvis.  She also underwent a brain MRI that showed no evidence of intracranial metastatic disease.  She underwent a biopsy of one of the lymph nodes in the left neck which showed metastatic adenocarcinoma consistent with breast origin, which was estrogen-receptor positive.  The tumor cells were positive for CK7, ER and MO consistent with metastatic breast carcinoma.  Estrogen receptor showed strong average nuclear intensity in 95% of carcinoma cells, progesterone receptor moderate average nuclear intensity in 70% of the carcinoma.  A GATA3 stain was apparently not performed.     She started Ibrance on 9/21/18 and letrozole on 9/23. She had improved disease on restaging on 11/26/18. Please see Dr. Bhatt's previous notes for further details on the patient's history. She comes in today for routine follow up.    Interval History:  Jade returns to clinic today. She is overall feeling well and notes no new changes. She has been more tired and has been taking letrozole, palbociclib, and Remeron before bed and does sleep well with this. She has been gaining weight. Eating a lot of sweets and not exercising. She states deconditioning and asthma limits this. Rhinitis and cough are well-controlled. No fevers/chills or SOB. Her L hip and low back continue to bother her at times especially if she has been driving more or is sedentary. Continues to have the same amount of hot flashes and night sweats.     Seeing Sari Holliday for PTSD, anxiety, and depression. Symptoms are stable. She goes to [employer] once per week as well for [employer]. Also is on a metastatic breast cancer Facebook group but notes this sometimes makes her more sad.      She has no nausea. Bowels alternate between constipation and diarrhea. No urinary concerns. She hydrates well.     Review of Systems:  Patient denies any of the following except if noted above: fevers, chills, chest pain, dyspnea,  abdominal pain, dysuria.    Current Outpatient Medications   Medication Sig Dispense Refill     ACETAMINOPHEN PO Take 650 mg by mouth every 4 hours as needed for pain       albuterol (PROAIR HFA/PROVENTIL HFA/VENTOLIN HFA) 108 (90 Base) MCG/ACT inhaler Inhale 2 puffs into the lungs every 6 hours (Patient not taking: Reported on 2/19/2019) 1 Inhaler 4     albuterol (PROAIR HFA/PROVENTIL HFA/VENTOLIN HFA) 108 (90 Base) MCG/ACT inhaler Inhale 2 puffs into the lungs every 6 hours as needed for shortness of breath / dyspnea or wheezing       albuterol (PROVENTIL) (2.5 MG/3ML) 0.083% neb solution Take 1 vial (2.5 mg) by nebulization every 6 hours as needed for shortness of breath / dyspnea or wheezing (Patient not taking: Reported on 2/19/2019) 30 vial 0     calcium carbonate (TUMS) 500 MG chewable tablet Take 1 chew tab by mouth as needed for heartburn       Calcium Citrate-Vitamin D (CALCIUM + D PO) Take 250 mg by mouth 2 times daily        fluticasone (FLONASE) 50 MCG/ACT spray Spray 1-2 sprays into both nostrils daily (Patient not taking: Reported on 2/5/2019) 1 Bottle 3     fluticasone-salmeterol (AIRDUO RESPICLICK) 113-14 MCG/ACT inhaler Inhale 1 puff into the lungs 2 times daily (Patient not taking: Reported on 2/19/2019) 1 Inhaler 11     IBUPROFEN PO Take 200 mg by mouth every 4 hours as needed for moderate pain       letrozole (FEMARA) 2.5 MG tablet Take 1 tablet (2.5 mg) by mouth daily 28 tablet 3     letrozole (FEMARA) 2.5 MG tablet Take 1 tablet (2.5 mg) by mouth daily for 28 days 28 tablet 0     letrozole (FEMARA) 2.5 MG tablet Take 1 tablet (2.5 mg) by mouth daily for 28 days 28 tablet 0     levothyroxine (SYNTHROID/LEVOTHROID) 200 MCG tablet Take 200 mcg by mouth        "loratadine (CLARITIN) 10 MG tablet Take 1 tablet (10 mg) by mouth daily 90 tablet 3     methylPREDNISolone (MEDROL) 16 MG tablet Take 32 mg by mouth 12 hours before the procedure and repeat 32 mg by mouth 2 hours before the procedure to prevent contrast reaction. (Patient not taking: Reported on 2/5/2019) 4 tablet 0     Multiple Vitamins-Minerals (MULTIVITAMIN ADULT PO)        ondansetron (ZOFRAN ODT) 8 MG ODT tab Take 1 tablet (8 mg) by mouth 3 times daily as needed for nausea (Patient not taking: Reported on 2/19/2019) 30 tablet 1     palbociclib (IBRANCE) 125 MG capsule CHEMO Take 1 capsule (125 mg) by mouth daily with food Take for 21 days, then 7 days off. Avoid grapefruit. Do not open/crush/chew capsule. 21 capsule 0     palbociclib (IBRANCE) 125 MG capsule CHEMO Take 1 capsule (125 mg) by mouth daily with food Take for 21 days, then 7 days off. Avoid grapefruit. Do not open/crush/chew capsule. 21 capsule 0     prochlorperazine (COMPAZINE) 10 MG tablet Take 1 tablet (10 mg) by mouth every 6 hours as needed for nausea or vomiting 30 tablet 0     sertraline (ZOLOFT) 100 MG tablet TAKE 1 AND 1/2 TABLETS(150 MG) BY MOUTH EVERY DAY       triamcinolone (NASACORT) 55 MCG/ACT inhaler Spray 1 spray into both nostrils daily        VITAMIN D, CHOLECALCIFEROL, PO Take 2,000 Units by mouth daily          Physical Examination:  General: The patient is a pleasant female in no acute distress.  /70 (BP Location: Right arm, Patient Position: Sitting, Cuff Size: Adult Large)   Pulse 84   Temp 98.3  F (36.8  C) (Oral)   Resp 18   Ht 1.727 m (5' 8\")   Wt 133.9 kg (295 lb 3.2 oz)   SpO2 96%   BMI 44.89 kg/m     Wt Readings from Last 10 Encounters:   03/06/19 131.1 kg (289 lb)   02/19/19 129.5 kg (285 lb 8 oz)   02/05/19 127.9 kg (282 lb)   01/22/19 128.1 kg (282 lb 4.8 oz)   01/09/19 125.7 kg (277 lb 1.6 oz)   12/24/18 125.5 kg (276 lb 11.2 oz)   11/27/18 125.6 kg (276 lb 14.4 oz)   11/13/18 124.3 kg (274 lb) "   10/16/18 122.5 kg (270 lb)   10/01/18 121.6 kg (268 lb)     HEENT: EOMI, PERRL. Sclerae are anicteric. Oral mucosa is pink and moist with no lesions or thrush.   Lymph: No palpable cervical, supraclavicular, subclavicular or axillary lymphadenopathy.  Heart: Regular rate and rhythm.   Lungs: Clear to auscultation bilaterally. Normal work of breathing.   Breast: Deferred today   Abdomen: Bowel sounds present, soft, nontender with no palpable hepatosplenomegaly or masses.   Extremities: No lower extremity edema noted bilaterally.   Neuro: Cranial nerves II through XII are grossly intact.  Skin: No rashes, petechiae, or bruising noted on exposed skin.     Laboratory Data:    4/2/2019 12:58   Sodium 139   Potassium 3.8   Chloride 107   Carbon Dioxide 27   Urea Nitrogen 16   Creatinine 0.94   GFR Estimate 71   GFR Estimate If Black 82   Calcium 9.4   Anion Gap 4   Albumin 3.9   Protein Total 7.6   Bilirubin Total 0.4   Alkaline Phosphatase 69   ALT 27   AST 21   Glucose 115 (H)   WBC 2.6 (L)   Hemoglobin 12.7   Hematocrit 37.4   Platelet Count 142 (L)   RBC Count 3.87   MCV 97   MCH 32.8   MCHC 34.0   RDW 13.6   Diff Method Automated Method   % Neutrophils 42.1   % Lymphocytes 39.2   % Monocytes 8.4   % Eosinophils 8.4   % Basophils 1.9   % Immature Granulocytes 0.0   Nucleated RBCs 0   Absolute Neutrophil 1.1 (L)   Absolute Lymphocytes 1.0   Absolute Monocytes 0.2   Absolute Eosinophils 0.2   Absolute Basophils 0.1   Abs Immature Granulocytes 0.0   Absolute Nucleated RBC 0.0       Assessment and Plan:    Recurrent, metastatic ER-positive, HER2 negative breast cancer: History of left breast cancer s/p lumpectomy and SNLB in 2014. S/p 4 cycles ddAC and 12 weeks of Taxol. On AI from 11/2014-2/2017. Recurrence on PET/CT scan performed 08/03/2018 and then biopsy to left posterior cervical lymph nodes, aorticopulmonary, and prevascular lymph nodes. No evidence of metastatic disease in the abdomen and pelvis or brain.  Started Ibrance and letrozole on 9/21/18. Tolerating well aside from some fatigue, hot flashes, and mild joint stiffness. Re imaging in January showed improved disease. No clinical symptoms of recurrence, however her tumor markers have been slowly trending up since diagnosis. She will be due to start C8 on 4/5. Plan for restaging CT following cycle 8 with Dr. Bhatt. Of note, she has methylpred to take prior due to history of reaction to IV contrast.     Allergic rhinitis/asthma: Currently on Nasacort and Advair per Dr. Hay with improvement of symptoms. Uses albuterol prn.     Weight gain: Encouraged her to make smarter choices with diet and increase exercise. She should use albuterol inhaler prior to exercise.     Nausea: Improved. Antiemetics prn.    Bone health: No bone mets but she is on AI. Last DEXA 3/2017 showing normal bone density. Continue calcium and vitamin D. Will message Dr. Bhatt to see if he would like a DEXA.     Coping: Underlying depression, anxiety, and PTSD. Seeing palliative SW and is plugged into the community. Discussed setting some boundaries with her facebook group--maybe only checking once per week or month or when she is starting a new treatment. She can explore this more with Sari Holliday.     Cecilia Goins PA-C  [facility] [address] [address] [phone]

## 2019-04-02 NOTE — PROGRESS NOTES
Oncology/Hematology Visit Note  Apr 2, 2019    Reason for Visit: follow up of recurrent metastatic ER-positive HER2 negative breast cancer    History of Present Illness: Jade Collins is a 49 year old female with a history of left breast cancer diagnosed in 12/2013 in [place] area and Dr. David Redd was her medical oncologist. Biopsy showed tumor to be ER positive, CA positive and HER2 negative. She underwent lumpectomy and SLNB by Dr. Tasha Segura in [place]. [place] lymph node was noted to be involved with tumor but no lymph node dissection done at that time. She subsequently underwent adjuvant ddAC x 4 cycles and Taxol x 12 weeks, competed in 9/11/2014. She had post lumpectomy radiation completed in 11/23/2014 by Dr. Manley. She was begun on an AI in 11/2014, but does not recall name.     She was then switched within about a month or so to anastrozole and remained on anastrozole from 11/2014-02/2017.  She has never received tamoxifen. She then moved to [place], [place] in 03/2017.  She saw a gynecologist there and underwent a IRIS/BSO by Dr. Zendejsa.  She then also saw Dr. Linares in [place], [place], who is an oncologist.  His interpretation of the pathology report was that she had triple-negative breast cancer.  It is possible that she may have had low estrogen receptor positivity, but the anastrozole was then discontinued in 02/2017.  She then underwent the IRIS/BSO in 03/2017.  This was done laparoscopically.        She then remained off of all hormonal therapy and in 09/2017 moved to [place].  She remained off hormonal therapy and did not have Medical Oncology followup initially.        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  She then went to see Dr. Norberto Brand who performed a PET/CT scan and the patient also underwent a brain MRI for staging.  The PET/CT scan performed 08/03/2018 which showed mildly enlarged left posterior cervical lymph nodes, largest measuring 1.5  x 1.0 cm and mildly enlarged aorticopulmonary and prevascular lymph nodes that were noted on contrast-enhanced CT scan seen less well on the PET scan.  No evidence of metastatic disease in the abdomen and pelvis.  She also underwent a brain MRI that showed no evidence of intracranial metastatic disease.  She underwent a biopsy of one of the lymph nodes in the left neck which showed metastatic adenocarcinoma consistent with breast origin, which was estrogen-receptor positive.  The tumor cells were positive for CK7, ER and CT consistent with metastatic breast carcinoma.  Estrogen receptor showed strong average nuclear intensity in 95% of carcinoma cells, progesterone receptor moderate average nuclear intensity in 70% of the carcinoma.  A GATA3 stain was apparently not performed.     She started Ibrance on 9/21/18 and letrozole on 9/23. She had improved disease on restaging on 11/26/18. Please see Dr. Bhatt's previous notes for further details on the patient's history. She comes in today for routine follow up.    Interval History:  Jade returns to clinic today. She is overall feeling well and notes no new changes. She has been more tired and has been taking letrozole, palbociclib, and Remeron before bed and does sleep well with this. She has been gaining weight. Eating a lot of sweets and not exercising. She states deconditioning and asthma limits this. Rhinitis and cough are well-controlled. No fevers/chills or SOB. Her L hip and low back continue to bother her at times especially if she has been driving more or is sedentary. Continues to have the same amount of hot flashes and night sweats.     Seeing Sari Holliday for PTSD, anxiety, and depression. Symptoms are stable. She goes to [employer] once per week as well for [employer]. Also is on a metastatic breast cancer Facebook group but notes this sometimes makes her more sad.     She has no nausea. Bowels alternate between constipation and diarrhea. No urinary  concerns. She hydrates well.     Review of Systems:  Patient denies any of the following except if noted above: fevers, chills, chest pain, dyspnea,  abdominal pain, dysuria.    Current Outpatient Medications   Medication Sig Dispense Refill     ACETAMINOPHEN PO Take 650 mg by mouth every 4 hours as needed for pain       albuterol (PROAIR HFA/PROVENTIL HFA/VENTOLIN HFA) 108 (90 Base) MCG/ACT inhaler Inhale 2 puffs into the lungs every 6 hours (Patient not taking: Reported on 2/19/2019) 1 Inhaler 4     albuterol (PROAIR HFA/PROVENTIL HFA/VENTOLIN HFA) 108 (90 Base) MCG/ACT inhaler Inhale 2 puffs into the lungs every 6 hours as needed for shortness of breath / dyspnea or wheezing       albuterol (PROVENTIL) (2.5 MG/3ML) 0.083% neb solution Take 1 vial (2.5 mg) by nebulization every 6 hours as needed for shortness of breath / dyspnea or wheezing (Patient not taking: Reported on 2/19/2019) 30 vial 0     calcium carbonate (TUMS) 500 MG chewable tablet Take 1 chew tab by mouth as needed for heartburn       Calcium Citrate-Vitamin D (CALCIUM + D PO) Take 250 mg by mouth 2 times daily        fluticasone (FLONASE) 50 MCG/ACT spray Spray 1-2 sprays into both nostrils daily (Patient not taking: Reported on 2/5/2019) 1 Bottle 3     fluticasone-salmeterol (AIRDUO RESPICLICK) 113-14 MCG/ACT inhaler Inhale 1 puff into the lungs 2 times daily (Patient not taking: Reported on 2/19/2019) 1 Inhaler 11     IBUPROFEN PO Take 200 mg by mouth every 4 hours as needed for moderate pain       letrozole (FEMARA) 2.5 MG tablet Take 1 tablet (2.5 mg) by mouth daily 28 tablet 3     letrozole (FEMARA) 2.5 MG tablet Take 1 tablet (2.5 mg) by mouth daily for 28 days 28 tablet 0     letrozole (FEMARA) 2.5 MG tablet Take 1 tablet (2.5 mg) by mouth daily for 28 days 28 tablet 0     levothyroxine (SYNTHROID/LEVOTHROID) 200 MCG tablet Take 200 mcg by mouth       loratadine (CLARITIN) 10 MG tablet Take 1 tablet (10 mg) by mouth daily 90 tablet 3      "methylPREDNISolone (MEDROL) 16 MG tablet Take 32 mg by mouth 12 hours before the procedure and repeat 32 mg by mouth 2 hours before the procedure to prevent contrast reaction. (Patient not taking: Reported on 2/5/2019) 4 tablet 0     Multiple Vitamins-Minerals (MULTIVITAMIN ADULT PO)        ondansetron (ZOFRAN ODT) 8 MG ODT tab Take 1 tablet (8 mg) by mouth 3 times daily as needed for nausea (Patient not taking: Reported on 2/19/2019) 30 tablet 1     palbociclib (IBRANCE) 125 MG capsule CHEMO Take 1 capsule (125 mg) by mouth daily with food Take for 21 days, then 7 days off. Avoid grapefruit. Do not open/crush/chew capsule. 21 capsule 0     palbociclib (IBRANCE) 125 MG capsule CHEMO Take 1 capsule (125 mg) by mouth daily with food Take for 21 days, then 7 days off. Avoid grapefruit. Do not open/crush/chew capsule. 21 capsule 0     prochlorperazine (COMPAZINE) 10 MG tablet Take 1 tablet (10 mg) by mouth every 6 hours as needed for nausea or vomiting 30 tablet 0     sertraline (ZOLOFT) 100 MG tablet TAKE 1 AND 1/2 TABLETS(150 MG) BY MOUTH EVERY DAY       triamcinolone (NASACORT) 55 MCG/ACT inhaler Spray 1 spray into both nostrils daily        VITAMIN D, CHOLECALCIFEROL, PO Take 2,000 Units by mouth daily          Physical Examination:  General: The patient is a pleasant female in no acute distress.  /70 (BP Location: Right arm, Patient Position: Sitting, Cuff Size: Adult Large)   Pulse 84   Temp 98.3  F (36.8  C) (Oral)   Resp 18   Ht 1.727 m (5' 8\")   Wt 133.9 kg (295 lb 3.2 oz)   SpO2 96%   BMI 44.89 kg/m    Wt Readings from Last 10 Encounters:   03/06/19 131.1 kg (289 lb)   02/19/19 129.5 kg (285 lb 8 oz)   02/05/19 127.9 kg (282 lb)   01/22/19 128.1 kg (282 lb 4.8 oz)   01/09/19 125.7 kg (277 lb 1.6 oz)   12/24/18 125.5 kg (276 lb 11.2 oz)   11/27/18 125.6 kg (276 lb 14.4 oz)   11/13/18 124.3 kg (274 lb)   10/16/18 122.5 kg (270 lb)   10/01/18 121.6 kg (268 lb)     HEENT: EOMI, PERRL. Sclerae are " anicteric. Oral mucosa is pink and moist with no lesions or thrush.   Lymph: No palpable cervical, supraclavicular, subclavicular or axillary lymphadenopathy.  Heart: Regular rate and rhythm.   Lungs: Clear to auscultation bilaterally. Normal work of breathing.   Breast: Deferred today   Abdomen: Bowel sounds present, soft, nontender with no palpable hepatosplenomegaly or masses.   Extremities: No lower extremity edema noted bilaterally.   Neuro: Cranial nerves II through XII are grossly intact.  Skin: No rashes, petechiae, or bruising noted on exposed skin.     Laboratory Data:    4/2/2019 12:58   Sodium 139   Potassium 3.8   Chloride 107   Carbon Dioxide 27   Urea Nitrogen 16   Creatinine 0.94   GFR Estimate 71   GFR Estimate If Black 82   Calcium 9.4   Anion Gap 4   Albumin 3.9   Protein Total 7.6   Bilirubin Total 0.4   Alkaline Phosphatase 69   ALT 27   AST 21   Glucose 115 (H)   WBC 2.6 (L)   Hemoglobin 12.7   Hematocrit 37.4   Platelet Count 142 (L)   RBC Count 3.87   MCV 97   MCH 32.8   MCHC 34.0   RDW 13.6   Diff Method Automated Method   % Neutrophils 42.1   % Lymphocytes 39.2   % Monocytes 8.4   % Eosinophils 8.4   % Basophils 1.9   % Immature Granulocytes 0.0   Nucleated RBCs 0   Absolute Neutrophil 1.1 (L)   Absolute Lymphocytes 1.0   Absolute Monocytes 0.2   Absolute Eosinophils 0.2   Absolute Basophils 0.1   Abs Immature Granulocytes 0.0   Absolute Nucleated RBC 0.0       Assessment and Plan:    Recurrent, metastatic ER-positive, HER2 negative breast cancer: History of left breast cancer s/p lumpectomy and SNLB in 2014. S/p 4 cycles ddAC and 12 weeks of Taxol. On AI from 11/2014-2/2017. Recurrence on PET/CT scan performed 08/03/2018 and then biopsy to left posterior cervical lymph nodes, aorticopulmonary, and prevascular lymph nodes. No evidence of metastatic disease in the abdomen and pelvis or brain. Started Ibrance and letrozole on 9/21/18. Tolerating well aside from some fatigue, hot flashes, and  mild joint stiffness. Re imaging in January showed improved disease. No clinical symptoms of recurrence, however her tumor markers have been slowly trending up since diagnosis. She will be due to start C8 on 4/5. Plan for restaging CT following cycle 8 with Dr. Bhatt. Of note, she has methylpred to take prior due to history of reaction to IV contrast.     Allergic rhinitis/asthma: Currently on Nasacort and Advair per Dr. Hay with improvement of symptoms. Uses albuterol prn.     Weight gain: Encouraged her to make smarter choices with diet and increase exercise. She should use albuterol inhaler prior to exercise.     Nausea: Improved. Antiemetics prn.    Bone health: No bone mets but she is on AI. Last DEXA 3/2017 showing normal bone density. Continue calcium and vitamin D. Will message Dr. Bhatt to see if he would like a DEXA.     Coping: Underlying depression, anxiety, and PTSD. Seeing palliative SW and is plugged into the community. Discussed setting some boundaries with her facebook group--maybe only checking once per week or month or when she is starting a new treatment. She can explore this more with Sari Holliday.     Cecilia Goins PA-C  [facility]  [address] [place] [address] [phone]

## 2019-04-03 ENCOUNTER — TELEPHONE (OUTPATIENT)
Dept: ONCOLOGY | Facility: CLINIC | Age: 49
End: 2019-04-03

## 2019-04-03 NOTE — ORAL ONC MGMT
Oral Chemotherapy Monitoring Program      Placed call to patient in follow up of Ibrance (palbociclib) therapy.   Left message requesting call back.   No drug names were mentioned.    Patient scheduled Ibrance  at [facility] on 4/8/19.   PDC=1 therefore no concerns with adherence.      Jose Swain, PharmD  Hematology/Oncology Clinical Pharmacist  [facility]

## 2019-04-09 ENCOUNTER — OFFICE VISIT (OUTPATIENT)
Dept: PULMONOLOGY | Facility: CLINIC | Age: 49
End: 2019-04-09
Attending: INTERNAL MEDICINE
Payer: COMMERCIAL

## 2019-04-09 VITALS
RESPIRATION RATE: 18 BRPM | DIASTOLIC BLOOD PRESSURE: 85 MMHG | WEIGHT: 293 LBS | SYSTOLIC BLOOD PRESSURE: 133 MMHG | HEIGHT: 68 IN | OXYGEN SATURATION: 94 % | HEART RATE: 83 BPM | BODY MASS INDEX: 44.41 KG/M2

## 2019-04-09 DIAGNOSIS — J45.20 MILD INTERMITTENT ASTHMA WITHOUT COMPLICATION: Primary | ICD-10-CM

## 2019-04-09 DIAGNOSIS — J45.20 MILD INTERMITTENT ASTHMA, UNSPECIFIED WHETHER COMPLICATED: Primary | ICD-10-CM

## 2019-04-09 PROCEDURE — G0463 HOSPITAL OUTPT CLINIC VISIT: HCPCS | Mod: ZF

## 2019-04-09 ASSESSMENT — PAIN SCALES - GENERAL: PAINLEVEL: EXTREME PAIN (8)

## 2019-04-09 ASSESSMENT — MIFFLIN-ST. JEOR: SCORE: 2011.61

## 2019-04-09 NOTE — PROGRESS NOTES
Reason for Visit  Jade Collins is a 49 year old year old female who is being seen for RECHECK (Dx. Asthma)    Pulmonary HPI  48 YO with history of metastatic breast cancer with metastasis to the cervical lymph nodes but no evidence of metastatic disease in her chest, currently on palbociclib.  History of asthma since 1998.  States had no symptoms of asthma as child, born and raised in [place].  Moved to [place] in 1994 and noticed developing increased sinus symptoms and asthma symptoms in 1998. Triggers were dust in air and seasonal variation in Spring with flowering of cacti and orange trees.  Had 2-3 flares per year, sometime requiring prednisone.  Hospitalized twice- once in 2001 and then in 2014, each time was for ~ 24 hours, usually responded quickly to nebulizers.  Also has allergies to cats and dogs.  Moved back to [place] in Fall of 2017; since the move she states that her allergies and asthma have been under better control.  Using nasal steroids daily- has noted some nasal irritation with Flonase, is thinking about switching back to Nasocort.  Uses Claritin on a daily basis.  Does not use nasal saline rinses.  Does notice some exertional component, cold air also triggers an increase in difficulty breathing.  Was on Advair in the past but has not taken for some time.  Uses albuterol 2-3 times per week. Recent URI with some increase in nasal congestion but this is resolving.  Received flu vaccination this Fall.  No Tobacco use but second hand smoke exposure.  + FH of asthma in her father.    Last seen two months ago. At that appointment she was started on Advair. Was scheduled for spirometry testing today but was not able to make appointment. Since her last visit she has been doing very well, not much sinus drainage. Overall asthma is better controlled on Advair.  Does not need to use albuterol during the day but uses prior to doing any exercise or walking.  Able to walk without any significant difficulties.  Using  Nasocort but not using saline washes, overall thinks that nasal symptoms are well controlled.    The patient was seen and examined by Facundo Hay           Current Outpatient Medications   Medication     ACETAMINOPHEN PO     albuterol (PROAIR HFA/PROVENTIL HFA/VENTOLIN HFA) 108 (90 Base) MCG/ACT inhaler     albuterol (PROAIR HFA/PROVENTIL HFA/VENTOLIN HFA) 108 (90 Base) MCG/ACT inhaler     albuterol (PROVENTIL) (2.5 MG/3ML) 0.083% neb solution     calcium carbonate (TUMS) 500 MG chewable tablet     Calcium Citrate-Vitamin D (CALCIUM + D PO)     fluticasone (FLONASE) 50 MCG/ACT spray     fluticasone-salmeterol (AIRDUO RESPICLICK) 113-14 MCG/ACT inhaler     IBUPROFEN PO     letrozole (FEMARA) 2.5 MG tablet     letrozole (FEMARA) 2.5 MG tablet     letrozole (FEMARA) 2.5 MG tablet     levothyroxine (SYNTHROID/LEVOTHROID) 200 MCG tablet     loratadine (CLARITIN) 10 MG tablet     methylPREDNISolone (MEDROL) 16 MG tablet     mirtazapine (REMERON) 15 MG tablet     Multiple Vitamins-Minerals (MULTIVITAMIN ADULT PO)     ondansetron (ZOFRAN ODT) 8 MG ODT tab     palbociclib (IBRANCE) 125 MG capsule CHEMO     palbociclib (IBRANCE) 125 MG capsule CHEMO     palbociclib (IBRANCE) 125 MG capsule CHEMO     prochlorperazine (COMPAZINE) 10 MG tablet     sertraline (ZOLOFT) 100 MG tablet     triamcinolone (NASACORT) 55 MCG/ACT inhaler     VITAMIN D, CHOLECALCIFEROL, PO     No current facility-administered medications for this visit.      Allergies   Allergen Reactions     Contrast Dye Shortness Of Breath     Swollen eyes, coughing, headache     Penicillins      Sulfa Drugs Shortness Of Breath     No past medical history on file.    No past surgical history on file.    Social History     Socioeconomic History     Marital status: Single     Spouse name: Not on file     Number of children: Not on file     Years of education: Not on file     Highest education level: Not on file   Occupational History     Not on file   Social Needs      "Financial resource strain: Not on file     Food insecurity:     Worry: Not on file     Inability: Not on file     Transportation needs:     Medical: Not on file     Non-medical: Not on file   Tobacco Use     Smoking status: Never Smoker     Smokeless tobacco: Never Used   Substance and Sexual Activity     Alcohol use: Not on file     Drug use: Not on file     Sexual activity: Not on file   Lifestyle     Physical activity:     Days per week: Not on file     Minutes per session: Not on file     Stress: Not on file   Relationships     Social connections:     Talks on phone: Not on file     Gets together: Not on file     Attends Jew service: Not on file     Active member of club or organization: Not on file     Attends meetings of clubs or organizations: Not on file     Relationship status: Not on file     Intimate partner violence:     Fear of current or ex partner: Not on file     Emotionally abused: Not on file     Physically abused: Not on file     Forced sexual activity: Not on file   Other Topics Concern     Not on file   Social History Narrative     Not on file       ROS Pulmonary  A complete ROS was otherwise negative except as noted in the HPI.  Resp 18   Ht 1.727 m (5' 8\")   Wt 133.8 kg (295 lb)   BMI 44.85 kg/m    Exam:   GENERAL APPEARANCE: Well developed, well nourished, alert, and in no apparent distress.  EYES: PERRL, EOMI  HENT: Nasal mucosa with no edema and no hyperemia. No nasal polyps.  EARS: Canals clear, TMs normal  MOUTH: Oral mucosa is moist, without any lesions, no tonsillar enlargement, no oropharyngeal exudate.  NECK: supple, no masses, no thyromegaly.  LYMPHATICS: No significant axillary, cervical, or supraclavicular nodes.  RESP:  Good air flow throughout.  No crackles. No rhonchi. No wheezes.  CV: Normal S1, S2, regular rhythm, normal rate. No murmur.  No rub. No gallop. No LE edema.   ABDOMEN:  Bowel sounds normal, soft, nontender, no HSM or masses.   MS: extremities normal. No " clubbing. No cyanosis.  SKIN: no rash on limited exam  NEURO: Mentation intact, speech normal, normal strength and tone, normal gait and stance  PSYCH: mentation appears normal. and affect normal/bright  Results:  Recent Results (from the past 168 hour(s))   Comprehensive metabolic panel    Collection Time: 04/02/19 12:58 PM   Result Value Ref Range    Sodium 139 133 - 144 mmol/L    Potassium 3.8 3.4 - 5.3 mmol/L    Chloride 107 94 - 109 mmol/L    Carbon Dioxide 27 20 - 32 mmol/L    Anion Gap 4 3 - 14 mmol/L    Glucose 115 (H) 70 - 99 mg/dL    Urea Nitrogen 16 7 - 30 mg/dL    Creatinine 0.94 0.52 - 1.04 mg/dL    GFR Estimate 71 >60 mL/min/[1.73_m2]    GFR Estimate If Black 82 >60 mL/min/[1.73_m2]    Calcium 9.4 8.5 - 10.1 mg/dL    Bilirubin Total 0.4 0.2 - 1.3 mg/dL    Albumin 3.9 3.4 - 5.0 g/dL    Protein Total 7.6 6.8 - 8.8 g/dL    Alkaline Phosphatase 69 40 - 150 U/L    ALT 27 0 - 50 U/L    AST 21 0 - 45 U/L   CBC with platelets differential    Collection Time: 04/02/19 12:58 PM   Result Value Ref Range    WBC 2.6 (L) 4.0 - 11.0 10e9/L    RBC Count 3.87 3.8 - 5.2 10e12/L    Hemoglobin 12.7 11.7 - 15.7 g/dL    Hematocrit 37.4 35.0 - 47.0 %    MCV 97 78 - 100 fl    MCH 32.8 26.5 - 33.0 pg    MCHC 34.0 31.5 - 36.5 g/dL    RDW 13.6 10.0 - 15.0 %    Platelet Count 142 (L) 150 - 450 10e9/L    Diff Method Automated Method     % Neutrophils 42.1 %    % Lymphocytes 39.2 %    % Monocytes 8.4 %    % Eosinophils 8.4 %    % Basophils 1.9 %    % Immature Granulocytes 0.0 %    Nucleated RBCs 0 0 /100    Absolute Neutrophil 1.1 (L) 1.6 - 8.3 10e9/L    Absolute Lymphocytes 1.0 0.8 - 5.3 10e9/L    Absolute Monocytes 0.2 0.0 - 1.3 10e9/L    Absolute Eosinophils 0.2 0.0 - 0.7 10e9/L    Absolute Basophils 0.1 0.0 - 0.2 10e9/L    Abs Immature Granulocytes 0.0 0 - 0.4 10e9/L    Absolute Nucleated RBC 0.0        Assessment and plan:   48 YO with onset of asthma later in life in late 20's after environmental and allergen change with move  to [place].  On spirometry had mild to moderate obstruction, unclear at present if this is fixed obstruction. Fixed obstruction would be more consistent with sub-optimally controlled asthma that has been chronic in nature.  Sinus symptoms may have been triggering more asthma symptoms- these are now better controlled.  Overall her symptoms are significantly improved compared to her first visit.     1. Continue Advair 250-50 BID  2. Continue albuterol prn  3. Continue Nasocort  4. Advised to use nasal saline washes if has an increase in nasal drainage  5. To monitor reflux symptoms at present  6. I will follow-up spirometry testing from today- overall they are significantly improved     RTC in 4 months.  Patient to call if she has any questions or concerns prior to her next clinic appointment

## 2019-04-09 NOTE — LETTER
4/9/2019       RE: Jade Collins  [address]  [facility] [id number]     Dear Colleague,    Thank you for referring your patient, Jade Collins, to the [facility] at [facility]. Please see a copy of my visit note below.    Reason for Visit  Jade Collins is a 49 year old year old female who is being seen for RECHECK (Dx. Asthma)    Pulmonary HPI  50 YO with history of metastatic breast cancer with metastasis to the cervical lymph nodes but no evidence of metastatic disease in her chest, currently on palbociclib.  History of asthma since 1998.  States had no symptoms of asthma as child, born and raised in [place].  Moved to [place] in 1994 and noticed developing increased sinus symptoms and asthma symptoms in 1998. Triggers were dust in air and seasonal variation in Spring with flowering of cacti and orange trees.  Had 2-3 flares per year, sometime requiring prednisone.  Hospitalized twice- once in 2001 and then in 2014, each time was for ~ 24 hours, usually responded quickly to nebulizers.  Also has allergies to cats and dogs.  Moved back to [place] in Fall of 2017; since the move she states that her allergies and asthma have been under better control.  Using nasal steroids daily- has noted some nasal irritation with Flonase, is thinking about switching back to Nasocort.  Uses Claritin on a daily basis.  Does not use nasal saline rinses.  Does notice some exertional component, cold air also triggers an increase in difficulty breathing.  Was on Advair in the past but has not taken for some time.  Uses albuterol 2-3 times per week. Recent URI with some increase in nasal congestion but this is resolving.  Received flu vaccination this Fall.  No Tobacco use but second hand smoke exposure.  + FH of asthma in her father.    Last seen two months ago. At that appointment she was started on Advair. Was scheduled for spirometry testing today but was not able to  make appointment. Since her last visit she has been doing very well, not much sinus drainage. Overall asthma is better controlled on Advair.  Does not need to use albuterol during the day but uses prior to doing any exercise or walking.  Able to walk without any significant difficulties.  Using Nasocort but not using saline washes, overall thinks that nasal symptoms are well controlled.    The patient was seen and examined by Facundo Hay           Current Outpatient Medications   Medication     ACETAMINOPHEN PO     albuterol (PROAIR HFA/PROVENTIL HFA/VENTOLIN HFA) 108 (90 Base) MCG/ACT inhaler     albuterol (PROAIR HFA/PROVENTIL HFA/VENTOLIN HFA) 108 (90 Base) MCG/ACT inhaler     albuterol (PROVENTIL) (2.5 MG/3ML) 0.083% neb solution     calcium carbonate (TUMS) 500 MG chewable tablet     Calcium Citrate-Vitamin D (CALCIUM + D PO)     fluticasone (FLONASE) 50 MCG/ACT spray     fluticasone-salmeterol (AIRDUO RESPICLICK) 113-14 MCG/ACT inhaler     IBUPROFEN PO     letrozole (FEMARA) 2.5 MG tablet     letrozole (FEMARA) 2.5 MG tablet     letrozole (FEMARA) 2.5 MG tablet     levothyroxine (SYNTHROID/LEVOTHROID) 200 MCG tablet     loratadine (CLARITIN) 10 MG tablet     methylPREDNISolone (MEDROL) 16 MG tablet     mirtazapine (REMERON) 15 MG tablet     Multiple Vitamins-Minerals (MULTIVITAMIN ADULT PO)     ondansetron (ZOFRAN ODT) 8 MG ODT tab     palbociclib (IBRANCE) 125 MG capsule CHEMO     palbociclib (IBRANCE) 125 MG capsule CHEMO     palbociclib (IBRANCE) 125 MG capsule CHEMO     prochlorperazine (COMPAZINE) 10 MG tablet     sertraline (ZOLOFT) 100 MG tablet     triamcinolone (NASACORT) 55 MCG/ACT inhaler     VITAMIN D, CHOLECALCIFEROL, PO     No current facility-administered medications for this visit.      Allergies   Allergen Reactions     Contrast Dye Shortness Of Breath     Swollen eyes, coughing, headache     Penicillins      Sulfa Drugs Shortness Of Breath     No past medical history on file.    No past  "surgical history on file.    Social History     Socioeconomic History     Marital status: Single     Spouse name: Not on file     Number of children: Not on file     Years of education: Not on file     Highest education level: Not on file   Occupational History     Not on file   Social Needs     Financial resource strain: Not on file     Food insecurity:     Worry: Not on file     Inability: Not on file     Transportation needs:     Medical: Not on file     Non-medical: Not on file   Tobacco Use     Smoking status: Never Smoker     Smokeless tobacco: Never Used   Substance and Sexual Activity     Alcohol use: Not on file     Drug use: Not on file     Sexual activity: Not on file   Lifestyle     Physical activity:     Days per week: Not on file     Minutes per session: Not on file     Stress: Not on file   Relationships     Social connections:     Talks on phone: Not on file     Gets together: Not on file     Attends Restorationism service: Not on file     Active member of club or organization: Not on file     Attends meetings of clubs or organizations: Not on file     Relationship status: Not on file     Intimate partner violence:     Fear of current or ex partner: Not on file     Emotionally abused: Not on file     Physically abused: Not on file     Forced sexual activity: Not on file   Other Topics Concern     Not on file   Social History Narrative     Not on file       ROS Pulmonary  A complete ROS was otherwise negative except as noted in the HPI.  Resp 18   Ht 1.727 m (5' 8\")   Wt 133.8 kg (295 lb)   BMI 44.85 kg/m     Exam:   GENERAL APPEARANCE: Well developed, well nourished, alert, and in no apparent distress.  EYES: PERRL, EOMI  HENT: Nasal mucosa with no edema and no hyperemia. No nasal polyps.  EARS: Canals clear, TMs normal  MOUTH: Oral mucosa is moist, without any lesions, no tonsillar enlargement, no oropharyngeal exudate.  NECK: supple, no masses, no thyromegaly.  LYMPHATICS: No significant axillary, " cervical, or supraclavicular nodes.  RESP:  Good air flow throughout.  No crackles. No rhonchi. No wheezes.  CV: Normal S1, S2, regular rhythm, normal rate. No murmur.  No rub. No gallop. No LE edema.   ABDOMEN:  Bowel sounds normal, soft, nontender, no HSM or masses.   MS: extremities normal. No clubbing. No cyanosis.  SKIN: no rash on limited exam  NEURO: Mentation intact, speech normal, normal strength and tone, normal gait and stance  PSYCH: mentation appears normal. and affect normal/bright  Results:  Recent Results (from the past 168 hour(s))   Comprehensive metabolic panel    Collection Time: 04/02/19 12:58 PM   Result Value Ref Range    Sodium 139 133 - 144 mmol/L    Potassium 3.8 3.4 - 5.3 mmol/L    Chloride 107 94 - 109 mmol/L    Carbon Dioxide 27 20 - 32 mmol/L    Anion Gap 4 3 - 14 mmol/L    Glucose 115 (H) 70 - 99 mg/dL    Urea Nitrogen 16 7 - 30 mg/dL    Creatinine 0.94 0.52 - 1.04 mg/dL    GFR Estimate 71 >60 mL/min/[1.73_m2]    GFR Estimate If Black 82 >60 mL/min/[1.73_m2]    Calcium 9.4 8.5 - 10.1 mg/dL    Bilirubin Total 0.4 0.2 - 1.3 mg/dL    Albumin 3.9 3.4 - 5.0 g/dL    Protein Total 7.6 6.8 - 8.8 g/dL    Alkaline Phosphatase 69 40 - 150 U/L    ALT 27 0 - 50 U/L    AST 21 0 - 45 U/L   CBC with platelets differential    Collection Time: 04/02/19 12:58 PM   Result Value Ref Range    WBC 2.6 (L) 4.0 - 11.0 10e9/L    RBC Count 3.87 3.8 - 5.2 10e12/L    Hemoglobin 12.7 11.7 - 15.7 g/dL    Hematocrit 37.4 35.0 - 47.0 %    MCV 97 78 - 100 fl    MCH 32.8 26.5 - 33.0 pg    MCHC 34.0 31.5 - 36.5 g/dL    RDW 13.6 10.0 - 15.0 %    Platelet Count 142 (L) 150 - 450 10e9/L    Diff Method Automated Method     % Neutrophils 42.1 %    % Lymphocytes 39.2 %    % Monocytes 8.4 %    % Eosinophils 8.4 %    % Basophils 1.9 %    % Immature Granulocytes 0.0 %    Nucleated RBCs 0 0 /100    Absolute Neutrophil 1.1 (L) 1.6 - 8.3 10e9/L    Absolute Lymphocytes 1.0 0.8 - 5.3 10e9/L    Absolute Monocytes 0.2 0.0 - 1.3 10e9/L     Absolute Eosinophils 0.2 0.0 - 0.7 10e9/L    Absolute Basophils 0.1 0.0 - 0.2 10e9/L    Abs Immature Granulocytes 0.0 0 - 0.4 10e9/L    Absolute Nucleated RBC 0.0        Assessment and plan:   48 YO with onset of asthma later in life in late 20's after environmental and allergen change with move to [place].  On spirometry had mild to moderate obstruction, unclear at present if this is fixed obstruction. Fixed obstruction would be more consistent with sub-optimally controlled asthma that has been chronic in nature.  Sinus symptoms may have been triggering more asthma symptoms- these are now better controlled.   Overall her symptoms are significantly improved compared to her first visit.     1. Continue Advair 250-50 BID  2. Continue albuterol prn  3. Continue Nasocort  4. Advised to use nasal saline washes if has an increase in nasal drainage  5. To monitor reflux symptoms at present  6. I will follow-up spirometry testing from today- overall they are significantly improved     RTC in 4 months.  Patient to call if she has any questions or concerns prior to her next clinic appointment        Again, thank you for allowing me to participate in the care of your patient.      Sincerely,    Facundo Hay MD

## 2019-04-10 LAB
EXPTIME-PRE: 9.13 SEC
FEF2575-%PRED-POST: 66 %
FEF2575-%PRED-PRE: 67 %
FEF2575-POST: 2.04 L/SEC
FEF2575-PRE: 2.05 L/SEC
FEF2575-PRED: 3.06 L/SEC
FEFMAX-%PRED-PRE: 84 %
FEFMAX-PRE: 6.36 L/SEC
FEFMAX-PRED: 7.48 L/SEC
FEV1-%PRED-PRE: 78 %
FEV1-PRE: 2.52 L
FEV1FEV6-PRE: 77 %
FEV1FEV6-PRED: 82 %
FEV1FVC-PRE: 75 %
FEV1FVC-PRED: 80 %
FIFMAX-PRE: 6.24 L/SEC
FVC-%PRED-PRE: 82 %
FVC-PRE: 3.36 L
FVC-PRED: 4.06 L

## 2019-04-11 ENCOUNTER — TELEPHONE (OUTPATIENT)
Dept: PALLIATIVE CARE | Facility: CLINIC | Age: 49
End: 2019-04-11

## 2019-04-11 NOTE — TELEPHONE ENCOUNTER
Palliative Care Counseling Contact    Data: Jade cancelled therapy today due to weather.    Intervention: Called to check in, since she is in the midst of exposure treatment for PTSD and we have had to cancel twice in two weeks.    Response/Assessment: Jade told me that she is OK waiting till we meet next week and overall is doing well. She had some more intense emotions after last NET session, and took intentional self care actions as well as connecting with peers at [employer] class that day, which was very helpful. Has some new pain in chest which she plans to mention to Dr Bhatt at next visit.     Plan: Follow up next week Wednesday.    ROMULO Suazo, [facility]  Palliative Care Counseling Services  [facility]  Office Phone: [phone]  [phone] ([facility] [place] [place]) or [phone] ([place])

## 2019-04-15 NOTE — PROGRESS NOTES
"Palliative Care Clinical Social Work Return Appointment    PLEASE NOTE:  THIS IS A MENTAL HEALTH NOTE.  OTHER PROVIDERS VIEWING THIS NOTE SHOULD USE THIS ONLY FOR UNDERSTANDING THE CONTEXT OF THE PATIENT S EXPERIENCE.  TOPICS DESCRIBED IN THIS NOTE SHOULD NOT BE REFERENCED TO THE PATIENT BY MEDICAL PROVIDERS.    Jade is a 48 year old woman with metastatic breast cancer, seen today at [facility] for a return psychotherapy appointment to address PTSD, recurrent major depression, and anxiety as these relate to coping with illness and treatment.    Mental Status Exam:(List all that apply)      Appearance: Appropriate      Eye Contact: Good       Orientation: Yes, x4      Mood: engaged, reflective      Affect: Appropriate, full range, expressive      Thought Content: Clear         Thought Form: Logical      Psychomotor Behavior: Normal    Visit themes:   - Narrative Exposure Therapy exposure session #2    Adjustment to Illness: Not focus today.    Mental Health (thoughts, feelings, actions, coping, and symptoms): Life review and NET exposure work continued.      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 Processing re: relationships with mother, sister, grandmother,  providers, peers.    End of Life and Advance Care " Planning: Not focus today.     Main therapeutic interventions provided this session include:  Psychoeducation, facilitating processing of feelings and thoughts, NET exposure session #2    Plan:  Will return for psychotherapy with palliative care focus [place] [facility] [place]      Time spent with patient/family:  65 minutes (Start 3:05pm, end 4:10pm)  Longer session, Narrative Exposure Therapy    Palliative Care Counseling Treatment Plan    Client's Name: Jade Collins  YOB: 1970    Date: 1/9/2019    Initial DSM5 Diagnoses:   296.32 Major Depressive Disorder, Recurrent Episode, Moderate With anxious distress  309.81 (F43.10) Posttraumatic Stress Disorder (includes Posttraumatic Stress Dsiorder for Children 6 Years and Younger)  With dissociative symptoms      Date to review plan (90 days usually): 3/20/19    Referral / Collaboration:  .Referral to another professional/service is not indicated at this time.    Anticipated number of sessions to complete episode of care:  10         Treatment Goal(s)  Date Goal Dates  Reviewed Status   12/20/2018   Goal 1:  Client will decrease PTSD symptoms.      Continued   12/20/2018   Objective #1A (Client Action)  Client will Increase understanding of PTSD/ASD and Process trauma history in safe counseling environment using Narrative Exposure Therapy.    Intervention(s)  Therapist will Provide psychoeducation and Narrative Exposure Therapy .    Continued                   Date Goal Dates  Reviewed Status   12/20/2018   Goal 2:  Client will address illness related stressors.    Continued   12/20/2018   Objective #2A (Client Action)  Client will Communicate effectively with family/friends re needs and Communicate effectively with medical provider re needed information.    Intervention(s) (Therapist Action)  Therapist will Provide psychoeducation, Facilitate couples/family therapy session(s) and Facilitate structured problem solving.    Continued   12/20/2018   Objective  #2B  Client will Identify effective coping strategies and Use/practice relaxation strategies.    Intervention(s)  Therapist will Provide psychoeducation, Provide behavioral intervention training, Facilitate processing of thoughts and feelings and Facilitate structured problem solving.    Continued   12/20/2018     Objective #2C  Client will Complete health care directive and/or POLST form.    Intervention(s)  Therapist will Provide advance care planning education.    Continued       Date Goal Dates  Reviewed Status   1/9/2019   Goal 3:  Client will effectively manage recurrent major depressive disorder in light of increased stressors.    Continued   1/9/2019   Objective #3A (Client Action)  Client will Identify activities that provide comfort and/or pleasure, Participate in activities that provide comfort and/or pleasure and Identify an activity that would provide meaning/contribute to feeling of self worth.    Intervention(s) (Therapist Action)  Therapist will Provide psychoeducation, Facilitate processing of thoughts and feelings and Facilitate structured problem solving.    Contined   1/9/2019   Objective #3B  Client will Effectively communicate needs to others, Identify a crisis plan for suicidal ideation and Identify triggers/early signs of depressed mood.    Intervention(s)  Therapist will Provide psychoeducation, Facilitate processing of thoughts and feelings, Facilitate structured problem solving and facilitate safety planning as needed..    Continued               Client has contributed regarding goals and concerns, but has not reviewed the treatment plan. Plan to review at future session.    Lisa Holliday, ROMULO, LICSW      DO NOT SEND ANY LETTERS

## 2019-04-17 ENCOUNTER — OFFICE VISIT (OUTPATIENT)
Dept: PALLIATIVE CARE | Facility: CLINIC | Age: 49
End: 2019-04-17
Attending: SOCIAL WORKER
Payer: COMMERCIAL

## 2019-04-17 DIAGNOSIS — Z51.5 ENCOUNTER FOR PALLIATIVE CARE: ICD-10-CM

## 2019-04-17 DIAGNOSIS — F43.10 POSTTRAUMATIC STRESS DISORDER: Primary | ICD-10-CM

## 2019-04-17 PROCEDURE — 90837 PSYTX W PT 60 MINUTES: CPT | Performed by: SOCIAL WORKER

## 2019-04-17 PROCEDURE — 40000114 ZZH STATISTIC NO CHARGE CLINIC VISIT
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
## 2019-04-17 NOTE — LETTER
Date:April 18, 2019      Patient was self referred, no letter generated. Do not send.        [facility] Information

## 2019-04-25 ENCOUNTER — OFFICE VISIT (OUTPATIENT)
Dept: PALLIATIVE CARE | Facility: CLINIC | Age: 49
End: 2019-04-25
Attending: SOCIAL WORKER
Payer: COMMERCIAL

## 2019-04-25 DIAGNOSIS — Z51.5 ENCOUNTER FOR PALLIATIVE CARE: ICD-10-CM

## 2019-04-25 DIAGNOSIS — F43.10 POSTTRAUMATIC STRESS DISORDER: Primary | ICD-10-CM

## 2019-04-25 PROCEDURE — 40000114 ZZH STATISTIC NO CHARGE CLINIC VISIT

## 2019-04-25 PROCEDURE — 90837 PSYTX W PT 60 MINUTES: CPT | Performed by: SOCIAL WORKER
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
## 2019-04-29 ENCOUNTER — HOSPITAL ENCOUNTER (OUTPATIENT)
Dept: CT IMAGING | Facility: CLINIC | Age: 49
End: 2019-04-29
Attending: INTERNAL MEDICINE
Payer: COMMERCIAL

## 2019-04-29 ENCOUNTER — HOSPITAL ENCOUNTER (OUTPATIENT)
Dept: CT IMAGING | Facility: CLINIC | Age: 49
Discharge: HOME | End: 2019-04-29
Attending: INTERNAL MEDICINE | Admitting: INTERNAL MEDICINE
Payer: COMMERCIAL

## 2019-04-29 DIAGNOSIS — C50.412 MALIGNANT NEOPLASM OF UPPER-OUTER QUADRANT OF LEFT BREAST IN FEMALE, ESTROGEN RECEPTOR POSITIVE (H): ICD-10-CM

## 2019-04-29 DIAGNOSIS — Z17.0 MALIGNANT NEOPLASM OF UPPER-OUTER QUADRANT OF LEFT BREAST IN FEMALE, ESTROGEN RECEPTOR POSITIVE (H): ICD-10-CM

## 2019-04-29 PROCEDURE — 25000128 H RX IP 250 OP 636: Performed by: STUDENT IN AN ORGANIZED HEALTH CARE EDUCATION/TRAINING PROGRAM

## 2019-04-29 PROCEDURE — 70491 CT SOFT TISSUE NECK W/DYE: CPT

## 2019-04-29 PROCEDURE — 71260 CT THORAX DX C+: CPT

## 2019-04-29 PROCEDURE — 74177 CT ABD & PELVIS W/CONTRAST: CPT

## 2019-04-29 RX ORDER — IOPAMIDOL 755 MG/ML
135 INJECTION, SOLUTION INTRAVASCULAR ONCE
Status: COMPLETED | OUTPATIENT
Start: 2019-04-29 | End: 2019-04-29

## 2019-04-29 RX ADMIN — IOPAMIDOL 135 ML: 755 INJECTION, SOLUTION INTRAVENOUS at 12:34

## 2019-04-30 ENCOUNTER — ONCOLOGY VISIT (OUTPATIENT)
Dept: ONCOLOGY | Facility: CLINIC | Age: 49
End: 2019-04-30
Attending: INTERNAL MEDICINE
Payer: COMMERCIAL

## 2019-04-30 VITALS
WEIGHT: 293 LBS | DIASTOLIC BLOOD PRESSURE: 84 MMHG | TEMPERATURE: 98.1 F | SYSTOLIC BLOOD PRESSURE: 126 MMHG | RESPIRATION RATE: 16 BRPM | OXYGEN SATURATION: 97 % | HEART RATE: 88 BPM | BODY MASS INDEX: 44.41 KG/M2 | HEIGHT: 68 IN

## 2019-04-30 DIAGNOSIS — C50.412 MALIGNANT NEOPLASM OF UPPER-OUTER QUADRANT OF LEFT BREAST IN FEMALE, ESTROGEN RECEPTOR POSITIVE (H): ICD-10-CM

## 2019-04-30 DIAGNOSIS — C50.412 MALIGNANT NEOPLASM OF UPPER-OUTER QUADRANT OF LEFT BREAST IN FEMALE, ESTROGEN RECEPTOR POSITIVE (H): Primary | ICD-10-CM

## 2019-04-30 DIAGNOSIS — F41.9 ANXIETY: Primary | ICD-10-CM

## 2019-04-30 DIAGNOSIS — Z17.0 MALIGNANT NEOPLASM OF UPPER-OUTER QUADRANT OF LEFT BREAST IN FEMALE, ESTROGEN RECEPTOR POSITIVE (H): Primary | ICD-10-CM

## 2019-04-30 DIAGNOSIS — Z17.0 MALIGNANT NEOPLASM OF UPPER-OUTER QUADRANT OF LEFT BREAST IN FEMALE, ESTROGEN RECEPTOR POSITIVE (H): ICD-10-CM

## 2019-04-30 DIAGNOSIS — F32.A DEPRESSION, UNSPECIFIED DEPRESSION TYPE: ICD-10-CM

## 2019-04-30 LAB
ALBUMIN SERPL-MCNC: 4 G/DL (ref 3.4–5)
ALP SERPL-CCNC: 64 U/L (ref 40–150)
ALT SERPL W P-5'-P-CCNC: 31 U/L (ref 0–50)
ANION GAP SERPL CALCULATED.3IONS-SCNC: 8 MMOL/L (ref 3–14)
AST SERPL W P-5'-P-CCNC: 18 U/L (ref 0–45)
BASOPHILS # BLD AUTO: 0 10E9/L (ref 0–0.2)
BASOPHILS NFR BLD AUTO: 0.4 %
BILIRUB SERPL-MCNC: 0.2 MG/DL (ref 0.2–1.3)
BUN SERPL-MCNC: 15 MG/DL (ref 7–30)
CALCIUM SERPL-MCNC: 9.6 MG/DL (ref 8.5–10.1)
CANCER AG27-29 SERPL-ACNC: 21 U/ML (ref 0–39)
CEA SERPL-MCNC: 1.1 UG/L (ref 0–2.5)
CHLORIDE SERPL-SCNC: 106 MMOL/L (ref 94–109)
CO2 SERPL-SCNC: 26 MMOL/L (ref 20–32)
CREAT SERPL-MCNC: 0.95 MG/DL (ref 0.52–1.04)
DIFFERENTIAL METHOD BLD: ABNORMAL
EOSINOPHIL # BLD AUTO: 0 10E9/L (ref 0–0.7)
EOSINOPHIL NFR BLD AUTO: 0.4 %
ERYTHROCYTE [DISTWIDTH] IN BLOOD BY AUTOMATED COUNT: 13.9 % (ref 10–15)
GFR SERPL CREATININE-BSD FRML MDRD: 70 ML/MIN/{1.73_M2}
GLUCOSE SERPL-MCNC: 145 MG/DL (ref 70–99)
HCT VFR BLD AUTO: 36.7 % (ref 35–47)
HGB BLD-MCNC: 12.5 G/DL (ref 11.7–15.7)
IMM GRANULOCYTES # BLD: 0 10E9/L (ref 0–0.4)
IMM GRANULOCYTES NFR BLD: 0.4 %
LYMPHOCYTES # BLD AUTO: 1.5 10E9/L (ref 0.8–5.3)
LYMPHOCYTES NFR BLD AUTO: 32.7 %
MCH RBC QN AUTO: 33.7 PG (ref 26.5–33)
MCHC RBC AUTO-ENTMCNC: 34.1 G/DL (ref 31.5–36.5)
MCV RBC AUTO: 99 FL (ref 78–100)
MONOCYTES # BLD AUTO: 0.3 10E9/L (ref 0–1.3)
MONOCYTES NFR BLD AUTO: 5.6 %
NEUTROPHILS # BLD AUTO: 2.7 10E9/L (ref 1.6–8.3)
NEUTROPHILS NFR BLD AUTO: 60.5 %
NRBC # BLD AUTO: 0 10*3/UL
NRBC BLD AUTO-RTO: 0 /100
PLATELET # BLD AUTO: 153 10E9/L (ref 150–450)
POTASSIUM SERPL-SCNC: 3.3 MMOL/L (ref 3.4–5.3)
PROT SERPL-MCNC: 7.9 G/DL (ref 6.8–8.8)
RBC # BLD AUTO: 3.71 10E12/L (ref 3.8–5.2)
SODIUM SERPL-SCNC: 141 MMOL/L (ref 133–144)
WBC # BLD AUTO: 4.5 10E9/L (ref 4–11)

## 2019-04-30 PROCEDURE — 80053 COMPREHEN METABOLIC PANEL: CPT | Performed by: PHYSICIAN ASSISTANT

## 2019-04-30 PROCEDURE — 36415 COLL VENOUS BLD VENIPUNCTURE: CPT

## 2019-04-30 PROCEDURE — 85025 COMPLETE CBC W/AUTO DIFF WBC: CPT | Performed by: PHYSICIAN ASSISTANT

## 2019-04-30 PROCEDURE — 82378 CARCINOEMBRYONIC ANTIGEN: CPT | Performed by: INTERNAL MEDICINE

## 2019-04-30 PROCEDURE — G0463 HOSPITAL OUTPT CLINIC VISIT: HCPCS | Mod: ZF

## 2019-04-30 PROCEDURE — 99214 OFFICE O/P EST MOD 30 MIN: CPT | Mod: ZP | Performed by: INTERNAL MEDICINE

## 2019-04-30 PROCEDURE — 86300 IMMUNOASSAY TUMOR CA 15-3: CPT | Performed by: INTERNAL MEDICINE

## 2019-04-30 RX ORDER — SERTRALINE HYDROCHLORIDE 100 MG/1
100 TABLET, FILM COATED ORAL
Qty: 60 TABLET | Refills: 3 | Status: SHIPPED | OUTPATIENT
Start: 2019-04-30 | End: 2020-03-13

## 2019-04-30 ASSESSMENT — MIFFLIN-ST. JEOR: SCORE: 2042

## 2019-04-30 ASSESSMENT — PAIN SCALES - GENERAL: PAINLEVEL: NO PAIN (0)

## 2019-04-30 NOTE — LETTER
4/30/2019       RE: Jade Collins  [address]  [facility] [address]     Dear Colleague,    Thank you for referring your patient, Jade Collins, to the [facility]. Please see a copy of my visit note below.    HISTORY OF PRESENT ILLNESS:  Jade is a 48-year-old woman with metastatic breast cancer who comes to our clinic for recommendations for further treatment.  She is referred by Dr. Norberto Brand at [facility].  Jade would like to continue her care at the [facility].       Jade was diagnosed with breast cancer with a lump found in the upper-inner quadrant of the left breast.  She was diagnosed in 12/2013 in the [place] area and Dr. David Redd was her medical oncologist.  Biopsy of the tumor showed it to be ER positive, WI positive, and HER-2 negative.  We do not have any of the original pathology and we need to obtain those reports.  She underwent a lumpectomy performed by Dr. Tasha Segura who is a surgeon in the [place] area.  She also had a sentinel lymph node which was noted to be involved with tumor but there was no lymph node dissection done at that time.  [id number].  She subsequently underwent adjuvant chemotherapy with dose-dense AC for 4 cycles and Taxol for 12 weeks, completed 09/11/2014.        She then had radiation therapy post lumpectomy to the left breast, which was completed 11/23/2014 by Dr. Manley.        She was then begun on an aromatase inhibitor, the name of which she does not remember.  The aromatase inhibitor therapy was begun in 11/2014.  She was then switched within about a month or so to anastrozole and remained on anastrozole from 11/2014-02/2017.  She has never received tamoxifen.        She then moved to [place], [place] in 03/2017.  She saw a gynecologist there and underwent a IRIS/BSO by Dr. Zendejas.  She then also saw Dr. Linares in [place], [place], who is an oncologist.  His interpretation of the pathology report was that she had triple-negative  breast cancer.  It is possible that she may have had low estrogen receptor positivity, but the anastrozole was then discontinued in 02/2017.  She then underwent the IRIS/BSO in 03/2017.  This was done laparoscopically.        She then remained off of all hormonal therapy and in 09/2017 moved to [place].  She remained off hormonal therapy and did not have Medical Oncology followup initially.        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  She then went to see Dr. Norberto Brand who performed a PET/CT scan and the patient also underwent a brain MRI for staging.  The PET/CT scan performed 08/03/2018 showed in the neck there were several mildly metabolic active and large prominent left posterior cervical lymph nodes.  The largest is located posterior to the left sternocleidomastoid muscle and measures 1.5 x 1 cm in size.  This demonstrate modest FDG uptake.  The other lymph nodes are smaller and demonstrate mild FDG uptake.  The prominent prevascular and aortopulmonary lymph nodes were also seen on the contrast-enhanced scan were less well seen on the PET.  The largest lymph node visualized on the PET scan was 1.1 x 0.8 cm and demonstrated mild FDG uptake.  This was in the periaortic area just anterior to the aortic arch.  No lung nodules.  No lung infiltrates.  No pleural effusion.  There was no uptake of FDG in the bones.  In summary, there were mildly enlarged left posterior cervical lymph nodes, largest measuring 1.5 x 1.0 cm and mildly enlarged aorticopulmonary and prevascular lymph nodes that were noted on contrast-enhanced CT scan seen less well on the PET scan.  No evidence of metastatic disease in the abdomen and pelvis.  She also underwent a brain MRI that showed no evidence of intracranial metastatic disease.  She underwent a biopsy of one of the lymph nodes in the left neck which showed metastatic adenocarcinoma consistent with breast origin, which was  estrogen-receptor positive.  The tumor cells were positive for CK7, ER and MN consistent with metastatic breast carcinoma.  Estrogen receptor showed strong average nuclear intensity in 95% of carcinoma cells, progesterone receptor moderate average nuclear intensity in 70% of the carcinoma.  A GATA3 stain was apparently not performed.       Jade came to our clinic for recommendations.  She has mild pain in the left neck, moderate fatigue, moderate depression and has begun on sertraline, and she has moderate anxiety.      Nausea.   Zofran.  Persistent symptoms of depression sertraline was increased to 150 mg per day.        FOLLOWUP NOTE      Jade returns to clinic and has had significant anxiety.  Sadly, her father  recently of congestive heart failure.  She has been having continued low back pain, but has no evidence of bone metastases.  She has significant fatigue and has been drinking large amounts of caffeinated soda because she does not like coffee, and this has been a problem with her with about a 5 kg weight gain.  She also has significant depression and significant anxiety.  She feels the sertraline is not working adequately for her.  She is getting psychiatric social work input from Lisa Holliday weekly, but she still needs additional support.  She also needs a Nutrition consult.  I recommended to her that she stop soda entirely, and this has been a problem.  She does not know what type of nutritional support to have, and I have arranged for a Nutrition consult for her.      REVIEW OF SYSTEMS:  She denies fevers or chills, cough, chest pain or shortness of breath, nausea or vomiting, constipation or diarrhea.  She has some low back pain.  No headache.  The remainder of a 10 point review of systems is negative.      She has had some hot flashes, no joint stiffness on the palbociclib and letrozole, which she is tolerating quite well.  She does take mirtazapine at night, which helps prevent waking up  related to the hot flashes.  She had tremendous difficulty tolerating IV contrast, and we are going to switch her imaging to noncontrast CT and bone scan every 3 months for future imaging followup.  She has a slight soreness of her throat and may have a minor upper respiratory infection.      PHYSICAL EXAMINATION:   VITAL SIGNS:  Blood pressure 126/84, pulse 88, respirations 16, temperature 98.1, O2 sat 97%, height 1.7 m and weight 137 kg, which is up 5 kg.   GENERAL:  Jade appears reasonably well.     HEENT:  She has no alopecia.  No lesions in the oropharynx.   LYMPH:  There is no palpable cervical, supraclavicular, subclavicular or axillary lymphadenopathy.   BREASTS:  Exam was not performed today.   LUNGS:  Clear to percussion and auscultation.   HEART:  Regular rate and rhythm.   ABDOMEN:  Soft and nontender, consistent with an increased BMI.   EXTREMITIES:  Without edema.   PSYCHIATRIC:  Mood is anxious.  Affect is somewhat depressed.      LABORATORY DATA:  The CMP was remarkable for potassium of 3.3, glucose elevated at 145, and CBC was within normal limits.      IMAGING:  A CT scan of the chest, abdomen and pelvis shows s no convincing evidence of metastatic disease in the chest, abdomen or pelvis or bones.  Diffuse hepatic steatosis, stable nonenlarged mediastinal lymph nodes.  Of note, these lymph nodes were FDG avid on PET/CT scan from 09/13/2018.  The CT scan of the neck included previously described right level IIA lymph node measures 9 mm, upper limit of normal in size.  No suspicious cervical lymphadenopathy.      ASSESSMENT AND PLAN:   1.  Jade Collins is a 49-year-old woman with a history of metastatic breast cancer, ER positive and HER-2 negative.  She presented with a breast cancer of the left breast, initially unknown pathology, [id number] by her report.  The pathology slides have not been obtainable from [place].  She did have an ER-positive, HER-2-negative breast cancer by report.  She was treated in  terms of primary care therapy with a left breast lumpectomy, adjuvant chemotherapy with AC followed by Taxol followed by radiation followed by raloxifene for 2 years followed by an aromatase inhibitor for 6 months.  She then had a IRIS/BSO.  The aromatase inhibitor was discontinued in [place], [place], where she was living at the time.  She therefore had an aromatase inhibitor for only 6 months.  Anastrozole was discontinued 01/2017.  She was off all hormonal therapy for 18 months.  She came to see us in the [facility] with a new diagnosis of recurrent metastatic, ER-positive, HER-2-negative breast cancer.   2.  She began palbociclib and letrozole 09/21/2018.  She had followup imaging, which showed response to therapy and resolution of her left neck lymphadenopathy.  She also has had stability of her abdominal retroperitoneal lymphadenopathy.  She has no evidence of bony metastatic disease.  The plan is to continue with the current dosing of palbociclib 125 mg a day for 21 days on, 7 days off, in combination with letrozole.  She has tolerated this combination quite well.  Her next imaging will be in 3 months with a CT scan of the neck, chest, abdomen and pelvis and a bone scan.  This is in lieu of a PET/CT.  Jade is in agreement with this plan.   3.  Father passed away in the last month.  She has severe anxiety and depression and weight gain and heavy consumption of caffeinated soda and sugar.  She is willing to stop the caffeinated soda and sugar and try caffeine gum instead.  She does not want to drink coffee.  We referred her to Dr. Kris Riley.  She continues with psychiatric social worker support with Lisa Holliday.   4.  History of asthma.  Lungs are clear today.  She has been referred to Dr. Facundo Hay.   5.  Dietary counseling has been requested.   6.  Depression and anxiety.  I have increased the sertraline to 200 mg daily.   7.  Followup.  We will see Jade in followup with Evelina in 1 month. Referral to  Dr. Riley.  Referral to nutrition.  Referral to Dr. Dennis. Follow up with Evelina May 28, June 25 and with me July 23 with CBC, CMP, CA27.29 and CEA with follow up visits. CT CAP without contrast and bone scan on July 22.     Thank you for allowing us to continue to participate in Jade Collins's care.      Wilfredo Bhatt MD       [facility]           I spent 35 minutes with the patient more than 50% of which was in counseling and coordination of care.             Again, thank you for allowing me to participate in the care of your patient.      Sincerely,    Wilfredo Bhatt MD

## 2019-04-30 NOTE — PROGRESS NOTES
HISTORY OF PRESENT ILLNESS:  Jade is a 48-year-old woman with metastatic breast cancer who comes to our clinic for recommendations for further treatment.  She is referred by Dr. Norberto Brand at [facility].  Jade would like to continue her care at the [facility].       Jade was diagnosed with breast cancer with a lump found in the upper-inner quadrant of the left breast.  She was diagnosed in 12/2013 in the [place] area and Dr. David Redd was her medical oncologist.  Biopsy of the tumor showed it to be ER positive, CA positive, and HER-2 negative.  We do not have any of the original pathology and we need to obtain those reports.  She underwent a lumpectomy performed by Dr. Tasha Segura who is a surgeon in the [place] area.  She also had a sentinel lymph node which was noted to be involved with tumor but there was no lymph node dissection done at that time.  [id number].  She subsequently underwent adjuvant chemotherapy with dose-dense AC for 4 cycles and Taxol for 12 weeks, completed 09/11/2014.        She then had radiation therapy post lumpectomy to the left breast, which was completed 11/23/2014 by Dr. Manley.        She was then begun on an aromatase inhibitor, the name of which she does not remember.  The aromatase inhibitor therapy was begun in 11/2014.  She was then switched within about a month or so to anastrozole and remained on anastrozole from 11/2014-02/2017.  She has never received tamoxifen.        She then moved to [place], [place] in 03/2017.  She saw a gynecologist there and underwent a IRIS/BSO by Dr. Zendejas.  She then also saw Dr. Linares in [place], [place], who is an oncologist.  His interpretation of the pathology report was that she had triple-negative breast cancer.  It is possible that she may have had low estrogen receptor positivity, but the anastrozole was then discontinued in 02/2017.  She then underwent the IRIS/BSO in 03/2017.  This was done laparoscopically.        She then  remained off of all hormonal therapy and in 09/2017 moved to [place].  She remained off hormonal therapy and did not have Medical Oncology followup initially.        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  She then went to see Dr. Norberto Brand who performed a PET/CT scan and the patient also underwent a brain MRI for staging.  The PET/CT scan performed 08/03/2018 showed in the neck there were several mildly metabolic active and large prominent left posterior cervical lymph nodes.  The largest is located posterior to the left sternocleidomastoid muscle and measures 1.5 x 1 cm in size.  This demonstrate modest FDG uptake.  The other lymph nodes are smaller and demonstrate mild FDG uptake.  The prominent prevascular and aortopulmonary lymph nodes were also seen on the contrast-enhanced scan were less well seen on the PET.  The largest lymph node visualized on the PET scan was 1.1 x 0.8 cm and demonstrated mild FDG uptake.  This was in the periaortic area just anterior to the aortic arch.  No lung nodules.  No lung infiltrates.  No pleural effusion.  There was no uptake of FDG in the bones.  In summary, there were mildly enlarged left posterior cervical lymph nodes, largest measuring 1.5 x 1.0 cm and mildly enlarged aorticopulmonary and prevascular lymph nodes that were noted on contrast-enhanced CT scan seen less well on the PET scan.  No evidence of metastatic disease in the abdomen and pelvis.  She also underwent a brain MRI that showed no evidence of intracranial metastatic disease.  She underwent a biopsy of one of the lymph nodes in the left neck which showed metastatic adenocarcinoma consistent with breast origin, which was estrogen-receptor positive.  The tumor cells were positive for CK7, ER and MS consistent with metastatic breast carcinoma.  Estrogen receptor showed strong average nuclear intensity in 95% of carcinoma cells, progesterone receptor moderate average  nuclear intensity in 70% of the carcinoma.  A GATA3 stain was apparently not performed.       Jade came to our clinic for recommendations.  She has mild pain in the left neck, moderate fatigue, moderate depression and has begun on sertraline, and she has moderate anxiety.      Nausea.   Zofran.  Persistent symptoms of depression sertraline was increased to 150 mg per day.        FOLLOWUP NOTE      Jade returns to clinic and has had significant anxiety.  Sadly, her father  recently of congestive heart failure.  She has been having continued low back pain, but has no evidence of bone metastases.  She has significant fatigue and has been drinking large amounts of caffeinated soda because she does not like coffee, and this has been a problem with her with about a 5 kg weight gain.  She also has significant depression and significant anxiety.  She feels the sertraline is not working adequately for her.  She is getting psychiatric social work input from Lisa Holliday weekly, but she still needs additional support.  She also needs a Nutrition consult.  I recommended to her that she stop soda entirely, and this has been a problem.  She does not know what type of nutritional support to have, and I have arranged for a Nutrition consult for her.      REVIEW OF SYSTEMS:  She denies fevers or chills, cough, chest pain or shortness of breath, nausea or vomiting, constipation or diarrhea.  She has some low back pain.  No headache.  The remainder of a 10 point review of systems is negative.      She has had some hot flashes, no joint stiffness on the palbociclib and letrozole, which she is tolerating quite well.  She does take mirtazapine at night, which helps prevent waking up related to the hot flashes.  She had tremendous difficulty tolerating IV contrast, and we are going to switch her imaging to noncontrast CT and bone scan every 3 months for future imaging followup.  She has a slight soreness of her throat and may have a  minor upper respiratory infection.      PHYSICAL EXAMINATION:   VITAL SIGNS:  Blood pressure 126/84, pulse 88, respirations 16, temperature 98.1, O2 sat 97%, height 1.7 m and weight 137 kg, which is up 5 kg.   GENERAL:  Jade appears reasonably well.     HEENT:  She has no alopecia.  No lesions in the oropharynx.   LYMPH:  There is no palpable cervical, supraclavicular, subclavicular or axillary lymphadenopathy.   BREASTS:  Exam was not performed today.   LUNGS:  Clear to percussion and auscultation.   HEART:  Regular rate and rhythm.   ABDOMEN:  Soft and nontender, consistent with an increased BMI.   EXTREMITIES:  Without edema.   PSYCHIATRIC:  Mood is anxious.  Affect is somewhat depressed.      LABORATORY DATA:  The CMP was remarkable for potassium of 3.3, glucose elevated at 145, and CBC was within normal limits.      IMAGING:  A CT scan of the chest, abdomen and pelvis shows s no convincing evidence of metastatic disease in the chest, abdomen or pelvis or bones.  Diffuse hepatic steatosis, stable nonenlarged mediastinal lymph nodes.  Of note, these lymph nodes were FDG avid on PET/CT scan from 09/13/2018.  The CT scan of the neck included previously described right level IIA lymph node measures 9 mm, upper limit of normal in size.  No suspicious cervical lymphadenopathy.      ASSESSMENT AND PLAN:   1.  Jade Collins is a 49-year-old woman with a history of metastatic breast cancer, ER positive and HER-2 negative.  She presented with a breast cancer of the left breast, initially unknown pathology, TXN1M0 by her report.  The pathology slides have not been obtainable from [place].  She did have an ER-positive, HER-2-negative breast cancer by report.  She was treated in terms of primary care therapy with a left breast lumpectomy, adjuvant chemotherapy with AC followed by Taxol followed by radiation followed by raloxifene for 2 years followed by an aromatase inhibitor for 6 months.  She then had a IRIS/BSO.  The aromatase  inhibitor was discontinued in [place], [place], where she was living at the time.  She therefore had an aromatase inhibitor for only 6 months.  Anastrozole was discontinued 01/2017.  She was off all hormonal therapy for 18 months.  She came to see us in the [facility] with a new diagnosis of recurrent metastatic, ER-positive, HER-2-negative breast cancer.   2.  She began palbociclib and letrozole 09/21/2018.  She had followup imaging, which showed response to therapy and resolution of her left neck lymphadenopathy.  She also has had stability of her abdominal retroperitoneal lymphadenopathy.  She has no evidence of bony metastatic disease.  The plan is to continue with the current dosing of palbociclib 125 mg a day for 21 days on, 7 days off, in combination with letrozole.  She has tolerated this combination quite well.  Her next imaging will be in 3 months with a CT scan of the neck, chest, abdomen and pelvis and a bone scan.  This is in lieu of a PET/CT.  Jade is in agreement with this plan.   3.  Father passed away in the last month.  She has severe anxiety and depression and weight gain and heavy consumption of caffeinated soda and sugar.  She is willing to stop the caffeinated soda and sugar and try caffeine gum instead.  She does not want to drink coffee.  We referred her to Dr. Kris Riley.  She continues with psychiatric social worker support with Lisa Holliday.   4.  History of asthma.  Lungs are clear today.  She has been referred to Dr. Facundo Hay.   5.  Dietary counseling has been requested.   6.  Depression and anxiety.  I have increased the sertraline to 200 mg daily.   7.  Followup.  We will see Jade in followup with Evelina in 1 month. Referral to Dr. Riley.  Referral to nutrition.  Referral to Dr. Dennis. Follow up with Evelina May 28, June 25 and with me July 23 with CBC, CMP, CA27.29 and CEA with follow up visits. CT CAP without contrast and bone scan on July 22.     Thank you for allowing us  to continue to participate in Jade Collins's care.      Wilfredo Bhatt MD       [facility]           I spent 35 minutes with the patient more than 50% of which was in counseling and coordination of care.

## 2019-04-30 NOTE — NURSING NOTE
Chief Complaint   Patient presents with     Oncology Clinic Visit     RETURN VISIT; BREAST CA FOLLOW UP      Blood Draw     Labs drawn via VPT by RN in lab. VS taken. Pt checked in for next appt     Labs collected from venipuncture by RN. Vitals taken. Checked in for appointment(s).    Caren LOZOYA RN PHN BSN  BMT/Oncology Lab

## 2019-04-30 NOTE — NURSING NOTE
"Oncology Rooming Note    April 30, 2019 1:52 PM   Jade Collins is a 49 year old female who presents for:    Chief Complaint   Patient presents with     Oncology Clinic Visit     RETURN VISIT; BREAST CA FOLLOW UP      Blood Draw     Labs drawn via VPT by RN in lab. VS taken. Pt checked in for next appt     Initial Vitals: /84 (BP Location: Right arm, Patient Position: Sitting, Cuff Size: Adult Large)   Pulse 88   Temp 98.1  F (36.7  C) (Oral)   Resp 16   Ht 1.727 m (5' 8\")   Wt 136.9 kg (301 lb 11.2 oz)   SpO2 97%   BMI 45.87 kg/m   Estimated body mass index is 45.87 kg/m  as calculated from the following:    Height as of this encounter: 1.727 m (5' 8\").    Weight as of this encounter: 136.9 kg (301 lb 11.2 oz). Body surface area is 2.56 meters squared.  No Pain (0) Comment: Data Unavailable   No LMP recorded. Patient is postmenopausal.  Allergies reviewed: Yes  Medications reviewed: Yes    Medications: MEDICATION REFILLS NEEDED TODAY. Provider was notified.  Patient needs a refill on iBrance 125 mg tablet and Femara 2.5 mg tablet.     Pharmacy name entered into [facility]:    [id number] [facility] [address] [facility] [address] [facility] [address] [facility] - [place], [place] - [address]    Clinical concerns: Patient lost her father last week. Patient would like to discuss a antianxiety medication today because she is feeling more anxious lately. Dr. Bhatt was notified.      Ashwini Hernandez              "

## 2019-05-01 ENCOUNTER — TELEPHONE (OUTPATIENT)
Dept: ONCOLOGY | Facility: CLINIC | Age: 49
End: 2019-05-01

## 2019-05-01 NOTE — ORAL ONC MGMT
Oral Chemotherapy Monitoring Program    Primary Oncologist: Dr. Bhatt  Primary Oncology Clinic: [facility]   Cancer Diagnosis: Breast Cancer     Drug: Ibrance 125mg once daily x 21 days on and 7 days off   Therapy History:  C1D1 9/21/2018  Next cycle: 5/3/2019     Drug Interaction Assessment: No new drug-drug interactions.    Lab Monitoring Plan  CBC/CMP monthly   Subjective/Objective:  Jade Collins is a 49 year old female contacted by phone for a follow-up visit for oral chemotherapy. She reports she continues to do well on Ibrance. She verbally confirmed correct dose and cycle start dates. She takes at night since she has night sweats. Denies nausea, vomiting, constipation, diarrhea, fevers, chills, or other side effects. No medication changes or missed doses. No concerns or questions today.     ORAL CHEMOTHERAPY 9/18/2018 9/25/2018 10/16/2018 1/8/2019 1/11/2019 5/1/2019   Drug Name Ibrance (Palbociclib) Ibrance (Palbociclib) Ibrance (Palbociclib) Ibrance (Palbociclib) Ibrance (Palbociclib) Ibrance (Palbociclib)   Current Dosage 125mg 125mg 125mg 125mg 125mg 125mg   Current Schedule Daily Daily Daily Daily Daily Daily   Cycle Details 3 weeks on 1 week off 3 weeks on 1 week off 3 weeks on 1 week off 3 weeks on 1 week off 3 weeks on 1 week off 3 weeks on 1 week off   Start Date of Last Cycle - 9/21/2018 9/21/2018 12/14/2018 - 4/5/2019   Planned next cycle start date - - 10/19/2018 1/11/2019 1/11/2019 5/3/2019   Doses missed in last 2 weeks - - 0 0 - 0   Adherence Assessment - Adherent Adherent Adherent - Adherent   Adverse Effects - Arthralgias Nausea;Diarrhea Diarrhea Diarrhea;Fatigue Diarrhea;Fatigue   Nausea - - Grade 2 - - -   Pharmacist Intervention(nausea) - - Yes - - -   Intervention(s) - - Rx medication recommendation - - -   Diarrhea - - Grade 2 Grade 2 Grade 1 Resolved due to intervention   Pharmacist Intervention(diarrhea) - - No No Yes -   Intervention(s) - - - - Patient education -   Fatigue -  "- - - Grade 1 Resolved due to intervention   Pharmacist Intervention(fatigue) - - - - Yes -   Arthralgias - Grade 1 - - - -   Pharmacist Intervention(arthralgias) - Yes - - - -   Intervention(s) - OTC recommendation - - - -   Home BPs - - - - - not needed   Any new drug interactions? No No - - - No   Is the dose as ordered appropriate for the patient? Yes Yes Yes - - Yes   Is the patient currently in pain? - Yes - - - No   Does the patient feel the pain is currently being managed by a provider? - Yes - - - -   Has the patient been assessed within the past 6 months for depression? - No - - - -   Has the patient missed any days of school, work, or other routine activity? - - - - - No       Last PHQ-2 Score on record:   PHQ-2 ( 1999 Pf[employer]) 9/18/2018   Q1: Little interest or pleasure in doing things 1   Q2: Feeling down, depressed or hopeless 1   PHQ-2 Score 2       Patient does not report depression symptoms.      Vitals:  BP:   BP Readings from Last 1 Encounters:   04/30/19 126/84     Wt Readings from Last 1 Encounters:   04/30/19 136.9 kg (301 lb 11.2 oz)     Estimated body surface area is 2.56 meters squared as calculated from the following:    Height as of 4/30/19: 1.727 m (5' 8\").    Weight as of 4/30/19: 136.9 kg (301 lb 11.2 oz).    Labs:  _  Result Component Current Result Ref Range   Sodium 141 (4/30/2019) 133 - 144 mmol/L     _  Result Component Current Result Ref Range   Potassium 3.3 (L) (4/30/2019) 3.4 - 5.3 mmol/L     _  Result Component Current Result Ref Range   Calcium 9.6 (4/30/2019) 8.5 - 10.1 mg/dL     No results found for Mag within last 30 days.     No results found for Phos within last 30 days.     _  Result Component Current Result Ref Range   Albumin 4.0 (4/30/2019) 3.4 - 5.0 g/dL     _  Result Component Current Result Ref Range   Urea Nitrogen 15 (4/30/2019) 7 - 30 mg/dL     _  Result Component Current Result Ref Range   Creatinine 0.95 (4/30/2019) 0.52 - 1.04 mg/dL       _  Result Component " Current Result Ref Range   AST 18 (4/30/2019) 0 - 45 U/L     _  Result Component Current Result Ref Range   ALT 31 (4/30/2019) 0 - 50 U/L     _  Result Component Current Result Ref Range   Bilirubin Total 0.2 (4/30/2019) 0.2 - 1.3 mg/dL       _  Result Component Current Result Ref Range   WBC 4.5 (4/30/2019) 4.0 - 11.0 10e9/L     _  Result Component Current Result Ref Range   Hemoglobin 12.5 (4/30/2019) 11.7 - 15.7 g/dL     _  Result Component Current Result Ref Range   Platelet Count 153 (4/30/2019) 150 - 450 10e9/L     _  Result Component Current Result Ref Range   Absolute Neutrophil 2.7 (4/30/2019) 1.6 - 8.3 10e9/L       Assessment:  Jade is tolerating Ibrance well with no new or worse side effects.     PDC=1, Denies missed doses and no concerns with adherence.     Plan:  Continue Ibrance 125mg daily x 21 days on, 7 days off. Cycle starts 5/3/2019.     Follow-Up:  5/28: Labs  5/29: [facility] appointment     Refill Due:  [facility] will deliver Ibrance and letrozole to patient home 5/2.       Thank you for the opportunity to participate in the care of the above patient,  Jose Swain, PharmD  Hematology/Oncology Clinical Pharmacist  [facility]  [facility]  [phone]

## 2019-05-02 ENCOUNTER — OFFICE VISIT (OUTPATIENT)
Dept: PALLIATIVE CARE | Facility: CLINIC | Age: 49
End: 2019-05-02
Attending: SOCIAL WORKER
Payer: COMMERCIAL

## 2019-05-02 DIAGNOSIS — Z51.5 ENCOUNTER FOR PALLIATIVE CARE: ICD-10-CM

## 2019-05-02 DIAGNOSIS — F43.10 POSTTRAUMATIC STRESS DISORDER: Primary | ICD-10-CM

## 2019-05-02 PROCEDURE — 40000114 ZZH STATISTIC NO CHARGE CLINIC VISIT

## 2019-05-02 PROCEDURE — 90837 PSYTX W PT 60 MINUTES: CPT | Performed by: SOCIAL WORKER

## 2019-05-06 ENCOUNTER — ONCOLOGY VISIT (OUTPATIENT)
Dept: ONCOLOGY | Facility: CLINIC | Age: 49
End: 2019-05-06
Attending: DIETITIAN, REGISTERED
Payer: COMMERCIAL

## 2019-05-06 DIAGNOSIS — C50.412 MALIGNANT NEOPLASM OF UPPER-OUTER QUADRANT OF LEFT BREAST IN FEMALE, ESTROGEN RECEPTOR POSITIVE (H): Primary | ICD-10-CM

## 2019-05-06 DIAGNOSIS — Z17.0 MALIGNANT NEOPLASM OF UPPER-OUTER QUADRANT OF LEFT BREAST IN FEMALE, ESTROGEN RECEPTOR POSITIVE (H): Primary | ICD-10-CM

## 2019-05-06 DIAGNOSIS — E66.01 MORBID OBESITY (H): ICD-10-CM

## 2019-05-06 PROCEDURE — 97802 MEDICAL NUTRITION INDIV IN: CPT | Mod: ZF | Performed by: DIETITIAN, REGISTERED

## 2019-05-06 NOTE — PROGRESS NOTES
"CLINICAL NUTRITION SERVICES - ASSESSMENT NOTE    Jade Collins 49 year old referred for MNT related to breast cancer, obesity    Time Spent: 45 minutes  Visit Type: initial  Referring Physician: Nova Bradley accompanied by: self    NUTRITION HISTORY  Factors affecting nutrition intake include: disordered eating  Current diet: general  Current appetite/intake: good  Chemotherapy: Ibrance   Radiation: h/o radiation after lumpectomy in 2014       Jade presents today with desire to lose weight.    She admits to increased emotional eating and binging.    She typically only binge eats on sugary foods.   She also drinks >48 oz regular soda per day while she is working as a .    She completely acknowledges her disordered eating patterns and emotional eating.    She gets meals from [employer] - 5 /week.   She reports that these meals are her only structured eating.    She does not like to eat vegetables.     Diet Recall  Breakfast Skips or rice krispies with sugar and milk, banana OR yogurt and granola   Lunch [facility] meals OR skips and drinks her soda   Dinner Fast foods (Fried chicken or fish sandwich), fries, soda   Snacks 2-4 ice cream sandwiches, 2 cups ice cream with toppings or cookies    Beverages >48 oz regular soda, >64 oz water   Alcohol No   Dining out Almost daily     ANTHROPOMETRICS  Height: 70\"  Weight: 301 lbs/136kg  BMI: 45  Weight Status:  Obesity Grade III BMI >40  IBW: 150 lbs  Weight History: noted 12 lb wt gain x past 1 month  Wt Readings from Last 7 Encounters:   04/30/19 136.9 kg (301 lb 11.2 oz)   04/09/19 133.8 kg (295 lb)   04/02/19 133.9 kg (295 lb 3.2 oz)   03/06/19 131.1 kg (289 lb)   02/19/19 129.5 kg (285 lb 8 oz)   02/05/19 127.9 kg (282 lb)   01/22/19 128.1 kg (282 lb 4.8 oz)     Dosing Weight: 187 lbs/85kg      Medications/vitamins/minerals/herbals:   Reviewed    Labs:  Labs reviewed    ASSESSED NUTRITION NEEDS:  Estimated Energy Needs: 1700 kcals (20 Kcal/Kg)  Justification: " "obese  Estimated Protein Needs: 85 grams protein (1 g pro/Kg)  Justification: maintenance  Estimated Fluid Needs: 2000  mL   Justification: maintenance    NUTRITION DIAGNOSIS  Obesity related to self-monitoring deficit, disordered eating patterns ane excessive energy intake AEB diet recall and BMI >30      INTERVENTIONS  Provided written & verbal education:   -Discussed dietary and behavioral modifications to promote weight loss (portion control, meal consistency, measuring foods, 9\"portion plate method, fiber sources, protein sources, eating pace, exercise and avoidance of disordered eating patterns.   Encouraged pt to start tracking her intake on jose, aiming for <1600kcal/day.  Encouraged increased fiber in diet to help increase fullness and reduce cravings.  Advised pt to avoid skipping meals. Provided ideas for healthy, high fiber snacks to have on hand in car while driving.  Provided healthy, low calorie smoothie recipes to have as alterative to soda while driving.   -Reviewed alternative activities to eating in the evening.  -Reviewed programs available for disordered eating such as [facility] and [facility].       Provided pt with corresponding education materials/handouts on:  1600kcal meal plan, List of quick meal ideas and resources for healthful living websites   and Protein Sources.      Goals  1.  Aim 1600kcal /day   2.  Start tracking daily intake on MFP or jose/website of choice  3. Weight loss (0.5-1.0 lb /week desirable)      Follow-Up Plans: Pt has RD contact information for questions.      MONITORING AND EVALUATION:  -Food intake  -Weight trends    Lida Vo, CARLOSN, LDN      "

## 2019-05-06 NOTE — LETTER
"5/6/2019       RE: Jade Collins  [address]  [place] [address]     Dear Colleague,    Thank you for referring your patient, Jade Collins, to the [facility]. Please see a copy of my visit note below.    CLINICAL NUTRITION SERVICES - ASSESSMENT NOTE    Jade Collins 49 year old referred for MNT related to breast cancer, obesity    Time Spent: 45 minutes  Visit Type:  initial  Referring Physician:  Nova  Pt accompanied by:  self    NUTRITION HISTORY  Factors affecting nutrition intake include: disordered eating  Current diet: general  Current appetite/intake: good  Chemotherapy: Ibrance   Radiation: h/o radiation after lumpectomy in 2014       Jade presents today with desire to lose weight.    She admits to increased emotional eating and binging.    She typically only binge eats on sugary foods.   She also drinks >48 oz regular soda per day while she is working as a .    She completely acknowledges her disordered eating patterns and emotional eating.    She gets meals from [facility] - 5 /week.   She reports that these meals are her only structured eating.    She does not like to eat vegetables.     Diet Recall  Breakfast Skips or rice krispies with sugar and milk, banana OR yogurt and granola   Lunch [facility] meals OR skips and drinks her soda   Dinner Fast foods (Fried chicken or fish sandwich), fries, soda   Snacks 2-4 ice cream sandwiches, 2 cups ice cream with toppings or cookies    Beverages >48 oz regular soda, >64 oz water   Alcohol No   Dining out Almost daily     ANTHROPOMETRICS  Height: 70\"  Weight: 301 lbs/136kg  BMI: 45  Weight Status:  Obesity Grade III BMI >40  IBW: 150 lbs  Weight History: noted 12 lb wt gain x past 1 month  Wt Readings from Last 7 Encounters:   04/30/19 136.9 kg (301 lb 11.2 oz)   04/09/19 133.8 kg (295 lb)   04/02/19 133.9 kg (295 lb 3.2 oz)   03/06/19 131.1 kg (289 lb)   02/19/19 129.5 kg (285 lb 8 oz)   02/05/19 127.9 kg (282 lb)   01/22/19 " "128.1 kg (282 lb 4.8 oz)     Dosing Weight: 187 lbs/85kg      Medications/vitamins/minerals/herbals:   Reviewed    Labs:  Labs reviewed    ASSESSED NUTRITION NEEDS:  Estimated Energy Needs: 1700 kcals (20 Kcal/Kg)  Justification: obese  Estimated Protein Needs: 85 grams protein (1 g pro/Kg)  Justification: maintenance  Estimated Fluid Needs: 2000  mL   Justification: maintenance    NUTRITION DIAGNOSIS  Obesity related to self-monitoring deficit, disordered eating patterns ane excessive energy intake AEB diet recall and BMI >30      INTERVENTIONS  Provided written & verbal education:   -Discussed dietary and behavioral modifications to promote weight loss (portion control, meal consistency, measuring foods, 9\"portion plate method, fiber sources, protein sources, eating pace, exercise and avoidance of disordered eating patterns.   Encouraged pt to start tracking her intake on jose, aiming for <1600kcal/day.  Encouraged increased fiber in diet to help increase fullness and reduce cravings.  Advised pt to avoid skipping meals. Provided ideas for healthy, high fiber snacks to have on hand in car while driving.  Provided healthy, low calorie smoothie recipes to have as alterative to soda while driving.   -Reviewed alternative activities to eating in the evening.  -Reviewed programs available for disordered eating such as [facility] and [facility].       Provided pt with corresponding education materials/handouts on:  1600kcal meal plan, List of quick meal ideas and resources for healthful living websites   and Protein Sources.      Goals  1.  Aim 1600kcal /day   2.  Start tracking daily intake on MFP or jose/website of choice  3. Weight loss (0.5-1.0 lb /week desirable)      Follow-Up Plans: Pt has RD contact information for questions.      MONITORING AND EVALUATION:  -Food intake  -Weight trends    Lida Vo, CARLOSN, LDN        "
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
## 2019-05-16 ENCOUNTER — OFFICE VISIT (OUTPATIENT)
Dept: PALLIATIVE CARE | Facility: CLINIC | Age: 49
End: 2019-05-16
Attending: SOCIAL WORKER
Payer: COMMERCIAL

## 2019-05-16 DIAGNOSIS — Z51.5 ENCOUNTER FOR PALLIATIVE CARE: ICD-10-CM

## 2019-05-16 DIAGNOSIS — F33.1 MODERATE EPISODE OF RECURRENT MAJOR DEPRESSIVE DISORDER (H): ICD-10-CM

## 2019-05-16 DIAGNOSIS — F43.10 POSTTRAUMATIC STRESS DISORDER: Primary | ICD-10-CM

## 2019-05-16 PROCEDURE — 90837 PSYTX W PT 60 MINUTES: CPT | Performed by: SOCIAL WORKER

## 2019-05-16 NOTE — LETTER
"5/16/2019       RE: Jade Collins  [address]  [facility] [address]     Dear Colleague,    Thank you for referring your patient, Jade Collins, to the [facility] at [facility]. Please see a copy of my visit note below.    Palliative Care Clinical Social Work Return Appointment    PLEASE NOTE:  THIS IS A MENTAL HEALTH NOTE.  OTHER PROVIDERS VIEWING THIS NOTE SHOULD USE THIS ONLY FOR UNDERSTANDING THE CONTEXT OF THE PATIENT S EXPERIENCE.  TOPICS DESCRIBED IN THIS NOTE SHOULD NOT BE REFERENCED TO THE PATIENT BY MEDICAL PROVIDERS.    Jade is a 49 year old woman with metastatic breast cancer, seen today at [facility] for a return psychotherapy appointment to address PTSD, recurrent major depression, and anxiety as these relate to coping with illness and treatment.    Mental Status Exam:(List all that apply)      Appearance: Appropriate      Eye Contact: Good       Orientation: Yes, x4      Mood: grieving, expressive      Affect: Appropriate, full range, expressive      Thought Content: Clear         Thought Form: Logical      Psychomotor Behavior: Normal    Adjustment to Illness: Not focus today.    Mental Health (thoughts, feelings, actions, coping, and symptoms): Jade describes feeling more relief and comfort recently, being able to balance remembering the ways her dad did attempt to show her love and consideration, as well as her grief and disappointments connected with their relationship especially his absence in her childhood.     Today we complete NET session with focus on her late 20s.      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    Relationships: Addressed as part of NET today.    End of Life and Advance Care Planning: Not focus today.     Main therapeutic interventions provided this session include:  Psychoeducation, facilitating processing of feelings and thoughts, structured problem solving, grief counseling, and Narrative Exposure Therapy - exposure session    Plan:  Will return for psychotherapy with palliative care focus in 1 - 2 weeks.      Time spent with patient/family:  60 minutes (Start 2:05pm, end 3:05pm)  Narrative Exposure Therapy, longer session      Palliative Care Counseling Treatment Plan    Client's Name: Jade Collins  YOB: 1970    Date: 1/9/2019    Initial DSM5 Diagnoses:   296.32 Major Depressive Disorder, Recurrent Episode, Moderate With anxious distress  309.81 (F43.10) Posttraumatic Stress Disorder (includes Posttraumatic Stress Dsiorder for Children 6 Years and Younger)  With dissociative symptoms      Date to review plan (90 days usually): 3/20/19    Referral / Collaboration:  .Referral to another professional/service is not indicated at this time.    Anticipated number of sessions to complete episode of care:  10         Treatment Goal(s)  Date Goal Dates  Reviewed Status   12/20/2018   Goal 1:  Client will decrease PTSD symptoms.      Continued   12/20/2018   Objective #1A (Client Action)  Client will Increase understanding of PTSD/ASD and Process trauma history in safe counseling environment using Narrative Exposure Therapy.    Intervention(s)  Therapist will Provide psychoeducation and " Narrative Exposure Therapy .    Continued                   Date Goal Dates  Reviewed Status   12/20/2018   Goal 2:  Client will address illness related stressors.    Continued   12/20/2018   Objective #2A (Client Action)  Client will Communicate effectively with family/friends re needs and Communicate effectively with medical provider re needed information.    Intervention(s) (Therapist Action)  Therapist will Provide psychoeducation, Facilitate couples/family therapy session(s) and Facilitate structured problem solving.    Continued   12/20/2018   Objective #2B  Client will Identify effective coping strategies and Use/practice relaxation strategies.    Intervention(s)  Therapist will Provide psychoeducation, Provide behavioral intervention training, Facilitate processing of thoughts and feelings and Facilitate structured problem solving.    Continued   12/20/2018     Objective #2C  Client will Complete health care directive and/or POLST form.    Intervention(s)  Therapist will Provide advance care planning education.    Continued       Date Goal Dates  Reviewed Status   1/9/2019   Goal 3:  Client will effectively manage recurrent major depressive disorder in light of increased stressors.    Continued   1/9/2019   Objective #3A (Client Action)  Client will Identify activities that provide comfort and/or pleasure, Participate in activities that provide comfort and/or pleasure and Identify an activity that would provide meaning/contribute to feeling of self worth.    Intervention(s) (Therapist Action)  Therapist will Provide psychoeducation, Facilitate processing of thoughts and feelings and Facilitate structured problem solving.    Contined   1/9/2019   Objective #3B  Client will Effectively communicate needs to others, Identify a crisis plan for suicidal ideation and Identify triggers/early signs of depressed mood.    Intervention(s)  Therapist will Provide psychoeducation, Facilitate processing of thoughts and  feelings, Facilitate structured problem solving and facilitate safety planning as needed..    Continued               Client has contributed regarding goals and concerns, but has not reviewed the treatment plan. Plan to review at future session.    ROMULO Ulloa, LICSW      DO NOT SEND ANY LETTERS    Again, thank you for allowing me to participate in the care of your patient.      Sincerely,    CED Cottrell

## 2019-05-22 ENCOUNTER — OFFICE VISIT (OUTPATIENT)
Dept: PALLIATIVE CARE | Facility: CLINIC | Age: 49
End: 2019-05-22
Attending: SOCIAL WORKER
Payer: COMMERCIAL

## 2019-05-22 DIAGNOSIS — Z51.5 ENCOUNTER FOR PALLIATIVE CARE: ICD-10-CM

## 2019-05-22 DIAGNOSIS — F43.10 POSTTRAUMATIC STRESS DISORDER: Primary | ICD-10-CM

## 2019-05-22 PROCEDURE — 40000114 ZZH STATISTIC NO CHARGE CLINIC VISIT

## 2019-05-22 PROCEDURE — 90837 PSYTX W PT 60 MINUTES: CPT | Performed by: SOCIAL WORKER

## 2019-05-22 NOTE — LETTER
5/22/2019       RE: Jade Collins  [address]  [facility] [address]     Dear Colleague,    Thank you for referring your patient, Jade Collins, to the [facility] at [facility]. Please see a copy of my visit note below.    Palliative Care Clinical Social Work Return Appointment    PLEASE NOTE:  THIS IS A MENTAL HEALTH NOTE.  OTHER PROVIDERS VIEWING THIS NOTE SHOULD USE THIS ONLY FOR UNDERSTANDING THE CONTEXT OF THE PATIENT S EXPERIENCE.  TOPICS DESCRIBED IN THIS NOTE SHOULD NOT BE REFERENCED TO THE PATIENT BY MEDICAL PROVIDERS.    Jade is a 49 year old woman with metastatic breast cancer, seen today at [facility] for a return psychotherapy appointment to address PTSD, recurrent major depression, and anxiety as these relate to coping with illness and treatment.    Mental Status Exam:(List all that apply)      Appearance: Appropriate      Eye Contact: Good       Orientation: Yes, x4      Mood: reflective, describing relief, generally stable mood recently      Affect: Appropriate, full range, expressive      Thought Content: Clear         Thought Form: Logical      Psychomotor Behavior: Normal    Visit themes:   - NET exposure session   - Age 17 - 19, grandmother's death, graduation, moving out    Adjustment to Illness: Not focus today.    Mental Health (thoughts, feelings, actions, coping, and symptoms): Jade describes continuing to grieve her dad. More open conversations have been stirred recently with her sister and her mother including stories connected with events she has processed during NET, and new information about their experiences. Her mom described some envy re: her mother's close and caring connections with grandchildren after seeming more distant when parenting her.     NET exposure today with focus on grandmother' death, which was sudden during a vacation, mid- Jade's senior year, days before she was supposed to see her. Service just before  "[place] in [place]. Months of feeling numb.    After graduation Jade's mom left her a note informing her she needed to move out within the month. 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    We discuss today that next decades, while they do include some traumatic events, largely were safer and she was able to have much more control over what was happening in her daily life. The majority of traumatic events in Jade's life happened before age 20.    Jade describes feeling relief and hope connected with the process so far, although it has brought emotional discomfort at times. She describes the NET process as part of her overall life goals and hopes right now, coming \"home\" to [place], reconnecting with where she spent time with her grandmother. She reflects on how the relationship with her grandmother helped her understand how to love, and how to feel safe.    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    Relationships: Continues to support her sister re: practical concerns connected with their dad's estate. Sister has interest in buying a place for Jade to live here in [place]; Jade is contemplating this plan but not sure. They are also considering some travel together.    End of Life and Advance Care Planning: Reflects on unknown timeframe/prognosis and how this influences decisions.    Main therapeutic interventions provided this session include:  Psychoeducation, facilitating processing of feelings and thoughts, structured problem solving, grief counseling, and Narrative Exposure Therapy - exposure session    Plan:  Will return for psychotherapy with palliative care focus in 1 week at [facility].      Time spent with patient/family:  60 minutes (Start 2:05pm, end 3:05pm)  Narrative Exposure Therapy, longer session      Palliative Care Counseling Treatment Plan    Client's Name: Jade Collins  YOB: 1970    Date: 1/9/2019    Initial DSM5 Diagnoses:   296.32 Major Depressive Disorder, Recurrent Episode, Moderate With anxious distress  309.81 (F43.10) Posttraumatic Stress Disorder (includes Posttraumatic Stress Dsiorder for Children 6 Years and Younger)  With dissociative symptoms      Date to review plan (90 days usually): 3/20/19    Referral / Collaboration:  .Referral to another professional/service is not indicated at this time.    Anticipated number of sessions to complete episode of care:  10         Treatment Goal(s)  Date Goal Dates  Reviewed Status   12/20/2018   Goal 1:  Client will decrease PTSD symptoms.      Continued   12/20/2018   Objective #1A (Client Action)  Client will Increase understanding of PTSD/ASD and Process trauma history in safe counseling environment using Narrative Exposure Therapy.    Intervention(s)  Therapist will Provide psychoeducation and Narrative Exposure Therapy .    Continued                   Date Goal Dates  Reviewed Status   12/20/2018   Goal  2:  Client will address illness related stressors.    Continued   12/20/2018   Objective #2A (Client Action)  Client will Communicate effectively with family/friends re needs and Communicate effectively with medical provider re needed information.    Intervention(s) (Therapist Action)  Therapist will Provide psychoeducation, Facilitate couples/family therapy session(s) and Facilitate structured problem solving.    Continued   12/20/2018   Objective #2B  Client will Identify effective coping strategies and Use/practice relaxation strategies.    Intervention(s)  Therapist will Provide psychoeducation, Provide behavioral intervention training, Facilitate processing of thoughts and feelings and Facilitate structured problem solving.    Continued   12/20/2018     Objective #2C  Client will Complete health care directive and/or POLST form.    Intervention(s)  Therapist will Provide advance care planning education.    Continued       Date Goal Dates  Reviewed Status   1/9/2019   Goal 3:  Client will effectively manage recurrent major depressive disorder in light of increased stressors.    Continued   1/9/2019   Objective #3A (Client Action)  Client will Identify activities that provide comfort and/or pleasure, Participate in activities that provide comfort and/or pleasure and Identify an activity that would provide meaning/contribute to feeling of self worth.    Intervention(s) (Therapist Action)  Therapist will Provide psychoeducation, Facilitate processing of thoughts and feelings and Facilitate structured problem solving.    Contined   1/9/2019   Objective #3B  Client will Effectively communicate needs to others, Identify a crisis plan for suicidal ideation and Identify triggers/early signs of depressed mood.    Intervention(s)  Therapist will Provide psychoeducation, Facilitate processing of thoughts and feelings, Facilitate structured problem solving and facilitate safety planning as needed..    Continued                Client has contributed regarding goals and concerns, but has not reviewed the treatment plan. Plan to review at future session.    ROMULO Ulloa, LICSW      DO NOT SEND ANY LETTERS    Again, thank you for allowing me to participate in the care of your patient.      Sincerely,    CED Cottrell

## 2019-05-27 NOTE — PROGRESS NOTES
Palliative Care Clinical Social Work Return Appointment    PLEASE NOTE:  THIS IS A MENTAL HEALTH NOTE.  OTHER PROVIDERS VIEWING THIS NOTE SHOULD USE THIS ONLY FOR UNDERSTANDING THE CONTEXT OF THE PATIENT S EXPERIENCE.  TOPICS DESCRIBED IN THIS NOTE SHOULD NOT BE REFERENCED TO THE PATIENT BY MEDICAL PROVIDERS.    Jade is a 49 year old woman with metastatic breast cancer, seen today at [facility] for a return psychotherapy appointment to address PTSD, recurrent major depression, and anxiety as these relate to coping with illness and treatment.    Mental Status Exam:(List all that apply)      Appearance: Appropriate      Eye Contact: Good       Orientation: Yes, x4      Mood: reflective, describing relief, generally stable mood recently      Affect: Appropriate, full range, expressive      Thought Content: Clear         Thought Form: Logical      Psychomotor Behavior: Normal    Visit themes:   - NET exposure session   - Age 17 - 19, grandmother's death, graduation, moving out    Adjustment to Illness: Not focus today.    Mental Health (thoughts, feelings, actions, coping, and symptoms): Jade describes continuing to grieve her dad. More open conversations have been stirred recently with her sister and her mother including stories connected with events she has processed during NET, and new information about their experiences. Her mom described some envy re: her mother's close and caring connections with grandchildren after seeming more distant when parenting her.     NET exposure today with focus on grandmother' death, which was sudden during a vacation, mid- Jade's senior year, days before she was supposed to see her. Service just before [place] in [place]. Months of feeling numb.    After graduation Jade's mom left her a note informing her she needed to move out within the month. 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    We discuss today that next decades, while they do include some traumatic events, largely were safer and she was able to have much more control over what was happening in her daily life. The majority of traumatic events in Jade's life happened before age 20.    Jade describes feeling relief and hope connected with the process so far, although it has brought emotional discomfort at times. She describes the NET process as part of her overall life goals and hopes right now, coming \"home\" to [place], reconnecting with where she spent time with her grandmother. She reflects on how the relationship with her grandmother helped her understand how to love, and how to feel safe.    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    Relationships: Continues to support her sister re: practical concerns connected with their dad's estate. Sister has interest in buying a place for Jade to live here in [place]; Jade is contemplating this plan but not sure. They are also considering some travel " together.    End of Life and Advance Care Planning: Reflects on unknown timeframe/prognosis and how this influences decisions.    Main therapeutic interventions provided this session include:  Psychoeducation, facilitating processing of feelings and thoughts, structured problem solving, grief counseling, and Narrative Exposure Therapy - exposure session    Plan:  Will return for psychotherapy with palliative care focus in 1 week at [facility].      Time spent with patient/family:  60 minutes (Start 2:05pm, end 3:05pm)  Narrative Exposure Therapy, longer session      Palliative Care Counseling Treatment Plan    Client's Name: Jade Collins  YOB: 1970    Date: 1/9/2019    Initial DSM5 Diagnoses:   296.32 Major Depressive Disorder, Recurrent Episode, Moderate With anxious distress  309.81 (F43.10) Posttraumatic Stress Disorder (includes Posttraumatic Stress Dsiorder for Children 6 Years and Younger)  With dissociative symptoms      Date to review plan (90 days usually): 3/20/19    Referral / Collaboration:  .Referral to another professional/service is not indicated at this time.    Anticipated number of sessions to complete episode of care:  10         Treatment Goal(s)  Date Goal Dates  Reviewed Status   12/20/2018   Goal 1:  Client will decrease PTSD symptoms.      Continued   12/20/2018   Objective #1A (Client Action)  Client will Increase understanding of PTSD/ASD and Process trauma history in safe counseling environment using Narrative Exposure Therapy.    Intervention(s)  Therapist will Provide psychoeducation and Narrative Exposure Therapy .    Continued                   Date Goal Dates  Reviewed Status   12/20/2018   Goal 2:  Client will address illness related stressors.    Continued   12/20/2018   Objective #2A (Client Action)  Client will Communicate effectively with family/friends re needs and Communicate effectively with medical provider re needed information.    Intervention(s) (Therapist  Action)  Therapist will Provide psychoeducation, Facilitate couples/family therapy session(s) and Facilitate structured problem solving.    Continued   12/20/2018   Objective #2B  Client will Identify effective coping strategies and Use/practice relaxation strategies.    Intervention(s)  Therapist will Provide psychoeducation, Provide behavioral intervention training, Facilitate processing of thoughts and feelings and Facilitate structured problem solving.    Continued   12/20/2018     Objective #2C  Client will Complete health care directive and/or POLST form.    Intervention(s)  Therapist will Provide advance care planning education.    Continued       Date Goal Dates  Reviewed Status   1/9/2019   Goal 3:  Client will effectively manage recurrent major depressive disorder in light of increased stressors.    Continued   1/9/2019   Objective #3A (Client Action)  Client will Identify activities that provide comfort and/or pleasure, Participate in activities that provide comfort and/or pleasure and Identify an activity that would provide meaning/contribute to feeling of self worth.    Intervention(s) (Therapist Action)  Therapist will Provide psychoeducation, Facilitate processing of thoughts and feelings and Facilitate structured problem solving.    Contined   1/9/2019   Objective #3B  Client will Effectively communicate needs to others, Identify a crisis plan for suicidal ideation and Identify triggers/early signs of depressed mood.    Intervention(s)  Therapist will Provide psychoeducation, Facilitate processing of thoughts and feelings, Facilitate structured problem solving and facilitate safety planning as needed..    Continued               Client has contributed regarding goals and concerns, but has not reviewed the treatment plan. Plan to review at future session.    Lisa Holliday, MSW, LICSW      DO NOT SEND ANY LETTERS

## 2019-05-28 DIAGNOSIS — Z17.0 MALIGNANT NEOPLASM OF UPPER-OUTER QUADRANT OF LEFT BREAST IN FEMALE, ESTROGEN RECEPTOR POSITIVE (H): ICD-10-CM

## 2019-05-28 DIAGNOSIS — C50.412 MALIGNANT NEOPLASM OF UPPER-OUTER QUADRANT OF LEFT BREAST IN FEMALE, ESTROGEN RECEPTOR POSITIVE (H): ICD-10-CM

## 2019-05-28 LAB
ALBUMIN SERPL-MCNC: 4 G/DL (ref 3.4–5)
ALP SERPL-CCNC: 66 U/L (ref 40–150)
ALT SERPL W P-5'-P-CCNC: 34 U/L (ref 0–50)
ANION GAP SERPL CALCULATED.3IONS-SCNC: 8 MMOL/L (ref 3–14)
AST SERPL W P-5'-P-CCNC: 26 U/L (ref 0–45)
BASOPHILS # BLD AUTO: 0.1 10E9/L (ref 0–0.2)
BASOPHILS NFR BLD AUTO: 1.9 %
BILIRUB SERPL-MCNC: 0.6 MG/DL (ref 0.2–1.3)
BUN SERPL-MCNC: 13 MG/DL (ref 7–30)
CALCIUM SERPL-MCNC: 10.3 MG/DL (ref 8.5–10.1)
CANCER AG27-29 SERPL-ACNC: 22 U/ML (ref 0–39)
CEA SERPL-MCNC: 0.9 UG/L (ref 0–2.5)
CHLORIDE SERPL-SCNC: 103 MMOL/L (ref 94–109)
CO2 SERPL-SCNC: 26 MMOL/L (ref 20–32)
CREAT SERPL-MCNC: 1.08 MG/DL (ref 0.52–1.04)
DIFFERENTIAL METHOD BLD: ABNORMAL
EOSINOPHIL # BLD AUTO: 0.1 10E9/L (ref 0–0.7)
EOSINOPHIL NFR BLD AUTO: 2.6 %
ERYTHROCYTE [DISTWIDTH] IN BLOOD BY AUTOMATED COUNT: 13.6 % (ref 10–15)
GFR SERPL CREATININE-BSD FRML MDRD: 60 ML/MIN/{1.73_M2}
GLUCOSE SERPL-MCNC: 100 MG/DL (ref 70–99)
HCT VFR BLD AUTO: 38.1 % (ref 35–47)
HGB BLD-MCNC: 13.2 G/DL (ref 11.7–15.7)
IMM GRANULOCYTES # BLD: 0 10E9/L (ref 0–0.4)
IMM GRANULOCYTES NFR BLD: 0.4 %
LYMPHOCYTES # BLD AUTO: 1 10E9/L (ref 0.8–5.3)
LYMPHOCYTES NFR BLD AUTO: 37.1 %
MCH RBC QN AUTO: 33.9 PG (ref 26.5–33)
MCHC RBC AUTO-ENTMCNC: 34.6 G/DL (ref 31.5–36.5)
MCV RBC AUTO: 98 FL (ref 78–100)
MONOCYTES # BLD AUTO: 0.3 10E9/L (ref 0–1.3)
MONOCYTES NFR BLD AUTO: 9.7 %
NEUTROPHILS # BLD AUTO: 1.3 10E9/L (ref 1.6–8.3)
NEUTROPHILS NFR BLD AUTO: 48.3 %
NRBC # BLD AUTO: 0 10*3/UL
NRBC BLD AUTO-RTO: 0 /100
PLATELET # BLD AUTO: 149 10E9/L (ref 150–450)
PLATELET # BLD EST: ABNORMAL 10*3/UL
POTASSIUM SERPL-SCNC: 4 MMOL/L (ref 3.4–5.3)
PROT SERPL-MCNC: 7.6 G/DL (ref 6.8–8.8)
RBC # BLD AUTO: 3.89 10E12/L (ref 3.8–5.2)
SODIUM SERPL-SCNC: 137 MMOL/L (ref 133–144)
WBC # BLD AUTO: 2.7 10E9/L (ref 4–11)

## 2019-05-28 PROCEDURE — 82378 CARCINOEMBRYONIC ANTIGEN: CPT | Performed by: INTERNAL MEDICINE

## 2019-05-28 PROCEDURE — 85025 COMPLETE CBC W/AUTO DIFF WBC: CPT | Performed by: INTERNAL MEDICINE

## 2019-05-28 PROCEDURE — 86300 IMMUNOASSAY TUMOR CA 15-3: CPT | Performed by: INTERNAL MEDICINE

## 2019-05-28 PROCEDURE — 80053 COMPREHEN METABOLIC PANEL: CPT | Performed by: INTERNAL MEDICINE

## 2019-05-28 NOTE — NURSING NOTE
Chief Complaint   Patient presents with     Blood Draw     labs drawn by venipuncture by rn.  lab only appointment.     Labs drawn by venipuncture by rn in lab.    Irma Yap RN

## 2019-05-29 ENCOUNTER — ONCOLOGY VISIT (OUTPATIENT)
Dept: ONCOLOGY | Facility: CLINIC | Age: 49
End: 2019-05-29
Attending: PHYSICIAN ASSISTANT
Payer: COMMERCIAL

## 2019-05-29 ENCOUNTER — TELEPHONE (OUTPATIENT)
Dept: ONCOLOGY | Facility: CLINIC | Age: 49
End: 2019-05-29

## 2019-05-29 VITALS
SYSTOLIC BLOOD PRESSURE: 117 MMHG | HEART RATE: 106 BPM | BODY MASS INDEX: 45.89 KG/M2 | DIASTOLIC BLOOD PRESSURE: 61 MMHG | OXYGEN SATURATION: 93 % | TEMPERATURE: 98.5 F | RESPIRATION RATE: 16 BRPM | WEIGHT: 293 LBS

## 2019-05-29 DIAGNOSIS — C50.412 MALIGNANT NEOPLASM OF UPPER-OUTER QUADRANT OF LEFT BREAST IN FEMALE, ESTROGEN RECEPTOR POSITIVE (H): Primary | ICD-10-CM

## 2019-05-29 DIAGNOSIS — Z17.0 MALIGNANT NEOPLASM OF UPPER-OUTER QUADRANT OF LEFT BREAST IN FEMALE, ESTROGEN RECEPTOR POSITIVE (H): Primary | ICD-10-CM

## 2019-05-29 DIAGNOSIS — J45.909 UNCOMPLICATED ASTHMA, UNSPECIFIED ASTHMA SEVERITY, UNSPECIFIED WHETHER PERSISTENT: ICD-10-CM

## 2019-05-29 DIAGNOSIS — E03.9 HYPOTHYROIDISM, UNSPECIFIED TYPE: ICD-10-CM

## 2019-05-29 PROCEDURE — G0463 HOSPITAL OUTPT CLINIC VISIT: HCPCS | Mod: ZF

## 2019-05-29 PROCEDURE — 99214 OFFICE O/P EST MOD 30 MIN: CPT | Mod: ZP | Performed by: PHYSICIAN ASSISTANT

## 2019-05-29 RX ORDER — LEVOTHYROXINE SODIUM 200 UG/1
200 TABLET ORAL DAILY
Qty: 30 TABLET | Refills: 0 | Status: SHIPPED | OUTPATIENT
Start: 2019-05-29 | End: 2020-07-10

## 2019-05-29 RX ORDER — LORATADINE 10 MG/1
10 TABLET ORAL DAILY
Qty: 90 TABLET | Refills: 3 | Status: SHIPPED | OUTPATIENT
Start: 2019-05-29

## 2019-05-29 RX ORDER — LETROZOLE 2.5 MG/1
2.5 TABLET, FILM COATED ORAL DAILY
Qty: 28 TABLET | Refills: 0 | Status: SHIPPED | OUTPATIENT
Start: 2019-05-29 | End: 2019-07-25

## 2019-05-29 ASSESSMENT — PAIN SCALES - GENERAL: PAINLEVEL: NO PAIN (0)

## 2019-05-29 NOTE — ORAL ONC MGMT
Oral Chemotherapy Monitoring Program     Primary Oncologist: Dr. Bhatt  Primary Oncology Clinic: [facility]   Cancer Diagnosis: Breast Cancer      Drug: Ibrance 125mg once daily x 21 days on, 7 days off   Therapy History:  C1D1 9/21/2018  Next cycle: 5/3/19, 5/31/19      Drug Interaction Assessment: No new drug-drug interactions.     Lab Monitoring Plan  CBC/CMP monthly     Subjective/Objective:  Jade Collins is a 49 year old female contacted by phone for a follow-up visit for oral chemotherapy. She reports she continues to do well on Ibrance. She verbally confirmed correct dose and cycle start dates. She takes at night since she has night sweats. Denies nausea, vomiting, constipation, diarrhea, fevers, chills, or other side effects. No medication changes or missed doses. No concerns or questions today.       ORAL CHEMOTHERAPY 9/18/2018 9/25/2018 10/16/2018 1/8/2019 1/11/2019 5/1/2019 5/29/2019   Drug Name Ibrance (Palbociclib) Ibrance (Palbociclib) Ibrance (Palbociclib) Ibrance (Palbociclib) Ibrance (Palbociclib) Ibrance (Palbociclib) Ibrance (Palbociclib)   Current Dosage 125mg 125mg 125mg 125mg 125mg 125mg 125mg   Current Schedule Daily Daily Daily Daily Daily Daily Daily   Cycle Details 3 weeks on 1 week off 3 weeks on 1 week off 3 weeks on 1 week off 3 weeks on 1 week off 3 weeks on 1 week off 3 weeks on 1 week off 3 weeks on 1 week off   Start Date of Last Cycle - 9/21/2018 9/21/2018 12/14/2018 - 4/5/2019 5/3/2019   Planned next cycle start date - - 10/19/2018 1/11/2019 1/11/2019 5/3/2019 5/31/2019   Doses missed in last 2 weeks - - 0 0 - 0 0   Adherence Assessment - Adherent Adherent Adherent - Adherent Adherent   Adverse Effects - Arthralgias Nausea;Diarrhea Diarrhea Diarrhea;Fatigue Diarrhea;Fatigue No AE identified during assessment   Nausea - - Grade 2 - - - -   Pharmacist Intervention(nausea) - - Yes - - - -   Intervention(s) - - Rx medication recommendation - - - -   Diarrhea - - Grade 2 Grade  "2 Grade 1 Resolved due to intervention -   Pharmacist Intervention(diarrhea) - - No No Yes - -   Intervention(s) - - - - Patient education - -   Fatigue - - - - Grade 1 Resolved due to intervention -   Pharmacist Intervention(fatigue) - - - - Yes - -   Arthralgias - Grade 1 - - - - -   Pharmacist Intervention(arthralgias) - Yes - - - - -   Intervention(s) - OTC recommendation - - - - -   Home BPs - - - - - not needed not needed   Any new drug interactions? No No - - - No No   Is the dose as ordered appropriate for the patient? Yes Yes Yes - - Yes Yes   Is the patient currently in pain? - Yes - - - No No   Does the patient feel the pain is currently being managed by a provider? - Yes - - - - -   Has the patient been assessed within the past 6 months for depression? - No - - - - -   Has the patient missed any days of school, work, or other routine activity? - - - - - No No       Last PHQ-2 Score on record:   PHQ-2 ( 1999 [employer]) 9/18/2018   Q1: Little interest or pleasure in doing things 1   Q2: Feeling down, depressed or hopeless 1   PHQ-2 Score 2       Patient does not report depression symptoms.      Vitals:  BP:   BP Readings from Last 1 Encounters:   05/29/19 117/61     Wt Readings from Last 1 Encounters:   05/29/19 136.9 kg (301 lb 13 oz)     Estimated body surface area is 2.56 meters squared as calculated from the following:    Height as of 4/30/19: 1.727 m (5' 8\").    Weight as of an earlier encounter on 5/29/19: 136.9 kg (301 lb 13 oz).    Labs:  _  Result Component Current Result Ref Range   Sodium 137 (5/28/2019) 133 - 144 mmol/L     _  Result Component Current Result Ref Range   Potassium 4.0 (5/28/2019) 3.4 - 5.3 mmol/L     _  Result Component Current Result Ref Range   Calcium 10.3 (H) (5/28/2019) 8.5 - 10.1 mg/dL     No results found for Mag within last 30 days.     No results found for Phos within last 30 days.     _  Result Component Current Result Ref Range   Albumin 4.0 (5/28/2019) 3.4 - 5.0 g/dL "     _  Result Component Current Result Ref Range   Urea Nitrogen 13 (5/28/2019) 7 - 30 mg/dL     _  Result Component Current Result Ref Range   Creatinine 1.08 (H) (5/28/2019) 0.52 - 1.04 mg/dL       _  Result Component Current Result Ref Range   AST 26 (5/28/2019) 0 - 45 U/L     _  Result Component Current Result Ref Range   ALT 34 (5/28/2019) 0 - 50 U/L     _  Result Component Current Result Ref Range   Bilirubin Total 0.6 (5/28/2019) 0.2 - 1.3 mg/dL       _  Result Component Current Result Ref Range   WBC 2.7 (L) (5/28/2019) 4.0 - 11.0 10e9/L     _  Result Component Current Result Ref Range   Hemoglobin 13.2 (5/28/2019) 11.7 - 15.7 g/dL     _  Result Component Current Result Ref Range   Platelet Count 149 (L) (5/28/2019) 150 - 450 10e9/L     _  Result Component Current Result Ref Range   Absolute Neutrophil 1.3 (L) (5/28/2019) 1.6 - 8.3 10e9/L       Assessment:  Jade is tolerating Ibrance well with no new or worse side effects and will continue current treatment.      PDC=1, Denies missed doses and no concerns with adherence.      Plan:  Continue Ibrance 125mg daily x 21 days on, 7 days off. Cycle starts 5/31/2019.      Follow-Up:  6/25: [facility] appointment and labs      Refill Due:  [facility] will deliver Ibrance and letrozole to her home 5/30        Thank you for the opportunity to participate in the care of the above patient,  Jose Swain, PharmD  Hematology/Oncology Clinical Pharmacist  [facility]  [facility]  [phone]

## 2019-05-29 NOTE — NURSING NOTE
"Oncology Rooming Note    May 29, 2019 11:03 AM   Jade Collins is a 49 year old female who presents for:    Chief Complaint   Patient presents with     Oncology Clinic Visit     Breast Ca      Initial Vitals: /61   Pulse 106   Temp 98.5  F (36.9  C) (Oral)   Resp 16   Wt 136.9 kg (301 lb 13 oz)   SpO2 93%   BMI 45.89 kg/m   Estimated body mass index is 45.89 kg/m  as calculated from the following:    Height as of 4/30/19: 1.727 m (5' 8\").    Weight as of this encounter: 136.9 kg (301 lb 13 oz). Body surface area is 2.56 meters squared.  No Pain (0) Comment: Data Unavailable   No LMP recorded. Patient is postmenopausal.  Allergies reviewed: Yes  Medications reviewed: Yes    Medications: MEDICATION REFILLS NEEDED TODAY. Provider was notified.  Pharmacy name entered into [facility]:    [facility] [address] [place] [address] [address] [facility] [address] [facility] [address] [facility] - [place], [place] - [address]    Clinical concerns: Levothyroxen . Loratadine  Tatsumi  was notified.      Danii Salazar MA              "

## 2019-05-29 NOTE — PROGRESS NOTES
Oncology/Hematology Visit Note  May 29, 2019    Reason for Visit: follow up of recurrent metastatic ER-positive HER2 negative breast cancer    History of Present Illness: Jade Collins is a 49 year old female with a history of left breast cancer diagnosed in 12/2013 in [place] area and Dr. David Redd was her medical oncologist. Biopsy showed tumor to be ER positive, UT positive and HER2 negative. She underwent lumpectomy and SLNB by Dr. Tasha Segura in [place]. [place] lymph node was noted to be involved with tumor but no lymph node dissection done at that time. She subsequently underwent adjuvant ddAC x 4 cycles and Taxol x 12 weeks, competed in 9/11/2014. She had post lumpectomy radiation completed in 11/23/2014 by Dr. Manley. She was begun on an AI in 11/2014, but does not recall name.     She was then switched within about a month or so to anastrozole and remained on anastrozole from 11/2014-02/2017.  She has never received tamoxifen. She then moved to [place], [place] in 03/2017.  She saw a gynecologist there and underwent a IRIS/BSO by Dr. Zendejas.  She then also saw Dr. Linares in [place], [place], who is an oncologist.  His interpretation of the pathology report was that she had triple-negative breast cancer.  It is possible that she may have had low estrogen receptor positivity, but the anastrozole was then discontinued in 02/2017.  She then underwent the IRIS/BSO in 03/2017.  This was done laparoscopically.        She then remained off of all hormonal therapy and in 09/2017 moved to [place].  She remained off hormonal therapy and did not have Medical Oncology followup initially.        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  She then went to see Dr. Norberto Brand who performed a PET/CT scan and the patient also underwent a brain MRI for staging.  The PET/CT scan performed 08/03/2018 which showed mildly enlarged left posterior cervical lymph nodes, largest measuring  1.5 x 1.0 cm and mildly enlarged aorticopulmonary and prevascular lymph nodes that were noted on contrast-enhanced CT scan seen less well on the PET scan.  No evidence of metastatic disease in the abdomen and pelvis.  She also underwent a brain MRI that showed no evidence of intracranial metastatic disease.  She underwent a biopsy of one of the lymph nodes in the left neck which showed metastatic adenocarcinoma consistent with breast origin, which was estrogen-receptor positive.  The tumor cells were positive for CK7, ER and CA consistent with metastatic breast carcinoma.  Estrogen receptor showed strong average nuclear intensity in 95% of carcinoma cells, progesterone receptor moderate average nuclear intensity in 70% of the carcinoma.  A GATA3 stain was apparently not performed.     She started Ibrance on 9/21 and letrozole on 9/23. She had improved disease on restaging on 11/26. Last restaging on 4/29/19 with stable disease.     Please see Dr. Bhatt's previous notes for further details on the patient's history. She comes in today for routine follow up.    Interval History:  Jade is here for follow up. She is due to start Ibrance on 5/31. She has no infectious concerns.    She fees the increased dose of Zoloft has improved her mood. She is coping with loss of her father. She is planning to go to [place] June 13-18th to help her sister with packing his things and spreading his ashes.     She has been having some intermittent low back pain. She thinks it is activity related. Pain does not wake her at night. No pain radiating down legs.     She cut out caffeine and soda from her diet and is sleeping better. She stopped mirtazapine at night because she felt groggy in the morning.    Her calcium was high today but she states she might be taking too much calcium supplements because she switched brands and thinks she took double the amount by accident.     She is on Nasocort and Advair for asthma which is effective.      No nausea, appetite good. Alternating constipation/diarrhea. No urinary concerns.      Review of Systems:  Patient denies any of the following except if noted above: chest pain, dyspnea,  abdominal pain, dysuria.    Current Outpatient Medications   Medication Sig Dispense Refill     ACETAMINOPHEN PO Take 650 mg by mouth every 4 hours as needed for pain       albuterol (PROAIR HFA/PROVENTIL HFA/VENTOLIN HFA) 108 (90 Base) MCG/ACT inhaler Inhale 2 puffs into the lungs every 6 hours 1 Inhaler 4     albuterol (PROVENTIL) (2.5 MG/3ML) 0.083% neb solution Take 1 vial (2.5 mg) by nebulization every 6 hours as needed for shortness of breath / dyspnea or wheezing 30 vial 0     calcium carbonate (TUMS) 500 MG chewable tablet Take 1 chew tab by mouth as needed for heartburn       Calcium Citrate-Vitamin D (CALCIUM + D PO) Take 250 mg by mouth 2 times daily        fluticasone-salmeterol (AIRDUO RESPICLICK) 113-14 MCG/ACT inhaler Inhale 1 puff into the lungs 2 times daily 1 Inhaler 11     IBUPROFEN PO Take 200 mg by mouth every 4 hours as needed for moderate pain       letrozole (FEMARA) 2.5 MG tablet Take 1 tablet (2.5 mg) by mouth daily 28 tablet 3     levothyroxine (SYNTHROID/LEVOTHROID) 200 MCG tablet Take 200 mcg by mouth       loratadine (CLARITIN) 10 MG tablet Take 1 tablet (10 mg) by mouth daily 90 tablet 3     methylPREDNISolone (MEDROL) 16 MG tablet Take 32 mg by mouth 12 hours before the procedure and repeat 32 mg by mouth 2 hours before the procedure to prevent contrast reaction. 4 tablet 0     mirtazapine (REMERON) 15 MG tablet TK 1 T PO HS  0     Multiple Vitamins-Minerals (MULTIVITAMIN ADULT PO)        ondansetron (ZOFRAN ODT) 8 MG ODT tab Take 1 tablet (8 mg) by mouth 3 times daily as needed for nausea 30 tablet 1     palbociclib (IBRANCE) 125 MG capsule CHEMO Take 1 capsule (125 mg) by mouth daily with food Take for 21 days, then 7 days off. Avoid grapefruit. Do not open/crush/chew capsule. 21 capsule 0      palbociclib (IBRANCE) 125 MG capsule CHEMO Take 1 capsule (125 mg) by mouth daily with food Take for 21 days, then 7 days off. Avoid grapefruit. Do not open/crush/chew capsule. 21 capsule 0     prochlorperazine (COMPAZINE) 10 MG tablet Take 1 tablet (10 mg) by mouth every 6 hours as needed for nausea or vomiting 30 tablet 0     sertraline (ZOLOFT) 100 MG tablet Take 1 tablet (100 mg) by mouth 2 times daily 60 tablet 3     triamcinolone (NASACORT) 55 MCG/ACT inhaler Spray 1 spray into both nostrils daily        VITAMIN D, CHOLECALCIFEROL, PO Take 2,000 Units by mouth daily          Physical Examination:  General: The patient is a pleasant female in no acute distress.  /61   Pulse 106   Temp 98.5  F (36.9  C) (Oral)   Resp 16   Wt 136.9 kg (301 lb 13 oz)   SpO2 93%   BMI 45.89 kg/m    Wt Readings from Last 10 Encounters:   05/29/19 136.9 kg (301 lb 13 oz)   04/30/19 136.9 kg (301 lb 11.2 oz)   04/09/19 133.8 kg (295 lb)   04/02/19 133.9 kg (295 lb 3.2 oz)   03/06/19 131.1 kg (289 lb)   02/19/19 129.5 kg (285 lb 8 oz)   02/05/19 127.9 kg (282 lb)   01/22/19 128.1 kg (282 lb 4.8 oz)   01/09/19 125.7 kg (277 lb 1.6 oz)   12/24/18 125.5 kg (276 lb 11.2 oz)     HEENT: EOMI, PERRL. Sclerae are anicteric. Oral mucosa is pink and moist with no lesions or thrush.   Lymph: No palpable cervical, supraclavicular, subclavicular or axillary lymphadenopathy.  Heart: Regular rate and rhythm.   Lungs: Clear to auscultation bilaterally. Rare expiratory wheezing at base. Normal work of breathing.   Breast: Deferred today   Abdomen: Bowel sounds present, soft, nontender with no palpable hepatosplenomegaly or masses.   Extremities: No lower extremity edema noted bilaterally.   Neuro: Cranial nerves II through XII are grossly intact.  Skin: No rashes, petechiae, or bruising noted on exposed skin. Some horizontal ridges with darker coloring at base of nails.     Laboratory Data:   Results for ANAHI DOBSON (MRN [id number]) as of  5/29/2019 12:04   Ref. Range 5/28/2019 11:02   Sodium Latest Ref Range: 133 - 144 mmol/L 137   Potassium Latest Ref Range: 3.4 - 5.3 mmol/L 4.0   Chloride Latest Ref Range: 94 - 109 mmol/L 103   Carbon Dioxide Latest Ref Range: 20 - 32 mmol/L 26   Urea Nitrogen Latest Ref Range: 7 - 30 mg/dL 13   Creatinine Latest Ref Range: 0.52 - 1.04 mg/dL 1.08 (H)   GFR Estimate Latest Ref Range: >60 mL/min/1.73_m2 60 (L)   GFR Estimate If Black Latest Ref Range: >60 mL/min/1.73_m2 70   Calcium Latest Ref Range: 8.5 - 10.1 mg/dL 10.3 (H)   Anion Gap Latest Ref Range: 3 - 14 mmol/L 8   Albumin Latest Ref Range: 3.4 - 5.0 g/dL 4.0   Protein Total Latest Ref Range: 6.8 - 8.8 g/dL 7.6   Bilirubin Total Latest Ref Range: 0.2 - 1.3 mg/dL 0.6   Alkaline Phosphatase Latest Ref Range: 40 - 150 U/L 66   ALT Latest Ref Range: 0 - 50 U/L 34   AST Latest Ref Range: 0 - 45 U/L 26   CA 27-29 Latest Ref Range: 0 - 39 U/mL 22   Glucose Latest Ref Range: 70 - 99 mg/dL 100 (H)   WBC Latest Ref Range: 4.0 - 11.0 10e9/L 2.7 (L)   Hemoglobin Latest Ref Range: 11.7 - 15.7 g/dL 13.2   Hematocrit Latest Ref Range: 35.0 - 47.0 % 38.1   Platelet Count Latest Ref Range: 150 - 450 10e9/L 149 (L)   RBC Count Latest Ref Range: 3.8 - 5.2 10e12/L 3.89   MCV Latest Ref Range: 78 - 100 fl 98   MCH Latest Ref Range: 26.5 - 33.0 pg 33.9 (H)   MCHC Latest Ref Range: 31.5 - 36.5 g/dL 34.6   RDW Latest Ref Range: 10.0 - 15.0 % 13.6   Diff Method Unknown Automated Method   % Neutrophils Latest Units: % 48.3   % Lymphocytes Latest Units: % 37.1   % Monocytes Latest Units: % 9.7   % Eosinophils Latest Units: % 2.6   % Basophils Latest Units: % 1.9   % Immature Granulocytes Latest Units: % 0.4   Nucleated RBCs Latest Ref Range: 0 /100 0   Absolute Neutrophil Latest Ref Range: 1.6 - 8.3 10e9/L 1.3 (L)   Absolute Lymphocytes Latest Ref Range: 0.8 - 5.3 10e9/L 1.0   Absolute Monocytes Latest Ref Range: 0.0 - 1.3 10e9/L 0.3   Absolute Eosinophils Latest Ref Range: 0.0 - 0.7  10e9/L 0.1   Absolute Basophils Latest Ref Range: 0.0 - 0.2 10e9/L 0.1   Abs Immature Granulocytes Latest Ref Range: 0 - 0.4 10e9/L 0.0   Results for ANAHI DOBSON (MRN [id number]) as of 5/29/2019 12:22   Ref. Range 3/6/2019 14:38 4/30/2019 13:41 5/28/2019 11:02   CA 27-29 Latest Ref Range: 0 - 39 U/mL 23 21 22   Results for ANAHI DOBSON (MRN [id number]) as of 5/29/2019 12:22   Ref. Range 3/6/2019 14:38 4/30/2019 13:41 5/28/2019 11:02   CEA Latest Ref Range: 0 - 2.5 ug/L 0.7 1.1 0.9     Assessment and Plan:    Recurrent, metastatic ER-positive, HER2 negative breast cancer: History of left breast cancer s/p lumpectomy and SNLB in 2014. S/p 4 cycles ddAC and 12 weeks of Taxol. On AI from 11/2014-2/2017. Recurrence on PET/CT scan performed 08/03/2018 and then biopsy to left posterior cervical lymph nodes, aorticopulmonary, and prevascular lymph nodes. No evidence of metastatic disease in the abdomen and pelvis or brain. Started Ibrance and letrozole on 9/21/18. Tolerating well aside from some hot flashes and mild joint stiffness. Last restaging 4/29 with stable disease. Contrast allergy and next scan with be without contrast and bone scan. Calcium a little high today. Tumor markers stable. Likely due to supplementation  --Recheck calcium in 1 week  --Return in June as scheduled.    Post-nasal drip, congestion: Now on Nasacort per Dr. Hay. Also recommended nasal saline washes daily to BID.     Asthma: Seen by Dr. Hay and started on Advair with improvement of symptoms. Also has albuterol prn    Nausea: none recent. Antiemetics prn.    Bone health: bone targeting agents are not required as she has no evidence of bone metastases. Reduce calcium as high today and continue vitamin D.     Depression: On zoloft 200 mg, recently increased with improvement in symptoms. Sees Sari Holliday.    Evelina Garcia PA-C  [facility] [address] [address] [phone]

## 2019-05-31 ENCOUNTER — OFFICE VISIT (OUTPATIENT)
Dept: PALLIATIVE CARE | Facility: CLINIC | Age: 49
End: 2019-05-31
Attending: SOCIAL WORKER
Payer: COMMERCIAL

## 2019-05-31 DIAGNOSIS — F43.10 POSTTRAUMATIC STRESS DISORDER: Primary | ICD-10-CM

## 2019-05-31 DIAGNOSIS — Z51.5 ENCOUNTER FOR PALLIATIVE CARE: ICD-10-CM

## 2019-05-31 DIAGNOSIS — F33.1 MODERATE EPISODE OF RECURRENT MAJOR DEPRESSIVE DISORDER (H): ICD-10-CM

## 2019-05-31 PROCEDURE — 90837 PSYTX W PT 60 MINUTES: CPT | Performed by: SOCIAL WORKER

## 2019-05-31 PROCEDURE — 40000114 ZZH STATISTIC NO CHARGE CLINIC VISIT
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
## 2019-06-05 DIAGNOSIS — Z17.0 MALIGNANT NEOPLASM OF UPPER-OUTER QUADRANT OF LEFT BREAST IN FEMALE, ESTROGEN RECEPTOR POSITIVE (H): ICD-10-CM

## 2019-06-05 DIAGNOSIS — C50.412 MALIGNANT NEOPLASM OF UPPER-OUTER QUADRANT OF LEFT BREAST IN FEMALE, ESTROGEN RECEPTOR POSITIVE (H): ICD-10-CM

## 2019-06-05 LAB
ALBUMIN SERPL-MCNC: 3.9 G/DL (ref 3.4–5)
ALP SERPL-CCNC: 65 U/L (ref 40–150)
ALT SERPL W P-5'-P-CCNC: 37 U/L (ref 0–50)
ANION GAP SERPL CALCULATED.3IONS-SCNC: 8 MMOL/L (ref 3–14)
AST SERPL W P-5'-P-CCNC: 26 U/L (ref 0–45)
BASOPHILS # BLD AUTO: 0.1 10E9/L (ref 0–0.2)
BASOPHILS NFR BLD AUTO: 1.8 %
BILIRUB SERPL-MCNC: 0.4 MG/DL (ref 0.2–1.3)
BUN SERPL-MCNC: 13 MG/DL (ref 7–30)
CALCIUM SERPL-MCNC: 9.1 MG/DL (ref 8.5–10.1)
CANCER AG27-29 SERPL-ACNC: 19 U/ML (ref 0–39)
CEA SERPL-MCNC: 0.8 UG/L (ref 0–2.5)
CHLORIDE SERPL-SCNC: 103 MMOL/L (ref 94–109)
CO2 SERPL-SCNC: 26 MMOL/L (ref 20–32)
CREAT SERPL-MCNC: 1.13 MG/DL (ref 0.52–1.04)
DIFFERENTIAL METHOD BLD: ABNORMAL
EOSINOPHIL # BLD AUTO: 0.1 10E9/L (ref 0–0.7)
EOSINOPHIL NFR BLD AUTO: 3.2 %
ERYTHROCYTE [DISTWIDTH] IN BLOOD BY AUTOMATED COUNT: 13.2 % (ref 10–15)
GFR SERPL CREATININE-BSD FRML MDRD: 57 ML/MIN/{1.73_M2}
GLUCOSE SERPL-MCNC: 102 MG/DL (ref 70–99)
HCT VFR BLD AUTO: 37.7 % (ref 35–47)
HGB BLD-MCNC: 13.1 G/DL (ref 11.7–15.7)
IMM GRANULOCYTES # BLD: 0 10E9/L (ref 0–0.4)
IMM GRANULOCYTES NFR BLD: 0.3 %
LYMPHOCYTES # BLD AUTO: 1.4 10E9/L (ref 0.8–5.3)
LYMPHOCYTES NFR BLD AUTO: 39.7 %
MCH RBC QN AUTO: 34.3 PG (ref 26.5–33)
MCHC RBC AUTO-ENTMCNC: 34.7 G/DL (ref 31.5–36.5)
MCV RBC AUTO: 99 FL (ref 78–100)
MONOCYTES # BLD AUTO: 0.4 10E9/L (ref 0–1.3)
MONOCYTES NFR BLD AUTO: 11.2 %
NEUTROPHILS # BLD AUTO: 1.5 10E9/L (ref 1.6–8.3)
NEUTROPHILS NFR BLD AUTO: 43.8 %
NRBC # BLD AUTO: 0 10*3/UL
NRBC BLD AUTO-RTO: 0 /100
PLATELET # BLD AUTO: 163 10E9/L (ref 150–450)
POTASSIUM SERPL-SCNC: 3.8 MMOL/L (ref 3.4–5.3)
PROT SERPL-MCNC: 7.5 G/DL (ref 6.8–8.8)
RBC # BLD AUTO: 3.82 10E12/L (ref 3.8–5.2)
SODIUM SERPL-SCNC: 137 MMOL/L (ref 133–144)
WBC # BLD AUTO: 3.4 10E9/L (ref 4–11)

## 2019-06-05 PROCEDURE — 86300 IMMUNOASSAY TUMOR CA 15-3: CPT | Performed by: INTERNAL MEDICINE

## 2019-06-05 PROCEDURE — 82378 CARCINOEMBRYONIC ANTIGEN: CPT | Performed by: INTERNAL MEDICINE

## 2019-06-05 PROCEDURE — 80053 COMPREHEN METABOLIC PANEL: CPT | Performed by: INTERNAL MEDICINE

## 2019-06-05 PROCEDURE — 85025 COMPLETE CBC W/AUTO DIFF WBC: CPT | Performed by: INTERNAL MEDICINE

## 2019-06-05 NOTE — NURSING NOTE
Chief Complaint   Patient presents with     Blood Draw     Labs drawn via  by RN in lab.     Labs collected from venipuncture by RN.     Carly Martines RN

## 2019-06-14 DIAGNOSIS — J45.909 UNCOMPLICATED ASTHMA, UNSPECIFIED ASTHMA SEVERITY, UNSPECIFIED WHETHER PERSISTENT: ICD-10-CM

## 2019-06-14 RX ORDER — ALBUTEROL SULFATE 0.83 MG/ML
2.5 SOLUTION RESPIRATORY (INHALATION) EVERY 6 HOURS PRN
Qty: 30 VIAL | Refills: 0 | Status: SHIPPED | OUTPATIENT
Start: 2019-06-14 | End: 2020-02-19

## 2019-06-20 ENCOUNTER — OFFICE VISIT (OUTPATIENT)
Dept: PALLIATIVE CARE | Facility: CLINIC | Age: 49
End: 2019-06-20
Attending: SOCIAL WORKER
Payer: COMMERCIAL

## 2019-06-20 DIAGNOSIS — F33.1 MODERATE EPISODE OF RECURRENT MAJOR DEPRESSIVE DISORDER (H): ICD-10-CM

## 2019-06-20 DIAGNOSIS — F43.10 POSTTRAUMATIC STRESS DISORDER: Primary | ICD-10-CM

## 2019-06-20 DIAGNOSIS — Z51.5 ENCOUNTER FOR PALLIATIVE CARE: ICD-10-CM

## 2019-06-20 PROCEDURE — 40000114 ZZH STATISTIC NO CHARGE CLINIC VISIT

## 2019-06-20 PROCEDURE — 90837 PSYTX W PT 60 MINUTES: CPT | Performed by: SOCIAL WORKER

## 2019-06-20 PROCEDURE — G0463 HOSPITAL OUTPT CLINIC VISIT: HCPCS

## 2019-06-20 NOTE — LETTER
6/20/2019       RE: Jade Collins  [address]  [facility] [address]     Dear Colleague,    Thank you for referring your patient, Jade Collins, to the [facility] at [facility]. Please see a copy of my visit note below.    Palliative Care Clinical Social Work Return Appointment    PLEASE NOTE:  THIS IS A MENTAL HEALTH NOTE.  OTHER PROVIDERS VIEWING THIS NOTE SHOULD USE THIS ONLY FOR UNDERSTANDING THE CONTEXT OF THE PATIENT S EXPERIENCE.  TOPICS DESCRIBED IN THIS NOTE SHOULD NOT BE REFERENCED TO THE PATIENT BY MEDICAL PROVIDERS.    Jade is a 49 year old woman with metastatic breast cancer, seen today at [facility] for a return psychotherapy appointment to address PTSD, recurrent major depression, and anxiety as these relate to coping with illness and treatment.    Mental Status Exam:(List all that apply)      Appearance: Appropriate      Eye Contact: Good       Orientation: Yes, x4      Mood: hopeful, thankful      Affect: Appropriate, full range, expressive      Thought Content: Clear         Thought Form: Logical      Psychomotor Behavior: Normal    Visit themes:   - NET exposure session   - Mid-20s to mid-30s    Adjustment to Illness: Not focus today.    Mental Health (thoughts, feelings, actions, coping, and symptoms):   Jade describes overall very positive visit with her sister Ana Cristina and her nephew regarding honoring their dad. She did feel at times overwhelmed with caregiving style of her sister and we process this experience as well as ongoing communication options. She expresses how meaningful the trip was and managing the airplane travel despite related anxieties.    We continue NET with exposure session focused on mid-20s to mid-30s, overall a time of healing and things coming together for Jade, though with some periods of depression, intense relationships. 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    Relationships: See above, processes visit with sister. Continues to process loss of her dad.    End of Life and Advance Care Planning: Not focus today.    Main therapeutic interventions provided this session include:  Psychoeducation, facilitating processing of feelings and thoughts, structured problem solving, grief counseling, and Narrative Exposure Therapy - exposure session    Plan:  Will return for psychotherapy with palliative care focus in 2 - 3 weeks [place] [facility]    Time spent with patient/family:  65 minutes (Start 3:05pm, end 4:10pm)  Narrative Exposure Therapy, longer session      Palliative Care Counseling Treatment Plan    Client's Name: Jade Collins  YOB: 1970    Date: 1/9/2019    Initial DSM5 Diagnoses:   296.32 Major Depressive Disorder, Recurrent Episode, Moderate With anxious distress  309.81 (F43.10) Posttraumatic Stress Disorder (includes Posttraumatic Stress Dsiorder for Children 6 Years and Younger)  With dissociative symptoms      Date to review plan (90 days usually): 3/20/19    Referral / Collaboration:  .Referral to another professional/service is not indicated at this " time.    Anticipated number of sessions to complete episode of care:  10         Treatment Goal(s)  Date Goal Dates  Reviewed Status   12/20/2018   Goal 1:  Client will decrease PTSD symptoms.      Continued   12/20/2018   Objective #1A (Client Action)  Client will Increase understanding of PTSD/ASD and Process trauma history in safe counseling environment using Narrative Exposure Therapy.    Intervention(s)  Therapist will Provide psychoeducation and Narrative Exposure Therapy .    Continued                   Date Goal Dates  Reviewed Status   12/20/2018   Goal 2:  Client will address illness related stressors.    Continued   12/20/2018   Objective #2A (Client Action)  Client will Communicate effectively with family/friends re needs and Communicate effectively with medical provider re needed information.    Intervention(s) (Therapist Action)  Therapist will Provide psychoeducation, Facilitate couples/family therapy session(s) and Facilitate structured problem solving.    Continued   12/20/2018   Objective #2B  Client will Identify effective coping strategies and Use/practice relaxation strategies.    Intervention(s)  Therapist will Provide psychoeducation, Provide behavioral intervention training, Facilitate processing of thoughts and feelings and Facilitate structured problem solving.    Continued   12/20/2018     Objective #2C  Client will Complete health care directive and/or POLST form.    Intervention(s)  Therapist will Provide advance care planning education.    Continued       Date Goal Dates  Reviewed Status   1/9/2019   Goal 3:  Client will effectively manage recurrent major depressive disorder in light of increased stressors.    Continued   1/9/2019   Objective #3A (Client Action)  Client will Identify activities that provide comfort and/or pleasure, Participate in activities that provide comfort and/or pleasure and Identify an activity that would provide meaning/contribute to feeling of self  worth.    Intervention(s) (Therapist Action)  Therapist will Provide psychoeducation, Facilitate processing of thoughts and feelings and Facilitate structured problem solving.    Contined   1/9/2019   Objective #3B  Client will Effectively communicate needs to others, Identify a crisis plan for suicidal ideation and Identify triggers/early signs of depressed mood.    Intervention(s)  Therapist will Provide psychoeducation, Facilitate processing of thoughts and feelings, Facilitate structured problem solving and facilitate safety planning as needed..    Continued               Client has contributed regarding goals and concerns, but has not reviewed the treatment plan. Plan to review at future session.    ROMULO Ulloa, [facility] Center[facility]      DO NOT SEND ANY LETTERS    Again, thank you for allowing me to participate in the care of your patient.      Sincerely,    Lisa Holliday LICSW

## 2019-06-25 ENCOUNTER — APPOINTMENT (OUTPATIENT)
Dept: LAB | Facility: CLINIC | Age: 49
End: 2019-06-25
Attending: INTERNAL MEDICINE
Payer: COMMERCIAL

## 2019-06-25 ENCOUNTER — ONCOLOGY VISIT (OUTPATIENT)
Dept: ONCOLOGY | Facility: CLINIC | Age: 49
End: 2019-06-25
Attending: PHYSICIAN ASSISTANT
Payer: COMMERCIAL

## 2019-06-25 VITALS
TEMPERATURE: 98.3 F | HEART RATE: 102 BPM | DIASTOLIC BLOOD PRESSURE: 64 MMHG | SYSTOLIC BLOOD PRESSURE: 118 MMHG | BODY MASS INDEX: 44.41 KG/M2 | HEIGHT: 68 IN | RESPIRATION RATE: 16 BRPM | WEIGHT: 293 LBS | OXYGEN SATURATION: 93 %

## 2019-06-25 DIAGNOSIS — C50.412 MALIGNANT NEOPLASM OF UPPER-OUTER QUADRANT OF LEFT BREAST IN FEMALE, ESTROGEN RECEPTOR POSITIVE (H): Primary | ICD-10-CM

## 2019-06-25 DIAGNOSIS — Z17.0 MALIGNANT NEOPLASM OF UPPER-OUTER QUADRANT OF LEFT BREAST IN FEMALE, ESTROGEN RECEPTOR POSITIVE (H): ICD-10-CM

## 2019-06-25 DIAGNOSIS — Z17.0 MALIGNANT NEOPLASM OF UPPER-OUTER QUADRANT OF LEFT BREAST IN FEMALE, ESTROGEN RECEPTOR POSITIVE (H): Primary | ICD-10-CM

## 2019-06-25 DIAGNOSIS — C50.412 MALIGNANT NEOPLASM OF UPPER-OUTER QUADRANT OF LEFT BREAST IN FEMALE, ESTROGEN RECEPTOR POSITIVE (H): ICD-10-CM

## 2019-06-25 LAB
ALBUMIN SERPL-MCNC: 4.2 G/DL (ref 3.4–5)
ALP SERPL-CCNC: 71 U/L (ref 40–150)
ALT SERPL W P-5'-P-CCNC: 28 U/L (ref 0–50)
ANION GAP SERPL CALCULATED.3IONS-SCNC: 8 MMOL/L (ref 3–14)
AST SERPL W P-5'-P-CCNC: 23 U/L (ref 0–45)
BASOPHILS # BLD AUTO: 0 10E9/L (ref 0–0.2)
BASOPHILS NFR BLD AUTO: 2 %
BILIRUB SERPL-MCNC: 0.5 MG/DL (ref 0.2–1.3)
BUN SERPL-MCNC: 16 MG/DL (ref 7–30)
CALCIUM SERPL-MCNC: 9.5 MG/DL (ref 8.5–10.1)
CANCER AG27-29 SERPL-ACNC: 20 U/ML (ref 0–39)
CEA SERPL-MCNC: 0.9 UG/L (ref 0–2.5)
CHLORIDE SERPL-SCNC: 102 MMOL/L (ref 94–109)
CO2 SERPL-SCNC: 26 MMOL/L (ref 20–32)
CREAT SERPL-MCNC: 1.1 MG/DL (ref 0.52–1.04)
DIFFERENTIAL METHOD BLD: ABNORMAL
EOSINOPHIL # BLD AUTO: 0.1 10E9/L (ref 0–0.7)
EOSINOPHIL NFR BLD AUTO: 3 %
ERYTHROCYTE [DISTWIDTH] IN BLOOD BY AUTOMATED COUNT: 12.9 % (ref 10–15)
GFR SERPL CREATININE-BSD FRML MDRD: 59 ML/MIN/{1.73_M2}
GLUCOSE SERPL-MCNC: 104 MG/DL (ref 70–99)
HCT VFR BLD AUTO: 38.8 % (ref 35–47)
HGB BLD-MCNC: 13.4 G/DL (ref 11.7–15.7)
LYMPHOCYTES # BLD AUTO: 1 10E9/L (ref 0.8–5.3)
LYMPHOCYTES NFR BLD AUTO: 46 %
MAGNESIUM SERPL-MCNC: 2 MG/DL (ref 1.6–2.3)
MCH RBC QN AUTO: 33.7 PG (ref 26.5–33)
MCHC RBC AUTO-ENTMCNC: 34.5 G/DL (ref 31.5–36.5)
MCV RBC AUTO: 98 FL (ref 78–100)
MONOCYTES # BLD AUTO: 0.1 10E9/L (ref 0–1.3)
MONOCYTES NFR BLD AUTO: 6 %
NEUTROPHILS # BLD AUTO: 0.9 10E9/L (ref 1.6–8.3)
NEUTROPHILS NFR BLD AUTO: 43 %
PLATELET # BLD AUTO: 150 10E9/L (ref 150–450)
PLATELET # BLD EST: ABNORMAL 10*3/UL
POTASSIUM SERPL-SCNC: 3.8 MMOL/L (ref 3.4–5.3)
PROT SERPL-MCNC: 7.8 G/DL (ref 6.8–8.8)
RBC # BLD AUTO: 3.98 10E12/L (ref 3.8–5.2)
SODIUM SERPL-SCNC: 136 MMOL/L (ref 133–144)
WBC # BLD AUTO: 2.2 10E9/L (ref 4–11)

## 2019-06-25 PROCEDURE — G0463 HOSPITAL OUTPT CLINIC VISIT: HCPCS | Mod: ZF

## 2019-06-25 PROCEDURE — 86300 IMMUNOASSAY TUMOR CA 15-3: CPT | Performed by: INTERNAL MEDICINE

## 2019-06-25 PROCEDURE — 85025 COMPLETE CBC W/AUTO DIFF WBC: CPT | Performed by: INTERNAL MEDICINE

## 2019-06-25 PROCEDURE — 82378 CARCINOEMBRYONIC ANTIGEN: CPT | Performed by: INTERNAL MEDICINE

## 2019-06-25 PROCEDURE — 83735 ASSAY OF MAGNESIUM: CPT | Performed by: INTERNAL MEDICINE

## 2019-06-25 PROCEDURE — 99214 OFFICE O/P EST MOD 30 MIN: CPT | Mod: ZP | Performed by: PHYSICIAN ASSISTANT

## 2019-06-25 PROCEDURE — 80053 COMPREHEN METABOLIC PANEL: CPT | Performed by: INTERNAL MEDICINE

## 2019-06-25 PROCEDURE — 36415 COLL VENOUS BLD VENIPUNCTURE: CPT

## 2019-06-25 RX ORDER — LETROZOLE 2.5 MG/1
2.5 TABLET, FILM COATED ORAL DAILY
Qty: 28 TABLET | Refills: 0 | Status: SHIPPED | OUTPATIENT
Start: 2019-06-25 | End: 2019-07-25

## 2019-06-25 RX ORDER — MIRTAZAPINE 15 MG/1
TABLET, FILM COATED ORAL
Qty: 30 TABLET | Refills: 3 | Status: SHIPPED | OUTPATIENT
Start: 2019-06-25 | End: 2022-02-03

## 2019-06-25 ASSESSMENT — PAIN SCALES - GENERAL: PAINLEVEL: EXTREME PAIN (9)

## 2019-06-25 ASSESSMENT — MIFFLIN-ST. JEOR: SCORE: 2026.9

## 2019-06-25 NOTE — NURSING NOTE
"Oncology Rooming Note    June 25, 2019 10:50 AM   Jade Collins is a 49 year old female who presents for:    Chief Complaint   Patient presents with     Blood Draw     Labs drawn via  by RN in lab. VS taken.      Oncology Clinic Visit     Return visit related to Breast Cancer     Initial Vitals: /64 (BP Location: Right arm, Patient Position: Sitting, Cuff Size: Adult Large)   Pulse 102   Temp 98.3  F (36.8  C) (Oral)   Resp 16   Ht 1.727 m (5' 7.99\")   Wt 135.4 kg (298 lb 6.4 oz)   SpO2 93%   BMI 45.38 kg/m   Estimated body mass index is 45.38 kg/m  as calculated from the following:    Height as of this encounter: 1.727 m (5' 7.99\").    Weight as of this encounter: 135.4 kg (298 lb 6.4 oz). Body surface area is 2.55 meters squared.  Extreme Pain (9) Comment: Data Unavailable   No LMP recorded. Patient is postmenopausal.  Allergies reviewed: Yes  Medications reviewed: Yes    Medications: MEDICATION REFILLS NEEDED TODAY. Provider was notified.  Pharmacy name entered into [facility]:    [id number] [address] [facility] - [place], [place] - [address]    Clinical concerns: Refills needed today. Provider was notified.      Kay Almanzar LPN            "

## 2019-06-25 NOTE — ORAL ONC MGMT
Oral Chemotherapy Monitoring Program    Evelina saw patient today and okayed her start of next Ibrance cycle for Friday (6/28) with ANC of 0.9 from today. Released refill to [facility].    Jade Fatima, Pharm.D., [facility]  [phone]  06/25/19

## 2019-06-25 NOTE — NURSING NOTE
Chief Complaint   Patient presents with     Blood Draw     Labs drawn via  by RN in lab. VS taken.      Labs drawn via venipuncture. Vital signs taken. Checked into next appointment.   Ana Byrd RN

## 2019-06-25 NOTE — PROGRESS NOTES
Oncology/Hematology Visit Note  Jun 25, 2019    Reason for Visit: follow up of recurrent metastatic ER-positive HER2 negative breast cancer    History of Present Illness: Jade Collins is a 49 year old female with a history of left breast cancer diagnosed in 12/2013 in [place] area and Dr. David Redd was her medical oncologist. Biopsy showed tumor to be ER positive, AK positive and HER2 negative. She underwent lumpectomy and SLNB by Dr. Tasha Segura in [place]. [place] lymph node was noted to be involved with tumor but no lymph node dissection done at that time. She subsequently underwent adjuvant ddAC x 4 cycles and Taxol x 12 weeks, competed in 9/11/2014. She had post lumpectomy radiation completed in 11/23/2014 by Dr. Manley. She was begun on an AI in 11/2014, but does not recall name.     She was then switched within about a month or so to anastrozole and remained on anastrozole from 11/2014-02/2017.  She has never received tamoxifen. She then moved to [place], [place] in 03/2017.  She saw a gynecologist there and underwent a IRIS/BSO by Dr. Zendejas.  She then also saw Dr. Linares in [place], [place], who is an oncologist.  His interpretation of the pathology report was that she had triple-negative breast cancer.  It is possible that she may have had low estrogen receptor positivity, but the anastrozole was then discontinued in 02/2017.  She then underwent the IRIS/BSO in 03/2017.  This was done laparoscopically.        She then remained off of all hormonal therapy and in 09/2017 moved to [place].  She remained off hormonal therapy and did not have Medical Oncology followup initially.        She was driving for [place] and noticed lymph nodes in the left neck about 8 weeks ago.  She then went to see Dr. Norberto Brand who performed a PET/CT scan and the patient also underwent a brain MRI for staging.  The PET/CT scan performed 08/03/2018 which showed mildly enlarged left posterior cervical lymph nodes, largest measuring  1.5 x 1.0 cm and mildly enlarged aorticopulmonary and prevascular lymph nodes that were noted on contrast-enhanced CT scan seen less well on the PET scan.  No evidence of metastatic disease in the abdomen and pelvis.  She also underwent a brain MRI that showed no evidence of intracranial metastatic disease.  She underwent a biopsy of one of the lymph nodes in the left neck which showed metastatic adenocarcinoma consistent with breast origin, which was estrogen-receptor positive.  The tumor cells were positive for CK7, ER and OK consistent with metastatic breast carcinoma.  Estrogen receptor showed strong average nuclear intensity in 95% of carcinoma cells, progesterone receptor moderate average nuclear intensity in 70% of the carcinoma.  A GATA3 stain was apparently not performed.     She started Ibrance on 9/21 and letrozole on 9/23. She had improved disease on restaging on 11/26. Last restaging on 4/29/19 with stable disease.     Please see Dr. Bhatt's previous notes for further details on the patient's history. She comes in today for routine follow up.    Interval History:  Jade is here for follow up. She states she has been getting leg cramps in bilateral thighs and joint pains in wrists and upper extremities in the past week. She states sometimes in more humid whether she has more muscle cramps/joint aches and she is not sure if related to letrozole or not but is now problematic and interfering with her sleep as she cannot get comfortable. She also had 3 episodes of large diarrhea yesterday, with associated low appetite and nausea. No vomiting. No fevers, but did have chills and more intense night sweats. She finished Ibrance on 6/21. Diarrhea resolved without intervention. She did take some tums for stomach discomfort. She also drank some gingerale. Appetite has returned today and no longer feeling any nausea.    She is using remeron at night which helps her sleep. She returned from [place] last week where  she visited her sister and helped distribute her father's ashes. She had a good experience overall. Mood is stable. She did not have any leg swelling or lymphedema from plane ride.    She is on Nasocort and Advair for asthma which is effective. No recent asthma symptoms, cough or dyspnea. No urinary concerns. Remaining 10 point ROS negative    Current Outpatient Medications   Medication Sig Dispense Refill     ACETAMINOPHEN PO Take 650 mg by mouth every 4 hours as needed for pain       albuterol (PROAIR HFA/PROVENTIL HFA/VENTOLIN HFA) 108 (90 Base) MCG/ACT inhaler Inhale 2 puffs into the lungs every 6 hours 1 Inhaler 4     albuterol (PROVENTIL) (2.5 MG/3ML) 0.083% neb solution Take 1 vial (2.5 mg) by nebulization every 6 hours as needed for shortness of breath / dyspnea or wheezing 30 vial 0     calcium carbonate (TUMS) 500 MG chewable tablet Take 1 chew tab by mouth as needed for heartburn       Calcium Citrate-Vitamin D (CALCIUM + D PO) Take 250 mg by mouth 2 times daily        fluticasone-salmeterol (AIRDUO RESPICLICK) 113-14 MCG/ACT inhaler Inhale 1 puff into the lungs 2 times daily 1 Inhaler 11     IBUPROFEN PO Take 200 mg by mouth every 4 hours as needed for moderate pain       letrozole (FEMARA) 2.5 MG tablet Take 1 tablet (2.5 mg) by mouth daily for 28 days 28 tablet 0     letrozole (FEMARA) 2.5 MG tablet Take 1 tablet (2.5 mg) by mouth daily for 28 days 28 tablet 0     letrozole (FEMARA) 2.5 MG tablet Take 1 tablet (2.5 mg) by mouth daily 28 tablet 3     levothyroxine (SYNTHROID/LEVOTHROID) 200 MCG tablet Take 1 tablet (200 mcg) by mouth daily 30 tablet 0     loratadine (CLARITIN) 10 MG tablet Take 1 tablet (10 mg) by mouth daily 90 tablet 3     mirtazapine (REMERON) 15 MG tablet TK 1 T PO HS  0     Multiple Vitamins-Minerals (MULTIVITAMIN ADULT PO)        ondansetron (ZOFRAN ODT) 8 MG ODT tab Take 1 tablet (8 mg) by mouth 3 times daily as needed for nausea 30 tablet 1     palbociclib (IBRANCE) 125 MG  "capsule CHEMO Take 1 capsule (125 mg) by mouth daily with food Take for 21 days, then 7 days off. Avoid grapefruit. Do not open/crush/chew capsule. 21 capsule 0     palbociclib (IBRANCE) 125 MG capsule CHEMO Take 1 capsule (125 mg) by mouth daily with food Take for 21 days, then 7 days off. Avoid grapefruit. Do not open/crush/chew capsule. 21 capsule 0     palbociclib (IBRANCE) 125 MG capsule CHEMO Take 1 capsule (125 mg) by mouth daily with food Take for 21 days, then 7 days off. Avoid grapefruit. Do not open/crush/chew capsule. 21 capsule 0     prochlorperazine (COMPAZINE) 10 MG tablet Take 1 tablet (10 mg) by mouth every 6 hours as needed for nausea or vomiting 30 tablet 0     sertraline (ZOLOFT) 100 MG tablet Take 1 tablet (100 mg) by mouth 2 times daily 60 tablet 3     triamcinolone (NASACORT) 55 MCG/ACT inhaler Spray 1 spray into both nostrils daily        VITAMIN D, CHOLECALCIFEROL, PO Take 2,000 Units by mouth daily        methylPREDNISolone (MEDROL) 16 MG tablet Take 32 mg by mouth 12 hours before the procedure and repeat 32 mg by mouth 2 hours before the procedure to prevent contrast reaction. (Patient not taking: Reported on 6/25/2019) 4 tablet 0       Physical Examination:  General: The patient is a pleasant female in no acute distress.  /64 (BP Location: Right arm, Patient Position: Sitting, Cuff Size: Adult Large)   Pulse 102   Temp 98.3  F (36.8  C) (Oral)   Resp 16   Ht 1.727 m (5' 7.99\")   Wt 135.4 kg (298 lb 6.4 oz)   SpO2 93%   BMI 45.38 kg/m    Wt Readings from Last 10 Encounters:   06/25/19 135.4 kg (298 lb 6.4 oz)   05/29/19 136.9 kg (301 lb 13 oz)   04/30/19 136.9 kg (301 lb 11.2 oz)   04/09/19 133.8 kg (295 lb)   04/02/19 133.9 kg (295 lb 3.2 oz)   03/06/19 131.1 kg (289 lb)   02/19/19 129.5 kg (285 lb 8 oz)   02/05/19 127.9 kg (282 lb)   01/22/19 128.1 kg (282 lb 4.8 oz)   01/09/19 125.7 kg (277 lb 1.6 oz)     HEENT: EOMI, PERRL. Sclerae are anicteric. Oral mucosa is pink and " moist with no lesions or thrush.   Lymph: No palpable cervical, supraclavicular, subclavicular or axillary lymphadenopathy.  Heart: Regular rate and rhythm.   Lungs: Clear to auscultation bilaterally.   Breast: Deferred today   Abdomen: Bowel sounds present, soft, nontender with no palpable hepatosplenomegaly or masses.   Extremities: No lower extremity edema noted bilaterally.   Neuro: Cranial nerves II through XII are grossly intact.  Skin: No rashes, petechiae, or bruising noted on exposed skin. Some horizontal ridges with darker coloring at base of nails.     Laboratory Data:   Results for ANAHI DOBSON (MRN [id number]) as of 6/25/2019 12:20   6/25/2019 10:36   Sodium 136   Potassium 3.8   Chloride 102   Carbon Dioxide 26   Urea Nitrogen 16   Creatinine 1.10 (H)   GFR Estimate 59 (L)   GFR Estimate If Black 68   Calcium 9.5   Anion Gap 8   Magnesium 2.0   Albumin 4.2   Protein Total 7.8   Bilirubin Total 0.5   Alkaline Phosphatase 71   ALT 28   AST 23   Glucose 104 (H)   WBC 2.2 (L)   Hemoglobin 13.4   Hematocrit 38.8   Platelet Count 150   RBC Count 3.98   MCV 98   MCH 33.7 (H)   MCHC 34.5   RDW 12.9   Diff Method Manual Differential   % Neutrophils 43.0   % Lymphocytes 46.0   % Monocytes 6.0   % Eosinophils 3.0   % Basophils 2.0   Absolute Neutrophil 0.9 (L)   Absolute Lymphocytes 1.0   Absolute Monocytes 0.1   Absolute Eosinophils 0.1   Absolute Basophils 0.0     Results for ANAHI DOBSON (MRN [id number]) as of 6/25/2019 20:13   Ref. Range 5/28/2019 11:02 6/5/2019 09:10 6/25/2019 10:36   CA 27-29 Latest Ref Range: 0 - 39 U/mL 22 19 20   Results for ANAHI DOBSON (MRN [id number]) as of 6/25/2019 20:13   Ref. Range 5/28/2019 11:02 6/5/2019 09:10 6/25/2019 10:36   CEA Latest Ref Range: 0 - 2.5 ug/L 0.9 0.8 0.9     Assessment and Plan:    Recurrent, metastatic ER-positive, HER2 negative breast cancer: History of left breast cancer s/p lumpectomy and SNLB in 2014. S/p 4 cycles ddAC and 12 weeks of Taxol. On AI from  11/2014-2/2017. Recurrence on PET/CT scan performed 08/03/2018 and then biopsy to left posterior cervical lymph nodes, aorticopulmonary, and prevascular lymph nodes. No evidence of metastatic disease in the abdomen and pelvis or brain. Started Ibrance and letrozole on 9/21/18. Tolerating well aside from some hot flashes and mild joint stiffness. Last restaging 4/29 with stable disease. Contrast allergy and next scan with be without contrast and bone scan.   --Labs reviewed. ANC 0.9 but she is on her week off. Suspect she had viral gastroenteritis based on her symptoms but she is feeling better today with no nausea or diarrhea. Ok to resume on Ibrance on Friday. She is having more myalgias/arthralgias which may have been exacerbated by dehydration. Electrolytes WNL today. Encouraged gatorade. If continues to have myalgias/arthralgias can consider changing letrozole to another AI.    Post-nasal drip, congestion: Now on Nasacort per Dr. Hay. Also recommended nasal saline washes daily to BID.     Asthma: Seen by Dr. Hay and started on Advair with improvement of symptoms. Also has albuterol prn    Bone health: bone targeting agents are not required as she has no evidence of bone metastases. Reduce calcium as high today and continue vitamin D.     Depression: On zoloft 200 mg, recently increased with improvement in symptoms. Also on remeron at bedtime--refilled today.Sees Sari Holliday.    Follow up as scheduled with scans and Dr. Bhatt in July.    Evelina Garcia PA-C  [facility]  [address] [place] [address] [id number] [phone]

## 2019-06-26 ENCOUNTER — TELEPHONE (OUTPATIENT)
Dept: ONCOLOGY | Facility: CLINIC | Age: 49
End: 2019-06-26

## 2019-06-26 NOTE — ORAL ONC MGMT
Oral Chemotherapy Monitoring Program    Primary Oncologist: Dr. Bhatt  Primary Oncology Clinic: [facility]   Cancer Diagnosis: Breast Cancer      Drug: Ibrance 125mg once daily x 21 days on, 7 days off   Therapy History:  C1D1 9/21/2018  Next cycle: 5/3/19, 5/31/19, 6/28/19      Drug Interaction Assessment: No new drug-drug interactions.     Lab Monitoring Plan  CBC/CMP monthly      Subjective/Objective:  Jade Collins is a 49 year old female contacted by phone for a follow-up visit for oral chemotherapy. She reports she continues to do well on Ibrance. She verbally confirmed correct dose 1 x 125mg capsule for 21 days on and 7 days off and cycle start dates. She takes at night since she has night sweats. No new/worse side effects, medication changes or missed doses. No concerns or questions today.     ORAL CHEMOTHERAPY 9/25/2018 10/16/2018 1/8/2019 1/11/2019 5/1/2019 5/29/2019 6/26/2019   Drug Name Ibrance (Palbociclib) Ibrance (Palbociclib) Ibrance (Palbociclib) Ibrance (Palbociclib) Ibrance (Palbociclib) Ibrance (Palbociclib) Ibrance (Palbociclib)   Current Dosage 125mg 125mg 125mg 125mg 125mg 125mg 125mg   Current Schedule Daily Daily Daily Daily Daily Daily Daily   Cycle Details 3 weeks on 1 week off 3 weeks on 1 week off 3 weeks on 1 week off 3 weeks on 1 week off 3 weeks on 1 week off 3 weeks on 1 week off 3 weeks on 1 week off   Start Date of Last Cycle 9/21/2018 9/21/2018 12/14/2018 - 4/5/2019 5/3/2019 5/31/2019   Planned next cycle start date - 10/19/2018 1/11/2019 1/11/2019 5/3/2019 5/31/2019 6/28/2019   Doses missed in last 2 weeks - 0 0 - 0 0 0   Adherence Assessment Adherent Adherent Adherent - Adherent Adherent Adherent   Adverse Effects Arthralgias Nausea;Diarrhea Diarrhea Diarrhea;Fatigue Diarrhea;Fatigue No AE identified during assessment No AE identified during assessment   Nausea - Grade 2 - - - - -   Pharmacist Intervention(nausea) - Yes - - - - -   Intervention(s) - Rx medication  "recommendation - - - - -   Diarrhea - Grade 2 Grade 2 Grade 1 Resolved due to intervention - -   Pharmacist Intervention(diarrhea) - No No Yes - - -   Intervention(s) - - - Patient education - - -   Fatigue - - - Grade 1 Resolved due to intervention - -   Pharmacist Intervention(fatigue) - - - Yes - - -   Arthralgias Grade 1 - - - - - -   Pharmacist Intervention(arthralgias) Yes - - - - - -   Intervention(s) OTC recommendation - - - - - -   Home BPs - - - - not needed not needed not needed   Any new drug interactions? No - - - No No No   Is the dose as ordered appropriate for the patient? Yes Yes - - Yes Yes Yes   Is the patient currently in pain? Yes - - - No No No   Does the patient feel the pain is currently being managed by a provider? Yes - - - - - -   Has the patient been assessed within the past 6 months for depression? No - - - - - -   Has the patient missed any days of school, work, or other routine activity? - - - - No No No       Last PHQ-2 Score on record:   PHQ-2 ( 1999 [employer]) 9/18/2018   Q1: Little interest or pleasure in doing things 1   Q2: Feeling down, depressed or hopeless 1   PHQ-2 Score 2       Patient does not report depression symptoms.      Vitals:  BP:   BP Readings from Last 1 Encounters:   06/25/19 118/64     Wt Readings from Last 1 Encounters:   06/25/19 135.4 kg (298 lb 6.4 oz)     Estimated body surface area is 2.55 meters squared as calculated from the following:    Height as of 6/25/19: 1.727 m (5' 7.99\").    Weight as of 6/25/19: 135.4 kg (298 lb 6.4 oz).    Labs:  _  Result Component Current Result Ref Range   Sodium 136 (6/25/2019) 133 - 144 mmol/L     _  Result Component Current Result Ref Range   Potassium 3.8 (6/25/2019) 3.4 - 5.3 mmol/L     _  Result Component Current Result Ref Range   Calcium 9.5 (6/25/2019) 8.5 - 10.1 mg/dL     _  Result Component Current Result Ref Range   Magnesium 2.0 (6/25/2019) 1.6 - 2.3 mg/dL     No results found for Phos within last 30 days. "     _  Result Component Current Result Ref Range   Albumin 4.2 (6/25/2019) 3.4 - 5.0 g/dL     _  Result Component Current Result Ref Range   Urea Nitrogen 16 (6/25/2019) 7 - 30 mg/dL     _  Result Component Current Result Ref Range   Creatinine 1.10 (H) (6/25/2019) 0.52 - 1.04 mg/dL       _  Result Component Current Result Ref Range   AST 23 (6/25/2019) 0 - 45 U/L     _  Result Component Current Result Ref Range   ALT 28 (6/25/2019) 0 - 50 U/L     _  Result Component Current Result Ref Range   Bilirubin Total 0.5 (6/25/2019) 0.2 - 1.3 mg/dL       _  Result Component Current Result Ref Range   WBC 2.2 (L) (6/25/2019) 4.0 - 11.0 10e9/L     _  Result Component Current Result Ref Range   Hemoglobin 13.4 (6/25/2019) 11.7 - 15.7 g/dL     _  Result Component Current Result Ref Range   Platelet Count 150 (6/25/2019) 150 - 450 10e9/L     _  Result Component Current Result Ref Range   Absolute Neutrophil 0.9 (L) (6/25/2019) 1.6 - 8.3 10e9/L       Assessment:  Jade is tolerating Ibrance well with no new or worse side effects and will continue current treatment.      PDC=1, Denies missed doses and no concerns with adherence.      Plan:  Continue Ibrance 125mg daily x 21 days on, 7 days off. Cycle starts 6/28/2019.      Follow-Up:  7/23: Dr. Bhatt appointment and labs      Refill Due:  Patient will pick-up Ibrance and letrozole at [facility] on 6/28.       Thank you for the opportunity to participate in the care of the above patient,  Jose Swain PharmD  Hematology/Oncology Clinical Pharmacist  [facility]  [facility]  [phone]        Oral Chemotherapy Monitoring Program    Placed call in follow up of Ibrance (palbociclib) therapy and refill. Left message requesting call back. Next cycle due to start 6/28/19.     Thank you for the opportunity to participate in the care of the above patient,  Jose Swain PharmD  Hematology/Oncology Clinical Pharmacist  [facility]  [facility]  [phone]

## 2019-06-27 ENCOUNTER — OFFICE VISIT (OUTPATIENT)
Dept: PALLIATIVE CARE | Facility: CLINIC | Age: 49
End: 2019-06-27
Attending: SOCIAL WORKER
Payer: COMMERCIAL

## 2019-06-27 DIAGNOSIS — F33.1 MODERATE EPISODE OF RECURRENT MAJOR DEPRESSIVE DISORDER (H): ICD-10-CM

## 2019-06-27 DIAGNOSIS — F43.10 POSTTRAUMATIC STRESS DISORDER: Primary | ICD-10-CM

## 2019-06-27 DIAGNOSIS — Z51.5 ENCOUNTER FOR PALLIATIVE CARE: ICD-10-CM

## 2019-06-27 PROCEDURE — G0463 HOSPITAL OUTPT CLINIC VISIT: HCPCS

## 2019-06-27 PROCEDURE — 40000114 ZZH STATISTIC NO CHARGE CLINIC VISIT

## 2019-06-27 PROCEDURE — 90834 PSYTX W PT 45 MINUTES: CPT | Performed by: SOCIAL WORKER

## 2019-06-27 NOTE — LETTER
"6/27/2019       RE: Jade Collins  [address]  [place] [address]     Dear Colleague,    Thank you for referring your patient, Jade Collins, to the [facility] at [facility]. Please see a copy of my visit note below.    Palliative Care Clinical Social Work Return Appointment    PLEASE NOTE:  THIS IS A MENTAL HEALTH NOTE.  OTHER PROVIDERS VIEWING THIS NOTE SHOULD USE THIS ONLY FOR UNDERSTANDING THE CONTEXT OF THE PATIENT S EXPERIENCE.  TOPICS DESCRIBED IN THIS NOTE SHOULD NOT BE REFERENCED TO THE PATIENT BY MEDICAL PROVIDERS.    Jade is a 49 year old woman with metastatic breast cancer, seen today at [facility] for a return psychotherapy appointment to address PTSD, recurrent major depression, and anxiety as these relate to coping with illness and treatment.    Mental Status Exam:(List all that apply)      Appearance: Appropriate      Eye Contact: Good       Orientation: Yes, x4      Mood: hopeful, thankful      Affect: Appropriate, full range, expressive      Thought Content: Clear         Thought Form: Logical      Psychomotor Behavior: Normal    Adjustment to Illness: Not focus today.    Mental Health (thoughts, feelings, actions, coping, and symptoms):   We continue Narrative Exposure Therapy with events around move from [place] to mom's house, continued health experiences including hysterectomy.    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    Relationships: See above. Processes re: boundary setting with person she was seeing for a while. Looks forward to moving to new apartment in September.    End of Life and Advance Care Planning: Not focus today.    Main therapeutic interventions provided this session include:  Psychoeducation, facilitating processing of feelings and thoughts, structured problem solving, grief counseling, and Narrative Exposure Therapy - exposure session    Plan:  Will return for psychotherapy with palliative care focus in 2 - 3 weeks at [facility].    Time spent with patient/family:  50 minutes (Start 3:10pm, end 4:00pm)      Palliative Care Counseling Treatment Plan    Client's Name: Jade Collins  YOB: 1970    Date: 1/9/2019    Initial DSM5 Diagnoses:   296.32 Major Depressive Disorder, Recurrent Episode, Moderate With anxious distress  309.81 (F43.10) Posttraumatic Stress Disorder (includes Posttraumatic Stress Dsiorder for Children 6 Years and Younger)  With dissociative symptoms      Date to review plan (90 days usually): 3/20/19    Referral / Collaboration:  .Referral to another professional/service is not indicated at this time.    Anticipated number of sessions to complete episode of care:  10         Treatment Goal(s)  Date Goal Dates  Reviewed Status   12/20/2018   Goal 1:  Client will decrease PTSD symptoms.      Continued   12/20/2018   Objective #1A (Client Action)  Client will Increase understanding of PTSD/ASD and Process trauma history in safe counseling environment using Narrative Exposure Therapy.    Intervention(s)  Therapist will Provide psychoeducation and Narrative Exposure Therapy .    Continued                   Date Goal Dates  Reviewed Status   12/20/2018   Goal 2:  Client  will address illness related stressors.    Continued   12/20/2018   Objective #2A (Client Action)  Client will Communicate effectively with family/friends re needs and Communicate effectively with medical provider re needed information.    Intervention(s) (Therapist Action)  Therapist will Provide psychoeducation, Facilitate couples/family therapy session(s) and Facilitate structured problem solving.    Continued   12/20/2018   Objective #2B  Client will Identify effective coping strategies and Use/practice relaxation strategies.    Intervention(s)  Therapist will Provide psychoeducation, Provide behavioral intervention training, Facilitate processing of thoughts and feelings and Facilitate structured problem solving.    Continued   12/20/2018     Objective #2C  Client will Complete health care directive and/or POLST form.    Intervention(s)  Therapist will Provide advance care planning education.    Continued       Date Goal Dates  Reviewed Status   1/9/2019   Goal 3:  Client will effectively manage recurrent major depressive disorder in light of increased stressors.    Continued   1/9/2019   Objective #3A (Client Action)  Client will Identify activities that provide comfort and/or pleasure, Participate in activities that provide comfort and/or pleasure and Identify an activity that would provide meaning/contribute to feeling of self worth.    Intervention(s) (Therapist Action)  Therapist will Provide psychoeducation, Facilitate processing of thoughts and feelings and Facilitate structured problem solving.    Contined   1/9/2019   Objective #3B  Client will Effectively communicate needs to others, Identify a crisis plan for suicidal ideation and Identify triggers/early signs of depressed mood.    Intervention(s)  Therapist will Provide psychoeducation, Facilitate processing of thoughts and feelings, Facilitate structured problem solving and facilitate safety planning as needed..    Continued               Client  has contributed regarding goals and concerns, but has not reviewed the treatment plan. Plan to review at future session.    ROMULO Ulloa, LICSW    DO NOT SEND ANY LETTERS      Again, thank you for allowing me to participate in the care of your patient.      Sincerely,    CED Cottrell

## 2019-07-12 ENCOUNTER — OFFICE VISIT (OUTPATIENT)
Dept: PALLIATIVE CARE | Facility: CLINIC | Age: 49
End: 2019-07-12
Attending: SOCIAL WORKER
Payer: COMMERCIAL

## 2019-07-12 DIAGNOSIS — Z51.5 ENCOUNTER FOR PALLIATIVE CARE: Primary | ICD-10-CM

## 2019-07-12 DIAGNOSIS — F43.10 POSTTRAUMATIC STRESS DISORDER: ICD-10-CM

## 2019-07-12 PROCEDURE — 40000114 ZZH STATISTIC NO CHARGE CLINIC VISIT

## 2019-07-12 PROCEDURE — 90834 PSYTX W PT 45 MINUTES: CPT | Performed by: SOCIAL WORKER

## 2019-07-12 NOTE — LETTER
"7/12/2019       RE: Jade Collins  [address]  [place] [address]     Dear Colleague,    Thank you for referring your patient, Jade Collins, to the [facility] at [facility]. Please see a copy of my visit note below.    Palliative Care Clinical Social Work Return Appointment    PLEASE NOTE:  THIS IS A MENTAL HEALTH NOTE.  OTHER PROVIDERS VIEWING THIS NOTE SHOULD USE THIS ONLY FOR UNDERSTANDING THE CONTEXT OF THE PATIENT S EXPERIENCE.  TOPICS DESCRIBED IN THIS NOTE SHOULD NOT BE REFERENCED TO THE PATIENT BY MEDICAL PROVIDERS.    Jade is a 49 year old woman with metastatic breast cancer, seen today at [facility] for a return psychotherapy appointment to address PTSD, recurrent major depression, and anxiety as these relate to coping with illness and treatment.    Mental Status Exam:(List all that apply)      Appearance: Appropriate      Eye Contact: Good       Orientation: Yes, x4      Mood: hopeful, thankful      Affect: Appropriate, full range, expressive      Thought Content: Clear         Thought Form: Logical      Psychomotor Behavior: Normal    Adjustment to Illness: Processing diagnosis of metastatic breast cancer in 2018.    Mental Health (thoughts, feelings, actions, coping, and symptoms):   We complete Narrative Exposure therapy narrative today including move to [place] and engaging with oncology care here.    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    Relationships: Enjoyed time with sister and nephew when they visited. Continuing to have grief related to loss of father and reflecting on their relationship over time. Positive contact with her mom.    End of Life and Advance Care Planning: Not focus today.    Main therapeutic interventions provided this session include:  Psychoeducation, facilitating processing of feelings and thoughts, structured problem solving, grief counseling, and Narrative Exposure Therapy - exposure session    Plan:  Will return for psychotherapy with palliative care focus later in August or September to complete NET final reflection on narrative.    Time spent with patient/family:  50 minutes (Start 3:00pm, end 3:50pm)      Palliative Care Counseling Treatment Plan    Client's Name: Jade Collins  YOB: 1970    Date: 1/9/2019    Initial DSM5 Diagnoses:   296.32 Major Depressive Disorder, Recurrent Episode, Moderate With anxious distress  309.81 (F43.10) Posttraumatic Stress Disorder (includes Posttraumatic Stress Dsiorder for Children 6 Years and Younger)  With dissociative symptoms      Date to review plan (90 days usually): 3/20/19    Referral / Collaboration:  .Referral to another professional/service is not indicated at this time.    Anticipated number of sessions to complete episode of care:  10         Treatment Goal(s)  Date Goal Dates  Reviewed Status   12/20/2018   Goal 1:  Client will decrease PTSD symptoms.      Continued   12/20/2018   Objective #1A (Client Action)  Client will Increase understanding of PTSD/ASD and Process trauma history in safe counseling environment using Narrative Exposure Therapy.    Intervention(s)  Therapist will Provide psychoeducation and Narrative  Exposure Therapy .    Continued                   Date Goal Dates  Reviewed Status   12/20/2018   Goal 2:  Client will address illness related stressors.    Continued   12/20/2018   Objective #2A (Client Action)  Client will Communicate effectively with family/friends re needs and Communicate effectively with medical provider re needed information.    Intervention(s) (Therapist Action)  Therapist will Provide psychoeducation, Facilitate couples/family therapy session(s) and Facilitate structured problem solving.    Continued   12/20/2018   Objective #2B  Client will Identify effective coping strategies and Use/practice relaxation strategies.    Intervention(s)  Therapist will Provide psychoeducation, Provide behavioral intervention training, Facilitate processing of thoughts and feelings and Facilitate structured problem solving.    Continued   12/20/2018     Objective #2C  Client will Complete health care directive and/or POLST form.    Intervention(s)  Therapist will Provide advance care planning education.    Continued       Date Goal Dates  Reviewed Status   1/9/2019   Goal 3:  Client will effectively manage recurrent major depressive disorder in light of increased stressors.    Continued   1/9/2019   Objective #3A (Client Action)  Client will Identify activities that provide comfort and/or pleasure, Participate in activities that provide comfort and/or pleasure and Identify an activity that would provide meaning/contribute to feeling of self worth.    Intervention(s) (Therapist Action)  Therapist will Provide psychoeducation, Facilitate processing of thoughts and feelings and Facilitate structured problem solving.    Contined   1/9/2019   Objective #3B  Client will Effectively communicate needs to others, Identify a crisis plan for suicidal ideation and Identify triggers/early signs of depressed mood.    Intervention(s)  Therapist will Provide psychoeducation, Facilitate processing of thoughts and feelings,  Facilitate structured problem solving and facilitate safety planning as needed..    Continued               Client has contributed regarding goals and concerns, but has not reviewed the treatment plan. Plan to review at future session.    ROMULO Ulloa, LICSW      DO NOT SEND ANY LETTERS    Again, thank you for allowing me to participate in the care of your patient.      Sincerely,    Lisa Holliday, SANDRASW

## 2019-07-22 ENCOUNTER — HOSPITAL ENCOUNTER (OUTPATIENT)
Dept: NUCLEAR MEDICINE | Facility: CLINIC | Age: 49
Setting detail: NUCLEAR MEDICINE
Discharge: HOME | End: 2019-07-22
Attending: INTERNAL MEDICINE | Admitting: INTERNAL MEDICINE
Payer: COMMERCIAL

## 2019-07-22 ENCOUNTER — HOSPITAL ENCOUNTER (OUTPATIENT)
Dept: CT IMAGING | Facility: CLINIC | Age: 49
End: 2019-07-22
Attending: INTERNAL MEDICINE
Payer: COMMERCIAL

## 2019-07-22 ENCOUNTER — HOSPITAL ENCOUNTER (OUTPATIENT)
Dept: NUCLEAR MEDICINE | Facility: CLINIC | Age: 49
Setting detail: NUCLEAR MEDICINE
End: 2019-07-22
Attending: INTERNAL MEDICINE
Payer: COMMERCIAL

## 2019-07-22 ENCOUNTER — HOSPITAL ENCOUNTER (OUTPATIENT)
Dept: CT IMAGING | Facility: CLINIC | Age: 49
Discharge: HOME | End: 2019-07-22
Attending: INTERNAL MEDICINE | Admitting: INTERNAL MEDICINE
Payer: COMMERCIAL

## 2019-07-22 DIAGNOSIS — C50.412 MALIGNANT NEOPLASM OF UPPER-OUTER QUADRANT OF LEFT BREAST IN FEMALE, ESTROGEN RECEPTOR POSITIVE (H): ICD-10-CM

## 2019-07-22 DIAGNOSIS — Z17.0 MALIGNANT NEOPLASM OF UPPER-OUTER QUADRANT OF LEFT BREAST IN FEMALE, ESTROGEN RECEPTOR POSITIVE (H): ICD-10-CM

## 2019-07-22 PROCEDURE — 74176 CT ABD & PELVIS W/O CONTRAST: CPT

## 2019-07-22 PROCEDURE — 78306 BONE IMAGING WHOLE BODY: CPT

## 2019-07-22 PROCEDURE — A9503 TC99M MEDRONATE: HCPCS | Performed by: INTERNAL MEDICINE

## 2019-07-22 PROCEDURE — 70490 CT SOFT TISSUE NECK W/O DYE: CPT

## 2019-07-22 PROCEDURE — 34300033 ZZH RX 343: Performed by: INTERNAL MEDICINE

## 2019-07-22 RX ORDER — TC 99M MEDRONATE 20 MG/10ML
20-30 INJECTION, POWDER, LYOPHILIZED, FOR SOLUTION INTRAVENOUS ONCE
Status: COMPLETED | OUTPATIENT
Start: 2019-07-22 | End: 2019-07-22

## 2019-07-22 RX ADMIN — TC 99M MEDRONATE 25.1 MCI.: 20 INJECTION, POWDER, LYOPHILIZED, FOR SOLUTION INTRAVENOUS at 09:14

## 2019-07-23 DIAGNOSIS — Z17.0 MALIGNANT NEOPLASM OF UPPER-OUTER QUADRANT OF LEFT BREAST IN FEMALE, ESTROGEN RECEPTOR POSITIVE (H): Primary | ICD-10-CM

## 2019-07-23 DIAGNOSIS — C50.412 MALIGNANT NEOPLASM OF UPPER-OUTER QUADRANT OF LEFT BREAST IN FEMALE, ESTROGEN RECEPTOR POSITIVE (H): Primary | ICD-10-CM

## 2019-07-23 RX ORDER — LETROZOLE 2.5 MG/1
2.5 TABLET, FILM COATED ORAL DAILY
Qty: 28 TABLET | Refills: 0 | Status: SHIPPED | OUTPATIENT
Start: 2019-07-23 | End: 2019-10-14

## 2019-07-23 NOTE — PROGRESS NOTES
"Palliative Care Clinical Social Work Return Appointment    PLEASE NOTE:  THIS IS A MENTAL HEALTH NOTE.  OTHER PROVIDERS VIEWING THIS NOTE SHOULD USE THIS ONLY FOR UNDERSTANDING THE CONTEXT OF THE PATIENT S EXPERIENCE.  TOPICS DESCRIBED IN THIS NOTE SHOULD NOT BE REFERENCED TO THE PATIENT BY MEDICAL PROVIDERS.    Jade is a 49 year old woman with metastatic breast cancer, seen today at [facility] for a return psychotherapy appointment to address PTSD, recurrent major depression, and anxiety as these relate to coping with illness and treatment.    Mental Status Exam:(List all that apply)      Appearance: Appropriate      Eye Contact: Good       Orientation: Yes, x4      Mood: reflective, describing relief, generally stable mood recently      Affect: Appropriate, full range, expressive      Thought Content: Clear         Thought Form: Logical      Psychomotor Behavior: Normal    Adjustment to Illness: Not focus today.    Mental Health (thoughts, feelings, actions, coping, and symptoms): Jade continues to process grieving her dad.    Today we complete NET session with focus on her 20s.      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    Relationships: See above. Relationships with sister, " partner who was in long term.    End of Life and Advance Care Planning: Not focus today.     Main therapeutic interventions provided this session include:  Psychoeducation, facilitating processing of feelings and thoughts, structured problem solving, grief counseling, and Narrative Exposure Therapy - exposure session    Plan:  Will return for psychotherapy with palliative care focus in 1 week at [facility] (May 2).      Time spent with patient/family:  60 minutes (Start 3:05pm, end 4:05pm)  Narrative Exposure Therapy, longer session      Palliative Care Counseling Treatment Plan    Client's Name: Jade Collins  YOB: 1970    Date: 1/9/2019    Initial DSM5 Diagnoses:   296.32 Major Depressive Disorder, Recurrent Episode, Moderate With anxious distress  309.81 (F43.10) Posttraumatic Stress Disorder (includes Posttraumatic Stress Dsiorder for Children 6 Years and Younger)  With dissociative symptoms      Date to review plan (90 days usually): 3/20/19    Referral / Collaboration:  .Referral to another professional/service is not indicated at this time.    Anticipated number of sessions to complete episode of care:  10         Treatment Goal(s)  Date Goal Dates  Reviewed Status   12/20/2018   Goal 1:  Client will decrease PTSD symptoms.      Continued   12/20/2018   Objective #1A (Client Action)  Client will Increase understanding of PTSD/ASD and Process trauma history in safe counseling environment using Narrative Exposure Therapy.    Intervention(s)  Therapist will Provide psychoeducation and Narrative Exposure Therapy .    Continued                   Date Goal Dates  Reviewed Status   12/20/2018   Goal 2:  Client will address illness related stressors.    Continued   12/20/2018   Objective #2A (Client Action)  Client will Communicate effectively with family/friends re needs and Communicate effectively with medical provider re needed information.    Intervention(s) (Therapist Action)  Therapist will Provide  psychoeducation, Facilitate couples/family therapy session(s) and Facilitate structured problem solving.    Continued   12/20/2018   Objective #2B  Client will Identify effective coping strategies and Use/practice relaxation strategies.    Intervention(s)  Therapist will Provide psychoeducation, Provide behavioral intervention training, Facilitate processing of thoughts and feelings and Facilitate structured problem solving.    Continued   12/20/2018     Objective #2C  Client will Complete health care directive and/or POLST form.    Intervention(s)  Therapist will Provide advance care planning education.    Continued       Date Goal Dates  Reviewed Status   1/9/2019   Goal 3:  Client will effectively manage recurrent major depressive disorder in light of increased stressors.    Continued   1/9/2019   Objective #3A (Client Action)  Client will Identify activities that provide comfort and/or pleasure, Participate in activities that provide comfort and/or pleasure and Identify an activity that would provide meaning/contribute to feeling of self worth.    Intervention(s) (Therapist Action)  Therapist will Provide psychoeducation, Facilitate processing of thoughts and feelings and Facilitate structured problem solving.    Contined   1/9/2019   Objective #3B  Client will Effectively communicate needs to others, Identify a crisis plan for suicidal ideation and Identify triggers/early signs of depressed mood.    Intervention(s)  Therapist will Provide psychoeducation, Facilitate processing of thoughts and feelings, Facilitate structured problem solving and facilitate safety planning as needed..    Continued               Client has contributed regarding goals and concerns, but has not reviewed the treatment plan. Plan to review at future session.    Lisa Holliday, ROMULO, LICSW      DO NOT SEND ANY LETTERS

## 2019-07-24 ENCOUNTER — TELEPHONE (OUTPATIENT)
Dept: ONCOLOGY | Facility: CLINIC | Age: 49
End: 2019-07-24

## 2019-07-24 NOTE — PROGRESS NOTES
HISTORY OF PRESENT ILLNESS:  Jade is a 48-year-old woman with metastatic breast cancer who comes to our clinic for recommendations for further treatment.  She is referred by Dr. Norberto Brand at [facility].  Jade would like to continue her care at the [facility].       Jade was diagnosed with breast cancer with a lump found in the upper-inner quadrant of the left breast.  She was diagnosed in 12/2013 in the [place] area and Dr. David Redd was her medical oncologist.  Biopsy of the tumor showed it to be ER positive, MI positive, and HER-2 negative.  We do not have any of the original pathology and we need to obtain those reports.  She underwent a lumpectomy performed by Dr. Tasha Segura who is a surgeon in the [place] area.  She also had a sentinel lymph node which was noted to be involved with tumor but there was no lymph node dissection done at that time.  [id number].  She subsequently underwent adjuvant chemotherapy with dose-dense AC for 4 cycles and Taxol for 12 weeks, completed 09/11/2014.        She then had radiation therapy post lumpectomy to the left breast, which was completed 11/23/2014 by Dr. Manley.        She was then begun on an aromatase inhibitor, the name of which she does not remember.  The aromatase inhibitor therapy was begun in 11/2014.  She was then switched within about a month or so to anastrozole and remained on anastrozole from 11/2014-02/2017.  She has never received tamoxifen.        She then moved to [place], [place] in 03/2017.  She saw a gynecologist there and underwent a IRIS/BSO by Dr. Zendejas.  She then also saw Dr. Linares in [place], [place], who is an oncologist.  His interpretation of the pathology report was that she had triple-negative breast cancer.  It is possible that she may have had low estrogen receptor positivity, but the anastrozole was then discontinued in 02/2017.  She then underwent the IRIS/BSO in 03/2017.  This was done laparoscopically.        She then  remained off of all hormonal therapy and in 09/2017 moved to [place].  She remained off hormonal therapy and did not have Medical Oncology followup initially.        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  She then went to see Dr. Norberto Brand who performed a PET/CT scan and the patient also underwent a brain MRI for staging.  The PET/CT scan performed 08/03/2018 showed in the neck there were several mildly metabolic active and large prominent left posterior cervical lymph nodes.  The largest is located posterior to the left sternocleidomastoid muscle and measures 1.5 x 1 cm in size.  This demonstrate modest FDG uptake.  The other lymph nodes are smaller and demonstrate mild FDG uptake.  The prominent prevascular and aortopulmonary lymph nodes were also seen on the contrast-enhanced scan were less well seen on the PET.  The largest lymph node visualized on the PET scan was 1.1 x 0.8 cm and demonstrated mild FDG uptake.  This was in the periaortic area just anterior to the aortic arch.  No lung nodules.  No lung infiltrates.  No pleural effusion.  There was no uptake of FDG in the bones.  In summary, there were mildly enlarged left posterior cervical lymph nodes, largest measuring 1.5 x 1.0 cm and mildly enlarged aorticopulmonary and prevascular lymph nodes that were noted on contrast-enhanced CT scan seen less well on the PET scan.  No evidence of metastatic disease in the abdomen and pelvis.  She also underwent a brain MRI that showed no evidence of intracranial metastatic disease.  She underwent a biopsy of one of the lymph nodes in the left neck which showed metastatic adenocarcinoma consistent with breast origin, which was estrogen-receptor positive.  The tumor cells were positive for CK7, ER and ID consistent with metastatic breast carcinoma.  Estrogen receptor showed strong average nuclear intensity in 95% of carcinoma cells, progesterone receptor moderate average  nuclear intensity in 70% of the carcinoma.  A GATA3 stain was apparently not performed.       Jade came to our clinic for recommendations.  She has mild pain in the left neck, moderate fatigue, moderate depression and has begun on sertraline, and she has moderate anxiety.      Nausea.   Zofran.  Persistent symptoms of depression sertraline was increased to 150 mg per day.          FOLLOWUP NOTE     INTERVAL HISTORY:  Jade returns to clinic and has been feeling generally well.  She had a CT scan of chest, abdomen and pelvis as well as the neck that shows no evidence of disease progression.  She had a bone scan that shows no evidence of bone metastases.  She reports that her left neck is sore and that she has left-sided migraine headaches.  Nonetheless, she is fully active and was jet skiing and tubing over the weekend with her relatives.        No pain, no fatigue, no depression, no anxiety.  She has had an upper respiratory infection that is without a cough, which is now clearing up and notably the CT portion of her PET/CT scan showed no evidence of infiltrates or infection.  She does have some loose stools.  She has been on an antibiotic prescribed by her primary care provider for a left eye infection which occurred after she reportedly poked herself in the right eye area.  This is resolving.  She does have some loose stools and we will check a C. diff.  She is eating a diet, which is mostly from Meals on Wheels.  She has been exercising some.  She does not drink alcohol.  She is taking vitamin D and calcium.      Notably, her father  recently and I expressed my condolences.      PHYSICAL EXAMINATION:   VITAL SIGNS:  Blood pressure 138/83, temperature 97.5, pulse 72, respirations 16, O2 sat 94% on room air, height 1.7 meters and weight 137 kg.   GENERAL:  Jade appeared generally well, no alopecia.   HEENT:  No lesions in the oropharynx.   LYMPH:  There is no palpable cervical, supraclavicular, subclavicular or  axillary lymphadenopathy.   LUNGS:  Clear to percussion and auscultation.   HEART:  Regular rate and rhythm, S1, S2.   ABDOMEN:  Soft and nontender, consistent with increased BMI.   EXTREMITIES:  Without edema.   PSYCHIATRIC:  Mood and affect were normal.      LABORATORY DATA:  The CMP is within normal limits.  CBC showed a WBC of 2.2, hemoglobin 12.3 and platelets 138,000.  The absolute neutrophil count is 1000.  She is due to restart Ibrance.      IMAGING:  CT scan of the chest, abdomen and pelvis showed no convincing evidence of metastatic disease in this noncontrast study in the chest, abdomen and pelvis.  Diffuse hepatic steatosis was noted and stable nonenlarged lymph nodes, previously demonstrated hypermetabolic activity on the PET/CT of 09/13/2018.  There are subcentimeter mediastinal lymph nodes, which are stable and unchanged by size criteria.      ASSESSMENT AND PLAN:    1. Jade Collins is a 49-year-old woman with a history of metastatic ER-positive, HER2 -negative breast cancer.  She presented with a cancer of the left breast, initially unknown pathology, [id number] by her report.  The pathology slides were requested from [place], but had not been obtained.  She did have an ER-positive, HER2-negative breast cancer by report.  She was treated with a left breast lumpectomy, adjuvant chemotherapy with AC followed by Taxol followed by radiation followed by raloxifene for 2 years, followed by an aromatase inhibitor for 6 months.  She then had a IRIS/BSO.  [place].  She was off hormonal therapy for 18 months.  She [facility] the [facility] with a new diagnosis of recurrent metastatic, ER-positive, HER2-negative breast cancer.   2.  She began palbociclib and letrozole 09/21/2018.  She had followup imaging which showed response to therapy and resolution of her left neck lymphadenopathy.  She has had no evidence of progression.  She has no  evidence of bony metastatic disease and indeed had a negative bone scan done on Monday.  She continues palbociclib 125 mg per, 21 days on, 7 days off in combination with letrozole.  She has tolerated this combination quite well.     3.  Imaging will be in 3 months, and will be a CT scan of the neck, chest, abdomen and pelvis. Jade is in agreement with this plan.     4.  Her father passed away in March.  Her anxiety and depression have improved since then.  She has declined a Psychology referral.  She says a lot of her anxiety was caused by financial insecurity, which has been helped by an inheritance.   5.  History of asthma.  Lungs are clear today.   6.  Dietary counseling was performed.  I reinforced the emphasis on whole foods and less prepared foods.   7.  Depression and anxiety. Sertraline 200 mg daily.   8.  Diarrhea associated with recent oral antibiotic treatment for an eye infection.  I have requested a C. diff on her stool.   9. Followup.  We will see Jade in followup with Evelina Garcia in 1 month. Follow up with Evelina 8-22, 9-19 and with me 10-17 with CBC, CMP, CA27.29 and CEA with all visits. CT CAP on October 16. Stool today for c dif.     Thank you for allowing us to continue to participate in Jade Collins's care.      Wilfredo Bhatt MD      [facility]     I spent 35 minutes with the patient more than 50% of which was in counseling and coordination  of care.

## 2019-07-25 ENCOUNTER — ONCOLOGY VISIT (OUTPATIENT)
Dept: ONCOLOGY | Facility: CLINIC | Age: 49
End: 2019-07-25
Attending: INTERNAL MEDICINE
Payer: COMMERCIAL

## 2019-07-25 ENCOUNTER — APPOINTMENT (OUTPATIENT)
Dept: LAB | Facility: CLINIC | Age: 49
End: 2019-07-25
Attending: INTERNAL MEDICINE
Payer: COMMERCIAL

## 2019-07-25 ENCOUNTER — OFFICE VISIT (OUTPATIENT)
Dept: PALLIATIVE CARE | Facility: CLINIC | Age: 49
End: 2019-07-25
Attending: SOCIAL WORKER
Payer: COMMERCIAL

## 2019-07-25 ENCOUNTER — PATIENT OUTREACH (OUTPATIENT)
Dept: ONCOLOGY | Facility: CLINIC | Age: 49
End: 2019-07-25

## 2019-07-25 VITALS
OXYGEN SATURATION: 94 % | SYSTOLIC BLOOD PRESSURE: 138 MMHG | HEART RATE: 72 BPM | WEIGHT: 293 LBS | TEMPERATURE: 97.5 F | HEIGHT: 68 IN | BODY MASS INDEX: 44.41 KG/M2 | DIASTOLIC BLOOD PRESSURE: 83 MMHG | RESPIRATION RATE: 16 BRPM

## 2019-07-25 DIAGNOSIS — C50.412 MALIGNANT NEOPLASM OF UPPER-OUTER QUADRANT OF LEFT BREAST IN FEMALE, ESTROGEN RECEPTOR POSITIVE (H): ICD-10-CM

## 2019-07-25 DIAGNOSIS — R19.7 DIARRHEA, UNSPECIFIED TYPE: ICD-10-CM

## 2019-07-25 DIAGNOSIS — Z91.041 CONTRAST MEDIA ALLERGY: ICD-10-CM

## 2019-07-25 DIAGNOSIS — F43.10 POSTTRAUMATIC STRESS DISORDER: ICD-10-CM

## 2019-07-25 DIAGNOSIS — Z17.0 MALIGNANT NEOPLASM OF UPPER-OUTER QUADRANT OF LEFT BREAST IN FEMALE, ESTROGEN RECEPTOR POSITIVE (H): ICD-10-CM

## 2019-07-25 DIAGNOSIS — R05.9 COUGH: Primary | ICD-10-CM

## 2019-07-25 DIAGNOSIS — F33.41 MAJOR DEPRESSIVE DISORDER, RECURRENT EPISODE, IN PARTIAL REMISSION (H): Primary | ICD-10-CM

## 2019-07-25 DIAGNOSIS — Z51.5 ENCOUNTER FOR PALLIATIVE CARE: ICD-10-CM

## 2019-07-25 LAB
ALBUMIN SERPL-MCNC: 3.8 G/DL (ref 3.4–5)
ALP SERPL-CCNC: 65 U/L (ref 40–150)
ALT SERPL W P-5'-P-CCNC: 32 U/L (ref 0–50)
ANION GAP SERPL CALCULATED.3IONS-SCNC: 6 MMOL/L (ref 3–14)
AST SERPL W P-5'-P-CCNC: 26 U/L (ref 0–45)
BASOPHILS # BLD AUTO: 0 10E9/L (ref 0–0.2)
BASOPHILS NFR BLD AUTO: 1.8 %
BILIRUB SERPL-MCNC: 0.4 MG/DL (ref 0.2–1.3)
BUN SERPL-MCNC: 10 MG/DL (ref 7–30)
CALCIUM SERPL-MCNC: 8.7 MG/DL (ref 8.5–10.1)
CANCER AG27-29 SERPL-ACNC: 16 U/ML (ref 0–39)
CEA SERPL-MCNC: 0.5 UG/L (ref 0–2.5)
CHLORIDE SERPL-SCNC: 107 MMOL/L (ref 94–109)
CO2 SERPL-SCNC: 25 MMOL/L (ref 20–32)
CREAT SERPL-MCNC: 0.92 MG/DL (ref 0.52–1.04)
DIFFERENTIAL METHOD BLD: ABNORMAL
EOSINOPHIL # BLD AUTO: 0.1 10E9/L (ref 0–0.7)
EOSINOPHIL NFR BLD AUTO: 2.7 %
ERYTHROCYTE [DISTWIDTH] IN BLOOD BY AUTOMATED COUNT: 13.2 % (ref 10–15)
GFR SERPL CREATININE-BSD FRML MDRD: 73 ML/MIN/{1.73_M2}
GLUCOSE SERPL-MCNC: 98 MG/DL (ref 70–99)
HCT VFR BLD AUTO: 35.6 % (ref 35–47)
HGB BLD-MCNC: 12.3 G/DL (ref 11.7–15.7)
IMM GRANULOCYTES # BLD: 0 10E9/L (ref 0–0.4)
IMM GRANULOCYTES NFR BLD: 0 %
LYMPHOCYTES # BLD AUTO: 0.9 10E9/L (ref 0.8–5.3)
LYMPHOCYTES NFR BLD AUTO: 41.6 %
MCH RBC QN AUTO: 34.1 PG (ref 26.5–33)
MCHC RBC AUTO-ENTMCNC: 34.6 G/DL (ref 31.5–36.5)
MCV RBC AUTO: 99 FL (ref 78–100)
MONOCYTES # BLD AUTO: 0.2 10E9/L (ref 0–1.3)
MONOCYTES NFR BLD AUTO: 10.5 %
NEUTROPHILS # BLD AUTO: 1 10E9/L (ref 1.6–8.3)
NEUTROPHILS NFR BLD AUTO: 43.4 %
NRBC # BLD AUTO: 0 10*3/UL
NRBC BLD AUTO-RTO: 0 /100
PLATELET # BLD AUTO: 138 10E9/L (ref 150–450)
POTASSIUM SERPL-SCNC: 3.8 MMOL/L (ref 3.4–5.3)
PROT SERPL-MCNC: 7.3 G/DL (ref 6.8–8.8)
RBC # BLD AUTO: 3.61 10E12/L (ref 3.8–5.2)
SODIUM SERPL-SCNC: 139 MMOL/L (ref 133–144)
WBC # BLD AUTO: 2.2 10E9/L (ref 4–11)

## 2019-07-25 PROCEDURE — 82378 CARCINOEMBRYONIC ANTIGEN: CPT | Performed by: INTERNAL MEDICINE

## 2019-07-25 PROCEDURE — 86300 IMMUNOASSAY TUMOR CA 15-3: CPT | Performed by: INTERNAL MEDICINE

## 2019-07-25 PROCEDURE — G0463 HOSPITAL OUTPT CLINIC VISIT: HCPCS | Mod: ZF

## 2019-07-25 PROCEDURE — 90837 PSYTX W PT 60 MINUTES: CPT | Performed by: SOCIAL WORKER

## 2019-07-25 PROCEDURE — 36415 COLL VENOUS BLD VENIPUNCTURE: CPT

## 2019-07-25 PROCEDURE — 80053 COMPREHEN METABOLIC PANEL: CPT | Performed by: INTERNAL MEDICINE

## 2019-07-25 PROCEDURE — 99214 OFFICE O/P EST MOD 30 MIN: CPT | Mod: ZP | Performed by: INTERNAL MEDICINE

## 2019-07-25 PROCEDURE — 85025 COMPLETE CBC W/AUTO DIFF WBC: CPT | Performed by: INTERNAL MEDICINE

## 2019-07-25 RX ORDER — ERYTHROMYCIN 5 MG/G
1 OINTMENT OPHTHALMIC
COMMUNITY
Start: 2019-07-18 | End: 2019-08-22

## 2019-07-25 RX ORDER — METHYLPREDNISOLONE 32 MG/1
32 TABLET ORAL DAILY
Qty: 2 TABLET | Refills: 3 | Status: SHIPPED | OUTPATIENT
Start: 2019-07-25 | End: 2019-07-25

## 2019-07-25 RX ORDER — DOXYCYCLINE HYCLATE 100 MG
100 TABLET ORAL 2 TIMES DAILY
COMMUNITY
Start: 2019-07-18 | End: 2019-09-19

## 2019-07-25 ASSESSMENT — MIFFLIN-ST. JEOR: SCORE: 2044.11

## 2019-07-25 ASSESSMENT — PAIN SCALES - GENERAL: PAINLEVEL: EXTREME PAIN (8)

## 2019-07-25 NOTE — LETTER
7/25/2019       RE: Jade Collins  [address]  [facility] [address]     Dear Colleague,    Thank you for referring your patient, Jade Collins, to the [facility]. Please see a copy of my visit note below.    HISTORY OF PRESENT ILLNESS:  Jade is a 48-year-old woman with metastatic breast cancer who comes to our clinic for recommendations for further treatment.  She is referred by Dr. Norberto Brand at [facility].  Jade would like to continue her care at the [facility].       Jade was diagnosed with breast cancer with a lump found in the upper-inner quadrant of the left breast.  She was diagnosed in 12/2013 in the [place] area and Dr. David Redd was her medical oncologist.  Biopsy of the tumor showed it to be ER positive, IN positive, and HER-2 negative.  We do not have any of the original pathology and we need to obtain those reports.  She underwent a lumpectomy performed by Dr. Tasha Segura who is a surgeon in the [place] area.  She also had a sentinel lymph node which was noted to be involved with tumor but there was no lymph node dissection done at that time.  [id number].  She subsequently underwent adjuvant chemotherapy with dose-dense AC for 4 cycles and Taxol for 12 weeks, completed 09/11/2014.        She then had radiation therapy post lumpectomy to the left breast, which was completed 11/23/2014 by Dr. Manley.        She was then begun on an aromatase inhibitor, the name of which she does not remember.  The aromatase inhibitor therapy was begun in 11/2014.  She was then switched within about a month or so to anastrozole and remained on anastrozole from 11/2014-02/2017.  She has never received tamoxifen.        She then moved to [place], [place] in 03/2017.  She saw a gynecologist there and underwent a IRIS/BSO by Dr. Zendejas.  She then also saw Dr. Linares in [place], [place], who is an oncologist.  His interpretation of the pathology report was that she had triple-negative  breast cancer.  It is possible that she may have had low estrogen receptor positivity, but the anastrozole was then discontinued in 02/2017.  She then underwent the IRIS/BSO in 03/2017.  This was done laparoscopically.        She then remained off of all hormonal therapy and in 09/2017 moved to [place].  She remained off hormonal therapy and did not have Medical Oncology followup initially.        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  She then went to see Dr. Norberto Brand who performed a PET/CT scan and the patient also underwent a brain MRI for staging.  The PET/CT scan performed 08/03/2018 showed in the neck there were several mildly metabolic active and large prominent left posterior cervical lymph nodes.  The largest is located posterior to the left sternocleidomastoid muscle and measures 1.5 x 1 cm in size.  This demonstrate modest FDG uptake.  The other lymph nodes are smaller and demonstrate mild FDG uptake.  The prominent prevascular and aortopulmonary lymph nodes were also seen on the contrast-enhanced scan were less well seen on the PET.  The largest lymph node visualized on the PET scan was 1.1 x 0.8 cm and demonstrated mild FDG uptake.  This was in the periaortic area just anterior to the aortic arch.  No lung nodules.  No lung infiltrates.  No pleural effusion.  There was no uptake of FDG in the bones.  In summary, there were mildly enlarged left posterior cervical lymph nodes, largest measuring 1.5 x 1.0 cm and mildly enlarged aorticopulmonary and prevascular lymph nodes that were noted on contrast-enhanced CT scan seen less well on the PET scan.  No evidence of metastatic disease in the abdomen and pelvis.  She also underwent a brain MRI that showed no evidence of intracranial metastatic disease.  She underwent a biopsy of one of the lymph nodes in the left neck which showed metastatic adenocarcinoma consistent with breast origin, which was  estrogen-receptor positive.  The tumor cells were positive for CK7, ER and NY consistent with metastatic breast carcinoma.  Estrogen receptor showed strong average nuclear intensity in 95% of carcinoma cells, progesterone receptor moderate average nuclear intensity in 70% of the carcinoma.  A GATA3 stain was apparently not performed.       Jade came to our clinic for recommendations.  She has mild pain in the left neck, moderate fatigue, moderate depression and has begun on sertraline, and she has moderate anxiety.      Nausea.   Zofran.  Persistent symptoms of depression sertraline was increased to 150 mg per day.          FOLLOWUP NOTE     INTERVAL HISTORY:  Jade returns to clinic and has been feeling generally well.  She had a CT scan of chest, abdomen and pelvis as well as the neck that shows no evidence of disease progression.  She had a bone scan that shows no evidence of bone metastases.  She reports that her left neck is sore and that she has left-sided migraine headaches.  Nonetheless, she is fully active and was jet skiing and tubing over the weekend with her relatives.        No pain, no fatigue, no depression, no anxiety.  She has had an upper respiratory infection that is without a cough, which is now clearing up and notably the CT portion of her PET/CT scan showed no evidence of infiltrates or infection.  She does have some loose stools.  She has been on an antibiotic prescribed by her primary care provider for a left eye infection which occurred after she reportedly poked herself in the right eye area.  This is resolving.  She does have some loose stools and we will check a C. diff.  She is eating a diet, which is mostly from [employer]als [employer] Whe[employer].  She has been exercising some.  She does not drink alcohol.  She is taking vitamin D and calcium.      Notably, her father  recently and I expressed my condolences.      PHYSICAL EXAMINATION:   VITAL SIGNS:  Blood pressure 138/83, temperature 97.5, pulse 72,  respirations 16, O2 sat 94% on room air, height 1.7 meters and weight 137 kg.   GENERAL:  Jade appeared generally well, no alopecia.   HEENT:  No lesions in the oropharynx.   LYMPH:  There is no palpable cervical, supraclavicular, subclavicular or axillary lymphadenopathy.   LUNGS:  Clear to percussion and auscultation.   HEART:  Regular rate and rhythm, S1, S2.   ABDOMEN:  Soft and nontender, consistent with increased BMI.   EXTREMITIES:  Without edema.   PSYCHIATRIC:  Mood and affect were normal.      LABORATORY DATA:  The CMP is within normal limits.  CBC showed a WBC of 2.2, hemoglobin 12.3 and platelets 138,000.  The absolute neutrophil count is 1000.  She is due to restart Ibrance.      IMAGING:  CT scan of the chest, abdomen and pelvis showed no convincing evidence of metastatic disease in this noncontrast study in the chest, abdomen and pelvis.  Diffuse hepatic steatosis was noted and stable nonenlarged lymph nodes, previously demonstrated hypermetabolic activity on the PET/CT of 09/13/2018.  There are subcentimeter mediastinal lymph nodes, which are stable and unchanged by size criteria.      ASSESSMENT AND PLAN:    1. Jade Collins is a 49-year-old woman with a history of metastatic ER-positive, HER2 -negative breast cancer.  She presented with a cancer of the left breast, initially unknown pathology, [id number] by her report.  The pathology slides were requested from [place], but had not been obtained.  She did have an ER-positive, HER2-negative breast cancer by report.  She was treated with a left breast lumpectomy, adjuvant chemotherapy with AC followed by Taxol followed by radiation followed by raloxifene for 2 years, followed by an aromatase inhibitor for 6 months.  She then had a IRIS/BSO.  [place] the aromatase inhibitor.  She was off hormonal therapy for 18 months.  She came to the [facility] with a new diagnosis of recurrent metastatic, ER-positive,  HER2-negative breast cancer.   2.  She began palbociclib and letrozole 09/21/2018.  She had followup imaging which showed response to therapy and resolution of her left neck lymphadenopathy.  She has had no evidence of progression.  She has no evidence of bony metastatic disease and indeed had a negative bone scan done on Monday.  She continues palbociclib 125 mg per, 21 days on, 7 days off in combination with letrozole.  She has tolerated this combination quite well.     3.  Imaging will be in 3 months, and will be a CT scan of the neck, chest, abdomen and pelvis. Jade is in agreement with this plan.     4.  Her father passed away in March.  Her anxiety and depression have improved since then.  She has declined a Psychology referral.  She says a lot of her anxiety was caused by financial insecurity, which has been helped by an inheritance.   5.  History of asthma.  Lungs are clear today.   6.  Dietary counseling was performed.  I reinforced the emphasis on whole foods and less prepared foods.   7.  Depression and anxiety. Sertraline 200 mg daily.   8.  Diarrhea associated with recent oral antibiotic treatment for an eye infection.  I have requested a C. diff on her stool.   9. Followup.  We will see Jade in followup with Evelina Garcia in 1 month. Follow up with Evelina 8-22, 9-19 and with me 10-17 with CBC, CMP, CA27.29 and CEA with all visits. CT CAP on October 16. Stool today for c dif.     Thank you for allowing us to continue to participate in Jade Collins's care.      Wilfredo Bhatt MD      [facility]     I spent 35 minutes with the patient more than 50% of which was in counseling and coordination  of care.

## 2019-07-25 NOTE — ORAL ONC MGMT
Oral Chemotherapy Monitoring Program    Primary Oncologist: Dr. Bhatt  Primary Oncology Clinic: [facility]   Cancer Diagnosis: Breast Cancer      Drug: Ibrance 125mg once daily x 21 days on, 7 days off   Therapy History:  C1D1 9/21/2018  Next cycle: 5/3/19, 5/31/19, 6/28/19, 7/26/19      Drug Interaction Assessment: No new drug-drug interactions.     Lab Monitoring Plan  CBC/CMP monthly      Subjective/Objective:  Jade Collins is a 49 year old female contacted by phone for a follow-up visit for oral chemotherapy. She reports she continues to do well on Ibrance. She verbally confirmed correct dose 1 x 125mg capsule for 21 days on and 7 days off and cycle start dates. She takes at night since she has night sweats. No new/worse side effects, medication changes or missed doses. No concerns or questions today.     ORAL CHEMOTHERAPY 10/16/2018 1/8/2019 1/11/2019 5/1/2019 5/29/2019 6/26/2019 7/25/2019   Drug Name Ibrance (Palbociclib) Ibrance (Palbociclib) Ibrance (Palbociclib) Ibrance (Palbociclib) Ibrance (Palbociclib) Ibrance (Palbociclib) Ibrance (Palbociclib)   Current Dosage 125mg 125mg 125mg 125mg 125mg 125mg 125mg   Current Schedule Daily Daily Daily Daily Daily Daily Daily   Cycle Details 3 weeks on 1 week off 3 weeks on 1 week off 3 weeks on 1 week off 3 weeks on 1 week off 3 weeks on 1 week off 3 weeks on 1 week off 3 weeks on 1 week off   Start Date of Last Cycle 9/21/2018 12/14/2018 - 4/5/2019 5/3/2019 5/31/2019 6/28/2019   Planned next cycle start date 10/19/2018 1/11/2019 1/11/2019 5/3/2019 5/31/2019 6/28/2019 7/26/2019   Doses missed in last 2 weeks 0 0 - 0 0 0 0   Adherence Assessment Adherent Adherent - Adherent Adherent Adherent Adherent   Adverse Effects Nausea;Diarrhea Diarrhea Diarrhea;Fatigue Diarrhea;Fatigue No AE identified during assessment No AE identified during assessment No AE identified during assessment   Nausea Grade 2 - - - - - -   Pharmacist Intervention(nausea) Yes - - - - - -  "  Intervention(s) Rx medication recommendation - - - - - -   Diarrhea Grade 2 Grade 2 Grade 1 Resolved due to intervention - - -   Pharmacist Intervention(diarrhea) No No Yes - - - -   Intervention(s) - - Patient education - - - -   Fatigue - - Grade 1 Resolved due to intervention - - -   Pharmacist Intervention(fatigue) - - Yes - - - -   Arthralgias - - - - - - -   Pharmacist Intervention(arthralgias) - - - - - - -   Intervention(s) - - - - - - -   Home BPs - - - not needed not needed not needed not needed   Any new drug interactions? - - - No No No No   Is the dose as ordered appropriate for the patient? Yes - - Yes Yes Yes Yes   Is the patient currently in pain? - - - No No No No   Does the patient feel the pain is currently being managed by a provider? - - - - - - -   Has the patient been assessed within the past 6 months for depression? - - - - - - -   Has the patient missed any days of school, work, or other routine activity? - - - No No No No       Last PHQ-2 Score on record:   PHQ-2 ( 1999 [employer]) 9/18/2018   Q1: Little interest or pleasure in doing things 1   Q2: Feeling down, depressed or hopeless 1   PHQ-2 Score 2       Patient does not report depression symptoms.      Vitals:  BP:   BP Readings from Last 1 Encounters:   07/25/19 138/83     Wt Readings from Last 1 Encounters:   07/25/19 137.1 kg (302 lb 3.2 oz)     Estimated body surface area is 2.56 meters squared as calculated from the following:    Height as of 7/25/19: 1.727 m (5' 7.99\").    Weight as of 7/25/19: 137.1 kg (302 lb 3.2 oz).    Labs:  _  Result Component Current Result Ref Range   Sodium 139 (7/25/2019) 133 - 144 mmol/L     _  Result Component Current Result Ref Range   Potassium 3.8 (7/25/2019) 3.4 - 5.3 mmol/L     _  Result Component Current Result Ref Range   Calcium 8.7 (7/25/2019) 8.5 - 10.1 mg/dL     No results found for Mag within last 30 days.     No results found for Phos within last 30 days.     _  Result Component Current " Result Ref Range   Albumin 3.8 (7/25/2019) 3.4 - 5.0 g/dL     _  Result Component Current Result Ref Range   Urea Nitrogen 10 (7/25/2019) 7 - 30 mg/dL     _  Result Component Current Result Ref Range   Creatinine 0.92 (7/25/2019) 0.52 - 1.04 mg/dL       _  Result Component Current Result Ref Range   AST 26 (7/25/2019) 0 - 45 U/L     _  Result Component Current Result Ref Range   ALT 32 (7/25/2019) 0 - 50 U/L     _  Result Component Current Result Ref Range   Bilirubin Total 0.4 (7/25/2019) 0.2 - 1.3 mg/dL       _  Result Component Current Result Ref Range   WBC 2.2 (L) (7/25/2019) 4.0 - 11.0 10e9/L     _  Result Component Current Result Ref Range   Hemoglobin 12.3 (7/25/2019) 11.7 - 15.7 g/dL     _  Result Component Current Result Ref Range   Platelet Count 138 (L) (7/25/2019) 150 - 450 10e9/L     _  Result Component Current Result Ref Range   Absolute Neutrophil 1.0 (L) (7/25/2019) 1.6 - 8.3 10e9/L       Assessment:  Jade is tolerating Ibrance well with no new or worse side effects and will continue current treatment.      PDC=1, no missed doses and no concerns with adherence.      Plan:  Continue Ibrance 125mg daily x 21 days on, 7 days off. Cycle starts 7/26/2019.      Follow-Up:  8/22: Evelina Garcia appointment and labs      Refill Due:  Patient will pick-up Ibrance and letrozole at [facility] on 7/25.   She picks up at [facility] after appointment each month.      Thank you for the opportunity to participate in the care of the above patient,  Jose Swain, PharmD  Hematology/Oncology Clinical Pharmacist  [facility]  [facility]  [phone]

## 2019-07-25 NOTE — NURSING NOTE
"Oncology Rooming Note    July 25, 2019 7:36 AM   Jade Collins is a 49 year old female who presents for:    Chief Complaint   Patient presents with     Oncology Clinic Visit     RETURN VISIT; BREAST CA FOLLOW UP      Blood Draw     labs drawn by venipuncture by rn.  vital signs taken.     Initial Vitals: /83 (BP Location: Right arm, Patient Position: Sitting, Cuff Size: Adult Regular)   Pulse 72   Temp 97.5  F (36.4  C) (Oral)   Resp 16   Ht 1.727 m (5' 7.99\")   Wt 137.1 kg (302 lb 3.2 oz)   SpO2 94%   Breastfeeding? No   BMI 45.96 kg/m   Estimated body mass index is 45.96 kg/m  as calculated from the following:    Height as of this encounter: 1.727 m (5' 7.99\").    Weight as of this encounter: 137.1 kg (302 lb 3.2 oz). Body surface area is 2.56 meters squared.  Extreme Pain (8) Comment: Data Unavailable   No LMP recorded. Patient is postmenopausal.  Allergies reviewed: Yes  Medications reviewed: Yes    Medications: Medication refills not needed today.  Pharmacy name entered into [facility]:    [id number] - [place], [place] - [address] [facility] [address]  [facility] - [place], [place] - [address]    Clinical concerns: No new concerns today  Dr Bhatt was notified.      Ashwini Hernandez              "

## 2019-07-25 NOTE — PROGRESS NOTES
"Spoke to patient to verify contrast allergy with shortness of breath, \"itchy throat\" and eye swelling. Patient took dexamethasone with 4/29/19 imaging as Dr Bhatt prescribed and was able to tolerate contrast. Dr Bhatt recommended CT CAP imaging in three months with contrast and ordered dexamethasone and sent prescription to the [facility] in [place].  Answered all patient's questions and verbalized understanding. Farheen Price RN, BSN.      "
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
## 2019-07-25 NOTE — NURSING NOTE
Chief Complaint   Patient presents with     Oncology Clinic Visit     RETURN VISIT; BREAST CA FOLLOW UP      Blood Draw     labs drawn by venipuncture by rn.  vital signs taken.     Labs drawn by venipuncture by RN in lab.  Vital signs taken.  Pt checked in to next appointment.  Irma Yap RN
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
## 2019-08-13 NOTE — PROGRESS NOTES
"Palliative Care Clinical Social Work Return Appointment    PLEASE NOTE:  THIS IS A MENTAL HEALTH NOTE.  OTHER PROVIDERS VIEWING THIS NOTE SHOULD USE THIS ONLY FOR UNDERSTANDING THE CONTEXT OF THE PATIENT S EXPERIENCE.  TOPICS DESCRIBED IN THIS NOTE SHOULD NOT BE REFERENCED TO THE PATIENT BY MEDICAL PROVIDERS.    Jade is a 49 year old woman with metastatic breast cancer, seen today at [facility] [place] return psychotherapy appointment to address PTSD, recurrent major depression, and anxiety as these relate to coping with illness and treatment.    Mental Status Exam:(List all that apply)      Appearance: Appropriate      Eye Contact: Good       Orientation: Yes, x4      Mood: grieving, expressive      Affect: Appropriate, full range, expressive      Thought Content: Clear         Thought Form: Logical      Psychomotor Behavior: Normal    Adjustment to Illness: Not focus today.    Mental Health (thoughts, feelings, actions, coping, and symptoms): Jade describes feeling more relief and comfort recently, being able to balance remembering the ways her dad did attempt to show her love and consideration, as well as her grief and disappointments connected with their relationship especially his absence in her childhood.     Today we complete NET session with focus on her late 20s.      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    Relationships: Addressed as part of NET today.    End of Life and Advance Care Planning: Not focus today.     Main therapeutic interventions provided this session include:  Psychoeducation, facilitating processing of feelings and thoughts, structured problem solving, grief counseling, and Narrative Exposure Therapy - exposure session    Plan:  Will return for psychotherapy with palliative care focus in 1 - 2 weeks.      Time spent with patient/family:  60 minutes (Start 2:05pm, end 3:05pm)  Narrative Exposure Therapy, longer session      Palliative Care Counseling Treatment Plan    Client's Name: Jade Collins  YOB: 1970    Date: 1/9/2019    Initial DSM5 Diagnoses:   296.32 Major Depressive Disorder, Recurrent Episode, Moderate With anxious distress  309.81 (F43.10) Posttraumatic Stress Disorder (includes Posttraumatic Stress Dsiorder for Children 6 Years and Younger)  With dissociative symptoms      Date to review plan (90 days usually): 3/20/19    Referral / Collaboration:  .Referral to another professional/service is not indicated at this time.    Anticipated number of sessions to complete episode of care:  10         Treatment Goal(s)  Date Goal Dates  Reviewed Status   12/20/2018   Goal 1:  Client will decrease PTSD symptoms.      Continued   12/20/2018   Objective #1A (Client Action)  Client will Increase understanding of PTSD/ASD and Process trauma history in safe counseling environment using Narrative Exposure Therapy.    Intervention(s)  Therapist will Provide psychoeducation and Narrative Exposure Therapy .    Continued                   Date Goal Dates  Reviewed Status   12/20/2018   Goal 2:  Client will address illness related stressors.    Continued   12/20/2018   Objective #2A (Client Action)  Client will Communicate effectively with family/friends re needs  and Communicate effectively with medical provider re needed information.    Intervention(s) (Therapist Action)  Therapist will Provide psychoeducation, Facilitate couples/family therapy session(s) and Facilitate structured problem solving.    Continued   12/20/2018   Objective #2B  Client will Identify effective coping strategies and Use/practice relaxation strategies.    Intervention(s)  Therapist will Provide psychoeducation, Provide behavioral intervention training, Facilitate processing of thoughts and feelings and Facilitate structured problem solving.    Continued   12/20/2018     Objective #2C  Client will Complete health care directive and/or POLST form.    Intervention(s)  Therapist will Provide advance care planning education.    Continued       Date Goal Dates  Reviewed Status   1/9/2019   Goal 3:  Client will effectively manage recurrent major depressive disorder in light of increased stressors.    Continued   1/9/2019   Objective #3A (Client Action)  Client will Identify activities that provide comfort and/or pleasure, Participate in activities that provide comfort and/or pleasure and Identify an activity that would provide meaning/contribute to feeling of self worth.    Intervention(s) (Therapist Action)  Therapist will Provide psychoeducation, Facilitate processing of thoughts and feelings and Facilitate structured problem solving.    Contined   1/9/2019   Objective #3B  Client will Effectively communicate needs to others, Identify a crisis plan for suicidal ideation and Identify triggers/early signs of depressed mood.    Intervention(s)  Therapist will Provide psychoeducation, Facilitate processing of thoughts and feelings, Facilitate structured problem solving and facilitate safety planning as needed..    Continued               Client has contributed regarding goals and concerns, but has not reviewed the treatment plan. Plan to review at future session.    Lisa Holliday, MSW, LICSW      DO NOT  SEND ANY LETTERS

## 2019-08-19 DIAGNOSIS — Z17.0 MALIGNANT NEOPLASM OF UPPER-OUTER QUADRANT OF LEFT BREAST IN FEMALE, ESTROGEN RECEPTOR POSITIVE (H): Primary | ICD-10-CM

## 2019-08-19 DIAGNOSIS — C50.412 MALIGNANT NEOPLASM OF UPPER-OUTER QUADRANT OF LEFT BREAST IN FEMALE, ESTROGEN RECEPTOR POSITIVE (H): Primary | ICD-10-CM

## 2019-08-19 RX ORDER — LETROZOLE 2.5 MG/1
2.5 TABLET, FILM COATED ORAL DAILY
Qty: 28 TABLET | Refills: 0 | Status: SHIPPED | OUTPATIENT
Start: 2019-08-19 | End: 2019-08-22

## 2019-08-22 ENCOUNTER — ONCOLOGY VISIT (OUTPATIENT)
Dept: ONCOLOGY | Facility: CLINIC | Age: 49
End: 2019-08-22
Attending: PHYSICIAN ASSISTANT
Payer: COMMERCIAL

## 2019-08-22 ENCOUNTER — APPOINTMENT (OUTPATIENT)
Dept: LAB | Facility: CLINIC | Age: 49
End: 2019-08-22
Attending: INTERNAL MEDICINE
Payer: COMMERCIAL

## 2019-08-22 ENCOUNTER — TELEPHONE (OUTPATIENT)
Dept: ONCOLOGY | Facility: CLINIC | Age: 49
End: 2019-08-22

## 2019-08-22 VITALS
RESPIRATION RATE: 16 BRPM | DIASTOLIC BLOOD PRESSURE: 62 MMHG | HEART RATE: 74 BPM | WEIGHT: 293 LBS | OXYGEN SATURATION: 96 % | TEMPERATURE: 98.2 F | BODY MASS INDEX: 45.96 KG/M2 | SYSTOLIC BLOOD PRESSURE: 116 MMHG

## 2019-08-22 DIAGNOSIS — Z17.0 MALIGNANT NEOPLASM OF UPPER-OUTER QUADRANT OF LEFT BREAST IN FEMALE, ESTROGEN RECEPTOR POSITIVE (H): ICD-10-CM

## 2019-08-22 DIAGNOSIS — C50.412 MALIGNANT NEOPLASM OF UPPER-OUTER QUADRANT OF LEFT BREAST IN FEMALE, ESTROGEN RECEPTOR POSITIVE (H): ICD-10-CM

## 2019-08-22 PROBLEM — F32.A DEPRESSION, UNSPECIFIED DEPRESSION TYPE: Status: ACTIVE | Noted: 2018-01-24

## 2019-08-22 PROBLEM — R59.9 LYMPH NODES ENLARGED: Status: ACTIVE | Noted: 2018-07-27

## 2019-08-22 PROBLEM — E66.9 OBESITY: Status: ACTIVE | Noted: 2018-07-06

## 2019-08-22 PROBLEM — E03.9 PRIMARY HYPOTHYROIDISM: Status: ACTIVE | Noted: 2018-01-24

## 2019-08-22 LAB
ALBUMIN SERPL-MCNC: 3.8 G/DL (ref 3.4–5)
ALP SERPL-CCNC: 63 U/L (ref 40–150)
ALT SERPL W P-5'-P-CCNC: 28 U/L (ref 0–50)
ANION GAP SERPL CALCULATED.3IONS-SCNC: 6 MMOL/L (ref 3–14)
AST SERPL W P-5'-P-CCNC: 18 U/L (ref 0–45)
BASOPHILS # BLD AUTO: 0 10E9/L (ref 0–0.2)
BASOPHILS NFR BLD AUTO: 1.3 %
BILIRUB SERPL-MCNC: 0.4 MG/DL (ref 0.2–1.3)
BUN SERPL-MCNC: 14 MG/DL (ref 7–30)
CALCIUM SERPL-MCNC: 8.9 MG/DL (ref 8.5–10.1)
CANCER AG27-29 SERPL-ACNC: 19 U/ML (ref 0–39)
CEA SERPL-MCNC: <0.5 UG/L (ref 0–2.5)
CHLORIDE SERPL-SCNC: 110 MMOL/L (ref 94–109)
CO2 SERPL-SCNC: 25 MMOL/L (ref 20–32)
CREAT SERPL-MCNC: 0.93 MG/DL (ref 0.52–1.04)
DIFFERENTIAL METHOD BLD: ABNORMAL
EOSINOPHIL # BLD AUTO: 0.1 10E9/L (ref 0–0.7)
EOSINOPHIL NFR BLD AUTO: 2.6 %
ERYTHROCYTE [DISTWIDTH] IN BLOOD BY AUTOMATED COUNT: 12.9 % (ref 10–15)
GFR SERPL CREATININE-BSD FRML MDRD: 72 ML/MIN/{1.73_M2}
GLUCOSE SERPL-MCNC: 102 MG/DL (ref 70–99)
HCT VFR BLD AUTO: 36.1 % (ref 35–47)
HGB BLD-MCNC: 12.8 G/DL (ref 11.7–15.7)
IMM GRANULOCYTES # BLD: 0 10E9/L (ref 0–0.4)
IMM GRANULOCYTES NFR BLD: 0.4 %
LYMPHOCYTES # BLD AUTO: 0.9 10E9/L (ref 0.8–5.3)
LYMPHOCYTES NFR BLD AUTO: 40.3 %
MCH RBC QN AUTO: 33.9 PG (ref 26.5–33)
MCHC RBC AUTO-ENTMCNC: 35.5 G/DL (ref 31.5–36.5)
MCV RBC AUTO: 96 FL (ref 78–100)
MONOCYTES # BLD AUTO: 0.2 10E9/L (ref 0–1.3)
MONOCYTES NFR BLD AUTO: 9.5 %
NEUTROPHILS # BLD AUTO: 1.1 10E9/L (ref 1.6–8.3)
NEUTROPHILS NFR BLD AUTO: 45.9 %
NRBC # BLD AUTO: 0 10*3/UL
NRBC BLD AUTO-RTO: 0 /100
PLATELET # BLD AUTO: 137 10E9/L (ref 150–450)
POTASSIUM SERPL-SCNC: 3.9 MMOL/L (ref 3.4–5.3)
PROT SERPL-MCNC: 7.4 G/DL (ref 6.8–8.8)
RBC # BLD AUTO: 3.78 10E12/L (ref 3.8–5.2)
SODIUM SERPL-SCNC: 141 MMOL/L (ref 133–144)
WBC # BLD AUTO: 2.3 10E9/L (ref 4–11)

## 2019-08-22 PROCEDURE — 85025 COMPLETE CBC W/AUTO DIFF WBC: CPT | Performed by: INTERNAL MEDICINE

## 2019-08-22 PROCEDURE — G0463 HOSPITAL OUTPT CLINIC VISIT: HCPCS | Mod: ZF

## 2019-08-22 PROCEDURE — 86300 IMMUNOASSAY TUMOR CA 15-3: CPT | Performed by: INTERNAL MEDICINE

## 2019-08-22 PROCEDURE — 80053 COMPREHEN METABOLIC PANEL: CPT | Performed by: INTERNAL MEDICINE

## 2019-08-22 PROCEDURE — 99214 OFFICE O/P EST MOD 30 MIN: CPT | Mod: ZP | Performed by: PHYSICIAN ASSISTANT

## 2019-08-22 PROCEDURE — 82378 CARCINOEMBRYONIC ANTIGEN: CPT | Performed by: INTERNAL MEDICINE

## 2019-08-22 PROCEDURE — 36415 COLL VENOUS BLD VENIPUNCTURE: CPT

## 2019-08-22 ASSESSMENT — PAIN SCALES - GENERAL: PAINLEVEL: MILD PAIN (3)

## 2019-08-22 NOTE — NURSING NOTE
"Oncology Rooming Note    August 22, 2019 12:55 PM   Jade Collins is a 49 year old female who presents for:    Chief Complaint   Patient presents with     Blood Draw     Labs drawn via  by RN in lab. VS taken. Patient checked in for next appt.     Oncology Clinic Visit     Return Visit for Breast Cancer     Initial Vitals: /62 (BP Location: Right arm, Patient Position: Sitting, Cuff Size: Adult Large)   Pulse 74   Temp 98.2  F (36.8  C) (Oral)   Resp 16   Wt 137.1 kg (302 lb 3.2 oz)   SpO2 96%   Breastfeeding? No   BMI 45.96 kg/m   Estimated body mass index is 45.96 kg/m  as calculated from the following:    Height as of 7/25/19: 1.727 m (5' 7.99\").    Weight as of this encounter: 137.1 kg (302 lb 3.2 oz). Body surface area is 2.56 meters squared.  Mild Pain (3) Comment: Data Unavailable   No LMP recorded. Patient is postmenopausal.  Allergies reviewed: Yes  Medications reviewed: Yes    Medications: MEDICATION REFILLS NEEDED TODAY. Provider was notified.  Pharmacy name entered into InhibOx:    [id number] - [place], [place] - [address] AT [facility] [address]  [address]  [facility] - [place], [place] - [address]    Clinical concerns: Patient is doing well. She does need a refill of Femara.  On her last CT scan it showed an umbilical hernia.  She had a hysterectomy 3 years ago & she is wondering if that is the cause of the hernia & it is now somewhat irritating & does get a foul odor if she doesn't clean that area well with a qtip.   Evelina was notified.      China Bowens, RN, MSN              "

## 2019-08-22 NOTE — ORAL ONC MGMT
Oral Chemotherapy Monitoring Program    Jade prefers to  Ibrance at [facility] after her appointments and labs. Set up [facility] [place] [facility] after reviewed labs and Evelina in basket to continue therapy. Jade picked up Ibrance and letrozole at 2PM today.       Thank you for the opportunity to participate in the care of the above patient,  Jose Swain, PharmD  Hematology/Oncology Clinical Pharmacist  [facility]  [facility]  [phone]

## 2019-08-22 NOTE — PROGRESS NOTES
Oncology/Hematology Visit Note  Aug 22, 2019    Reason for Visit: follow up of recurrent metastatic ER-positive HER2 negative breast cancer    History of Present Illness: Jade Collins is a 49 year old female with a history of left breast cancer diagnosed in 12/2013 in [place] area and Dr. David Redd was her medical oncologist. Biopsy showed tumor to be ER positive, KS positive and HER2 negative. She underwent lumpectomy and SLNB by Dr. Tasha Segura in [place]. [place] lymph node was noted to be involved with tumor but no lymph node dissection done at that time. She subsequently underwent adjuvant ddAC x 4 cycles and Taxol x 12 weeks, competed in 9/11/2014. She had post lumpectomy radiation completed in 11/23/2014 by Dr. Manley. She was begun on an AI in 11/2014, but does not recall name.     She was then switched within about a month or so to anastrozole and remained on anastrozole from 11/2014-02/2017.  She has never received tamoxifen. She then moved to [place], [place] in 03/2017.  She saw a gynecologist there and underwent a IRIS/BSO by Dr. Zendejas.  She then also saw Dr. Linares in [place], [place], who is an oncologist.  His interpretation of the pathology report was that she had triple-negative breast cancer.  It is possible that she may have had low estrogen receptor positivity, but the anastrozole was then discontinued in 02/2017.  She then underwent the IRIS/BSO in 03/2017.  This was done laparoscopically.        She then remained off of all hormonal therapy and in 09/2017 moved to [place].  She remained off hormonal therapy and did not have Medical Oncology followup initially.        She was driving for [place] and noticed lymph nodes in the left neck about 8 weeks ago.  She then went to see Dr. Norberto Brand who performed a PET/CT scan and the patient also underwent a brain MRI for staging.  The PET/CT scan performed 08/03/2018 which showed mildly enlarged left posterior cervical lymph nodes, largest measuring  1.5 x 1.0 cm and mildly enlarged aorticopulmonary and prevascular lymph nodes that were noted on contrast-enhanced CT scan seen less well on the PET scan.  No evidence of metastatic disease in the abdomen and pelvis.  She also underwent a brain MRI that showed no evidence of intracranial metastatic disease.  She underwent a biopsy of one of the lymph nodes in the left neck which showed metastatic adenocarcinoma consistent with breast origin, which was estrogen-receptor positive.  The tumor cells were positive for CK7, ER and KS consistent with metastatic breast carcinoma.  Estrogen receptor showed strong average nuclear intensity in 95% of carcinoma cells, progesterone receptor moderate average nuclear intensity in 70% of the carcinoma.  A GATA3 stain was apparently not performed.     She started Ibrance on 9/21 and letrozole on 9/23. She had improved disease on restaging on 11/26. Last restaging on 4/29/19 with stable disease.     Please see Dr. Bhatt's previous notes for further details on the patient's history. She comes in today for routine follow up.    Interval History:  Jade is here for follow up. She went jet skiing yesterday and is a little sore in her wrists. She has no other significant joint pain. She has some hotflashes on letrozole. She is now on 200 mg daily of Zoloft and feels mood is improved.     She will be due to resume Ibrance on 8/23. Occasional nausea, no vomiting. She takes compazine as needed as Zofran causes constipation. She denies any diarrhea. She has occasional pain around umbilicus and she is concerned because CT report of umbilical hernia. She has not noticed any bulge. She wears pants that are not constrictive so as to not cause discomfort. She has occasional redness and irritation after cleaning umbilicus, with some foul odor. No discharge from umbilicus. She has recovered from her URI but then had an eye infection that she completed antibiotics. No other infectious concerns    She  is using remeron at night which helps her sleep. No recent asthma symptoms, cough or dyspnea. No urinary concerns. Remaining 10 point ROS negative    Current Outpatient Medications   Medication Sig Dispense Refill     ACETAMINOPHEN PO Take 650 mg by mouth every 4 hours as needed for pain       albuterol (PROAIR HFA/PROVENTIL HFA/VENTOLIN HFA) 108 (90 Base) MCG/ACT inhaler Inhale 2 puffs into the lungs every 6 hours 1 Inhaler 4     albuterol (PROVENTIL) (2.5 MG/3ML) 0.083% neb solution Take 1 vial (2.5 mg) by nebulization every 6 hours as needed for shortness of breath / dyspnea or wheezing 30 vial 0     calcium carbonate (TUMS) 500 MG chewable tablet Take 1 chew tab by mouth as needed for heartburn       Calcium Citrate-Vitamin D (CALCIUM + D PO) Take 250 mg by mouth 2 times daily        doxycycline hyclate (VIBRA-TABS) 100 MG tablet Take 100 mg by mouth 2 times daily       fluticasone-salmeterol (AIRDUO RESPICLICK) 113-14 MCG/ACT inhaler Inhale 1 puff into the lungs 2 times daily 1 Inhaler 11     letrozole (FEMARA) 2.5 MG tablet Take 1 tablet (2.5 mg) by mouth daily for 28 days 28 tablet 0     levothyroxine (SYNTHROID/LEVOTHROID) 200 MCG tablet Take 1 tablet (200 mcg) by mouth daily 30 tablet 0     loratadine (CLARITIN) 10 MG tablet Take 1 tablet (10 mg) by mouth daily 90 tablet 3     mirtazapine (REMERON) 15 MG tablet TK 1 T PO HS 30 tablet 3     Multiple Vitamins-Minerals (MULTIVITAMIN ADULT PO)        ondansetron (ZOFRAN ODT) 8 MG ODT tab Take 1 tablet (8 mg) by mouth 3 times daily as needed for nausea 30 tablet 1     palbociclib (IBRANCE) 125 MG capsule CHEMO Take 1 capsule (125 mg) by mouth daily with food Take for 21 days, then 7 days off. Avoid grapefruit. Do not open/crush/chew capsule. 21 capsule 0     sertraline (ZOLOFT) 100 MG tablet Take 1 tablet (100 mg) by mouth 2 times daily 60 tablet 3     triamcinolone (NASACORT) 55 MCG/ACT inhaler Spray 1 spray into both nostrils daily        VITAMIN D,  CHOLECALCIFEROL, PO Take 2,000 Units by mouth daily        palbociclib (IBRANCE) 125 MG capsule CHEMO Take 1 capsule (125 mg) by mouth daily with food Take for 21 days, then 7 days off. Avoid grapefruit. Do not open/crush/chew capsule. (Patient not taking: Reported on 8/22/2019) 21 capsule 0     prochlorperazine (COMPAZINE) 10 MG tablet Take 1 tablet (10 mg) by mouth every 6 hours as needed for nausea or vomiting 30 tablet 0       Physical Examination:  General: The patient is a pleasant female in no acute distress.  /62 (BP Location: Right arm, Patient Position: Sitting, Cuff Size: Adult Large)   Pulse 74   Temp 98.2  F (36.8  C) (Oral)   Resp 16   Wt 137.1 kg (302 lb 3.2 oz)   SpO2 96%   Breastfeeding? No   BMI 45.96 kg/m    Wt Readings from Last 10 Encounters:   08/22/19 137.1 kg (302 lb 3.2 oz)   07/25/19 137.1 kg (302 lb 3.2 oz)   06/25/19 135.4 kg (298 lb 6.4 oz)   05/29/19 136.9 kg (301 lb 13 oz)   04/30/19 136.9 kg (301 lb 11.2 oz)   04/09/19 133.8 kg (295 lb)   04/02/19 133.9 kg (295 lb 3.2 oz)   03/06/19 131.1 kg (289 lb)   02/19/19 129.5 kg (285 lb 8 oz)   02/05/19 127.9 kg (282 lb)     HEENT: EOMI, PERRL. Sclerae are anicteric. Oral mucosa is pink and moist with no lesions or thrush.   Lymph: No palpable cervical, supraclavicular, subclavicular or axillary lymphadenopathy.  Heart: Regular rate and rhythm.   Lungs: Clear to auscultation bilaterally.   Breast: Deferred today   Abdomen: Bowel sounds present, soft, nontender with no palpable hepatosplenomegaly or masses. No visible hernia or palpable hernia defect. No erythema of umbilicus.  Extremities: No lower extremity edema noted bilaterally.   Neuro: Cranial nerves II through XII are grossly intact.  Skin: No rashes, petechiae, or bruising noted on exposed skin. Some horizontal ridges with darker coloring at base of nails.   Laboratory Data:   Results for ANAHI DOBSON (MRN [id number]) as of 8/22/2019 13:24   8/22/2019 12:35   Sodium 141    Potassium 3.9   Chloride 110 (H)   Carbon Dioxide 25   Urea Nitrogen 14   Creatinine 0.93   GFR Estimate 72   GFR Estimate If Black 83   Calcium 8.9   Anion Gap 6   Albumin 3.8   Protein Total 7.4   Bilirubin Total 0.4   Alkaline Phosphatase 63   ALT 28   AST 18   Glucose 102 (H)   WBC 2.3 (L)   Hemoglobin 12.8   Hematocrit 36.1   Platelet Count 137 (L)   RBC Count 3.78 (L)   MCV 96   MCH 33.9 (H)   MCHC 35.5   RDW 12.9   Diff Method Automated Method   % Neutrophils 45.9   % Lymphocytes 40.3   % Monocytes 9.5   % Eosinophils 2.6   % Basophils 1.3   % Immature Granulocytes 0.4   Nucleated RBCs 0   Absolute Neutrophil 1.1 (L)   Absolute Lymphocytes 0.9   Absolute Monocytes 0.2   Absolute Eosinophils 0.1   Absolute Basophils 0.0   Abs Immature Granulocytes 0.0   Absolute Nucleated RBC 0.0        6/25/2019 10:36 7/25/2019 07:26 8/22/2019 12:35   CA 27-29 20 16 19   Results for ANAHI DOBSON (MRN [id number]) as of 8/22/2019 15:34   Ref. Range 6/25/2019 10:36 7/25/2019 07:26 8/22/2019 12:35   CEA Latest Ref Range: 0 - 2.5 ug/L 0.9 0.5 <0.5       Assessment and Plan:    Recurrent, metastatic ER-positive, HER2 negative breast cancer: History of left breast cancer s/p lumpectomy and SNLB in 2014. S/p 4 cycles ddAC and 12 weeks of Taxol. On AI from 11/2014-2/2017. Recurrence on PET/CT scan performed 08/03/2018 and then biopsy to left posterior cervical lymph nodes, aorticopulmonary, and prevascular lymph nodes. No evidence of metastatic disease in the abdomen and pelvis or brain. Started Ibrance and letrozole on 9/21/18. Tolerating well aside from some hot flashes and mild joint stiffness. Last restaging 7/22 with stable disease. She has a contrast allergy but would like to try contrast again with premeds for next scans in October  --Labs reviewed. ANC 1.1. Ok to resume Ibrance tomorrow  --Continue letrozole    Allergies: Now on Nasacort per Dr. Hay. Also recommended nasal saline washes daily to BID.     Asthma: Seen by   Bharti. Per chart on fluticasone-salmeterol inhaler BID. She does not recall the name of this. Also has albuterol prn    Bone health: bone targeting agents are not required as she has no evidence of bone metastases. Reduce calcium as high today and continue vitamin D.     Depression: On zoloft 200 mg, recently increased with improvement in symptoms. Also on remeron at bedtime.Sees Sari Holliday.    Umbilical hernia: incidental finding on CT. She has no visible hernia on exam nor has she ever noticed a bulge near umbilicus. No erythema of umbilicus today. No tenderness on abdominal exam. Discussed signs of hernia that would be concerning--bulge that does not reduce with pressure, signs of obstruction including nausea/vomiting, no flatus or BM. She knows to call if these occur.    Follow up as scheduled with myself in September with labs. CT CAP and neck scheduled for 10/15. She would like to re-try with contrast. Will need pre-meds. Dr. Bhatt with labs on 10/17.    Evelina Garcia PA-C  [facility]  [address] [place] [id number]  [phone]

## 2019-08-22 NOTE — NURSING NOTE
Chief Complaint   Patient presents with     Blood Draw     Labs drawn via  by RN in lab. VS taken. Patient checked in for next appt.     Labs collected from venipuncture by RN. Vitals taken. Checked in for appointment.    Carly Martines RN

## 2019-08-22 NOTE — LETTER
8/22/2019      RE: Jade Collins  [address]  [facility] [address]       Oncology/Hematology Visit Note  Aug 22, 2019    Reason for Visit: follow up of recurrent metastatic ER-positive HER2 negative breast cancer    History of Present Illness: Jade Collins is a 49 year old female with a history of left breast cancer diagnosed in 12/2013 in [place] area and Dr. David Redd was her medical oncologist. Biopsy showed tumor to be ER positive, UT positive and HER2 negative. She underwent lumpectomy and SLNB by Dr. Tasha Segura in [place]. [place] lymph node was noted to be involved with tumor but no lymph node dissection done at that time. She subsequently underwent adjuvant ddAC x 4 cycles and Taxol x 12 weeks, competed in 9/11/2014. She had post lumpectomy radiation completed in 11/23/2014 by Dr. Manley. She was begun on an AI in 11/2014, but does not recall name.     She was then switched within about a month or so to anastrozole and remained on anastrozole from 11/2014-02/2017.  She has never received tamoxifen. She then moved to [place], [place] in 03/2017.  She saw a gynecologist there and underwent a IRIS/BSO by Dr. Zendejas.  She then also saw Dr. Linares in [place], [place], who is an oncologist.  His interpretation of the pathology report was that she had triple-negative breast cancer.  It is possible that she may have had low estrogen receptor positivity, but the anastrozole was then discontinued in 02/2017.  She then underwent the IRIS/BSO in 03/2017.  This was done laparoscopically.        She then remained off of all hormonal therapy and in 09/2017 moved to [place].  She remained off hormonal therapy and did not have Medical Oncology followup initially.        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  She then went to see Dr. Norberto Brand who performed a PET/CT scan and the patient also underwent a brain MRI for staging.  The PET/CT scan performed 08/03/2018  which showed mildly enlarged left posterior cervical lymph nodes, largest measuring 1.5 x 1.0 cm and mildly enlarged aorticopulmonary and prevascular lymph nodes that were noted on contrast-enhanced CT scan seen less well on the PET scan.  No evidence of metastatic disease in the abdomen and pelvis.  She also underwent a brain MRI that showed no evidence of intracranial metastatic disease.  She underwent a biopsy of one of the lymph nodes in the left neck which showed metastatic adenocarcinoma consistent with breast origin, which was estrogen-receptor positive.  The tumor cells were positive for CK7, ER and AZ consistent with metastatic breast carcinoma.  Estrogen receptor showed strong average nuclear intensity in 95% of carcinoma cells, progesterone receptor moderate average nuclear intensity in 70% of the carcinoma.  A GATA3 stain was apparently not performed.     She started Ibrance on 9/21 and letrozole on 9/23. She had improved disease on restaging on 11/26. Last restaging on 4/29/19 with stable disease.     Please see Dr. Bhatt's previous notes for further details on the patient's history. She comes in today for routine follow up.    Interval History:  Jade is here for follow up. She went jet skiing yesterday and is a little sore in her wrists. She has no other significant joint pain. She has some hotflashes on letrozole. She is now on 200 mg daily of Zoloft and feels mood is improved.     She will be due to resume Ibrance on 8/23. Occasional nausea, no vomiting. She takes compazine as needed as Zofran causes constipation. She denies any diarrhea. She has occasional pain around umbilicus and she is concerned because CT report of umbilical hernia. She has not noticed any bulge. She wears pants that are not constrictive so as to not cause discomfort. She has occasional redness and irritation after cleaning umbilicus, with some foul odor. No discharge from umbilicus. She has recovered from her URI but then had  an eye infection that she completed antibiotics. No other infectious concerns    She is using remeron at night which helps her sleep. No recent asthma symptoms, cough or dyspnea. No urinary concerns. Remaining 10 point ROS negative    Current Outpatient Medications   Medication Sig Dispense Refill     ACETAMINOPHEN PO Take 650 mg by mouth every 4 hours as needed for pain       albuterol (PROAIR HFA/PROVENTIL HFA/VENTOLIN HFA) 108 (90 Base) MCG/ACT inhaler Inhale 2 puffs into the lungs every 6 hours 1 Inhaler 4     albuterol (PROVENTIL) (2.5 MG/3ML) 0.083% neb solution Take 1 vial (2.5 mg) by nebulization every 6 hours as needed for shortness of breath / dyspnea or wheezing 30 vial 0     calcium carbonate (TUMS) 500 MG chewable tablet Take 1 chew tab by mouth as needed for heartburn       Calcium Citrate-Vitamin D (CALCIUM + D PO) Take 250 mg by mouth 2 times daily        doxycycline hyclate (VIBRA-TABS) 100 MG tablet Take 100 mg by mouth 2 times daily       fluticasone-salmeterol (AIRDUO RESPICLICK) 113-14 MCG/ACT inhaler Inhale 1 puff into the lungs 2 times daily 1 Inhaler 11     letrozole (FEMARA) 2.5 MG tablet Take 1 tablet (2.5 mg) by mouth daily for 28 days 28 tablet 0     levothyroxine (SYNTHROID/LEVOTHROID) 200 MCG tablet Take 1 tablet (200 mcg) by mouth daily 30 tablet 0     loratadine (CLARITIN) 10 MG tablet Take 1 tablet (10 mg) by mouth daily 90 tablet 3     mirtazapine (REMERON) 15 MG tablet TK 1 T PO HS 30 tablet 3     Multiple Vitamins-Minerals (MULTIVITAMIN ADULT PO)        ondansetron (ZOFRAN ODT) 8 MG ODT tab Take 1 tablet (8 mg) by mouth 3 times daily as needed for nausea 30 tablet 1     palbociclib (IBRANCE) 125 MG capsule CHEMO Take 1 capsule (125 mg) by mouth daily with food Take for 21 days, then 7 days off. Avoid grapefruit. Do not open/crush/chew capsule. 21 capsule 0     sertraline (ZOLOFT) 100 MG tablet Take 1 tablet (100 mg) by mouth 2 times daily 60 tablet 3     triamcinolone (NASACORT)  55 MCG/ACT inhaler Spray 1 spray into both nostrils daily        VITAMIN D, CHOLECALCIFEROL, PO Take 2,000 Units by mouth daily        palbociclib (IBRANCE) 125 MG capsule CHEMO Take 1 capsule (125 mg) by mouth daily with food Take for 21 days, then 7 days off. Avoid grapefruit. Do not open/crush/chew capsule. (Patient not taking: Reported on 8/22/2019) 21 capsule 0     prochlorperazine (COMPAZINE) 10 MG tablet Take 1 tablet (10 mg) by mouth every 6 hours as needed for nausea or vomiting 30 tablet 0       Physical Examination:  General: The patient is a pleasant female in no acute distress.  /62 (BP Location: Right arm, Patient Position: Sitting, Cuff Size: Adult Large)   Pulse 74   Temp 98.2  F (36.8  C) (Oral)   Resp 16   Wt 137.1 kg (302 lb 3.2 oz)   SpO2 96%   Breastfeeding? No   BMI 45.96 kg/m     Wt Readings from Last 10 Encounters:   08/22/19 137.1 kg (302 lb 3.2 oz)   07/25/19 137.1 kg (302 lb 3.2 oz)   06/25/19 135.4 kg (298 lb 6.4 oz)   05/29/19 136.9 kg (301 lb 13 oz)   04/30/19 136.9 kg (301 lb 11.2 oz)   04/09/19 133.8 kg (295 lb)   04/02/19 133.9 kg (295 lb 3.2 oz)   03/06/19 131.1 kg (289 lb)   02/19/19 129.5 kg (285 lb 8 oz)   02/05/19 127.9 kg (282 lb)     HEENT: EOMI, PERRL. Sclerae are anicteric. Oral mucosa is pink and moist with no lesions or thrush.   Lymph: No palpable cervical, supraclavicular, subclavicular or axillary lymphadenopathy.  Heart: Regular rate and rhythm.   Lungs: Clear to auscultation bilaterally.   Breast: Deferred today   Abdomen: Bowel sounds present, soft, nontender with no palpable hepatosplenomegaly or masses. No visible hernia or palpable hernia defect. No erythema of umbilicus.  Extremities: No lower extremity edema noted bilaterally.   Neuro: Cranial nerves II through XII are grossly intact.  Skin: No rashes, petechiae, or bruising noted on exposed skin. Some horizontal ridges with darker coloring at base of nails.   Laboratory Data:   Results for DOBSON,  ANAHI (MRN [id number]) as of 8/22/2019 13:24   8/22/2019 12:35   Sodium 141   Potassium 3.9   Chloride 110 (H)   Carbon Dioxide 25   Urea Nitrogen 14   Creatinine 0.93   GFR Estimate 72   GFR Estimate If Black 83   Calcium 8.9   Anion Gap 6   Albumin 3.8   Protein Total 7.4   Bilirubin Total 0.4   Alkaline Phosphatase 63   ALT 28   AST 18   Glucose 102 (H)   WBC 2.3 (L)   Hemoglobin 12.8   Hematocrit 36.1   Platelet Count 137 (L)   RBC Count 3.78 (L)   MCV 96   MCH 33.9 (H)   MCHC 35.5   RDW 12.9   Diff Method Automated Method   % Neutrophils 45.9   % Lymphocytes 40.3   % Monocytes 9.5   % Eosinophils 2.6   % Basophils 1.3   % Immature Granulocytes 0.4   Nucleated RBCs 0   Absolute Neutrophil 1.1 (L)   Absolute Lymphocytes 0.9   Absolute Monocytes 0.2   Absolute Eosinophils 0.1   Absolute Basophils 0.0   Abs Immature Granulocytes 0.0   Absolute Nucleated RBC 0.0        6/25/2019 10:36 7/25/2019 07:26 8/22/2019 12:35   CA 27-29 20 16 19   Results for ANAHI DOBSON (MRN [id number]) as of 8/22/2019 15:34   Ref. Range 6/25/2019 10:36 7/25/2019 07:26 8/22/2019 12:35   CEA Latest Ref Range: 0 - 2.5 ug/L 0.9 0.5 <0.5       Assessment and Plan:    Recurrent, metastatic ER-positive, HER2 negative breast cancer: History of left breast cancer s/p lumpectomy and SNLB in 2014. S/p 4 cycles ddAC and 12 weeks of Taxol. On AI from 11/2014-2/2017. Recurrence on PET/CT scan performed 08/03/2018 and then biopsy to left posterior cervical lymph nodes, aorticopulmonary, and prevascular lymph nodes. No evidence of metastatic disease in the abdomen and pelvis or brain. Started Ibrance and letrozole on 9/21/18. Tolerating well aside from some hot flashes and mild joint stiffness. Last restaging 7/22 with stable disease. She has a contrast allergy but would like to try contrast again with premeds for next scans in October  --Labs reviewed. ANC 1.1. Ok to resume Ibrance tomorrow  --Continue letrozole    Allergies: Now on Nasacort per Dr. Hay.  Also recommended nasal saline washes daily to BID.     Asthma: Seen by Dr. Hay. Per chart on fluticasone-salmeterol inhaler BID. She does not recall the name of this. Also has albuterol prn    Bone health: bone targeting agents are not required as she has no evidence of bone metastases. Reduce calcium as high today and continue vitamin D.     Depression: On zoloft 200 mg, recently increased with improvement in symptoms. Also on remeron at bedtime.Sees Sari Holliday.    Umbilical hernia: incidental finding on CT. She has no visible hernia on exam nor has she ever noticed a bulge near umbilicus. No erythema of umbilicus today. No tenderness on abdominal exam. Discussed signs of hernia that would be concerning--bulge that does not reduce with pressure, signs of obstruction including nausea/vomiting, no flatus or BM. She knows to call if these occur.    Follow up as scheduled with myself in September with labs. CT CAP and neck scheduled for 10/15. She would like to re-try with contrast. Will need pre-meds. Dr. Bhatt with labs on 10/17.    Evelina Garcia PA-C  [facility]  [address] [place] [address] [id number] [phone]

## 2019-09-02 NOTE — PROGRESS NOTES
Palliative Care Clinical Social Work Return Appointment    PLEASE NOTE:  THIS IS A MENTAL HEALTH NOTE.  OTHER PROVIDERS VIEWING THIS NOTE SHOULD USE THIS ONLY FOR UNDERSTANDING THE CONTEXT OF THE PATIENT S EXPERIENCE.  TOPICS DESCRIBED IN THIS NOTE SHOULD NOT BE REFERENCED TO THE PATIENT BY MEDICAL PROVIDERS.    Jade is a 49 year old woman with metastatic breast cancer, seen today at [facility] for a return psychotherapy appointment to address PTSD, recurrent major depression, and anxiety as these relate to coping with illness and treatment.    Mental Status Exam:(List all that apply)      Appearance: Appropriate      Eye Contact: Good       Orientation: Yes, x4      Mood: hopeful, thankful      Affect: Appropriate, full range, expressive      Thought Content: Clear         Thought Form: Logical      Psychomotor Behavior: Normal    Visit themes:   - NET exposure session   - Mid-20s to mid-30s    Adjustment to Illness: Not focus today.    Mental Health (thoughts, feelings, actions, coping, and symptoms):   Jade describes overall very positive visit with her sister Ana Cristina and her nephew regarding honoring their dad. She did feel at times overwhelmed with caregiving style of her sister and we process this experience as well as ongoing communication options. She expresses how meaningful the trip was and managing the airplane travel despite related anxieties.    We continue NET with exposure session focused on mid-20s to mid-30s, overall a time of healing and things coming together for Jade, though with some periods of depression, intense relationships. 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    Relationships: See above, processes visit with sister. Continues to process loss of her dad.    End of Life and Advance Care Planning: Not focus today.    Main therapeutic interventions provided this session include:  Psychoeducation, facilitating processing of feelings and thoughts, structured problem solving, grief counseling, and Narrative Exposure Therapy - exposure session    Plan:  Will return for psychotherapy with palliative care focus in 2 - 3 weeks at [facility].    Time spent with patient/family:  65 minutes (Start 3:05pm, end 4:10pm)  Narrative Exposure Therapy, longer session      Palliative Care Counseling Treatment Plan    Client's Name: Jade Collins  YOB: 1970    Date: 1/9/2019    Initial DSM5 Diagnoses:   296.32 Major Depressive Disorder, Recurrent Episode, Moderate With anxious distress  309.81 (F43.10) Posttraumatic Stress Disorder (includes Posttraumatic Stress Dsiorder for Children 6 Years and Younger)  With dissociative symptoms      Date to review plan (90 days usually): 3/20/19    Referral / Collaboration:  .Referral to another professional/service is not indicated at this time.    Anticipated number of sessions to complete episode of care:  10         Treatment Goal(s)  Date Goal Dates  Reviewed Status   12/20/2018   Goal 1:  Client will decrease PTSD symptoms.      Continued   12/20/2018   Objective #1A (Client Action)  Client will Increase " understanding of PTSD/ASD and Process trauma history in safe counseling environment using Narrative Exposure Therapy.    Intervention(s)  Therapist will Provide psychoeducation and Narrative Exposure Therapy .    Continued                   Date Goal Dates  Reviewed Status   12/20/2018   Goal 2:  Client will address illness related stressors.    Continued   12/20/2018   Objective #2A (Client Action)  Client will Communicate effectively with family/friends re needs and Communicate effectively with medical provider re needed information.    Intervention(s) (Therapist Action)  Therapist will Provide psychoeducation, Facilitate couples/family therapy session(s) and Facilitate structured problem solving.    Continued   12/20/2018   Objective #2B  Client will Identify effective coping strategies and Use/practice relaxation strategies.    Intervention(s)  Therapist will Provide psychoeducation, Provide behavioral intervention training, Facilitate processing of thoughts and feelings and Facilitate structured problem solving.    Continued   12/20/2018     Objective #2C  Client will Complete health care directive and/or POLST form.    Intervention(s)  Therapist will Provide advance care planning education.    Continued       Date Goal Dates  Reviewed Status   1/9/2019   Goal 3:  Client will effectively manage recurrent major depressive disorder in light of increased stressors.    Continued   1/9/2019   Objective #3A (Client Action)  Client will Identify activities that provide comfort and/or pleasure, Participate in activities that provide comfort and/or pleasure and Identify an activity that would provide meaning/contribute to feeling of self worth.    Intervention(s) (Therapist Action)  Therapist will Provide psychoeducation, Facilitate processing of thoughts and feelings and Facilitate structured problem solving.    Contined   1/9/2019   Objective #3B  Client will Effectively communicate needs to others, Identify a crisis  plan for suicidal ideation and Identify triggers/early signs of depressed mood.    Intervention(s)  Therapist will Provide psychoeducation, Facilitate processing of thoughts and feelings, Facilitate structured problem solving and facilitate safety planning as needed..    Continued               Client has contributed regarding goals and concerns, but has not reviewed the treatment plan. Plan to review at future session.    ROMULO Ulloa, LICSW      DO NOT SEND ANY LETTERS

## 2019-09-13 NOTE — PROGRESS NOTES
"Palliative Care Clinical Social Work Return Appointment    PLEASE NOTE:  THIS IS A MENTAL HEALTH NOTE.  OTHER PROVIDERS VIEWING THIS NOTE SHOULD USE THIS ONLY FOR UNDERSTANDING THE CONTEXT OF THE PATIENT S EXPERIENCE.  TOPICS DESCRIBED IN THIS NOTE SHOULD NOT BE REFERENCED TO THE PATIENT BY MEDICAL PROVIDERS.    Jade is a 49 year old woman with metastatic breast cancer, seen today at [facility] for a return psychotherapy appointment to address PTSD, recurrent major depression, and anxiety as these relate to coping with illness and treatment.    Mental Status Exam:(List all that apply)      Appearance: Appropriate      Eye Contact: Good       Orientation: Yes, x4      Mood: hopeful, thankful      Affect: Appropriate, full range, expressive      Thought Content: Clear         Thought Form: Logical      Psychomotor Behavior: Normal    Adjustment to Illness: Not focus today.    Mental Health (thoughts, feelings, actions, coping, and symptoms):   We continue Narrative Exposure Therapy with events around move from [place] to mom's house, continued health experiences including hysterectomy.    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    Relationships: See above. Processes re: boundary setting " with person she was seeing for a while. Looks forward to moving to new apartment in September.    End of Life and Advance Care Planning: Not focus today.    Main therapeutic interventions provided this session include:  Psychoeducation, facilitating processing of feelings and thoughts, structured problem solving, grief counseling, and Narrative Exposure Therapy - exposure session    Plan:  Will return for psychotherapy with palliative care focus in 2 - 3 weeks at [facility].    Time spent with patient/family:  50 minutes (Start 3:10pm, end 4:00pm)      Palliative Care Counseling Treatment Plan    Client's Name: Jade Collins  YOB: 1970    Date: 1/9/2019    Initial DSM5 Diagnoses:   296.32 Major Depressive Disorder, Recurrent Episode, Moderate With anxious distress  309.81 (F43.10) Posttraumatic Stress Disorder (includes Posttraumatic Stress Dsiorder for Children 6 Years and Younger)  With dissociative symptoms      Date to review plan (90 days usually): 3/20/19    Referral / Collaboration:  .Referral to another professional/service is not indicated at this time.    Anticipated number of sessions to complete episode of care:  10         Treatment Goal(s)  Date Goal Dates  Reviewed Status   12/20/2018   Goal 1:  Client will decrease PTSD symptoms.      Continued   12/20/2018   Objective #1A (Client Action)  Client will Increase understanding of PTSD/ASD and Process trauma history in safe counseling environment using Narrative Exposure Therapy.    Intervention(s)  Therapist will Provide psychoeducation and Narrative Exposure Therapy .    Continued                   Date Goal Dates  Reviewed Status   12/20/2018   Goal 2:  Client will address illness related stressors.    Continued   12/20/2018   Objective #2A (Client Action)  Client will Communicate effectively with family/friends re needs and Communicate effectively with medical provider re needed information.    Intervention(s) (Therapist Action)  Therapist will  Provide psychoeducation, Facilitate couples/family therapy session(s) and Facilitate structured problem solving.    Continued   12/20/2018   Objective #2B  Client will Identify effective coping strategies and Use/practice relaxation strategies.    Intervention(s)  Therapist will Provide psychoeducation, Provide behavioral intervention training, Facilitate processing of thoughts and feelings and Facilitate structured problem solving.    Continued   12/20/2018     Objective #2C  Client will Complete health care directive and/or POLST form.    Intervention(s)  Therapist will Provide advance care planning education.    Continued       Date Goal Dates  Reviewed Status   1/9/2019   Goal 3:  Client will effectively manage recurrent major depressive disorder in light of increased stressors.    Continued   1/9/2019   Objective #3A (Client Action)  Client will Identify activities that provide comfort and/or pleasure, Participate in activities that provide comfort and/or pleasure and Identify an activity that would provide meaning/contribute to feeling of self worth.    Intervention(s) (Therapist Action)  Therapist will Provide psychoeducation, Facilitate processing of thoughts and feelings and Facilitate structured problem solving.    Contined   1/9/2019   Objective #3B  Client will Effectively communicate needs to others, Identify a crisis plan for suicidal ideation and Identify triggers/early signs of depressed mood.    Intervention(s)  Therapist will Provide psychoeducation, Facilitate processing of thoughts and feelings, Facilitate structured problem solving and facilitate safety planning as needed..    Continued               Client has contributed regarding goals and concerns, but has not reviewed the treatment plan. Plan to review at future session.    Lisa Holliday, MSLILLI, Stephens Memorial HospitalSW      DO NOT SEND ANY LETTERS

## 2019-09-16 DIAGNOSIS — C50.412 MALIGNANT NEOPLASM OF UPPER-OUTER QUADRANT OF LEFT BREAST IN FEMALE, ESTROGEN RECEPTOR POSITIVE (H): Primary | ICD-10-CM

## 2019-09-16 DIAGNOSIS — Z17.0 MALIGNANT NEOPLASM OF UPPER-OUTER QUADRANT OF LEFT BREAST IN FEMALE, ESTROGEN RECEPTOR POSITIVE (H): Primary | ICD-10-CM

## 2019-09-16 RX ORDER — LETROZOLE 2.5 MG/1
2.5 TABLET, FILM COATED ORAL DAILY
Qty: 28 TABLET | Refills: 0 | Status: SHIPPED | OUTPATIENT
Start: 2019-09-16 | End: 2019-10-14

## 2019-09-19 ENCOUNTER — APPOINTMENT (OUTPATIENT)
Dept: LAB | Facility: CLINIC | Age: 49
End: 2019-09-19
Attending: INTERNAL MEDICINE
Payer: COMMERCIAL

## 2019-09-19 ENCOUNTER — ONCOLOGY VISIT (OUTPATIENT)
Dept: ONCOLOGY | Facility: CLINIC | Age: 49
End: 2019-09-19
Attending: PHYSICIAN ASSISTANT
Payer: COMMERCIAL

## 2019-09-19 VITALS
TEMPERATURE: 98.5 F | SYSTOLIC BLOOD PRESSURE: 117 MMHG | HEART RATE: 68 BPM | RESPIRATION RATE: 16 BRPM | BODY MASS INDEX: 46.08 KG/M2 | WEIGHT: 293 LBS | OXYGEN SATURATION: 96 % | DIASTOLIC BLOOD PRESSURE: 76 MMHG

## 2019-09-19 DIAGNOSIS — T50.8X5D ALLERGIC REACTION TO CONTRAST MATERIAL, SUBSEQUENT ENCOUNTER: ICD-10-CM

## 2019-09-19 DIAGNOSIS — C50.412 MALIGNANT NEOPLASM OF UPPER-OUTER QUADRANT OF LEFT BREAST IN FEMALE, ESTROGEN RECEPTOR POSITIVE (H): ICD-10-CM

## 2019-09-19 DIAGNOSIS — Z00.00 HEALTHCARE MAINTENANCE: Primary | ICD-10-CM

## 2019-09-19 DIAGNOSIS — Z17.0 MALIGNANT NEOPLASM OF UPPER-OUTER QUADRANT OF LEFT BREAST IN FEMALE, ESTROGEN RECEPTOR POSITIVE (H): ICD-10-CM

## 2019-09-19 LAB
ALBUMIN SERPL-MCNC: 4 G/DL (ref 3.4–5)
ALP SERPL-CCNC: 71 U/L (ref 40–150)
ALT SERPL W P-5'-P-CCNC: 21 U/L (ref 0–50)
ANION GAP SERPL CALCULATED.3IONS-SCNC: 5 MMOL/L (ref 3–14)
AST SERPL W P-5'-P-CCNC: 12 U/L (ref 0–45)
BASOPHILS # BLD AUTO: 0 10E9/L (ref 0–0.2)
BASOPHILS NFR BLD AUTO: 0.9 %
BILIRUB SERPL-MCNC: 0.5 MG/DL (ref 0.2–1.3)
BUN SERPL-MCNC: 10 MG/DL (ref 7–30)
CALCIUM SERPL-MCNC: 9.2 MG/DL (ref 8.5–10.1)
CHLORIDE SERPL-SCNC: 106 MMOL/L (ref 94–109)
CO2 SERPL-SCNC: 25 MMOL/L (ref 20–32)
CREAT SERPL-MCNC: 0.94 MG/DL (ref 0.52–1.04)
DIFFERENTIAL METHOD BLD: ABNORMAL
EOSINOPHIL # BLD AUTO: 0 10E9/L (ref 0–0.7)
EOSINOPHIL NFR BLD AUTO: 0.9 %
ERYTHROCYTE [DISTWIDTH] IN BLOOD BY AUTOMATED COUNT: 13.2 % (ref 10–15)
GFR SERPL CREATININE-BSD FRML MDRD: 71 ML/MIN/{1.73_M2}
GLUCOSE SERPL-MCNC: 95 MG/DL (ref 70–99)
HBA1C MFR BLD: 5.4 % (ref 0–5.6)
HCT VFR BLD AUTO: 37.5 % (ref 35–47)
HGB BLD-MCNC: 13 G/DL (ref 11.7–15.7)
LYMPHOCYTES # BLD AUTO: 0.9 10E9/L (ref 0.8–5.3)
LYMPHOCYTES NFR BLD AUTO: 41.7 %
MCH RBC QN AUTO: 33.9 PG (ref 26.5–33)
MCHC RBC AUTO-ENTMCNC: 34.7 G/DL (ref 31.5–36.5)
MCV RBC AUTO: 98 FL (ref 78–100)
MONOCYTES # BLD AUTO: 0.1 10E9/L (ref 0–1.3)
MONOCYTES NFR BLD AUTO: 5.2 %
NEUTROPHILS # BLD AUTO: 1.1 10E9/L (ref 1.6–8.3)
NEUTROPHILS NFR BLD AUTO: 51.3 %
PLATELET # BLD AUTO: 160 10E9/L (ref 150–450)
PLATELET # BLD EST: ABNORMAL 10*3/UL
POIKILOCYTOSIS BLD QL SMEAR: SLIGHT
POTASSIUM SERPL-SCNC: 4.1 MMOL/L (ref 3.4–5.3)
PROT SERPL-MCNC: 7.6 G/DL (ref 6.8–8.8)
RBC # BLD AUTO: 3.83 10E12/L (ref 3.8–5.2)
SODIUM SERPL-SCNC: 136 MMOL/L (ref 133–144)
WBC # BLD AUTO: 2.2 10E9/L (ref 4–11)

## 2019-09-19 PROCEDURE — G0463 HOSPITAL OUTPT CLINIC VISIT: HCPCS | Mod: ZF

## 2019-09-19 PROCEDURE — 83036 HEMOGLOBIN GLYCOSYLATED A1C: CPT | Performed by: INTERNAL MEDICINE

## 2019-09-19 PROCEDURE — 85025 COMPLETE CBC W/AUTO DIFF WBC: CPT | Performed by: INTERNAL MEDICINE

## 2019-09-19 PROCEDURE — 80053 COMPREHEN METABOLIC PANEL: CPT | Performed by: INTERNAL MEDICINE

## 2019-09-19 PROCEDURE — 36415 COLL VENOUS BLD VENIPUNCTURE: CPT

## 2019-09-19 PROCEDURE — 99214 OFFICE O/P EST MOD 30 MIN: CPT | Mod: ZP | Performed by: PHYSICIAN ASSISTANT

## 2019-09-19 RX ORDER — ASPIRIN 81 MG/1
81 TABLET, CHEWABLE ORAL DAILY
COMMUNITY
End: 2020-10-22

## 2019-09-19 RX ORDER — METHYLPREDNISOLONE 16 MG/1
TABLET ORAL
Qty: 4 TABLET | Refills: 0 | Status: SHIPPED | OUTPATIENT
Start: 2019-09-19 | End: 2019-10-17

## 2019-09-19 ASSESSMENT — PAIN SCALES - GENERAL: PAINLEVEL: SEVERE PAIN (6)

## 2019-09-19 NOTE — NURSING NOTE
"Oncology Rooming Note    September 19, 2019 12:38 PM   Jade Collins is a 49 year old female who presents for:    Chief Complaint   Patient presents with     Lab Only     venipuncture, vitals checked     Initial Vitals: /76   Pulse 68   Temp 98.5  F (36.9  C)   Resp 16   Wt 137.4 kg (303 lb)   SpO2 96%   BMI 46.08 kg/m   Estimated body mass index is 46.08 kg/m  as calculated from the following:    Height as of 7/25/19: 1.727 m (5' 7.99\").    Weight as of this encounter: 137.4 kg (303 lb). Body surface area is 2.57 meters squared.  No Pain (0) Comment: Data Unavailable   No LMP recorded. Patient is postmenopausal.  Allergies reviewed: Yes  Medications reviewed: Yes    Medications: MEDICATION REFILLS NEEDED TODAY. Provider was NOT notified. Patient needs refill on albuterol, Ibrance, letrezole.   Pharmacy name entered into [facility]:    [id number] - [place], [place] - [address] AT [facility] [address] [facility] - [place], [place] - [address]    Clinical concerns: Patient complains of knees hurting, vision changes, discoloration of the skin, also having issues losing weight.  Evelina Garcia was NOT notified.      CHATO MORENO, NATY            "

## 2019-09-19 NOTE — PROGRESS NOTES
Oncology/Hematology Visit Note  Sep 19, 2019    Reason for Visit: follow up of recurrent metastatic ER-positive HER2 negative breast cancer    History of Present Illness: Jade Collins is a 49 year old female with a history of left breast cancer diagnosed in 12/2013 in [place] area and Dr. David Redd was her medical oncologist. Biopsy showed tumor to be ER positive, HI positive and HER2 negative. She underwent lumpectomy and SLNB by Dr. Tasha Segura in [place]. [place] lymph node was noted to be involved with tumor but no lymph node dissection done at that time. She subsequently underwent adjuvant ddAC x 4 cycles and Taxol x 12 weeks, competed in 9/11/2014. She had post lumpectomy radiation completed in 11/23/2014 by Dr. Manley. She was begun on an AI in 11/2014, but does not recall name.     She was then switched within about a month or so to anastrozole and remained on anastrozole from 11/2014-02/2017.  She has never received tamoxifen. She then moved to [place], [place] in 03/2017.  She saw a gynecologist there and underwent a IRIS/BSO by Dr. Zendejas.  She then also saw Dr. Linares in [place], [place], who is an oncologist.  His interpretation of the pathology report was that she had triple-negative breast cancer.  It is possible that she may have had low estrogen receptor positivity, but the anastrozole was then discontinued in 02/2017.  She then underwent the IRIS/BSO in 03/2017.  This was done laparoscopically.        She then remained off of all hormonal therapy and in 09/2017 moved to [place].  She remained off hormonal therapy and did not have Medical Oncology followup initially.        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  She then went to see Dr. Norberot Brand who performed a PET/CT scan and the patient also underwent a brain MRI for staging.  The PET/CT scan performed 08/03/2018 which showed mildly enlarged left posterior cervical lymph nodes, largest measuring  "1.5 x 1.0 cm and mildly enlarged aorticopulmonary and prevascular lymph nodes that were noted on contrast-enhanced CT scan seen less well on the PET scan.  No evidence of metastatic disease in the abdomen and pelvis.  She also underwent a brain MRI that showed no evidence of intracranial metastatic disease.  She underwent a biopsy of one of the lymph nodes in the left neck which showed metastatic adenocarcinoma consistent with breast origin, which was estrogen-receptor positive.  The tumor cells were positive for CK7, ER and DE consistent with metastatic breast carcinoma.  Estrogen receptor showed strong average nuclear intensity in 95% of carcinoma cells, progesterone receptor moderate average nuclear intensity in 70% of the carcinoma.  A GATA3 stain was apparently not performed.     She started Ibrance on 9/21 and letrozole on 9/23. She had improved disease on restaging on 11/26. Last restaging on 4/29/19 with stable disease.     Please see Dr. Bhatt's previous notes for further details on the patient's history. She comes in today for routine follow up.    Interval History:  Jade is here for follow up. She moved on September 1st to a new apartment. She states the toilets are low to ground, causing her knee pain. She bought a raised seat to alleviate the discomfort.     She has had some intense hotflashes recently. She uses a fan at night. She has had some swelling in feet and hands with more humid weather. Swelling in feet improves with elevation. She is concerned about diabetes and wants to lose weight. She states previously when tested for diabetes she was \"borderline\". She has met with nutritionist and knows she needs to make more effort to follow recommendations. Her new apartment has fitness center and she plans to start using treadmill and stationary bike.     She is on 200 mg daily of Zoloft and feels mood is good recently. She is very happy with her new apartment. 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     She will be due to resume Ibrance tomorrow. No GI side effects. Appetite good. No nausea, abdomina pain. She has alternating loose stools and constipation.     No recent asthma symptoms, cough or dyspnea. No recent fevers or infectious concerns. Remaining 10 point ROS negative    Current Outpatient Medications   Medication Sig Dispense Refill     ACETAMINOPHEN PO Take 650 mg by mouth every 4 hours as needed for pain       albuterol (PROAIR HFA/PROVENTIL HFA/VENTOLIN HFA) 108 (90 Base) MCG/ACT inhaler Inhale 2 puffs into the lungs every 6 hours 1 Inhaler 4     albuterol (PROVENTIL) (2.5 MG/3ML) 0.083% neb solution Take 1 vial (2.5 mg) by nebulization every 6 hours as needed for shortness of breath / dyspnea or wheezing 30 vial 0     calcium carbonate (TUMS) 500 MG chewable tablet Take 1 chew tab by mouth as needed for heartburn       Calcium Citrate-Vitamin D (CALCIUM + D PO) Take 250 mg by mouth 2 times daily        doxycycline hyclate (VIBRA-TABS) 100 MG tablet Take 100 mg by mouth 2 times daily       fluticasone-salmeterol (AIRDUO RESPICLICK) 113-14 MCG/ACT inhaler Inhale 1 puff into the lungs 2 times daily 1 Inhaler 11     letrozole (FEMARA) 2.5 MG tablet Take 1 tablet (2.5 mg) by mouth daily for 28 days 28 tablet 0     letrozole (FEMARA) 2.5 MG tablet Take 1 tablet (2.5 mg) by mouth daily for 28 days 28 tablet 0     levothyroxine (SYNTHROID/LEVOTHROID) 200 MCG tablet Take 1 tablet (200 mcg) by mouth daily 30 tablet 0     loratadine (CLARITIN) 10 MG tablet Take 1 tablet (10 mg) by mouth daily 90 tablet 3     mirtazapine (REMERON) 15 MG tablet TK 1 T PO HS 30 tablet 3     Multiple Vitamins-Minerals (MULTIVITAMIN ADULT PO)        palbociclib (IBRANCE) 125 MG capsule CHEMO Take 1 capsule (125 mg) by mouth daily with food Take for 21 days, then 7 days off. Avoid grapefruit. Do not open/crush/chew capsule. 21 capsule 0      palbociclib (IBRANCE) 125 MG capsule CHEMO Take 1 capsule (125 mg) by mouth daily with food Take for 21 days, then 7 days off. Avoid grapefruit. Do not open/crush/chew capsule. (Patient not taking: Reported on 8/22/2019) 21 capsule 0     palbociclib (IBRANCE) 125 MG capsule CHEMO Take 1 capsule (125 mg) by mouth daily with food Take for 21 days, then 7 days off. Avoid grapefruit. Do not open/crush/chew capsule. 21 capsule 0     prochlorperazine (COMPAZINE) 10 MG tablet Take 1 tablet (10 mg) by mouth every 6 hours as needed for nausea or vomiting 30 tablet 0     sertraline (ZOLOFT) 100 MG tablet Take 1 tablet (100 mg) by mouth 2 times daily 60 tablet 3     triamcinolone (NASACORT) 55 MCG/ACT inhaler Spray 1 spray into both nostrils daily        VITAMIN D, CHOLECALCIFEROL, PO Take 2,000 Units by mouth daily          Physical Examination:  General: The patient is a pleasant female in no acute distress.  There were no vitals taken for this visit.  Wt Readings from Last 10 Encounters:   08/22/19 137.1 kg (302 lb 3.2 oz)   07/25/19 137.1 kg (302 lb 3.2 oz)   06/25/19 135.4 kg (298 lb 6.4 oz)   05/29/19 136.9 kg (301 lb 13 oz)   04/30/19 136.9 kg (301 lb 11.2 oz)   04/09/19 133.8 kg (295 lb)   04/02/19 133.9 kg (295 lb 3.2 oz)   03/06/19 131.1 kg (289 lb)   02/19/19 129.5 kg (285 lb 8 oz)   02/05/19 127.9 kg (282 lb)     HEENT: EOMI, PERRL. Sclerae are anicteric. Oral mucosa is pink and moist with no lesions or thrush.   Lymph: No palpable cervical, supraclavicular, subclavicular or axillary lymphadenopathy.  Heart: Regular rate and rhythm.   Lungs: Clear to auscultation bilaterally.   Breast: Deferred today   Abdomen: Bowel sounds present, soft, nontender with no palpable hepatosplenomegaly or masses. No visible hernia or palpable hernia defect. No erythema of umbilicus.  Extremities: No lower extremity edema noted bilaterally.   Neuro: Cranial nerves II through XII are grossly intact.  Skin: No rashes, petechiae, or  bruising noted on exposed skin. Some horizontal ridges with darker coloring at base of nails.   Laboratory Data:   Results for ANAHI DOBSON (MRN [id number]) as of 9/19/2019 17:39   9/19/2019 12:19   Sodium 136   Potassium 4.1   Chloride 106   Carbon Dioxide 25   Urea Nitrogen 10   Creatinine 0.94   GFR Estimate 71   GFR Estimate If Black 82   Calcium 9.2   Anion Gap 5   Albumin 4.0   Protein Total 7.6   Bilirubin Total 0.5   Alkaline Phosphatase 71   ALT 21   AST 12   Hemoglobin A1C 5.4   Glucose 95   WBC 2.2 (L)   Hemoglobin 13.0   Hematocrit 37.5   Platelet Count 160   RBC Count 3.83   MCV 98   MCH 33.9 (H)   MCHC 34.7   RDW 13.2   Diff Method Manual Differential   % Neutrophils 51.3   % Lymphocytes 41.7   % Monocytes 5.2   % Eosinophils 0.9   % Basophils 0.9   Absolute Neutrophil 1.1 (L)   Absolute Lymphocytes 0.9   Absolute Monocytes 0.1   Absolute Eosinophils 0.0   Absolute Basophils 0.0       Assessment and Plan:    Recurrent, metastatic ER-positive, HER2 negative breast cancer: History of left breast cancer s/p lumpectomy and SNLB in 2014. S/p 4 cycles ddAC and 12 weeks of Taxol. On AI from 11/2014-2/2017. Recurrence on PET/CT scan performed 08/03/2018 and then biopsy to left posterior cervical lymph nodes, aorticopulmonary, and prevascular lymph nodes. No evidence of metastatic disease in the abdomen and pelvis or brain. Started Ibrance and letrozole on 9/21/18. Tolerating well aside from some hot flashes and mild joint stiffness. Last restaging 7/22 with stable disease. She has a contrast allergy but would like to try contrast again with premeds for next scans in October.   --Labs reviewed. ANC 1.1. Ok to resume Ibrance tomorrow  --Continue letrozole  --Referral to cancer rehab placed.  --Sent rx for pre-medication    Allergies: Now on Nasacort per Dr. Hay. Also recommended nasal saline washes daily to BID.     Asthma: Seen by Dr. Hay. Per chart on fluticasone-salmeterol inhaler BID. She does not recall  the name of this. Also has albuterol prn    Bone health: bone targeting agents are not required as she has no evidence of bone metastases. Continue calcium and vitamin D.     Depression: On zoloft 200 mg, recently increased with improvement in symptoms. Also on remeron at bedtime.Sees Sari Holliday.    ?Hx of pre-diabetes: Will order A1C. She previously saw Dr. Baez in [place] but is now looking for a new PCP more local to her new apartment.   --A1c 5.4    Follow up: CT CAP and neck scheduled for 10/15. She would like to re-try with contrast. Pre-meds ordered. Dr. Bhatt with labs on 10/17.    Evelina Garcia PA-C  [facility]  [address] [place] [id number] [phone]

## 2019-09-19 NOTE — LETTER
9/19/2019       RE: Jade Collins  [address]  [facility] [address]     Dear Colleague,    Thank you for referring your patient, Jade Collins, to the [facility]. Please see a copy of my visit note below.    Oncology/Hematology Visit Note  Sep 19, 2019    Reason for Visit: follow up of recurrent metastatic ER-positive HER2 negative breast cancer    History of Present Illness: Jade Collins is a 49 year old female with a history of left breast cancer diagnosed in 12/2013 in [place] area and Dr. David Redd was her medical oncologist. Biopsy showed tumor to be ER positive, CT positive and HER2 negative. She underwent lumpectomy and SLNB by Dr. Tasha Segura in [place]. [place] lymph node was noted to be involved with tumor but no lymph node dissection done at that time. She subsequently underwent adjuvant ddAC x 4 cycles and Taxol x 12 weeks, competed in 9/11/2014. She had post lumpectomy radiation completed in 11/23/2014 by Dr. Manley. She was begun on an AI in 11/2014, but does not recall name.     She was then switched within about a month or so to anastrozole and remained on anastrozole from 11/2014-02/2017.  She has never received tamoxifen. She then moved to [place], [place] in 03/2017.  She saw a gynecologist there and underwent a IRIS/BSO by Dr. Zendejas.  She then also saw Dr. Linares in [place], [place], who is an oncologist.  His interpretation of the pathology report was that she had triple-negative breast cancer.  It is possible that she may have had low estrogen receptor positivity, but the anastrozole was then discontinued in 02/2017.  She then underwent the IRIS/BSO in 03/2017.  This was done laparoscopically.        She then remained off of all hormonal therapy and in 09/2017 moved to [place].  She remained off hormonal therapy and did not have Medical Oncology followup initially.        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  She then  "went to see Dr. Norberto Brand who performed a PET/CT scan and the patient also underwent a brain MRI for staging.  The PET/CT scan performed 08/03/2018 which showed mildly enlarged left posterior cervical lymph nodes, largest measuring 1.5 x 1.0 cm and mildly enlarged aorticopulmonary and prevascular lymph nodes that were noted on contrast-enhanced CT scan seen less well on the PET scan.  No evidence of metastatic disease in the abdomen and pelvis.  She also underwent a brain MRI that showed no evidence of intracranial metastatic disease.  She underwent a biopsy of one of the lymph nodes in the left neck which showed metastatic adenocarcinoma consistent with breast origin, which was estrogen-receptor positive.  The tumor cells were positive for CK7, ER and SD consistent with metastatic breast carcinoma.  Estrogen receptor showed strong average nuclear intensity in 95% of carcinoma cells, progesterone receptor moderate average nuclear intensity in 70% of the carcinoma.  A GATA3 stain was apparently not performed.     She started Ibrance on 9/21 and letrozole on 9/23. She had improved disease on restaging on 11/26. Last restaging on 4/29/19 with stable disease.     Please see Dr. Bhatt's previous notes for further details on the patient's history. She comes in today for routine follow up.    Interval History:  Jade is here for follow up. She moved on September 1st to a new apartment. She states the toilets are low to ground, causing her knee pain. She bought a raised seat to alleviate the discomfort.     She has had some intense hotflashes recently. She uses a fan at night. She has had some swelling in feet and hands with more humid weather. Swelling in feet improves with elevation. She is concerned about diabetes and wants to lose weight. She states previously when tested for diabetes she was \"borderline\". She has met with nutritionist and knows she needs to make more effort to follow recommendations. Her new apartment " has fitness center and she plans to start using treadmill and stationary bike.     She is on 200 mg daily of Zoloft and feels mood is good recently. She is very happy with her new apartment. 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     She will be due to resume Ibrance tomorrow. No GI side effects. Appetite good. No nausea, abdomina pain. She has alternating loose stools and constipation.     No recent asthma symptoms, cough or dyspnea. No recent fevers or infectious concerns. Remaining 10 point ROS negative    Current Outpatient Medications   Medication Sig Dispense Refill     ACETAMINOPHEN PO Take 650 mg by mouth every 4 hours as needed for pain       albuterol (PROAIR HFA/PROVENTIL HFA/VENTOLIN HFA) 108 (90 Base) MCG/ACT inhaler Inhale 2 puffs into the lungs every 6 hours 1 Inhaler 4     albuterol (PROVENTIL) (2.5 MG/3ML) 0.083% neb solution Take 1 vial (2.5 mg) by nebulization every 6 hours as needed for shortness of breath / dyspnea or wheezing 30 vial 0     calcium carbonate (TUMS) 500 MG chewable tablet Take 1 chew tab by mouth as needed for heartburn       Calcium Citrate-Vitamin D (CALCIUM + D PO) Take 250 mg by mouth 2 times daily        doxycycline hyclate (VIBRA-TABS) 100 MG tablet Take 100 mg by mouth 2 times daily       fluticasone-salmeterol (AIRDUO RESPICLICK) 113-14 MCG/ACT inhaler Inhale 1 puff into the lungs 2 times daily 1 Inhaler 11     letrozole (FEMARA) 2.5 MG tablet Take 1 tablet (2.5 mg) by mouth daily for 28 days 28 tablet 0     letrozole (FEMARA) 2.5 MG tablet Take 1 tablet (2.5 mg) by mouth daily for 28 days 28 tablet 0     levothyroxine (SYNTHROID/LEVOTHROID) 200 MCG tablet Take 1 tablet (200 mcg) by mouth daily 30 tablet 0     loratadine (CLARITIN) 10 MG tablet Take 1 tablet (10 mg) by mouth daily 90 tablet 3     mirtazapine (REMERON) 15 MG tablet TK 1 T PO HS 30 tablet 3     Multiple Vitamins-Minerals  (MULTIVITAMIN ADULT PO)        palbociclib (IBRANCE) 125 MG capsule CHEMO Take 1 capsule (125 mg) by mouth daily with food Take for 21 days, then 7 days off. Avoid grapefruit. Do not open/crush/chew capsule. 21 capsule 0     palbociclib (IBRANCE) 125 MG capsule CHEMO Take 1 capsule (125 mg) by mouth daily with food Take for 21 days, then 7 days off. Avoid grapefruit. Do not open/crush/chew capsule. (Patient not taking: Reported on 8/22/2019) 21 capsule 0     palbociclib (IBRANCE) 125 MG capsule CHEMO Take 1 capsule (125 mg) by mouth daily with food Take for 21 days, then 7 days off. Avoid grapefruit. Do not open/crush/chew capsule. 21 capsule 0     prochlorperazine (COMPAZINE) 10 MG tablet Take 1 tablet (10 mg) by mouth every 6 hours as needed for nausea or vomiting 30 tablet 0     sertraline (ZOLOFT) 100 MG tablet Take 1 tablet (100 mg) by mouth 2 times daily 60 tablet 3     triamcinolone (NASACORT) 55 MCG/ACT inhaler Spray 1 spray into both nostrils daily        VITAMIN D, CHOLECALCIFEROL, PO Take 2,000 Units by mouth daily          Physical Examination:  General: The patient is a pleasant female in no acute distress.  There were no vitals taken for this visit.  Wt Readings from Last 10 Encounters:   08/22/19 137.1 kg (302 lb 3.2 oz)   07/25/19 137.1 kg (302 lb 3.2 oz)   06/25/19 135.4 kg (298 lb 6.4 oz)   05/29/19 136.9 kg (301 lb 13 oz)   04/30/19 136.9 kg (301 lb 11.2 oz)   04/09/19 133.8 kg (295 lb)   04/02/19 133.9 kg (295 lb 3.2 oz)   03/06/19 131.1 kg (289 lb)   02/19/19 129.5 kg (285 lb 8 oz)   02/05/19 127.9 kg (282 lb)     HEENT: EOMI, PERRL. Sclerae are anicteric. Oral mucosa is pink and moist with no lesions or thrush.   Lymph: No palpable cervical, supraclavicular, subclavicular or axillary lymphadenopathy.  Heart: Regular rate and rhythm.   Lungs: Clear to auscultation bilaterally.   Breast: Deferred today   Abdomen: Bowel sounds present, soft, nontender with no palpable hepatosplenomegaly or masses.  No visible hernia or palpable hernia defect. No erythema of umbilicus.  Extremities: No lower extremity edema noted bilaterally.   Neuro: Cranial nerves II through XII are grossly intact.  Skin: No rashes, petechiae, or bruising noted on exposed skin. Some horizontal ridges with darker coloring at base of nails.   Laboratory Data:   Results for ANAHI DOBSON (MRN [id number]) as of 9/19/2019 17:39   9/19/2019 12:19   Sodium 136   Potassium 4.1   Chloride 106   Carbon Dioxide 25   Urea Nitrogen 10   Creatinine 0.94   GFR Estimate 71   GFR Estimate If Black 82   Calcium 9.2   Anion Gap 5   Albumin 4.0   Protein Total 7.6   Bilirubin Total 0.5   Alkaline Phosphatase 71   ALT 21   AST 12   Hemoglobin A1C 5.4   Glucose 95   WBC 2.2 (L)   Hemoglobin 13.0   Hematocrit 37.5   Platelet Count 160   RBC Count 3.83   MCV 98   MCH 33.9 (H)   MCHC 34.7   RDW 13.2   Diff Method Manual Differential   % Neutrophils 51.3   % Lymphocytes 41.7   % Monocytes 5.2   % Eosinophils 0.9   % Basophils 0.9   Absolute Neutrophil 1.1 (L)   Absolute Lymphocytes 0.9   Absolute Monocytes 0.1   Absolute Eosinophils 0.0   Absolute Basophils 0.0       Assessment and Plan:    Recurrent, metastatic ER-positive, HER2 negative breast cancer: History of left breast cancer s/p lumpectomy and SNLB in 2014. S/p 4 cycles ddAC and 12 weeks of Taxol. On AI from 11/2014-2/2017. Recurrence on PET/CT scan performed 08/03/2018 and then biopsy to left posterior cervical lymph nodes, aorticopulmonary, and prevascular lymph nodes. No evidence of metastatic disease in the abdomen and pelvis or brain. Started Ibrance and letrozole on 9/21/18. Tolerating well aside from some hot flashes and mild joint stiffness. Last restaging 7/22 with stable disease. She has a contrast allergy but would like to try contrast again with premeds for next scans in October.   --Labs reviewed. ANC 1.1. Ok to resume Ibrance tomorrow  --Continue letrozole  --Referral to cancer rehab placed.  --Sent  rx for pre-medication    Allergies: Now on Nasacort per Dr. Hay. Also recommended nasal saline washes daily to BID.     Asthma: Seen by Dr. Hay. Per chart on fluticasone-salmeterol inhaler BID. She does not recall the name of this. Also has albuterol prn    Bone health: bone targeting agents are not required as she has no evidence of bone metastases. Continue calcium and vitamin D.     Depression: On zoloft 200 mg, recently increased with improvement in symptoms. Also on remeron at bedtime.Sees Sari Holliday.    ?Hx of pre-diabetes: Will order A1C. She previously saw Dr. Baez in [place] but is now looking for a new PCP more local to her new apartment.   --A1c 5.4    Follow up: CT CAP and neck scheduled for 10/15. She would like to re-try with contrast. Pre-meds ordered. Dr. Bhatt with labs on 10/17.    Evelina Garcia PA-C  [facility]  [address] [place] [id number] [phone]

## 2019-10-03 ENCOUNTER — OFFICE VISIT (OUTPATIENT)
Dept: PALLIATIVE CARE | Facility: CLINIC | Age: 49
End: 2019-10-03
Attending: SOCIAL WORKER
Payer: COMMERCIAL

## 2019-10-03 DIAGNOSIS — F43.10 POSTTRAUMATIC STRESS DISORDER: ICD-10-CM

## 2019-10-03 DIAGNOSIS — F33.40 RECURRENT MAJOR DEPRESSIVE DISORDER, IN REMISSION (H): ICD-10-CM

## 2019-10-03 DIAGNOSIS — Z51.5 ENCOUNTER FOR PALLIATIVE CARE: Primary | ICD-10-CM

## 2019-10-03 PROCEDURE — 90837 PSYTX W PT 60 MINUTES: CPT | Performed by: SOCIAL WORKER

## 2019-10-03 PROCEDURE — 40000114 ZZH STATISTIC NO CHARGE CLINIC VISIT

## 2019-10-04 NOTE — PROGRESS NOTES
"Palliative Care Clinical Social Work Return Appointment    PLEASE NOTE:  THIS IS A MENTAL HEALTH NOTE.  OTHER PROVIDERS VIEWING THIS NOTE SHOULD USE THIS ONLY FOR UNDERSTANDING THE CONTEXT OF THE PATIENT S EXPERIENCE.  TOPICS DESCRIBED IN THIS NOTE SHOULD NOT BE REFERENCED TO THE PATIENT BY MEDICAL PROVIDERS.    Jade is a 49 year old woman with metastatic breast cancer, seen today at [facility] for a return psychotherapy appointment to address PTSD, recurrent major depression, and anxiety as these relate to coping with illness and treatment.    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 She reflects on how despite the bad things about having cancer, she has been able to find a positive aspect of living with cancer in the feelings of connection with others going through cancer also. 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 She wonders about roles for [employer].    Mental Health (thoughts, feelings, actions, coping, and symptoms):   Jade reflects on the benefits she has found in doing NET, especially \"seeing the big picture\" of her life, and awareness of how much she has been able to come through, so increased confidence in her own strength to face challenges as they come. We review narrative and I provide written and digital copies for her use as she chooses. Overall, she describes feeling hopeful, and less stressed due to inheritance from her dad. New apartment is good and she has enjoyed getting " "it set up. 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 She reflects on a wide range of ideas and options. She plans to connect with  at [employer] also for ideas and to plan next steps.    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    Relationships: Sister planning to buy house in [place], has given notice at her job in [place]. Jade not dating recently but has been using FB dating jose. Reflects on her goal to be selective and slow, as well as openly sharing on her profile that she has a terminal illness. She plans to avoid having a martinez come to her apartment at all until several months into dating. She enjoys connected with friend Lizbeth here and with others at [employer]. She talks with her mom once a week, and her mom is defended about accepting financial help. Otherwise they are getting along well. She talks with her sister daily.     End of Life and Advance Care Planning: Not focus today.    Main therapeutic interventions provided this session include:  Psychoeducation, facilitating processing of feelings and thoughts, structured problem " solving, grief counseling, and Narrative Exposure Therapy - closure session    Plan:  Will return for psychotherapy with palliative care focus if needed going forward.      Time spent with patient/family:  70 minutes (Start 3:05pm, end 4:30pm)  Narrative Exposure Therapy, longer session      Palliative Care Counseling Treatment Plan    Client's Name: Jade Collins  YOB: 1970    Date: 1/9/2019    Initial DSM5 Diagnoses:   296.32 Major Depressive Disorder, Recurrent Episode, Moderate With anxious distress  309.81 (F43.10) Posttraumatic Stress Disorder (includes Posttraumatic Stress Dsiorder for Children 6 Years and Younger)  With dissociative symptoms      Date to review plan (90 days usually): 3/20/19    Referral / Collaboration:  .Referral to another professional/service is not indicated at this time.    Anticipated number of sessions to complete episode of care:  10         Treatment Goal(s)  Date Goal Dates  Reviewed Status   12/20/2018   Goal 1:  Client will decrease PTSD symptoms.      Continued   12/20/2018   Objective #1A (Client Action)  Client will Increase understanding of PTSD/ASD and Process trauma history in safe counseling environment using Narrative Exposure Therapy.    Intervention(s)  Therapist will Provide psychoeducation and Narrative Exposure Therapy .    Continued                   Date Goal Dates  Reviewed Status   12/20/2018   Goal 2:  Client will address illness related stressors.    Continued   12/20/2018   Objective #2A (Client Action)  Client will Communicate effectively with family/friends re needs and Communicate effectively with medical provider re needed information.    Intervention(s) (Therapist Action)  Therapist will Provide psychoeducation, Facilitate couples/family therapy session(s) and Facilitate structured problem solving.    Continued   12/20/2018   Objective #2B  Client will Identify effective coping strategies and Use/practice relaxation  strategies.    Intervention(s)  Therapist will Provide psychoeducation, Provide behavioral intervention training, Facilitate processing of thoughts and feelings and Facilitate structured problem solving.    Continued   12/20/2018     Objective #2C  Client will Complete health care directive and/or POLST form.    Intervention(s)  Therapist will Provide advance care planning education.    Continued       Date Goal Dates  Reviewed Status   1/9/2019   Goal 3:  Client will effectively manage recurrent major depressive disorder in light of increased stressors.    Continued   1/9/2019   Objective #3A (Client Action)  Client will Identify activities that provide comfort and/or pleasure, Participate in activities that provide comfort and/or pleasure and Identify an activity that would provide meaning/contribute to feeling of self worth.    Intervention(s) (Therapist Action)  Therapist will Provide psychoeducation, Facilitate processing of thoughts and feelings and Facilitate structured problem solving.    Contined   1/9/2019   Objective #3B  Client will Effectively communicate needs to others, Identify a crisis plan for suicidal ideation and Identify triggers/early signs of depressed mood.    Intervention(s)  Therapist will Provide psychoeducation, Facilitate processing of thoughts and feelings, Facilitate structured problem solving and facilitate safety planning as needed..    Continued               Client has contributed regarding goals and concerns, but has not reviewed the treatment plan. Plan to review at future session.    Lisa Holliday, MSLILLI, [facility] Hospital[facility]      DO NOT SEND ANY LETTERS

## 2019-10-14 ENCOUNTER — HOSPITAL ENCOUNTER (OUTPATIENT)
Dept: PHYSICAL THERAPY | Facility: CLINIC | Age: 49
Setting detail: THERAPIES SERIES
End: 2019-10-14
Attending: PHYSICIAN ASSISTANT
Payer: COMMERCIAL

## 2019-10-14 DIAGNOSIS — Z17.0 MALIGNANT NEOPLASM OF UPPER-OUTER QUADRANT OF LEFT BREAST IN FEMALE, ESTROGEN RECEPTOR POSITIVE (H): Primary | ICD-10-CM

## 2019-10-14 DIAGNOSIS — C50.412 MALIGNANT NEOPLASM OF UPPER-OUTER QUADRANT OF LEFT BREAST IN FEMALE, ESTROGEN RECEPTOR POSITIVE (H): ICD-10-CM

## 2019-10-14 DIAGNOSIS — C50.412 MALIGNANT NEOPLASM OF UPPER-OUTER QUADRANT OF LEFT BREAST IN FEMALE, ESTROGEN RECEPTOR POSITIVE (H): Primary | ICD-10-CM

## 2019-10-14 DIAGNOSIS — Z17.0 MALIGNANT NEOPLASM OF UPPER-OUTER QUADRANT OF LEFT BREAST IN FEMALE, ESTROGEN RECEPTOR POSITIVE (H): ICD-10-CM

## 2019-10-14 PROCEDURE — 97110 THERAPEUTIC EXERCISES: CPT | Mod: GP | Performed by: PHYSICAL THERAPIST

## 2019-10-14 PROCEDURE — 97161 PT EVAL LOW COMPLEX 20 MIN: CPT | Mod: GP | Performed by: PHYSICAL THERAPIST

## 2019-10-14 RX ORDER — LETROZOLE 2.5 MG/1
2.5 TABLET, FILM COATED ORAL DAILY
Qty: 28 TABLET | Refills: 0 | Status: SHIPPED | OUTPATIENT
Start: 2019-10-14 | End: 2019-12-06

## 2019-10-14 ASSESSMENT — 6 MINUTE WALK TEST (6MWT)
TOTAL DISTANCE WALKED (FT): 1227
TOTAL DISTANCE WALKED (METERS): 374

## 2019-10-14 NOTE — PROGRESS NOTES
10/14/19 0800   Quick Adds   Type of Visit Initial OP PT Evaluation   General Information   Start of Care Date 10/14/19   Referring Physician Evelina Garcia PA-C   Orders Evaluate and Treat as Indicated   Order Date 09/19/19   Medical Diagnosis Malignant neoplasm of upper-outer quadrant of left breast in female, estrogen receptor positive (H)   Onset of illness/injury or Date of Surgery 10/14/19   Surgical/Medical history reviewed Yes   Pertinent history of current problem (include personal factors and/or comorbidities that impact the POC) 2013 had breast CA, radiation/chemo. Dx'd this fall with metastatic breast CA, she was not a candidate for chemo or radiation so is on Ibrance and leprozole and CA is stabilizing. She is having more imaging this week. Applied for disability but has not heard yet. 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 Has neuropathy in fingers and toes-- when she first gets up, she does feel some increased unsteadiness in morning, improves during the day. Asthma at baseline. She feels that she is out of shape and wants to feel better when she moves. She also gets knee and hip pain that she thinks is related to being more sedentary.   Prior level of function comment no formal exercise-- she wants to walk/move more. She has gym in apartment building-- TM, weights, bike, elliptical    Patient role/Employment history Employed  (part time  )   Living environment Apartment/condo   Home/Community Accessibility Comments lives in apartment, she has elevator she can use, trying to do the stairs when she goes down to main floor    Patient/Family Goals Statement three flight so stairs without fatigue, loose weight, improve her strength and endurnace, get up form low toilet    Fall Risk Screen   Fall screen completed by PT   Have you fallen 2 or more times in the past year? No   Have you fallen and had an injury in the past year? No   Timed Up and Go score (seconds) not tested    Is patient a fall risk? No    System Outcome Measures   Outcome Measures Cancer Rehab   FACIT Fatigue Subscale (score out of 52). The higher the score, the better the QOL. 24   Six Minute Walk (meters). An increase of 70 or more meters indicates statistically significant change. 374  (R hip pain )   Pain   Pain comments B knee pain, B hip pain-- none at rest but increases to mild-moderate pain with movement    Cognitive Status Examination   Orientation orientation to person, place and time   Level of Consciousness alert   Follows Commands and Answers Questions 100% of the time   Personal Safety and Judgment intact   Memory intact   Posture   Posture Comments forward shoulder posture   Range of Motion (ROM)   ROM Comment decreased IR B--reaches to mid back IR R; to glutes IR L; heel cord tightness B   Strength   Strength Comments 5/5 LE strength with muscle testing; decreased ease with sit<>stand transfer but can do without her UEs; decreased activity tolerance as noted with SOB with 6MWT    Bed Mobility   Bed Mobility Comments did not assess   Transfer Skills   Transfer Comments sit<>stand preferring to use her UEs on chair    Gait   Gait Comments symmetrical, B arm swing, SOB with 6MWT    Gait Special Tests   Gait Special Tests SIX MINUTE WALK TEST   Gait Special Tests Six Minute Walk Test   Feet 1227 Feet   Balance Special Tests   Balance Special Tests Modified CTSIB Conditions;Single leg stance right;Single leg stance left   Balance Special Tests Single Leg Stance Right,   Right, seconds 3 Seconds   Balance Special Tests Single Leg Stance Left   Left, seconds 10 Seconds   Comments Reports knee pain   Balance Special Tests Modified CTSIB Conditions   Condition 1, seconds 30 Seconds   Condition 2, seconds 30 Seconds   Condition 4, seconds 30 Seconds   Condition 5, seconds 22 Seconds   Sensory Examination   Sensory Perception Comments reports n/t in hands and feet from CA treatments-- worse in the AM   Planned Therapy Interventions   Planned  Therapy Interventions balance training;gait training;ROM;strengthening;stretching;transfer training;neuromuscular re-education   Clinical Impression   Criteria for Skilled Therapeutic Interventions Met yes, treatment indicated   PT Diagnosis weakess and decreased activity tolerance    Influenced by the following impairments decreased ROM, weakness, decreased activity tolerance, pain, balance impairments    Functional limitations due to impairments decreased ease of functional mobility and impaired iADLs    Clinical Presentation Stable/Uncomplicated   Clinical Presentation Rationale stable medically, PT impairments, clinical judgement    Clinical Decision Making (Complexity) Low complexity   Therapy Frequency 1 time/week   Predicted Duration of Therapy Intervention (days/wks) up to 8 weeks    Risk & Benefits of therapy have been explained Yes   Patient, Family & other staff in agreement with plan of care Yes   Clinical Impression Comments Patient presents with weakness and decreased activity tolerance in setting of CA. Patient motivated to improve her strength and exercise tolerance in order to decrease pain and aches in her body and have less challenges with functional mobility. Will benefit from skilled PT.    GOALS   PT Eval Goals 1;2;3;4   Goal 1   Goal Identifier 6MWT   Goal Description Jade will improve her 6MWT by 70 feet to show signiciant improvement in walking speed and functional endurance.    Target Date 12/12/19   Goal 2   Goal Identifier FACIT   Goal Description Jaed will improve FACIT from 24 to 36 in order to show improved fatigue to increase energy level for community activities.    Target Date 12/12/19   Goal 3   Goal Identifier sit<>stand    Goal Description Jade will perform 10 reps of sit<>stand from 18'' surface without arms to improve eae of transfers at home.    Target Date 12/12/19   Goal 4   Goal Identifier HEP    Goal Description Jade will participate in HEP 4 days per week on consistent  basis doing PT exercises or using gym equipment at apartment in order to improve her strength and funcitonal endurnace.    Target Date 12/12/19   Total Evaluation Time   PT Todd Low Complexity Minutes (54895) 30

## 2019-10-15 ENCOUNTER — HOSPITAL ENCOUNTER (OUTPATIENT)
Dept: CT IMAGING | Facility: CLINIC | Age: 49
Discharge: HOME | End: 2019-10-15
Attending: INTERNAL MEDICINE | Admitting: INTERNAL MEDICINE
Payer: COMMERCIAL

## 2019-10-15 ENCOUNTER — HOSPITAL ENCOUNTER (OUTPATIENT)
Dept: CT IMAGING | Facility: CLINIC | Age: 49
End: 2019-10-15
Attending: INTERNAL MEDICINE
Payer: COMMERCIAL

## 2019-10-15 DIAGNOSIS — Z17.0 MALIGNANT NEOPLASM OF UPPER-OUTER QUADRANT OF LEFT BREAST IN FEMALE, ESTROGEN RECEPTOR POSITIVE (H): ICD-10-CM

## 2019-10-15 DIAGNOSIS — C50.412 MALIGNANT NEOPLASM OF UPPER-OUTER QUADRANT OF LEFT BREAST IN FEMALE, ESTROGEN RECEPTOR POSITIVE (H): ICD-10-CM

## 2019-10-15 PROCEDURE — 70491 CT SOFT TISSUE NECK W/DYE: CPT

## 2019-10-15 PROCEDURE — 71260 CT THORAX DX C+: CPT

## 2019-10-15 PROCEDURE — 74177 CT ABD & PELVIS W/CONTRAST: CPT

## 2019-10-15 PROCEDURE — 25000128 H RX IP 250 OP 636: Performed by: GENERAL PRACTICE

## 2019-10-15 RX ORDER — IOPAMIDOL 755 MG/ML
135 INJECTION, SOLUTION INTRAVASCULAR ONCE
Status: COMPLETED | OUTPATIENT
Start: 2019-10-15 | End: 2019-10-15

## 2019-10-15 RX ADMIN — IOPAMIDOL 135 ML: 755 INJECTION, SOLUTION INTRAVENOUS at 12:50

## 2019-10-16 ENCOUNTER — TELEPHONE (OUTPATIENT)
Dept: ONCOLOGY | Facility: CLINIC | Age: 49
End: 2019-10-16

## 2019-10-16 NOTE — ORAL ONC MGMT
Oral Chemotherapy Monitoring Program     Primary Oncologist: Dr. Bhatt  Primary Oncology Clinic: [facility]   Cancer Diagnosis: Breast Cancer      Drug: Ibrance 125mg once daily x 21 days on, 7 days off   Therapy History:  C1D1 9/21/2018  Next cycle: 5/3/19, 5/31/19, 6/28/19, 7/26/19      Drug Interaction Assessment: No new drug-drug interactions.     Lab Monitoring Plan  CBC/CMP monthly     Subjective/Objective:  Jade Collins is a 49 year old female contacted by phone for a follow-up visit for oral chemotherapy. She confirmed the correct dosing of ibrance/letrozole. Next cycle starts on Friday. Patient denies any new/worsening side effects, missed doses, or medication changes.     ORAL CHEMOTHERAPY 1/8/2019 1/11/2019 5/1/2019 5/29/2019 6/26/2019 7/25/2019 10/16/2019   Drug Name Ibrance (Palbociclib) Ibrance (Palbociclib) Ibrance (Palbociclib) Ibrance (Palbociclib) Ibrance (Palbociclib) Ibrance (Palbociclib) Ibrance (Palbociclib)   Current Dosage 125mg 125mg 125mg 125mg 125mg 125mg 125mg   Current Schedule Daily Daily Daily Daily Daily Daily Daily   Cycle Details 3 weeks on 1 week off 3 weeks on 1 week off 3 weeks on 1 week off 3 weeks on 1 week off 3 weeks on 1 week off 3 weeks on 1 week off 3 weeks on 1 week off   Start Date of Last Cycle 12/14/2018 - 4/5/2019 5/3/2019 5/31/2019 6/28/2019 9/20/2019   Planned next cycle start date 1/11/2019 1/11/2019 5/3/2019 5/31/2019 6/28/2019 7/26/2019 10/18/2019   Doses missed in last 2 weeks 0 - 0 0 0 0 0   Adherence Assessment Adherent - Adherent Adherent Adherent Adherent Adherent   Adverse Effects Diarrhea Diarrhea;Fatigue Diarrhea;Fatigue No AE identified during assessment No AE identified during assessment No AE identified during assessment No AE identified during assessment   Nausea - - - - - - -   Pharmacist Intervention(nausea) - - - - - - -   Intervention(s) - - - - - - -   Diarrhea Grade 2 Grade 1 Resolved due to intervention - - - -   Pharmacist  "Intervention(diarrhea) No Yes - - - - -   Intervention(s) - Patient education - - - - -   Fatigue - Grade 1 Resolved due to intervention - - - -   Pharmacist Intervention(fatigue) - Yes - - - - -   Arthralgias - - - - - - -   Pharmacist Intervention(arthralgias) - - - - - - -   Intervention(s) - - - - - - -   Home BPs - - not needed not needed not needed not needed -   Any new drug interactions? - - No No No No No   Is the dose as ordered appropriate for the patient? - - Yes Yes Yes Yes Yes   Is the patient currently in pain? - - No No No No -   Does the patient feel the pain is currently being managed by a provider? - - - - - - -   Has the patient been assessed within the past 6 months for depression? - - - - - - Yes   Has the patient missed any days of school, work, or other routine activity? - - No No No No No       Vitals:  BP:   BP Readings from Last 1 Encounters:   09/19/19 117/76     Wt Readings from Last 1 Encounters:   09/19/19 137.4 kg (303 lb)     Estimated body surface area is 2.57 meters squared as calculated from the following:    Height as of 7/25/19: 1.727 m (5' 7.99\").    Weight as of 9/19/19: 137.4 kg (303 lb).    Labs:  _  Result Component Current Result Ref Range   Sodium 136 (9/19/2019) 133 - 144 mmol/L     _  Result Component Current Result Ref Range   Potassium 4.1 (9/19/2019) 3.4 - 5.3 mmol/L     _  Result Component Current Result Ref Range   Calcium 9.2 (9/19/2019) 8.5 - 10.1 mg/dL     No results found for Mag within last 30 days.     No results found for Phos within last 30 days.     _  Result Component Current Result Ref Range   Albumin 4.0 (9/19/2019) 3.4 - 5.0 g/dL     _  Result Component Current Result Ref Range   Urea Nitrogen 10 (9/19/2019) 7 - 30 mg/dL     _  Result Component Current Result Ref Range   Creatinine 0.94 (9/19/2019) 0.52 - 1.04 mg/dL       _  Result Component Current Result Ref Range   AST 12 (9/19/2019) 0 - 45 U/L     _  Result Component Current Result Ref Range   ALT " 21 (9/19/2019) 0 - 50 U/L     _  Result Component Current Result Ref Range   Bilirubin Total 0.5 (9/19/2019) 0.2 - 1.3 mg/dL       _  Result Component Current Result Ref Range   WBC 2.2 (L) (9/19/2019) 4.0 - 11.0 10e9/L     _  Result Component Current Result Ref Range   Hemoglobin 13.0 (9/19/2019) 11.7 - 15.7 g/dL     _  Result Component Current Result Ref Range   Platelet Count 160 (9/19/2019) 150 - 450 10e9/L     _  Result Component Current Result Ref Range   Absolute Neutrophil 1.1 (L) (9/19/2019) 1.6 - 8.3 10e9/L       Assessment:  Jade is tolerating therapy well. She'll have labs and an appt with Dr. Bhatt tomorrow.     Plan:  Continue ibrance and letrozole. Appt tomorrow with labs. Will review labs and ensure counts are adequate.     Follow-Up:  11/8: monthly follow up assessment    Refill Due:  Ibrance/letrozole will be picked up at [facility] on 10/17 following her appointment.     Grant Clark, PharmD, MS  Hematology/Oncology Clinical Pharmacist  [facility]  [facility]  [phone]

## 2019-10-17 ENCOUNTER — ONCOLOGY VISIT (OUTPATIENT)
Dept: ONCOLOGY | Facility: CLINIC | Age: 49
End: 2019-10-17
Attending: INTERNAL MEDICINE
Payer: COMMERCIAL

## 2019-10-17 VITALS
SYSTOLIC BLOOD PRESSURE: 111 MMHG | DIASTOLIC BLOOD PRESSURE: 70 MMHG | WEIGHT: 293 LBS | BODY MASS INDEX: 44.41 KG/M2 | HEIGHT: 68 IN | OXYGEN SATURATION: 93 % | TEMPERATURE: 98.6 F | HEART RATE: 74 BPM

## 2019-10-17 DIAGNOSIS — C50.412 MALIGNANT NEOPLASM OF UPPER-OUTER QUADRANT OF LEFT BREAST IN FEMALE, ESTROGEN RECEPTOR POSITIVE (H): ICD-10-CM

## 2019-10-17 DIAGNOSIS — Z17.0 MALIGNANT NEOPLASM OF UPPER-OUTER QUADRANT OF LEFT BREAST IN FEMALE, ESTROGEN RECEPTOR POSITIVE (H): ICD-10-CM

## 2019-10-17 DIAGNOSIS — T50.8X5D ALLERGIC REACTION TO CONTRAST MATERIAL, SUBSEQUENT ENCOUNTER: ICD-10-CM

## 2019-10-17 DIAGNOSIS — Z23 NEED FOR PROPHYLACTIC VACCINATION AND INOCULATION AGAINST INFLUENZA: Primary | ICD-10-CM

## 2019-10-17 LAB
ALBUMIN SERPL-MCNC: 3.8 G/DL (ref 3.4–5)
ALP SERPL-CCNC: 68 U/L (ref 40–150)
ALT SERPL W P-5'-P-CCNC: 31 U/L (ref 0–50)
ANION GAP SERPL CALCULATED.3IONS-SCNC: 7 MMOL/L (ref 3–14)
AST SERPL W P-5'-P-CCNC: 25 U/L (ref 0–45)
BASOPHILS # BLD AUTO: 0.1 10E9/L (ref 0–0.2)
BASOPHILS NFR BLD AUTO: 1.7 %
BILIRUB SERPL-MCNC: 0.4 MG/DL (ref 0.2–1.3)
BUN SERPL-MCNC: 16 MG/DL (ref 7–30)
CALCIUM SERPL-MCNC: 9 MG/DL (ref 8.5–10.1)
CHLORIDE SERPL-SCNC: 106 MMOL/L (ref 94–109)
CO2 SERPL-SCNC: 27 MMOL/L (ref 20–32)
CREAT SERPL-MCNC: 1.07 MG/DL (ref 0.52–1.04)
DIFFERENTIAL METHOD BLD: ABNORMAL
EOSINOPHIL # BLD AUTO: 0.1 10E9/L (ref 0–0.7)
EOSINOPHIL NFR BLD AUTO: 2.4 %
ERYTHROCYTE [DISTWIDTH] IN BLOOD BY AUTOMATED COUNT: 13.6 % (ref 10–15)
GFR SERPL CREATININE-BSD FRML MDRD: 61 ML/MIN/{1.73_M2}
GLUCOSE SERPL-MCNC: 98 MG/DL (ref 70–99)
HCT VFR BLD AUTO: 37.6 % (ref 35–47)
HGB BLD-MCNC: 13 G/DL (ref 11.7–15.7)
IMM GRANULOCYTES # BLD: 0 10E9/L (ref 0–0.4)
IMM GRANULOCYTES NFR BLD: 0.3 %
LYMPHOCYTES # BLD AUTO: 1.5 10E9/L (ref 0.8–5.3)
LYMPHOCYTES NFR BLD AUTO: 53.3 %
MCH RBC QN AUTO: 33.8 PG (ref 26.5–33)
MCHC RBC AUTO-ENTMCNC: 34.6 G/DL (ref 31.5–36.5)
MCV RBC AUTO: 98 FL (ref 78–100)
MONOCYTES # BLD AUTO: 0.3 10E9/L (ref 0–1.3)
MONOCYTES NFR BLD AUTO: 9 %
NEUTROPHILS # BLD AUTO: 1 10E9/L (ref 1.6–8.3)
NEUTROPHILS NFR BLD AUTO: 33.3 %
NRBC # BLD AUTO: 0 10*3/UL
NRBC BLD AUTO-RTO: 0 /100
PLATELET # BLD AUTO: 156 10E9/L (ref 150–450)
POTASSIUM SERPL-SCNC: 4 MMOL/L (ref 3.4–5.3)
PROT SERPL-MCNC: 7.4 G/DL (ref 6.8–8.8)
RBC # BLD AUTO: 3.85 10E12/L (ref 3.8–5.2)
SODIUM SERPL-SCNC: 140 MMOL/L (ref 133–144)
WBC # BLD AUTO: 2.9 10E9/L (ref 4–11)

## 2019-10-17 PROCEDURE — G0463 HOSPITAL OUTPT CLINIC VISIT: HCPCS | Mod: 25

## 2019-10-17 PROCEDURE — 99214 OFFICE O/P EST MOD 30 MIN: CPT | Mod: GC | Performed by: INTERNAL MEDICINE

## 2019-10-17 PROCEDURE — 90686 IIV4 VACC NO PRSV 0.5 ML IM: CPT | Mod: ZF | Performed by: INTERNAL MEDICINE

## 2019-10-17 PROCEDURE — 96372 THER/PROPH/DIAG INJ SC/IM: CPT | Mod: ZF

## 2019-10-17 PROCEDURE — 85025 COMPLETE CBC W/AUTO DIFF WBC: CPT | Performed by: INTERNAL MEDICINE

## 2019-10-17 PROCEDURE — 80053 COMPREHEN METABOLIC PANEL: CPT | Performed by: INTERNAL MEDICINE

## 2019-10-17 PROCEDURE — 25000128 H RX IP 250 OP 636: Mod: ZF | Performed by: INTERNAL MEDICINE

## 2019-10-17 PROCEDURE — G0008 ADMIN INFLUENZA VIRUS VAC: HCPCS

## 2019-10-17 RX ORDER — METHYLPREDNISOLONE 16 MG/1
TABLET ORAL
Qty: 4 TABLET | Refills: 0 | Status: SHIPPED | OUTPATIENT
Start: 2019-10-17 | End: 2019-12-18

## 2019-10-17 RX ADMIN — INFLUENZA A VIRUS A/BRISBANE/02/2018 IVR-190 (H1N1) ANTIGEN (FORMALDEHYDE INACTIVATED), INFLUENZA A VIRUS A/KANSAS/14/2017 X-327 (H3N2) ANTIGEN (FORMALDEHYDE INACTIVATED), INFLUENZA B VIRUS B/PHUKET/3073/2013 ANTIGEN (FORMALDEHYDE INACTIVATED), AND INFLUENZA B VIRUS B/MARYLAND/15/2016 BX-69A ANTIGEN (FORMALDEHYDE INACTIVATED) 0.5 ML: 15; 15; 15; 15 INJECTION, SUSPENSION INTRAMUSCULAR at 09:50

## 2019-10-17 ASSESSMENT — PAIN SCALES - GENERAL: PAINLEVEL: NO PAIN (0)

## 2019-10-17 ASSESSMENT — MIFFLIN-ST. JEOR: SCORE: 2068.15

## 2019-10-17 NOTE — PROGRESS NOTES
Chief Complaint   Patient presents with     Oncology Clinic Visit     RETURN VISIT; BREAST CANCER FOLLOW UP      Blood Draw     Vitals and blood drawn by LPN. Pt checked into joset.      FANNY Pandey LPN

## 2019-10-17 NOTE — NURSING NOTE
"Oncology Rooming Note    October 17, 2019 9:35 AM   Jade Collins is a 49 year old female who presents for:    Chief Complaint   Patient presents with     Oncology Clinic Visit     RETURN VISIT; BREAST CANCER FOLLOW UP      Blood Draw     Vitals and blood drawn by LPN. Pt checked into appt.      Initial Vitals: /70   Pulse 74   Temp 98.6  F (37  C) (Oral)   Ht 1.727 m (5' 7.99\")   Wt 139.5 kg (307 lb 8 oz)   SpO2 93%   BMI 46.77 kg/m   Estimated body mass index is 46.77 kg/m  as calculated from the following:    Height as of this encounter: 1.727 m (5' 7.99\").    Weight as of this encounter: 139.5 kg (307 lb 8 oz). Body surface area is 2.59 meters squared.  No Pain (0) Comment: Data Unavailable   No LMP recorded. Patient is postmenopausal.  Allergies reviewed: Yes  Medications reviewed: Yes    Medications: MEDICATION REFILLS NEEDED TODAY. Provider was notified. IBrance and Letrozole.     Pharmacy name entered into [employer]:    [id number] - [place], [place] - [address] AT [facility] [address] [facility] - [place], [place] - [address]    Clinical concerns: No new concerns today  Dr Bhatt  was notified.      Ashwini Mejia              "

## 2019-10-17 NOTE — PROGRESS NOTES
Oncology/Hematology Visit Note  Oct 17, 2019    Reason for Visit: follow up of recurrent metastatic ER-positive HER2 negative breast cancer    History of Present Illness: Jade Collins is a 49 year old female with a history of left breast cancer diagnosed in 12/2013 in [place] area and Dr. David Redd was her medical oncologist. Biopsy showed tumor to be ER positive, [place] positive and HER2 negative. She underwent lumpectomy and SLNB by Dr. Tasha Segura in [place]. [place] lymph node was noted to be involved with tumor but no lymph node dissection done at that time. She subsequently underwent adjuvant ddAC x 4 cycles and Taxol x 12 weeks, competed in 9/11/2014. She had post lumpectomy radiation completed in 11/23/2014 by Dr. Manley. She was begun on an AI in 11/2014, but does not recall name.     She was then switched within about a month or so to anastrozole and remained on anastrozole from 11/2014-02/2017.  She has never received tamoxifen. She then moved to [place], [place] in 03/2017.  She saw a gynecologist there and underwent a IRIS/BSO by Dr. Zendejas.  She then also saw Dr. Linares in [place], [place], who is an oncologist.  His interpretation of the pathology report was that she had triple-negative breast cancer.  It is possible that she may have had low estrogen receptor positivity, but the anastrozole was then discontinued in 02/2017.  She then underwent the IRIS/BSO in 03/2017.  This was done laparoscopically.        She then remained off of all hormonal therapy and in 09/2017 moved to [place].  She remained off hormonal therapy and did not have Medical Oncology followup initially.        She was driving for [place] and noticed lymph nodes in the left neck about 8 weeks ago.  She then went to see Dr. Norberto Brand who performed a PET/CT scan and the patient also underwent a brain MRI for staging.  The PET/CT scan performed 08/03/2018 which showed mildly enlarged left posterior cervical lymph nodes, largest measuring  1.5 x 1.0 cm and mildly enlarged aorticopulmonary and prevascular lymph nodes that were noted on contrast-enhanced CT scan seen less well on the PET scan.  No evidence of metastatic disease in the abdomen and pelvis.  She also underwent a brain MRI that showed no evidence of intracranial metastatic disease.  She underwent a biopsy of one of the lymph nodes in the left neck which showed metastatic adenocarcinoma consistent with breast origin, which was estrogen-receptor positive.  The tumor cells were positive for CK7, ER and [place] consistent with metastatic breast carcinoma.  Estrogen receptor showed strong average nuclear intensity in 95% of carcinoma cells, progesterone receptor moderate average nuclear intensity in 70% of the carcinoma.  A GATA3 stain was apparently not performed.     She started Ibrance on 9/21 and letrozole on 9/23. She had improved disease on restaging on 11/26. Last restaging on 4/29/19 with stable disease.     Please see Dr. Bhatt's previous notes for further details on the patient's history. She comes in today for routine follow up.    Interval History:  Jade is here byherself for follow up.  She is feeling good in general.  She denies any complains today.  Everything works better after she moved to a new apartment.  Patient having some point from her father and she walks around 10-12 hours and [place].  She denies having any mood issue.  She is recently on  Zoloft 200 mg daily    She is on Ibrance and Letrozole. No GI side effects. Appetite good. No nausea, abdomina pain. No recent asthma symptoms, cough or dyspnea. No recent fevers or infectious concerns. Remaining 10 point ROS negative    Current Outpatient Medications   Medication Sig Dispense Refill     ACETAMINOPHEN PO Take 650 mg by mouth every 4 hours as needed for pain       albuterol (PROAIR HFA/PROVENTIL HFA/VENTOLIN HFA) 108 (90 Base) MCG/ACT inhaler Inhale 2 puffs into the lungs every 6 hours 1 Inhaler 4     albuterol  "(PROVENTIL) (2.5 MG/3ML) 0.083% neb solution Take 1 vial (2.5 mg) by nebulization every 6 hours as needed for shortness of breath / dyspnea or wheezing 30 vial 0     aspirin (ASA) 81 MG chewable tablet Take 81 mg by mouth daily       calcium carbonate (TUMS) 500 MG chewable tablet Take 1 chew tab by mouth as needed for heartburn       Calcium Citrate-Vitamin D (CALCIUM + D PO) Take 250 mg by mouth 2 times daily        fluticasone-salmeterol (AIRDUO RESPICLICK) 113-14 MCG/ACT inhaler Inhale 1 puff into the lungs 2 times daily 1 Inhaler 11     letrozole (FEMARA) 2.5 MG tablet Take 1 tablet (2.5 mg) by mouth daily for 28 days 28 tablet 0     levothyroxine (SYNTHROID/LEVOTHROID) 200 MCG tablet Take 1 tablet (200 mcg) by mouth daily 30 tablet 0     loratadine (CLARITIN) 10 MG tablet Take 1 tablet (10 mg) by mouth daily 90 tablet 3     methylPREDNISolone (MEDROL) 16 MG tablet Take 32 mg by mouth 12 hours before the procedure and repeat 32 mg by mouth 2 hours before the procedure to prevent contrast reaction. 4 tablet 0     mirtazapine (REMERON) 15 MG tablet TK 1 T PO HS 30 tablet 3     Multiple Vitamins-Minerals (MULTIVITAMIN ADULT PO)        palbociclib (IBRANCE) 125 MG capsule CHEMO Take 1 capsule (125 mg) by mouth daily with food Take for 21 days, then 7 days off. Avoid grapefruit. Do not open/crush/chew capsule. 21 capsule 0     prochlorperazine (COMPAZINE) 10 MG tablet Take 1 tablet (10 mg) by mouth every 6 hours as needed for nausea or vomiting 30 tablet 0     sertraline (ZOLOFT) 100 MG tablet Take 1 tablet (100 mg) by mouth 2 times daily 60 tablet 3     triamcinolone (NASACORT) 55 MCG/ACT inhaler Spray 1 spray into both nostrils daily        VITAMIN D, CHOLECALCIFEROL, PO Take 2,000 Units by mouth daily          Physical Examination:  General: The patient is a pleasant female in no acute distress.  /70   Pulse 74   Temp 98.6  F (37  C) (Oral)   Ht 1.727 m (5' 7.99\")   Wt 139.5 kg (307 lb 8 oz)   SpO2 93% "   BMI 46.77 kg/m    Wt Readings from Last 10 Encounters:   10/17/19 139.5 kg (307 lb 8 oz)   09/19/19 137.4 kg (303 lb)   08/22/19 137.1 kg (302 lb 3.2 oz)   07/25/19 137.1 kg (302 lb 3.2 oz)   06/25/19 135.4 kg (298 lb 6.4 oz)   05/29/19 136.9 kg (301 lb 13 oz)   04/30/19 136.9 kg (301 lb 11.2 oz)   04/09/19 133.8 kg (295 lb)   04/02/19 133.9 kg (295 lb 3.2 oz)   03/06/19 131.1 kg (289 lb)     HEENT: EOMI, PERRL. Sclerae are anicteric. Oral mucosa is pink and moist with no lesions or thrush.   Lymph: No palpable cervical, supraclavicular, subclavicular or axillary lymphadenopathy.  Heart: Regular rate and rhythm.   Lungs: Clear to auscultation bilaterally.   Breast: Deferred today   Abdomen: Bowel sounds present, soft, nontender with no palpable hepatosplenomegaly or masses. No visible hernia or palpable hernia defect. No erythema of umbilicus.  Extremities: No lower extremity edema noted bilaterally.   Neuro: Cranial nerves II through XII are grossly intact.  Skin: No rashes, petechiae, or bruising noted on exposed skin. Some horizontal ridges with darker coloring at base of nails.   Laboratory Data:   CBC RESULTS:   Recent Labs   Lab Test 10/17/19  0907   WBC 2.9*   RBC 3.85   HGB 13.0   HCT 37.6   MCV 98   MCH 33.8*   MCHC 34.6   RDW 13.6        Last Comprehensive Metabolic Panel:  Sodium   Date Value Ref Range Status   10/17/2019 140 133 - 144 mmol/L Final     Potassium   Date Value Ref Range Status   10/17/2019 4.0 3.4 - 5.3 mmol/L Final     Chloride   Date Value Ref Range Status   10/17/2019 106 94 - 109 mmol/L Final     Carbon Dioxide   Date Value Ref Range Status   10/17/2019 27 20 - 32 mmol/L Final     Anion Gap   Date Value Ref Range Status   10/17/2019 7 3 - 14 mmol/L Final     Glucose   Date Value Ref Range Status   10/17/2019 98 70 - 99 mg/dL Final     Urea Nitrogen   Date Value Ref Range Status   10/17/2019 16 7 - 30 mg/dL Final     Creatinine   Date Value Ref Range Status   10/17/2019 1.07  (H) 0.52 - 1.04 mg/dL Final     GFR Estimate   Date Value Ref Range Status   10/17/2019 61 >60 mL/min/[1.73_m2] Final     Comment:     Non  GFR Calc  Starting 12/18/2018, serum creatinine based estimated GFR (eGFR) will be   calculated using the Chronic Kidney Disease Epidemiology Collaboration   (CKD-EPI) equation.       Calcium   Date Value Ref Range Status   10/17/2019 9.0 8.5 - 10.1 mg/dL Final     Bilirubin Total   Date Value Ref Range Status   10/17/2019 0.4 0.2 - 1.3 mg/dL Final     Alkaline Phosphatase   Date Value Ref Range Status   10/17/2019 68 40 - 150 U/L Final     ALT   Date Value Ref Range Status   10/17/2019 31 0 - 50 U/L Final     AST   Date Value Ref Range Status   10/17/2019 25 0 - 45 U/L Final     Images were reviewed: - 10/15/19 : CT CAP with contrast showed: No evidence of metastatic disease in the chest, abdomen or pelvis. CT neck showed no masses or adenopathy.    Assessment and Plan:    Recurrent, metastatic ER-positive, HER2 negative breast cancer: History of left breast cancer s/p lumpectomy and SNLB in 2014. S/p 4 cycles ddAC and 12 weeks of Taxol. On AI from 11/2014-2/2017. Recurrence on PET/CT scan performed 08/03/2018 and then biopsy to left posterior cervical lymph nodes, aorticopulmonary, and prevascular lymph nodes were positive. No evidence of metastatic disease in the abdomen and pelvis or brain. Started Ibrance and letrozole on 9/21/18. Tolerating well aside from some hot flashes and mild joint stiffness. Last restaging 7/22 with stable disease.   She has a contrast allergy but would like to try contrast again with premeds.   - 10/15/19 : CT CAP with contrast showed: No evidence of metastatic disease in the chest, abdomen or pelvis. CT neck showed no masses or adenopathy.  -Plan to repeat CT CAP every 3 months and the next one will be in January 2020.  --Labs reviewed. ANC 1.0. Ok to resume Ibrance  --Continue letrozole.    Allergies: Now on Nasacort per   Bharti. Also recommended nasal saline washes daily to BID.     Asthma: Seen by Dr. Hay. Per chart on fluticasone-salmeterol inhaler BID. She does not recall the name of this. Also has albuterol prn    Bone health: bone targeting agents are not required as she has no evidence of bone metastases. Continue calcium and vitamin D.     Depression: On zoloft 200 mg, recently increased with improvement in symptoms. Also on remeron at bedtime.Sees Sari Holliday.    ?Hx of pre-diabetes:  She previously saw Dr. Baez in [place] but is now looking for a new PCP more local to her new apartment.   --A1c 5.4.  I doubt patient has prediabetes.    Follow up: CT CAP and neck scheduled for January 2020. She would like to re-try with contrast.    Patient is seen and examined by Dr. Bhatt and I.  Assessment and plan are discussed and delivered to the patient.     Sara Tavares MD  Hematology&Oncology Fellow  Pager: [phone]

## 2019-10-17 NOTE — NURSING NOTE
Influenza vaccine given to patient in Right Deltoid. Patient tolerated injection without any incidents.     Has the patient received the information for the injectable influenza vaccine? YES      Ashwini Mejia CMA ([facility]) October 17, 2019 9:49 AM

## 2019-10-17 NOTE — PROGRESS NOTES
HISTORY OF PRESENT ILLNESS:  Jade is a 48-year-old woman with metastatic breast cancer who comes to our clinic for recommendations for further treatment.  She is referred by Dr. Norberto Brand at [facility].  Jade would like to continue her care at the [facility].       Jade was diagnosed with breast cancer with a lump found in the upper-inner quadrant of the left breast.  She was diagnosed in 12/2013 in the [place] area and Dr. David Redd was her medical oncologist.  Biopsy of the tumor showed it to be ER positive, TN positive, and HER-2 negative.  We do not have any of the original pathology and we need to obtain those reports.  She underwent a lumpectomy performed by Dr. Tasha Segura who is a surgeon in the [place] area.  She also had a sentinel lymph node which was noted to be involved with tumor but there was no lymph node dissection done at that time.  [id number].  She subsequently underwent adjuvant chemotherapy with dose-dense AC for 4 cycles and Taxol for 12 weeks, completed 09/11/2014.        She then had radiation therapy post lumpectomy to the left breast, which was completed 11/23/2014 by Dr. Manley.        She was then begun on an aromatase inhibitor, the name of which she does not remember.  The aromatase inhibitor therapy was begun in 11/2014.  She was then switched within about a month or so to anastrozole and remained on anastrozole from 11/2014-02/2017.  She has never received tamoxifen.        She then moved to [place], [place] in 03/2017.  She saw a gynecologist there and underwent a IRIS/BSO by Dr. Zendejas.  She then also saw Dr. Linares in [place], [place], who is an oncologist.  His interpretation of the pathology report was that she had triple-negative breast cancer.  It is possible that she may have had low estrogen receptor positivity, but the anastrozole was then discontinued in 02/2017.  She then underwent the IRIS/BSO in 03/2017.  This was done laparoscopically.        She then  remained off of all hormonal therapy and in 09/2017 moved to [place].  She remained off hormonal therapy and did not have Medical Oncology followup initially.        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  She then went to see Dr. Norberto Brand who performed a PET/CT scan and the patient also underwent a brain MRI for staging.  The PET/CT scan performed 08/03/2018 showed in the neck there were several mildly metabolic active and large prominent left posterior cervical lymph nodes.  The largest is located posterior to the left sternocleidomastoid muscle and measures 1.5 x 1 cm in size.  This demonstrate modest FDG uptake.  The other lymph nodes are smaller and demonstrate mild FDG uptake.  The prominent prevascular and aortopulmonary lymph nodes were also seen on the contrast-enhanced scan were less well seen on the PET.  The largest lymph node visualized on the PET scan was 1.1 x 0.8 cm and demonstrated mild FDG uptake.  This was in the periaortic area just anterior to the aortic arch.  No lung nodules.  No lung infiltrates.  No pleural effusion.  There was no uptake of FDG in the bones.  In summary, there were mildly enlarged left posterior cervical lymph nodes, largest measuring 1.5 x 1.0 cm and mildly enlarged aorticopulmonary and prevascular lymph nodes that were noted on contrast-enhanced CT scan seen less well on the PET scan.  No evidence of metastatic disease in the abdomen and pelvis.  She also underwent a brain MRI that showed no evidence of intracranial metastatic disease.  She underwent a biopsy of one of the lymph nodes in the left neck which showed metastatic adenocarcinoma consistent with breast origin, which was estrogen-receptor positive.  The tumor cells were positive for CK7, ER and NE consistent with metastatic breast carcinoma.  Estrogen receptor showed strong average nuclear intensity in 95% of carcinoma cells, progesterone receptor moderate average  nuclear intensity in 70% of the carcinoma.  A GATA3 stain was apparently not performed.       Jade came to our clinic for recommendations.  She has mild pain in the left neck, moderate fatigue, moderate depression and has begun on sertraline, and she has moderate anxiety.      Nausea.   Zofran.  Persistent symptoms of depression sertraline was increased to 150 mg per day.          FOLLOWUP NOTE      INTERVAL HISTORY:  Jade is here by herself for follow up.  She is feeling well in general.  She denies any complains today.  Everything works better for her after she moved to a new apartment.  Patient having some point from her father and she walks around 10-12 hours and [employer].  She denies having any mood issue.  She is recently on  Zoloft 200 mg daily     She is on Ibrance and Letrozole. No GI side effects. Appetite good. No nausea, abdomina pain. No recent asthma symptoms, cough or dyspnea. No recent fevers or infectious concerns.     REVIEW OF SYSTEMS:  The remainder of the 10-point of ROS is negative.     Current Outpatient Prescriptions          Current Outpatient Medications   Medication Sig Dispense Refill     ACETAMINOPHEN PO Take 650 mg by mouth every 4 hours as needed for pain         albuterol (PROAIR HFA/PROVENTIL HFA/VENTOLIN HFA) 108 (90 Base) MCG/ACT inhaler Inhale 2 puffs into the lungs every 6 hours 1 Inhaler 4     albuterol (PROVENTIL) (2.5 MG/3ML) 0.083% neb solution Take 1 vial (2.5 mg) by nebulization every 6 hours as needed for shortness of breath / dyspnea or wheezing 30 vial 0     aspirin (ASA) 81 MG chewable tablet Take 81 mg by mouth daily         calcium carbonate (TUMS) 500 MG chewable tablet Take 1 chew tab by mouth as needed for heartburn         Calcium Citrate-Vitamin D (CALCIUM + D PO) Take 250 mg by mouth 2 times daily          fluticasone-salmeterol (AIRDUO RESPICLICK) 113-14 MCG/ACT inhaler Inhale 1 puff into the lungs 2 times daily 1 Inhaler 11     letrozole (FEMARA) 2.5 MG  "tablet Take 1 tablet (2.5 mg) by mouth daily for 28 days 28 tablet 0     levothyroxine (SYNTHROID/LEVOTHROID) 200 MCG tablet Take 1 tablet (200 mcg) by mouth daily 30 tablet 0     loratadine (CLARITIN) 10 MG tablet Take 1 tablet (10 mg) by mouth daily 90 tablet 3     methylPREDNISolone (MEDROL) 16 MG tablet Take 32 mg by mouth 12 hours before the procedure and repeat 32 mg by mouth 2 hours before the procedure to prevent contrast reaction. 4 tablet 0     mirtazapine (REMERON) 15 MG tablet TK 1 T PO HS 30 tablet 3     Multiple Vitamins-Minerals (MULTIVITAMIN ADULT PO)           palbociclib (IBRANCE) 125 MG capsule CHEMO Take 1 capsule (125 mg) by mouth daily with food Take for 21 days, then 7 days off. Avoid grapefruit. Do not open/crush/chew capsule. 21 capsule 0     prochlorperazine (COMPAZINE) 10 MG tablet Take 1 tablet (10 mg) by mouth every 6 hours as needed for nausea or vomiting 30 tablet 0     sertraline (ZOLOFT) 100 MG tablet Take 1 tablet (100 mg) by mouth 2 times daily 60 tablet 3     triamcinolone (NASACORT) 55 MCG/ACT inhaler Spray 1 spray into both nostrils daily          VITAMIN D, CHOLECALCIFEROL, PO Take 2,000 Units by mouth daily                 PHYSICAL EXAM:  General: The patient is a pleasant female in no acute distress.  /70   Pulse 74   Temp 98.6  F (37  C) (Oral)   Ht 1.727 m (5' 7.99\")   Wt 139.5 kg (307 lb 8 oz)   SpO2 93%   BMI 46.77 kg/m        Wt Readings from Last 10 Encounters:   10/17/19 139.5 kg (307 lb 8 oz)   09/19/19 137.4 kg (303 lb)   08/22/19 137.1 kg (302 lb 3.2 oz)   07/25/19 137.1 kg (302 lb 3.2 oz)   06/25/19 135.4 kg (298 lb 6.4 oz)   05/29/19 136.9 kg (301 lb 13 oz)   04/30/19 136.9 kg (301 lb 11.2 oz)   04/09/19 133.8 kg (295 lb)   04/02/19 133.9 kg (295 lb 3.2 oz)   03/06/19 131.1 kg (289 lb)      HEENT: EOMI, PERRL. Sclerae are anicteric. Oral mucosa is pink and moist with no lesions or thrush.   Lymph: No palpable cervical, supraclavicular, subclavicular or " axillary lymphadenopathy.  Heart: Regular rate and rhythm.   Lungs: Clear to auscultation bilaterally.   Breast: Deferred today   Abdomen: Bowel sounds present, soft, nontender with no palpable hepatosplenomegaly or masses. No visible hernia or palpable hernia defect. No erythema of umbilicus.  Extremities: No lower extremity edema noted bilaterally.   Neuro: Cranial nerves II through XII are grossly intact.  Skin: No rashes, petechiae, or bruising noted on exposed skin. Some horizontal ridges with darker coloring at base of nails.     Laboratory Data:   CBC RESULTS:       Recent Labs   Lab Test 10/17/19  0907   WBC 2.9*   RBC 3.85   HGB 13.0   HCT 37.6   MCV 98   MCH 33.8*   MCHC 34.6   RDW 13.6         Last Comprehensive Metabolic Panel:        Sodium   Date Value Ref Range Status   10/17/2019 140 133 - 144 mmol/L Final            Potassium   Date Value Ref Range Status   10/17/2019 4.0 3.4 - 5.3 mmol/L Final            Chloride   Date Value Ref Range Status   10/17/2019 106 94 - 109 mmol/L Final            Carbon Dioxide   Date Value Ref Range Status   10/17/2019 27 20 - 32 mmol/L Final            Anion Gap   Date Value Ref Range Status   10/17/2019 7 3 - 14 mmol/L Final            Glucose   Date Value Ref Range Status   10/17/2019 98 70 - 99 mg/dL Final            Urea Nitrogen   Date Value Ref Range Status   10/17/2019 16 7 - 30 mg/dL Final            Creatinine   Date Value Ref Range Status   10/17/2019 1.07 (H) 0.52 - 1.04 mg/dL Final              GFR Estimate   Date Value Ref Range Status   10/17/2019 61 >60 mL/min/[1.73_m2] Final       Comment:       Non  GFR Calc  Starting 12/18/2018, serum creatinine based estimated GFR (eGFR) will be   calculated using the Chronic Kidney Disease Epidemiology Collaboration   (CKD-EPI) equation.               Calcium   Date Value Ref Range Status   10/17/2019 9.0 8.5 - 10.1 mg/dL Final            Bilirubin Total   Date Value Ref Range Status    10/17/2019 0.4 0.2 - 1.3 mg/dL Final            Alkaline Phosphatase   Date Value Ref Range Status   10/17/2019 68 40 - 150 U/L Final            ALT   Date Value Ref Range Status   10/17/2019 31 0 - 50 U/L Final            AST   Date Value Ref Range Status   10/17/2019 25 0 - 45 U/L Final      IMAGING REVIEWED - 10/15/19 : CT CAP with contrast showed: No evidence of metastatic disease in the chest, abdomen or pelvis. CT neck showed no masses or adenopathy.     ASSESSMENT AND PLAN:   1.  Recurrent, metastatic ER-positive, HER2 negative breast cancer: History of left breast cancer s/p lumpectomy and SNLB in 2014. S/p 4 cycles ddAC and 12 weeks of Taxol. On AI from 11/2014-2/2017. Recurrence on PET/CT scan performed 08/03/2018 and then biopsy to left posterior cervical lymph nodes, aorticopulmonary, and prevascular lymph nodes were positive. No evidence of metastatic disease in the abdomen and pelvis or brain. Started Ibrance and letrozole on 9/21/18. Tolerating well aside from some hot flashes and mild joint stiffness. Last restaging 7/22 with stable disease.  Lymphadenopathy in her neck has clinically resolved.  She has a contrast allergy but would like to try contrast again with premeds.   - 10/15/19 : CT CAP with contrast showed: No evidence of metastatic disease in the chest, abdomen or pelvis. CT neck showed no masses or adenopathy.  -Plan to repeat CT CAP every 3 months and the next one will be in January 2020.  --Labs reviewed. ANC 1.0. Ok to resume Ibrance  --Continue letrozole.  2.  Increasing BMI.  She has been advised to increase exercise to 150 minutes per week and reduce added sugar in diet.   3.  Bone health: bone targeting agents are not required as she has no evidence of bone metastases. Continue calcium and vitamin D.   4.  Depression: On zoloft 200 mg, recently increased with improvement in symptoms. Also on remeron at bedtime.Sees Sari Holliday.  5.  Hx of pre-diabetes:  She previously saw   House in [place] but is now looking for a new PCP more local to her new apartment.   --A1c 5.4.  I doubt patient has prediabetes.  6. Allergies: Now on Nasacort per Dr. Hay. Also recommended nasal saline washes daily to BID.   7.   Asthma: Seen by Dr. Hay. Per chart on fluticasone-salmeterol inhaler BID. She does not recall the name of this. Also has albuterol prn.  8.  Follow up: CT CAP and neck scheduled for January 2020. She would like to re-try with contrast.  Follow up with SONIA November 14, December 12 and with me January 9 with CT CAP and neck on January 8. CBC, CMP with follow up visits.     Thank you for allowing us to participate in this patient's care.  The patient was seen and evaluated by me.  I discussed the patient with the fellow and agree with the findings and plan in the note, which was edited by me.    Sincerely,     Wilfredo Bhatt MD    [facility]  [phone].          Patient is seen and examined by Dr. Bhatt and me.  Assessment and plan are discussed and delivered to the patient.     Sara Tavares MD  Hematology&Oncology Fellow  Pager: [phone]       I spent 35 minutes with the patient more than 50% of which was in counseling and coordination of care. I spent ... minutes with the patient more than 50% of which was in counseling and coordination of care.

## 2019-10-22 ENCOUNTER — HOSPITAL ENCOUNTER (OUTPATIENT)
Dept: PHYSICAL THERAPY | Facility: CLINIC | Age: 49
Setting detail: THERAPIES SERIES
End: 2019-10-22
Attending: PHYSICIAN ASSISTANT
Payer: COMMERCIAL

## 2019-10-22 PROCEDURE — 97110 THERAPEUTIC EXERCISES: CPT | Mod: GP | Performed by: PHYSICAL THERAPIST

## 2019-10-29 ENCOUNTER — HOSPITAL ENCOUNTER (OUTPATIENT)
Dept: PHYSICAL THERAPY | Facility: CLINIC | Age: 49
Setting detail: THERAPIES SERIES
End: 2019-10-29
Attending: PHYSICIAN ASSISTANT
Payer: COMMERCIAL

## 2019-10-29 PROCEDURE — 97110 THERAPEUTIC EXERCISES: CPT | Mod: GP | Performed by: PHYSICAL THERAPIST

## 2019-11-02 NOTE — PROGRESS NOTES
"Palliative Care Clinical Social Work Return Appointment    PLEASE NOTE:  THIS IS A MENTAL HEALTH NOTE.  OTHER PROVIDERS VIEWING THIS NOTE SHOULD USE THIS ONLY FOR UNDERSTANDING THE CONTEXT OF THE PATIENT S EXPERIENCE.  TOPICS DESCRIBED IN THIS NOTE SHOULD NOT BE REFERENCED TO THE PATIENT BY MEDICAL PROVIDERS.    Jade is a 49 year old woman with metastatic breast cancer, seen today at [facility] for a return psychotherapy appointment to address PTSD, recurrent major depression, and anxiety as these relate to coping with illness and treatment.    Mental Status Exam:(List all that apply)      Appearance: Appropriate      Eye Contact: Good       Orientation: Yes, x4      Mood: hopeful, thankful      Affect: Appropriate, full range, expressive      Thought Content: Clear         Thought Form: Logical      Psychomotor Behavior: Normal    Adjustment to Illness: Processing diagnosis of metastatic breast cancer in 2018.    Mental Health (thoughts, feelings, actions, coping, and symptoms):   We complete Narrative Exposure therapy narrative today including move to [place] and engaging with oncology care here.    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    Relationships: Enjoyed time with sister and " nephew when they visited. Continuing to have grief related to loss of father and reflecting on their relationship over time. Positive contact with her mom.    End of Life and Advance Care Planning: Not focus today.    Main therapeutic interventions provided this session include:  Psychoeducation, facilitating processing of feelings and thoughts, structured problem solving, grief counseling, and Narrative Exposure Therapy - exposure session    Plan:  Will return for psychotherapy with palliative care focus later in August or September to complete NET final reflection on narrative.    Time spent with patient/family:  50 minutes (Start 3:00pm, end 3:50pm)      Palliative Care Counseling Treatment Plan    Client's Name: Jade Collins  YOB: 1970    Date: 1/9/2019    Initial DSM5 Diagnoses:   296.32 Major Depressive Disorder, Recurrent Episode, Moderate With anxious distress  309.81 (F43.10) Posttraumatic Stress Disorder (includes Posttraumatic Stress Dsiorder for Children 6 Years and Younger)  With dissociative symptoms      Date to review plan (90 days usually): 3/20/19    Referral / Collaboration:  .Referral to another professional/service is not indicated at this time.    Anticipated number of sessions to complete episode of care:  10         Treatment Goal(s)  Date Goal Dates  Reviewed Status   12/20/2018   Goal 1:  Client will decrease PTSD symptoms.      Continued   12/20/2018   Objective #1A (Client Action)  Client will Increase understanding of PTSD/ASD and Process trauma history in safe counseling environment using Narrative Exposure Therapy.    Intervention(s)  Therapist will Provide psychoeducation and Narrative Exposure Therapy .    Continued                   Date Goal Dates  Reviewed Status   12/20/2018   Goal 2:  Client will address illness related stressors.    Continued   12/20/2018   Objective #2A (Client Action)  Client will Communicate effectively with family/friends re needs and  Communicate effectively with medical provider re needed information.    Intervention(s) (Therapist Action)  Therapist will Provide psychoeducation, Facilitate couples/family therapy session(s) and Facilitate structured problem solving.    Continued   12/20/2018   Objective #2B  Client will Identify effective coping strategies and Use/practice relaxation strategies.    Intervention(s)  Therapist will Provide psychoeducation, Provide behavioral intervention training, Facilitate processing of thoughts and feelings and Facilitate structured problem solving.    Continued   12/20/2018     Objective #2C  Client will Complete health care directive and/or POLST form.    Intervention(s)  Therapist will Provide advance care planning education.    Continued       Date Goal Dates  Reviewed Status   1/9/2019   Goal 3:  Client will effectively manage recurrent major depressive disorder in light of increased stressors.    Continued   1/9/2019   Objective #3A (Client Action)  Client will Identify activities that provide comfort and/or pleasure, Participate in activities that provide comfort and/or pleasure and Identify an activity that would provide meaning/contribute to feeling of self worth.    Intervention(s) (Therapist Action)  Therapist will Provide psychoeducation, Facilitate processing of thoughts and feelings and Facilitate structured problem solving.    Contined   1/9/2019   Objective #3B  Client will Effectively communicate needs to others, Identify a crisis plan for suicidal ideation and Identify triggers/early signs of depressed mood.    Intervention(s)  Therapist will Provide psychoeducation, Facilitate processing of thoughts and feelings, Facilitate structured problem solving and facilitate safety planning as needed..    Continued               Client has contributed regarding goals and concerns, but has not reviewed the treatment plan. Plan to review at future session.    Lisa Holliday, MSW, LICSW      DO NOT  SEND ANY LETTERS

## 2019-11-05 ENCOUNTER — HOSPITAL ENCOUNTER (OUTPATIENT)
Dept: PHYSICAL THERAPY | Facility: CLINIC | Age: 49
Setting detail: THERAPIES SERIES
End: 2019-11-05
Attending: PHYSICIAN ASSISTANT
Payer: COMMERCIAL

## 2019-11-05 PROCEDURE — 97110 THERAPEUTIC EXERCISES: CPT | Mod: GP | Performed by: PHYSICAL THERAPIST

## 2019-11-11 ENCOUNTER — TELEPHONE (OUTPATIENT)
Dept: ONCOLOGY | Facility: CLINIC | Age: 49
End: 2019-11-11

## 2019-11-11 DIAGNOSIS — C50.412 MALIGNANT NEOPLASM OF UPPER-OUTER QUADRANT OF LEFT BREAST IN FEMALE, ESTROGEN RECEPTOR POSITIVE (H): Primary | ICD-10-CM

## 2019-11-11 DIAGNOSIS — Z17.0 MALIGNANT NEOPLASM OF UPPER-OUTER QUADRANT OF LEFT BREAST IN FEMALE, ESTROGEN RECEPTOR POSITIVE (H): Primary | ICD-10-CM

## 2019-11-11 RX ORDER — LETROZOLE 2.5 MG/1
2.5 TABLET, FILM COATED ORAL DAILY
Qty: 28 TABLET | Refills: 0 | Status: SHIPPED | OUTPATIENT
Start: 2019-11-11 | End: 2019-12-06

## 2019-11-11 NOTE — ORAL ONC MGMT
Oral Chemotherapy Monitoring Program     Primary Oncologist: Dr. Bhatt  Primary Oncology Clinic: [facility]   Cancer Diagnosis: Breast Cancer      Drug: Ibrance 125mg once daily x 21 days on, 7 days off   Therapy History:  C1D1 9/21/2018  Next cycle: 5/3/19, 5/31/19, 6/28/19, 7/26/19      Drug Interaction Assessment: No new drug-drug interactions.     Lab Monitoring Plan  CBC/CMP monthly      Subjective/Objective:  Jade Collins is a 49 year old female contacted by phone for a follow-up visit for oral chemotherapy. She confirmed the correct dosing of ibrance and letrozole. Experienced some mouth sores with this cycle, but it didn't interfere with ability to eat or drink. She utilized salt water rinses and this was helpful. She denies any other medication side effects, missed doses, or medication changes.     ORAL CHEMOTHERAPY 1/11/2019 5/1/2019 5/29/2019 6/26/2019 7/25/2019 10/16/2019 11/11/2019   Drug Name Ibrance (Palbociclib) Ibrance (Palbociclib) Ibrance (Palbociclib) Ibrance (Palbociclib) Ibrance (Palbociclib) Ibrance (Palbociclib) Ibrance (Palbociclib)   Current Dosage 125mg 125mg 125mg 125mg 125mg 125mg 125mg   Current Schedule Daily Daily Daily Daily Daily Daily Daily   Cycle Details 3 weeks on 1 week off 3 weeks on 1 week off 3 weeks on 1 week off 3 weeks on 1 week off 3 weeks on 1 week off 3 weeks on 1 week off 3 weeks on 1 week off   Start Date of Last Cycle - 4/5/2019 5/3/2019 5/31/2019 6/28/2019 9/20/2019 10/18/2019   Planned next cycle start date 1/11/2019 5/3/2019 5/31/2019 6/28/2019 7/26/2019 10/18/2019 11/15/2019   Doses missed in last 2 weeks - 0 0 0 0 0 0   Adherence Assessment - Adherent Adherent Adherent Adherent Adherent Adherent   Adverse Effects Diarrhea;Fatigue Diarrhea;Fatigue No AE identified during assessment No AE identified during assessment No AE identified during assessment No AE identified during assessment Oral mucositis   Nausea - - - - - - -   Pharmacist  "Intervention(nausea) - - - - - - -   Intervention(s) - - - - - - -   Diarrhea Grade 1 Resolved due to intervention - - - - -   Pharmacist Intervention(diarrhea) Yes - - - - - -   Intervention(s) Patient education - - - - - -   Oral Mucositis - - - - - - Grade 1   Pharmacist Intervention(mucositis) - - - - - - Yes   Intervention(s) - - - - - - OTC recommendation   Fatigue Grade 1 Resolved due to intervention - - - - -   Pharmacist Intervention(fatigue) Yes - - - - - -   Arthralgias - - - - - - -   Pharmacist Intervention(arthralgias) - - - - - - -   Intervention(s) - - - - - - -   Home BPs - not needed not needed not needed not needed - -   Any new drug interactions? - No No No No No No   Is the dose as ordered appropriate for the patient? - Yes Yes Yes Yes Yes Yes   Is the patient currently in pain? - No No No No - -   Does the patient feel the pain is currently being managed by a provider? - - - - - - -   Has the patient been assessed within the past 6 months for depression? - - - - - Yes Yes   Has the patient missed any days of school, work, or other routine activity? - No No No No No No       Vitals:  BP:   BP Readings from Last 1 Encounters:   10/17/19 111/70     Wt Readings from Last 1 Encounters:   10/17/19 139.5 kg (307 lb 8 oz)     Estimated body surface area is 2.59 meters squared as calculated from the following:    Height as of 10/17/19: 1.727 m (5' 7.99\").    Weight as of 10/17/19: 139.5 kg (307 lb 8 oz).    Labs:  _  Result Component Current Result Ref Range   Sodium 140 (10/17/2019) 133 - 144 mmol/L     _  Result Component Current Result Ref Range   Potassium 4.0 (10/17/2019) 3.4 - 5.3 mmol/L     _  Result Component Current Result Ref Range   Calcium 9.0 (10/17/2019) 8.5 - 10.1 mg/dL     No results found for Mag within last 30 days.     No results found for Phos within last 30 days.     _  Result Component Current Result Ref Range   Albumin 3.8 (10/17/2019) 3.4 - 5.0 g/dL     _  Result Component " Current Result Ref Range   Urea Nitrogen 16 (10/17/2019) 7 - 30 mg/dL     _  Result Component Current Result Ref Range   Creatinine 1.07 (H) (10/17/2019) 0.52 - 1.04 mg/dL       _  Result Component Current Result Ref Range   AST 25 (10/17/2019) 0 - 45 U/L     _  Result Component Current Result Ref Range   ALT 31 (10/17/2019) 0 - 50 U/L     _  Result Component Current Result Ref Range   Bilirubin Total 0.4 (10/17/2019) 0.2 - 1.3 mg/dL       _  Result Component Current Result Ref Range   WBC 2.9 (L) (10/17/2019) 4.0 - 11.0 10e9/L     _  Result Component Current Result Ref Range   Hemoglobin 13.0 (10/17/2019) 11.7 - 15.7 g/dL     _  Result Component Current Result Ref Range   Platelet Count 156 (10/17/2019) 150 - 450 10e9/L     _  Result Component Current Result Ref Range   Absolute Neutrophil 1.0 (L) (10/17/2019) 1.6 - 8.3 10e9/L       Assessment:  Jade is tolerating therapy well. Mild oral mucositis relieved by salt water rinses, no other intervention needed at this time. Could consider magic mouthwash in the future if they become worse.     Plan:  Continue ibrance and letrozole.    Follow-Up:  11/15: review labs to ensure appropriate to start next cycle  12/11: call for monthly follow and PDC    Refill Due:  Jade will  ibrance and letrozole from [facility] after her appt on 11/15.     Grant Clark, PharmD, MS  Hematology/Oncology Clinical Pharmacist  [facility]  [facility]  [phone]

## 2019-11-14 ENCOUNTER — TELEPHONE (OUTPATIENT)
Dept: ONCOLOGY | Facility: CLINIC | Age: 49
End: 2019-11-14

## 2019-11-14 NOTE — TELEPHONE ENCOUNTER
Pt called in to triage to report her cat bit her yesterday on the hand. Today bite is red, swollen and painful. Wondering if she can wait until tomorrow's apt to show provider. Advised pt should go to pcp or UC today, may need rabies vaccination. Pt verbalized understanding and state she will call her pcp.

## 2019-11-15 ENCOUNTER — APPOINTMENT (OUTPATIENT)
Dept: LAB | Facility: CLINIC | Age: 49
End: 2019-11-15
Attending: INTERNAL MEDICINE
Payer: COMMERCIAL

## 2019-11-15 ENCOUNTER — ONCOLOGY VISIT (OUTPATIENT)
Dept: ONCOLOGY | Facility: CLINIC | Age: 49
End: 2019-11-15
Attending: PHYSICIAN ASSISTANT
Payer: COMMERCIAL

## 2019-11-15 VITALS
RESPIRATION RATE: 16 BRPM | HEART RATE: 95 BPM | SYSTOLIC BLOOD PRESSURE: 121 MMHG | BODY MASS INDEX: 47.5 KG/M2 | TEMPERATURE: 98.1 F | OXYGEN SATURATION: 94 % | DIASTOLIC BLOOD PRESSURE: 75 MMHG | WEIGHT: 293 LBS

## 2019-11-15 DIAGNOSIS — C50.412 MALIGNANT NEOPLASM OF UPPER-OUTER QUADRANT OF LEFT BREAST IN FEMALE, ESTROGEN RECEPTOR POSITIVE (H): ICD-10-CM

## 2019-11-15 DIAGNOSIS — Z17.0 MALIGNANT NEOPLASM OF UPPER-OUTER QUADRANT OF LEFT BREAST IN FEMALE, ESTROGEN RECEPTOR POSITIVE (H): ICD-10-CM

## 2019-11-15 LAB
ALBUMIN SERPL-MCNC: 3.8 G/DL (ref 3.4–5)
ALP SERPL-CCNC: 76 U/L (ref 40–150)
ALT SERPL W P-5'-P-CCNC: 38 U/L (ref 0–50)
ANION GAP SERPL CALCULATED.3IONS-SCNC: 6 MMOL/L (ref 3–14)
AST SERPL W P-5'-P-CCNC: 28 U/L (ref 0–45)
BASOPHILS # BLD AUTO: 0 10E9/L (ref 0–0.2)
BASOPHILS NFR BLD AUTO: 1.2 %
BILIRUB SERPL-MCNC: 0.6 MG/DL (ref 0.2–1.3)
BUN SERPL-MCNC: 14 MG/DL (ref 7–30)
CALCIUM SERPL-MCNC: 9.3 MG/DL (ref 8.5–10.1)
CHLORIDE SERPL-SCNC: 105 MMOL/L (ref 94–109)
CO2 SERPL-SCNC: 25 MMOL/L (ref 20–32)
CREAT SERPL-MCNC: 1.06 MG/DL (ref 0.52–1.04)
DIFFERENTIAL METHOD BLD: ABNORMAL
EOSINOPHIL # BLD AUTO: 0.1 10E9/L (ref 0–0.7)
EOSINOPHIL NFR BLD AUTO: 2 %
ERYTHROCYTE [DISTWIDTH] IN BLOOD BY AUTOMATED COUNT: 13.8 % (ref 10–15)
GFR SERPL CREATININE-BSD FRML MDRD: 61 ML/MIN/{1.73_M2}
GLUCOSE SERPL-MCNC: 115 MG/DL (ref 70–99)
HCT VFR BLD AUTO: 35.9 % (ref 35–47)
HGB BLD-MCNC: 12.6 G/DL (ref 11.7–15.7)
IMM GRANULOCYTES # BLD: 0 10E9/L (ref 0–0.4)
IMM GRANULOCYTES NFR BLD: 0.4 %
LYMPHOCYTES # BLD AUTO: 0.7 10E9/L (ref 0.8–5.3)
LYMPHOCYTES NFR BLD AUTO: 29.4 %
MCH RBC QN AUTO: 34 PG (ref 26.5–33)
MCHC RBC AUTO-ENTMCNC: 35.1 G/DL (ref 31.5–36.5)
MCV RBC AUTO: 97 FL (ref 78–100)
MONOCYTES # BLD AUTO: 0.3 10E9/L (ref 0–1.3)
MONOCYTES NFR BLD AUTO: 12.1 %
NEUTROPHILS # BLD AUTO: 1.4 10E9/L (ref 1.6–8.3)
NEUTROPHILS NFR BLD AUTO: 54.9 %
NRBC # BLD AUTO: 0 10*3/UL
NRBC BLD AUTO-RTO: 0 /100
PLATELET # BLD AUTO: 124 10E9/L (ref 150–450)
POTASSIUM SERPL-SCNC: 3.8 MMOL/L (ref 3.4–5.3)
PROT SERPL-MCNC: 7.6 G/DL (ref 6.8–8.8)
RBC # BLD AUTO: 3.71 10E12/L (ref 3.8–5.2)
SODIUM SERPL-SCNC: 136 MMOL/L (ref 133–144)
WBC # BLD AUTO: 2.5 10E9/L (ref 4–11)

## 2019-11-15 PROCEDURE — G0463 HOSPITAL OUTPT CLINIC VISIT: HCPCS | Mod: ZF

## 2019-11-15 PROCEDURE — 85025 COMPLETE CBC W/AUTO DIFF WBC: CPT | Performed by: INTERNAL MEDICINE

## 2019-11-15 PROCEDURE — 25000128 H RX IP 250 OP 636: Mod: ZF | Performed by: PHYSICIAN ASSISTANT

## 2019-11-15 PROCEDURE — 99214 OFFICE O/P EST MOD 30 MIN: CPT | Mod: ZP | Performed by: PHYSICIAN ASSISTANT

## 2019-11-15 PROCEDURE — 90715 TDAP VACCINE 7 YRS/> IM: CPT | Mod: ZF | Performed by: PHYSICIAN ASSISTANT

## 2019-11-15 PROCEDURE — 90471 IMMUNIZATION ADMIN: CPT

## 2019-11-15 PROCEDURE — 80053 COMPREHEN METABOLIC PANEL: CPT | Performed by: INTERNAL MEDICINE

## 2019-11-15 PROCEDURE — 36415 COLL VENOUS BLD VENIPUNCTURE: CPT

## 2019-11-15 RX ORDER — ONDANSETRON 4 MG/1
4 TABLET, ORALLY DISINTEGRATING ORAL
COMMUNITY
Start: 2019-11-14 | End: 2020-10-22

## 2019-11-15 RX ORDER — DOXYCYCLINE HYCLATE 100 MG
100 TABLET ORAL
COMMUNITY
Start: 2019-11-14 | End: 2020-07-10

## 2019-11-15 RX ORDER — METRONIDAZOLE 500 MG/1
500 TABLET ORAL
COMMUNITY
Start: 2019-11-14 | End: 2019-11-21

## 2019-11-15 RX ADMIN — CLOSTRIDIUM TETANI TOXOID ANTIGEN (FORMALDEHYDE INACTIVATED), CORYNEBACTERIUM DIPHTHERIAE TOXOID ANTIGEN (FORMALDEHYDE INACTIVATED), BORDETELLA PERTUSSIS TOXOID ANTIGEN (GLUTARALDEHYDE INACTIVATED), BORDETELLA PERTUSSIS FILAMENTOUS HEMAGGLUTININ ANTIGEN (FORMALDEHYDE INACTIVATED), BORDETELLA PERTUSSIS PERTACTIN ANTIGEN, AND BORDETELLA PERTUSSIS FIMBRIAE 2/3 ANTIGEN 0.5 ML: 5; 2; 2.5; 5; 3; 5 INJECTION, SUSPENSION INTRAMUSCULAR at 13:07

## 2019-11-15 ASSESSMENT — PAIN SCALES - GENERAL: PAINLEVEL: SEVERE PAIN (7)

## 2019-11-15 NOTE — LETTER
11/15/2019      RE: Jade Collins  [address]  [facility] [facility] [address]       Oncology/Hematology Visit Note  Nov 15, 2019    Reason for Visit: follow up of recurrent metastatic ER-positive HER2 negative breast cancer    History of Present Illness: Jade Collins is a 49 year old female with a history of left breast cancer diagnosed in 12/2013 in [place] area and Dr. David Redd was her medical oncologist. Biopsy showed tumor to be ER positive, [place] positive and HER2 negative. She underwent lumpectomy and SLNB by Dr. Tasha Segura in [place]. [place] lymph node was noted to be involved with tumor but no lymph node dissection done at that time. She subsequently underwent adjuvant ddAC x 4 cycles and Taxol x 12 weeks, competed in 9/11/2014. She had post lumpectomy radiation completed in 11/23/2014 by Dr. Manley. She was begun on an AI in 11/2014, but does not recall name.     She was then switched within about a month or so to anastrozole and remained on anastrozole from 11/2014-02/2017.  She has never received tamoxifen. She then moved to [place], [place] in 03/2017.  She saw a gynecologist there and underwent a IRIS/BSO by Dr. Zendejas.  She then also saw Dr. Linares in [place], [place], who is an oncologist.  His interpretation of the pathology report was that she had triple-negative breast cancer.  It is possible that she may have had low estrogen receptor positivity, but the anastrozole was then discontinued in 02/2017.  She then underwent the IRIS/BSO in 03/2017.  This was done laparoscopically.        She then remained off of all hormonal therapy and in 09/2017 moved to [place].  She remained off hormonal therapy and did not have Medical Oncology followup initially.        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  She then went to see Dr. Norberto Brand who performed a PET/CT scan and the patient also underwent a brain MRI for staging.  The PET/CT scan performed 08/03/2018  which showed mildly enlarged left posterior cervical lymph nodes, largest measuring 1.5 x 1.0 cm and mildly enlarged aorticopulmonary and prevascular lymph nodes that were noted on contrast-enhanced CT scan seen less well on the PET scan.  No evidence of metastatic disease in the abdomen and pelvis.  She also underwent a brain MRI that showed no evidence of intracranial metastatic disease.  She underwent a biopsy of one of the lymph nodes in the left neck which showed metastatic adenocarcinoma consistent with breast origin, which was estrogen-receptor positive.  The tumor cells were positive for CK7, ER and WY consistent with metastatic breast carcinoma.  Estrogen receptor showed strong average nuclear intensity in 95% of carcinoma cells, progesterone receptor moderate average nuclear intensity in 70% of the carcinoma.  A GATA3 stain was apparently not performed.     She started Ibrance on 9/21 and letrozole on 9/23. She had improved disease on restaging on 11/26. Last restaging on 4/29/19 with stable disease.     Please see Dr. Bhatt's previous notes for further details on the patient's history. She comes in today for routine follow up.    Interval History:  Jade is here for follow up.     Her cat bit her on dorsum on back of left hand 2 days ago and she had redness and swelling but no fever. She saw her PCP, Dr. Baez, at [facility] yesterday who drained it and started her on doxycycline and Augmentin. She was supposed to get a tetanus shot but she says it was not given. She had some chills last night, but no fever. She did have mild nausea with the antibiotics. She took a Zofran ODT which helped. She continues to have alternating constipation followed by loose stools, with no recent changes.     She continues on letrozole. She is supposed to start Ibrance tonight. She is taking calcium and vitamin D. She is working with PT and plans to start exercising at the fitness center in her apartment building and  doing more walking. She is no longer working for [employer]. She is on disability and considering going back to work part-time. Mood is good and stable on Zoloft 200 mg daily. She takes Remeron at bedtime and states this helps her sleep.    No recent asthma symptoms, cough or dyspnea. No urinary symptoms. Remaining 10 point ROS negative       Current Outpatient Medications   Medication Sig Dispense Refill     ACETAMINOPHEN PO Take 650 mg by mouth every 4 hours as needed for pain       albuterol (PROAIR HFA/PROVENTIL HFA/VENTOLIN HFA) 108 (90 Base) MCG/ACT inhaler Inhale 2 puffs into the lungs every 6 hours 1 Inhaler 4     albuterol (PROVENTIL) (2.5 MG/3ML) 0.083% neb solution Take 1 vial (2.5 mg) by nebulization every 6 hours as needed for shortness of breath / dyspnea or wheezing 30 vial 0     aspirin (ASA) 81 MG chewable tablet Take 81 mg by mouth daily       calcium carbonate (TUMS) 500 MG chewable tablet Take 1 chew tab by mouth as needed for heartburn       Calcium Citrate-Vitamin D (CALCIUM + D PO) Take 250 mg by mouth 2 times daily        fluticasone-salmeterol (AIRDUO RESPICLICK) 113-14 MCG/ACT inhaler Inhale 1 puff into the lungs 2 times daily 1 Inhaler 11     letrozole (FEMARA) 2.5 MG tablet Take 1 tablet (2.5 mg) by mouth daily for 28 days 28 tablet 0     letrozole (FEMARA) 2.5 MG tablet Take 1 tablet (2.5 mg) by mouth daily for 28 days 28 tablet 0     levothyroxine (SYNTHROID/LEVOTHROID) 200 MCG tablet Take 1 tablet (200 mcg) by mouth daily 30 tablet 0     loratadine (CLARITIN) 10 MG tablet Take 1 tablet (10 mg) by mouth daily 90 tablet 3     methylPREDNISolone (MEDROL) 16 MG tablet Take 32 mg by mouth 12 hours before the procedure and repeat 32 mg by mouth 2 hours before the procedure to prevent contrast reaction. 4 tablet 0     mirtazapine (REMERON) 15 MG tablet TK 1 T PO HS 30 tablet 3     Multiple Vitamins-Minerals (MULTIVITAMIN ADULT PO)        palbociclib (IBRANCE) 125 MG capsule CHEMO Take 1 capsule  (125 mg) by mouth daily with food Take for 21 days, then 7 days off. Avoid grapefruit. Do not open/crush/chew capsule. 21 capsule 0     palbociclib (IBRANCE) 125 MG capsule Take 1 capsule (125 mg) by mouth daily with food Take for 21 days, then 7 days off. Avoid grapefruit. Do not open/crush/chew capsule. 21 capsule 0     prochlorperazine (COMPAZINE) 10 MG tablet Take 1 tablet (10 mg) by mouth every 6 hours as needed for nausea or vomiting 30 tablet 0     sertraline (ZOLOFT) 100 MG tablet Take 1 tablet (100 mg) by mouth 2 times daily 60 tablet 3     triamcinolone (NASACORT) 55 MCG/ACT inhaler Spray 1 spray into both nostrils daily        VITAMIN D, CHOLECALCIFEROL, PO Take 2,000 Units by mouth daily          Physical Examination:  General: The patient is a pleasant female in no acute distress.  /75 (BP Location: Right arm, Patient Position: Sitting, Cuff Size: Adult Large)   Pulse 95   Temp 98.1  F (36.7  C) (Oral)   Resp 16   Wt 141.7 kg (312 lb 4.8 oz)   SpO2 94%   BMI 47.50 kg/m     Wt Readings from Last 10 Encounters:   11/15/19 141.7 kg (312 lb 4.8 oz)   10/17/19 139.5 kg (307 lb 8 oz)   09/19/19 137.4 kg (303 lb)   08/22/19 137.1 kg (302 lb 3.2 oz)   07/25/19 137.1 kg (302 lb 3.2 oz)   06/25/19 135.4 kg (298 lb 6.4 oz)   05/29/19 136.9 kg (301 lb 13 oz)   04/30/19 136.9 kg (301 lb 11.2 oz)   04/09/19 133.8 kg (295 lb)   04/02/19 133.9 kg (295 lb 3.2 oz)     HEENT: EOMI, PERRL. Sclerae are anicteric. Oral mucosa is pink and moist with no lesions or thrush.   Lymph: No palpable cervical, supraclavicular, subclavicular or axillary lymphadenopathy.  Heart: Regular rate and rhythm.   Lungs: Clear to auscultation bilaterally.   Breast: Deferred today   Abdomen: Bowel sounds present, soft, nontender with no palpable hepatosplenomegaly or masses.   Extremities: No lower extremity edema noted bilaterally.   Neuro: Cranial nerves II through XII are grossly intact.  Skin: Dorsum of left hand with small  puncture wound. A [place] has been drawn in ink from PCP where previous erythema was, but only faint erythema today. Minimal swelling. No bleeding or purulent discharge. No rashes, petechiae, or bruising noted on exposed skin. Some horizontal ridges with darker coloring at base of nails.   Laboratory Data:   Results for ANAHI DOBSON (MRN [id number]) as of 11/15/2019 13:24   11/15/2019 11:59   Sodium 136   Potassium 3.8   Chloride 105   Carbon Dioxide 25   Urea Nitrogen 14   Creatinine 1.06 (H)   GFR Estimate 61   GFR Estimate If Black 71   Calcium 9.3   Anion Gap 6   Albumin 3.8   Protein Total 7.6   Bilirubin Total 0.6   Alkaline Phosphatase 76   ALT 38   AST 28   Glucose 115 (H)   WBC 2.5 (L)   Hemoglobin 12.6   Hematocrit 35.9   Platelet Count 124 (L)   RBC Count 3.71 (L)   MCV 97   MCH 34.0 (H)   MCHC 35.1   RDW 13.8   Diff Method Automated Method   % Neutrophils 54.9   % Lymphocytes 29.4   % Monocytes 12.1   % Eosinophils 2.0   % Basophils 1.2   % Immature Granulocytes 0.4   Nucleated RBCs 0   Absolute Neutrophil 1.4 (L)   Absolute Lymphocytes 0.7 (L)   Absolute Monocytes 0.3   Absolute Eosinophils 0.1   Absolute Basophils 0.0   Abs Immature Granulocytes 0.0   Absolute Nucleated RBC 0.0     Assessment and Plan:    Recurrent, metastatic ER-positive, HER2 negative breast cancer: History of left breast cancer s/p lumpectomy and SNLB in 2014. S/p 4 cycles ddAC and 12 weeks of Taxol. On AI from 11/2014-2/2017. Recurrence on PET/CT scan performed 08/03/2018 and then biopsy to left posterior cervical lymph nodes, aorticopulmonary, and prevascular lymph nodes. No evidence of metastatic disease in the abdomen and pelvis or brain. Started Ibrance and letrozole on 9/21/18. Tolerating well aside from some hot flashes and mild joint stiffness. Last restaging 10/15/19 with stable disease. She has a contrast allergy and requires premeds for scans   --Labs reviewed. ANC 1.4. Due to cat bite, will have her hold Ibrance and not  resume tomorrow but in 1 week, as long as wound is healing and she is afebrile and feeling well.   --Continue letrozole    Allergies: Now on Nasacort per Dr. Hay. Also recommended nasal saline washes daily to BID.     Asthma: Seen by Dr. Hay. Per chart on fluticasone-salmeterol inhaler BID. She does not recall the name of this. Also has albuterol prn    Bone health: bone targeting agents are not required as she has no evidence of bone metastases. Continue calcium and vitamin D.     Depression: On zoloft 200 mg, recently increased with improvement in symptoms. Also on remeron at bedtime.Sees Sari Holliday.    Cellulitis of left hand s/p cat bite: She is on Doxycyline BID x 10 days and flagyl 500 mg q8h x 7 days prescribed by Dr. Baez. She states pain, redness and swelling have improved.      Follow up: Scheduled for labs, Cecilia Goins on 12/12 and CT CAP/neck on 1/8 and labs, Dr. Bhatt on 1/9. Will reschedule all appointments 1 week later as holding Ibrance while she takes antibiotics as above.    Evelina Garcia PA-C  [facility]  [address] [place] [id number] [phone]

## 2019-11-15 NOTE — NURSING NOTE
"Oncology Rooming Note    November 15, 2019 12:11 PM   Jade Collins is a 49 year old female who presents for:    Chief Complaint   Patient presents with     Blood Draw     Labs drawn via  by RN in lab. VS taken. Patient checked in for next appt.     Oncology Clinic Visit     Return - Malignant neoplasm of upper-outer quadrant of left breast      Initial Vitals: /75 (BP Location: Right arm, Patient Position: Sitting, Cuff Size: Adult Large)   Pulse 95   Temp 98.1  F (36.7  C) (Oral)   Resp 16   Wt 141.7 kg (312 lb 4.8 oz)   SpO2 94%   BMI 47.50 kg/m   Estimated body mass index is 47.5 kg/m  as calculated from the following:    Height as of 10/17/19: 1.727 m (5' 7.99\").    Weight as of this encounter: 141.7 kg (312 lb 4.8 oz). Body surface area is 2.61 meters squared.  Severe Pain (7) Comment: Data Unavailable   No LMP recorded. Patient is postmenopausal.  Allergies reviewed: Yes  Medications reviewed: Yes    Medications: MEDICATION REFILLS NEEDED TODAY. Provider was notified.  Pharmacy name entered into [facility]:    [id number] - [place], [place] - [address] AT [facility] [address] [facility] - [place], [place] - [address]  [id number] - [place], [place] - [address] AT [facility] OF [address]    Clinical concerns: Lab Results       Sb Rebolledo, EMT              "

## 2019-11-15 NOTE — PROGRESS NOTES
Oncology/Hematology Visit Note  Nov 15, 2019    Reason for Visit: follow up of recurrent metastatic ER-positive HER2 negative breast cancer    History of Present Illness: Jade Collins is a 49 year old female with a history of left breast cancer diagnosed in 12/2013 in [place] area and Dr. David Redd was her medical oncologist. Biopsy showed tumor to be ER positive, IN positive and HER2 negative. She underwent lumpectomy and SLNB by Dr. Tasha Segura in [place]. [place] lymph node was noted to be involved with tumor but no lymph node dissection done at that time. She subsequently underwent adjuvant ddAC x 4 cycles and Taxol x 12 weeks, competed in 9/11/2014. She had post lumpectomy radiation completed in 11/23/2014 by Dr. Manley. She was begun on an AI in 11/2014, but does not recall name.     She was then switched within about a month or so to anastrozole and remained on anastrozole from 11/2014-02/2017.  She has never received tamoxifen. She then moved to [place], [place] in 03/2017.  She saw a gynecologist there and underwent a IRIS/BSO by Dr. Zendejas.  She then also saw Dr. Linares in [place], [place], who is an oncologist.  His interpretation of the pathology report was that she had triple-negative breast cancer.  It is possible that she may have had low estrogen receptor positivity, but the anastrozole was then discontinued in 02/2017.  She then underwent the IRIS/BSO in 03/2017.  This was done laparoscopically.        She then remained off of all hormonal therapy and in 09/2017 moved to [place].  She remained off hormonal therapy and did not have Medical Oncology followup initially.        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  She then went to see Dr. Norberto Brand who performed a PET/CT scan and the patient also underwent a brain MRI for staging.  The PET/CT scan performed 08/03/2018 which showed mildly enlarged left posterior cervical lymph nodes, largest measuring  1.5 x 1.0 cm and mildly enlarged aorticopulmonary and prevascular lymph nodes that were noted on contrast-enhanced CT scan seen less well on the PET scan.  No evidence of metastatic disease in the abdomen and pelvis.  She also underwent a brain MRI that showed no evidence of intracranial metastatic disease.  She underwent a biopsy of one of the lymph nodes in the left neck which showed metastatic adenocarcinoma consistent with breast origin, which was estrogen-receptor positive.  The tumor cells were positive for CK7, ER and FL consistent with metastatic breast carcinoma.  Estrogen receptor showed strong average nuclear intensity in 95% of carcinoma cells, progesterone receptor moderate average nuclear intensity in 70% of the carcinoma.  A GATA3 stain was apparently not performed.     She started Ibrance on 9/21 and letrozole on 9/23. She had improved disease on restaging on 11/26. Last restaging on 4/29/19 with stable disease.     Please see Dr. Bhatt's previous notes for further details on the patient's history. She comes in today for routine follow up.    Interval History:  Jade is here for follow up.     Her cat bit her on dorsum on back of left hand 2 days ago and she had redness and swelling but no fever. She saw her PCP, Dr. Baez, at [facility] yesterday who drained it and started her on doxycycline and Augmentin. She was supposed to get a tetanus shot but she says it was not given. She had some chills last night, but no fever. She did have mild nausea with the antibiotics. She took a Zofran ODT which helped. She continues to have alternating constipation followed by loose stools, with no recent changes.     She continues on letrozole. She is supposed to start Ibrance tonight. She is taking calcium and vitamin D. She is working with PT and plans to start exercising at the fitness center in her apartment building and doing more walking. She is no longer working for [employer]. She is on disability and  considering going back to work part-time. Mood is good and stable on Zoloft 200 mg daily. She takes Remeron at bedtime and states this helps her sleep.    No recent asthma symptoms, cough or dyspnea. No urinary symptoms. Remaining 10 point ROS negative       Current Outpatient Medications   Medication Sig Dispense Refill     ACETAMINOPHEN PO Take 650 mg by mouth every 4 hours as needed for pain       albuterol (PROAIR HFA/PROVENTIL HFA/VENTOLIN HFA) 108 (90 Base) MCG/ACT inhaler Inhale 2 puffs into the lungs every 6 hours 1 Inhaler 4     albuterol (PROVENTIL) (2.5 MG/3ML) 0.083% neb solution Take 1 vial (2.5 mg) by nebulization every 6 hours as needed for shortness of breath / dyspnea or wheezing 30 vial 0     aspirin (ASA) 81 MG chewable tablet Take 81 mg by mouth daily       calcium carbonate (TUMS) 500 MG chewable tablet Take 1 chew tab by mouth as needed for heartburn       Calcium Citrate-Vitamin D (CALCIUM + D PO) Take 250 mg by mouth 2 times daily        fluticasone-salmeterol (AIRDUO RESPICLICK) 113-14 MCG/ACT inhaler Inhale 1 puff into the lungs 2 times daily 1 Inhaler 11     letrozole (FEMARA) 2.5 MG tablet Take 1 tablet (2.5 mg) by mouth daily for 28 days 28 tablet 0     letrozole (FEMARA) 2.5 MG tablet Take 1 tablet (2.5 mg) by mouth daily for 28 days 28 tablet 0     levothyroxine (SYNTHROID/LEVOTHROID) 200 MCG tablet Take 1 tablet (200 mcg) by mouth daily 30 tablet 0     loratadine (CLARITIN) 10 MG tablet Take 1 tablet (10 mg) by mouth daily 90 tablet 3     methylPREDNISolone (MEDROL) 16 MG tablet Take 32 mg by mouth 12 hours before the procedure and repeat 32 mg by mouth 2 hours before the procedure to prevent contrast reaction. 4 tablet 0     mirtazapine (REMERON) 15 MG tablet TK 1 T PO HS 30 tablet 3     Multiple Vitamins-Minerals (MULTIVITAMIN ADULT PO)        palbociclib (IBRANCE) 125 MG capsule CHEMO Take 1 capsule (125 mg) by mouth daily with food Take for 21 days, then 7 days off. Avoid  grapefruit. Do not open/crush/chew capsule. 21 capsule 0     palbociclib (IBRANCE) 125 MG capsule Take 1 capsule (125 mg) by mouth daily with food Take for 21 days, then 7 days off. Avoid grapefruit. Do not open/crush/chew capsule. 21 capsule 0     prochlorperazine (COMPAZINE) 10 MG tablet Take 1 tablet (10 mg) by mouth every 6 hours as needed for nausea or vomiting 30 tablet 0     sertraline (ZOLOFT) 100 MG tablet Take 1 tablet (100 mg) by mouth 2 times daily 60 tablet 3     triamcinolone (NASACORT) 55 MCG/ACT inhaler Spray 1 spray into both nostrils daily        VITAMIN D, CHOLECALCIFEROL, PO Take 2,000 Units by mouth daily          Physical Examination:  General: The patient is a pleasant female in no acute distress.  /75 (BP Location: Right arm, Patient Position: Sitting, Cuff Size: Adult Large)   Pulse 95   Temp 98.1  F (36.7  C) (Oral)   Resp 16   Wt 141.7 kg (312 lb 4.8 oz)   SpO2 94%   BMI 47.50 kg/m    Wt Readings from Last 10 Encounters:   11/15/19 141.7 kg (312 lb 4.8 oz)   10/17/19 139.5 kg (307 lb 8 oz)   09/19/19 137.4 kg (303 lb)   08/22/19 137.1 kg (302 lb 3.2 oz)   07/25/19 137.1 kg (302 lb 3.2 oz)   06/25/19 135.4 kg (298 lb 6.4 oz)   05/29/19 136.9 kg (301 lb 13 oz)   04/30/19 136.9 kg (301 lb 11.2 oz)   04/09/19 133.8 kg (295 lb)   04/02/19 133.9 kg (295 lb 3.2 oz)     HEENT: EOMI, PERRL. Sclerae are anicteric. Oral mucosa is pink and moist with no lesions or thrush.   Lymph: No palpable cervical, supraclavicular, subclavicular or axillary lymphadenopathy.  Heart: Regular rate and rhythm.   Lungs: Clear to auscultation bilaterally.   Breast: Deferred today   Abdomen: Bowel sounds present, soft, nontender with no palpable hepatosplenomegaly or masses.   Extremities: No lower extremity edema noted bilaterally.   Neuro: Cranial nerves II through XII are grossly intact.  Skin: Dorsum of left hand with small puncture wound. A [place] has been drawn in ink from PCP where previous erythema  was, but only faint erythema today. Minimal swelling. No bleeding or purulent discharge. No rashes, petechiae, or bruising noted on exposed skin. Some horizontal ridges with darker coloring at base of nails.   Laboratory Data:   Results for ANAHI DOBSON (MRN [id number]) as of 11/15/2019 13:24   11/15/2019 11:59   Sodium 136   Potassium 3.8   Chloride 105   Carbon Dioxide 25   Urea Nitrogen 14   Creatinine 1.06 (H)   GFR Estimate 61   GFR Estimate If Black 71   Calcium 9.3   Anion Gap 6   Albumin 3.8   Protein Total 7.6   Bilirubin Total 0.6   Alkaline Phosphatase 76   ALT 38   AST 28   Glucose 115 (H)   WBC 2.5 (L)   Hemoglobin 12.6   Hematocrit 35.9   Platelet Count 124 (L)   RBC Count 3.71 (L)   MCV 97   MCH 34.0 (H)   MCHC 35.1   RDW 13.8   Diff Method Automated Method   % Neutrophils 54.9   % Lymphocytes 29.4   % Monocytes 12.1   % Eosinophils 2.0   % Basophils 1.2   % Immature Granulocytes 0.4   Nucleated RBCs 0   Absolute Neutrophil 1.4 (L)   Absolute Lymphocytes 0.7 (L)   Absolute Monocytes 0.3   Absolute Eosinophils 0.1   Absolute Basophils 0.0   Abs Immature Granulocytes 0.0   Absolute Nucleated RBC 0.0     Assessment and Plan:    Recurrent, metastatic ER-positive, HER2 negative breast cancer: History of left breast cancer s/p lumpectomy and SNLB in 2014. S/p 4 cycles ddAC and 12 weeks of Taxol. On AI from 11/2014-2/2017. Recurrence on PET/CT scan performed 08/03/2018 and then biopsy to left posterior cervical lymph nodes, aorticopulmonary, and prevascular lymph nodes. No evidence of metastatic disease in the abdomen and pelvis or brain. Started Ibrance and letrozole on 9/21/18. Tolerating well aside from some hot flashes and mild joint stiffness. Last restaging 10/15/19 with stable disease. She has a contrast allergy and requires premeds for scans   --Labs reviewed. ANC 1.4. Due to cat bite, will have her hold Ibrance and not resume tomorrow but in 1 week, as long as wound is healing and she is afebrile and  feeling well.   --Continue letrozole    Allergies: Now on Nasacort per Dr. Hay. Also recommended nasal saline washes daily to BID.     Asthma: Seen by Dr. Hay. Per chart on fluticasone-salmeterol inhaler BID. She does not recall the name of this. Also has albuterol prn    Bone health: bone targeting agents are not required as she has no evidence of bone metastases. Continue calcium and vitamin D.     Depression: On zoloft 200 mg, recently increased with improvement in symptoms. Also on remeron at bedtime.Sees Sari Holliday.    Cellulitis of left hand s/p cat bite: She is on Doxycyline BID x 10 days and flagyl 500 mg q8h x 7 days prescribed by Dr. Baez. She states pain, redness and swelling have improved.      Follow up: Scheduled for labs, Cecilia Goins on 12/12 and CT CAP/neck on 1/8 and labs, Dr. Bhatt on 1/9. Will reschedule all appointments 1 week later as holding Ibrance while she takes antibiotics as above.    Evelina Garcia PA-C  [facility]  [address] [place] [id number] [phone]

## 2019-11-18 ENCOUNTER — PATIENT OUTREACH (OUTPATIENT)
Dept: ONCOLOGY | Facility: CLINIC | Age: 49
End: 2019-11-18

## 2019-11-18 NOTE — PROGRESS NOTES
Contact patient with advice for current diagnosis of Metastatic Breast cancer and currently is getting disability. Gave telephone number for Estee Sheets and Farheen Sanders to assist her with options for grants or volunteer work at this time. Will access [employer] for a part time job working for home and placed to volunteer such as [employer] or [employer]. 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  Answered all patient's questions and verbalized understanding. Farheen Price RN, BSN.

## 2019-12-06 DIAGNOSIS — Z17.0 MALIGNANT NEOPLASM OF UPPER-OUTER QUADRANT OF LEFT BREAST IN FEMALE, ESTROGEN RECEPTOR POSITIVE (H): Primary | ICD-10-CM

## 2019-12-06 DIAGNOSIS — C50.412 MALIGNANT NEOPLASM OF UPPER-OUTER QUADRANT OF LEFT BREAST IN FEMALE, ESTROGEN RECEPTOR POSITIVE (H): Primary | ICD-10-CM

## 2019-12-06 RX ORDER — LETROZOLE 2.5 MG/1
2.5 TABLET, FILM COATED ORAL DAILY
Qty: 28 TABLET | Refills: 0 | Status: SHIPPED | OUTPATIENT
Start: 2019-12-06 | End: 2020-03-13

## 2019-12-09 ENCOUNTER — TELEPHONE (OUTPATIENT)
Dept: ONCOLOGY | Facility: CLINIC | Age: 49
End: 2019-12-09

## 2019-12-09 NOTE — ORAL ONC MGMT
Oral Chemotherapy Monitoring Program     Message sent to Dr. Bhatt to discuss upcoming ibrance cycles and need for lab monitoring. See messages below.    Wilfredo Bhatt MD [place], [place] [facility]; Cecilia Goins PA-C; Farheen Price, RN             Wait until 12-19.   Wilfredo    Previous Messages      ----- Message -----   From: Grant Clark RPH   Sent: 12/9/2019  10:05 AM CST   To: Wilfredo Bhatt MD, Farheen Price, RN, *   Subject: Next ibrance cycle                               Dr. Bhatt     Jade is due to start her next ibrance cycle on 12/13. She doesn't have an appointment in clinic until 12/19 with Cecilia with labs prior. Are you OK with Jade starting her next cycle on 12/13 or should she wait to start next cycle until 12/19 after she has labs checked? Of note, her ANC has remained above 1.0 since July and platelets have been stably low over the same time period.     Please advise. Thank you!     Grant Clark, Kaitlin, MS   Hematology/Oncology Clinical Pharmacist   [facility]   [facility]   [phone]           Placed call to patient to discuss the above recommendations from Dr. Bhatt. Left message requesting call back. Will update when response received.      Grant Clark PharmD, MS  Hematology/Oncology Clinical Pharmacist  [facility]  [facility]

## 2019-12-17 NOTE — PROGRESS NOTES
Oncology/Hematology Visit Note  Dec 18, 2019    Reason for Visit: follow up of recurrent metastatic ER-positive HER2 negative breast cancer    History of Present Illness: Jade Collins is a 49 year old female with a history of left breast cancer diagnosed in 12/2013 in [place] area and Dr. David Redd was her medical oncologist. Biopsy showed tumor to be ER positive, NC positive and HER2 negative. She underwent lumpectomy and SLNB by Dr. Tasha Segura in [place]. [place] lymph node was noted to be involved with tumor but no lymph node dissection done at that time. She subsequently underwent adjuvant ddAC x 4 cycles and Taxol x 12 weeks, competed in 9/11/2014. She had post lumpectomy radiation completed in 11/23/2014 by Dr. Manley. She was begun on an AI in 11/2014, but does not recall name.     She was then switched within about a month or so to anastrozole and remained on anastrozole from 11/2014-02/2017.  She has never received tamoxifen. She then moved to [place], [place] in 03/2017.  She saw a gynecologist there and underwent a IRIS/BSO by Dr. Zendejas.  She then also saw Dr. Linares in [place], [place], who is an oncologist.  His interpretation of the pathology report was that she had triple-negative breast cancer.  It is possible that she may have had low estrogen receptor positivity, but the anastrozole was then discontinued in 02/2017.  She then underwent the IRIS/BSO in 03/2017.  This was done laparoscopically.        She then remained off of all hormonal therapy and in 09/2017 moved to [place].  She remained off hormonal therapy and did not have Medical Oncology followup initially.        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  She then went to see Dr. Norberto Brand who performed a PET/CT scan and the patient also underwent a brain MRI for staging.  The PET/CT scan performed 08/03/2018 which showed mildly enlarged left posterior cervical lymph nodes, largest measuring  1.5 x 1.0 cm and mildly enlarged aorticopulmonary and prevascular lymph nodes that were noted on contrast-enhanced CT scan seen less well on the PET scan.  No evidence of metastatic disease in the abdomen and pelvis.  She also underwent a brain MRI that showed no evidence of intracranial metastatic disease.  She underwent a biopsy of one of the lymph nodes in the left neck which showed metastatic adenocarcinoma consistent with breast origin, which was estrogen-receptor positive.  The tumor cells were positive for CK7, ER and SC consistent with metastatic breast carcinoma.  Estrogen receptor showed strong average nuclear intensity in 95% of carcinoma cells, progesterone receptor moderate average nuclear intensity in 70% of the carcinoma.  A GATA3 stain was apparently not performed.     She started Ibrance on 9/21/18 and letrozole on 9/23/18. She had improved disease on restaging on 11/26. Last restaging on 10/15/19 with stable disease.     Please see Dr. Bhatt's previous notes for further details on the patient's history. She comes in today for routine follow up.    Interval History:  Jade is here for follow up. She feels well. She has no concerns today. She had a cat bite last month treated with I&D and antibiotics. It healed well without any issues. She has had no other infectious concerns. Still has some mild hot flashes and joint aches with letrozole but all of this is manageable. She has done PT to help with ROM and this has helped. She does not have any nausea. Has intermittent constipation and diarrhea. Urinating okay. ROS otherwise negative.         Current Outpatient Medications   Medication Sig Dispense Refill     ACETAMINOPHEN PO Take 650 mg by mouth every 4 hours as needed for pain       albuterol (PROAIR HFA/PROVENTIL HFA/VENTOLIN HFA) 108 (90 Base) MCG/ACT inhaler Inhale 2 puffs into the lungs every 6 hours 1 Inhaler 4     albuterol (PROVENTIL) (2.5 MG/3ML) 0.083% neb solution Take 1 vial (2.5 mg) by  nebulization every 6 hours as needed for shortness of breath / dyspnea or wheezing 30 vial 0     aspirin (ASA) 81 MG chewable tablet Take 81 mg by mouth daily       calcium carbonate (TUMS) 500 MG chewable tablet Take 1 chew tab by mouth as needed for heartburn       Calcium Citrate-Vitamin D (CALCIUM + D PO) Take 250 mg by mouth 2 times daily        doxycycline hyclate (VIBRA-TABS) 100 MG tablet Take 100 mg by mouth       fluticasone-salmeterol (AIRDUO RESPICLICK) 113-14 MCG/ACT inhaler Inhale 1 puff into the lungs 2 times daily 1 Inhaler 11     letrozole (FEMARA) 2.5 MG tablet Take 1 tablet (2.5 mg) by mouth daily for 28 days 28 tablet 0     levothyroxine (SYNTHROID/LEVOTHROID) 200 MCG tablet Take 1 tablet (200 mcg) by mouth daily 30 tablet 0     loratadine (CLARITIN) 10 MG tablet Take 1 tablet (10 mg) by mouth daily 90 tablet 3     methylPREDNISolone (MEDROL) 16 MG tablet Take 32 mg by mouth 12 hours before the procedure and repeat 32 mg by mouth 2 hours before the procedure to prevent contrast reaction. 4 tablet 0     mirtazapine (REMERON) 15 MG tablet TK 1 T PO HS 30 tablet 3     Multiple Vitamins-Minerals (MULTIVITAMIN ADULT PO)        ondansetron (ZOFRAN-ODT) 4 MG ODT tab Take 4 mg by mouth       palbociclib (IBRANCE) 125 MG capsule Take 1 capsule (125 mg) by mouth daily with food Take for 21 days, then 7 days off. Avoid grapefruit. Do not open/crush/chew capsule. 21 capsule 0     prochlorperazine (COMPAZINE) 10 MG tablet Take 1 tablet (10 mg) by mouth every 6 hours as needed for nausea or vomiting 30 tablet 0     sertraline (ZOLOFT) 100 MG tablet Take 1 tablet (100 mg) by mouth 2 times daily 60 tablet 3     triamcinolone (NASACORT) 55 MCG/ACT inhaler Spray 1 spray into both nostrils daily        VITAMIN D, CHOLECALCIFEROL, PO Take 2,000 Units by mouth daily          Physical Examination:  General: The patient is a pleasant female in no acute distress.  /79 (BP Location: Right arm, Patient Position:  Sitting, Cuff Size: Adult Large)   Pulse 99   Temp 98  F (36.7  C) (Oral)   Resp 16   Wt 140.3 kg (309 lb 4.8 oz)   SpO2 97%   BMI 47.04 kg/m    Wt Readings from Last 10 Encounters:   12/18/19 140.3 kg (309 lb 4.8 oz)   11/15/19 141.7 kg (312 lb 4.8 oz)   10/17/19 139.5 kg (307 lb 8 oz)   09/19/19 137.4 kg (303 lb)   08/22/19 137.1 kg (302 lb 3.2 oz)   07/25/19 137.1 kg (302 lb 3.2 oz)   06/25/19 135.4 kg (298 lb 6.4 oz)   05/29/19 136.9 kg (301 lb 13 oz)   04/30/19 136.9 kg (301 lb 11.2 oz)   04/09/19 133.8 kg (295 lb)     HEENT: EOMI, PERRL. Sclerae are anicteric. Oral mucosa is pink and moist with no lesions or thrush.   Lymph: No palpable cervical, supraclavicular, subclavicular or axillary lymphadenopathy.  Heart: Regular rate and rhythm.   Lungs: Clear to auscultation bilaterally.   Breast: Deferred today   Abdomen: Bowel sounds present, soft, nontender with no palpable hepatosplenomegaly or masses.   Extremities: No lower extremity edema noted bilaterally.   Neuro: Cranial nerves II through XII are grossly intact.  Skin:   Laboratory Data:    12/18/2019 14:46   Sodium 137   Potassium 3.9   Chloride 107   Carbon Dioxide 25   Urea Nitrogen 14   Creatinine 1.09 (H)   GFR Estimate 59 (L)   GFR Estimate If Black 69   Calcium 9.5   Anion Gap 6   Albumin 4.0   Protein Total 7.9   Bilirubin Total 0.4   Alkaline Phosphatase 82   ALT 45   AST 32   Glucose 108 (H)   WBC 2.6 (L)   Hemoglobin 13.2   Hematocrit 38.0   Platelet Count 161   RBC Count 3.93   MCV 97   MCH 33.6 (H)   MCHC 34.7   RDW 13.4   Diff Method Automated Method   % Neutrophils 40.3   % Lymphocytes 42.6   % Monocytes 11.4   % Eosinophils 3.4   % Basophils 2.3   % Immature Granulocytes 0.0   Nucleated RBCs 0   Absolute Neutrophil 1.1 (L)   Absolute Lymphocytes 1.1   Absolute Monocytes 0.3   Absolute Eosinophils 0.1   Absolute Basophils 0.1   Abs Immature Granulocytes 0.0   Absolute Nucleated RBC 0.0       Assessment and Plan:    Recurrent, metastatic  ER-positive, HER2 negative breast cancer: History of left breast cancer s/p lumpectomy and SNLB in 2014. S/p 4 cycles ddAC and 12 weeks of Taxol. On AI from 11/2014-2/2017. Recurrence on PET/CT scan performed 08/03/2018 and then biopsy to left posterior cervical lymph nodes, aorticopulmonary, and prevascular lymph nodes. No evidence of metastatic disease in the abdomen and pelvis or brain. Started Ibrance and letrozole on 9/21/18. Tolerating well aside from some hot flashes and mild joint stiffness. Last restaging 10/15/19 with no evidence of disease. She has a contrast allergy and requires premeds for scans     Due to start next cycle this Friday. ANC is okay at 1.1. Has restaging next month with Dr. Bhatt. I re ordered methylpred for her to take.     Bone health: bone targeting agents are not required as she has no evidence of bone metastases. Continue calcium and vitamin D.     Depression: On zoloft 200 mg and remeron at bedtime.Sees Sari Holliday.    Cecilia Goins PA-C  [facility]  [address] [place] [id number] [phone]

## 2019-12-18 ENCOUNTER — ONCOLOGY VISIT (OUTPATIENT)
Dept: ONCOLOGY | Facility: CLINIC | Age: 49
End: 2019-12-18
Attending: PHYSICIAN ASSISTANT
Payer: COMMERCIAL

## 2019-12-18 ENCOUNTER — APPOINTMENT (OUTPATIENT)
Dept: LAB | Facility: CLINIC | Age: 49
End: 2019-12-18
Attending: PHYSICIAN ASSISTANT
Payer: COMMERCIAL

## 2019-12-18 VITALS
SYSTOLIC BLOOD PRESSURE: 132 MMHG | OXYGEN SATURATION: 97 % | DIASTOLIC BLOOD PRESSURE: 79 MMHG | TEMPERATURE: 98 F | WEIGHT: 293 LBS | RESPIRATION RATE: 16 BRPM | BODY MASS INDEX: 47.04 KG/M2 | HEART RATE: 99 BPM

## 2019-12-18 DIAGNOSIS — C50.412 MALIGNANT NEOPLASM OF UPPER-OUTER QUADRANT OF LEFT BREAST IN FEMALE, ESTROGEN RECEPTOR POSITIVE (H): ICD-10-CM

## 2019-12-18 DIAGNOSIS — Z17.0 MALIGNANT NEOPLASM OF UPPER-OUTER QUADRANT OF LEFT BREAST IN FEMALE, ESTROGEN RECEPTOR POSITIVE (H): ICD-10-CM

## 2019-12-18 DIAGNOSIS — T50.8X5D ALLERGIC REACTION TO CONTRAST MATERIAL, SUBSEQUENT ENCOUNTER: ICD-10-CM

## 2019-12-18 LAB
ALBUMIN SERPL-MCNC: 4 G/DL (ref 3.4–5)
ALP SERPL-CCNC: 82 U/L (ref 40–150)
ALT SERPL W P-5'-P-CCNC: 45 U/L (ref 0–50)
ANION GAP SERPL CALCULATED.3IONS-SCNC: 6 MMOL/L (ref 3–14)
AST SERPL W P-5'-P-CCNC: 32 U/L (ref 0–45)
BASOPHILS # BLD AUTO: 0.1 10E9/L (ref 0–0.2)
BASOPHILS NFR BLD AUTO: 2.3 %
BILIRUB SERPL-MCNC: 0.4 MG/DL (ref 0.2–1.3)
BUN SERPL-MCNC: 14 MG/DL (ref 7–30)
CALCIUM SERPL-MCNC: 9.5 MG/DL (ref 8.5–10.1)
CHLORIDE SERPL-SCNC: 107 MMOL/L (ref 94–109)
CO2 SERPL-SCNC: 25 MMOL/L (ref 20–32)
CREAT SERPL-MCNC: 1.09 MG/DL (ref 0.52–1.04)
DIFFERENTIAL METHOD BLD: ABNORMAL
EOSINOPHIL # BLD AUTO: 0.1 10E9/L (ref 0–0.7)
EOSINOPHIL NFR BLD AUTO: 3.4 %
ERYTHROCYTE [DISTWIDTH] IN BLOOD BY AUTOMATED COUNT: 13.4 % (ref 10–15)
GFR SERPL CREATININE-BSD FRML MDRD: 59 ML/MIN/{1.73_M2}
GLUCOSE SERPL-MCNC: 108 MG/DL (ref 70–99)
HCT VFR BLD AUTO: 38 % (ref 35–47)
HGB BLD-MCNC: 13.2 G/DL (ref 11.7–15.7)
IMM GRANULOCYTES # BLD: 0 10E9/L (ref 0–0.4)
IMM GRANULOCYTES NFR BLD: 0 %
LYMPHOCYTES # BLD AUTO: 1.1 10E9/L (ref 0.8–5.3)
LYMPHOCYTES NFR BLD AUTO: 42.6 %
MCH RBC QN AUTO: 33.6 PG (ref 26.5–33)
MCHC RBC AUTO-ENTMCNC: 34.7 G/DL (ref 31.5–36.5)
MCV RBC AUTO: 97 FL (ref 78–100)
MONOCYTES # BLD AUTO: 0.3 10E9/L (ref 0–1.3)
MONOCYTES NFR BLD AUTO: 11.4 %
NEUTROPHILS # BLD AUTO: 1.1 10E9/L (ref 1.6–8.3)
NEUTROPHILS NFR BLD AUTO: 40.3 %
NRBC # BLD AUTO: 0 10*3/UL
NRBC BLD AUTO-RTO: 0 /100
PLATELET # BLD AUTO: 161 10E9/L (ref 150–450)
POTASSIUM SERPL-SCNC: 3.9 MMOL/L (ref 3.4–5.3)
PROT SERPL-MCNC: 7.9 G/DL (ref 6.8–8.8)
RBC # BLD AUTO: 3.93 10E12/L (ref 3.8–5.2)
SODIUM SERPL-SCNC: 137 MMOL/L (ref 133–144)
WBC # BLD AUTO: 2.6 10E9/L (ref 4–11)

## 2019-12-18 PROCEDURE — 36415 COLL VENOUS BLD VENIPUNCTURE: CPT

## 2019-12-18 PROCEDURE — 85025 COMPLETE CBC W/AUTO DIFF WBC: CPT | Performed by: INTERNAL MEDICINE

## 2019-12-18 PROCEDURE — 99214 OFFICE O/P EST MOD 30 MIN: CPT | Mod: ZP | Performed by: PHYSICIAN ASSISTANT

## 2019-12-18 PROCEDURE — G0463 HOSPITAL OUTPT CLINIC VISIT: HCPCS | Mod: ZF

## 2019-12-18 PROCEDURE — 80053 COMPREHEN METABOLIC PANEL: CPT | Performed by: INTERNAL MEDICINE

## 2019-12-18 RX ORDER — METHYLPREDNISOLONE 16 MG/1
TABLET ORAL
Qty: 4 TABLET | Refills: 0 | Status: SHIPPED | OUTPATIENT
Start: 2019-12-18 | End: 2020-01-02

## 2019-12-18 ASSESSMENT — PAIN SCALES - GENERAL: PAINLEVEL: NO PAIN (0)

## 2019-12-18 NOTE — NURSING NOTE
"Oncology Rooming Note    December 18, 2019 3:24 PM   Jade Collins is a 49 year old female who presents for:    Chief Complaint   Patient presents with     Blood Draw     labs drawn with vpt by rn.  vs taken     Oncology Clinic Visit     Return - Malignant neoplasm of upper-outer quadrant of left breast      Initial Vitals: /79 (BP Location: Right arm, Patient Position: Sitting, Cuff Size: Adult Large)   Pulse 99   Temp 98  F (36.7  C) (Oral)   Resp 16   Wt 140.3 kg (309 lb 4.8 oz)   SpO2 97%   BMI 47.04 kg/m   Estimated body mass index is 47.04 kg/m  as calculated from the following:    Height as of 10/17/19: 1.727 m (5' 7.99\").    Weight as of this encounter: 140.3 kg (309 lb 4.8 oz). Body surface area is 2.59 meters squared.  No Pain (0) Comment: Data Unavailable   No LMP recorded. Patient is postmenopausal.  Allergies reviewed: Yes  Medications reviewed: Yes    Medications: Medication refills not needed today.  Pharmacy name entered into [facility]:    [id number] - [place] - [address] AT [facility] [address] [facility] - [place], [place] - [address]  [facility][id number] - [place], [place] - [address] AT [facility] [address]    Clinical concerns: Lab Results        Sb Rebolledo              "

## 2019-12-18 NOTE — LETTER
12/18/2019      RE: Jade Collins  [address]  [facility] [id number]       Oncology/Hematology Visit Note  Dec 18, 2019    Reason for Visit: follow up of recurrent metastatic ER-positive HER2 negative breast cancer    History of Present Illness: Jade Collins is a 49 year old female with a history of left breast cancer diagnosed in 12/2013 in [place] area and Dr. David Redd was her medical oncologist. Biopsy showed tumor to be ER positive, SC positive and HER2 negative. She underwent lumpectomy and SLNB by Dr. Tasha Segura in [place]. [place] lymph node was noted to be involved with tumor but no lymph node dissection done at that time. She subsequently underwent adjuvant ddAC x 4 cycles and Taxol x 12 weeks, competed in 9/11/2014. She had post lumpectomy radiation completed in 11/23/2014 by Dr. Manley. She was begun on an AI in 11/2014, but does not recall name.     She was then switched within about a month or so to anastrozole and remained on anastrozole from 11/2014-02/2017.  She has never received tamoxifen. She then moved to [place], [place] in 03/2017.  She saw a gynecologist there and underwent a IRIS/BSO by Dr. Zendejas.  She then also saw Dr. Linares in [place], [place], who is an oncologist.  His interpretation of the pathology report was that she had triple-negative breast cancer.  It is possible that she may have had low estrogen receptor positivity, but the anastrozole was then discontinued in 02/2017.  She then underwent the IRIS/BSO in 03/2017.  This was done laparoscopically.        She then remained off of all hormonal therapy and in 09/2017 moved to [place].  She remained off hormonal therapy and did not have Medical Oncology followup initially.        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  She then went to see Dr. Norberto Brand who performed a PET/CT scan and the patient also underwent a brain MRI for staging.  The PET/CT scan performed 08/03/2018  which showed mildly enlarged left posterior cervical lymph nodes, largest measuring 1.5 x 1.0 cm and mildly enlarged aorticopulmonary and prevascular lymph nodes that were noted on contrast-enhanced CT scan seen less well on the PET scan.  No evidence of metastatic disease in the abdomen and pelvis.  She also underwent a brain MRI that showed no evidence of intracranial metastatic disease.  She underwent a biopsy of one of the lymph nodes in the left neck which showed metastatic adenocarcinoma consistent with breast origin, which was estrogen-receptor positive.  The tumor cells were positive for CK7, ER and CO consistent with metastatic breast carcinoma.  Estrogen receptor showed strong average nuclear intensity in 95% of carcinoma cells, progesterone receptor moderate average nuclear intensity in 70% of the carcinoma.  A GATA3 stain was apparently not performed.     She started Ibrance on 9/21/18 and letrozole on 9/23/18. She had improved disease on restaging on 11/26. Last restaging on 10/15/19 with stable disease.     Please see Dr. Bhatt's previous notes for further details on the patient's history. She comes in today for routine follow up.    Interval History:  Jade is here for follow up. She feels well. She has no concerns today. She had a cat bite last month treated with I&D and antibiotics. It healed well without any issues. She has had no other infectious concerns. Still has some mild hot flashes and joint aches with letrozole but all of this is manageable. She has done PT to help with ROM and this has helped. She does not have any nausea. Has intermittent constipation and diarrhea. Urinating okay. ROS otherwise negative.         Current Outpatient Medications   Medication Sig Dispense Refill     ACETAMINOPHEN PO Take 650 mg by mouth every 4 hours as needed for pain       albuterol (PROAIR HFA/PROVENTIL HFA/VENTOLIN HFA) 108 (90 Base) MCG/ACT inhaler Inhale 2 puffs into the lungs every 6 hours 1 Inhaler 4      albuterol (PROVENTIL) (2.5 MG/3ML) 0.083% neb solution Take 1 vial (2.5 mg) by nebulization every 6 hours as needed for shortness of breath / dyspnea or wheezing 30 vial 0     aspirin (ASA) 81 MG chewable tablet Take 81 mg by mouth daily       calcium carbonate (TUMS) 500 MG chewable tablet Take 1 chew tab by mouth as needed for heartburn       Calcium Citrate-Vitamin D (CALCIUM + D PO) Take 250 mg by mouth 2 times daily        doxycycline hyclate (VIBRA-TABS) 100 MG tablet Take 100 mg by mouth       fluticasone-salmeterol (AIRDUO RESPICLICK) 113-14 MCG/ACT inhaler Inhale 1 puff into the lungs 2 times daily 1 Inhaler 11     letrozole (FEMARA) 2.5 MG tablet Take 1 tablet (2.5 mg) by mouth daily for 28 days 28 tablet 0     levothyroxine (SYNTHROID/LEVOTHROID) 200 MCG tablet Take 1 tablet (200 mcg) by mouth daily 30 tablet 0     loratadine (CLARITIN) 10 MG tablet Take 1 tablet (10 mg) by mouth daily 90 tablet 3     methylPREDNISolone (MEDROL) 16 MG tablet Take 32 mg by mouth 12 hours before the procedure and repeat 32 mg by mouth 2 hours before the procedure to prevent contrast reaction. 4 tablet 0     mirtazapine (REMERON) 15 MG tablet TK 1 T PO HS 30 tablet 3     Multiple Vitamins-Minerals (MULTIVITAMIN ADULT PO)        ondansetron (ZOFRAN-ODT) 4 MG ODT tab Take 4 mg by mouth       palbociclib (IBRANCE) 125 MG capsule Take 1 capsule (125 mg) by mouth daily with food Take for 21 days, then 7 days off. Avoid grapefruit. Do not open/crush/chew capsule. 21 capsule 0     prochlorperazine (COMPAZINE) 10 MG tablet Take 1 tablet (10 mg) by mouth every 6 hours as needed for nausea or vomiting 30 tablet 0     sertraline (ZOLOFT) 100 MG tablet Take 1 tablet (100 mg) by mouth 2 times daily 60 tablet 3     triamcinolone (NASACORT) 55 MCG/ACT inhaler Spray 1 spray into both nostrils daily        VITAMIN D, CHOLECALCIFEROL, PO Take 2,000 Units by mouth daily          Physical Examination:  General: The patient is a pleasant  female in no acute distress.  /79 (BP Location: Right arm, Patient Position: Sitting, Cuff Size: Adult Large)   Pulse 99   Temp 98  F (36.7  C) (Oral)   Resp 16   Wt 140.3 kg (309 lb 4.8 oz)   SpO2 97%   BMI 47.04 kg/m     Wt Readings from Last 10 Encounters:   12/18/19 140.3 kg (309 lb 4.8 oz)   11/15/19 141.7 kg (312 lb 4.8 oz)   10/17/19 139.5 kg (307 lb 8 oz)   09/19/19 137.4 kg (303 lb)   08/22/19 137.1 kg (302 lb 3.2 oz)   07/25/19 137.1 kg (302 lb 3.2 oz)   06/25/19 135.4 kg (298 lb 6.4 oz)   05/29/19 136.9 kg (301 lb 13 oz)   04/30/19 136.9 kg (301 lb 11.2 oz)   04/09/19 133.8 kg (295 lb)     HEENT: EOMI, PERRL. Sclerae are anicteric. Oral mucosa is pink and moist with no lesions or thrush.   Lymph: No palpable cervical, supraclavicular, subclavicular or axillary lymphadenopathy.  Heart: Regular rate and rhythm.   Lungs: Clear to auscultation bilaterally.   Breast: Deferred today   Abdomen: Bowel sounds present, soft, nontender with no palpable hepatosplenomegaly or masses.   Extremities: No lower extremity edema noted bilaterally.   Neuro: Cranial nerves II through XII are grossly intact.  Skin:   Laboratory Data:    12/18/2019 14:46   Sodium 137   Potassium 3.9   Chloride 107   Carbon Dioxide 25   Urea Nitrogen 14   Creatinine 1.09 (H)   GFR Estimate 59 (L)   GFR Estimate If Black 69   Calcium 9.5   Anion Gap 6   Albumin 4.0   Protein Total 7.9   Bilirubin Total 0.4   Alkaline Phosphatase 82   ALT 45   AST 32   Glucose 108 (H)   WBC 2.6 (L)   Hemoglobin 13.2   Hematocrit 38.0   Platelet Count 161   RBC Count 3.93   MCV 97   MCH 33.6 (H)   MCHC 34.7   RDW 13.4   Diff Method Automated Method   % Neutrophils 40.3   % Lymphocytes 42.6   % Monocytes 11.4   % Eosinophils 3.4   % Basophils 2.3   % Immature Granulocytes 0.0   Nucleated RBCs 0   Absolute Neutrophil 1.1 (L)   Absolute Lymphocytes 1.1   Absolute Monocytes 0.3   Absolute Eosinophils 0.1   Absolute Basophils 0.1   Abs Immature Granulocytes  0.0   Absolute Nucleated RBC 0.0       Assessment and Plan:    Recurrent, metastatic ER-positive, HER2 negative breast cancer: History of left breast cancer s/p lumpectomy and SNLB in 2014. S/p 4 cycles ddAC and 12 weeks of Taxol. On AI from 11/2014-2/2017. Recurrence on PET/CT scan performed 08/03/2018 and then biopsy to left posterior cervical lymph nodes, aorticopulmonary, and prevascular lymph nodes. No evidence of metastatic disease in the abdomen and pelvis or brain. Started Ibrance and letrozole on 9/21/18. Tolerating well aside from some hot flashes and mild joint stiffness. Last restaging 10/15/19 with no evidence of disease. She has a contrast allergy and requires premeds for scans     Due to start next cycle this Friday. ANC is okay at 1.1. Has restaging next month with Dr. Bhtat. I re ordered methylpred for her to take.     Bone health: bone targeting agents are not required as she has no evidence of bone metastases. Continue calcium and vitamin D.     Depression: On zoloft 200 mg and remeron at bedtime.Sees Sari Holliday.    Cecilia Goins PA-C  [facility]  [address] [place] [id number] [phone]

## 2020-01-02 ENCOUNTER — MYC REFILL (OUTPATIENT)
Dept: ONCOLOGY | Facility: CLINIC | Age: 50
End: 2020-01-02

## 2020-01-02 DIAGNOSIS — C50.412 MALIGNANT NEOPLASM OF UPPER-OUTER QUADRANT OF LEFT BREAST IN FEMALE, ESTROGEN RECEPTOR POSITIVE (H): ICD-10-CM

## 2020-01-02 DIAGNOSIS — T50.8X5D ALLERGIC REACTION TO CONTRAST MATERIAL, SUBSEQUENT ENCOUNTER: ICD-10-CM

## 2020-01-02 DIAGNOSIS — Z17.0 MALIGNANT NEOPLASM OF UPPER-OUTER QUADRANT OF LEFT BREAST IN FEMALE, ESTROGEN RECEPTOR POSITIVE (H): ICD-10-CM

## 2020-01-02 RX ORDER — METHYLPREDNISOLONE 16 MG/1
TABLET ORAL
Qty: 4 TABLET | Refills: 0 | Status: SHIPPED | OUTPATIENT
Start: 2020-01-02 | End: 2020-02-04

## 2020-01-09 DIAGNOSIS — Z17.0 MALIGNANT NEOPLASM OF UPPER-OUTER QUADRANT OF LEFT BREAST IN FEMALE, ESTROGEN RECEPTOR POSITIVE (H): Primary | ICD-10-CM

## 2020-01-09 DIAGNOSIS — C50.412 MALIGNANT NEOPLASM OF UPPER-OUTER QUADRANT OF LEFT BREAST IN FEMALE, ESTROGEN RECEPTOR POSITIVE (H): Primary | ICD-10-CM

## 2020-01-09 RX ORDER — LETROZOLE 2.5 MG/1
2.5 TABLET, FILM COATED ORAL DAILY
Qty: 28 TABLET | Refills: 0 | Status: SHIPPED | OUTPATIENT
Start: 2020-01-09 | End: 2020-03-13

## 2020-01-14 ENCOUNTER — TELEPHONE (OUTPATIENT)
Dept: ONCOLOGY | Facility: CLINIC | Age: 50
End: 2020-01-14

## 2020-01-14 NOTE — TELEPHONE ENCOUNTER
Patient has been approved for free drug through 01/13/2021        Tarsha Fernandez CPhT  [facility]  Oncology Pharmacy Liaison  [email or web address]  Phone: [phone]  Fax: [phone]

## 2020-01-14 NOTE — PROGRESS NOTES
HISTORY OF PRESENT ILLNESS:  Jade is a 48-year-old woman with metastatic breast cancer who comes to our clinic for recommendations for further treatment.  She is referred by Dr. Norberto Brand at [facility].  Jade would like to continue her care at the [facility].       Jade was diagnosed with breast cancer with a lump found in the upper-inner quadrant of the left breast.  She was diagnosed in 12/2013 in the [place] area and Dr. David Redd was her medical oncologist.  Biopsy of the tumor showed it to be ER positive, MA positive, and HER-2 negative.  We do not have any of the original pathology and we need to obtain those reports.  She underwent a lumpectomy performed by Dr. Tasha Segura who is a surgeon in the [place] area.  She also had a sentinel lymph node which was noted to be involved with tumor but there was no lymph node dissection done at that time.  [id number].  She subsequently underwent adjuvant chemotherapy with dose-dense AC for 4 cycles and Taxol for 12 weeks, completed 09/11/2014.        She then had radiation therapy post lumpectomy to the left breast, which was completed 11/23/2014 by Dr. Manley.        She was then begun on an aromatase inhibitor, the name of which she does not remember.  The aromatase inhibitor therapy was begun in 11/2014.  She was then switched within about a month or so to anastrozole and remained on anastrozole from 11/2014-02/2017.  She has never received tamoxifen.        She then moved to [place], [place] in 03/2017.  She saw a gynecologist there and underwent a IRIS/BSO by Dr. Zendejas.  She then also saw Dr. Linares in [place], [place], who is an oncologist.  His interpretation of the pathology report was that she had triple-negative breast cancer.  It is possible that she may have had low estrogen receptor positivity, but the anastrozole was then discontinued in 02/2017.  She then underwent the IRIS/BSO in 03/2017.  This was done laparoscopically.        She then  remained off of all hormonal therapy and in 09/2017 moved to [place].  She remained off hormonal therapy and did not have Medical Oncology followup initially.        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  She then went to see Dr. Norberto Brand who performed a PET/CT scan and the patient also underwent a brain MRI for staging.  The PET/CT scan performed 08/03/2018 showed in the neck there were several mildly metabolic active and large prominent left posterior cervical lymph nodes.  The largest is located posterior to the left sternocleidomastoid muscle and measures 1.5 x 1 cm in size.  This demonstrate modest FDG uptake.  The other lymph nodes are smaller and demonstrate mild FDG uptake.  The prominent prevascular and aortopulmonary lymph nodes were also seen on the contrast-enhanced scan were less well seen on the PET.  The largest lymph node visualized on the PET scan was 1.1 x 0.8 cm and demonstrated mild FDG uptake.  This was in the periaortic area just anterior to the aortic arch.  No lung nodules.  No lung infiltrates.  No pleural effusion.  There was no uptake of FDG in the bones.  In summary, there were mildly enlarged left posterior cervical lymph nodes, largest measuring 1.5 x 1.0 cm and mildly enlarged aorticopulmonary and prevascular lymph nodes that were noted on contrast-enhanced CT scan seen less well on the PET scan.  No evidence of metastatic disease in the abdomen and pelvis.  She also underwent a brain MRI that showed no evidence of intracranial metastatic disease.  She underwent a biopsy of one of the lymph nodes in the left neck which showed metastatic adenocarcinoma consistent with breast origin, which was estrogen-receptor positive.  The tumor cells were positive for CK7, ER and IN consistent with metastatic breast carcinoma.  Estrogen receptor showed strong average nuclear intensity in 95% of carcinoma cells, progesterone receptor moderate average  "nuclear intensity in 70% of the carcinoma.  A GATA3 stain was apparently not performed.       TREATMENT HISTORY:  A. Tamoxifen  B. Letrozole and palbociclib started 9-18-18.    INTERVAL HISTORY:  She is generally doing well with no concerns.  She has a little facial/neck flushing after her CT scans and contrast yesterday.  No shortness of breath or tongue swelling.      A 10-point review of systems was performed and was negative with the exception of pertinent positives noted above.    OBJECTIVE DATA:  Blood pressure 123/72, pulse 109, temperature 98  F (36.7  C), temperature source Oral, resp. rate 16, height 1.727 m (5' 7.99\"), weight 137.6 kg (303 lb 4.8 oz), SpO2 92 %, not currently breastfeeding.  -- General: no acute distress  -- HEENT: mucous membranes moist, no erythema; pupils equally round, reactive  -- Lymph: no lymphadenopathy in the cervical, submandibular, supraclavicular, axillary, or inguinal areas  -- Cardiovascular: regular rate and rhythm, no murmurs; no peripheral edema or JVD  -- Pulmonary: lungs clear bilaterally, equal diaphragmatic excursion, no wheezes or crackles  -- Gastrointestinal: RUQ tenderness; bowel sounds present; no organomegaly  -- Musculoskeletal: no swelling or erythema of joints  -- Integumentary: no rashes  -- Neurologic: alert and oriented to self, place, time; no gross abnormalities  -- Psychiatric: appropriate affect, cooperative    I have independently reviewed the new laboratory results and imaging studies.    CT CAP  IMPRESSION:   1.  No evidence of new malignancy in the chest, abdomen, or pelvis. A  3 mm left upper lobe pulmonary nodule has not changed.  2.  Hepatic steatosis and hepatomegaly.     LESTER J FAHRNER, MD    ASSESSMENT:  1. Recurrent, metastatic ER-positive, HER2 negative breast cancer: History of left breast cancer s/p lumpectomy and SNLB in 2014. S/p 4 cycles ddAC and 12 weeks of Taxol. On AI from 11/2014-2/2017. Recurrence on PET/CT scan performed " 08/03/2018 and then biopsy to left posterior cervical lymph nodes, aorticopulmonary, and prevascular lymph nodes were positive. No evidence of metastatic disease in the abdomen and pelvis or brain. Started Ibrance and letrozole on 9/21/18. Tolerating well aside from some hot flashes and mild joint stiffness. Last restaging 7/22 with stable disease.  Lymphadenopathy in her neck has clinically resolved.  She has a contrast allergy but would like to try contrast again with premeds. CT CAP Shows no evidence of progression.   2. Overall doing well.  No signs of liver injury, but RUQ tenderness is concerning.  Will get ultrasound of RUQ to rule out gallstones.  Continue current therapy.  3.  Diet and exercise. We also discussed eating less sugar and getting 150 minutes of exercise per week.  She will try to incorporate these recommendations into her lifestyle.  She previously saw Dr. Baez in [place] but is now looking for a new PCP more local to her new apartment.   4.  She has not gone to Psychiatry despite our referral to them several months ago.  She is thinking about going again.  Sertraline is working ok for her anxiety.  On Zoloft 200 mg with improvement in symptoms. Also on Remeron at bedtime.Sees Sari Holliday.  5. Asthma: Seen by Dr. Hay. Per chart on fluticasone-salmeterol inhaler BID. She does not recall the name of this. Also has albuterol prn.  6.  Follow up.   Ultrasound of abdomen. Follow up with SONIA Feb 13, March 12, and with me April 9 with CBC, CMP, CA27.29 and CEA. CT CAP April 8.    PLAN:  -- RUQ ultrasound to rule out gallstones  -- Continue palbociclib/letrozole  -- Return to clinic in 3 months with restaging CTs    Thank you for allowing us to participate in this patient's care.  The patient was seen and evaluated by me.  I discussed the patient with the fellow and agree with the findings and plan in the note, which was edited by me.    Sincerely,     Wilfredo Bhatt MD  Associate  Professor  [facility]  [phone].             Patient seen and discussed with Dr. Bhatt.    Yolis Chang MD  Hematology-Oncology-Transplant Fellow      I spent 35 minutes with the patient more than 50% of which was in counseling and coordination of care.

## 2020-01-15 ENCOUNTER — ANCILLARY PROCEDURE (OUTPATIENT)
Dept: CT IMAGING | Facility: CLINIC | Age: 50
End: 2020-01-15
Attending: INTERNAL MEDICINE
Payer: MEDICAID

## 2020-01-15 DIAGNOSIS — Z17.0 MALIGNANT NEOPLASM OF UPPER-OUTER QUADRANT OF LEFT BREAST IN FEMALE, ESTROGEN RECEPTOR POSITIVE (H): ICD-10-CM

## 2020-01-15 DIAGNOSIS — C50.412 MALIGNANT NEOPLASM OF UPPER-OUTER QUADRANT OF LEFT BREAST IN FEMALE, ESTROGEN RECEPTOR POSITIVE (H): ICD-10-CM

## 2020-01-15 RX ORDER — IOPAMIDOL 755 MG/ML
135 INJECTION, SOLUTION INTRAVASCULAR ONCE
Status: COMPLETED | OUTPATIENT
Start: 2020-01-15 | End: 2020-01-15

## 2020-01-15 RX ADMIN — IOPAMIDOL 135 ML: 755 INJECTION, SOLUTION INTRAVASCULAR at 10:09

## 2020-01-16 ENCOUNTER — APPOINTMENT (OUTPATIENT)
Dept: LAB | Facility: CLINIC | Age: 50
End: 2020-01-16
Attending: INTERNAL MEDICINE
Payer: MEDICAID

## 2020-01-16 ENCOUNTER — ONCOLOGY VISIT (OUTPATIENT)
Dept: ONCOLOGY | Facility: CLINIC | Age: 50
End: 2020-01-16
Attending: INTERNAL MEDICINE
Payer: MEDICAID

## 2020-01-16 VITALS
HEIGHT: 68 IN | RESPIRATION RATE: 16 BRPM | BODY MASS INDEX: 44.41 KG/M2 | DIASTOLIC BLOOD PRESSURE: 72 MMHG | OXYGEN SATURATION: 92 % | TEMPERATURE: 98 F | WEIGHT: 293 LBS | HEART RATE: 109 BPM | SYSTOLIC BLOOD PRESSURE: 123 MMHG

## 2020-01-16 DIAGNOSIS — Z17.0 MALIGNANT NEOPLASM OF UPPER-OUTER QUADRANT OF LEFT BREAST IN FEMALE, ESTROGEN RECEPTOR POSITIVE (H): ICD-10-CM

## 2020-01-16 DIAGNOSIS — C50.412 MALIGNANT NEOPLASM OF UPPER-OUTER QUADRANT OF LEFT BREAST IN FEMALE, ESTROGEN RECEPTOR POSITIVE (H): ICD-10-CM

## 2020-01-16 DIAGNOSIS — R10.11 RIGHT UPPER QUADRANT PAIN: Primary | ICD-10-CM

## 2020-01-16 LAB
ALBUMIN SERPL-MCNC: 4 G/DL (ref 3.4–5)
ALP SERPL-CCNC: 62 U/L (ref 40–150)
ALT SERPL W P-5'-P-CCNC: 32 U/L (ref 0–50)
ANION GAP SERPL CALCULATED.3IONS-SCNC: 10 MMOL/L (ref 3–14)
AST SERPL W P-5'-P-CCNC: 19 U/L (ref 0–45)
BASOPHILS # BLD AUTO: 0 10E9/L (ref 0–0.2)
BASOPHILS NFR BLD AUTO: 1.1 %
BILIRUB SERPL-MCNC: 0.5 MG/DL (ref 0.2–1.3)
BUN SERPL-MCNC: 15 MG/DL (ref 7–30)
CALCIUM SERPL-MCNC: 9.2 MG/DL (ref 8.5–10.1)
CHLORIDE SERPL-SCNC: 108 MMOL/L (ref 94–109)
CO2 SERPL-SCNC: 22 MMOL/L (ref 20–32)
CREAT SERPL-MCNC: 0.96 MG/DL (ref 0.52–1.04)
DIFFERENTIAL METHOD BLD: ABNORMAL
EOSINOPHIL # BLD AUTO: 0 10E9/L (ref 0–0.7)
EOSINOPHIL NFR BLD AUTO: 1.1 %
ERYTHROCYTE [DISTWIDTH] IN BLOOD BY AUTOMATED COUNT: 13.5 % (ref 10–15)
GFR SERPL CREATININE-BSD FRML MDRD: 69 ML/MIN/{1.73_M2}
GLUCOSE SERPL-MCNC: 103 MG/DL (ref 70–99)
HCT VFR BLD AUTO: 36.4 % (ref 35–47)
HGB BLD-MCNC: 12.7 G/DL (ref 11.7–15.7)
IMM GRANULOCYTES # BLD: 0 10E9/L (ref 0–0.4)
IMM GRANULOCYTES NFR BLD: 0.3 %
LYMPHOCYTES # BLD AUTO: 1.5 10E9/L (ref 0.8–5.3)
LYMPHOCYTES NFR BLD AUTO: 40.2 %
MCH RBC QN AUTO: 33.5 PG (ref 26.5–33)
MCHC RBC AUTO-ENTMCNC: 34.9 G/DL (ref 31.5–36.5)
MCV RBC AUTO: 96 FL (ref 78–100)
MONOCYTES # BLD AUTO: 0.3 10E9/L (ref 0–1.3)
MONOCYTES NFR BLD AUTO: 8.7 %
NEUTROPHILS # BLD AUTO: 1.8 10E9/L (ref 1.6–8.3)
NEUTROPHILS NFR BLD AUTO: 48.6 %
NRBC # BLD AUTO: 0 10*3/UL
NRBC BLD AUTO-RTO: 0 /100
PLATELET # BLD AUTO: 136 10E9/L (ref 150–450)
POTASSIUM SERPL-SCNC: 3.5 MMOL/L (ref 3.4–5.3)
PROT SERPL-MCNC: 7.6 G/DL (ref 6.8–8.8)
RBC # BLD AUTO: 3.79 10E12/L (ref 3.8–5.2)
SODIUM SERPL-SCNC: 140 MMOL/L (ref 133–144)
WBC # BLD AUTO: 3.7 10E9/L (ref 4–11)

## 2020-01-16 PROCEDURE — 85025 COMPLETE CBC W/AUTO DIFF WBC: CPT | Performed by: PHYSICIAN ASSISTANT

## 2020-01-16 PROCEDURE — 36415 COLL VENOUS BLD VENIPUNCTURE: CPT

## 2020-01-16 PROCEDURE — 99214 OFFICE O/P EST MOD 30 MIN: CPT | Mod: ZP | Performed by: INTERNAL MEDICINE

## 2020-01-16 PROCEDURE — G0463 HOSPITAL OUTPT CLINIC VISIT: HCPCS | Mod: ZF

## 2020-01-16 PROCEDURE — 80053 COMPREHEN METABOLIC PANEL: CPT | Performed by: PHYSICIAN ASSISTANT

## 2020-01-16 ASSESSMENT — MIFFLIN-ST. JEOR: SCORE: 2049.13

## 2020-01-16 ASSESSMENT — PAIN SCALES - GENERAL: PAINLEVEL: NO PAIN (0)

## 2020-01-16 NOTE — LETTER
1/16/2020       RE: Jade Collins  [address]  [facility] [address]     Dear Colleague,    Thank you for referring your patient, Jade Collins, to the [facility]. Please see a copy of my visit note below.    HISTORY OF PRESENT ILLNESS:  Jade is a 48-year-old woman with metastatic breast cancer who comes to our clinic for recommendations for further treatment.  She is referred by Dr. Norberto Brand at [facility].  Jade would like to continue her care at the [facility].       Jade was diagnosed with breast cancer with a lump found in the upper-inner quadrant of the left breast.  She was diagnosed in 12/2013 in the [place] area and Dr. David Redd was her medical oncologist.  Biopsy of the tumor showed it to be ER positive, WI positive, and HER-2 negative.  We do not have any of the original pathology and we need to obtain those reports.  She underwent a lumpectomy performed by Dr. Tasha Segura who is a surgeon in the [place] area.  She also had a sentinel lymph node which was noted to be involved with tumor but there was no lymph node dissection done at that time.  [id number].  She subsequently underwent adjuvant chemotherapy with dose-dense AC for 4 cycles and Taxol for 12 weeks, completed 09/11/2014.        She then had radiation therapy post lumpectomy to the left breast, which was completed 11/23/2014 by Dr. Manley.        She was then begun on an aromatase inhibitor, the name of which she does not remember.  The aromatase inhibitor therapy was begun in 11/2014.  She was then switched within about a month or so to anastrozole and remained on anastrozole from 11/2014-02/2017.  She has never received tamoxifen.        She then moved to [place], [place] in 03/2017.  She saw a gynecologist there and underwent a IRIS/BSO by Dr. Zendejas.  She then also saw Dr. Linares in [place], [place], who is an oncologist.  His interpretation of the pathology report was that she had triple-negative  breast cancer.  It is possible that she may have had low estrogen receptor positivity, but the anastrozole was then discontinued in 02/2017.  She then underwent the IRIS/BSO in 03/2017.  This was done laparoscopically.        She then remained off of all hormonal therapy and in 09/2017 moved to [place].  She remained off hormonal therapy and did not have Medical Oncology followup initially.        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  She then went to see Dr. Norberto Brand who performed a PET/CT scan and the patient also underwent a brain MRI for staging.  The PET/CT scan performed 08/03/2018 showed in the neck there were several mildly metabolic active and large prominent left posterior cervical lymph nodes.  The largest is located posterior to the left sternocleidomastoid muscle and measures 1.5 x 1 cm in size.  This demonstrate modest FDG uptake.  The other lymph nodes are smaller and demonstrate mild FDG uptake.  The prominent prevascular and aortopulmonary lymph nodes were also seen on the contrast-enhanced scan were less well seen on the PET.  The largest lymph node visualized on the PET scan was 1.1 x 0.8 cm and demonstrated mild FDG uptake.  This was in the periaortic area just anterior to the aortic arch.  No lung nodules.  No lung infiltrates.  No pleural effusion.  There was no uptake of FDG in the bones.  In summary, there were mildly enlarged left posterior cervical lymph nodes, largest measuring 1.5 x 1.0 cm and mildly enlarged aorticopulmonary and prevascular lymph nodes that were noted on contrast-enhanced CT scan seen less well on the PET scan.  No evidence of metastatic disease in the abdomen and pelvis.  She also underwent a brain MRI that showed no evidence of intracranial metastatic disease.  She underwent a biopsy of one of the lymph nodes in the left neck which showed metastatic adenocarcinoma consistent with breast origin, which was  "estrogen-receptor positive.  The tumor cells were positive for CK7, ER and IN consistent with metastatic breast carcinoma.  Estrogen receptor showed strong average nuclear intensity in 95% of carcinoma cells, progesterone receptor moderate average nuclear intensity in 70% of the carcinoma.  A GATA3 stain was apparently not performed.       TREATMENT HISTORY:  A. Tamoxifen  B. Letrozole and palbociclib started 9-18-18.    INTERVAL HISTORY:  She is generally doing well with no concerns.  She has a little facial/neck flushing after her CT scans and contrast yesterday.  No shortness of breath or tongue swelling.      A 10-point review of systems was performed and was negative with the exception of pertinent positives noted above.    OBJECTIVE DATA:  Blood pressure 123/72, pulse 109, temperature 98  F (36.7  C), temperature source Oral, resp. rate 16, height 1.727 m (5' 7.99\"), weight 137.6 kg (303 lb 4.8 oz), SpO2 92 %, not currently breastfeeding.  -- General: no acute distress  -- HEENT: mucous membranes moist, no erythema; pupils equally round, reactive  -- Lymph: no lymphadenopathy in the cervical, submandibular, supraclavicular, axillary, or inguinal areas  -- Cardiovascular: regular rate and rhythm, no murmurs; no peripheral edema or JVD  -- Pulmonary: lungs clear bilaterally, equal diaphragmatic excursion, no wheezes or crackles  -- Gastrointestinal: RUQ tenderness; bowel sounds present; no organomegaly  -- Musculoskeletal: no swelling or erythema of joints  -- Integumentary: no rashes  -- Neurologic: alert and oriented to self, place, time; no gross abnormalities  -- Psychiatric: appropriate affect, cooperative    I have independently reviewed the new laboratory results and imaging studies.    CT CAP  IMPRESSION:   1.  No evidence of new malignancy in the chest, abdomen, or pelvis. A  3 mm left upper lobe pulmonary nodule has not changed.  2.  Hepatic steatosis and hepatomegaly.     LESTER J FAHRNER, " MD    ASSESSMENT:  1. Recurrent, metastatic ER-positive, HER2 negative breast cancer: History of left breast cancer s/p lumpectomy and SNLB in 2014. S/p 4 cycles ddAC and 12 weeks of Taxol. On AI from 11/2014-2/2017. Recurrence on PET/CT scan performed 08/03/2018 and then biopsy to left posterior cervical lymph nodes, aorticopulmonary, and prevascular lymph nodes were positive. No evidence of metastatic disease in the abdomen and pelvis or brain. Started Ibrance and letrozole on 9/21/18. Tolerating well aside from some hot flashes and mild joint stiffness. Last restaging 7/22 with stable disease.  Lymphadenopathy in her neck has clinically resolved.  She has a contrast allergy but would like to try contrast again with premeds. CT CAP Shows no evidence of progression.   2. Overall doing well.  No signs of liver injury, but RUQ tenderness is concerning.  Will get ultrasound of RUQ to rule out gallstones.  Continue current therapy.  3.  Diet and exercise. We also discussed eating less sugar and getting 150 minutes of exercise per week.  She will try to incorporate these recommendations into her lifestyle.  She previously saw Dr. Baez in [place] but is now looking for a new PCP more local to her new apartment.   4.  She has not gone to Psychiatry despite our referral to them several months ago.  She is thinking about going again.  Sertraline is working ok for her anxiety.  On Zoloft 200 mg with improvement in symptoms. Also on Remeron at bedtime.Sees Sari Holliday.  5. Asthma: Seen by Dr. Hay. Per chart on fluticasone-salmeterol inhaler BID. She does not recall the name of this. Also has albuterol prn.  6.  Follow up.   Ultrasound of abdomen. Follow up with SONIA Feb 13, March 12, and with me April 9 with CBC, CMP, CA27.29 and CEA. CT CAP April 8.    PLAN:  -- RUQ ultrasound to rule out gallstones  -- Continue palbociclib/letrozole  -- Return to clinic in 3 months with restaging CTs    Thank you for allowing us to  participate in this patient's care.  The patient was seen and evaluated by me.  I discussed the patient with the fellow and agree with the findings and plan in the note, which was edited by me.    Sincerely,     Wilfredo Bhatt MD    [facility]  [phone].      Patient seen and discussed with Dr. Bhatt.    Yolis Chang MD  Hematology-Oncology-Transplant Fellow    I spent 35 minutes with the patient more than 50% of which was in counseling and coordination of care.

## 2020-01-16 NOTE — NURSING NOTE
"Oncology Rooming Note    January 16, 2020 10:24 AM   Jade Collins is a 49 year old female who presents for:    Chief Complaint   Patient presents with     Blood Draw     labs drawn via vpt by rn. vs taken      Oncology Clinic Visit     [facility] RETURN- BREAST CA     Initial Vitals: /72 (BP Location: Right arm, Patient Position: Sitting, Cuff Size: Adult Large)   Pulse 109   Temp 98  F (36.7  C) (Oral)   Resp 16   Ht 1.727 m (5' 7.99\")   Wt 137.6 kg (303 lb 4.8 oz)   SpO2 92%   BMI 46.13 kg/m   Estimated body mass index is 46.13 kg/m  as calculated from the following:    Height as of this encounter: 1.727 m (5' 7.99\").    Weight as of this encounter: 137.6 kg (303 lb 4.8 oz). Body surface area is 2.57 meters squared.  No Pain (0) Comment: Data Unavailable   No LMP recorded. Patient is postmenopausal.  Allergies reviewed: Yes  Medications reviewed: Yes    Medications: Medication refills not needed today.  Pharmacy name entered into [facility]:    [id number] - [place], [place] - [address] [facility] [address] [facility] - [place], [place] - [address]  [id number] - [place], [place] - [address] AT [facility] [address]    Clinical concerns: No new concerns. Dr. Bhatt was notified.      Lauri Vargas LPN            "

## 2020-01-17 ENCOUNTER — TELEPHONE (OUTPATIENT)
Dept: ONCOLOGY | Facility: CLINIC | Age: 50
End: 2020-01-17

## 2020-01-17 NOTE — ORAL ONC MGMT
Oral Chemotherapy Monitoring Program     Primary Oncologist: Dr. Bhatt  Primary Oncology Clinic: [facility]   Cancer Diagnosis: Breast Cancer      Drug: Ibrance 125mg once daily x 21 days on, 7 days off   Therapy History:  C1D1 9/21/2018  Next cycle: 5/3/19, 5/31/19, 6/28/19, 7/26/19      Drug Interaction Assessment: No new drug-drug interactions.     Lab Monitoring Plan  CBC/CMP monthly      Subjective/Objective:  Jade Collins is a 49 year old female contacted by phone for a follow-up visit for oral chemotherapy. Confirmed correct dosing and schedule. Starts next cycle today. Patient denies any new/worsening side effects, missed doses, or medication changes.     ORAL CHEMOTHERAPY 5/1/2019 5/29/2019 6/26/2019 7/25/2019 10/16/2019 11/11/2019 1/17/2020   Drug Name Ibrance (Palbociclib) Ibrance (Palbociclib) Ibrance (Palbociclib) Ibrance (Palbociclib) Ibrance (Palbociclib) Ibrance (Palbociclib) Ibrance (Palbociclib)   Current Dosage 125mg 125mg 125mg 125mg 125mg 125mg 125mg   Current Schedule Daily Daily Daily Daily Daily Daily Daily   Cycle Details 3 weeks on 1 week off 3 weeks on 1 week off 3 weeks on 1 week off 3 weeks on 1 week off 3 weeks on 1 week off 3 weeks on 1 week off 3 weeks on 1 week off   Start Date of Last Cycle 4/5/2019 5/3/2019 5/31/2019 6/28/2019 9/20/2019 10/18/2019 12/20/2019   Planned next cycle start date 5/3/2019 5/31/2019 6/28/2019 7/26/2019 10/18/2019 11/15/2019 1/17/2020   Doses missed in last 2 weeks 0 0 0 0 0 0 0   Adherence Assessment Adherent Adherent Adherent Adherent Adherent Adherent Adherent   Adverse Effects Diarrhea;Fatigue No AE identified during assessment No AE identified during assessment No AE identified during assessment No AE identified during assessment Oral mucositis No AE identified during assessment   Nausea - - - - - - -   Pharmacist Intervention(nausea) - - - - - - -   Intervention(s) - - - - - - -   Diarrhea Resolved due to intervention - - - - - -   Pharmacist  "Intervention(diarrhea) - - - - - - -   Intervention(s) - - - - - - -   Oral Mucositis - - - - - Grade 1 -   Pharmacist Intervention(mucositis) - - - - - Yes -   Intervention(s) - - - - - OTC recommendation -   Fatigue Resolved due to intervention - - - - - -   Pharmacist Intervention(fatigue) - - - - - - -   Arthralgias - - - - - - -   Pharmacist Intervention(arthralgias) - - - - - - -   Intervention(s) - - - - - - -   Home BPs not needed not needed not needed not needed - - -   Any new drug interactions? No No No No No No No   Is the dose as ordered appropriate for the patient? Yes Yes Yes Yes Yes Yes Yes   Is the patient currently in pain? No No No No - - -   Does the patient feel the pain is currently being managed by a provider? - - - - - - -   Has the patient been assessed within the past 6 months for depression? - - - - Yes Yes -   Has the patient missed any days of school, work, or other routine activity? No No No No No No No       Vitals:  BP:   BP Readings from Last 1 Encounters:   01/16/20 123/72     Wt Readings from Last 1 Encounters:   01/16/20 137.6 kg (303 lb 4.8 oz)     Estimated body surface area is 2.57 meters squared as calculated from the following:    Height as of 1/16/20: 1.727 m (5' 7.99\").    Weight as of 1/16/20: 137.6 kg (303 lb 4.8 oz).    Labs:  _  Result Component Current Result Ref Range   Sodium 140 (1/16/2020) 133 - 144 mmol/L     _  Result Component Current Result Ref Range   Potassium 3.5 (1/16/2020) 3.4 - 5.3 mmol/L     _  Result Component Current Result Ref Range   Calcium 9.2 (1/16/2020) 8.5 - 10.1 mg/dL     No results found for Mag within last 30 days.     No results found for Phos within last 30 days.     _  Result Component Current Result Ref Range   Albumin 4.0 (1/16/2020) 3.4 - 5.0 g/dL     _  Result Component Current Result Ref Range   Urea Nitrogen 15 (1/16/2020) 7 - 30 mg/dL     _  Result Component Current Result Ref Range   Creatinine 0.96 (1/16/2020) 0.52 - 1.04 mg/dL "       _  Result Component Current Result Ref Range   AST 19 (1/16/2020) 0 - 45 U/L     _  Result Component Current Result Ref Range   ALT 32 (1/16/2020) 0 - 50 U/L     _  Result Component Current Result Ref Range   Bilirubin Total 0.5 (1/16/2020) 0.2 - 1.3 mg/dL       _  Result Component Current Result Ref Range   WBC 3.7 (L) (1/16/2020) 4.0 - 11.0 10e9/L     _  Result Component Current Result Ref Range   Hemoglobin 12.7 (1/16/2020) 11.7 - 15.7 g/dL     _  Result Component Current Result Ref Range   Platelet Count 136 (L) (1/16/2020) 150 - 450 10e9/L     _  Result Component Current Result Ref Range   Absolute Neutrophil 1.8 (1/16/2020) 1.6 - 8.3 10e9/L       Assessment:  Tolerating therapy well.     Plan:  Continue ibrance    Follow-Up:  2/6: monthly follow up    Refill Due:  Jade will  ibrance and letrozole from [facility] today    Grant Clark, PharmD, MS  Hematology/Oncology Clinical Pharmacist  [facility]  [facility]  [phone]

## 2020-01-29 ENCOUNTER — PATIENT OUTREACH (OUTPATIENT)
Dept: ONCOLOGY | Facility: CLINIC | Age: 50
End: 2020-01-29

## 2020-01-29 NOTE — PROGRESS NOTES
Spoke to patient with Dr Bhatt's recommendations of cancelling the abdominal U/S due to abdominal pain has resolved. Scheduling notified of cancellation of the U/S.  Answered all patient's questions and verbalized understanding. Farheen Price RN, BSN.

## 2020-02-04 DIAGNOSIS — Z17.0 MALIGNANT NEOPLASM OF UPPER-OUTER QUADRANT OF LEFT BREAST IN FEMALE, ESTROGEN RECEPTOR POSITIVE (H): ICD-10-CM

## 2020-02-04 DIAGNOSIS — C50.412 MALIGNANT NEOPLASM OF UPPER-OUTER QUADRANT OF LEFT BREAST IN FEMALE, ESTROGEN RECEPTOR POSITIVE (H): ICD-10-CM

## 2020-02-04 DIAGNOSIS — C50.919 METASTATIC BREAST CANCER: Primary | ICD-10-CM

## 2020-02-04 DIAGNOSIS — T50.8X5D ALLERGIC REACTION TO CONTRAST MATERIAL, SUBSEQUENT ENCOUNTER: ICD-10-CM

## 2020-02-04 RX ORDER — METHYLPREDNISOLONE 16 MG/1
TABLET ORAL
Qty: 4 TABLET | Refills: 0 | Status: SHIPPED | OUTPATIENT
Start: 2020-02-04 | End: 2020-03-13

## 2020-02-06 ENCOUNTER — TELEPHONE (OUTPATIENT)
Dept: ONCOLOGY | Facility: CLINIC | Age: 50
End: 2020-02-06

## 2020-02-06 DIAGNOSIS — C50.412 MALIGNANT NEOPLASM OF UPPER-OUTER QUADRANT OF LEFT BREAST IN FEMALE, ESTROGEN RECEPTOR POSITIVE (H): Primary | ICD-10-CM

## 2020-02-06 DIAGNOSIS — Z17.0 MALIGNANT NEOPLASM OF UPPER-OUTER QUADRANT OF LEFT BREAST IN FEMALE, ESTROGEN RECEPTOR POSITIVE (H): Primary | ICD-10-CM

## 2020-02-06 RX ORDER — LETROZOLE 2.5 MG/1
2.5 TABLET, FILM COATED ORAL DAILY
Qty: 28 TABLET | Refills: 0 | Status: SHIPPED | OUTPATIENT
Start: 2020-02-06 | End: 2020-03-13

## 2020-02-07 NOTE — ORAL ONC MGMT
Oral Chemotherapy Monitoring Program    Primary Oncologist: Dr. Bhatt  Primary Oncology Clinic: [facility]   Cancer Diagnosis: Breast Cancer      Drug: Ibrance 125mg once daily x 21 days on, 7 days off   Therapy History:  C1D1 9/21/2018  Next cycle: 2/14/2020     Drug Interaction Assessment: No new drug-drug interactions.     Lab Monitoring Plan  CBC/CMP monthly      Subjective/Objective:  Jade Collins is a 49 year old female contacted by phone for a follow-up visit for oral chemotherapy. Confirmed correct dosing and schedule. Starts next cycle 2/14. Patient denies any new/worsening side effects, missed doses, or medication changes. Followed up with insurance updates. She had free drug approved through January 2021, but now that she has insurance, followed up with [facility] liaison to update free drug program. Jade is aware now that she has active insurance, she will not fill her Ibrance and letrozole with [facility] and will no longer have Ibrance delivered from [facility]. She expressed understanding and agreement with plan. She will continue to  her Ibrance and letrozole after her [facility] appointments at [facility].     ORAL CHEMOTHERAPY 5/29/2019 6/26/2019 7/25/2019 10/16/2019 11/11/2019 1/17/2020 2/6/2020   Drug Name Ibrance (Palbociclib) Ibrance (Palbociclib) Ibrance (Palbociclib) Ibrance (Palbociclib) Ibrance (Palbociclib) Ibrance (Palbociclib) Ibrance (Palbociclib)   Current Dosage 125mg 125mg 125mg 125mg 125mg 125mg 125mg   Current Schedule Daily Daily Daily Daily Daily Daily Daily   Cycle Details 3 weeks on 1 week off 3 weeks on 1 week off 3 weeks on 1 week off 3 weeks on 1 week off 3 weeks on 1 week off 3 weeks on 1 week off 3 weeks on 1 week off   Start Date of Last Cycle 5/3/2019 5/31/2019 6/28/2019 9/20/2019 10/18/2019 12/20/2019 1/17/2020   Planned next cycle start date 5/31/2019 6/28/2019 7/26/2019 10/18/2019 11/15/2019 1/17/2020 2/14/2020  "  Doses missed in last 2 weeks 0 0 0 0 0 0 0   Adherence Assessment Adherent Adherent Adherent Adherent Adherent Adherent Adherent   Adverse Effects No AE identified during assessment No AE identified during assessment No AE identified during assessment No AE identified during assessment Oral mucositis No AE identified during assessment No AE identified during assessment   Nausea - - - - - - -   Pharmacist Intervention(nausea) - - - - - - -   Intervention(s) - - - - - - -   Diarrhea - - - - - - -   Pharmacist Intervention(diarrhea) - - - - - - -   Intervention(s) - - - - - - -   Oral Mucositis - - - - Grade 1 - -   Pharmacist Intervention(mucositis) - - - - Yes - -   Intervention(s) - - - - OTC recommendation - -   Fatigue - - - - - - -   Pharmacist Intervention(fatigue) - - - - - - -   Arthralgias - - - - - - -   Pharmacist Intervention(arthralgias) - - - - - - -   Intervention(s) - - - - - - -   Home BPs not needed not needed not needed - - - -   Any new drug interactions? No No No No No No No   Is the dose as ordered appropriate for the patient? Yes Yes Yes Yes Yes Yes Yes   Is the patient currently in pain? No No No - - - -   Does the patient feel the pain is currently being managed by a provider? - - - - - - -   Has the patient been assessed within the past 6 months for depression? - - - Yes Yes - -   Has the patient missed any days of school, work, or other routine activity? No No No No No No No       Last PHQ-2 Score on record:   PHQ-2 ( 1999 [employer]) 9/18/2018   Q1: Little interest or pleasure in doing things 1   Q2: Feeling down, depressed or hopeless 1   PHQ-2 Score 2       Patient does not report depression symptoms.      Vitals:  BP:   BP Readings from Last 1 Encounters:   01/16/20 123/72     Wt Readings from Last 1 Encounters:   01/16/20 137.6 kg (303 lb 4.8 oz)     Estimated body surface area is 2.57 meters squared as calculated from the following:    Height as of 1/16/20: 1.727 m (5' 7.99\").    Weight " as of 1/16/20: 137.6 kg (303 lb 4.8 oz).    Labs:  _  Result Component Current Result Ref Range   Sodium 140 (1/16/2020) 133 - 144 mmol/L     _  Result Component Current Result Ref Range   Potassium 3.5 (1/16/2020) 3.4 - 5.3 mmol/L     _  Result Component Current Result Ref Range   Calcium 9.2 (1/16/2020) 8.5 - 10.1 mg/dL     No results found for Mag within last 30 days.     No results found for Phos within last 30 days.     _  Result Component Current Result Ref Range   Albumin 4.0 (1/16/2020) 3.4 - 5.0 g/dL     _  Result Component Current Result Ref Range   Urea Nitrogen 15 (1/16/2020) 7 - 30 mg/dL     _  Result Component Current Result Ref Range   Creatinine 0.96 (1/16/2020) 0.52 - 1.04 mg/dL       _  Result Component Current Result Ref Range   AST 19 (1/16/2020) 0 - 45 U/L     _  Result Component Current Result Ref Range   ALT 32 (1/16/2020) 0 - 50 U/L     _  Result Component Current Result Ref Range   Bilirubin Total 0.5 (1/16/2020) 0.2 - 1.3 mg/dL       _  Result Component Current Result Ref Range   WBC 3.7 (L) (1/16/2020) 4.0 - 11.0 10e9/L     _  Result Component Current Result Ref Range   Hemoglobin 12.7 (1/16/2020) 11.7 - 15.7 g/dL     _  Result Component Current Result Ref Range   Platelet Count 136 (L) (1/16/2020) 150 - 450 10e9/L     _  Result Component Current Result Ref Range   Absolute Neutrophil 1.8 (1/16/2020) 1.6 - 8.3 10e9/L       Assessment:  Jade is tolerating therapy well.    Jade as active insurance now and will continue filling with [facility].     Plan:  [facility] clinic liaison will notify [employer] free drug program patient has active insurance.   Continue plan. Next cycle starts 2/14.    Follow-Up:  2/13: [facility] appointment and labs.     Refill Due:  Jade will  Ibrance and letrozole at [facility] after her appointment on 2/13.   Scheduled Pick-up-at-Retail at [facility] site [facility] for 2/13.     Thank you for the opportunity to participate in the care of the  above patient,  Jose Swain, PharmD  Hematology/Oncology Clinical Pharmacist  [facility]  [facility]  [phone]

## 2020-02-13 ENCOUNTER — ONCOLOGY VISIT (OUTPATIENT)
Dept: ONCOLOGY | Facility: CLINIC | Age: 50
End: 2020-02-13
Attending: INTERNAL MEDICINE
Payer: COMMERCIAL

## 2020-02-13 ENCOUNTER — ANCILLARY PROCEDURE (OUTPATIENT)
Dept: MRI IMAGING | Facility: CLINIC | Age: 50
End: 2020-02-13
Attending: PHYSICIAN ASSISTANT
Payer: COMMERCIAL

## 2020-02-13 VITALS
BODY MASS INDEX: 44.41 KG/M2 | TEMPERATURE: 98 F | DIASTOLIC BLOOD PRESSURE: 74 MMHG | SYSTOLIC BLOOD PRESSURE: 115 MMHG | WEIGHT: 293 LBS | HEIGHT: 68 IN | RESPIRATION RATE: 18 BRPM | HEART RATE: 77 BPM | OXYGEN SATURATION: 97 %

## 2020-02-13 DIAGNOSIS — C50.412 MALIGNANT NEOPLASM OF UPPER-OUTER QUADRANT OF LEFT BREAST IN FEMALE, ESTROGEN RECEPTOR POSITIVE (H): ICD-10-CM

## 2020-02-13 DIAGNOSIS — Z17.0 MALIGNANT NEOPLASM OF UPPER-OUTER QUADRANT OF LEFT BREAST IN FEMALE, ESTROGEN RECEPTOR POSITIVE (H): ICD-10-CM

## 2020-02-13 DIAGNOSIS — Z17.0 MALIGNANT NEOPLASM OF UPPER-OUTER QUADRANT OF LEFT BREAST IN FEMALE, ESTROGEN RECEPTOR POSITIVE (H): Primary | ICD-10-CM

## 2020-02-13 DIAGNOSIS — C50.412 MALIGNANT NEOPLASM OF UPPER-OUTER QUADRANT OF LEFT BREAST IN FEMALE, ESTROGEN RECEPTOR POSITIVE (H): Primary | ICD-10-CM

## 2020-02-13 LAB
ALBUMIN SERPL-MCNC: 4 G/DL (ref 3.4–5)
ALP SERPL-CCNC: 70 U/L (ref 40–150)
ALT SERPL W P-5'-P-CCNC: 40 U/L (ref 0–50)
ANION GAP SERPL CALCULATED.3IONS-SCNC: 6 MMOL/L (ref 3–14)
AST SERPL W P-5'-P-CCNC: 26 U/L (ref 0–45)
BASOPHILS # BLD AUTO: 0.1 10E9/L (ref 0–0.2)
BASOPHILS NFR BLD AUTO: 2.6 %
BILIRUB SERPL-MCNC: 0.6 MG/DL (ref 0.2–1.3)
BUN SERPL-MCNC: 17 MG/DL (ref 7–30)
CALCIUM SERPL-MCNC: 9.6 MG/DL (ref 8.5–10.1)
CANCER AG27-29 SERPL-ACNC: 19 U/ML (ref 0–39)
CEA SERPL-MCNC: <0.5 UG/L (ref 0–2.5)
CHLORIDE SERPL-SCNC: 106 MMOL/L (ref 94–109)
CO2 SERPL-SCNC: 26 MMOL/L (ref 20–32)
CREAT SERPL-MCNC: 1.01 MG/DL (ref 0.52–1.04)
DIFFERENTIAL METHOD BLD: ABNORMAL
EOSINOPHIL # BLD AUTO: 0 10E9/L (ref 0–0.7)
EOSINOPHIL NFR BLD AUTO: 2.1 %
ERYTHROCYTE [DISTWIDTH] IN BLOOD BY AUTOMATED COUNT: 13.9 % (ref 10–15)
GFR SERPL CREATININE-BSD FRML MDRD: 65 ML/MIN/{1.73_M2}
GLUCOSE SERPL-MCNC: 113 MG/DL (ref 70–99)
HCT VFR BLD AUTO: 37 % (ref 35–47)
HGB BLD-MCNC: 12.8 G/DL (ref 11.7–15.7)
IMM GRANULOCYTES # BLD: 0 10E9/L (ref 0–0.4)
IMM GRANULOCYTES NFR BLD: 0 %
LYMPHOCYTES # BLD AUTO: 0.9 10E9/L (ref 0.8–5.3)
LYMPHOCYTES NFR BLD AUTO: 45.3 %
MCH RBC QN AUTO: 33.6 PG (ref 26.5–33)
MCHC RBC AUTO-ENTMCNC: 34.6 G/DL (ref 31.5–36.5)
MCV RBC AUTO: 97 FL (ref 78–100)
MONOCYTES # BLD AUTO: 0.2 10E9/L (ref 0–1.3)
MONOCYTES NFR BLD AUTO: 12.1 %
NEUTROPHILS # BLD AUTO: 0.7 10E9/L (ref 1.6–8.3)
NEUTROPHILS NFR BLD AUTO: 37.9 %
NRBC # BLD AUTO: 0 10*3/UL
NRBC BLD AUTO-RTO: 0 /100
PLATELET # BLD AUTO: 127 10E9/L (ref 150–450)
POTASSIUM SERPL-SCNC: 4.1 MMOL/L (ref 3.4–5.3)
PROT SERPL-MCNC: 7.6 G/DL (ref 6.8–8.8)
RBC # BLD AUTO: 3.81 10E12/L (ref 3.8–5.2)
SODIUM SERPL-SCNC: 138 MMOL/L (ref 133–144)
WBC # BLD AUTO: 1.9 10E9/L (ref 4–11)

## 2020-02-13 PROCEDURE — 85025 COMPLETE CBC W/AUTO DIFF WBC: CPT | Performed by: INTERNAL MEDICINE

## 2020-02-13 PROCEDURE — 86300 IMMUNOASSAY TUMOR CA 15-3: CPT | Performed by: INTERNAL MEDICINE

## 2020-02-13 PROCEDURE — 80053 COMPREHEN METABOLIC PANEL: CPT | Performed by: INTERNAL MEDICINE

## 2020-02-13 PROCEDURE — 82378 CARCINOEMBRYONIC ANTIGEN: CPT | Performed by: INTERNAL MEDICINE

## 2020-02-13 PROCEDURE — 36415 COLL VENOUS BLD VENIPUNCTURE: CPT

## 2020-02-13 PROCEDURE — G0463 HOSPITAL OUTPT CLINIC VISIT: HCPCS | Mod: ZF

## 2020-02-13 PROCEDURE — 99214 OFFICE O/P EST MOD 30 MIN: CPT | Mod: ZP | Performed by: PHYSICIAN ASSISTANT

## 2020-02-13 RX ORDER — GADOBUTROL 604.72 MG/ML
15 INJECTION INTRAVENOUS ONCE
Status: COMPLETED | OUTPATIENT
Start: 2020-02-13 | End: 2020-02-13

## 2020-02-13 RX ADMIN — GADOBUTROL 13 ML: 604.72 INJECTION INTRAVENOUS at 17:03

## 2020-02-13 ASSESSMENT — MIFFLIN-ST. JEOR: SCORE: 2059.99

## 2020-02-13 ASSESSMENT — PAIN SCALES - GENERAL: PAINLEVEL: NO PAIN (0)

## 2020-02-13 NOTE — DISCHARGE INSTRUCTIONS
MRI Contrast Discharge Instructions    The IV contrast you received today will pass out of your body in your  urine. This will happen in the next 24 hours. You will not feel this process.  Your urine will not change color.    Drink at least 4 extra glasses of water or juice today (unless your doctor  has restricted your fluids). This reduces the stress on your kidneys.  You may take your regular medicines.    If you are on dialysis: It is best to have dialysis today.    If you have a reaction: Most reactions happen right away. If you have  any new symptoms after leaving the hospital (such as hives or swelling),  call your hospital at the correct number below. Or call your family doctor.  If you have breathing distress or wheezing, call 911.    Special instructions: ***    I have read and understand the above information.    Signature:______________________________________ Date:___________    Staff:__________________________________________ Date:___________     Time:__________    [place] [facility] Departments:    ___[place]: [phone]  ___[facility]: [phone]  ___[place]: [phone] ___[facility]: [phone]  ___[facility]: [phone]  ___[facility]: [phone]  ___[phone]  ___[facility]: [phone]  ___[phone]

## 2020-02-13 NOTE — PROGRESS NOTES
Chief Complaint   Patient presents with     Blood Draw     Vitals and blood drawn by LPN. Pt checked into [facility].      FANNY Pandey LPN

## 2020-02-13 NOTE — PROGRESS NOTES
Oncology/Hematology Visit Note  Feb 13, 2020    Reason for Visit: follow up of recurrent metastatic ER-positive HER2 negative breast cancer    History of Present Illness: Jade Collins is a 49 year old female with a history of left breast cancer diagnosed in 12/2013 in [place] area and Dr. David Redd was her medical oncologist. Biopsy showed tumor to be ER positive, LA positive and HER2 negative. She underwent lumpectomy and SLNB by Dr. Tasha Segura in [place]. [place] lymph node was noted to be involved with tumor but no lymph node dissection done at that time. She subsequently underwent adjuvant ddAC x 4 cycles and Taxol x 12 weeks, competed in 9/11/2014. She had post lumpectomy radiation completed in 11/23/2014 by Dr. Manley. She was begun on an AI in 11/2014, but does not recall name.     She was then switched within about a month or so to anastrozole and remained on anastrozole from 11/2014-02/2017.  She has never received tamoxifen. She then moved to [place], [place] in 03/2017.  She saw a gynecologist there and underwent a IRIS/BSO by Dr. Zendejas.  She then also saw Dr. Linares in [place], [place], who is an oncologist.  His interpretation of the pathology report was that she had triple-negative breast cancer.  It is possible that she may have had low estrogen receptor positivity, but the anastrozole was then discontinued in 02/2017.  She then underwent the IRIS/BSO in 03/2017.  This was done laparoscopically.        She then remained off of all hormonal therapy and in 09/2017 moved to [place].  She remained off hormonal therapy and did not have Medical Oncology followup initially.        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  She then went to see Dr. Norberto Brand who performed a PET/CT scan and the patient also underwent a brain MRI for staging.  The PET/CT scan performed 08/03/2018 which showed mildly enlarged left posterior cervical lymph nodes, largest measuring  1.5 x 1.0 cm and mildly enlarged aorticopulmonary and prevascular lymph nodes that were noted on contrast-enhanced CT scan seen less well on the PET scan.  No evidence of metastatic disease in the abdomen and pelvis.  She also underwent a brain MRI that showed no evidence of intracranial metastatic disease.  She underwent a biopsy of one of the lymph nodes in the left neck which showed metastatic adenocarcinoma consistent with breast origin, which was estrogen-receptor positive.  The tumor cells were positive for CK7, ER and FL consistent with metastatic breast carcinoma.  Estrogen receptor showed strong average nuclear intensity in 95% of carcinoma cells, progesterone receptor moderate average nuclear intensity in 70% of the carcinoma.  A GATA3 stain was apparently not performed.     She started Ibrance on 9/21/18 and letrozole on 9/23/18. She had improved disease on restaging on 11/26. Last restaging on 10/15/19 with stable disease.     Please see Dr. Bhatt's previous notes for further details on the patient's history. She comes in today for routine follow up.    Interval History:  Jade is here for follow up.     Overall she has been doing well though she did have a odd episode on Monday.  She states she started feeling her entire body tremble and then when she woke up she was confused about what was going on and it took her a while to understand what had happened.  She thinks she had a seizure.  She did not go to the ER or call anyone.  It was unwitnessed.  This is never happened to her before.  She denies any headaches,  weakness or balance changes.  She does have some blurred vision though this is not necessarily new.  She has had some left-sided numbness and tingling in her hand.  She is also had some tightness in her chest though denies any shortness of breath, pain with breathing or chest pressure.  The pain is reproducible with palpation.  She otherwise continues to tolerate Ibrance well and has no other  new or different symptoms to report.    ROS: 10 point ROS neg other than the symptoms noted above in the HPI.      Current Outpatient Medications   Medication Sig Dispense Refill     ACETAMINOPHEN PO Take 650 mg by mouth every 4 hours as needed for pain       aspirin (ASA) 81 MG chewable tablet Take 81 mg by mouth daily       calcium carbonate (TUMS) 500 MG chewable tablet Take 1 chew tab by mouth as needed for heartburn       Calcium Citrate-Vitamin D (CALCIUM + D PO) Take 250 mg by mouth 2 times daily        doxycycline hyclate (VIBRA-TABS) 100 MG tablet Take 100 mg by mouth       letrozole (FEMARA) 2.5 MG tablet Take 1 tablet (2.5 mg) by mouth daily for 28 days 28 tablet 0     levothyroxine (SYNTHROID/LEVOTHROID) 200 MCG tablet Take 1 tablet (200 mcg) by mouth daily 30 tablet 0     loratadine (CLARITIN) 10 MG tablet Take 1 tablet (10 mg) by mouth daily 90 tablet 3     methylPREDNISolone (MEDROL) 16 MG tablet Take 32 mg by mouth 12 hours before the procedure and repeat 32 mg by mouth 2 hours before the procedure to prevent contrast reaction. 4 tablet 0     mirtazapine (REMERON) 15 MG tablet TK 1 T PO HS 30 tablet 3     Multiple Vitamins-Minerals (MULTIVITAMIN ADULT PO)        ondansetron (ZOFRAN-ODT) 4 MG ODT tab Take 4 mg by mouth       palbociclib (IBRANCE) 125 MG capsule Take 1 capsule (125 mg) by mouth daily with food Take for 21 days, then 7 days off. Avoid grapefruit. Do not open/crush/chew capsule. 21 capsule 0     palbociclib (IBRANCE) 125 MG capsule Take 1 capsule (125 mg) by mouth daily with food Take for 21 days, then 7 days off. Avoid grapefruit. Do not open/crush/chew capsule. 21 capsule 0     palbociclib (IBRANCE) 125 MG capsule Take 1 capsule (125 mg) by mouth daily with food Take for 21 days, then 7 days off. Avoid grapefruit. Do not open/crush/chew capsule. 21 capsule 0     prochlorperazine (COMPAZINE) 10 MG tablet Take 1 tablet (10 mg) by mouth every 6 hours as needed for nausea or vomiting 30  "tablet 0     sertraline (ZOLOFT) 100 MG tablet Take 1 tablet (100 mg) by mouth 2 times daily 60 tablet 3     triamcinolone (NASACORT) 55 MCG/ACT inhaler Spray 1 spray into both nostrils daily        VITAMIN D, CHOLECALCIFEROL, PO Take 2,000 Units by mouth daily        albuterol (2.5 MG/3ML) 0.083% IN neb solution Take 1 vial (2.5 mg) by nebulization every 6 hours as needed for shortness of breath / dyspnea or wheezing 30 vial 0     albuterol (PROAIR HFA/PROVENTIL HFA/VENTOLIN HFA) 108 (90 Base) MCG/ACT inhaler Inhale 2 puffs into the lungs every 6 hours 1 Inhaler 4     fluticasone-salmeterol 113-14 MCG/ACT IN inhaler Inhale 1 puff into the lungs 2 times daily 1 Inhaler 2     letrozole (FEMARA) 2.5 MG tablet Take 1 tablet (2.5 mg) by mouth daily for 28 days 28 tablet 0     letrozole (FEMARA) 2.5 MG tablet Take 1 tablet (2.5 mg) by mouth daily for 28 days 28 tablet 0       Physical Examination:  General: The patient is a pleasant female in no acute distress.  /74   Pulse 77   Temp 98  F (36.7  C) (Oral)   Resp 18   Ht 1.727 m (5' 7.99\")   Wt 138.7 kg (305 lb 11.2 oz)   SpO2 97%   BMI 46.50 kg/m    Wt Readings from Last 10 Encounters:   02/13/20 138.7 kg (305 lb 11.2 oz)   01/16/20 137.6 kg (303 lb 4.8 oz)   12/18/19 140.3 kg (309 lb 4.8 oz)   11/15/19 141.7 kg (312 lb 4.8 oz)   10/17/19 139.5 kg (307 lb 8 oz)   09/19/19 137.4 kg (303 lb)   08/22/19 137.1 kg (302 lb 3.2 oz)   07/25/19 137.1 kg (302 lb 3.2 oz)   06/25/19 135.4 kg (298 lb 6.4 oz)   05/29/19 136.9 kg (301 lb 13 oz)     HEENT: EOMI, PERRL. Sclerae are anicteric. Oral mucosa is pink and moist with no lesions or thrush.   Lymph: No palpable cervical, supraclavicular, subclavicular or axillary lymphadenopathy.  Heart: Regular rate and rhythm.   Lungs: Clear to auscultation bilaterally.    Abdomen: Bowel sounds present, soft, nontender with no palpable hepatosplenomegaly or masses.   Extremities: No lower extremity edema noted bilaterally. "   Neuro: Cranial nerves II through XII are grossly intact. No pronation or drift. Sensation intact throughout. Reflexes equal. Romberg -. Strength 5/5 throughout, alert to time/date, place, person. Able to follow complex commands. Recall immediate 3/3, delayed 3/3.   MSK: Pain to palpation under left clavicle     Laboratory Data:    2/13/2020 11:13   Sodium 138   Potassium 4.1   Chloride 106   Carbon Dioxide 26   Urea Nitrogen 17   Creatinine 1.01   GFR Estimate 65   GFR Estimate If Black 75   Calcium 9.6   Anion Gap 6   Albumin 4.0   Protein Total 7.6   Bilirubin Total 0.6   Alkaline Phosphatase 70   ALT 40   AST 26   CA 27-29 19   Glucose 113 (H)   WBC 1.9 (L)   Hemoglobin 12.8   Hematocrit 37.0   Platelet Count 127 (L)   RBC Count 3.81   MCV 97   MCH 33.6 (H)   MCHC 34.6   RDW 13.9   Diff Method Automated Method   % Neutrophils 37.9   % Lymphocytes 45.3   % Monocytes 12.1   % Eosinophils 2.1   % Basophils 2.6   % Immature Granulocytes 0.0   Nucleated RBCs 0   Absolute Neutrophil 0.7 (L)   Absolute Lymphocytes 0.9   Absolute Monocytes 0.2   Absolute Eosinophils 0.0   Absolute Basophils 0.1   Abs Immature Granulocytes 0.0   Absolute Nucleated RBC 0.0   CEA <0.5       Assessment and Plan:    Recurrent, metastatic ER-positive, HER2 negative breast cancer: History of left breast cancer s/p lumpectomy and SNLB in 2014. S/p 4 cycles ddAC and 12 weeks of Taxol. On AI from 11/2014-2/2017. Recurrence on PET/CT scan performed 08/03/2018 and then biopsy to left posterior cervical lymph nodes, aorticopulmonary, and prevascular lymph nodes. No evidence of metastatic disease in the abdomen and pelvis or brain. Started Ibrance and letrozole on 9/21/18. Tolerating well aside from some hot flashes and mild joint stiffness. Last restaging 10/15/19 with no evidence of disease. She has a contrast allergy and requires premeds for CT scans   -CT from 1/15 showed stable disease. Tumor markers stable  -next cycle, due to start on 2/14  though she is neutropenic today. Will have her recheck labs next week and restart once ANC > 1.0.   -will follow-up in 1 month     Seizure: Episode of shaking on Monday. Sounds like she may have had a post ictal state. It was unwitnessed and she did not seek immediate medical attention. Will get MRI of Brain to r/o brain mets as she does not have a h/o seizures and has no reason to have new onset     Addendum: MRI negative for brain mets, masses or other acute processes. Should follow-up with PCP about this episode     Left Chest Pain: Reproducible on exam. MSK in nature. Should use heat, stretching and APAP prn     Bone health: bone targeting agents are not required as she has no evidence of bone metastases. Continue calcium and vitamin D.     Depression: On zoloft 200 mg and remeron at bedtime. Continue to follow-up with Sari Holliday.    Helga Fernando PA-C  [facility]  [address] [place] [id number] [phone]

## 2020-02-13 NOTE — Clinical Note
2/13/2020       RE: Jade Collins  [address]  [facility] [address]     Dear Colleague,    Thank you for referring your patient, Jade Collins, to the [facility]. Please see a copy of my visit note below.    Oncology/Hematology Visit Note  Feb 13, 2020    Reason for Visit: follow up of recurrent metastatic ER-positive HER2 negative breast cancer    History of Present Illness: Jade Collins is a 49 year old female with a history of left breast cancer diagnosed in 12/2013 in [place] area and Dr. David Redd was her medical oncologist. Biopsy showed tumor to be ER positive, ID positive and HER2 negative. She underwent lumpectomy and SLNB by Dr. Tasha Segura in [place]. [place] lymph node was noted to be involved with tumor but no lymph node dissection done at that time. She subsequently underwent adjuvant ddAC x 4 cycles and Taxol x 12 weeks, competed in 9/11/2014. She had post lumpectomy radiation completed in 11/23/2014 by Dr. Manley. She was begun on an AI in 11/2014, but does not recall name.     She was then switched within about a month or so to anastrozole and remained on anastrozole from 11/2014-02/2017.  She has never received tamoxifen. She then moved to [place], [place] in 03/2017.  She saw a gynecologist there and underwent a IRIS/BSO by Dr. Zendejas.  She then also saw Dr. Linares in [place], [place], who is an oncologist.  His interpretation of the pathology report was that she had triple-negative breast cancer.  It is possible that she may have had low estrogen receptor positivity, but the anastrozole was then discontinued in 02/2017.  She then underwent the IRIS/BSO in 03/2017.  This was done laparoscopically.        She then remained off of all hormonal therapy and in 09/2017 moved to [place].  She remained off hormonal therapy and did not have Medical Oncology followup initially.        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  She then  went to see Dr. Norberto Brand who performed a PET/CT scan and the patient also underwent a brain MRI for staging.  The PET/CT scan performed 08/03/2018 which showed mildly enlarged left posterior cervical lymph nodes, largest measuring 1.5 x 1.0 cm and mildly enlarged aorticopulmonary and prevascular lymph nodes that were noted on contrast-enhanced CT scan seen less well on the PET scan.  No evidence of metastatic disease in the abdomen and pelvis.  She also underwent a brain MRI that showed no evidence of intracranial metastatic disease.  She underwent a biopsy of one of the lymph nodes in the left neck which showed metastatic adenocarcinoma consistent with breast origin, which was estrogen-receptor positive.  The tumor cells were positive for CK7, ER and MA consistent with metastatic breast carcinoma.  Estrogen receptor showed strong average nuclear intensity in 95% of carcinoma cells, progesterone receptor moderate average nuclear intensity in 70% of the carcinoma.  A GATA3 stain was apparently not performed.     She started Ibrance on 9/21/18 and letrozole on 9/23/18. She had improved disease on restaging on 11/26. Last restaging on 10/15/19 with stable disease.     Please see Dr. Bhatt's previous notes for further details on the patient's history. She comes in today for routine follow up.    Interval History:  Jade is here for follow up.         ROS: 10 point ROS neg other than the symptoms noted above in the HPI.      Current Outpatient Medications   Medication Sig Dispense Refill     ACETAMINOPHEN PO Take 650 mg by mouth every 4 hours as needed for pain       albuterol (PROAIR HFA/PROVENTIL HFA/VENTOLIN HFA) 108 (90 Base) MCG/ACT inhaler Inhale 2 puffs into the lungs every 6 hours 1 Inhaler 4     albuterol (PROVENTIL) (2.5 MG/3ML) 0.083% neb solution Take 1 vial (2.5 mg) by nebulization every 6 hours as needed for shortness of breath / dyspnea or wheezing 30 vial 0     aspirin (ASA) 81 MG chewable tablet Take  81 mg by mouth daily       calcium carbonate (TUMS) 500 MG chewable tablet Take 1 chew tab by mouth as needed for heartburn       Calcium Citrate-Vitamin D (CALCIUM + D PO) Take 250 mg by mouth 2 times daily        doxycycline hyclate (VIBRA-TABS) 100 MG tablet Take 100 mg by mouth       fluticasone-salmeterol (AIRDUO RESPICLICK) 113-14 MCG/ACT inhaler Inhale 1 puff into the lungs 2 times daily 1 Inhaler 11     letrozole (FEMARA) 2.5 MG tablet Take 1 tablet (2.5 mg) by mouth daily for 28 days 28 tablet 0     letrozole (FEMARA) 2.5 MG tablet Take 1 tablet (2.5 mg) by mouth daily for 28 days 28 tablet 0     letrozole (FEMARA) 2.5 MG tablet Take 1 tablet (2.5 mg) by mouth daily for 28 days 28 tablet 0     levothyroxine (SYNTHROID/LEVOTHROID) 200 MCG tablet Take 1 tablet (200 mcg) by mouth daily 30 tablet 0     loratadine (CLARITIN) 10 MG tablet Take 1 tablet (10 mg) by mouth daily 90 tablet 3     methylPREDNISolone (MEDROL) 16 MG tablet Take 32 mg by mouth 12 hours before the procedure and repeat 32 mg by mouth 2 hours before the procedure to prevent contrast reaction. 4 tablet 0     mirtazapine (REMERON) 15 MG tablet TK 1 T PO HS 30 tablet 3     Multiple Vitamins-Minerals (MULTIVITAMIN ADULT PO)        ondansetron (ZOFRAN-ODT) 4 MG ODT tab Take 4 mg by mouth       palbociclib (IBRANCE) 125 MG capsule Take 1 capsule (125 mg) by mouth daily with food Take for 21 days, then 7 days off. Avoid grapefruit. Do not open/crush/chew capsule. 21 capsule 0     palbociclib (IBRANCE) 125 MG capsule Take 1 capsule (125 mg) by mouth daily with food Take for 21 days, then 7 days off. Avoid grapefruit. Do not open/crush/chew capsule. 21 capsule 0     palbociclib (IBRANCE) 125 MG capsule Take 1 capsule (125 mg) by mouth daily with food Take for 21 days, then 7 days off. Avoid grapefruit. Do not open/crush/chew capsule. 21 capsule 0     prochlorperazine (COMPAZINE) 10 MG tablet Take 1 tablet (10 mg) by mouth every 6 hours as needed for  nausea or vomiting 30 tablet 0     sertraline (ZOLOFT) 100 MG tablet Take 1 tablet (100 mg) by mouth 2 times daily 60 tablet 3     triamcinolone (NASACORT) 55 MCG/ACT inhaler Spray 1 spray into both nostrils daily        VITAMIN D, CHOLECALCIFEROL, PO Take 2,000 Units by mouth daily          Physical Examination:  General: The patient is a pleasant female in no acute distress.  There were no vitals taken for this visit.  Wt Readings from Last 10 Encounters:   01/16/20 137.6 kg (303 lb 4.8 oz)   12/18/19 140.3 kg (309 lb 4.8 oz)   11/15/19 141.7 kg (312 lb 4.8 oz)   10/17/19 139.5 kg (307 lb 8 oz)   09/19/19 137.4 kg (303 lb)   08/22/19 137.1 kg (302 lb 3.2 oz)   07/25/19 137.1 kg (302 lb 3.2 oz)   06/25/19 135.4 kg (298 lb 6.4 oz)   05/29/19 136.9 kg (301 lb 13 oz)   04/30/19 136.9 kg (301 lb 11.2 oz)     HEENT: EOMI, PERRL. Sclerae are anicteric. Oral mucosa is pink and moist with no lesions or thrush.   Lymph: No palpable cervical, supraclavicular, subclavicular or axillary lymphadenopathy.  Heart: Regular rate and rhythm.   Lungs: Clear to auscultation bilaterally.   Breast: Deferred today   Abdomen: Bowel sounds present, soft, nontender with no palpable hepatosplenomegaly or masses.   Extremities: No lower extremity edema noted bilaterally.   Neuro: Cranial nerves II through XII are grossly intact.  Skin:   Laboratory Data:       Assessment and Plan:    Recurrent, metastatic ER-positive, HER2 negative breast cancer: History of left breast cancer s/p lumpectomy and SNLB in 2014. S/p 4 cycles ddAC and 12 weeks of Taxol. On AI from 11/2014-2/2017. Recurrence on PET/CT scan performed 08/03/2018 and then biopsy to left posterior cervical lymph nodes, aorticopulmonary, and prevascular lymph nodes. No evidence of metastatic disease in the abdomen and pelvis or brain. Started Ibrance and letrozole on 9/21/18. Tolerating well aside from some hot flashes and mild joint stiffness. Last restaging 10/15/19 with no evidence  of disease. She has a contrast allergy and requires premeds for scans   -CT from 1/15 showed stable disease  -will continue with next cycle, due to start on 2/14     Bone health: bone targeting agents are not required as she has no evidence of bone metastases. Continue calcium and vitamin D.     Depression: On zoloft 200 mg and remeron at bedtime.Sees Sari Holliday.    Helga Fernando PA-C  [facility] [address] [address]  [phone]                    Chief Complaint   Patient presents with     Blood Draw     Vitals and blood drawn by LPN. Pt checked into jaren Pandey LPN    Again, thank you for allowing me to participate in the care of your patient.      Sincerely,    Helga Fernando PA-C

## 2020-02-13 NOTE — NURSING NOTE
"Oncology Rooming Note    February 13, 2020 11:48 AM   Jade Collins is a 49 year old female who presents for:    Chief Complaint   Patient presents with     Blood Draw     Vitals and blood drawn by LPN. Pt checked into appt.      Oncology Clinic Visit     Return; Breast Ca     Initial Vitals: /74   Pulse 77   Temp 98  F (36.7  C) (Oral)   Resp 18   Ht 1.727 m (5' 7.99\")   Wt 138.7 kg (305 lb 11.2 oz)   SpO2 97%   BMI 46.50 kg/m   Estimated body mass index is 46.5 kg/m  as calculated from the following:    Height as of this encounter: 1.727 m (5' 7.99\").    Weight as of this encounter: 138.7 kg (305 lb 11.2 oz). Body surface area is 2.58 meters squared.  No Pain (0) Comment: Data Unavailable   No LMP recorded. Patient is postmenopausal.  Allergies reviewed: Yes  Medications reviewed: Yes    Medications: MEDICATION REFILLS NEEDED TODAY. Provider was notified.  Pharmacy name entered into [facility]:    [id number] - [place], [place] - [address] AT [facility] [address] [facility] [address] [facility] - [place], [place] - [address]  [id number] - [place], [place] - [address] AT [facility] OF [address]    Clinical concerns:  Levothroxine; sertraline; mirtazapine, Pt has concerns or questions. Having chest pain and tightness in her chest,    Padma Fernando CMA              "

## 2020-02-13 NOTE — LETTER
2/13/2020    RE: Jade Collins  [address]  [facility] [address]     Oncology/Hematology Visit Note  Feb 13, 2020    Reason for Visit: follow up of recurrent metastatic ER-positive HER2 negative breast cancer    History of Present Illness: Jade Collins is a 49 year old female with a history of left breast cancer diagnosed in 12/2013 in [place] area and Dr. David Redd was her medical oncologist. Biopsy showed tumor to be ER positive, NC positive and HER2 negative. She underwent lumpectomy and SLNB by Dr. Tasha Segura in [place]. [place] lymph node was noted to be involved with tumor but no lymph node dissection done at that time. She subsequently underwent adjuvant ddAC x 4 cycles and Taxol x 12 weeks, competed in 9/11/2014. She had post lumpectomy radiation completed in 11/23/2014 by Dr. Manley. She was begun on an AI in 11/2014, but does not recall name.     She was then switched within about a month or so to anastrozole and remained on anastrozole from 11/2014-02/2017.  She has never received tamoxifen. She then moved to [place], [place] in 03/2017.  She saw a gynecologist there and underwent a IRIS/BSO by Dr. Zendejas.  She then also saw Dr. Linares in [place], [place], who is an oncologist.  His interpretation of the pathology report was that she had triple-negative breast cancer.  It is possible that she may have had low estrogen receptor positivity, but the anastrozole was then discontinued in 02/2017.  She then underwent the IRIS/BSO in 03/2017.  This was done laparoscopically.        She then remained off of all hormonal therapy and in 09/2017 moved to [place].  She remained off hormonal therapy and did not have Medical Oncology followup initially.        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  She then went to see Dr. Norberto Brand who performed a PET/CT scan and the patient also underwent a brain MRI for staging.  The PET/CT scan performed 08/03/2018 which  showed mildly enlarged left posterior cervical lymph nodes, largest measuring 1.5 x 1.0 cm and mildly enlarged aorticopulmonary and prevascular lymph nodes that were noted on contrast-enhanced CT scan seen less well on the PET scan.  No evidence of metastatic disease in the abdomen and pelvis.  She also underwent a brain MRI that showed no evidence of intracranial metastatic disease.  She underwent a biopsy of one of the lymph nodes in the left neck which showed metastatic adenocarcinoma consistent with breast origin, which was estrogen-receptor positive.  The tumor cells were positive for CK7, ER and SC consistent with metastatic breast carcinoma.  Estrogen receptor showed strong average nuclear intensity in 95% of carcinoma cells, progesterone receptor moderate average nuclear intensity in 70% of the carcinoma.  A GATA3 stain was apparently not performed.     She started Ibrance on 9/21/18 and letrozole on 9/23/18. She had improved disease on restaging on 11/26. Last restaging on 10/15/19 with stable disease.     Please see Dr. Bhatt's previous notes for further details on the patient's history. She comes in today for routine follow up.    Interval History:  Jade is here for follow up.     Overall she has been doing well though she did have a odd episode on Monday.  She states she started feeling her entire body tremble and then when she woke up she was confused about what was going on and it took her a while to understand what had happened.  She thinks she had a seizure.  She did not go to the ER or call anyone.  It was unwitnessed.  This is never happened to her before.  She denies any headaches,  weakness or balance changes.  She does have some blurred vision though this is not necessarily new.  She has had some left-sided numbness and tingling in her hand.  She is also had some tightness in her chest though denies any shortness of breath, pain with breathing or chest pressure.  The pain is reproducible with  palpation.  She otherwise continues to tolerate Ibrance well and has no other new or different symptoms to report.    ROS: 10 point ROS neg other than the symptoms noted above in the HPI.      Current Outpatient Medications   Medication Sig Dispense Refill     ACETAMINOPHEN PO Take 650 mg by mouth every 4 hours as needed for pain       aspirin (ASA) 81 MG chewable tablet Take 81 mg by mouth daily       calcium carbonate (TUMS) 500 MG chewable tablet Take 1 chew tab by mouth as needed for heartburn       Calcium Citrate-Vitamin D (CALCIUM + D PO) Take 250 mg by mouth 2 times daily        doxycycline hyclate (VIBRA-TABS) 100 MG tablet Take 100 mg by mouth       letrozole (FEMARA) 2.5 MG tablet Take 1 tablet (2.5 mg) by mouth daily for 28 days 28 tablet 0     levothyroxine (SYNTHROID/LEVOTHROID) 200 MCG tablet Take 1 tablet (200 mcg) by mouth daily 30 tablet 0     loratadine (CLARITIN) 10 MG tablet Take 1 tablet (10 mg) by mouth daily 90 tablet 3     methylPREDNISolone (MEDROL) 16 MG tablet Take 32 mg by mouth 12 hours before the procedure and repeat 32 mg by mouth 2 hours before the procedure to prevent contrast reaction. 4 tablet 0     mirtazapine (REMERON) 15 MG tablet TK 1 T PO HS 30 tablet 3     Multiple Vitamins-Minerals (MULTIVITAMIN ADULT PO)        ondansetron (ZOFRAN-ODT) 4 MG ODT tab Take 4 mg by mouth       palbociclib (IBRANCE) 125 MG capsule Take 1 capsule (125 mg) by mouth daily with food Take for 21 days, then 7 days off. Avoid grapefruit. Do not open/crush/chew capsule. 21 capsule 0     palbociclib (IBRANCE) 125 MG capsule Take 1 capsule (125 mg) by mouth daily with food Take for 21 days, then 7 days off. Avoid grapefruit. Do not open/crush/chew capsule. 21 capsule 0     palbociclib (IBRANCE) 125 MG capsule Take 1 capsule (125 mg) by mouth daily with food Take for 21 days, then 7 days off. Avoid grapefruit. Do not open/crush/chew capsule. 21 capsule 0     prochlorperazine (COMPAZINE) 10 MG tablet Take  "1 tablet (10 mg) by mouth every 6 hours as needed for nausea or vomiting 30 tablet 0     sertraline (ZOLOFT) 100 MG tablet Take 1 tablet (100 mg) by mouth 2 times daily 60 tablet 3     triamcinolone (NASACORT) 55 MCG/ACT inhaler Spray 1 spray into both nostrils daily        VITAMIN D, CHOLECALCIFEROL, PO Take 2,000 Units by mouth daily        albuterol (2.5 MG/3ML) 0.083% IN neb solution Take 1 vial (2.5 mg) by nebulization every 6 hours as needed for shortness of breath / dyspnea or wheezing 30 vial 0     albuterol (PROAIR HFA/PROVENTIL HFA/VENTOLIN HFA) 108 (90 Base) MCG/ACT inhaler Inhale 2 puffs into the lungs every 6 hours 1 Inhaler 4     fluticasone-salmeterol 113-14 MCG/ACT IN inhaler Inhale 1 puff into the lungs 2 times daily 1 Inhaler 2     letrozole (FEMARA) 2.5 MG tablet Take 1 tablet (2.5 mg) by mouth daily for 28 days 28 tablet 0     letrozole (FEMARA) 2.5 MG tablet Take 1 tablet (2.5 mg) by mouth daily for 28 days 28 tablet 0       Physical Examination:  General: The patient is a pleasant female in no acute distress.  /74   Pulse 77   Temp 98  F (36.7  C) (Oral)   Resp 18   Ht 1.727 m (5' 7.99\")   Wt 138.7 kg (305 lb 11.2 oz)   SpO2 97%   BMI 46.50 kg/m     Wt Readings from Last 10 Encounters:   02/13/20 138.7 kg (305 lb 11.2 oz)   01/16/20 137.6 kg (303 lb 4.8 oz)   12/18/19 140.3 kg (309 lb 4.8 oz)   11/15/19 141.7 kg (312 lb 4.8 oz)   10/17/19 139.5 kg (307 lb 8 oz)   09/19/19 137.4 kg (303 lb)   08/22/19 137.1 kg (302 lb 3.2 oz)   07/25/19 137.1 kg (302 lb 3.2 oz)   06/25/19 135.4 kg (298 lb 6.4 oz)   05/29/19 136.9 kg (301 lb 13 oz)     HEENT: EOMI, PERRL. Sclerae are anicteric. Oral mucosa is pink and moist with no lesions or thrush.   Lymph: No palpable cervical, supraclavicular, subclavicular or axillary lymphadenopathy.  Heart: Regular rate and rhythm.   Lungs: Clear to auscultation bilaterally.    Abdomen: Bowel sounds present, soft, nontender with no palpable hepatosplenomegaly " or masses.   Extremities: No lower extremity edema noted bilaterally.   Neuro: Cranial nerves II through XII are grossly intact. No pronation or drift. Sensation intact throughout. Reflexes equal. Romberg -. Strength 5/5 throughout, alert to time/date, place, person. Able to follow complex commands. Recall immediate 3/3, delayed 3/3.   MSK: Pain to palpation under left clavicle     Laboratory Data:    2/13/2020 11:13   Sodium 138   Potassium 4.1   Chloride 106   Carbon Dioxide 26   Urea Nitrogen 17   Creatinine 1.01   GFR Estimate 65   GFR Estimate If Black 75   Calcium 9.6   Anion Gap 6   Albumin 4.0   Protein Total 7.6   Bilirubin Total 0.6   Alkaline Phosphatase 70   ALT 40   AST 26   CA 27-29 19   Glucose 113 (H)   WBC 1.9 (L)   Hemoglobin 12.8   Hematocrit 37.0   Platelet Count 127 (L)   RBC Count 3.81   MCV 97   MCH 33.6 (H)   MCHC 34.6   RDW 13.9   Diff Method Automated Method   % Neutrophils 37.9   % Lymphocytes 45.3   % Monocytes 12.1   % Eosinophils 2.1   % Basophils 2.6   % Immature Granulocytes 0.0   Nucleated RBCs 0   Absolute Neutrophil 0.7 (L)   Absolute Lymphocytes 0.9   Absolute Monocytes 0.2   Absolute Eosinophils 0.0   Absolute Basophils 0.1   Abs Immature Granulocytes 0.0   Absolute Nucleated RBC 0.0   CEA <0.5       Assessment and Plan:    Recurrent, metastatic ER-positive, HER2 negative breast cancer: History of left breast cancer s/p lumpectomy and SNLB in 2014. S/p 4 cycles ddAC and 12 weeks of Taxol. On AI from 11/2014-2/2017. Recurrence on PET/CT scan performed 08/03/2018 and then biopsy to left posterior cervical lymph nodes, aorticopulmonary, and prevascular lymph nodes. No evidence of metastatic disease in the abdomen and pelvis or brain. Started Ibrance and letrozole on 9/21/18. Tolerating well aside from some hot flashes and mild joint stiffness. Last restaging 10/15/19 with no evidence of disease. She has a contrast allergy and requires premeds for CT scans   -CT from 1/15 showed  stable disease. Tumor markers stable  -next cycle, due to start on 2/14 though she is neutropenic today. Will have her recheck labs next week and restart once ANC > 1.0.   -will follow-up in 1 month     Seizure: Episode of shaking on Monday. Sounds like she may have had a post ictal state. It was unwitnessed and she did not seek immediate medical attention. Will get MRI of Brain to r/o brain mets as she does not have a h/o seizures and has no reason to have new onset     Addendum: MRI negative for brain mets, masses or other acute processes. Should follow-up with PCP about this episode     Left Chest Pain: Reproducible on exam. MSK in nature. Should use heat, stretching and APAP prn     Bone health: bone targeting agents are not required as she has no evidence of bone metastases. Continue calcium and vitamin D.     Depression: On zoloft 200 mg and remeron at bedtime. Continue to follow-up with Sari Holliday.    Helga Fernando PA-C  [facility]  [address] [place] [id number] [phone]      Chief Complaint   Patient presents with     Blood Draw     Vitals and blood drawn by LPN. Pt checked into joset.      FANNY Fernando PA-C

## 2020-02-14 ENCOUNTER — PATIENT OUTREACH (OUTPATIENT)
Dept: ONCOLOGY | Facility: CLINIC | Age: 50
End: 2020-02-14

## 2020-02-14 NOTE — PROGRESS NOTES
Spoke to patient with Helga Fernando's recommendations of Brain MRI and to repeat lab work on Monday with neutropenia by lab work yesterday. Patient picked up her Ibrance to start today, but knows to hold it until labs repeated on 2/17/2020. Brain MRI and tumor markers released to patient. Instructed patient to see her PMD with symptoms.  Message sent to the Pharm D pool and scheduling to arrange the lab appointment.  Answered all patient's questions and verbalized understanding. Farheen Price RN, BSN.

## 2020-02-17 ENCOUNTER — TELEPHONE (OUTPATIENT)
Dept: ONCOLOGY | Facility: CLINIC | Age: 50
End: 2020-02-17

## 2020-02-17 DIAGNOSIS — Z17.0 MALIGNANT NEOPLASM OF UPPER-OUTER QUADRANT OF LEFT BREAST IN FEMALE, ESTROGEN RECEPTOR POSITIVE (H): ICD-10-CM

## 2020-02-17 DIAGNOSIS — C50.412 MALIGNANT NEOPLASM OF UPPER-OUTER QUADRANT OF LEFT BREAST IN FEMALE, ESTROGEN RECEPTOR POSITIVE (H): ICD-10-CM

## 2020-02-17 LAB
ALBUMIN SERPL-MCNC: 3.8 G/DL (ref 3.4–5)
ALP SERPL-CCNC: 68 U/L (ref 40–150)
ALT SERPL W P-5'-P-CCNC: 43 U/L (ref 0–50)
ANION GAP SERPL CALCULATED.3IONS-SCNC: 7 MMOL/L (ref 3–14)
AST SERPL W P-5'-P-CCNC: 34 U/L (ref 0–45)
BASOPHILS # BLD AUTO: 0.1 10E9/L (ref 0–0.2)
BASOPHILS NFR BLD AUTO: 2.6 %
BILIRUB SERPL-MCNC: 0.3 MG/DL (ref 0.2–1.3)
BUN SERPL-MCNC: 9 MG/DL (ref 7–30)
CALCIUM SERPL-MCNC: 9.4 MG/DL (ref 8.5–10.1)
CHLORIDE SERPL-SCNC: 109 MMOL/L (ref 94–109)
CO2 SERPL-SCNC: 25 MMOL/L (ref 20–32)
CREAT SERPL-MCNC: 0.93 MG/DL (ref 0.52–1.04)
DIFFERENTIAL METHOD BLD: ABNORMAL
EOSINOPHIL # BLD AUTO: 0.1 10E9/L (ref 0–0.7)
EOSINOPHIL NFR BLD AUTO: 2.6 %
ERYTHROCYTE [DISTWIDTH] IN BLOOD BY AUTOMATED COUNT: 14 % (ref 10–15)
GFR SERPL CREATININE-BSD FRML MDRD: 72 ML/MIN/{1.73_M2}
GLUCOSE SERPL-MCNC: 101 MG/DL (ref 70–99)
HCT VFR BLD AUTO: 37 % (ref 35–47)
HGB BLD-MCNC: 13.1 G/DL (ref 11.7–15.7)
LYMPHOCYTES # BLD AUTO: 1.2 10E9/L (ref 0.8–5.3)
LYMPHOCYTES NFR BLD AUTO: 46.1 %
MCH RBC QN AUTO: 34.1 PG (ref 26.5–33)
MCHC RBC AUTO-ENTMCNC: 35.4 G/DL (ref 31.5–36.5)
MCV RBC AUTO: 96 FL (ref 78–100)
MONOCYTES # BLD AUTO: 0.3 10E9/L (ref 0–1.3)
MONOCYTES NFR BLD AUTO: 12.2 %
MYELOCYTES # BLD: 0 10E9/L
MYELOCYTES NFR BLD MANUAL: 0.9 %
NEUTROPHILS # BLD AUTO: 0.9 10E9/L (ref 1.6–8.3)
NEUTROPHILS NFR BLD AUTO: 35.6 %
PLATELET # BLD AUTO: 153 10E9/L (ref 150–450)
PLATELET # BLD EST: ABNORMAL 10*3/UL
POIKILOCYTOSIS BLD QL SMEAR: SLIGHT
POTASSIUM SERPL-SCNC: 4.2 MMOL/L (ref 3.4–5.3)
PROT SERPL-MCNC: 7.2 G/DL (ref 6.8–8.8)
RBC # BLD AUTO: 3.84 10E12/L (ref 3.8–5.2)
SODIUM SERPL-SCNC: 141 MMOL/L (ref 133–144)
WBC # BLD AUTO: 2.6 10E9/L (ref 4–11)

## 2020-02-17 PROCEDURE — 85025 COMPLETE CBC W/AUTO DIFF WBC: CPT | Performed by: INTERNAL MEDICINE

## 2020-02-17 PROCEDURE — 80053 COMPREHEN METABOLIC PANEL: CPT | Performed by: INTERNAL MEDICINE

## 2020-02-17 PROCEDURE — 86300 IMMUNOASSAY TUMOR CA 15-3: CPT | Performed by: INTERNAL MEDICINE

## 2020-02-17 NOTE — ORAL ONC MGMT
Oral Chemotherapy Monitoring Program  Lab Follow Up    Patient currently on Ibrance therapy for breast cancer.    Reviewed lab results from 2/17/20.    Labs:  _  Result Component Current Result Ref Range   Sodium 141 (2/17/2020) 133 - 144 mmol/L     _  Result Component Current Result Ref Range   Potassium 4.2 (2/17/2020) 3.4 - 5.3 mmol/L     _  Result Component Current Result Ref Range   Calcium 9.4 (2/17/2020) 8.5 - 10.1 mg/dL     No results found for Mag within last 30 days.     No results found for Phos within last 30 days.     _  Result Component Current Result Ref Range   Albumin 3.8 (2/17/2020) 3.4 - 5.0 g/dL     _  Result Component Current Result Ref Range   Urea Nitrogen 9 (2/17/2020) 7 - 30 mg/dL     _  Result Component Current Result Ref Range   Creatinine 0.93 (2/17/2020) 0.52 - 1.04 mg/dL       _  Result Component Current Result Ref Range   AST 34 (2/17/2020) 0 - 45 U/L     _  Result Component Current Result Ref Range   ALT 43 (2/17/2020) 0 - 50 U/L     _  Result Component Current Result Ref Range   Bilirubin Total 0.3 (2/17/2020) 0.2 - 1.3 mg/dL       _  Result Component Current Result Ref Range   WBC 2.6 (L) (2/17/2020) 4.0 - 11.0 10e9/L     _  Result Component Current Result Ref Range   Hemoglobin 13.1 (2/17/2020) 11.7 - 15.7 g/dL     _  Result Component Current Result Ref Range   Platelet Count 153 (2/17/2020) 150 - 450 10e9/L     _  Result Component Current Result Ref Range   Absolute Neutrophil 0.9 (L) (2/17/2020) 1.6 - 8.3 10e9/L       Assessment & Plan:  Results are concerning for ANC of 0.9, per invamshiet, ok to resume therapy on 2/20 without labs.     Placed call to patient in follow up of labs. Jade would like to maintain her cycle start dates on Friday, plans to start next Ibrance cycle on 2/21. Questions answered to patient's satisfaction.    Follow-Up:  3/12: [facility] appointment and labs.       Thank you for the opportunity to participate in the care of the above patient,  Jose Swain,  PharmD  Hematology/Oncology Clinical Pharmacist  [facility]  [facility]  [phone]

## 2020-02-17 NOTE — NURSING NOTE
Chief Complaint   Patient presents with     Blood Draw     Labs drawn via  by RN in lab.      Ana Byrd RN

## 2020-02-19 DIAGNOSIS — J45.909 UNCOMPLICATED ASTHMA, UNSPECIFIED ASTHMA SEVERITY, UNSPECIFIED WHETHER PERSISTENT: ICD-10-CM

## 2020-02-19 DIAGNOSIS — J45.909 ASTHMA: ICD-10-CM

## 2020-02-19 RX ORDER — FLUTICASONE PROPIONATE AND SALMETEROL 113; 14 UG/1; UG/1
1 POWDER, METERED RESPIRATORY (INHALATION) 2 TIMES DAILY
Qty: 1 INHALER | Refills: 2 | Status: SHIPPED | OUTPATIENT
Start: 2020-02-19 | End: 2020-03-13

## 2020-02-19 RX ORDER — ALBUTEROL SULFATE 0.83 MG/ML
2.5 SOLUTION RESPIRATORY (INHALATION) EVERY 6 HOURS PRN
Qty: 30 VIAL | Refills: 0 | Status: SHIPPED | OUTPATIENT
Start: 2020-02-19 | End: 2020-03-13

## 2020-02-24 DIAGNOSIS — J45.20 MILD INTERMITTENT ASTHMA WITHOUT COMPLICATION: ICD-10-CM

## 2020-02-24 RX ORDER — ALBUTEROL SULFATE 90 UG/1
2 AEROSOL, METERED RESPIRATORY (INHALATION) EVERY 6 HOURS
Qty: 1 INHALER | Refills: 1 | Status: SHIPPED | OUTPATIENT
Start: 2020-02-24 | End: 2020-03-13

## 2020-02-25 ENCOUNTER — TELEPHONE (OUTPATIENT)
Dept: PULMONOLOGY | Facility: CLINIC | Age: 50
End: 2020-02-25

## 2020-02-25 NOTE — TELEPHONE ENCOUNTER
Prior Authorization Retail Medication Request    Medication/Dose: Fluticasone-Salmeterol 113-14 MCG/ACT Aerosol Powder Breath Activated (AIRDUO RESPICLICK)  ICD code (if different than what is on RX):    Previously Tried and Failed:   Rationale:    Insurance Name:  Insurance ID:      Pharmacy Information (if different than what is on RX)  Name:   Phone:

## 2020-02-26 NOTE — TELEPHONE ENCOUNTER
Central Prior Authorization Team   [phone]    PA Initiation    Medication: Fluticasone-Salmeterol 113-14 MCG/ACT Aerosol Powder Breath Activated (AIRDUO RESPICLICK)  Insurance Company: [employer] - Phone [phone] Fax [phone]  Pharmacy Filling the Rx: [id number] [place] [address] AT [facility] [address]  Filling Pharmacy Phone: [phone]  Filling Pharmacy Fax: [phone]  Start Date: 2/26/2020

## 2020-02-27 DIAGNOSIS — J45.909 ASTHMA: Primary | ICD-10-CM

## 2020-02-27 RX ORDER — FLUTICASONE PROPIONATE AND SALMETEROL XINAFOATE 115; 21 UG/1; UG/1
2 AEROSOL, METERED RESPIRATORY (INHALATION) 2 TIMES DAILY
Qty: 1 INHALER | Refills: 2 | Status: SHIPPED | OUTPATIENT
Start: 2020-02-27 | End: 2020-03-13

## 2020-02-27 NOTE — TELEPHONE ENCOUNTER
PA Pending. Insurance is requesting additional information on medication as noted below. Please provide additional information/documentations why non-formulary is preferred.

## 2020-02-27 NOTE — TELEPHONE ENCOUNTER
Placed Advair HFA to pharmacy as has to try and fail this. Air Duo is not on formulary. Left message with patient with this update.

## 2020-03-04 NOTE — TELEPHONE ENCOUNTER
Prior Authorization Not Needed per Insurance    Medication: Fluticasone-Salmeterol 113-14 MCG/ACT Aerosol Powder Breath Activated (AIRDUO RESPICLICK)-PA NOT NEEDED   Insurance Company: [employer] - Phone [phone] Fax [phone]  Expected CoPay:      Pharmacy Filling the Rx: [facility] #[id number] [place] [address] AT [facility] [address]  Pharmacy Notified: Yes  Patient Notified: No    Called pharmacy and pharmacy stated that PA is Not Needed and they received a new script for preferred medication Advair HFA as noted below. Pharmacy stated that they have a paid claim on medication quantity 1 inhaler for a 30 day supply. Insurance also stated that PA is Not Needed and that they see a paid claim on formulary alternative medication.

## 2020-03-11 ENCOUNTER — HEALTH MAINTENANCE LETTER (OUTPATIENT)
Age: 50
End: 2020-03-11

## 2020-03-11 NOTE — PROGRESS NOTES
Oncology/Hematology Visit Note  Mar 12, 2020    Reason for Visit: follow up of recurrent metastatic ER-positive HER2 negative breast cancer    History of Present Illness: Jade Collins is a 49 year old female with a history of left breast cancer diagnosed in 12/2013 in [place] area and Dr. David Redd was her medical oncologist. Biopsy showed tumor to be ER positive, IN positive and HER2 negative. She underwent lumpectomy and SLNB by Dr. Tasha Segura in [place]. [place] lymph node was noted to be involved with tumor but no lymph node dissection done at that time. She subsequently underwent adjuvant ddAC x 4 cycles and Taxol x 12 weeks, competed in 9/11/2014. She had post lumpectomy radiation completed in 11/23/2014 by Dr. Manley. She was begun on an AI in 11/2014, but does not recall name.     She was then switched within about a month or so to anastrozole and remained on anastrozole from 11/2014-02/2017.  She has never received tamoxifen. She then moved to [place], [place] in 03/2017.  She saw a gynecologist there and underwent a IRIS/BSO by Dr. Zendejas.  She then also saw Dr. Linares in [place], [place], who is an oncologist.  His interpretation of the pathology report was that she had triple-negative breast cancer.  It is possible that she may have had low estrogen receptor positivity, but the anastrozole was then discontinued in 02/2017.  She then underwent the IRIS/BSO in 03/2017.  This was done laparoscopically.        She then remained off of all hormonal therapy and in 09/2017 moved to [place].  She remained off hormonal therapy and did not have Medical Oncology followup initially.        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  She then went to see Dr. Norberto Brand who performed a PET/CT scan and the patient also underwent a brain MRI for staging.  The PET/CT scan performed 08/03/2018 which showed mildly enlarged left posterior cervical lymph nodes, largest measuring  1.5 x 1.0 cm and mildly enlarged aorticopulmonary and prevascular lymph nodes that were noted on contrast-enhanced CT scan seen less well on the PET scan.  No evidence of metastatic disease in the abdomen and pelvis.  She also underwent a brain MRI that showed no evidence of intracranial metastatic disease.  She underwent a biopsy of one of the lymph nodes in the left neck which showed metastatic adenocarcinoma consistent with breast origin, which was estrogen-receptor positive.  The tumor cells were positive for CK7, ER and AK consistent with metastatic breast carcinoma.  Estrogen receptor showed strong average nuclear intensity in 95% of carcinoma cells, progesterone receptor moderate average nuclear intensity in 70% of the carcinoma.  A GATA3 stain was apparently not performed.     She started Ibrance on 9/21/18 and letrozole on 9/23/18. She had improved disease on restaging on 11/26. Last restaging on 10/15/19 with stable disease.     Please see Dr. Bhatt's previous notes for further details on the patient's history. She comes in today for routine follow up.    Interval History:  Jade is here for follow up. She is feeling well. She has not had any further LOC or jerking movements since the episode she had after seeing Helgarupert Fernando last month. Her brain MRI was negative. She did follow-up with her PCP and states she had a full exam. Was given antibiotics for a UTI but she has not started treatment yet due to being on Ibrance. She has not had any fevers/chills, cough, sore throat, SOB, CP, nausea/vomiting, abdominal/flank pain, diarrhea, or constipation. Continues to have some hot flashes. No new or different pain or lumps/bumps. ROS otherwise negative.     Current Outpatient Medications   Medication Sig Dispense Refill     ACETAMINOPHEN PO Take 650 mg by mouth every 4 hours as needed for pain       albuterol (2.5 MG/3ML) 0.083% IN neb solution Take 1 vial (2.5 mg) by nebulization every 6 hours as needed for  shortness of breath / dyspnea or wheezing 30 vial 0     albuterol (PROAIR HFA/PROVENTIL HFA/VENTOLIN HFA) 108 (90 Base) MCG/ACT inhaler Inhale 2 puffs into the lungs every 6 hours 1 Inhaler 1     aspirin (ASA) 81 MG chewable tablet Take 81 mg by mouth daily       calcium carbonate (TUMS) 500 MG chewable tablet Take 1 chew tab by mouth as needed for heartburn       Calcium Citrate-Vitamin D (CALCIUM + D PO) Take 250 mg by mouth 2 times daily        doxycycline hyclate (VIBRA-TABS) 100 MG tablet Take 100 mg by mouth       fluticasone-salmeterol (ADVAIR-HFA) 115-21 MCG/ACT inhaler Inhale 2 puffs into the lungs 2 times daily 1 Inhaler 2     fluticasone-salmeterol 113-14 MCG/ACT IN inhaler Inhale 1 puff into the lungs 2 times daily 1 Inhaler 2     letrozole (FEMARA) 2.5 MG tablet Take 1 tablet (2.5 mg) by mouth daily for 28 days 28 tablet 0     letrozole (FEMARA) 2.5 MG tablet Take 1 tablet (2.5 mg) by mouth daily for 28 days 28 tablet 0     letrozole (FEMARA) 2.5 MG tablet Take 1 tablet (2.5 mg) by mouth daily for 28 days 28 tablet 0     levothyroxine (SYNTHROID/LEVOTHROID) 200 MCG tablet Take 1 tablet (200 mcg) by mouth daily 30 tablet 0     loratadine (CLARITIN) 10 MG tablet Take 1 tablet (10 mg) by mouth daily 90 tablet 3     methylPREDNISolone (MEDROL) 16 MG tablet Take 32 mg by mouth 12 hours before the procedure and repeat 32 mg by mouth 2 hours before the procedure to prevent contrast reaction. 4 tablet 0     mirtazapine (REMERON) 15 MG tablet TK 1 T PO HS 30 tablet 3     Multiple Vitamins-Minerals (MULTIVITAMIN ADULT PO)        ondansetron (ZOFRAN-ODT) 4 MG ODT tab Take 4 mg by mouth       palbociclib (IBRANCE) 125 MG capsule Take 1 capsule (125 mg) by mouth daily with food Take for 21 days, then 7 days off. Avoid grapefruit. Do not open/crush/chew capsule. 21 capsule 0     palbociclib (IBRANCE) 125 MG capsule Take 1 capsule (125 mg) by mouth daily with food Take for 21 days, then 7 days off. Avoid grapefruit.  "Do not open/crush/chew capsule. 21 capsule 0     palbociclib (IBRANCE) 125 MG capsule Take 1 capsule (125 mg) by mouth daily with food Take for 21 days, then 7 days off. Avoid grapefruit. Do not open/crush/chew capsule. 21 capsule 0     prochlorperazine (COMPAZINE) 10 MG tablet Take 1 tablet (10 mg) by mouth every 6 hours as needed for nausea or vomiting 30 tablet 0     sertraline (ZOLOFT) 100 MG tablet Take 1 tablet (100 mg) by mouth 2 times daily 60 tablet 3     triamcinolone (NASACORT) 55 MCG/ACT inhaler Spray 1 spray into both nostrils daily        VITAMIN D, CHOLECALCIFEROL, PO Take 2,000 Units by mouth daily          Physical Examination:  General: The patient is a pleasant female in no acute distress.  /75 (BP Location: Left arm, Patient Position: Sitting, Cuff Size: Adult Large)   Pulse 93   Temp 97.7  F (36.5  C) (Oral)   Resp 16   Ht 1.727 m (5' 7.99\")   Wt 139.5 kg (307 lb 8 oz)   SpO2 95%   BMI 46.77 kg/m    Wt Readings from Last 10 Encounters:   03/12/20 139.5 kg (307 lb 8 oz)   02/13/20 138.7 kg (305 lb 11.2 oz)   01/16/20 137.6 kg (303 lb 4.8 oz)   12/18/19 140.3 kg (309 lb 4.8 oz)   11/15/19 141.7 kg (312 lb 4.8 oz)   10/17/19 139.5 kg (307 lb 8 oz)   09/19/19 137.4 kg (303 lb)   08/22/19 137.1 kg (302 lb 3.2 oz)   07/25/19 137.1 kg (302 lb 3.2 oz)   06/25/19 135.4 kg (298 lb 6.4 oz)     HEENT: EOMI, PERRL. Sclerae are anicteric. Oral mucosa is pink and moist with no lesions or thrush.   Lymph: No palpable cervical, supraclavicular, subclavicular or axillary lymphadenopathy.  Heart: Regular rate and rhythm.   Lungs: Clear to auscultation bilaterally.    Abdomen: Bowel sounds present, soft, nontender with no palpable hepatosplenomegaly or masses. Negative CVA tenderness   Extremities: No lower extremity edema noted bilaterally.   Neuro: Cranial nerves II through XII are grossly intact.   MSK: Pain to palpation under left clavicle     Laboratory Data:    3/12/2020 13:22   Sodium 139 "   Potassium 4.0   Chloride 108   Carbon Dioxide 25   Urea Nitrogen 13   Creatinine 1.12 (H)   GFR Estimate 57 (L)   GFR Estimate If Black 66   Calcium 9.4   Anion Gap 6   Albumin 4.0   Protein Total 7.5   Bilirubin Total 0.4   Alkaline Phosphatase 72   ALT 35   AST 26   Glucose 122 (H)   WBC 2.6 (L)   Hemoglobin 12.9   Hematocrit 37.2   Platelet Count 122 (L)   RBC Count 3.89   MCV 96   MCH 33.2 (H)   MCHC 34.7   RDW 13.5   Diff Method Manual Differential   % Neutrophils 54.4   % Lymphocytes 38.6   % Monocytes 2.6   % Eosinophils 4.4   % Basophils 0.0   Absolute Neutrophil 1.4 (L)   Absolute Lymphocytes 1.0   Absolute Monocytes 0.1   Absolute Eosinophils 0.1   Absolute Basophils 0.0   RBC Morphology Normal   Platelet Estimate Confirming automated cell count       Assessment and Plan:    Recurrent, metastatic ER-positive, HER2 negative breast cancer: History of left breast cancer s/p lumpectomy and SNLB in 2014. S/p 4 cycles ddAC and 12 weeks of Taxol. On AI from 11/2014-2/2017. Recurrence on PET/CT scan performed 08/03/2018 and then biopsy to left posterior cervical lymph nodes, aorticopulmonary, and prevascular lymph nodes. No evidence of metastatic disease in the abdomen and pelvis or brain. Started Ibrance and letrozole on 9/21/18. Tolerating well aside from some hot flashes and mild joint stiffness. Last restaging 10/15/19 with no evidence of disease. Lab and tolerance are stable. She will continue on treatment. She has a contrast allergy and requires premeds for CT scans   -CT from 1/15 showed stable disease. Tumor markers have been stable but pending today.   -Had brain MRI last month for possible seizure which was negative. Saw PCP for follow-up.   -Has follow-up next month with Dr. Bhatt and restaging     Bone health: bone targeting agents are not required as she has no evidence of bone metastases. Continue calcium and vitamin D.     Depression: On zoloft 200 mg and remeron at bedtime. Continue to follow-up  with Sari Holliday.    Urinary frequency: Will recheck UA/UC today since test with PCP showed very mild infection, <2,000 colonies growth.     Cecilia Goins PA-C  [facility] [address] [address] [phone]

## 2020-03-12 ENCOUNTER — ONCOLOGY VISIT (OUTPATIENT)
Dept: ONCOLOGY | Facility: CLINIC | Age: 50
End: 2020-03-12
Attending: INTERNAL MEDICINE
Payer: COMMERCIAL

## 2020-03-12 VITALS
BODY MASS INDEX: 44.41 KG/M2 | HEART RATE: 93 BPM | HEIGHT: 68 IN | DIASTOLIC BLOOD PRESSURE: 75 MMHG | TEMPERATURE: 97.7 F | RESPIRATION RATE: 16 BRPM | OXYGEN SATURATION: 95 % | WEIGHT: 293 LBS | SYSTOLIC BLOOD PRESSURE: 119 MMHG

## 2020-03-12 DIAGNOSIS — R35.0 URINARY FREQUENCY: ICD-10-CM

## 2020-03-12 DIAGNOSIS — C50.412 MALIGNANT NEOPLASM OF UPPER-OUTER QUADRANT OF LEFT BREAST IN FEMALE, ESTROGEN RECEPTOR POSITIVE (H): Primary | ICD-10-CM

## 2020-03-12 DIAGNOSIS — Z17.0 MALIGNANT NEOPLASM OF UPPER-OUTER QUADRANT OF LEFT BREAST IN FEMALE, ESTROGEN RECEPTOR POSITIVE (H): Primary | ICD-10-CM

## 2020-03-12 DIAGNOSIS — C50.412 MALIGNANT NEOPLASM OF UPPER-OUTER QUADRANT OF LEFT BREAST IN FEMALE, ESTROGEN RECEPTOR POSITIVE (H): ICD-10-CM

## 2020-03-12 DIAGNOSIS — Z17.0 MALIGNANT NEOPLASM OF UPPER-OUTER QUADRANT OF LEFT BREAST IN FEMALE, ESTROGEN RECEPTOR POSITIVE (H): ICD-10-CM

## 2020-03-12 LAB
ALBUMIN SERPL-MCNC: 4 G/DL (ref 3.4–5)
ALBUMIN UR-MCNC: NEGATIVE MG/DL
ALP SERPL-CCNC: 72 U/L (ref 40–150)
ALT SERPL W P-5'-P-CCNC: 35 U/L (ref 0–50)
ANION GAP SERPL CALCULATED.3IONS-SCNC: 6 MMOL/L (ref 3–14)
APPEARANCE UR: ABNORMAL
AST SERPL W P-5'-P-CCNC: 26 U/L (ref 0–45)
BACTERIA #/AREA URNS HPF: ABNORMAL /HPF
BASOPHILS # BLD AUTO: 0 10E9/L (ref 0–0.2)
BASOPHILS NFR BLD AUTO: 0 %
BILIRUB SERPL-MCNC: 0.4 MG/DL (ref 0.2–1.3)
BILIRUB UR QL STRIP: NEGATIVE
BUN SERPL-MCNC: 13 MG/DL (ref 7–30)
CALCIUM SERPL-MCNC: 9.4 MG/DL (ref 8.5–10.1)
CANCER AG27-29 SERPL-ACNC: 19 U/ML (ref 0–39)
CEA SERPL-MCNC: <0.5 UG/L (ref 0–2.5)
CHLORIDE SERPL-SCNC: 108 MMOL/L (ref 94–109)
CO2 SERPL-SCNC: 25 MMOL/L (ref 20–32)
COLOR UR AUTO: YELLOW
CREAT SERPL-MCNC: 1.12 MG/DL (ref 0.52–1.04)
DIFFERENTIAL METHOD BLD: ABNORMAL
EOSINOPHIL # BLD AUTO: 0.1 10E9/L (ref 0–0.7)
EOSINOPHIL NFR BLD AUTO: 4.4 %
ERYTHROCYTE [DISTWIDTH] IN BLOOD BY AUTOMATED COUNT: 13.5 % (ref 10–15)
GFR SERPL CREATININE-BSD FRML MDRD: 57 ML/MIN/{1.73_M2}
GLUCOSE SERPL-MCNC: 122 MG/DL (ref 70–99)
GLUCOSE UR STRIP-MCNC: NEGATIVE MG/DL
HCT VFR BLD AUTO: 37.2 % (ref 35–47)
HGB BLD-MCNC: 12.9 G/DL (ref 11.7–15.7)
HGB UR QL STRIP: NEGATIVE
KETONES UR STRIP-MCNC: NEGATIVE MG/DL
LEUKOCYTE ESTERASE UR QL STRIP: ABNORMAL
LYMPHOCYTES # BLD AUTO: 1 10E9/L (ref 0.8–5.3)
LYMPHOCYTES NFR BLD AUTO: 38.6 %
MCH RBC QN AUTO: 33.2 PG (ref 26.5–33)
MCHC RBC AUTO-ENTMCNC: 34.7 G/DL (ref 31.5–36.5)
MCV RBC AUTO: 96 FL (ref 78–100)
MONOCYTES # BLD AUTO: 0.1 10E9/L (ref 0–1.3)
MONOCYTES NFR BLD AUTO: 2.6 %
MUCOUS THREADS #/AREA URNS LPF: PRESENT /LPF
NEUTROPHILS # BLD AUTO: 1.4 10E9/L (ref 1.6–8.3)
NEUTROPHILS NFR BLD AUTO: 54.4 %
NITRATE UR QL: NEGATIVE
PH UR STRIP: 5 PH (ref 5–7)
PLATELET # BLD AUTO: 122 10E9/L (ref 150–450)
PLATELET # BLD EST: ABNORMAL 10*3/UL
POTASSIUM SERPL-SCNC: 4 MMOL/L (ref 3.4–5.3)
PROT SERPL-MCNC: 7.5 G/DL (ref 6.8–8.8)
RBC # BLD AUTO: 3.89 10E12/L (ref 3.8–5.2)
RBC #/AREA URNS AUTO: 2 /HPF (ref 0–2)
RBC MORPH BLD: NORMAL
SODIUM SERPL-SCNC: 139 MMOL/L (ref 133–144)
SOURCE: ABNORMAL
SP GR UR STRIP: 1.02 (ref 1–1.03)
SQUAMOUS #/AREA URNS AUTO: 1 /HPF (ref 0–1)
UROBILINOGEN UR STRIP-MCNC: 0 MG/DL (ref 0–2)
WBC # BLD AUTO: 2.6 10E9/L (ref 4–11)
WBC #/AREA URNS AUTO: 12 /HPF (ref 0–5)

## 2020-03-12 PROCEDURE — 99214 OFFICE O/P EST MOD 30 MIN: CPT | Mod: ZP | Performed by: PHYSICIAN ASSISTANT

## 2020-03-12 PROCEDURE — 82378 CARCINOEMBRYONIC ANTIGEN: CPT | Performed by: INTERNAL MEDICINE

## 2020-03-12 PROCEDURE — 87086 URINE CULTURE/COLONY COUNT: CPT | Performed by: PHYSICIAN ASSISTANT

## 2020-03-12 PROCEDURE — G0463 HOSPITAL OUTPT CLINIC VISIT: HCPCS | Mod: ZF

## 2020-03-12 PROCEDURE — 86300 IMMUNOASSAY TUMOR CA 15-3: CPT | Performed by: INTERNAL MEDICINE

## 2020-03-12 PROCEDURE — 80053 COMPREHEN METABOLIC PANEL: CPT | Performed by: INTERNAL MEDICINE

## 2020-03-12 PROCEDURE — 85025 COMPLETE CBC W/AUTO DIFF WBC: CPT | Performed by: INTERNAL MEDICINE

## 2020-03-12 PROCEDURE — 36415 COLL VENOUS BLD VENIPUNCTURE: CPT

## 2020-03-12 PROCEDURE — 81001 URINALYSIS AUTO W/SCOPE: CPT | Performed by: PHYSICIAN ASSISTANT

## 2020-03-12 ASSESSMENT — MIFFLIN-ST. JEOR: SCORE: 2068.18

## 2020-03-12 ASSESSMENT — PAIN SCALES - GENERAL: PAINLEVEL: NO PAIN (0)

## 2020-03-12 NOTE — LETTER
3/12/2020      RE: Jade Collins  [address]  [facility] [address]       Oncology/Hematology Visit Note  Mar 12, 2020    Reason for Visit: follow up of recurrent metastatic ER-positive HER2 negative breast cancer    History of Present Illness: Jade Collins is a 49 year old female with a history of left breast cancer diagnosed in 12/2013 in [place] area and Dr. David Redd was her medical oncologist. Biopsy showed tumor to be ER positive, CA positive and HER2 negative. She underwent lumpectomy and SLNB by Dr. Tasha Segura in [place]. [place] lymph node was noted to be involved with tumor but no lymph node dissection done at that time. She subsequently underwent adjuvant ddAC x 4 cycles and Taxol x 12 weeks, competed in 9/11/2014. She had post lumpectomy radiation completed in 11/23/2014 by Dr. Manley. She was begun on an AI in 11/2014, but does not recall name.     She was then switched within about a month or so to anastrozole and remained on anastrozole from 11/2014-02/2017.  She has never received tamoxifen. She then moved to [place], [place] in 03/2017.  She saw a gynecologist there and underwent a IRIS/BSO by Dr. Zendejas.  She then also saw Dr. Linares in [place], [place], who is an oncologist.  His interpretation of the pathology report was that she had triple-negative breast cancer.  It is possible that she may have had low estrogen receptor positivity, but the anastrozole was then discontinued in 02/2017.  She then underwent the IRIS/BSO in 03/2017.  This was done laparoscopically.        She then remained off of all hormonal therapy and in 09/2017 moved to [place].  She remained off hormonal therapy and did not have Medical Oncology followup initially.        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  She then went to see Dr. Norberto Brand who performed a PET/CT scan and the patient also underwent a brain MRI for staging.  The PET/CT scan performed 08/03/2018  which showed mildly enlarged left posterior cervical lymph nodes, largest measuring 1.5 x 1.0 cm and mildly enlarged aorticopulmonary and prevascular lymph nodes that were noted on contrast-enhanced CT scan seen less well on the PET scan.  No evidence of metastatic disease in the abdomen and pelvis.  She also underwent a brain MRI that showed no evidence of intracranial metastatic disease.  She underwent a biopsy of one of the lymph nodes in the left neck which showed metastatic adenocarcinoma consistent with breast origin, which was estrogen-receptor positive.  The tumor cells were positive for CK7, ER and KS consistent with metastatic breast carcinoma.  Estrogen receptor showed strong average nuclear intensity in 95% of carcinoma cells, progesterone receptor moderate average nuclear intensity in 70% of the carcinoma.  A GATA3 stain was apparently not performed.     She started Ibrance on 9/21/18 and letrozole on 9/23/18. She had improved disease on restaging on 11/26. Last restaging on 10/15/19 with stable disease.     Please see Dr. Bhatt's previous notes for further details on the patient's history. She comes in today for routine follow up.    Interval History:  Jade is here for follow up. She is feeling well. She has not had any further LOC or jerking movements since the episode she had after seeing Helgarupert Fernando last month. Her brain MRI was negative. She did follow-up with her PCP and states she had a full exam. Was given antibiotics for a UTI but she has not started treatment yet due to being on Ibrance. She has not had any fevers/chills, cough, sore throat, SOB, CP, nausea/vomiting, abdominal/flank pain, diarrhea, or constipation. Continues to have some hot flashes. No new or different pain or lumps/bumps. ROS otherwise negative.     Current Outpatient Medications   Medication Sig Dispense Refill     ACETAMINOPHEN PO Take 650 mg by mouth every 4 hours as needed for pain       albuterol (2.5 MG/3ML) 0.083%  IN neb solution Take 1 vial (2.5 mg) by nebulization every 6 hours as needed for shortness of breath / dyspnea or wheezing 30 vial 0     albuterol (PROAIR HFA/PROVENTIL HFA/VENTOLIN HFA) 108 (90 Base) MCG/ACT inhaler Inhale 2 puffs into the lungs every 6 hours 1 Inhaler 1     aspirin (ASA) 81 MG chewable tablet Take 81 mg by mouth daily       calcium carbonate (TUMS) 500 MG chewable tablet Take 1 chew tab by mouth as needed for heartburn       Calcium Citrate-Vitamin D (CALCIUM + D PO) Take 250 mg by mouth 2 times daily        doxycycline hyclate (VIBRA-TABS) 100 MG tablet Take 100 mg by mouth       fluticasone-salmeterol (ADVAIR-HFA) 115-21 MCG/ACT inhaler Inhale 2 puffs into the lungs 2 times daily 1 Inhaler 2     fluticasone-salmeterol 113-14 MCG/ACT IN inhaler Inhale 1 puff into the lungs 2 times daily 1 Inhaler 2     letrozole (FEMARA) 2.5 MG tablet Take 1 tablet (2.5 mg) by mouth daily for 28 days 28 tablet 0     letrozole (FEMARA) 2.5 MG tablet Take 1 tablet (2.5 mg) by mouth daily for 28 days 28 tablet 0     letrozole (FEMARA) 2.5 MG tablet Take 1 tablet (2.5 mg) by mouth daily for 28 days 28 tablet 0     levothyroxine (SYNTHROID/LEVOTHROID) 200 MCG tablet Take 1 tablet (200 mcg) by mouth daily 30 tablet 0     loratadine (CLARITIN) 10 MG tablet Take 1 tablet (10 mg) by mouth daily 90 tablet 3     methylPREDNISolone (MEDROL) 16 MG tablet Take 32 mg by mouth 12 hours before the procedure and repeat 32 mg by mouth 2 hours before the procedure to prevent contrast reaction. 4 tablet 0     mirtazapine (REMERON) 15 MG tablet TK 1 T PO HS 30 tablet 3     Multiple Vitamins-Minerals (MULTIVITAMIN ADULT PO)        ondansetron (ZOFRAN-ODT) 4 MG ODT tab Take 4 mg by mouth       palbociclib (IBRANCE) 125 MG capsule Take 1 capsule (125 mg) by mouth daily with food Take for 21 days, then 7 days off. Avoid grapefruit. Do not open/crush/chew capsule. 21 capsule 0     palbociclib (IBRANCE) 125 MG capsule Take 1 capsule (125  "mg) by mouth daily with food Take for 21 days, then 7 days off. Avoid grapefruit. Do not open/crush/chew capsule. 21 capsule 0     palbociclib (IBRANCE) 125 MG capsule Take 1 capsule (125 mg) by mouth daily with food Take for 21 days, then 7 days off. Avoid grapefruit. Do not open/crush/chew capsule. 21 capsule 0     prochlorperazine (COMPAZINE) 10 MG tablet Take 1 tablet (10 mg) by mouth every 6 hours as needed for nausea or vomiting 30 tablet 0     sertraline (ZOLOFT) 100 MG tablet Take 1 tablet (100 mg) by mouth 2 times daily 60 tablet 3     triamcinolone (NASACORT) 55 MCG/ACT inhaler Spray 1 spray into both nostrils daily        VITAMIN D, CHOLECALCIFEROL, PO Take 2,000 Units by mouth daily          Physical Examination:  General: The patient is a pleasant female in no acute distress.  /75 (BP Location: Left arm, Patient Position: Sitting, Cuff Size: Adult Large)   Pulse 93   Temp 97.7  F (36.5  C) (Oral)   Resp 16   Ht 1.727 m (5' 7.99\")   Wt 139.5 kg (307 lb 8 oz)   SpO2 95%   BMI 46.77 kg/m    Wt Readings from Last 10 Encounters:   03/12/20 139.5 kg (307 lb 8 oz)   02/13/20 138.7 kg (305 lb 11.2 oz)   01/16/20 137.6 kg (303 lb 4.8 oz)   12/18/19 140.3 kg (309 lb 4.8 oz)   11/15/19 141.7 kg (312 lb 4.8 oz)   10/17/19 139.5 kg (307 lb 8 oz)   09/19/19 137.4 kg (303 lb)   08/22/19 137.1 kg (302 lb 3.2 oz)   07/25/19 137.1 kg (302 lb 3.2 oz)   06/25/19 135.4 kg (298 lb 6.4 oz)     HEENT: EOMI, PERRL. Sclerae are anicteric. Oral mucosa is pink and moist with no lesions or thrush.   Lymph: No palpable cervical, supraclavicular, subclavicular or axillary lymphadenopathy.  Heart: Regular rate and rhythm.   Lungs: Clear to auscultation bilaterally.    Abdomen: Bowel sounds present, soft, nontender with no palpable hepatosplenomegaly or masses. Negative CVA tenderness   Extremities: No lower extremity edema noted bilaterally.   Neuro: Cranial nerves II through XII are grossly intact.   MSK: Pain to " palpation under left clavicle     Laboratory Data:    3/12/2020 13:22   Sodium 139   Potassium 4.0   Chloride 108   Carbon Dioxide 25   Urea Nitrogen 13   Creatinine 1.12 (H)   GFR Estimate 57 (L)   GFR Estimate If Black 66   Calcium 9.4   Anion Gap 6   Albumin 4.0   Protein Total 7.5   Bilirubin Total 0.4   Alkaline Phosphatase 72   ALT 35   AST 26   Glucose 122 (H)   WBC 2.6 (L)   Hemoglobin 12.9   Hematocrit 37.2   Platelet Count 122 (L)   RBC Count 3.89   MCV 96   MCH 33.2 (H)   MCHC 34.7   RDW 13.5   Diff Method Manual Differential   % Neutrophils 54.4   % Lymphocytes 38.6   % Monocytes 2.6   % Eosinophils 4.4   % Basophils 0.0   Absolute Neutrophil 1.4 (L)   Absolute Lymphocytes 1.0   Absolute Monocytes 0.1   Absolute Eosinophils 0.1   Absolute Basophils 0.0   RBC Morphology Normal   Platelet Estimate Confirming automated cell count       Assessment and Plan:    Recurrent, metastatic ER-positive, HER2 negative breast cancer: History of left breast cancer s/p lumpectomy and SNLB in 2014. S/p 4 cycles ddAC and 12 weeks of Taxol. On AI from 11/2014-2/2017. Recurrence on PET/CT scan performed 08/03/2018 and then biopsy to left posterior cervical lymph nodes, aorticopulmonary, and prevascular lymph nodes. No evidence of metastatic disease in the abdomen and pelvis or brain. Started Ibrance and letrozole on 9/21/18. Tolerating well aside from some hot flashes and mild joint stiffness. Last restaging 10/15/19 with no evidence of disease. Lab and tolerance are stable. She will continue on treatment. She has a contrast allergy and requires premeds for CT scans   -CT from 1/15 showed stable disease. Tumor markers have been stable but pending today.   -Had brain MRI last month for possible seizure which was negative. Saw PCP for follow-up.   -Has follow-up next month with Dr. Bhatt and restaging     Bone health: bone targeting agents are not required as she has no evidence of bone metastases. Continue calcium and  vitamin D.     Depression: On zoloft 200 mg and remeron at bedtime. Continue to follow-up with Sari Holliday.    Urinary frequency: Will recheck UA/UC today since test with PCP showed very mild infection, <2,000 colonies growth.     Cecilia Goins PA-C  [facility]  [address] [place] [id number]  [phone]                    Cecilia Goins PA-C

## 2020-03-12 NOTE — NURSING NOTE
"Oncology Rooming Note    March 12, 2020 1:24 PM   Jade Collins is a 49 year old female who presents for:    Chief Complaint   Patient presents with     Blood Draw      blood draw and vitals     Oncology Clinic Visit     Return visit; Breast Ca     Initial Vitals: /75 (BP Location: Left arm, Patient Position: Sitting, Cuff Size: Adult Large)   Pulse 93   Temp 97.7  F (36.5  C) (Oral)   Resp 16   Ht 1.727 m (5' 7.99\")   Wt 139.5 kg (307 lb 8 oz)   SpO2 95%   BMI 46.77 kg/m   Estimated body mass index is 46.77 kg/m  as calculated from the following:    Height as of this encounter: 1.727 m (5' 7.99\").    Weight as of this encounter: 139.5 kg (307 lb 8 oz). Body surface area is 2.59 meters squared.  No Pain (0) Comment: Data Unavailable   No LMP recorded. Patient is postmenopausal.  Allergies reviewed: Yes  Medications reviewed: Yes    Medications: Medication refills not needed today.  Pharmacy name entered into [facility]:    [id number] - [place], [place] - [address] AT [facility] [address] [facility] - [place], [place] - [address]   [id number] - [place], [place] - [address] AT [facility] [address]    Clinical concerns: No new concerns today. Cecilia Goins was notified.      CHATO MORENO CMA            "

## 2020-03-12 NOTE — NURSING NOTE
Vitals done, labs drawn by venipuncture.  See doc flow sheets for details.  Maria G Posada, NATY March 12, 2020

## 2020-03-13 DIAGNOSIS — F41.9 ANXIETY: ICD-10-CM

## 2020-03-13 DIAGNOSIS — C50.412 MALIGNANT NEOPLASM OF UPPER-OUTER QUADRANT OF LEFT BREAST IN FEMALE, ESTROGEN RECEPTOR POSITIVE (H): ICD-10-CM

## 2020-03-13 DIAGNOSIS — Z17.0 MALIGNANT NEOPLASM OF UPPER-OUTER QUADRANT OF LEFT BREAST IN FEMALE, ESTROGEN RECEPTOR POSITIVE (H): Primary | ICD-10-CM

## 2020-03-13 DIAGNOSIS — C50.412 MALIGNANT NEOPLASM OF UPPER-OUTER QUADRANT OF LEFT BREAST IN FEMALE, ESTROGEN RECEPTOR POSITIVE (H): Primary | ICD-10-CM

## 2020-03-13 DIAGNOSIS — T50.8X5D ALLERGIC REACTION TO CONTRAST MATERIAL, SUBSEQUENT ENCOUNTER: ICD-10-CM

## 2020-03-13 DIAGNOSIS — F32.A DEPRESSION, UNSPECIFIED DEPRESSION TYPE: ICD-10-CM

## 2020-03-13 DIAGNOSIS — Z17.0 MALIGNANT NEOPLASM OF UPPER-OUTER QUADRANT OF LEFT BREAST IN FEMALE, ESTROGEN RECEPTOR POSITIVE (H): ICD-10-CM

## 2020-03-13 LAB
BACTERIA SPEC CULT: NORMAL
Lab: NORMAL
SPECIMEN SOURCE: NORMAL

## 2020-03-13 RX ORDER — LETROZOLE 2.5 MG/1
2.5 TABLET, FILM COATED ORAL DAILY
Qty: 28 TABLET | Refills: 0 | Status: SHIPPED | OUTPATIENT
Start: 2020-03-13 | End: 2020-04-10

## 2020-03-13 RX ORDER — SERTRALINE HYDROCHLORIDE 100 MG/1
100 TABLET, FILM COATED ORAL
Qty: 60 TABLET | Refills: 3 | Status: SHIPPED | OUTPATIENT
Start: 2020-03-13

## 2020-03-13 RX ORDER — METHYLPREDNISOLONE 16 MG/1
TABLET ORAL
Qty: 4 TABLET | Refills: 0 | Status: SHIPPED | OUTPATIENT
Start: 2020-03-13 | End: 2020-04-16

## 2020-03-17 ENCOUNTER — TELEPHONE (OUTPATIENT)
Dept: ONCOLOGY | Facility: CLINIC | Age: 50
End: 2020-03-17

## 2020-03-17 NOTE — ORAL ONC MGMT
Oral Chemotherapy Monitoring Program     Primary Oncologist: Dr. Bhatt  Primary Oncology Clinic: [facility]   Cancer Diagnosis: Breast Cancer      Drug: Ibrance 125mg once daily x 21 days on, 7 days off   Therapy History:  C1D1 9/21/2018  Next cycle: 2/14/2020     Drug Interaction Assessment: No new drug-drug interactions.     Lab Monitoring Plan  CBC/CMP monthly     Subjective/Objective:  Jade Collins is a 49 year old female contacted by phone for a follow-up visit for oral chemotherapy. She confirmed correct dose and schedule. Next cycles starts on 3/20. Patient denies any new/worsening side effects, missed doses, or medication changes. Labs reviewed from 3/12 appt.     ORAL CHEMOTHERAPY 7/25/2019 10/16/2019 11/11/2019 1/17/2020 2/6/2020 2/17/2020 3/17/2020   Drug Name Ibrance (Palbociclib) Ibrance (Palbociclib) Ibrance (Palbociclib) Ibrance (Palbociclib) Ibrance (Palbociclib) Ibrance (Palbociclib) Ibrance (Palbociclib)   Current Dosage 125mg 125mg 125mg 125mg 125mg 125mg 125mg   Current Schedule Daily Daily Daily Daily Daily Daily Daily   Cycle Details 3 weeks on 1 week off 3 weeks on 1 week off 3 weeks on 1 week off 3 weeks on 1 week off 3 weeks on 1 week off 3 weeks on 1 week off 3 weeks on 1 week off   Start Date of Last Cycle 6/28/2019 9/20/2019 10/18/2019 12/20/2019 1/17/2020 - 2/21/2020   Planned next cycle start date 7/26/2019 10/18/2019 11/15/2019 1/17/2020 2/14/2020 2/21/2020 3/20/2020   Doses missed in last 2 weeks 0 0 0 0 0 - 0   Adherence Assessment Adherent Adherent Adherent Adherent Adherent - Adherent   Adverse Effects No AE identified during assessment No AE identified during assessment Oral mucositis No AE identified during assessment No AE identified during assessment Decreased neutrophil count No AE identified during assessment   Nausea - - - - - - -   Pharmacist Intervention(nausea) - - - - - - -   Intervention(s) - - - - - - -   Diarrhea - - - - - - -   Pharmacist  "Intervention(diarrhea) - - - - - - -   Intervention(s) - - - - - - -   Oral Mucositis - - Grade 1 - - - -   Pharmacist Intervention(mucositis) - - Yes - - - -   Intervention(s) - - OTC recommendation - - - -   Fatigue - - - - - - -   Pharmacist Intervention(fatigue) - - - - - - -   Arthralgias - - - - - - -   Pharmacist Intervention(arthralgias) - - - - - - -   Intervention(s) - - - - - - -   Decreased Neutrophil Count - - - - - Grade 3 -   Pharmacist Intervention(neutrophil) - - - - - Yes -   Intervention(s) - - - - - Recommend drug hold -   Home BPs not needed - - - - - -   Any new drug interactions? No No No No No No No   Is the dose as ordered appropriate for the patient? Yes Yes Yes Yes Yes - Yes   Is the patient currently in pain? No - - - - - -   Does the patient feel the pain is currently being managed by a provider? - - - - - - -   Has the patient been assessed within the past 6 months for depression? - Yes Yes - - - -   Has the patient missed any days of school, work, or other routine activity? No No No No No - No       Vitals:  BP:   BP Readings from Last 1 Encounters:   03/12/20 119/75     Wt Readings from Last 1 Encounters:   03/12/20 139.5 kg (307 lb 8 oz)     Estimated body surface area is 2.59 meters squared as calculated from the following:    Height as of 3/12/20: 1.727 m (5' 7.99\").    Weight as of 3/12/20: 139.5 kg (307 lb 8 oz).    Labs:  _  Result Component Current Result Ref Range   Sodium 139 (3/12/2020) 133 - 144 mmol/L     _  Result Component Current Result Ref Range   Potassium 4.0 (3/12/2020) 3.4 - 5.3 mmol/L     _  Result Component Current Result Ref Range   Calcium 9.4 (3/12/2020) 8.5 - 10.1 mg/dL     No results found for Mag within last 30 days.     No results found for Phos within last 30 days.     _  Result Component Current Result Ref Range   Albumin 4.0 (3/12/2020) 3.4 - 5.0 g/dL     _  Result Component Current Result Ref Range   Urea Nitrogen 13 (3/12/2020) 7 - 30 mg/dL "     _  Result Component Current Result Ref Range   Creatinine 1.12 (H) (3/12/2020) 0.52 - 1.04 mg/dL       _  Result Component Current Result Ref Range   AST 26 (3/12/2020) 0 - 45 U/L     _  Result Component Current Result Ref Range   ALT 35 (3/12/2020) 0 - 50 U/L     _  Result Component Current Result Ref Range   Bilirubin Total 0.4 (3/12/2020) 0.2 - 1.3 mg/dL       _  Result Component Current Result Ref Range   WBC 2.6 (L) (3/12/2020) 4.0 - 11.0 10e9/L     _  Result Component Current Result Ref Range   Hemoglobin 12.9 (3/12/2020) 11.7 - 15.7 g/dL     _  Result Component Current Result Ref Range   Platelet Count 122 (L) (3/12/2020) 150 - 450 10e9/L     _  Result Component Current Result Ref Range   Absolute Neutrophil 1.4 (L) (3/12/2020) 1.6 - 8.3 10e9/L       Assessment:  Tolerating therapy well. Labs on 3/12 were adequate to continue with the upcoming cycle. ANC 1.4 which is great compared to previous values.     Adherence: PDC 0.97 so no issues with adherence.     Plan:  Continue ibrance and letrozole    Follow-Up:  4/9: review labs/scans  4/10: call to set up delivery      Refill Due:  Ibrance/letrozole to delivery from [facility] on 3/18    Grant Clark, PharmD, MS  Hematology/Oncology Clinical Pharmacist  [facility]  [facility]  [phone]

## 2020-04-08 ENCOUNTER — ANCILLARY PROCEDURE (OUTPATIENT)
Dept: CT IMAGING | Facility: CLINIC | Age: 50
End: 2020-04-08
Attending: INTERNAL MEDICINE
Payer: COMMERCIAL

## 2020-04-08 DIAGNOSIS — Z17.0 MALIGNANT NEOPLASM OF UPPER-OUTER QUADRANT OF LEFT BREAST IN FEMALE, ESTROGEN RECEPTOR POSITIVE (H): ICD-10-CM

## 2020-04-08 DIAGNOSIS — C50.919 METASTATIC BREAST CANCER: ICD-10-CM

## 2020-04-08 DIAGNOSIS — C50.412 MALIGNANT NEOPLASM OF UPPER-OUTER QUADRANT OF LEFT BREAST IN FEMALE, ESTROGEN RECEPTOR POSITIVE (H): ICD-10-CM

## 2020-04-08 LAB
ALBUMIN SERPL-MCNC: 3.9 G/DL (ref 3.4–5)
ALP SERPL-CCNC: 68 U/L (ref 40–150)
ALT SERPL W P-5'-P-CCNC: 38 U/L (ref 0–50)
ANION GAP SERPL CALCULATED.3IONS-SCNC: 6 MMOL/L (ref 3–14)
AST SERPL W P-5'-P-CCNC: 23 U/L (ref 0–45)
BASOPHILS # BLD AUTO: 0 10E9/L (ref 0–0.2)
BASOPHILS NFR BLD AUTO: 0.4 %
BILIRUB SERPL-MCNC: 0.4 MG/DL (ref 0.2–1.3)
BUN SERPL-MCNC: 12 MG/DL (ref 7–30)
CALCIUM SERPL-MCNC: 9.4 MG/DL (ref 8.5–10.1)
CANCER AG27-29 SERPL-ACNC: 15 U/ML (ref 0–39)
CEA SERPL-MCNC: 0.6 UG/L (ref 0–2.5)
CHLORIDE SERPL-SCNC: 103 MMOL/L (ref 94–109)
CO2 SERPL-SCNC: 26 MMOL/L (ref 20–32)
CREAT SERPL-MCNC: 1.03 MG/DL (ref 0.52–1.04)
DIFFERENTIAL METHOD BLD: ABNORMAL
EOSINOPHIL # BLD AUTO: 0 10E9/L (ref 0–0.7)
EOSINOPHIL NFR BLD AUTO: 0 %
ERYTHROCYTE [DISTWIDTH] IN BLOOD BY AUTOMATED COUNT: 13.5 % (ref 10–15)
GFR SERPL CREATININE-BSD FRML MDRD: 63 ML/MIN/{1.73_M2}
GLUCOSE SERPL-MCNC: 142 MG/DL (ref 70–99)
HCT VFR BLD AUTO: 37.3 % (ref 35–47)
HGB BLD-MCNC: 12.7 G/DL (ref 11.7–15.7)
IMM GRANULOCYTES # BLD: 0 10E9/L (ref 0–0.4)
IMM GRANULOCYTES NFR BLD: 0.4 %
LYMPHOCYTES # BLD AUTO: 0.4 10E9/L (ref 0.8–5.3)
LYMPHOCYTES NFR BLD AUTO: 14.5 %
MCH RBC QN AUTO: 33.5 PG (ref 26.5–33)
MCHC RBC AUTO-ENTMCNC: 34 G/DL (ref 31.5–36.5)
MCV RBC AUTO: 98 FL (ref 78–100)
MONOCYTES # BLD AUTO: 0 10E9/L (ref 0–1.3)
MONOCYTES NFR BLD AUTO: 1.5 %
NEUTROPHILS # BLD AUTO: 2.3 10E9/L (ref 1.6–8.3)
NEUTROPHILS NFR BLD AUTO: 83.2 %
NRBC # BLD AUTO: 0 10*3/UL
NRBC BLD AUTO-RTO: 0 /100
PLATELET # BLD AUTO: 144 10E9/L (ref 150–450)
POTASSIUM SERPL-SCNC: 4.3 MMOL/L (ref 3.4–5.3)
PROT SERPL-MCNC: 7.7 G/DL (ref 6.8–8.8)
RBC # BLD AUTO: 3.79 10E12/L (ref 3.8–5.2)
SODIUM SERPL-SCNC: 135 MMOL/L (ref 133–144)
WBC # BLD AUTO: 2.8 10E9/L (ref 4–11)

## 2020-04-08 RX ORDER — IOPAMIDOL 755 MG/ML
135 INJECTION, SOLUTION INTRAVASCULAR ONCE
Status: COMPLETED | OUTPATIENT
Start: 2020-04-08 | End: 2020-04-08

## 2020-04-08 RX ADMIN — IOPAMIDOL 135 ML: 755 INJECTION, SOLUTION INTRAVASCULAR at 11:45

## 2020-04-08 NOTE — PROGRESS NOTES
"Subjective     Jade Collins is a 50 year old female who is being evaluated via a billable telephone visit.      The patient has been notified of following:     \"This telephone visit will be conducted via a call between you and your physician/provider. We have found that certain health care needs can be provided without the need for a physical exam.  This service lets us provide the care you need with a short phone conversation.  If a prescription is necessary we can send it directly to your pharmacy.  If lab work is needed we can place an order for that and you can then stop by our lab to have the test done at a later time.    Telephone visits are billed at different rates depending on your insurance coverage. During this emergency period, for some insurers they may be billed the same as an in-person visit.  Please reach out to your insurance provider with any questions.    If during the course of the call the physician/provider feels a telephone visit is not appropriate, you will not be charged for this service.\"    Patient has given verbal consent for Telephone visit?  Yes    How would you like to obtain your AVS? Smitaharamauri Collins complains of   Chief Complaint   Patient presents with     Telephone     Malignant neoplasm of upper-outer quadrant of left breast        ALLERGIES  Contrast dye; Penicillins; and Sulfa drugs    I have reviewed and updated the patient's allergies and medication list.    Concerns: Patient has no new concerns.      Refills: Ibrance, Letrozole, Mirtazapine,       Sb Rebolledo, KATH      Phone call duration:  17 minutes          HISTORY OF PRESENT ILLNESS:  Jade is a 48-year-old woman with metastatic breast cancer who comes to our clinic for recommendations for further treatment.  She is referred by Dr. Norberto Brand at [facility].  Jade would like to continue her care at the [facility].       Jade was diagnosed with breast cancer with a lump found in the upper-inner quadrant " of the left breast.  She was diagnosed in 12/2013 in the [place] area and Dr. David Redd was her medical oncologist.  Biopsy of the tumor showed it to be ER positive, MI positive, and HER-2 negative.  We do not have any of the original pathology and we need to obtain those reports.  She underwent a lumpectomy performed by Dr. Tasha Segura who is a surgeon in the [place] area.  She also had a sentinel lymph node which was noted to be involved with tumor but there was no lymph node dissection done at that time.  [id number].  She subsequently underwent adjuvant chemotherapy with dose-dense AC for 4 cycles and Taxol for 12 weeks, completed 09/11/2014.        She then had radiation therapy post lumpectomy to the left breast, which was completed 11/23/2014 by Dr. Manley.        She was then begun on an aromatase inhibitor, the name of which she does not remember.  The aromatase inhibitor therapy was begun in 11/2014.  She was then switched within about a month or so to anastrozole and remained on anastrozole from 11/2014-02/2017.  She has never received tamoxifen.        She then moved to [place], [place] in 03/2017.  She saw a gynecologist there and underwent a RIIS/BSO by Dr. Zendejas.  She then also saw Dr. Linares in [place], [place], who is an oncologist.  His interpretation of the pathology report was that she had triple-negative breast cancer.  It is possible that she may have had low estrogen receptor positivity, but the anastrozole was then discontinued in 02/2017.  She then underwent the IRIS/BSO in 03/2017.  This was done laparoscopically.        She then remained off of all hormonal therapy and in 09/2017 moved to [place].  She remained off hormonal therapy and did not have Medical Oncology followup initially.        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  She then went to see Dr. Norberto Brand who performed a PET/CT scan and the patient also underwent a brain MRI for  staging.  The PET/CT scan performed 08/03/2018 showed in the neck there were several mildly metabolic active and large prominent left posterior cervical lymph nodes.  The largest is located posterior to the left sternocleidomastoid muscle and measures 1.5 x 1 cm in size.  This demonstrate modest FDG uptake.  The other lymph nodes are smaller and demonstrate mild FDG uptake.  The prominent prevascular and aortopulmonary lymph nodes were also seen on the contrast-enhanced scan were less well seen on the PET.  The largest lymph node visualized on the PET scan was 1.1 x 0.8 cm and demonstrated mild FDG uptake.  This was in the periaortic area just anterior to the aortic arch.  No lung nodules.  No lung infiltrates.  No pleural effusion.  There was no uptake of FDG in the bones.  In summary, there were mildly enlarged left posterior cervical lymph nodes, largest measuring 1.5 x 1.0 cm and mildly enlarged aorticopulmonary and prevascular lymph nodes that were noted on contrast-enhanced CT scan seen less well on the PET scan.  No evidence of metastatic disease in the abdomen and pelvis.  She also underwent a brain MRI that showed no evidence of intracranial metastatic disease.  She underwent a biopsy of one of the lymph nodes in the left neck which showed metastatic adenocarcinoma consistent with breast origin, which was estrogen-receptor positive.  The tumor cells were positive for CK7, ER and MT consistent with metastatic breast carcinoma.  Estrogen receptor showed strong average nuclear intensity in 95% of carcinoma cells, progesterone receptor moderate average nuclear intensity in 70% of the carcinoma.  A GATA3 stain was apparently not performed.       TREATMENT HISTORY:  A. Tamoxifen  B. Letrozole and palbociclib started 9-18-18.     INTERVAL HISTORY:  Telephone visit start: 0948  Telephone visit end: 1005    Ms. Dennis is feeling well.  She has a lot of energy on prednisone (takes two doses as pre-med for CT scans for  contrast allergy).  She has one pill of palbociclib tomorrow and then will be on her week off.  She will need a refill of this.  Her mood is good and she is still taking her two sertraline daily.  Her appetite is good.  Energy levels are good and she does not have any pain.    A 10-point review of systems was performed and was negative with the exception of pertinent positives noted above.    OBJECTIVE DATA:  I did not examine this patient today, as we conducted a telephone visit.    New, relevant results:  Recent Labs   Lab Test 04/08/20  1203 03/12/20  1322    139   POTASSIUM 4.3 4.0   CHLORIDE 103 108   CO2 26 25   ANIONGAP 6 6   * 122*   BUN 12 13   CR 1.03 1.12*   GIOVANNY 9.4 9.4     CBC RESULTS:   Recent Labs   Lab Test 04/08/20  1203   WBC 2.8*   RBC 3.79*   HGB 12.7   HCT 37.3   MCV 98   MCH 33.5*   MCHC 34.0   RDW 13.5   *     CA 27-29 is 15  CEA is 0.6    CT neck 4/8/2020:  Impression:  Normal CT study of the neck with contrast.  No evident mass or  adenopathy within the neck.      CT chest/abd/pelvis 4/8/2020:  IMPRESSION: In this patient with history of stage IV breast cancer:     1. No evidence of metastatic disease in the chest, abdomen, or pelvis.  2. Hepatic steatosis.    ASSESSMENT AND PLAN:  1. Recurrent, metastatic ER-positive, HER2 negative breast cancer: History of left breast cancer s/p lumpectomy and SNLB in 2014. S/p 4 cycles ddAC and 12 weeks of Taxol. On AI from 11/2014-2/2017. Recurrence on PET/CT scan performed 08/03/2018 and then biopsy to left posterior cervical lymph nodes, aorticopulmonary, and prevascular lymph nodes were positive. No evidence of metastatic disease in the abdomen and pelvis or brain. Started Ibrance and letrozole on 9/21/18. Tolerating well aside from some hot flashes and mild joint stiffness. Last restaging 7/22 with stable disease.  Lymphadenopathy in her neck has clinically resolved.  She has a contrast allergy but would like to try contrast again  with premeds. CT CAP Shows no evidence of progression.   2. Overall doing well.  No signs of liver injury, but RUQ tenderness is concerning.  Will get ultrasound of RUQ to rule out gallstones.  Continue current therapy.  3.  Diet and exercise. We also discussed eating less sugar and getting 150 minutes of exercise per week.  She will try to incorporate these recommendations into her lifestyle.  She previously saw Dr. Baez in [place] but is now looking for a new PCP more local to her new apartment.   4.  She has not gone to Psychiatry despite our referral to them several months ago.  She is thinking about going again.  Sertraline is working ok for her anxiety.  On Zoloft 200 mg with improvement in symptoms. Also on Remeron at bedtime.Sees Sari Holliday.  5. Asthma: Seen by Dr. Hay. Per chart on fluticasone-salmeterol inhaler BID. She does not recall the name of this. Also has albuterol prn.  6.  Follow up monthly with SONIA via telephone, then with telephone visit with Dr. Bhatt in 4 months (July 30, 2020).  Plan for restaging scans prior to the July 30 visit and will need CBC with differential, CMP, CEA, and CA 27-29 with each visit.    Ultrasound of abdomen. Follow up with SONIA Feb 13, March 12, and with me April 9 with CBC, CMP, CA27.29 and CEA. CT CAP April 8.    Thank you for allowing us to participate in this patient's care.  The patient was evaluated by me.  I discussed the patient with the fellow and agree with the findings and plan in the note, which was edited by me.    Sincerely,     Wilfredo Bhatt MD    [facility]  [phone].      Patient discussed with Dr. Bhatt.    Yolis Chang MD  Hematology-Oncology-Transplant Fellow            I spent 17 minutes with the patient more than 50% of which was in counseling and coordination of care.

## 2020-04-09 ENCOUNTER — VIRTUAL VISIT (OUTPATIENT)
Dept: ONCOLOGY | Facility: CLINIC | Age: 50
End: 2020-04-09
Attending: INTERNAL MEDICINE
Payer: COMMERCIAL

## 2020-04-09 DIAGNOSIS — Z17.0 MALIGNANT NEOPLASM OF UPPER-OUTER QUADRANT OF LEFT BREAST IN FEMALE, ESTROGEN RECEPTOR POSITIVE (H): Primary | ICD-10-CM

## 2020-04-09 DIAGNOSIS — C50.412 MALIGNANT NEOPLASM OF UPPER-OUTER QUADRANT OF LEFT BREAST IN FEMALE, ESTROGEN RECEPTOR POSITIVE (H): Primary | ICD-10-CM

## 2020-04-09 PROCEDURE — 40000114 ZZH STATISTIC NO CHARGE CLINIC VISIT

## 2020-04-09 PROCEDURE — 99214 OFFICE O/P EST MOD 30 MIN: CPT | Mod: TEL | Performed by: INTERNAL MEDICINE

## 2020-04-10 ENCOUNTER — MYC REFILL (OUTPATIENT)
Dept: ONCOLOGY | Facility: CLINIC | Age: 50
End: 2020-04-10

## 2020-04-10 DIAGNOSIS — C50.412 MALIGNANT NEOPLASM OF UPPER-OUTER QUADRANT OF LEFT BREAST IN FEMALE, ESTROGEN RECEPTOR POSITIVE (H): ICD-10-CM

## 2020-04-10 DIAGNOSIS — Z17.0 MALIGNANT NEOPLASM OF UPPER-OUTER QUADRANT OF LEFT BREAST IN FEMALE, ESTROGEN RECEPTOR POSITIVE (H): ICD-10-CM

## 2020-04-10 RX ORDER — LETROZOLE 2.5 MG/1
2.5 TABLET, FILM COATED ORAL DAILY
Qty: 28 TABLET | Refills: 0 | Status: SHIPPED | OUTPATIENT
Start: 2020-04-10 | End: 2020-04-14

## 2020-04-14 ENCOUNTER — MYC REFILL (OUTPATIENT)
Dept: ONCOLOGY | Facility: CLINIC | Age: 50
End: 2020-04-14

## 2020-04-14 ENCOUNTER — TELEPHONE (OUTPATIENT)
Dept: ONCOLOGY | Facility: CLINIC | Age: 50
End: 2020-04-14

## 2020-04-14 DIAGNOSIS — C50.412 MALIGNANT NEOPLASM OF UPPER-OUTER QUADRANT OF LEFT BREAST IN FEMALE, ESTROGEN RECEPTOR POSITIVE (H): Primary | ICD-10-CM

## 2020-04-14 DIAGNOSIS — T50.8X5D ALLERGIC REACTION TO CONTRAST MATERIAL, SUBSEQUENT ENCOUNTER: ICD-10-CM

## 2020-04-14 DIAGNOSIS — Z17.0 MALIGNANT NEOPLASM OF UPPER-OUTER QUADRANT OF LEFT BREAST IN FEMALE, ESTROGEN RECEPTOR POSITIVE (H): ICD-10-CM

## 2020-04-14 DIAGNOSIS — Z17.0 MALIGNANT NEOPLASM OF UPPER-OUTER QUADRANT OF LEFT BREAST IN FEMALE, ESTROGEN RECEPTOR POSITIVE (H): Primary | ICD-10-CM

## 2020-04-14 DIAGNOSIS — C50.412 MALIGNANT NEOPLASM OF UPPER-OUTER QUADRANT OF LEFT BREAST IN FEMALE, ESTROGEN RECEPTOR POSITIVE (H): ICD-10-CM

## 2020-04-14 RX ORDER — LETROZOLE 2.5 MG/1
2.5 TABLET, FILM COATED ORAL DAILY
Qty: 28 TABLET | Refills: 0 | Status: SHIPPED | OUTPATIENT
Start: 2020-04-14 | End: 2020-07-10

## 2020-04-14 RX ORDER — METHYLPREDNISOLONE 16 MG/1
TABLET ORAL
Qty: 4 TABLET | Refills: 0 | Status: CANCELLED | OUTPATIENT
Start: 2020-04-14

## 2020-04-14 NOTE — TELEPHONE ENCOUNTER
PA Initiation    Medication: Ibrance - Submitted  Insurance Company: [employer] - Phone [phone] Fax [phone]  Pharmacy Filling the Rx:    Filling Pharmacy Phone:    Filling Pharmacy Fax:    Start Date: 4/14/2020        Tarsha Fernandez CPhT  [facility]  Oncology Pharmacy Liaison  [email or web address]  Phone: [phone]  Fax: [phone]

## 2020-04-15 ENCOUNTER — TELEPHONE (OUTPATIENT)
Dept: ONCOLOGY | Facility: CLINIC | Age: 50
End: 2020-04-15

## 2020-04-15 NOTE — ORAL ONC MGMT
Oral Chemotherapy Monitoring Program    Released Ibrance and letrozole to [facility].     Jose Swain, PharmD  Hematology/Oncology Clinical Pharmacist  [facility]  [facility]

## 2020-04-15 NOTE — TELEPHONE ENCOUNTER
Prior Authorization Approval    Authorization Effective Date: 3/13/2020  Authorization Expiration Date: 10/11/2020  Medication: Ibrance - APPROVED  Approved Dose/Quantity: 21/28  Reference #: [id number]   Insurance Company: [employer] - Phone [phone] Fax [phone]  Expected CoPay:       CoPay Card Available:      Foundation Assistance Needed:    Which Pharmacy is filling the prescription (Not needed for infusion/clinic administered):    Pharmacy Notified:    Patient Notified:          Tarsha Fernandez CPhT  [facility]  Oncology Pharmacy Liaison  [email or web address]  Phone: [phone]  Fax: [phone]

## 2020-04-15 NOTE — ORAL ONC MGMT
Oral Chemotherapy Monitoring Program    Primary Oncologist: Dr. Bhatt  Primary Oncology Clinic: [facility]   Cancer Diagnosis: Breast Cancer      Drug: Ibrance 125mg once daily x 21 days on, 7 days off   Therapy History:  C1D1 9/21/2018  Next cycle: 3/20/20, 4/17/20     Drug Interaction Assessment: No new drug-drug interactions.     Lab Monitoring Plan  CBC/CMP monthly   Subjective/Objective:  Jade Collins is a 50 year old female contacted by phone for a follow-up visit for oral chemotherapy.  She confirmed correct dose and schedule. Next cycles starts on 4/17. Patient denies any new/worsening side effects, missed doses, or medication changes. Labs reviewed from 4/9 appt.     ORAL CHEMOTHERAPY 10/16/2019 11/11/2019 1/17/2020 2/6/2020 2/17/2020 3/17/2020 4/15/2020   Drug Name Ibrance (Palbociclib) Ibrance (Palbociclib) Ibrance (Palbociclib) Ibrance (Palbociclib) Ibrance (Palbociclib) Ibrance (Palbociclib) Ibrance (Palbociclib)   Current Dosage 125mg 125mg 125mg 125mg 125mg 125mg 125mg   Current Schedule Daily Daily Daily Daily Daily Daily Daily   Cycle Details 3 weeks on 1 week off 3 weeks on 1 week off 3 weeks on 1 week off 3 weeks on 1 week off 3 weeks on 1 week off 3 weeks on 1 week off 3 weeks on 1 week off   Start Date of Last Cycle 9/20/2019 10/18/2019 12/20/2019 1/17/2020 - 2/21/2020 3/20/2020   Planned next cycle start date 10/18/2019 11/15/2019 1/17/2020 2/14/2020 2/21/2020 3/20/2020 4/17/2020   Doses missed in last 2 weeks 0 0 0 0 - 0 0   Adherence Assessment Adherent Adherent Adherent Adherent - Adherent Adherent   Adverse Effects No AE identified during assessment Oral mucositis No AE identified during assessment No AE identified during assessment Decreased neutrophil count No AE identified during assessment No AE identified during assessment   Nausea - - - - - - -   Pharmacist Intervention(nausea) - - - - - - -   Intervention(s) - - - - - - -   Diarrhea - - - - - - -   Pharmacist  "Intervention(diarrhea) - - - - - - -   Intervention(s) - - - - - - -   Oral Mucositis - Grade 1 - - - - -   Pharmacist Intervention(mucositis) - Yes - - - - -   Intervention(s) - OTC recommendation - - - - -   Fatigue - - - - - - -   Pharmacist Intervention(fatigue) - - - - - - -   Arthralgias - - - - - - -   Pharmacist Intervention(arthralgias) - - - - - - -   Intervention(s) - - - - - - -   Decreased Neutrophil Count - - - - Grade 3 - -   Pharmacist Intervention(neutrophil) - - - - Yes - -   Intervention(s) - - - - Recommend drug hold - -   Home BPs - - - - - - -   Any new drug interactions? No No No No No No No   Is the dose as ordered appropriate for the patient? Yes Yes Yes Yes - Yes Yes   Is the patient currently in pain? - - - - - - -   Does the patient feel the pain is currently being managed by a provider? - - - - - - -   Has the patient been assessed within the past 6 months for depression? Yes Yes - - - - -   Has the patient missed any days of school, work, or other routine activity? No No No No - No No       Last PHQ-2 Score on record:   PHQ-2 ( 1999 [employer]) 9/18/2018   Q1: Little interest or pleasure in doing things 1   Q2: Feeling down, depressed or hopeless 1   PHQ-2 Score 2       Patient does not report depression symptoms.      Vitals:  BP:   BP Readings from Last 1 Encounters:   03/12/20 119/75     Wt Readings from Last 1 Encounters:   03/12/20 139.5 kg (307 lb 8 oz)     Estimated body surface area is 2.59 meters squared as calculated from the following:    Height as of 3/12/20: 1.727 m (5' 7.99\").    Weight as of 3/12/20: 139.5 kg (307 lb 8 oz).    Labs:  _  Result Component Current Result Ref Range   Sodium 135 (4/8/2020) 133 - 144 mmol/L     _  Result Component Current Result Ref Range   Potassium 4.3 (4/8/2020) 3.4 - 5.3 mmol/L     _  Result Component Current Result Ref Range   Calcium 9.4 (4/8/2020) 8.5 - 10.1 mg/dL     No results found for Mag within last 30 days.     No results found for " Phos within last 30 days.     _  Result Component Current Result Ref Range   Albumin 3.9 (4/8/2020) 3.4 - 5.0 g/dL     _  Result Component Current Result Ref Range   Urea Nitrogen 12 (4/8/2020) 7 - 30 mg/dL     _  Result Component Current Result Ref Range   Creatinine 1.03 (4/8/2020) 0.52 - 1.04 mg/dL       _  Result Component Current Result Ref Range   AST 23 (4/8/2020) 0 - 45 U/L     _  Result Component Current Result Ref Range   ALT 38 (4/8/2020) 0 - 50 U/L     _  Result Component Current Result Ref Range   Bilirubin Total 0.4 (4/8/2020) 0.2 - 1.3 mg/dL       _  Result Component Current Result Ref Range   WBC 2.8 (L) (4/8/2020) 4.0 - 11.0 10e9/L     _  Result Component Current Result Ref Range   Hemoglobin 12.7 (4/8/2020) 11.7 - 15.7 g/dL     _  Result Component Current Result Ref Range   Platelet Count 144 (L) (4/8/2020) 150 - 450 10e9/L     _  Result Component Current Result Ref Range   Absolute Neutrophil 2.3 (4/8/2020) 1.6 - 8.3 10e9/L     Assessment:  Tolerating Ibrance well, no concerning lab abnormalities.     Plan:  Continue next Ibrance cycle on 4/17/20.     Follow-Up:  No appointment scheduled yet.     Refill Due:  [facility] will deliver 4/16.      Jose Swain, PharmD  Hematology/Oncology Clinical Pharmacist  [facility]  [facility]

## 2020-04-16 DIAGNOSIS — T50.8X5D ALLERGIC REACTION TO CONTRAST MATERIAL, SUBSEQUENT ENCOUNTER: ICD-10-CM

## 2020-04-16 DIAGNOSIS — Z17.0 MALIGNANT NEOPLASM OF UPPER-OUTER QUADRANT OF LEFT BREAST IN FEMALE, ESTROGEN RECEPTOR POSITIVE (H): ICD-10-CM

## 2020-04-16 DIAGNOSIS — C50.412 MALIGNANT NEOPLASM OF UPPER-OUTER QUADRANT OF LEFT BREAST IN FEMALE, ESTROGEN RECEPTOR POSITIVE (H): ICD-10-CM

## 2020-04-16 RX ORDER — METHYLPREDNISOLONE 16 MG/1
TABLET ORAL
Qty: 4 TABLET | Refills: 0 | Status: SHIPPED | OUTPATIENT
Start: 2020-04-16 | End: 2020-07-10

## 2020-04-16 RX ORDER — LETROZOLE 2.5 MG/1
2.5 TABLET, FILM COATED ORAL DAILY
Qty: 28 TABLET | Refills: 0 | Status: SHIPPED | OUTPATIENT
Start: 2020-04-16 | End: 2020-07-10

## 2020-05-07 ENCOUNTER — TELEPHONE (OUTPATIENT)
Dept: ONCOLOGY | Facility: CLINIC | Age: 50
End: 2020-05-07

## 2020-05-07 NOTE — TELEPHONE ENCOUNTER
Pt currently on Ibrance with tonight as last dose prior to 7 day break. Pt now CO a fever of 101 which began around noon today. Pt states her lymph nodes on the right side of her neck are swollen and painful, had a dry cough that began last night, throat is sore (which also began last night). No N/V or rash, no known exposures to Covid 19. Briefly discussed with Farheen Price. Pt needs to go to an ED. Relayed information to Pt, she is near [facility] ED, will go there. Called and gave report to charge RN.

## 2020-05-10 ENCOUNTER — NURSE TRIAGE (OUTPATIENT)
Dept: NURSING | Facility: CLINIC | Age: 50
End: 2020-05-10

## 2020-05-10 ENCOUNTER — TELEPHONE (OUTPATIENT)
Dept: ONCOLOGY | Facility: CLINIC | Age: 50
End: 2020-05-10

## 2020-05-10 ENCOUNTER — APPOINTMENT (OUTPATIENT)
Dept: GENERAL RADIOLOGY | Facility: CLINIC | Age: 50
End: 2020-05-10
Attending: EMERGENCY MEDICINE
Payer: COMMERCIAL

## 2020-05-10 ENCOUNTER — HOSPITAL ENCOUNTER (EMERGENCY)
Facility: CLINIC | Age: 50
Discharge: HOME | End: 2020-05-11
Attending: EMERGENCY MEDICINE | Admitting: EMERGENCY MEDICINE
Payer: COMMERCIAL

## 2020-05-10 DIAGNOSIS — N39.0 URINARY TRACT INFECTION WITHOUT HEMATURIA, SITE UNSPECIFIED: ICD-10-CM

## 2020-05-10 DIAGNOSIS — R50.9 FEVER, UNSPECIFIED FEVER CAUSE: ICD-10-CM

## 2020-05-10 DIAGNOSIS — J18.9 PNEUMONIA DUE TO INFECTIOUS ORGANISM, UNSPECIFIED LATERALITY, UNSPECIFIED PART OF LUNG: ICD-10-CM

## 2020-05-10 LAB
ALBUMIN SERPL-MCNC: 3.8 G/DL (ref 3.4–5)
ALBUMIN UR-MCNC: 10 MG/DL
ALP SERPL-CCNC: 74 U/L (ref 40–150)
ALT SERPL W P-5'-P-CCNC: 47 U/L (ref 0–50)
ANION GAP SERPL CALCULATED.3IONS-SCNC: 6 MMOL/L (ref 3–14)
APPEARANCE UR: CLEAR
AST SERPL W P-5'-P-CCNC: 37 U/L (ref 0–45)
BASOPHILS # BLD AUTO: 0 10E9/L (ref 0–0.2)
BASOPHILS NFR BLD AUTO: 0.9 %
BILIRUB SERPL-MCNC: 0.3 MG/DL (ref 0.2–1.3)
BILIRUB UR QL STRIP: NEGATIVE
BUN SERPL-MCNC: 15 MG/DL (ref 7–30)
CALCIUM SERPL-MCNC: 9.3 MG/DL (ref 8.5–10.1)
CHLORIDE SERPL-SCNC: 106 MMOL/L (ref 94–109)
CO2 SERPL-SCNC: 25 MMOL/L (ref 20–32)
COLOR UR AUTO: YELLOW
CREAT SERPL-MCNC: 1.16 MG/DL (ref 0.52–1.04)
DIFFERENTIAL METHOD BLD: ABNORMAL
EOSINOPHIL # BLD AUTO: 0.1 10E9/L (ref 0–0.7)
EOSINOPHIL NFR BLD AUTO: 1.9 %
ERYTHROCYTE [DISTWIDTH] IN BLOOD BY AUTOMATED COUNT: 14 % (ref 10–15)
GFR SERPL CREATININE-BSD FRML MDRD: 55 ML/MIN/{1.73_M2}
GLUCOSE SERPL-MCNC: 107 MG/DL (ref 70–99)
GLUCOSE UR STRIP-MCNC: NEGATIVE MG/DL
HCT VFR BLD AUTO: 37.3 % (ref 35–47)
HGB BLD-MCNC: 12.6 G/DL (ref 11.7–15.7)
HGB UR QL STRIP: NEGATIVE
IMM GRANULOCYTES # BLD: 0 10E9/L (ref 0–0.4)
IMM GRANULOCYTES NFR BLD: 0.3 %
KETONES UR STRIP-MCNC: NEGATIVE MG/DL
LACTATE BLD-SCNC: 1.8 MMOL/L (ref 0.7–2)
LEUKOCYTE ESTERASE UR QL STRIP: ABNORMAL
LYMPHOCYTES # BLD AUTO: 1.1 10E9/L (ref 0.8–5.3)
LYMPHOCYTES NFR BLD AUTO: 34.3 %
MCH RBC QN AUTO: 33.3 PG (ref 26.5–33)
MCHC RBC AUTO-ENTMCNC: 33.8 G/DL (ref 31.5–36.5)
MCV RBC AUTO: 99 FL (ref 78–100)
MONOCYTES # BLD AUTO: 0.2 10E9/L (ref 0–1.3)
MONOCYTES NFR BLD AUTO: 7.5 %
MUCOUS THREADS #/AREA URNS LPF: PRESENT /LPF
NEUTROPHILS # BLD AUTO: 1.8 10E9/L (ref 1.6–8.3)
NEUTROPHILS NFR BLD AUTO: 55.1 %
NITRATE UR QL: NEGATIVE
NRBC # BLD AUTO: 0 10*3/UL
NRBC BLD AUTO-RTO: 0 /100
PH UR STRIP: 5.5 PH (ref 5–7)
PLATELET # BLD AUTO: 177 10E9/L (ref 150–450)
POTASSIUM SERPL-SCNC: 3.8 MMOL/L (ref 3.4–5.3)
PROT SERPL-MCNC: 8 G/DL (ref 6.8–8.8)
RBC # BLD AUTO: 3.78 10E12/L (ref 3.8–5.2)
RBC #/AREA URNS AUTO: 0 /HPF (ref 0–2)
SARS-COV-2 PCR COMMENT: NORMAL
SARS-COV-2 RNA SPEC QL NAA+PROBE: NEGATIVE
SARS-COV-2 RNA SPEC QL NAA+PROBE: NORMAL
SODIUM SERPL-SCNC: 137 MMOL/L (ref 133–144)
SOURCE: ABNORMAL
SP GR UR STRIP: 1.02 (ref 1–1.03)
SPECIMEN SOURCE: NORMAL
SPECIMEN SOURCE: NORMAL
SQUAMOUS #/AREA URNS AUTO: 1 /HPF (ref 0–1)
UROBILINOGEN UR STRIP-MCNC: NORMAL MG/DL (ref 0–2)
WBC # BLD AUTO: 3.2 10E9/L (ref 4–11)
WBC #/AREA URNS AUTO: 34 /HPF (ref 0–5)

## 2020-05-10 PROCEDURE — 85025 COMPLETE CBC W/AUTO DIFF WBC: CPT | Performed by: EMERGENCY MEDICINE

## 2020-05-10 PROCEDURE — 80053 COMPREHEN METABOLIC PANEL: CPT | Performed by: EMERGENCY MEDICINE

## 2020-05-10 PROCEDURE — 99285 EMERGENCY DEPT VISIT HI MDM: CPT | Mod: Z6 | Performed by: EMERGENCY MEDICINE

## 2020-05-10 PROCEDURE — 83605 ASSAY OF LACTIC ACID: CPT | Performed by: EMERGENCY MEDICINE

## 2020-05-10 PROCEDURE — 99284 EMERGENCY DEPT VISIT MOD MDM: CPT | Mod: 25 | Performed by: EMERGENCY MEDICINE

## 2020-05-10 PROCEDURE — 81001 URINALYSIS AUTO W/SCOPE: CPT | Performed by: EMERGENCY MEDICINE

## 2020-05-10 PROCEDURE — 87040 BLOOD CULTURE FOR BACTERIA: CPT | Mod: XU | Performed by: EMERGENCY MEDICINE

## 2020-05-10 PROCEDURE — 87635 SARS-COV-2 COVID-19 AMP PRB: CPT | Performed by: EMERGENCY MEDICINE

## 2020-05-10 PROCEDURE — 87086 URINE CULTURE/COLONY COUNT: CPT | Performed by: EMERGENCY MEDICINE

## 2020-05-10 PROCEDURE — 71045 X-RAY EXAM CHEST 1 VIEW: CPT

## 2020-05-10 NOTE — TELEPHONE ENCOUNTER
Pt calls in -- is a stage 4 breast cancer pt  Is suppose to go in tomorrow - Monday 5/11 for labs     However - pt has been experiencing   Nigh sweats  Had fever last Thursday > 101 - now afebrile  Cough > says from post nasal drip  Sore throat -  was on right side now left side with swollen glands    No HA   No fever  No nausea    Pt was advised to go to ED on Thursday - which she did   Ending up waiting outside for over an hour -- decided to simply go home and self treat  Was not seen    Pt advised to call Provider who ordered Labs tomorrow morning -5/11-  CLAUDIA Bhatt and discuss with them the above symptoms     As well as if she should STILL get her labs drawn tomorrow  And should she be tested for covid ?    Protocol and care advice reviewed  Caller states understanding of the recommended disposition    Advised to call back if further questions or concerns    Marshal Sinclair , RN / [facility]            Reason for Disposition    General information question, no triage required and triager able to answer question    Protocols used: INFORMATION ONLY CALL-A-

## 2020-05-10 NOTE — ED AVS SNAPSHOT
[facility], [place], Emergency Department  [address] [facility] [address]  Phone:  [phone]                                    Jade Collins   MRN: [id number]    Department:  [facility], Emergency Department   Date of Visit:  5/10/2020           After Visit Summary Signature Page    I have received my discharge instructions, and my questions have been answered. I have discussed any challenges I see with this plan with the nurse or doctor.    ..........................................................................................................................................  Patient/Patient Representative Signature      ..........................................................................................................................................  Patient Representative Print Name and Relationship to Patient    ..................................................               ................................................  Date                                   Time    ..........................................................................................................................................  Reviewed by Signature/Title    ...................................................              ..............................................  Date                                               Time          22EPIC Rev 08/18

## 2020-05-10 NOTE — TELEPHONE ENCOUNTER
I called Jade and she discussed with me that she had gone on a date and had kissed the individual about 2 weeks ago.  She developed a fever to 101 degrees Wednesday or Thursday.  She went to the emergency department and waited outside for 90 minutes and then left.  There was a Epic message today indicating that she had a fever.  I called Jade and she has had fevers chills night sweats and cough which I discussed with her are potential COVID 19 symptoms.  She said that she was feeling better today and had a few low-grade fever and a mild cough and was doing well at home.  She has swelling in her neck.  She said she finished her palbociclib yesterday.  She said that she was feeling better and that going to the ED would be difficult because you have to wait outside the ED.  I discussed that I can call the ED and try to expedite her being seen.     I discussed that she could have low neutrophil counts at this point and could be at risk for serious infection.  I strongly recommended that she go to the emergency department within 1 hour to be evaluated.  She agreed with this assessment but she was worried that it she would have to wait outside the emergency room.  I did call Dr. Hopkins in the emergency room and discussed least the situation and the issue that she could be neutropenic from the palbociclib.  Dr. Hopkins assured me that they will make every effort to see Jade and at this time it may be possible to see her fairly quickly.  All of her questions were answered.  I apologized for being alarmist but emphasized to Jade that she has symptoms that are associated with COVID-19 with an ongoing pandemic in [place] and has had cancer treatment and that the safest approach would be to come to the emergency department and be evaluated for COVID 19 and have her neutrophil count checked.  I apologized that she had to wait outside the emergency room and discussed that we will make every effort to get her seen quickly  I discussed that I had spoken with Dr. Hopkins and they will make every effort to get her in as soon as possible. I called back and left my cell phone number on her voice mail.      Wilfredo Bhatt MD

## 2020-05-11 ENCOUNTER — ANCILLARY PROCEDURE (OUTPATIENT)
Dept: CT IMAGING | Facility: CLINIC | Age: 50
End: 2020-05-11
Attending: INTERNAL MEDICINE
Payer: COMMERCIAL

## 2020-05-11 ENCOUNTER — TELEPHONE (OUTPATIENT)
Dept: ONCOLOGY | Facility: CLINIC | Age: 50
End: 2020-05-11

## 2020-05-11 ENCOUNTER — PATIENT OUTREACH (OUTPATIENT)
Dept: ONCOLOGY | Facility: CLINIC | Age: 50
End: 2020-05-11

## 2020-05-11 ENCOUNTER — OFFICE VISIT - HEALTHEAST (OUTPATIENT)
Dept: INTERNAL MEDICINE | Facility: CLINIC | Age: 50
End: 2020-05-11

## 2020-05-11 VITALS
RESPIRATION RATE: 20 BRPM | DIASTOLIC BLOOD PRESSURE: 95 MMHG | OXYGEN SATURATION: 96 % | SYSTOLIC BLOOD PRESSURE: 130 MMHG | TEMPERATURE: 98.6 F | HEART RATE: 98 BPM

## 2020-05-11 DIAGNOSIS — C50.412 MALIGNANT NEOPLASM OF UPPER-OUTER QUADRANT OF LEFT BREAST IN FEMALE, ESTROGEN RECEPTOR POSITIVE (H): ICD-10-CM

## 2020-05-11 DIAGNOSIS — Z17.0 MALIGNANT NEOPLASM OF UPPER-OUTER QUADRANT OF LEFT BREAST IN FEMALE, ESTROGEN RECEPTOR POSITIVE (H): ICD-10-CM

## 2020-05-11 DIAGNOSIS — E03.9 PRIMARY HYPOTHYROIDISM: ICD-10-CM

## 2020-05-11 DIAGNOSIS — Z20.822 SUSPECTED 2019 NOVEL CORONAVIRUS INFECTION: ICD-10-CM

## 2020-05-11 LAB
ALBUMIN SERPL-MCNC: 3.8 G/DL (ref 3.4–5)
ALP SERPL-CCNC: 73 U/L (ref 40–150)
ALT SERPL W P-5'-P-CCNC: 46 U/L (ref 0–50)
ANION GAP SERPL CALCULATED.3IONS-SCNC: 8 MMOL/L (ref 3–14)
AST SERPL W P-5'-P-CCNC: 32 U/L (ref 0–45)
BACTERIA SPEC CULT: NORMAL
BASOPHILS # BLD AUTO: 0 10E9/L (ref 0–0.2)
BASOPHILS NFR BLD AUTO: 0 %
BILIRUB SERPL-MCNC: 0.4 MG/DL (ref 0.2–1.3)
BUN SERPL-MCNC: 11 MG/DL (ref 7–30)
CALCIUM SERPL-MCNC: 9.5 MG/DL (ref 8.5–10.1)
CHLORIDE SERPL-SCNC: 105 MMOL/L (ref 94–109)
CO2 SERPL-SCNC: 24 MMOL/L (ref 20–32)
CREAT SERPL-MCNC: 1.04 MG/DL (ref 0.52–1.04)
DIFFERENTIAL METHOD BLD: ABNORMAL
EOSINOPHIL # BLD AUTO: 0.1 10E9/L (ref 0–0.7)
EOSINOPHIL NFR BLD AUTO: 2.6 %
ERYTHROCYTE [DISTWIDTH] IN BLOOD BY AUTOMATED COUNT: 14.1 % (ref 10–15)
GFR SERPL CREATININE-BSD FRML MDRD: 63 ML/MIN/{1.73_M2}
GLUCOSE SERPL-MCNC: 110 MG/DL (ref 70–99)
HCT VFR BLD AUTO: 35.8 % (ref 35–47)
HGB BLD-MCNC: 12.6 G/DL (ref 11.7–15.7)
LYMPHOCYTES # BLD AUTO: 0.9 10E9/L (ref 0.8–5.3)
LYMPHOCYTES NFR BLD AUTO: 30.4 %
Lab: NORMAL
MCH RBC QN AUTO: 34.6 PG (ref 26.5–33)
MCHC RBC AUTO-ENTMCNC: 35.2 G/DL (ref 31.5–36.5)
MCV RBC AUTO: 98 FL (ref 78–100)
MONOCYTES # BLD AUTO: 0 10E9/L (ref 0–1.3)
MONOCYTES NFR BLD AUTO: 1.7 %
NEUTROPHILS # BLD AUTO: 1.8 10E9/L (ref 1.6–8.3)
NEUTROPHILS NFR BLD AUTO: 65.3 %
PLATELET # BLD AUTO: 164 10E9/L (ref 150–450)
PLATELET # BLD EST: ABNORMAL 10*3/UL
POTASSIUM SERPL-SCNC: 4.1 MMOL/L (ref 3.4–5.3)
PROT SERPL-MCNC: 7.9 G/DL (ref 6.8–8.8)
RBC # BLD AUTO: 3.64 10E12/L (ref 3.8–5.2)
RBC MORPH BLD: NORMAL
SODIUM SERPL-SCNC: 138 MMOL/L (ref 133–144)
SPECIMEN SOURCE: NORMAL
TSH SERPL DL<=0.005 MIU/L-ACNC: 3.51 MU/L (ref 0.4–4)
WBC # BLD AUTO: 2.8 10E9/L (ref 4–11)

## 2020-05-11 RX ORDER — LEVOFLOXACIN 500 MG/1
500 TABLET, FILM COATED ORAL DAILY
Qty: 7 TABLET | Refills: 0 | Status: SHIPPED | OUTPATIENT
Start: 2020-05-11 | End: 2020-07-10

## 2020-05-11 RX ORDER — CLOTRIMAZOLE 10 MG/1
10 LOZENGE ORAL
Qty: 35 EACH | Refills: 0 | Status: SHIPPED | OUTPATIENT
Start: 2020-05-11 | End: 2020-07-10

## 2020-05-11 NOTE — ED TRIAGE NOTES
Pt arrives to triage, Being referred by Oncology for evaluation of fever (101F on Thursday and Friday), night sweats with chills for 5-6 days, and cough (productive cough ad post nasal drip).  SOB on exertion. R neck lymph node swollen to touch. Also c/o possible oral thrush.    Patient is neutropenic.  Hx:  Breast cancer.  Possible COVID

## 2020-05-11 NOTE — PROGRESS NOTES
Spoke to patient after being in the ED last evening. Patient afebrile today, but very tired as she got home a 1AM. Patient's bringing the Levaquin to the pharmacy as she has a paper copy to drop off and will start today. Spoke to Dr Bhatt with ED visit and wants to do a CT spiral chest w/o contrast as she has an allergy to contrast and her creatinine was 1.16 yesterday. Has an appointment at 3PM today for chest CT as Dr Bhatt recommended. Encouraged patient to drink water and not to take NSAIDS. Patient took Tylenol last night and knows not to take anything but Tylenol.  Answered all patient's questions and verbalized understanding. Farheen Price RN, BSN.

## 2020-05-11 NOTE — TELEPHONE ENCOUNTER
I spoke to Jade this afternoon and let her know that the CT scan of the chest abdomen and pelvis is fine and that there is no evidence of pneumonia or lung involvement with breast cancer.  Her urinary tract symptoms are improving she started on Levaquin the plan would be to start palbociclib on Saturday as planned. She did have COVID-19 testing which was negative she had a repeat COVID-19 test performed and the results are pending the results of the urine culture are also pending.  On review of systems her symptoms have completely resolved her fever is gone and she feels well 10 point review of systems is negative.  Follow-up will be in 1 month or sooner as dictated by symptoms.    Wilfredo Bhatt MD

## 2020-05-11 NOTE — ED PROVIDER NOTES
ED Provider Note  [facility]      History     Chief Complaint   Patient presents with     Cough     Chills     Shortness of Breath     HPI  Jade Collins is a 50 year old female with a history of stage IV breast cancer for which she is on Ibrance who presents to the Emergency Department today for evaluation of fever, and cough.  The patient reports that for the past week she is been having chills and sweats at night.  She states that on Thursday and Friday she did have a fever with a high of 101  F.  She has had a cough.  Patient has also been having swelling in the lymph node in the right side of her neck.  She states that she does currently have thrush as well. She notes a history of thrush from Advair in the past but this episode is worse than previous. The patient notes that she did go on a date 2 weeks ago and that did kiss the individual.  She states that she did go to the store last week but otherwise has been socially distancing.    Past Medical History  No past medical history on file.  No past surgical history on file.  ACETAMINOPHEN PO  albuterol (PROAIR HFA/PROVENTIL HFA/VENTOLIN HFA) 108 (90 Base) MCG/ACT inhaler  albuterol (PROAIR HFA/PROVENTIL HFA/VENTOLIN HFA) 108 (90 Base) MCG/ACT inhaler  albuterol (PROVENTIL) (2.5 MG/3ML) 0.083% neb solution  albuterol (PROVENTIL) (2.5 MG/3ML) 0.083% neb solution  aspirin (ASA) 81 MG chewable tablet  calcium carbonate (TUMS) 500 MG chewable tablet  Calcium Citrate-Vitamin D (CALCIUM + D PO)  doxycycline hyclate (VIBRA-TABS) 100 MG tablet  fluticasone-salmeterol (ADVAIR-HFA) 115-21 MCG/ACT inhaler  fluticasone-salmeterol (AIRDUO RESPICLICK) 113-14 MCG/ACT inhaler  letrozole (FEMARA) 2.5 MG tablet  letrozole (FEMARA) 2.5 MG tablet  levothyroxine (SYNTHROID/LEVOTHROID) 200 MCG tablet  loratadine (CLARITIN) 10 MG tablet  methylPREDNISolone (MEDROL) 16 MG tablet  mirtazapine (REMERON) 15 MG tablet  Multiple Vitamins-Minerals (MULTIVITAMIN ADULT  PO)  ondansetron (ZOFRAN-ODT) 4 MG ODT tab  palbociclib (IBRANCE) 125 MG capsule  palbociclib (IBRANCE) 125 MG capsule  prochlorperazine (COMPAZINE) 10 MG tablet  sertraline (ZOLOFT) 100 MG tablet  triamcinolone (NASACORT) 55 MCG/ACT inhaler  VITAMIN D, CHOLECALCIFEROL, PO      Allergies   Allergen Reactions     Contrast Dye Shortness Of Breath     Swollen eyes, coughing, headache     Penicillins      Sulfa Drugs Shortness Of Breath     Seasonal Allergies      Past medical history, past surgical history, medications, and allergies were reviewed with the patient. Additional pertinent items: None    Family History  No family history on file.  Family history was reviewed with the patient. Additional pertinent items: None    Social History  Social History     Tobacco Use     Smoking status: Never Smoker     Smokeless tobacco: Never Used   Substance Use Topics     Alcohol use: Not on file     Drug use: Not on file      Social history was reviewed with the patient. Additional pertinent items: None    Review of Systems  ROS: 14 point ROS neg other than the symptoms noted above in the HPI.   Physical Exam   BP: (!) 142/94  Pulse: 105  Temp: 98.4  F (36.9  C)  Resp: 20  SpO2: 95 %  Physical Exam  Constitutional:       General: She is not in acute distress.     Appearance: She is well-developed. She is not diaphoretic.   HENT:      Head: Normocephalic and atraumatic.      Mouth/Throat:      Pharynx: No oropharyngeal exudate.      Comments: Oral thrush on the R side of buccal mucosa.   Eyes:      General: No scleral icterus.        Right eye: No discharge.         Left eye: No discharge.      Pupils: Pupils are equal, round, and reactive to light.   Neck:      Musculoskeletal: Normal range of motion and neck supple.   Cardiovascular:      Rate and Rhythm: Normal rate and regular rhythm.      Heart sounds: Normal heart sounds. No murmur. No friction rub. No gallop.    Pulmonary:      Effort: Pulmonary effort is normal. No  respiratory distress.      Breath sounds: Normal breath sounds. No wheezing.   Chest:      Chest wall: No tenderness.   Abdominal:      General: Bowel sounds are normal. There is no distension.      Palpations: Abdomen is soft.      Tenderness: There is no abdominal tenderness.   Musculoskeletal: Normal range of motion.         General: No tenderness or deformity.   Skin:     General: Skin is warm and dry.      Coloration: Skin is not pale.      Findings: No erythema or rash.   Neurological:      Mental Status: She is alert and oriented to person, place, and time.      Cranial Nerves: No cranial nerve deficit.         ED Course      Procedures                         Results for orders placed or performed during the hospital encounter of 05/10/20   XR Chest Port 1 View     Status: None (Preliminary result)    Narrative    TEMPORARY on 5/10/2020 9:10 PM.    INDICATION: Fever, COVID PUI.    COMPARISON: CT dated 4/8/2020    FINDINGS:   Portable upright radiograph of the chest. Breast biopsy clips  projecting over the left lung. Trachea is clear. Cardiomediastinal  silhouette is within normal limits. Pulmonary vasculature is distinct.  No pneumothorax or pleural effusions. Bibasilar streaky opacities. The  visualized upper abdomen is unremarkable. No acute osseous  abnormalities.      Impression    IMPRESSION:   Bibasilar streaky opacities may represent infection versus  atelectasis.    CBC with platelets differential     Status: Abnormal   Result Value Ref Range    WBC 3.2 (L) 4.0 - 11.0 10e9/L    RBC Count 3.78 (L) 3.8 - 5.2 10e12/L    Hemoglobin 12.6 11.7 - 15.7 g/dL    Hematocrit 37.3 35.0 - 47.0 %    MCV 99 78 - 100 fl    MCH 33.3 (H) 26.5 - 33.0 pg    MCHC 33.8 31.5 - 36.5 g/dL    RDW 14.0 10.0 - 15.0 %    Platelet Count 177 150 - 450 10e9/L    Diff Method Automated Method     % Neutrophils 55.1 %    % Lymphocytes 34.3 %    % Monocytes 7.5 %    % Eosinophils 1.9 %    % Basophils 0.9 %    % Immature Granulocytes  0.3 %    Nucleated RBCs 0 0 /100    Absolute Neutrophil 1.8 1.6 - 8.3 10e9/L    Absolute Lymphocytes 1.1 0.8 - 5.3 10e9/L    Absolute Monocytes 0.2 0.0 - 1.3 10e9/L    Absolute Eosinophils 0.1 0.0 - 0.7 10e9/L    Absolute Basophils 0.0 0.0 - 0.2 10e9/L    Abs Immature Granulocytes 0.0 0 - 0.4 10e9/L    Absolute Nucleated RBC 0.0    Comprehensive metabolic panel     Status: Abnormal   Result Value Ref Range    Sodium 137 133 - 144 mmol/L    Potassium 3.8 3.4 - 5.3 mmol/L    Chloride 106 94 - 109 mmol/L    Carbon Dioxide 25 20 - 32 mmol/L    Anion Gap 6 3 - 14 mmol/L    Glucose 107 (H) 70 - 99 mg/dL    Urea Nitrogen 15 7 - 30 mg/dL    Creatinine 1.16 (H) 0.52 - 1.04 mg/dL    GFR Estimate 55 (L) >60 mL/min/[1.73_m2]    GFR Estimate If Black 64 >60 mL/min/[1.73_m2]    Calcium 9.3 8.5 - 10.1 mg/dL    Bilirubin Total 0.3 0.2 - 1.3 mg/dL    Albumin 3.8 3.4 - 5.0 g/dL    Protein Total 8.0 6.8 - 8.8 g/dL    Alkaline Phosphatase 74 40 - 150 U/L    ALT 47 0 - 50 U/L    AST 37 0 - 45 U/L   Lactic acid whole blood     Status: None   Result Value Ref Range    Lactic Acid 1.8 0.7 - 2.0 mmol/L     Medications - No data to display     Assessments & Plan (with Medical Decision Making)   This is a 50-year-old female who is currently undergoing chemotherapy for breast cancer who presents with fever.  Patient has been having intermittent fevers and night sweats.  Patient was advised to come to the emergency department for further evaluation.  She is also been having oral thrush which is typical for her due to Advair use but this episode is somewhat worse than usual.  Patient is currently afebrile.  Exam demonstrates oral candidiasis, no other acute abnormalities.  Lab work shows a WBC count of 3.2.  Patient is not neutropenic.  Chest x-ray shows bibasilar streaky opacities.  UA shows possible UTI.  Blood and urine cultures are pending at this time.  Lactic acid is not elevated, patient does not appear to be septic.  COVID testing is  negative.  I discussed all results with patient.  I also discussed the case with the oncology service who recommends treating both the possible pneumonia as well as UTI.  Will place patient on a course of Levaquin which will give coverage for both of these potential sources.  We will also provide treatment for patient's oral candidiasis. Will discharge home with return precautions. Discussed reasons to return to the emergency department.  Patient understands and agrees with this plan.    I have reviewed the nursing notes. I have reviewed the findings, diagnosis, plan and need for follow up with the patient.    New Prescriptions    No medications on file     Final diagnoses:   None   Lb MARTINEZ, am serving as a trained medical scribe to document services personally performed by Brian Elise DO, based on the provider's statements to me.   Brian MARTINEZ DO, was physically present and have reviewed and verified the accuracy of this note documented by Lb Wayne.    Emergency Medicine   [facility], EMERGENCY DEPARTMENT  5/10/2020     Brian Elise DO  05/11/20 0119

## 2020-05-12 DIAGNOSIS — Z17.0 MALIGNANT NEOPLASM OF UPPER-OUTER QUADRANT OF LEFT BREAST IN FEMALE, ESTROGEN RECEPTOR POSITIVE (H): Primary | ICD-10-CM

## 2020-05-12 DIAGNOSIS — C50.412 MALIGNANT NEOPLASM OF UPPER-OUTER QUADRANT OF LEFT BREAST IN FEMALE, ESTROGEN RECEPTOR POSITIVE (H): Primary | ICD-10-CM

## 2020-05-12 RX ORDER — LETROZOLE 2.5 MG/1
2.5 TABLET, FILM COATED ORAL DAILY
Qty: 28 TABLET | Refills: 0 | Status: SHIPPED | OUTPATIENT
Start: 2020-05-12 | End: 2020-06-11

## 2020-05-13 ENCOUNTER — COMMUNICATION - HEALTHEAST (OUTPATIENT)
Dept: INTERNAL MEDICINE | Facility: CLINIC | Age: 50
End: 2020-05-13

## 2020-05-16 LAB
BACTERIA SPEC CULT: NO GROWTH
SPECIMEN SOURCE: NORMAL

## 2020-05-17 LAB
BACTERIA SPEC CULT: NO GROWTH
SPECIMEN SOURCE: NORMAL

## 2020-05-18 DIAGNOSIS — J45.20 MILD INTERMITTENT ASTHMA WITHOUT COMPLICATION: ICD-10-CM

## 2020-05-18 RX ORDER — ALBUTEROL SULFATE 90 UG/1
2 AEROSOL, METERED RESPIRATORY (INHALATION) EVERY 6 HOURS
Qty: 1 INHALER | Refills: 0 | Status: SHIPPED | OUTPATIENT
Start: 2020-05-18 | End: 2020-06-19

## 2020-06-09 VITALS
WEIGHT: 293 LBS | TEMPERATURE: 97.9 F | RESPIRATION RATE: 16 BRPM | DIASTOLIC BLOOD PRESSURE: 85 MMHG | HEART RATE: 91 BPM | SYSTOLIC BLOOD PRESSURE: 138 MMHG | BODY MASS INDEX: 46.48 KG/M2 | OXYGEN SATURATION: 95 %

## 2020-06-09 DIAGNOSIS — Z17.0 MALIGNANT NEOPLASM OF UPPER-OUTER QUADRANT OF LEFT BREAST IN FEMALE, ESTROGEN RECEPTOR POSITIVE (H): ICD-10-CM

## 2020-06-09 DIAGNOSIS — C50.412 MALIGNANT NEOPLASM OF UPPER-OUTER QUADRANT OF LEFT BREAST IN FEMALE, ESTROGEN RECEPTOR POSITIVE (H): ICD-10-CM

## 2020-06-09 DIAGNOSIS — E03.9 PRIMARY HYPOTHYROIDISM: ICD-10-CM

## 2020-06-09 LAB
ALBUMIN SERPL-MCNC: 4 G/DL (ref 3.4–5)
ALP SERPL-CCNC: 73 U/L (ref 40–150)
ALT SERPL W P-5'-P-CCNC: 44 U/L (ref 0–50)
ANION GAP SERPL CALCULATED.3IONS-SCNC: 5 MMOL/L (ref 3–14)
AST SERPL W P-5'-P-CCNC: 36 U/L (ref 0–45)
BASOPHILS # BLD AUTO: 0 10E9/L (ref 0–0.2)
BASOPHILS NFR BLD AUTO: 1.8 %
BILIRUB SERPL-MCNC: 0.5 MG/DL (ref 0.2–1.3)
BUN SERPL-MCNC: 10 MG/DL (ref 7–30)
CALCIUM SERPL-MCNC: 9.3 MG/DL (ref 8.5–10.1)
CANCER AG27-29 SERPL-ACNC: 20 U/ML (ref 0–39)
CEA SERPL-MCNC: <0.5 UG/L (ref 0–2.5)
CHLORIDE SERPL-SCNC: 109 MMOL/L (ref 94–109)
CO2 SERPL-SCNC: 26 MMOL/L (ref 20–32)
CREAT SERPL-MCNC: 1.01 MG/DL (ref 0.52–1.04)
DIFFERENTIAL METHOD BLD: ABNORMAL
EOSINOPHIL # BLD AUTO: 0.1 10E9/L (ref 0–0.7)
EOSINOPHIL NFR BLD AUTO: 2.2 %
ERYTHROCYTE [DISTWIDTH] IN BLOOD BY AUTOMATED COUNT: 13.5 % (ref 10–15)
GFR SERPL CREATININE-BSD FRML MDRD: 65 ML/MIN/{1.73_M2}
GLUCOSE SERPL-MCNC: 130 MG/DL (ref 70–99)
HCT VFR BLD AUTO: 37.3 % (ref 35–47)
HGB BLD-MCNC: 12.7 G/DL (ref 11.7–15.7)
IMM GRANULOCYTES # BLD: 0 10E9/L (ref 0–0.4)
IMM GRANULOCYTES NFR BLD: 0.4 %
LYMPHOCYTES # BLD AUTO: 0.9 10E9/L (ref 0.8–5.3)
LYMPHOCYTES NFR BLD AUTO: 40.8 %
MCH RBC QN AUTO: 33.9 PG (ref 26.5–33)
MCHC RBC AUTO-ENTMCNC: 34 G/DL (ref 31.5–36.5)
MCV RBC AUTO: 100 FL (ref 78–100)
MONOCYTES # BLD AUTO: 0.1 10E9/L (ref 0–1.3)
MONOCYTES NFR BLD AUTO: 6.3 %
NEUTROPHILS # BLD AUTO: 1.1 10E9/L (ref 1.6–8.3)
NEUTROPHILS NFR BLD AUTO: 48.5 %
NRBC # BLD AUTO: 0 10*3/UL
NRBC BLD AUTO-RTO: 0 /100
PLATELET # BLD AUTO: 140 10E9/L (ref 150–450)
POTASSIUM SERPL-SCNC: 3.8 MMOL/L (ref 3.4–5.3)
PROT SERPL-MCNC: 8.3 G/DL (ref 6.8–8.8)
RBC # BLD AUTO: 3.75 10E12/L (ref 3.8–5.2)
SODIUM SERPL-SCNC: 139 MMOL/L (ref 133–144)
T4 FREE SERPL-MCNC: 1.47 NG/DL (ref 0.76–1.46)
TSH SERPL DL<=0.005 MIU/L-ACNC: 0.14 MU/L (ref 0.4–4)
WBC # BLD AUTO: 2.2 10E9/L (ref 4–11)

## 2020-06-09 PROCEDURE — 84443 ASSAY THYROID STIM HORMONE: CPT | Performed by: INTERNAL MEDICINE

## 2020-06-09 PROCEDURE — 84439 ASSAY OF FREE THYROXINE: CPT | Performed by: INTERNAL MEDICINE

## 2020-06-09 PROCEDURE — 36415 COLL VENOUS BLD VENIPUNCTURE: CPT | Performed by: INTERNAL MEDICINE

## 2020-06-09 PROCEDURE — 85025 COMPLETE CBC W/AUTO DIFF WBC: CPT | Performed by: INTERNAL MEDICINE

## 2020-06-09 PROCEDURE — 82378 CARCINOEMBRYONIC ANTIGEN: CPT | Performed by: INTERNAL MEDICINE

## 2020-06-09 PROCEDURE — 80053 COMPREHEN METABOLIC PANEL: CPT | Performed by: INTERNAL MEDICINE

## 2020-06-09 PROCEDURE — 86300 IMMUNOASSAY TUMOR CA 15-3: CPT | Performed by: INTERNAL MEDICINE

## 2020-06-09 ASSESSMENT — PAIN SCALES - GENERAL: PAINLEVEL: NO PAIN (0)

## 2020-06-09 NOTE — NURSING NOTE
Chief Complaint   Patient presents with     Blood Draw     Labs drawn via  by RN in lab. VS taken.     Labs collected from venipuncture by RN. Vitals taken.      Carly Martines RN

## 2020-06-10 ENCOUNTER — VIRTUAL VISIT (OUTPATIENT)
Dept: ONCOLOGY | Facility: CLINIC | Age: 50
End: 2020-06-10
Attending: PHYSICIAN ASSISTANT
Payer: COMMERCIAL

## 2020-06-10 DIAGNOSIS — C50.412 MALIGNANT NEOPLASM OF UPPER-OUTER QUADRANT OF LEFT BREAST IN FEMALE, ESTROGEN RECEPTOR POSITIVE (H): Primary | ICD-10-CM

## 2020-06-10 DIAGNOSIS — E03.9 HYPOTHYROIDISM, UNSPECIFIED TYPE: ICD-10-CM

## 2020-06-10 DIAGNOSIS — E05.90 HYPERTHYROIDISM: ICD-10-CM

## 2020-06-10 DIAGNOSIS — Z17.0 MALIGNANT NEOPLASM OF UPPER-OUTER QUADRANT OF LEFT BREAST IN FEMALE, ESTROGEN RECEPTOR POSITIVE (H): Primary | ICD-10-CM

## 2020-06-10 PROCEDURE — 99213 OFFICE O/P EST LOW 20 MIN: CPT | Mod: TEL | Performed by: PHYSICIAN ASSISTANT

## 2020-06-10 PROCEDURE — 40000114 ZZH STATISTIC NO CHARGE CLINIC VISIT

## 2020-06-10 NOTE — PROGRESS NOTES
Oncology/Hematology Visit Note  Will 10, 2020    Reason for Visit: follow up of recurrent metastatic ER-positive HER2 negative breast cancer    History of Present Illness: Jade Collins is a 50 year old female with a history of left breast cancer diagnosed in 12/2013 in [place] area and Dr. David Redd was her medical oncologist. Biopsy showed tumor to be ER positive, [place] positive and HER2 negative. She underwent lumpectomy and SLNB by Dr. Tasha Segura in [place]. [place] lymph node was noted to be involved with tumor but no lymph node dissection done at that time. She subsequently underwent adjuvant ddAC x 4 cycles and Taxol x 12 weeks, competed in 9/11/2014. She had post lumpectomy radiation completed in 11/23/2014 by Dr. Manley. She was begun on an AI in 11/2014, but does not recall name.     She was then switched within about a month or so to anastrozole and remained on anastrozole from 11/2014-02/2017.  She has never received tamoxifen. She then moved to [place], [place] in 03/2017.  She saw a gynecologist there and underwent a IRIS/BSO by Dr. Zendejas.  She then also saw Dr. Linares in [place], [place], who is an oncologist.  His interpretation of the pathology report was that she had triple-negative breast cancer.  It is possible that she may have had low estrogen receptor positivity, but the anastrozole was then discontinued in 02/2017.  She then underwent the IRIS/BSO in 03/2017.  This was done laparoscopically.        She then remained off of all hormonal therapy and in 09/2017 moved to [place].  She remained off hormonal therapy and did not have Medical Oncology followup initially.        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  She then went to see Dr. Norberto Brand who performed a PET/CT scan and the patient also underwent a brain MRI for staging.  The PET/CT scan performed 08/03/2018 which showed mildly enlarged left posterior cervical lymph nodes, largest measuring  1.5 x 1.0 cm and mildly enlarged aorticopulmonary and prevascular lymph nodes that were noted on contrast-enhanced CT scan seen less well on the PET scan.  No evidence of metastatic disease in the abdomen and pelvis.  She also underwent a brain MRI that showed no evidence of intracranial metastatic disease.  She underwent a biopsy of one of the lymph nodes in the left neck which showed metastatic adenocarcinoma consistent with breast origin, which was estrogen-receptor positive.  The tumor cells were positive for CK7, ER and TN consistent with metastatic breast carcinoma.  Estrogen receptor showed strong average nuclear intensity in 95% of carcinoma cells, progesterone receptor moderate average nuclear intensity in 70% of the carcinoma.  A GATA3 stain was apparently not performed.     She started Ibrance on 9/21/18 and letrozole on 9/23/18. She had improved disease on restaging on 11/26. Last restaging on 10/15/19 with stable disease.     Please see Dr. Bhatt's previous notes for further details on the patient's history. She comes in today for routine follow up.    Interval History:  She is doing well. She states she doesn't have aches with the pill pack rather than the capsules which she was happy about. Eating and drinking well. No f/c. No respiratory or GI symptoms. No bleeding or swelling.     ROS: 10 point ROS neg other than the symptoms noted above in the HPI.      Current Outpatient Medications   Medication Sig Dispense Refill     ACETAMINOPHEN PO Take 650 mg by mouth every 4 hours as needed for pain       albuterol (PROAIR HFA/PROVENTIL HFA/VENTOLIN HFA) 108 (90 Base) MCG/ACT inhaler Inhale 2 puffs into the lungs every 6 hours 1 Inhaler 0     albuterol (PROAIR HFA/PROVENTIL HFA/VENTOLIN HFA) 108 (90 Base) MCG/ACT inhaler Inhale 2 puffs into the lungs every 6 hours 1 Inhaler 3     albuterol (PROVENTIL) (2.5 MG/3ML) 0.083% neb solution Take 1 vial (2.5 mg) by nebulization every 6 hours as needed for shortness  of breath / dyspnea or wheezing 30 vial 0     albuterol (PROVENTIL) (2.5 MG/3ML) 0.083% neb solution Take 1 vial (2.5 mg) by nebulization every 6 hours as needed for shortness of breath / dyspnea or wheezing 30 vial 3     calcium carbonate (TUMS) 500 MG chewable tablet Take 1 chew tab by mouth as needed for heartburn       Calcium Citrate-Vitamin D (CALCIUM + D PO) Take 250 mg by mouth 2 times daily        doxycycline hyclate (VIBRA-TABS) 100 MG tablet Take 100 mg by mouth       fluticasone-salmeterol (ADVAIR-HFA) 115-21 MCG/ACT inhaler Inhale 2 puffs into the lungs 2 times daily 1 Inhaler 2     fluticasone-salmeterol (AIRDUO RESPICLICK) 113-14 MCG/ACT inhaler Inhale 1 puff into the lungs 2 times daily 1 Inhaler 1     letrozole (FEMARA) 2.5 MG tablet Take 1 tablet (2.5 mg) by mouth daily 28 tablet 0     levothyroxine (SYNTHROID/LEVOTHROID) 200 MCG tablet Take 1 tablet (200 mcg) by mouth daily 30 tablet 0     loratadine (CLARITIN) 10 MG tablet Take 1 tablet (10 mg) by mouth daily 90 tablet 3     methylPREDNISolone (MEDROL) 16 MG tablet Take 32 mg by mouth 12 hours before the procedure and repeat 32 mg by mouth 2 hours before the procedure to prevent contrast reaction. 4 tablet 0     mirtazapine (REMERON) 15 MG tablet TK 1 T PO HS 30 tablet 3     Multiple Vitamins-Minerals (MULTIVITAMIN ADULT PO)        ondansetron (ZOFRAN-ODT) 4 MG ODT tab Take 4 mg by mouth       palbociclib (IBRANCE) 125 MG capsule Take 1 capsule (125 mg) by mouth daily with food Take for 21 days, then 7 days off. Avoid grapefruit. Do not open/crush/chew capsule. 21 capsule 0     prochlorperazine (COMPAZINE) 10 MG tablet Take 1 tablet (10 mg) by mouth every 6 hours as needed for nausea or vomiting 30 tablet 0     sertraline (ZOLOFT) 100 MG tablet Take 1 tablet (100 mg) by mouth 2 times daily 60 tablet 3     triamcinolone (NASACORT) 55 MCG/ACT inhaler Spray 1 spray into both nostrils daily        VITAMIN D, CHOLECALCIFEROL, PO Take 2,000 Units by  mouth daily        aspirin (ASA) 81 MG chewable tablet Take 81 mg by mouth daily       clotrimazole 10 MG carley Take 1 Carley (10 mg) by mouth 5 times daily 35 each 0     letrozole (FEMARA) 2.5 MG tablet Take 1 tablet (2.5 mg) by mouth daily for 28 days 28 tablet 0     letrozole (FEMARA) 2.5 MG tablet Take 1 tablet (2.5 mg) by mouth daily for 28 days 28 tablet 0     palbociclib (IBRANCE) 125 MG capsule Take 1 capsule (125 mg) by mouth daily with food Take for 21 days, then 7 days off. Avoid grapefruit. Do not open/crush/chew capsule. (Patient not taking: Reported on 6/10/2020) 21 capsule 0     palbociclib (IBRANCE) 125 MG tablet Take 1 tablet (125 mg) by mouth daily with food Take for 21 days, then 7 days off. Avoid grapefruit. Do not open/crush/chew capsule. (Patient not taking: Reported on 6/10/2020) 21 capsule 0       Physical Examination:  General: The patient is a pleasant female in no acute distress.  There were no vitals taken for this visit.  Wt Readings from Last 10 Encounters:   06/09/20 138.6 kg (305 lb 9.6 oz)   03/12/20 139.5 kg (307 lb 8 oz)   02/13/20 138.7 kg (305 lb 11.2 oz)   01/16/20 137.6 kg (303 lb 4.8 oz)   12/18/19 140.3 kg (309 lb 4.8 oz)   11/15/19 141.7 kg (312 lb 4.8 oz)   10/17/19 139.5 kg (307 lb 8 oz)   09/19/19 137.4 kg (303 lb)   08/22/19 137.1 kg (302 lb 3.2 oz)   07/25/19 137.1 kg (302 lb 3.2 oz)     Objective:  General: patient sounds in no audible acute distress, alert and oriented, speech clear and fluid  Resp: Speaking in full sentences, no audible respiratory distress, no cough, no audible wheeze  Psych: able to articulate logical thoughts, able to abstract reason, no tangential thoughts, no hallucinations or delusions  His affect is normal    Laboratory Data:      6/9/2020 12:36   Sodium 139   Potassium 3.8   Chloride 109   Carbon Dioxide 26   Urea Nitrogen 10   Creatinine 1.01   GFR Estimate 65   GFR Estimate If Black 75   Calcium 9.3   Anion Gap 5   Albumin 4.0   Protein  Total 8.3   Bilirubin Total 0.5   Alkaline Phosphatase 73   ALT 44   AST 36   CA 27-29 20   T4 Free 1.47 (H)   TSH 0.14 (L)   Glucose 130 (H)   WBC 2.2 (L)   Hemoglobin 12.7   Hematocrit 37.3   Platelet Count 140 (L)   RBC Count 3.75 (L)      MCH 33.9 (H)   MCHC 34.0   RDW 13.5   Diff Method Automated Method   % Neutrophils 48.5   % Lymphocytes 40.8   % Monocytes 6.3   % Eosinophils 2.2   % Basophils 1.8   % Immature Granulocytes 0.4   Nucleated RBCs 0   Absolute Neutrophil 1.1 (L)   Absolute Lymphocytes 0.9   Absolute Monocytes 0.1   Absolute Eosinophils 0.1   Absolute Basophils 0.0   Abs Immature Granulocytes 0.0   Absolute Nucleated RBC 0.0       Assessment and Plan:    Recurrent, metastatic ER-positive, HER2 negative breast cancer: History of left breast cancer s/p lumpectomy and SNLB in 2014. S/p 4 cycles ddAC and 12 weeks of Taxol. On AI from 11/2014-2/2017. Recurrence on PET/CT scan performed 08/03/2018 and then biopsy to left posterior cervical lymph nodes, aorticopulmonary, and prevascular lymph nodes. No evidence of metastatic disease in the abdomen and pelvis or brain. Started Ibrance and letrozole on 9/21/18. Tolerating well aside from some hot flashes and mild joint stiffness.   -CT from 5/11 showed stable disease (She has a contrast allergy and requires premeds for CT scans). Tumor markers have been overall stable  -she is borderline neutropenic, though doing well so we will continue with treatment. Tolerating well still  -will follow-up in 1 month prior to next cycle     Bone health: No need for bisphosphonates at this time as she has not known bone mets.Continue calcium and vitamin D.     Depression: On zoloft 200 mg and remeron at bedtime. Continue to follow-up with Sari Holliday. Has had some difficulties with COVID and protests    UTI: Resolved    Hypothyroidism: Was previously followed endocrinology at outside clinic. Currently on levothyroxine 200 mcg. TSH rechecked and showed  hyperthyroidism. Wants to transfer care here. Will place referral. They will need to manage medication    Phone Call Duration: 9 minutes    Helga Fernando PA-C  [facility] [address] [address] [phone]

## 2020-06-10 NOTE — LETTER
"    6/10/2020         RE: Jade Collins  [address]  [facility] [address]        Dear Colleague,    Thank you for referring your patient, Jade Collins, to the [facility]. Please see a copy of my visit note below.    Jade Collins is a 50 year old female who is being evaluated via a billable telephone visit.      The patient has been notified of following:     \"This telephone visit will be conducted via a call between you and your physician/provider. We have found that certain health care needs can be provided without the need for a physical exam.  This service lets us provide the care you need with a short phone conversation.  If a prescription is necessary we can send it directly to your pharmacy.  If lab work is needed we can place an order for that and you can then stop by our lab to have the test done at a later time.    Telephone visits are billed at different rates depending on your insurance coverage. During this emergency period, for some insurers they may be billed the same as an in-person visit.  Please reach out to your insurance provider with any questions.    If during the course of the call the physician/provider feels a telephone visit is not appropriate, you will not be charged for this service.\"    Patient has given verbal consent for Telephone visit?  Yes    What phone number would you like to be contacted at? [phone]    How would you like to obtain your AVS? MyChart     Refills: MEDICATION REFILLS NEEDED TODAY. Provider was notified. Patient needs refills on synthroid, iBrance, and Femara.      Concerns: No new concerns today        Ashwini Mejia CMA Marivel 10, 2020  4:42 PM       Oncology/Hematology Visit Note  Will 10, 2020    Reason for Visit: follow up of recurrent metastatic ER-positive HER2 negative breast cancer    History of Present Illness: Jade Collins is a 50 year old female with a history of left breast cancer diagnosed in 12/2013 in [place] area and Dr." David eRdd was her medical oncologist. Biopsy showed tumor to be ER positive, [place] positive and HER2 negative. She underwent lumpectomy and SLNB by Dr. Tasha Segura in [place]. [place] lymph node was noted to be involved with tumor but no lymph node dissection done at that time. She subsequently underwent adjuvant ddAC x 4 cycles and Taxol x 12 weeks, competed in 9/11/2014. She had post lumpectomy radiation completed in 11/23/2014 by Dr. Manley. She was begun on an AI in 11/2014, but does not recall name.     She was then switched within about a month or so to anastrozole and remained on anastrozole from 11/2014-02/2017.  She has never received tamoxifen. She then moved to [place], [place] in 03/2017.  She saw a gynecologist there and underwent a IRIS/BSO by Dr. Zendejas.  She then also saw Dr. Linares in [place], [place], who is an oncologist.  His interpretation of the pathology report was that she had triple-negative breast cancer.  It is possible that she may have had low estrogen receptor positivity, but the anastrozole was then discontinued in 02/2017.  She then underwent the IRIS/BSO in 03/2017.  This was done laparoscopically.        She then remained off of all hormonal therapy and in 09/2017 moved to [place].  She remained off hormonal therapy and did not have Medical Oncology followup initially.        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  She then went to see Dr. Norberto Brand who performed a PET/CT scan and the patient also underwent a brain MRI for staging.  The PET/CT scan performed 08/03/2018 which showed mildly enlarged left posterior cervical lymph nodes, largest measuring 1.5 x 1.0 cm and mildly enlarged aorticopulmonary and prevascular lymph nodes that were noted on contrast-enhanced CT scan seen less well on the PET scan.  No evidence of metastatic disease in the abdomen and pelvis.  She also underwent a brain MRI that showed no evidence of intracranial  metastatic disease.  She underwent a biopsy of one of the lymph nodes in the left neck which showed metastatic adenocarcinoma consistent with breast origin, which was estrogen-receptor positive.  The tumor cells were positive for CK7, ER and MT consistent with metastatic breast carcinoma.  Estrogen receptor showed strong average nuclear intensity in 95% of carcinoma cells, progesterone receptor moderate average nuclear intensity in 70% of the carcinoma.  A GATA3 stain was apparently not performed.     She started Ibrance on 9/21/18 and letrozole on 9/23/18. She had improved disease on restaging on 11/26. Last restaging on 10/15/19 with stable disease.     Please see Dr. Bhatt's previous notes for further details on the patient's history. She comes in today for routine follow up.    Interval History:  She is doing well. She states she doesn't have aches with the pill pack rather than the capsules which she was happy about. Eating and drinking well. No f/c. No respiratory or GI symptoms. No bleeding or swelling.     ROS: 10 point ROS neg other than the symptoms noted above in the HPI.      Current Outpatient Medications   Medication Sig Dispense Refill     ACETAMINOPHEN PO Take 650 mg by mouth every 4 hours as needed for pain       albuterol (PROAIR HFA/PROVENTIL HFA/VENTOLIN HFA) 108 (90 Base) MCG/ACT inhaler Inhale 2 puffs into the lungs every 6 hours 1 Inhaler 0     albuterol (PROAIR HFA/PROVENTIL HFA/VENTOLIN HFA) 108 (90 Base) MCG/ACT inhaler Inhale 2 puffs into the lungs every 6 hours 1 Inhaler 3     albuterol (PROVENTIL) (2.5 MG/3ML) 0.083% neb solution Take 1 vial (2.5 mg) by nebulization every 6 hours as needed for shortness of breath / dyspnea or wheezing 30 vial 0     albuterol (PROVENTIL) (2.5 MG/3ML) 0.083% neb solution Take 1 vial (2.5 mg) by nebulization every 6 hours as needed for shortness of breath / dyspnea or wheezing 30 vial 3     calcium carbonate (TUMS) 500 MG chewable tablet Take 1 chew tab by  mouth as needed for heartburn       Calcium Citrate-Vitamin D (CALCIUM + D PO) Take 250 mg by mouth 2 times daily        doxycycline hyclate (VIBRA-TABS) 100 MG tablet Take 100 mg by mouth       fluticasone-salmeterol (ADVAIR-HFA) 115-21 MCG/ACT inhaler Inhale 2 puffs into the lungs 2 times daily 1 Inhaler 2     fluticasone-salmeterol (AIRDUO RESPICLICK) 113-14 MCG/ACT inhaler Inhale 1 puff into the lungs 2 times daily 1 Inhaler 1     letrozole (FEMARA) 2.5 MG tablet Take 1 tablet (2.5 mg) by mouth daily 28 tablet 0     levothyroxine (SYNTHROID/LEVOTHROID) 200 MCG tablet Take 1 tablet (200 mcg) by mouth daily 30 tablet 0     loratadine (CLARITIN) 10 MG tablet Take 1 tablet (10 mg) by mouth daily 90 tablet 3     methylPREDNISolone (MEDROL) 16 MG tablet Take 32 mg by mouth 12 hours before the procedure and repeat 32 mg by mouth 2 hours before the procedure to prevent contrast reaction. 4 tablet 0     mirtazapine (REMERON) 15 MG tablet TK 1 T PO HS 30 tablet 3     Multiple Vitamins-Minerals (MULTIVITAMIN ADULT PO)        ondansetron (ZOFRAN-ODT) 4 MG ODT tab Take 4 mg by mouth       palbociclib (IBRANCE) 125 MG capsule Take 1 capsule (125 mg) by mouth daily with food Take for 21 days, then 7 days off. Avoid grapefruit. Do not open/crush/chew capsule. 21 capsule 0     prochlorperazine (COMPAZINE) 10 MG tablet Take 1 tablet (10 mg) by mouth every 6 hours as needed for nausea or vomiting 30 tablet 0     sertraline (ZOLOFT) 100 MG tablet Take 1 tablet (100 mg) by mouth 2 times daily 60 tablet 3     triamcinolone (NASACORT) 55 MCG/ACT inhaler Spray 1 spray into both nostrils daily        VITAMIN D, CHOLECALCIFEROL, PO Take 2,000 Units by mouth daily        aspirin (ASA) 81 MG chewable tablet Take 81 mg by mouth daily       clotrimazole 10 MG carley Take 1 Carley (10 mg) by mouth 5 times daily 35 each 0     letrozole (FEMARA) 2.5 MG tablet Take 1 tablet (2.5 mg) by mouth daily for 28 days 28 tablet 0     letrozole (FEMARA)  2.5 MG tablet Take 1 tablet (2.5 mg) by mouth daily for 28 days 28 tablet 0     palbociclib (IBRANCE) 125 MG capsule Take 1 capsule (125 mg) by mouth daily with food Take for 21 days, then 7 days off. Avoid grapefruit. Do not open/crush/chew capsule. (Patient not taking: Reported on 6/10/2020) 21 capsule 0     palbociclib (IBRANCE) 125 MG tablet Take 1 tablet (125 mg) by mouth daily with food Take for 21 days, then 7 days off. Avoid grapefruit. Do not open/crush/chew capsule. (Patient not taking: Reported on 6/10/2020) 21 capsule 0       Physical Examination:  General: The patient is a pleasant female in no acute distress.  There were no vitals taken for this visit.  Wt Readings from Last 10 Encounters:   06/09/20 138.6 kg (305 lb 9.6 oz)   03/12/20 139.5 kg (307 lb 8 oz)   02/13/20 138.7 kg (305 lb 11.2 oz)   01/16/20 137.6 kg (303 lb 4.8 oz)   12/18/19 140.3 kg (309 lb 4.8 oz)   11/15/19 141.7 kg (312 lb 4.8 oz)   10/17/19 139.5 kg (307 lb 8 oz)   09/19/19 137.4 kg (303 lb)   08/22/19 137.1 kg (302 lb 3.2 oz)   07/25/19 137.1 kg (302 lb 3.2 oz)     Objective:  General: patient sounds in no audible acute distress, alert and oriented, speech clear and fluid  Resp: Speaking in full sentences, no audible respiratory distress, no cough, no audible wheeze  Psych: able to articulate logical thoughts, able to abstract reason, no tangential thoughts, no hallucinations or delusions  His affect is normal    Laboratory Data:      6/9/2020 12:36   Sodium 139   Potassium 3.8   Chloride 109   Carbon Dioxide 26   Urea Nitrogen 10   Creatinine 1.01   GFR Estimate 65   GFR Estimate If Black 75   Calcium 9.3   Anion Gap 5   Albumin 4.0   Protein Total 8.3   Bilirubin Total 0.5   Alkaline Phosphatase 73   ALT 44   AST 36   CA 27-29 20   T4 Free 1.47 (H)   TSH 0.14 (L)   Glucose 130 (H)   WBC 2.2 (L)   Hemoglobin 12.7   Hematocrit 37.3   Platelet Count 140 (L)   RBC Count 3.75 (L)      MCH 33.9 (H)   MCHC 34.0   RDW 13.5   Diff  Method Automated Method   % Neutrophils 48.5   % Lymphocytes 40.8   % Monocytes 6.3   % Eosinophils 2.2   % Basophils 1.8   % Immature Granulocytes 0.4   Nucleated RBCs 0   Absolute Neutrophil 1.1 (L)   Absolute Lymphocytes 0.9   Absolute Monocytes 0.1   Absolute Eosinophils 0.1   Absolute Basophils 0.0   Abs Immature Granulocytes 0.0   Absolute Nucleated RBC 0.0       Assessment and Plan:    Recurrent, metastatic ER-positive, HER2 negative breast cancer: History of left breast cancer s/p lumpectomy and SNLB in 2014. S/p 4 cycles ddAC and 12 weeks of Taxol. On AI from 11/2014-2/2017. Recurrence on PET/CT scan performed 08/03/2018 and then biopsy to left posterior cervical lymph nodes, aorticopulmonary, and prevascular lymph nodes. No evidence of metastatic disease in the abdomen and pelvis or brain. Started Ibrance and letrozole on 9/21/18. Tolerating well aside from some hot flashes and mild joint stiffness.   -CT from 5/11 showed stable disease (She has a contrast allergy and requires premeds for CT scans). Tumor markers have been overall stable  -she is borderline neutropenic, though doing well so we will continue with treatment. Tolerating well still  -will follow-up in 1 month prior to next cycle     Bone health: No need for bisphosphonates at this time as she has not known bone mets.Continue calcium and vitamin D.     Depression: On zoloft 200 mg and remeron at bedtime. Continue to follow-up with Sari Holliday. Has had some difficulties with COVID and protests    UTI: Resolved    Hypothyroidism: Was previously followed endocrinology at outside clinic. Currently on levothyroxine 200 mcg. TSH rechecked and showed hyperthyroidism. Wants to transfer care here. Will place referral. They will need to manage medication    Phone Call Duration: 9 minutes    Helga Fernando PA-C  [facility]  [address] [place] [address] [phone]

## 2020-06-10 NOTE — PROGRESS NOTES
"Jade Collisn is a 50 year old female who is being evaluated via a billable telephone visit.      The patient has been notified of following:     \"This telephone visit will be conducted via a call between you and your physician/provider. We have found that certain health care needs can be provided without the need for a physical exam.  This service lets us provide the care you need with a short phone conversation.  If a prescription is necessary we can send it directly to your pharmacy.  If lab work is needed we can place an order for that and you can then stop by our lab to have the test done at a later time.    Telephone visits are billed at different rates depending on your insurance coverage. During this emergency period, for some insurers they may be billed the same as an in-person visit.  Please reach out to your insurance provider with any questions.    If during the course of the call the physician/provider feels a telephone visit is not appropriate, you will not be charged for this service.\"    Patient has given verbal consent for Telephone visit?  Yes    What phone number would you like to be contacted at? [phone]    How would you like to obtain your AVS? MyChart     Refills: MEDICATION REFILLS NEEDED TODAY. Provider was notified. Patient needs refills on synthroid, iBrance, and Femara.      Concerns: No new concerns today        Ashwini Mejia CMA Marivel 10, 2020  4:42 PM     "

## 2020-06-11 ENCOUNTER — MYC REFILL (OUTPATIENT)
Dept: ONCOLOGY | Facility: CLINIC | Age: 50
End: 2020-06-11

## 2020-06-11 DIAGNOSIS — Z17.0 MALIGNANT NEOPLASM OF UPPER-OUTER QUADRANT OF LEFT BREAST IN FEMALE, ESTROGEN RECEPTOR POSITIVE (H): Primary | ICD-10-CM

## 2020-06-11 DIAGNOSIS — C50.412 MALIGNANT NEOPLASM OF UPPER-OUTER QUADRANT OF LEFT BREAST IN FEMALE, ESTROGEN RECEPTOR POSITIVE (H): ICD-10-CM

## 2020-06-11 DIAGNOSIS — Z17.0 MALIGNANT NEOPLASM OF UPPER-OUTER QUADRANT OF LEFT BREAST IN FEMALE, ESTROGEN RECEPTOR POSITIVE (H): ICD-10-CM

## 2020-06-11 DIAGNOSIS — C50.412 MALIGNANT NEOPLASM OF UPPER-OUTER QUADRANT OF LEFT BREAST IN FEMALE, ESTROGEN RECEPTOR POSITIVE (H): Primary | ICD-10-CM

## 2020-06-11 RX ORDER — LETROZOLE 2.5 MG/1
2.5 TABLET, FILM COATED ORAL DAILY
Qty: 28 TABLET | Refills: 0 | Status: SHIPPED | OUTPATIENT
Start: 2020-06-11 | End: 2020-07-10

## 2020-06-12 RX ORDER — LETROZOLE 2.5 MG/1
2.5 TABLET, FILM COATED ORAL DAILY
Qty: 28 TABLET | Refills: 0 | Status: SHIPPED | OUTPATIENT
Start: 2020-06-12 | End: 2020-07-10

## 2020-06-19 DIAGNOSIS — J45.20 MILD INTERMITTENT ASTHMA WITHOUT COMPLICATION: ICD-10-CM

## 2020-06-19 RX ORDER — ALBUTEROL SULFATE 90 UG/1
2 AEROSOL, METERED RESPIRATORY (INHALATION) EVERY 6 HOURS
Qty: 1 INHALER | Refills: 0 | Status: SHIPPED | OUTPATIENT
Start: 2020-06-19 | End: 2020-10-01

## 2020-06-22 ENCOUNTER — TELEPHONE (OUTPATIENT)
Dept: ENDOCRINOLOGY | Facility: CLINIC | Age: 50
End: 2020-06-22

## 2020-06-22 DIAGNOSIS — J45.909 ASTHMA: ICD-10-CM

## 2020-06-22 RX ORDER — FLUTICASONE PROPIONATE AND SALMETEROL XINAFOATE 115; 21 UG/1; UG/1
2 AEROSOL, METERED RESPIRATORY (INHALATION) 2 TIMES DAILY
Qty: 1 INHALER | Refills: 2 | Status: SHIPPED | OUTPATIENT
Start: 2020-06-22 | End: 2022-06-07

## 2020-06-26 NOTE — PROGRESS NOTES
"Hypothyroidism  Jv Damico, [facility]    Jade Collins is a 50 year old female who is being evaluated via a billable telephone visit.      The patient has been notified of following:     \"This telephone visit will be conducted via a call between you and your physician/provider. We have found that certain health care needs can be provided without the need for a physical exam.  This service lets us provide the care you need with a short phone conversation.  If a prescription is necessary we can send it directly to your pharmacy.  If lab work is needed we can place an order for that and you can then stop by our lab to have the test done at a later time.    Telephone visits are billed at different rates depending on your insurance coverage. During this emergency period, for some insurers they may be billed the same as an in-person visit.  Please reach out to your insurance provider with any questions.    If during the course of the call the physician/provider feels a telephone visit is not appropriate, you will not be charged for this service.\"    Patient has given verbal consent for Telephone visit?  Yes    What phone number would you like to be contacted at? [phone]    How would you like to obtain your AVS? [email or web address]    Video for 5 minute followed by Phone call duration: 45 minutes total                                                                               - Endocrinology Initial Consultation -    Reason for visit/consult:  Hypothyroidism due to Hashimoto's thyroiditis    Primary care provider: Dave Gallegos    HPI: A 49 yo female here for evaluation for her hypothyroidism.  Patient was diagnosed hypothyroidism in 2000 at the time she had mood swings, weight loss, hunger also time, and then she was diagnosed hypothyroidism later.   She has been taking levothyroxine and current dose is 200 mcg for 1-1/2-year so far and previous dose was 175 mcg.  She underwent it her compliance was looked great earlier this year " and her TSH of most recent at [facility] was slight elevated to 6.88 with free T4 1.24.  For the past few months she started to take more consistently, in most recent TSH was suppressed at 0.14 with elevated T4 1.47 on June 9 2020.  Of note she has family history of hypertension on his father.  Other medical condition she has breast cancer diagnosed in 2013 it was stage II on the left, she underwent surgery chemoradiation and 2018 it was diagnosed as stage IV and currently she is on chemotherapy and aromatase inhibitor.  Also she mentioned she had around 50 pounds again over 1 to 2 years.    Past Medical/Surgical History:  No past medical history on file.  No past surgical history on file.    Allergies:  Allergies   Allergen Reactions     Contrast Dye Shortness Of Breath     Swollen eyes, coughing, headache     Penicillins      Sulfa Drugs Shortness Of Breath     Seasonal Allergies        Current Medications   Current Outpatient Medications   Medication     ACETAMINOPHEN PO     albuterol (PROAIR HFA/PROVENTIL HFA/VENTOLIN HFA) 108 (90 Base) MCG/ACT inhaler     albuterol (PROAIR HFA/PROVENTIL HFA/VENTOLIN HFA) 108 (90 Base) MCG/ACT inhaler     albuterol (PROVENTIL) (2.5 MG/3ML) 0.083% neb solution     albuterol (PROVENTIL) (2.5 MG/3ML) 0.083% neb solution     aspirin (ASA) 81 MG chewable tablet     calcium carbonate (TUMS) 500 MG chewable tablet     Calcium Citrate-Vitamin D (CALCIUM + D PO)     clotrimazole 10 MG mary     doxycycline hyclate (VIBRA-TABS) 100 MG tablet     fluticasone-salmeterol (ADVAIR-HFA) 115-21 MCG/ACT inhaler     fluticasone-salmeterol (AIRDUO RESPICLICK) 113-14 MCG/ACT inhaler     letrozole (FEMARA) 2.5 MG tablet     letrozole (FEMARA) 2.5 MG tablet     letrozole (FEMARA) 2.5 MG tablet     levothyroxine (SYNTHROID/LEVOTHROID) 175 MCG tablet     levothyroxine (SYNTHROID/LEVOTHROID) 200 MCG tablet     loratadine (CLARITIN) 10 MG tablet     methylPREDNISolone (MEDROL) 16 MG tablet      mirtazapine (REMERON) 15 MG tablet     Multiple Vitamins-Minerals (MULTIVITAMIN ADULT PO)     ondansetron (ZOFRAN-ODT) 4 MG ODT tab     palbociclib (IBRANCE) 125 MG capsule     palbociclib (IBRANCE) 125 MG tablet     palbociclib (IBRANCE) 125 MG tablet     prochlorperazine (COMPAZINE) 10 MG tablet     sertraline (ZOLOFT) 100 MG tablet     triamcinolone (NASACORT) 55 MCG/ACT inhaler     VITAMIN D, CHOLECALCIFEROL, PO     letrozole (FEMARA) 2.5 MG tablet     palbociclib (IBRANCE) 125 MG capsule     No current facility-administered medications for this visit.        Family History:  No family history on file.    Social History:  Social History     Tobacco Use     Smoking status: Never Smoker     Smokeless tobacco: Never Used   Substance Use Topics     Alcohol use: Not on file   single, no children, disability,     ROS:  Full review of systems taken with the help of the intake sheet. Otherwise a complete 14 point review of systems was taken and is negative unless stated in the history above.      Physical Exam:   Vitals: There were no vitals taken for this visit.  BMI= There is no height or weight on file to calculate BMI.   General: well appearing, no acute distress, pleasant and conversant,   Mental Status/neuro: alert and oriented  Face: symmetrical, normal facial color  Eyes: anicteric, no proptosis or lid lag  Resp: no acute distress        Labs : I reviewed data from epic and extract and summarize the pertinent data here.   Lab Results   Component Value Date     06/09/2020      Lab Results   Component Value Date    POTASSIUM 3.8 06/09/2020     Lab Results   Component Value Date    CHLORIDE 109 06/09/2020     Lab Results   Component Value Date    GIOVANNY 9.3 06/09/2020     Lab Results   Component Value Date    CO2 26 06/09/2020     Lab Results   Component Value Date    BUN 10 06/09/2020     Lab Results   Component Value Date    CR 1.01 06/09/2020     Lab Results   Component Value Date     06/09/2020      Lab Results   Component Value Date    TSH 0.14 06/09/2020     Lab Results   Component Value Date    T4 1.47 06/09/2020     Lab Results   Component Value Date    A1C 5.4 09/19/2019               Assessment and Plan  50 year old female with breast CA, hypothyroidism    Hypothyroidism    Compliance    Subclinical hyperthyroidism currently    I think multi factorial including compliance, multiple medications take, weight changes.     - reduce the dose of LT4 from 200 mcg to 175 mcg    If still difficult to control, Tirosint is the option.     - recheck TSH, free T4 in 2 month    RTC with me in 6 month      aMry Carcamo MD  Staff Physician  Endocrinology and Metabolism  License: [id number]

## 2020-06-29 ENCOUNTER — VIRTUAL VISIT (OUTPATIENT)
Dept: ENDOCRINOLOGY | Facility: CLINIC | Age: 50
End: 2020-06-29
Attending: PHYSICIAN ASSISTANT
Payer: COMMERCIAL

## 2020-06-29 DIAGNOSIS — Z91.148 POOR COMPLIANCE WITH MEDICATION: ICD-10-CM

## 2020-06-29 DIAGNOSIS — E06.3 HYPOTHYROIDISM DUE TO HASHIMOTO'S THYROIDITIS: Primary | ICD-10-CM

## 2020-06-29 DIAGNOSIS — C50.919 MALIGNANT NEOPLASM OF BREAST, STAGE 4, UNSPECIFIED LATERALITY (H): ICD-10-CM

## 2020-06-29 RX ORDER — LEVOTHYROXINE SODIUM 175 UG/1
175 TABLET ORAL DAILY
Qty: 90 TABLET | Refills: 3 | Status: SHIPPED | OUTPATIENT
Start: 2020-06-29 | End: 2020-09-20

## 2020-06-29 NOTE — LETTER
"6/29/2020       RE: Jade Collins  [address]  [place] [place] [facility] [place] [id number]     Dear Colleague,    Thank you for referring your patient, Jade Collins, to the [facility] ENDOCRINOLOGY at [facility]. Please see a copy of my visit note below.    Hypothyroidism  vJ Damico CMA    Jade Collins is a 50 year old female who is being evaluated via a billable telephone visit.      The patient has been notified of following:     \"This telephone visit will be conducted via a call between you and your physician/provider. We have found that certain health care needs can be provided without the need for a physical exam.  This service lets us provide the care you need with a short phone conversation.  If a prescription is necessary we can send it directly to your pharmacy.  If lab work is needed we can place an order for that and you can then stop by our lab to have the test done at a later time.    Telephone visits are billed at different rates depending on your insurance coverage. During this emergency period, for some insurers they may be billed the same as an in-person visit.  Please reach out to your insurance provider with any questions.    If during the course of the call the physician/provider feels a telephone visit is not appropriate, you will not be charged for this service.\"    Patient has given verbal consent for Telephone visit?  Yes    What phone number would you like to be contacted at? [phone]    How would you like to obtain your AVS? MyChart    Video for 5 minute followed by Phone call duration: 45 minutes total                                                                               - Endocrinology Initial Consultation -    Reason for visit/consult:  Hypothyroidism due to Hashimoto's thyroiditis    Primary care provider: Dave Glalegos    HPI: A 51 yo female here for evaluation for her hypothyroidism.  Patient was diagnosed hypothyroidism in 2000 at the time she had " mood swings, weight loss, hunger also time, and then she was diagnosed hypothyroidism later.   She has been taking levothyroxine and current dose is 200 mcg for 1-1/2-year so far and previous dose was 175 mcg.  She underwent it her compliance was looked great earlier this year and her TSH of most recent at [facility] was slight elevated to 6.88 with free T4 1.24.  For the past few months she started to take more consistently, in most recent TSH was suppressed at 0.14 with elevated T4 1.47 on June 9 2020.  Of note she has family history of hypertension on his father.  Other medical condition she has breast cancer diagnosed in 2013 it was stage II on the left, she underwent surgery chemoradiation and 2018 it was diagnosed as stage IV and currently she is on chemotherapy and aromatase inhibitor.  Also she mentioned she had around 50 pounds again over 1 to 2 years.    Past Medical/Surgical History:  No past medical history on file.  No past surgical history on file.    Allergies:  Allergies   Allergen Reactions     Contrast Dye Shortness Of Breath     Swollen eyes, coughing, headache     Penicillins      Sulfa Drugs Shortness Of Breath     Seasonal Allergies        Current Medications   Current Outpatient Medications   Medication     ACETAMINOPHEN PO     albuterol (PROAIR HFA/PROVENTIL HFA/VENTOLIN HFA) 108 (90 Base) MCG/ACT inhaler     albuterol (PROAIR HFA/PROVENTIL HFA/VENTOLIN HFA) 108 (90 Base) MCG/ACT inhaler     albuterol (PROVENTIL) (2.5 MG/3ML) 0.083% neb solution     albuterol (PROVENTIL) (2.5 MG/3ML) 0.083% neb solution     aspirin (ASA) 81 MG chewable tablet     calcium carbonate (TUMS) 500 MG chewable tablet     Calcium Citrate-Vitamin D (CALCIUM + D PO)     clotrimazole 10 MG mary     doxycycline hyclate (VIBRA-TABS) 100 MG tablet     fluticasone-salmeterol (ADVAIR-HFA) 115-21 MCG/ACT inhaler     fluticasone-salmeterol (AIRDUO RESPICLICK) 113-14 MCG/ACT inhaler     letrozole (FEMARA) 2.5 MG tablet      letrozole (FEMARA) 2.5 MG tablet     letrozole (FEMARA) 2.5 MG tablet     levothyroxine (SYNTHROID/LEVOTHROID) 175 MCG tablet     levothyroxine (SYNTHROID/LEVOTHROID) 200 MCG tablet     loratadine (CLARITIN) 10 MG tablet     methylPREDNISolone (MEDROL) 16 MG tablet     mirtazapine (REMERON) 15 MG tablet     Multiple Vitamins-Minerals (MULTIVITAMIN ADULT PO)     ondansetron (ZOFRAN-ODT) 4 MG ODT tab     palbociclib (IBRANCE) 125 MG capsule     palbociclib (IBRANCE) 125 MG tablet     palbociclib (IBRANCE) 125 MG tablet     prochlorperazine (COMPAZINE) 10 MG tablet     sertraline (ZOLOFT) 100 MG tablet     triamcinolone (NASACORT) 55 MCG/ACT inhaler     VITAMIN D, CHOLECALCIFEROL, PO     letrozole (FEMARA) 2.5 MG tablet     palbociclib (IBRANCE) 125 MG capsule     No current facility-administered medications for this visit.        Family History:  No family history on file.    Social History:  Social History     Tobacco Use     Smoking status: Never Smoker     Smokeless tobacco: Never Used   Substance Use Topics     Alcohol use: Not on file   single, no children, disability,     ROS:  Full review of systems taken with the help of the intake sheet. Otherwise a complete 14 point review of systems was taken and is negative unless stated in the history above.      Physical Exam:   Vitals: There were no vitals taken for this visit.  BMI= There is no height or weight on file to calculate BMI.   General: well appearing, no acute distress, pleasant and conversant,   Mental Status/neuro: alert and oriented  Face: symmetrical, normal facial color  Eyes: anicteric, no proptosis or lid lag  Resp: no acute distress        Labs : I reviewed data from epic and extract and summarize the pertinent data here.   Lab Results   Component Value Date     06/09/2020      Lab Results   Component Value Date    POTASSIUM 3.8 06/09/2020     Lab Results   Component Value Date    CHLORIDE 109 06/09/2020     Lab Results   Component Value  Date    GIOVANNY 9.3 06/09/2020     Lab Results   Component Value Date    CO2 26 06/09/2020     Lab Results   Component Value Date    BUN 10 06/09/2020     Lab Results   Component Value Date    CR 1.01 06/09/2020     Lab Results   Component Value Date     06/09/2020     Lab Results   Component Value Date    TSH 0.14 06/09/2020     Lab Results   Component Value Date    T4 1.47 06/09/2020     Lab Results   Component Value Date    A1C 5.4 09/19/2019               Assessment and Plan  50 year old female with breast CA, hypothyroidism    Hypothyroidism    Compliance    Subclinical hyperthyroidism currently    I think multi factorial including compliance, multiple medications take, weight changes.     - reduce the dose of LT4 from 200 mcg to 175 mcg    If still difficult to control, Tirosint is the option.     - recheck TSH, free T4 in 2 month    RTC with me in 6 month      Mary Carcamo MD  Staff Physician  Endocrinology and Metabolism  License: [id number]

## 2020-07-06 DIAGNOSIS — C50.412 MALIGNANT NEOPLASM OF UPPER-OUTER QUADRANT OF LEFT BREAST IN FEMALE, ESTROGEN RECEPTOR POSITIVE (H): Primary | ICD-10-CM

## 2020-07-06 DIAGNOSIS — Z17.0 MALIGNANT NEOPLASM OF UPPER-OUTER QUADRANT OF LEFT BREAST IN FEMALE, ESTROGEN RECEPTOR POSITIVE (H): Primary | ICD-10-CM

## 2020-07-06 RX ORDER — LETROZOLE 2.5 MG/1
2.5 TABLET, FILM COATED ORAL DAILY
Qty: 28 TABLET | Refills: 0 | Status: SHIPPED | OUTPATIENT
Start: 2020-07-06 | End: 2020-10-16

## 2020-07-09 DIAGNOSIS — Z17.0 MALIGNANT NEOPLASM OF UPPER-OUTER QUADRANT OF LEFT BREAST IN FEMALE, ESTROGEN RECEPTOR POSITIVE (H): ICD-10-CM

## 2020-07-09 DIAGNOSIS — C50.412 MALIGNANT NEOPLASM OF UPPER-OUTER QUADRANT OF LEFT BREAST IN FEMALE, ESTROGEN RECEPTOR POSITIVE (H): ICD-10-CM

## 2020-07-09 LAB
ALBUMIN SERPL-MCNC: 4 G/DL (ref 3.4–5)
ALP SERPL-CCNC: 71 U/L (ref 40–150)
ALT SERPL W P-5'-P-CCNC: 42 U/L (ref 0–50)
ANION GAP SERPL CALCULATED.3IONS-SCNC: 4 MMOL/L (ref 3–14)
AST SERPL W P-5'-P-CCNC: 35 U/L (ref 0–45)
BASOPHILS # BLD AUTO: 0 10E9/L (ref 0–0.2)
BASOPHILS NFR BLD AUTO: 1.8 %
BILIRUB SERPL-MCNC: 0.5 MG/DL (ref 0.2–1.3)
BUN SERPL-MCNC: 11 MG/DL (ref 7–30)
CALCIUM SERPL-MCNC: 9.6 MG/DL (ref 8.5–10.1)
CANCER AG27-29 SERPL-ACNC: 21 U/ML (ref 0–39)
CEA SERPL-MCNC: 0.6 UG/L (ref 0–2.5)
CHLORIDE SERPL-SCNC: 108 MMOL/L (ref 94–109)
CO2 SERPL-SCNC: 26 MMOL/L (ref 20–32)
CREAT SERPL-MCNC: 0.84 MG/DL (ref 0.52–1.04)
DIFFERENTIAL METHOD BLD: ABNORMAL
EOSINOPHIL # BLD AUTO: 0 10E9/L (ref 0–0.7)
EOSINOPHIL NFR BLD AUTO: 1.8 %
ERYTHROCYTE [DISTWIDTH] IN BLOOD BY AUTOMATED COUNT: 13.2 % (ref 10–15)
GFR SERPL CREATININE-BSD FRML MDRD: 81 ML/MIN/{1.73_M2}
GLUCOSE SERPL-MCNC: 106 MG/DL (ref 70–99)
HCT VFR BLD AUTO: 38.5 % (ref 35–47)
HGB BLD-MCNC: 13.4 G/DL (ref 11.7–15.7)
IMM GRANULOCYTES # BLD: 0 10E9/L (ref 0–0.4)
IMM GRANULOCYTES NFR BLD: 0 %
LYMPHOCYTES # BLD AUTO: 1.1 10E9/L (ref 0.8–5.3)
LYMPHOCYTES NFR BLD AUTO: 49.8 %
MCH RBC QN AUTO: 34 PG (ref 26.5–33)
MCHC RBC AUTO-ENTMCNC: 34.8 G/DL (ref 31.5–36.5)
MCV RBC AUTO: 98 FL (ref 78–100)
MONOCYTES # BLD AUTO: 0.3 10E9/L (ref 0–1.3)
MONOCYTES NFR BLD AUTO: 13.8 %
NEUTROPHILS # BLD AUTO: 0.7 10E9/L (ref 1.6–8.3)
NEUTROPHILS NFR BLD AUTO: 32.8 %
NRBC # BLD AUTO: 0 10*3/UL
NRBC BLD AUTO-RTO: 0 /100
PLATELET # BLD AUTO: 142 10E9/L (ref 150–450)
POTASSIUM SERPL-SCNC: 4.2 MMOL/L (ref 3.4–5.3)
PROT SERPL-MCNC: 8.3 G/DL (ref 6.8–8.8)
RBC # BLD AUTO: 3.94 10E12/L (ref 3.8–5.2)
SODIUM SERPL-SCNC: 138 MMOL/L (ref 133–144)
WBC # BLD AUTO: 2.2 10E9/L (ref 4–11)

## 2020-07-10 ENCOUNTER — VIRTUAL VISIT (OUTPATIENT)
Dept: ONCOLOGY | Facility: CLINIC | Age: 50
End: 2020-07-10
Attending: PHYSICIAN ASSISTANT
Payer: COMMERCIAL

## 2020-07-10 DIAGNOSIS — T50.8X5D ALLERGIC REACTION TO CONTRAST MATERIAL, SUBSEQUENT ENCOUNTER: ICD-10-CM

## 2020-07-10 DIAGNOSIS — Z17.0 MALIGNANT NEOPLASM OF UPPER-OUTER QUADRANT OF LEFT BREAST IN FEMALE, ESTROGEN RECEPTOR POSITIVE (H): Primary | ICD-10-CM

## 2020-07-10 DIAGNOSIS — C50.412 MALIGNANT NEOPLASM OF UPPER-OUTER QUADRANT OF LEFT BREAST IN FEMALE, ESTROGEN RECEPTOR POSITIVE (H): Primary | ICD-10-CM

## 2020-07-10 PROCEDURE — 99214 OFFICE O/P EST MOD 30 MIN: CPT | Mod: TEL | Performed by: PHYSICIAN ASSISTANT

## 2020-07-10 PROCEDURE — 40001009 ZZH VIDEO/TELEPHONE VISIT; NO CHARGE

## 2020-07-10 RX ORDER — METHYLPREDNISOLONE 16 MG/1
TABLET ORAL
Qty: 4 TABLET | Refills: 3 | Status: SHIPPED | OUTPATIENT
Start: 2020-07-10 | End: 2020-09-28

## 2020-07-10 NOTE — PROGRESS NOTES
"Jade Collins is a 50 year old female who is being evaluated via a billable telephone visit.      The patient has been notified of following:     \"This telephone visit will be conducted via a call between you and your physician/provider. We have found that certain health care needs can be provided without the need for a physical exam.  This service lets us provide the care you need with a short phone conversation.  If a prescription is necessary we can send it directly to your pharmacy.  If lab work is needed we can place an order for that and you can then stop by our lab to have the test done at a later time.    Telephone visits are billed at different rates depending on your insurance coverage. During this emergency period, for some insurers they may be billed the same as an in-person visit.  Please reach out to your insurance provider with any questions.    If during the course of the call the physician/provider feels a telephone visit is not appropriate, you will not be charged for this service.\"    Patient has given verbal consent for Telephone visit?  Yes    What phone number would you like to be contacted at? [phone].    How would you like to obtain your AVS? Smitahart    I have reviewed and updated the patient's allergies and medication list.    Concerns: No new concerns.   Refills: None needed.     Vitals - Patient Reported  Pain Score: No Pain (0)        Melba Porter CMA    "

## 2020-07-10 NOTE — PROGRESS NOTES
Oncology/Hematology Visit Note  Jul 10, 2020    Reason for Visit: follow up of recurrent metastatic ER-positive HER2 negative breast cancer    History of Present Illness: Jade Collins is a 50 year old female with a history of left breast cancer diagnosed in 12/2013 in [place] area and Dr. David Redd was her medical oncologist. Biopsy showed tumor to be ER positive, DE positive and HER2 negative. She underwent lumpectomy and SLNB by Dr. Tasha Segura in [place]. [place] lymph node was noted to be involved with tumor but no lymph node dissection done at that time. She subsequently underwent adjuvant ddAC x 4 cycles and Taxol x 12 weeks, competed in 9/11/2014. She had post lumpectomy radiation completed in 11/23/2014 by Dr. Manley. She was begun on an AI in 11/2014, but does not recall name.     She was then switched within about a month or so to anastrozole and remained on anastrozole from 11/2014-02/2017.  She has never received tamoxifen. She then moved to [place], [place] in 03/2017.  She saw a gynecologist there and underwent a IRIS/BSO by Dr. Zendejas.  She then also saw Dr. Linares in [place], [place], who is an oncologist.  His interpretation of the pathology report was that she had triple-negative breast cancer.  It is possible that she may have had low estrogen receptor positivity, but the anastrozole was then discontinued in 02/2017.  She then underwent the IRIS/BSO in 03/2017.  This was done laparoscopically.        She then remained off of all hormonal therapy and in 09/2017 moved to [place].  She remained off hormonal therapy and did not have Medical Oncology followup initially.        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  She then went to see Dr. Norberto Brand who performed a PET/CT scan and the patient also underwent a brain MRI for staging.  The PET/CT scan performed 08/03/2018 which showed mildly enlarged left posterior cervical lymph nodes, largest measuring  1.5 x 1.0 cm and mildly enlarged aorticopulmonary and prevascular lymph nodes that were noted on contrast-enhanced CT scan seen less well on the PET scan.  No evidence of metastatic disease in the abdomen and pelvis.  She also underwent a brain MRI that showed no evidence of intracranial metastatic disease.  She underwent a biopsy of one of the lymph nodes in the left neck which showed metastatic adenocarcinoma consistent with breast origin, which was estrogen-receptor positive.  The tumor cells were positive for CK7, ER and OK consistent with metastatic breast carcinoma.  Estrogen receptor showed strong average nuclear intensity in 95% of carcinoma cells, progesterone receptor moderate average nuclear intensity in 70% of the carcinoma.  A GATA3 stain was apparently not performed.     She started Ibrance on 9/21/18 and letrozole on 9/23/18. She had improved disease on restaging on 11/26. Last restaging on 10/15/19 with stable disease.     Please see Dr. Bhatt's previous notes for further details on the patient's history. She comes in today for routine follow up.    Interval History:  -Night sweats resolved  -Muscle pain is less noticeable  -Little bit more tired lately, though wonders if it is due to the heat   -No N/V. No stools changing. Eating and drinking well  -No respiratory symptoms. No f/c  -Occasional edema in legs, minimal. Toes feel tight and tingling. Walking helps  -Has a cat scratch on her collar bone. Feels it is warm and red. Size of her fingernail    ROS: 10 point ROS neg other than the symptoms noted above in the HPI.      Current Outpatient Medications   Medication Sig Dispense Refill     ACETAMINOPHEN PO Take 650 mg by mouth every 4 hours as needed for pain       albuterol (PROAIR HFA/PROVENTIL HFA/VENTOLIN HFA) 108 (90 Base) MCG/ACT inhaler Inhale 2 puffs into the lungs every 6 hours 1 Inhaler 0     albuterol (PROAIR HFA/PROVENTIL HFA/VENTOLIN HFA) 108 (90 Base) MCG/ACT inhaler Inhale 2 puffs  into the lungs every 6 hours 1 Inhaler 3     albuterol (PROVENTIL) (2.5 MG/3ML) 0.083% neb solution Take 1 vial (2.5 mg) by nebulization every 6 hours as needed for shortness of breath / dyspnea or wheezing 30 vial 0     albuterol (PROVENTIL) (2.5 MG/3ML) 0.083% neb solution Take 1 vial (2.5 mg) by nebulization every 6 hours as needed for shortness of breath / dyspnea or wheezing 30 vial 3     aspirin (ASA) 81 MG chewable tablet Take 81 mg by mouth daily       calcium carbonate (TUMS) 500 MG chewable tablet Take 1 chew tab by mouth as needed for heartburn       Calcium Citrate-Vitamin D (CALCIUM + D PO) Take 250 mg by mouth 2 times daily        clotrimazole 10 MG carley Take 1 Carley (10 mg) by mouth 5 times daily 35 each 0     doxycycline hyclate (VIBRA-TABS) 100 MG tablet Take 100 mg by mouth       fluticasone-salmeterol (ADVAIR-HFA) 115-21 MCG/ACT inhaler Inhale 2 puffs into the lungs 2 times daily 1 Inhaler 2     fluticasone-salmeterol (AIRDUO RESPICLICK) 113-14 MCG/ACT inhaler Inhale 1 puff into the lungs 2 times daily 1 Inhaler 1     letrozole (FEMARA) 2.5 MG tablet Take 1 tablet (2.5 mg) by mouth daily for 28 days 28 tablet 0     letrozole (FEMARA) 2.5 MG tablet Take 1 tablet (2.5 mg) by mouth daily 28 tablet 0     letrozole (FEMARA) 2.5 MG tablet Take 1 tablet (2.5 mg) by mouth daily 28 tablet 0     levothyroxine (SYNTHROID/LEVOTHROID) 175 MCG tablet Take 1 tablet (175 mcg) by mouth daily 90 tablet 3     levothyroxine (SYNTHROID/LEVOTHROID) 200 MCG tablet Take 1 tablet (200 mcg) by mouth daily 30 tablet 0     loratadine (CLARITIN) 10 MG tablet Take 1 tablet (10 mg) by mouth daily 90 tablet 3     methylPREDNISolone (MEDROL) 16 MG tablet Take 32 mg by mouth 12 hours before the procedure and repeat 32 mg by mouth 2 hours before the procedure to prevent contrast reaction. 4 tablet 0     mirtazapine (REMERON) 15 MG tablet TK 1 T PO HS 30 tablet 3     Multiple Vitamins-Minerals (MULTIVITAMIN ADULT PO)         ondansetron (ZOFRAN-ODT) 4 MG ODT tab Take 4 mg by mouth       palbociclib (IBRANCE) 125 MG capsule Take 1 capsule (125 mg) by mouth daily with food Take for 21 days, then 7 days off. Avoid grapefruit. Do not open/crush/chew capsule. 21 capsule 0     palbociclib (IBRANCE) 125 MG capsule Take 1 capsule (125 mg) by mouth daily with food Take for 21 days, then 7 days off. Avoid grapefruit. Do not open/crush/chew capsule. 21 capsule 0     palbociclib (IBRANCE) 125 MG tablet Take 1 tablet (125 mg) by mouth daily with food Take for 21 days, then 7 days off. Avoid grapefruit. Do not open/crush/chew capsule. 21 capsule 0     palbociclib (IBRANCE) 125 MG tablet Take 1 tablet (125 mg) by mouth daily with food Take for 21 days, then 7 days off. Avoid grapefruit. Do not open/crush/chew capsule. 21 tablet 3     palbociclib (IBRANCE) 125 MG tablet Take 1 tablet (125 mg) by mouth daily with food Take for 21 days, then 7 days off. Avoid grapefruit. Do not open/crush/chew capsule. 21 capsule 0     prochlorperazine (COMPAZINE) 10 MG tablet Take 1 tablet (10 mg) by mouth every 6 hours as needed for nausea or vomiting 30 tablet 0     sertraline (ZOLOFT) 100 MG tablet Take 1 tablet (100 mg) by mouth 2 times daily 60 tablet 3     triamcinolone (NASACORT) 55 MCG/ACT inhaler Spray 1 spray into both nostrils daily        VITAMIN D, CHOLECALCIFEROL, PO Take 2,000 Units by mouth daily        letrozole (FEMARA) 2.5 MG tablet Take 1 tablet (2.5 mg) by mouth daily for 28 days 28 tablet 0     letrozole (FEMARA) 2.5 MG tablet Take 1 tablet (2.5 mg) by mouth daily for 28 days 28 tablet 0       Physical Examination:  General: The patient is a pleasant female in no acute distress.  There were no vitals taken for this visit.  Wt Readings from Last 10 Encounters:   06/09/20 138.6 kg (305 lb 9.6 oz)   03/12/20 139.5 kg (307 lb 8 oz)   02/13/20 138.7 kg (305 lb 11.2 oz)   01/16/20 137.6 kg (303 lb 4.8 oz)   12/18/19 140.3 kg (309 lb 4.8 oz)   11/15/19  141.7 kg (312 lb 4.8 oz)   10/17/19 139.5 kg (307 lb 8 oz)   09/19/19 137.4 kg (303 lb)   08/22/19 137.1 kg (302 lb 3.2 oz)   07/25/19 137.1 kg (302 lb 3.2 oz)     Objective:  General: patient sounds in no audible acute distress, alert and oriented, speech clear and fluid  Resp: Speaking in full sentences, no audible respiratory distress, no cough, no audible wheeze  Psych: able to articulate logical thoughts, able to abstract reason, no tangential thoughts, no hallucinations or delusions  His affect is normal    Laboratory Data:      7/9/2020 13:10   Sodium 138   Potassium 4.2   Chloride 108   Carbon Dioxide 26   Urea Nitrogen 11   Creatinine 0.84   GFR Estimate 81   GFR Estimate If Black >90   Calcium 9.6   Anion Gap 4   Albumin 4.0   Protein Total 8.3   Bilirubin Total 0.5   Alkaline Phosphatase 71   ALT 42   AST 35   CA 27-29 21   Glucose 106 (H)   WBC 2.2 (L)   Hemoglobin 13.4   Hematocrit 38.5   Platelet Count 142 (L)   RBC Count 3.94   MCV 98   MCH 34.0 (H)   MCHC 34.8   RDW 13.2   Diff Method Automated Method   % Neutrophils 32.8   % Lymphocytes 49.8   % Monocytes 13.8   % Eosinophils 1.8   % Basophils 1.8   % Immature Granulocytes 0.0   Nucleated RBCs 0   Absolute Neutrophil 0.7 (L)   Absolute Lymphocytes 1.1   Absolute Monocytes 0.3   Absolute Eosinophils 0.0   Absolute Basophils 0.0   Abs Immature Granulocytes 0.0   Absolute Nucleated RBC 0.0   CEA 0.6         Assessment and Plan:    Recurrent, metastatic ER-positive, HER2 negative breast cancer: History of left breast cancer s/p lumpectomy and SNLB in 2014. S/p 4 cycles ddAC and 12 weeks of Taxol. On AI from 11/2014-2/2017. Recurrence on PET/CT scan performed 08/03/2018 and then biopsy to left posterior cervical lymph nodes, aorticopulmonary, and prevascular lymph nodes. No evidence of metastatic disease in the abdomen and pelvis or brain. Started Ibrance and letrozole on 9/21/18. Tolerating well aside from some hot flashes and mild joint stiffness.    -CT from 5/11 showed stable disease (She has a contrast allergy and requires premeds for CT scans). Tumor markers have been overall stable  -Dr. Bhatt wanted to repeat scan in 4 months. Last scans in May. Will repeat in September   -neutropenic on labs. Will have her hold Ibrance for one week and recheck labs in a week. If ANC > 1.0, ok to restart at same dose   -will have her follow-up with me in 1 month    Bone health: No need for bisphosphonates at this time as she has not known bone mets. Continue calcium and vitamin D.     Cat Scratch: Per patient, red and warm to the touch. Has h/o skin infections requiring antibiotics. She is neutropenic currently. Had her send in pictures. Not concerned about infection at this time. Continue to monitor and use topical antibiotics and follow-up with PCP if symptoms were to progress    Depression: On zoloft 200 mg and remeron at bedtime. Continue to follow-up with Sari Holliday. Stable    Hypothyroidism: Established care with endocrinology here on 6/29/20. Decreased her levothyroxine to 175 mcg (changed a couple days ago). Continue to be managed by them    Phone Call Duration: 22 minutes    Helga Fernando PA-C  [facility]  [address] [place] [id number] [phone]

## 2020-07-10 NOTE — LETTER
7/10/2020         RE: Jade Collins  [address]  [facility] [address]        Dear Colleague,    Thank you for referring your patient, Jade Collins, to the [facility]. Please see a copy of my visit note below.    Jade Collins is a 50 year old female who is being evaluated via a billable telephone visit.          I have reviewed and updated the patient's allergies and medication list.    Concerns: No new concerns.   Refills: None needed.     Vitals - Patient Reported  Pain Score: No Pain (0)        Melba Porter CMA      Oncology/Hematology Visit Note  Jul 10, 2020    Reason for Visit: follow up of recurrent metastatic ER-positive HER2 negative breast cancer    History of Present Illness: Jade Collins is a 50 year old female with a history of left breast cancer diagnosed in 12/2013 in [place] area and Dr. David Redd was her medical oncologist. Biopsy showed tumor to be ER positive, NE positive and HER2 negative. She underwent lumpectomy and SLNB by Dr. Tasha Segura in [place]. [place] lymph node was noted to be involved with tumor but no lymph node dissection done at that time. She subsequently underwent adjuvant ddAC x 4 cycles and Taxol x 12 weeks, competed in 9/11/2014. She had post lumpectomy radiation completed in 11/23/2014 by Dr. Manley. She was begun on an AI in 11/2014, but does not recall name.     She was then switched within about a month or so to anastrozole and remained on anastrozole from 11/2014-02/2017.  She has never received tamoxifen. She then moved to [place], [place] in 03/2017.  She saw a gynecologist there and underwent a IRIS/BSO by Dr. Zendejas.  She then also saw Dr. Linares in [place], [place], who is an oncologist.  His interpretation of the pathology report was that she had triple-negative breast cancer.  It is possible that she may have had low estrogen receptor positivity, but the anastrozole was then discontinued in 02/2017.  She then underwent the IRIS/BSO in  03/2017.  This was done laparoscopically.        She then remained off of all hormonal therapy and in 09/2017 moved to [place].  She remained off hormonal therapy and did not have Medical Oncology followup initially.        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  She then went to see Dr. Norberto Brand who performed a PET/CT scan and the patient also underwent a brain MRI for staging.  The PET/CT scan performed 08/03/2018 which showed mildly enlarged left posterior cervical lymph nodes, largest measuring 1.5 x 1.0 cm and mildly enlarged aorticopulmonary and prevascular lymph nodes that were noted on contrast-enhanced CT scan seen less well on the PET scan.  No evidence of metastatic disease in the abdomen and pelvis.  She also underwent a brain MRI that showed no evidence of intracranial metastatic disease.  She underwent a biopsy of one of the lymph nodes in the left neck which showed metastatic adenocarcinoma consistent with breast origin, which was estrogen-receptor positive.  The tumor cells were positive for CK7, ER and MA consistent with metastatic breast carcinoma.  Estrogen receptor showed strong average nuclear intensity in 95% of carcinoma cells, progesterone receptor moderate average nuclear intensity in 70% of the carcinoma.  A GATA3 stain was apparently not performed.     She started Ibrance on 9/21/18 and letrozole on 9/23/18. She had improved disease on restaging on 11/26. Last restaging on 10/15/19 with stable disease.     Please see Dr. Bhatt's previous notes for further details on the patient's history. She comes in today for routine follow up.    Interval History:  -Night sweats resolved  -Muscle pain is less noticeable  -Little bit more tired lately, though wonders if it is due to the heat   -No N/V. No stools changing. Eating and drinking well  -No respiratory symptoms. No f/c  -Occasional edema in legs, minimal. Toes feel tight and tingling. Walking  helps  -Has a cat scratch on her collar bone. Feels it is warm and red. Size of her fingernail    ROS: 10 point ROS neg other than the symptoms noted above in the HPI.      Current Outpatient Medications   Medication Sig Dispense Refill     ACETAMINOPHEN PO Take 650 mg by mouth every 4 hours as needed for pain       albuterol (PROAIR HFA/PROVENTIL HFA/VENTOLIN HFA) 108 (90 Base) MCG/ACT inhaler Inhale 2 puffs into the lungs every 6 hours 1 Inhaler 0     albuterol (PROAIR HFA/PROVENTIL HFA/VENTOLIN HFA) 108 (90 Base) MCG/ACT inhaler Inhale 2 puffs into the lungs every 6 hours 1 Inhaler 3     albuterol (PROVENTIL) (2.5 MG/3ML) 0.083% neb solution Take 1 vial (2.5 mg) by nebulization every 6 hours as needed for shortness of breath / dyspnea or wheezing 30 vial 0     albuterol (PROVENTIL) (2.5 MG/3ML) 0.083% neb solution Take 1 vial (2.5 mg) by nebulization every 6 hours as needed for shortness of breath / dyspnea or wheezing 30 vial 3     aspirin (ASA) 81 MG chewable tablet Take 81 mg by mouth daily       calcium carbonate (TUMS) 500 MG chewable tablet Take 1 chew tab by mouth as needed for heartburn       Calcium Citrate-Vitamin D (CALCIUM + D PO) Take 250 mg by mouth 2 times daily        clotrimazole 10 MG carley Take 1 Carley (10 mg) by mouth 5 times daily 35 each 0     doxycycline hyclate (VIBRA-TABS) 100 MG tablet Take 100 mg by mouth       fluticasone-salmeterol (ADVAIR-HFA) 115-21 MCG/ACT inhaler Inhale 2 puffs into the lungs 2 times daily 1 Inhaler 2     fluticasone-salmeterol (AIRDUO RESPICLICK) 113-14 MCG/ACT inhaler Inhale 1 puff into the lungs 2 times daily 1 Inhaler 1     letrozole (FEMARA) 2.5 MG tablet Take 1 tablet (2.5 mg) by mouth daily for 28 days 28 tablet 0     letrozole (FEMARA) 2.5 MG tablet Take 1 tablet (2.5 mg) by mouth daily 28 tablet 0     letrozole (FEMARA) 2.5 MG tablet Take 1 tablet (2.5 mg) by mouth daily 28 tablet 0     levothyroxine (SYNTHROID/LEVOTHROID) 175 MCG tablet Take 1 tablet  (175 mcg) by mouth daily 90 tablet 3     levothyroxine (SYNTHROID/LEVOTHROID) 200 MCG tablet Take 1 tablet (200 mcg) by mouth daily 30 tablet 0     loratadine (CLARITIN) 10 MG tablet Take 1 tablet (10 mg) by mouth daily 90 tablet 3     methylPREDNISolone (MEDROL) 16 MG tablet Take 32 mg by mouth 12 hours before the procedure and repeat 32 mg by mouth 2 hours before the procedure to prevent contrast reaction. 4 tablet 0     mirtazapine (REMERON) 15 MG tablet TK 1 T PO HS 30 tablet 3     Multiple Vitamins-Minerals (MULTIVITAMIN ADULT PO)        ondansetron (ZOFRAN-ODT) 4 MG ODT tab Take 4 mg by mouth       palbociclib (IBRANCE) 125 MG capsule Take 1 capsule (125 mg) by mouth daily with food Take for 21 days, then 7 days off. Avoid grapefruit. Do not open/crush/chew capsule. 21 capsule 0     palbociclib (IBRANCE) 125 MG capsule Take 1 capsule (125 mg) by mouth daily with food Take for 21 days, then 7 days off. Avoid grapefruit. Do not open/crush/chew capsule. 21 capsule 0     palbociclib (IBRANCE) 125 MG tablet Take 1 tablet (125 mg) by mouth daily with food Take for 21 days, then 7 days off. Avoid grapefruit. Do not open/crush/chew capsule. 21 capsule 0     palbociclib (IBRANCE) 125 MG tablet Take 1 tablet (125 mg) by mouth daily with food Take for 21 days, then 7 days off. Avoid grapefruit. Do not open/crush/chew capsule. 21 tablet 3     palbociclib (IBRANCE) 125 MG tablet Take 1 tablet (125 mg) by mouth daily with food Take for 21 days, then 7 days off. Avoid grapefruit. Do not open/crush/chew capsule. 21 capsule 0     prochlorperazine (COMPAZINE) 10 MG tablet Take 1 tablet (10 mg) by mouth every 6 hours as needed for nausea or vomiting 30 tablet 0     sertraline (ZOLOFT) 100 MG tablet Take 1 tablet (100 mg) by mouth 2 times daily 60 tablet 3     triamcinolone (NASACORT) 55 MCG/ACT inhaler Spray 1 spray into both nostrils daily        VITAMIN D, CHOLECALCIFEROL, PO Take 2,000 Units by mouth daily        letrozole  (FEMARA) 2.5 MG tablet Take 1 tablet (2.5 mg) by mouth daily for 28 days 28 tablet 0     letrozole (FEMARA) 2.5 MG tablet Take 1 tablet (2.5 mg) by mouth daily for 28 days 28 tablet 0       Physical Examination:  General: The patient is a pleasant female in no acute distress.  There were no vitals taken for this visit.  Wt Readings from Last 10 Encounters:   06/09/20 138.6 kg (305 lb 9.6 oz)   03/12/20 139.5 kg (307 lb 8 oz)   02/13/20 138.7 kg (305 lb 11.2 oz)   01/16/20 137.6 kg (303 lb 4.8 oz)   12/18/19 140.3 kg (309 lb 4.8 oz)   11/15/19 141.7 kg (312 lb 4.8 oz)   10/17/19 139.5 kg (307 lb 8 oz)   09/19/19 137.4 kg (303 lb)   08/22/19 137.1 kg (302 lb 3.2 oz)   07/25/19 137.1 kg (302 lb 3.2 oz)     Objective:  General: patient sounds in no audible acute distress, alert and oriented, speech clear and fluid  Resp: Speaking in full sentences, no audible respiratory distress, no cough, no audible wheeze  Psych: able to articulate logical thoughts, able to abstract reason, no tangential thoughts, no hallucinations or delusions  His affect is normal    Laboratory Data:      7/9/2020 13:10   Sodium 138   Potassium 4.2   Chloride 108   Carbon Dioxide 26   Urea Nitrogen 11   Creatinine 0.84   GFR Estimate 81   GFR Estimate If Black >90   Calcium 9.6   Anion Gap 4   Albumin 4.0   Protein Total 8.3   Bilirubin Total 0.5   Alkaline Phosphatase 71   ALT 42   AST 35   CA 27-29 21   Glucose 106 (H)   WBC 2.2 (L)   Hemoglobin 13.4   Hematocrit 38.5   Platelet Count 142 (L)   RBC Count 3.94   MCV 98   MCH 34.0 (H)   MCHC 34.8   RDW 13.2   Diff Method Automated Method   % Neutrophils 32.8   % Lymphocytes 49.8   % Monocytes 13.8   % Eosinophils 1.8   % Basophils 1.8   % Immature Granulocytes 0.0   Nucleated RBCs 0   Absolute Neutrophil 0.7 (L)   Absolute Lymphocytes 1.1   Absolute Monocytes 0.3   Absolute Eosinophils 0.0   Absolute Basophils 0.0   Abs Immature Granulocytes 0.0   Absolute Nucleated RBC 0.0   CEA 0.6          Assessment and Plan:    Recurrent, metastatic ER-positive, HER2 negative breast cancer: History of left breast cancer s/p lumpectomy and SNLB in 2014. S/p 4 cycles ddAC and 12 weeks of Taxol. On AI from 11/2014-2/2017. Recurrence on PET/CT scan performed 08/03/2018 and then biopsy to left posterior cervical lymph nodes, aorticopulmonary, and prevascular lymph nodes. No evidence of metastatic disease in the abdomen and pelvis or brain. Started Ibrance and letrozole on 9/21/18. Tolerating well aside from some hot flashes and mild joint stiffness.   -CT from 5/11 showed stable disease (She has a contrast allergy and requires premeds for CT scans). Tumor markers have been overall stable  -Dr. Bhatt wanted to repeat scan in 4 months. Last scans in May. Will repeat in September   -neutropenic on labs. Will have her hold Ibrance for one week and recheck labs in a week. If ANC > 1.0, ok to restart at same dose   -will have her follow-up with me in 1 month    Bone health: No need for bisphosphonates at this time as she has not known bone mets. Continue calcium and vitamin D.     Cat Scratch: Per patient, red and warm to the touch. Has h/o skin infections requiring antibiotics. She is neutropenic currently. Had her send in pictures. Not concerned about infection at this time. Continue to monitor and use topical antibiotics and follow-up with PCP if symptoms were to progress    Depression: On zoloft 200 mg and remeron at bedtime. Continue to follow-up with Sari Holliday. Stable    Hypothyroidism: Established care with endocrinology here on 6/29/20. Decreased her levothyroxine to 175 mcg (changed a couple days ago). Continue to be managed by them    Phone Call Duration: 22 minutes    Helga Fernando PA-C  [facility]  [address] [place] [id number] [phone]                    Again, thank you for allowing me to participate in the care of your patient.        Sincerely,        Helga Fernando  NATHANAEL

## 2020-07-16 DIAGNOSIS — C50.412 MALIGNANT NEOPLASM OF UPPER-OUTER QUADRANT OF LEFT BREAST IN FEMALE, ESTROGEN RECEPTOR POSITIVE (H): ICD-10-CM

## 2020-07-16 DIAGNOSIS — Z17.0 MALIGNANT NEOPLASM OF UPPER-OUTER QUADRANT OF LEFT BREAST IN FEMALE, ESTROGEN RECEPTOR POSITIVE (H): ICD-10-CM

## 2020-07-16 LAB
ALBUMIN SERPL-MCNC: 3.8 G/DL (ref 3.4–5)
ALP SERPL-CCNC: 70 U/L (ref 40–150)
ALT SERPL W P-5'-P-CCNC: 73 U/L (ref 0–50)
ANION GAP SERPL CALCULATED.3IONS-SCNC: 5 MMOL/L (ref 3–14)
AST SERPL W P-5'-P-CCNC: 60 U/L (ref 0–45)
BASOPHILS # BLD AUTO: 0.1 10E9/L (ref 0–0.2)
BASOPHILS NFR BLD AUTO: 1.8 %
BILIRUB SERPL-MCNC: 0.5 MG/DL (ref 0.2–1.3)
BUN SERPL-MCNC: 14 MG/DL (ref 7–30)
CALCIUM SERPL-MCNC: 9.5 MG/DL (ref 8.5–10.1)
CHLORIDE SERPL-SCNC: 106 MMOL/L (ref 94–109)
CO2 SERPL-SCNC: 28 MMOL/L (ref 20–32)
CREAT SERPL-MCNC: 0.95 MG/DL (ref 0.52–1.04)
DIFFERENTIAL METHOD BLD: ABNORMAL
EOSINOPHIL # BLD AUTO: 0.1 10E9/L (ref 0–0.7)
EOSINOPHIL NFR BLD AUTO: 2.1 %
ERYTHROCYTE [DISTWIDTH] IN BLOOD BY AUTOMATED COUNT: 12.7 % (ref 10–15)
GFR SERPL CREATININE-BSD FRML MDRD: 70 ML/MIN/{1.73_M2}
GLUCOSE SERPL-MCNC: 103 MG/DL (ref 70–99)
HCT VFR BLD AUTO: 39.9 % (ref 35–47)
HGB BLD-MCNC: 13.5 G/DL (ref 11.7–15.7)
IMM GRANULOCYTES # BLD: 0 10E9/L (ref 0–0.4)
IMM GRANULOCYTES NFR BLD: 0.3 %
LYMPHOCYTES # BLD AUTO: 1.2 10E9/L (ref 0.8–5.3)
LYMPHOCYTES NFR BLD AUTO: 37 %
MCH RBC QN AUTO: 33.3 PG (ref 26.5–33)
MCHC RBC AUTO-ENTMCNC: 33.8 G/DL (ref 31.5–36.5)
MCV RBC AUTO: 99 FL (ref 78–100)
MONOCYTES # BLD AUTO: 0.5 10E9/L (ref 0–1.3)
MONOCYTES NFR BLD AUTO: 14.2 %
NEUTROPHILS # BLD AUTO: 1.5 10E9/L (ref 1.6–8.3)
NEUTROPHILS NFR BLD AUTO: 44.6 %
NRBC # BLD AUTO: 0 10*3/UL
NRBC BLD AUTO-RTO: 0 /100
PLATELET # BLD AUTO: 166 10E9/L (ref 150–450)
POTASSIUM SERPL-SCNC: 4.2 MMOL/L (ref 3.4–5.3)
PROT SERPL-MCNC: 8.1 G/DL (ref 6.8–8.8)
RBC # BLD AUTO: 4.05 10E12/L (ref 3.8–5.2)
SODIUM SERPL-SCNC: 139 MMOL/L (ref 133–144)
WBC # BLD AUTO: 3.3 10E9/L (ref 4–11)

## 2020-07-16 PROCEDURE — 82378 CARCINOEMBRYONIC ANTIGEN: CPT | Performed by: INTERNAL MEDICINE

## 2020-07-16 PROCEDURE — 85025 COMPLETE CBC W/AUTO DIFF WBC: CPT | Performed by: INTERNAL MEDICINE

## 2020-07-16 PROCEDURE — 80053 COMPREHEN METABOLIC PANEL: CPT | Performed by: INTERNAL MEDICINE

## 2020-07-16 NOTE — NURSING NOTE
Chief Complaint   Patient presents with     Blood Draw     Labs drawn via  by RN in lab.     Uzma Oneill RN

## 2020-07-17 ENCOUNTER — TELEPHONE (OUTPATIENT)
Dept: ONCOLOGY | Facility: CLINIC | Age: 50
End: 2020-07-17

## 2020-07-17 NOTE — ORAL ONC MGMT
Oral Chemotherapy Monitoring Program  Lab Follow Up    Patient currently on Ibrance therapy for breast cancer.    Reviewed lab results from 7/16/2020.    Labs:  _  Result Component Current Result Ref Range   Sodium 139 (7/16/2020) 133 - 144 mmol/L     _  Result Component Current Result Ref Range   Potassium 4.2 (7/16/2020) 3.4 - 5.3 mmol/L     _  Result Component Current Result Ref Range   Calcium 9.5 (7/16/2020) 8.5 - 10.1 mg/dL     No results found for Mag within last 30 days.     No results found for Phos within last 30 days.     _  Result Component Current Result Ref Range   Albumin 3.8 (7/16/2020) 3.4 - 5.0 g/dL     _  Result Component Current Result Ref Range   Urea Nitrogen 14 (7/16/2020) 7 - 30 mg/dL     _  Result Component Current Result Ref Range   Creatinine 0.95 (7/16/2020) 0.52 - 1.04 mg/dL       _  Result Component Current Result Ref Range   AST 60 (H) (7/16/2020) 0 - 45 U/L     _  Result Component Current Result Ref Range   ALT 73 (H) (7/16/2020) 0 - 50 U/L     _  Result Component Current Result Ref Range   Bilirubin Total 0.5 (7/16/2020) 0.2 - 1.3 mg/dL       _  Result Component Current Result Ref Range   WBC 3.3 (L) (7/16/2020) 4.0 - 11.0 10e9/L     _  Result Component Current Result Ref Range   Hemoglobin 13.5 (7/16/2020) 11.7 - 15.7 g/dL     _  Result Component Current Result Ref Range   Platelet Count 166 (7/16/2020) 150 - 450 10e9/L     _  Result Component Current Result Ref Range   Absolute Neutrophil 1.5 (L) (7/16/2020) 1.6 - 8.3 10e9/L       Assessment & Plan:  No concerning abnormalities.    Ibrance cycle was due to start 7/10. Labs 7/9 grade 3 neutropenia (ANC 0.7).      Labs from 7/16 ANC was 1.5 and per inbasket 7/17 ok to start next Ibrance cycle today.     Placed call to patient in follow up of labs. Left message stating ok to start cycle today and schedule labs in 4 weeks. No medication names or patient names mentioned. Requested call back if questions or concerns.       Follow-Up:  8/12 or 8/13: Review labs in 4 weeks. Nothing scheduled yet.       Thank you for the opportunity to participate in the care of the above patient,  Jose Swain, PharmD  Hematology/Oncology Clinical Pharmacist  [facility]  [facility]  [phone]    Addendum:  Spoke to Jade, agreeable to start next cycle tonight. She will repeat labs 8/12 or 8/13.

## 2020-08-10 ENCOUNTER — ANCILLARY PROCEDURE (OUTPATIENT)
Dept: CT IMAGING | Facility: CLINIC | Age: 50
End: 2020-08-10
Attending: INTERNAL MEDICINE
Payer: COMMERCIAL

## 2020-08-10 DIAGNOSIS — C50.412 MALIGNANT NEOPLASM OF UPPER-OUTER QUADRANT OF LEFT BREAST IN FEMALE, ESTROGEN RECEPTOR POSITIVE (H): ICD-10-CM

## 2020-08-10 DIAGNOSIS — Z17.0 MALIGNANT NEOPLASM OF UPPER-OUTER QUADRANT OF LEFT BREAST IN FEMALE, ESTROGEN RECEPTOR POSITIVE (H): ICD-10-CM

## 2020-08-10 LAB
ALBUMIN SERPL-MCNC: 3.8 G/DL (ref 3.4–5)
ALP SERPL-CCNC: 74 U/L (ref 40–150)
ALT SERPL W P-5'-P-CCNC: 35 U/L (ref 0–50)
ANION GAP SERPL CALCULATED.3IONS-SCNC: 8 MMOL/L (ref 3–14)
AST SERPL W P-5'-P-CCNC: 18 U/L (ref 0–45)
BASOPHILS # BLD AUTO: 0 10E9/L (ref 0–0.2)
BASOPHILS NFR BLD AUTO: 0.5 %
BILIRUB SERPL-MCNC: 0.4 MG/DL (ref 0.2–1.3)
BUN SERPL-MCNC: 11 MG/DL (ref 7–30)
CALCIUM SERPL-MCNC: 9.4 MG/DL (ref 8.5–10.1)
CANCER AG27-29 SERPL-ACNC: 16 U/ML (ref 0–39)
CEA SERPL-MCNC: <0.5 UG/L (ref 0–2.5)
CHLORIDE SERPL-SCNC: 106 MMOL/L (ref 94–109)
CO2 SERPL-SCNC: 22 MMOL/L (ref 20–32)
CREAT SERPL-MCNC: 0.87 MG/DL (ref 0.52–1.04)
DIFFERENTIAL METHOD BLD: ABNORMAL
EOSINOPHIL # BLD AUTO: 0 10E9/L (ref 0–0.7)
EOSINOPHIL NFR BLD AUTO: 0 %
ERYTHROCYTE [DISTWIDTH] IN BLOOD BY AUTOMATED COUNT: 13.2 % (ref 10–15)
GFR SERPL CREATININE-BSD FRML MDRD: 78 ML/MIN/{1.73_M2}
GLUCOSE SERPL-MCNC: 144 MG/DL (ref 70–99)
HCT VFR BLD AUTO: 37.8 % (ref 35–47)
HGB BLD-MCNC: 13.2 G/DL (ref 11.7–15.7)
IMM GRANULOCYTES # BLD: 0 10E9/L (ref 0–0.4)
IMM GRANULOCYTES NFR BLD: 0.3 %
LYMPHOCYTES # BLD AUTO: 1 10E9/L (ref 0.8–5.3)
LYMPHOCYTES NFR BLD AUTO: 25.5 %
MCH RBC QN AUTO: 33.4 PG (ref 26.5–33)
MCHC RBC AUTO-ENTMCNC: 34.9 G/DL (ref 31.5–36.5)
MCV RBC AUTO: 96 FL (ref 78–100)
MONOCYTES # BLD AUTO: 0.2 10E9/L (ref 0–1.3)
MONOCYTES NFR BLD AUTO: 4.8 %
NEUTROPHILS # BLD AUTO: 2.6 10E9/L (ref 1.6–8.3)
NEUTROPHILS NFR BLD AUTO: 68.9 %
NRBC # BLD AUTO: 0 10*3/UL
NRBC BLD AUTO-RTO: 0 /100
PLATELET # BLD AUTO: 153 10E9/L (ref 150–450)
POTASSIUM SERPL-SCNC: 4.3 MMOL/L (ref 3.4–5.3)
PROT SERPL-MCNC: 8.5 G/DL (ref 6.8–8.8)
RBC # BLD AUTO: 3.95 10E12/L (ref 3.8–5.2)
SODIUM SERPL-SCNC: 136 MMOL/L (ref 133–144)
WBC # BLD AUTO: 3.7 10E9/L (ref 4–11)

## 2020-08-10 RX ORDER — IOPAMIDOL 755 MG/ML
135 INJECTION, SOLUTION INTRAVASCULAR ONCE
Status: COMPLETED | OUTPATIENT
Start: 2020-08-10 | End: 2020-08-10

## 2020-08-10 RX ADMIN — IOPAMIDOL 135 ML: 755 INJECTION, SOLUTION INTRAVASCULAR at 13:34

## 2020-08-11 ENCOUNTER — PATIENT OUTREACH (OUTPATIENT)
Dept: ONCOLOGY | Facility: CLINIC | Age: 50
End: 2020-08-11

## 2020-08-11 DIAGNOSIS — C50.412 MALIGNANT NEOPLASM OF UPPER-OUTER QUADRANT OF LEFT BREAST IN FEMALE, ESTROGEN RECEPTOR POSITIVE (H): Primary | ICD-10-CM

## 2020-08-11 DIAGNOSIS — Z17.0 MALIGNANT NEOPLASM OF UPPER-OUTER QUADRANT OF LEFT BREAST IN FEMALE, ESTROGEN RECEPTOR POSITIVE (H): Primary | ICD-10-CM

## 2020-08-11 RX ORDER — LETROZOLE 2.5 MG/1
2.5 TABLET, FILM COATED ORAL DAILY
Qty: 28 TABLET | Refills: 0 | Status: SHIPPED | OUTPATIENT
Start: 2020-08-11 | End: 2020-10-16

## 2020-08-11 NOTE — PROGRESS NOTES
Spoke to patient with CT CAP and neck CT with two slightly larger L lymph nodes. Message sent to Dr Bhatt to review Neck CT. Patient has a follow-up appointment with Helga Fernando on August 13, 2020 for results.  Answered all patient's questions and verbalized understanding. Farheen Price RN, BSN.    Dr Bhatt reviewed results of Neck CT and recommended continuing current therapy. Spoke to patient with Dr Bhatt's recommendations and verbalized understanding. Farheen Price RN, BSN  [facility] Nurse Coordinator

## 2020-08-12 ENCOUNTER — TELEPHONE (OUTPATIENT)
Dept: ONCOLOGY | Facility: CLINIC | Age: 50
End: 2020-08-12

## 2020-08-13 ENCOUNTER — VIRTUAL VISIT (OUTPATIENT)
Dept: ONCOLOGY | Facility: CLINIC | Age: 50
End: 2020-08-13
Attending: PHYSICIAN ASSISTANT
Payer: COMMERCIAL

## 2020-08-13 DIAGNOSIS — Z17.0 MALIGNANT NEOPLASM OF UPPER-OUTER QUADRANT OF LEFT BREAST IN FEMALE, ESTROGEN RECEPTOR POSITIVE (H): Primary | ICD-10-CM

## 2020-08-13 DIAGNOSIS — B37.0 THRUSH: ICD-10-CM

## 2020-08-13 DIAGNOSIS — C50.412 MALIGNANT NEOPLASM OF UPPER-OUTER QUADRANT OF LEFT BREAST IN FEMALE, ESTROGEN RECEPTOR POSITIVE (H): Primary | ICD-10-CM

## 2020-08-13 DIAGNOSIS — C50.919 METASTATIC BREAST CANCER: ICD-10-CM

## 2020-08-13 PROCEDURE — 99442 ZZC PHYSICIAN TELEPHONE EVALUATION 11-20 MIN: CPT | Mod: 95 | Performed by: PHYSICIAN ASSISTANT

## 2020-08-13 PROCEDURE — 40001009 ZZH VIDEO/TELEPHONE VISIT; NO CHARGE

## 2020-08-13 RX ORDER — CLOTRIMAZOLE 10 MG/1
10 LOZENGE ORAL
Qty: 60 EACH | Refills: 0 | Status: SHIPPED | OUTPATIENT
Start: 2020-08-13

## 2020-08-13 NOTE — PROGRESS NOTES
Oncology/Hematology Visit Note  Aug 13, 2020    Reason for Visit: follow up of recurrent metastatic ER-positive HER2 negative breast cancer    History of Present Illness: Jade Collins is a 50 year old female with a history of left breast cancer diagnosed in 12/2013 in [place] area and Dr. David Redd was her medical oncologist. Biopsy showed tumor to be ER positive, OR positive and HER2 negative. She underwent lumpectomy and SLNB by Dr. Tasha Segura in [place]. [place] lymph node was noted to be involved with tumor but no lymph node dissection done at that time. She subsequently underwent adjuvant ddAC x 4 cycles and Taxol x 12 weeks, competed in 9/11/2014. She had post lumpectomy radiation completed in 11/23/2014 by Dr. Manley. She was begun on an AI in 11/2014, but does not recall name.     She was then switched within about a month or so to anastrozole and remained on anastrozole from 11/2014-02/2017.  She has never received tamoxifen. She then moved to [place], [place] in 03/2017.  She saw a gynecologist there and underwent a IRIS/BSO by Dr. Zendejas.  She then also saw Dr. Linares in [place], [place], who is an oncologist.  His interpretation of the pathology report was that she had triple-negative breast cancer.  It is possible that she may have had low estrogen receptor positivity, but the anastrozole was then discontinued in 02/2017.  She then underwent the IRIS/BSO in 03/2017.  This was done laparoscopically.        She then remained off of all hormonal therapy and in 09/2017 moved to [place].  She remained off hormonal therapy and did not have Medical Oncology followup initially.        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  She then went to see Dr. Norberto Brand who performed a PET/CT scan and the patient also underwent a brain MRI for staging.  The PET/CT scan performed 08/03/2018 which showed mildly enlarged left posterior cervical lymph nodes, largest measuring  1.5 x 1.0 cm and mildly enlarged aorticopulmonary and prevascular lymph nodes that were noted on contrast-enhanced CT scan seen less well on the PET scan.  No evidence of metastatic disease in the abdomen and pelvis.  She also underwent a brain MRI that showed no evidence of intracranial metastatic disease.  She underwent a biopsy of one of the lymph nodes in the left neck which showed metastatic adenocarcinoma consistent with breast origin, which was estrogen-receptor positive.  The tumor cells were positive for CK7, ER and DE consistent with metastatic breast carcinoma.  Estrogen receptor showed strong average nuclear intensity in 95% of carcinoma cells, progesterone receptor moderate average nuclear intensity in 70% of the carcinoma.  A GATA3 stain was apparently not performed.     She started Ibrance on 9/21/18 and letrozole on 9/23/18. She had improved disease on restaging on 11/26. Last restaging on 10/15/19 with stable disease.     Please see Dr. Bhatt's previous notes for further details on the patient's history. She comes in today for routine follow up.    Interval History:  -has been getting oral thrush. Will get white bumpy areas, especially on the right. Denies any erythema. Become sensitive. Occurring about once a month  -Eating and drinking well  -No GI or respiratory symptoms  -Does not feel any LAD in her neck  -Energy has been good  -Mood variable with COVID     ROS: 10 point ROS neg other than the symptoms noted above in the HPI.      Current Outpatient Medications   Medication Sig Dispense Refill     ACETAMINOPHEN PO Take 650 mg by mouth every 4 hours as needed for pain       albuterol (PROAIR HFA/PROVENTIL HFA/VENTOLIN HFA) 108 (90 Base) MCG/ACT inhaler Inhale 2 puffs into the lungs every 6 hours 1 Inhaler 0     albuterol (PROVENTIL) (2.5 MG/3ML) 0.083% neb solution Take 1 vial (2.5 mg) by nebulization every 6 hours as needed for shortness of breath / dyspnea or wheezing 30 vial 0     aspirin  (ASA) 81 MG chewable tablet Take 81 mg by mouth daily       calcium carbonate (TUMS) 500 MG chewable tablet Take 1 chew tab by mouth as needed for heartburn       Calcium Citrate-Vitamin D (CALCIUM + D PO) Take 250 mg by mouth 2 times daily        fluticasone-salmeterol (ADVAIR-HFA) 115-21 MCG/ACT inhaler Inhale 2 puffs into the lungs 2 times daily 1 Inhaler 2     letrozole (FEMARA) 2.5 MG tablet Take 1 tablet (2.5 mg) by mouth daily for 28 days 28 tablet 0     levothyroxine (SYNTHROID/LEVOTHROID) 175 MCG tablet Take 1 tablet (175 mcg) by mouth daily 90 tablet 3     loratadine (CLARITIN) 10 MG tablet Take 1 tablet (10 mg) by mouth daily 90 tablet 3     methylPREDNISolone (MEDROL) 16 MG tablet Take 32 mg by mouth 12 hours before the procedure and repeat 32 mg by mouth 2 hours before the procedure to prevent contrast reaction. 4 tablet 3     mirtazapine (REMERON) 15 MG tablet TK 1 T PO HS 30 tablet 3     Multiple Vitamins-Minerals (MULTIVITAMIN ADULT PO)        ondansetron (ZOFRAN-ODT) 4 MG ODT tab Take 4 mg by mouth       palbociclib (IBRANCE) 125 MG tablet Take 1 tablet (125 mg) by mouth daily with food Take for 21 days, then 7 days off. Avoid grapefruit. Do not open/crush/chew capsule. 21 capsule 0     palbociclib (IBRANCE) 125 MG tablet Take 1 tablet (125 mg) by mouth daily with food Take for 21 days, then 7 days off. Avoid grapefruit. Do not open/crush/chew capsule. 21 capsule 0     prochlorperazine (COMPAZINE) 10 MG tablet Take 1 tablet (10 mg) by mouth every 6 hours as needed for nausea or vomiting 30 tablet 0     sertraline (ZOLOFT) 100 MG tablet Take 1 tablet (100 mg) by mouth 2 times daily 60 tablet 3     triamcinolone (NASACORT) 55 MCG/ACT inhaler Spray 1 spray into both nostrils daily        VITAMIN D, CHOLECALCIFEROL, PO Take 2,000 Units by mouth daily        letrozole (FEMARA) 2.5 MG tablet Take 1 tablet (2.5 mg) by mouth daily for 28 days 28 tablet 0       Physical Examination:  General: The patient is  a pleasant female in no acute distress.  There were no vitals taken for this visit.  Wt Readings from Last 10 Encounters:   06/09/20 138.6 kg (305 lb 9.6 oz)   03/12/20 139.5 kg (307 lb 8 oz)   02/13/20 138.7 kg (305 lb 11.2 oz)   01/16/20 137.6 kg (303 lb 4.8 oz)   12/18/19 140.3 kg (309 lb 4.8 oz)   11/15/19 141.7 kg (312 lb 4.8 oz)   10/17/19 139.5 kg (307 lb 8 oz)   09/19/19 137.4 kg (303 lb)   08/22/19 137.1 kg (302 lb 3.2 oz)   07/25/19 137.1 kg (302 lb 3.2 oz)     Objective:  General: patient sounds in no audible acute distress, alert and oriented, speech clear and fluid  Resp: Speaking in full sentences, no audible respiratory distress, no cough, no audible wheeze  Psych: able to articulate logical thoughts, able to abstract reason, no tangential thoughts, no hallucinations or delusions  Her affect is normal    Laboratory Data:      8/10/2020 13:24   Sodium 136   Potassium 4.3   Chloride 106   Carbon Dioxide 22   Urea Nitrogen 11   Creatinine 0.87   GFR Estimate 78   GFR Estimate If Black >90   Calcium 9.4   Anion Gap 8   Albumin 3.8   Protein Total 8.5   Bilirubin Total 0.4   Alkaline Phosphatase 74   ALT 35   AST 18   CA 27-29 16   Glucose 144 (H)   WBC 3.7 (L)   Hemoglobin 13.2   Hematocrit 37.8   Platelet Count 153   RBC Count 3.95   MCV 96   MCH 33.4 (H)   MCHC 34.9   RDW 13.2   Diff Method Automated Method   % Neutrophils 68.9   % Lymphocytes 25.5   % Monocytes 4.8   % Eosinophils 0.0   % Basophils 0.5   % Immature Granulocytes 0.3   Nucleated RBCs 0   Absolute Neutrophil 2.6   Absolute Lymphocytes 1.0   Absolute Monocytes 0.2   Absolute Eosinophils 0.0   Absolute Basophils 0.0   Abs Immature Granulocytes 0.0   Absolute Nucleated RBC 0.0   CEA <0.5     CT CAP dated 8/10/20  IMPRESSION:  1.  No evidence of metastatic disease in the chest, abdomen or pelvis.  2.  Hepatomegaly with diffuse steatosis.    CT Neck dated 8/10/20  Impression:  Interval increase in size of few bilateral level 1 and level 2  lymph nodes, most pronounced with a left level 2B lymph node, which measures  1 cm in diameter, previously 6 mm. Findings are nonspecific and may represent a reactive process. However, metastasis could have a similar  appearance. Short interval follow-up is recommended for further evaluation.    Assessment and Plan:    Recurrent, metastatic ER-positive, HER2 negative breast cancer: History of left breast cancer s/p lumpectomy and SNLB in 2014. S/p 4 cycles ddAC and 12 weeks of Taxol. On AI from 11/2014-2/2017. Recurrence on PET/CT scan performed 08/03/2018 and then biopsy to left posterior cervical lymph nodes, aorticopulmonary, and prevascular lymph nodes. No evidence of metastatic disease in the abdomen and pelvis or brain. Started Ibrance and letrozole on 9/21/18.   -CT from 8/10 showed no disease in CAP, though had several new b/l LAD in her neck. Unclear if these represents metastatic disease or is reactive. With her tumor markers still improving and no other disease seen, favor that these may be reactive. Will continue with same treatment and repeat imaging in 2 months  (She has a contrast allergy and requires premeds for CT scans).   -labs are adequate to proceed with next cycle. Did need to delay last cycle due to neutropenia  -will follow-up in one month with labs    Bone health: No need for bisphosphonates at this time as she has not known bone mets. Continue calcium and vitamin D. Consider DEXA scan with long term letrozole use    Depression: On zoloft 200 mg and remeron at bedtime. Continue to follow-up with Sari Holliday. Variable with COVID, though feels she is overall doing ok    Hypothyroidism: Established care with endocrinology here on 6/29/20. Decreased her levothyroxine to 175 mcg (changed about a month ago). Continue to be managed by them    Thrush: Unable to exam though PCP previously had seen and dx. Has been occurring about once a month. Currently on Advair for asthma. Likely having increased  thrush during her catherine for ibrance. Continue to monitor and can use clotrimazole prn. Enforced rinsing her mouth after Advair    Phone Call Duration: 15 minutes    Helga Fernando PA-C  [facility]  [address] [place] [id number] [phone]

## 2020-08-13 NOTE — PROGRESS NOTES
"Jade Collins is a 50 year old female who is being evaluated via a billable telephone visit.      The patient has been notified of following:     \"This telephone visit will be conducted via a call between you and your physician/provider. We have found that certain health care needs can be provided without the need for a physical exam.  This service lets us provide the care you need with a short phone conversation.  If a prescription is necessary we can send it directly to your pharmacy.  If lab work is needed we can place an order for that and you can then stop by our lab to have the test done at a later time.    Telephone visits are billed at different rates depending on your insurance coverage. During this emergency period, for some insurers they may be billed the same as an in-person visit.  Please reach out to your insurance provider with any questions.    If during the course of the call the physician/provider feels a telephone visit is not appropriate, you will not be charged for this service.\"    Patient has given verbal consent for Telephone visit?  Yes    What phone number would you like to be contacted at? [phone]    How would you like to obtain your AVS? MyChart     I have reviewed and updated the patient's allergies and medication list. Patient was asked to provide any patient recorded vital signs, height and/or weight.  Please see in \"Patient Reported Vital Signs\" tab information.        Concerns: Patient has no new concerns.      Refills: None           Sb Rebolledo, EMT      "

## 2020-08-13 NOTE — LETTER
8/13/2020         RE: Jade Collins  [address]  [facility] [address]        Dear Colleague,    Thank you for referring your patient, Jade Collins, to the [facility]. Please see a copy of my visit note below.        Oncology/Hematology Visit Note  Aug 13, 2020    Reason for Visit: follow up of recurrent metastatic ER-positive HER2 negative breast cancer    History of Present Illness: Jade Collins is a 50 year old female with a history of left breast cancer diagnosed in 12/2013 in [place] area and Dr. David Redd was her medical oncologist. Biopsy showed tumor to be ER positive, HI positive and HER2 negative. She underwent lumpectomy and SLNB by Dr. Tasha Segura in [place]. [place] lymph node was noted to be involved with tumor but no lymph node dissection done at that time. She subsequently underwent adjuvant ddAC x 4 cycles and Taxol x 12 weeks, competed in 9/11/2014. She had post lumpectomy radiation completed in 11/23/2014 by Dr. Manley. She was begun on an AI in 11/2014, but does not recall name.     She was then switched within about a month or so to anastrozole and remained on anastrozole from 11/2014-02/2017.  She has never received tamoxifen. She then moved to [place], [place] in 03/2017.  She saw a gynecologist there and underwent a IRIS/BSO by Dr. Zendejas.  She then also saw Dr. Linares in [place], [place], who is an oncologist.  His interpretation of the pathology report was that she had triple-negative breast cancer.  It is possible that she may have had low estrogen receptor positivity, but the anastrozole was then discontinued in 02/2017.  She then underwent the IRIS/BSO in 03/2017.  This was done laparoscopically.        She then remained off of all hormonal therapy and in 09/2017 moved to [place].  She remained off hormonal therapy and did not have Medical Oncology followup initially.        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  She then went to see Dr. Norberto Brand who performed a PET/CT scan and the patient also underwent a brain MRI for staging.  The PET/CT scan performed 08/03/2018 which showed mildly enlarged left posterior cervical lymph nodes, largest measuring 1.5 x 1.0 cm and mildly enlarged aorticopulmonary and prevascular lymph nodes that were noted on contrast-enhanced CT scan seen less well on the PET scan.  No evidence of metastatic disease in the abdomen and pelvis.  She also underwent a brain MRI that showed no evidence of intracranial metastatic disease.  She underwent a biopsy of one of the lymph nodes in the left neck which showed metastatic adenocarcinoma consistent with breast origin, which was estrogen-receptor positive.  The tumor cells were positive for CK7, ER and IL consistent with metastatic breast carcinoma.  Estrogen receptor showed strong average nuclear intensity in 95% of carcinoma cells, progesterone receptor moderate average nuclear intensity in 70% of the carcinoma.  A GATA3 stain was apparently not performed.     She started Ibrance on 9/21/18 and letrozole on 9/23/18. She had improved disease on restaging on 11/26. Last restaging on 10/15/19 with stable disease.     Please see Dr. Bhatt's previous notes for further details on the patient's history. She comes in today for routine follow up.    Interval History:  -has been getting oral thrush. Will get white bumpy areas, especially on the right. Denies any erythema. Become sensitive. Occurring about once a month  -Eating and drinking well  -No GI or respiratory symptoms  -Does not feel any LAD in her neck  -Energy has been good  -Mood variable with COVID     ROS: 10 point ROS neg other than the symptoms noted above in the HPI.      Current Outpatient Medications   Medication Sig Dispense Refill     ACETAMINOPHEN PO Take 650 mg by mouth every 4 hours as needed for pain       albuterol (PROAIR HFA/PROVENTIL HFA/VENTOLIN HFA) 108 (90 Base) MCG/ACT  inhaler Inhale 2 puffs into the lungs every 6 hours 1 Inhaler 0     albuterol (PROVENTIL) (2.5 MG/3ML) 0.083% neb solution Take 1 vial (2.5 mg) by nebulization every 6 hours as needed for shortness of breath / dyspnea or wheezing 30 vial 0     aspirin (ASA) 81 MG chewable tablet Take 81 mg by mouth daily       calcium carbonate (TUMS) 500 MG chewable tablet Take 1 chew tab by mouth as needed for heartburn       Calcium Citrate-Vitamin D (CALCIUM + D PO) Take 250 mg by mouth 2 times daily        fluticasone-salmeterol (ADVAIR-HFA) 115-21 MCG/ACT inhaler Inhale 2 puffs into the lungs 2 times daily 1 Inhaler 2     letrozole (FEMARA) 2.5 MG tablet Take 1 tablet (2.5 mg) by mouth daily for 28 days 28 tablet 0     levothyroxine (SYNTHROID/LEVOTHROID) 175 MCG tablet Take 1 tablet (175 mcg) by mouth daily 90 tablet 3     loratadine (CLARITIN) 10 MG tablet Take 1 tablet (10 mg) by mouth daily 90 tablet 3     methylPREDNISolone (MEDROL) 16 MG tablet Take 32 mg by mouth 12 hours before the procedure and repeat 32 mg by mouth 2 hours before the procedure to prevent contrast reaction. 4 tablet 3     mirtazapine (REMERON) 15 MG tablet TK 1 T PO HS 30 tablet 3     Multiple Vitamins-Minerals (MULTIVITAMIN ADULT PO)        ondansetron (ZOFRAN-ODT) 4 MG ODT tab Take 4 mg by mouth       palbociclib (IBRANCE) 125 MG tablet Take 1 tablet (125 mg) by mouth daily with food Take for 21 days, then 7 days off. Avoid grapefruit. Do not open/crush/chew capsule. 21 capsule 0     palbociclib (IBRANCE) 125 MG tablet Take 1 tablet (125 mg) by mouth daily with food Take for 21 days, then 7 days off. Avoid grapefruit. Do not open/crush/chew capsule. 21 capsule 0     prochlorperazine (COMPAZINE) 10 MG tablet Take 1 tablet (10 mg) by mouth every 6 hours as needed for nausea or vomiting 30 tablet 0     sertraline (ZOLOFT) 100 MG tablet Take 1 tablet (100 mg) by mouth 2 times daily 60 tablet 3     triamcinolone (NASACORT) 55 MCG/ACT inhaler Spray 1  spray into both nostrils daily        VITAMIN D, CHOLECALCIFEROL, PO Take 2,000 Units by mouth daily        letrozole (FEMARA) 2.5 MG tablet Take 1 tablet (2.5 mg) by mouth daily for 28 days 28 tablet 0       Physical Examination:  General: The patient is a pleasant female in no acute distress.  There were no vitals taken for this visit.  Wt Readings from Last 10 Encounters:   06/09/20 138.6 kg (305 lb 9.6 oz)   03/12/20 139.5 kg (307 lb 8 oz)   02/13/20 138.7 kg (305 lb 11.2 oz)   01/16/20 137.6 kg (303 lb 4.8 oz)   12/18/19 140.3 kg (309 lb 4.8 oz)   11/15/19 141.7 kg (312 lb 4.8 oz)   10/17/19 139.5 kg (307 lb 8 oz)   09/19/19 137.4 kg (303 lb)   08/22/19 137.1 kg (302 lb 3.2 oz)   07/25/19 137.1 kg (302 lb 3.2 oz)     Objective:  General: patient sounds in no audible acute distress, alert and oriented, speech clear and fluid  Resp: Speaking in full sentences, no audible respiratory distress, no cough, no audible wheeze  Psych: able to articulate logical thoughts, able to abstract reason, no tangential thoughts, no hallucinations or delusions  Her affect is normal    Laboratory Data:      8/10/2020 13:24   Sodium 136   Potassium 4.3   Chloride 106   Carbon Dioxide 22   Urea Nitrogen 11   Creatinine 0.87   GFR Estimate 78   GFR Estimate If Black >90   Calcium 9.4   Anion Gap 8   Albumin 3.8   Protein Total 8.5   Bilirubin Total 0.4   Alkaline Phosphatase 74   ALT 35   AST 18   CA 27-29 16   Glucose 144 (H)   WBC 3.7 (L)   Hemoglobin 13.2   Hematocrit 37.8   Platelet Count 153   RBC Count 3.95   MCV 96   MCH 33.4 (H)   MCHC 34.9   RDW 13.2   Diff Method Automated Method   % Neutrophils 68.9   % Lymphocytes 25.5   % Monocytes 4.8   % Eosinophils 0.0   % Basophils 0.5   % Immature Granulocytes 0.3   Nucleated RBCs 0   Absolute Neutrophil 2.6   Absolute Lymphocytes 1.0   Absolute Monocytes 0.2   Absolute Eosinophils 0.0   Absolute Basophils 0.0   Abs Immature Granulocytes 0.0   Absolute Nucleated RBC 0.0   CEA <0.5      CT CAP dated 8/10/20  IMPRESSION:  1.  No evidence of metastatic disease in the chest, abdomen or pelvis.  2.  Hepatomegaly with diffuse steatosis.    CT Neck dated 8/10/20  Impression:  Interval increase in size of few bilateral level 1 and level 2 lymph nodes, most pronounced with a left level 2B lymph node, which measures  1 cm in diameter, previously 6 mm. Findings are nonspecific and may represent a reactive process. However, metastasis could have a similar  appearance. Short interval follow-up is recommended for further evaluation.    Assessment and Plan:    Recurrent, metastatic ER-positive, HER2 negative breast cancer: History of left breast cancer s/p lumpectomy and SNLB in 2014. S/p 4 cycles ddAC and 12 weeks of Taxol. On AI from 11/2014-2/2017. Recurrence on PET/CT scan performed 08/03/2018 and then biopsy to left posterior cervical lymph nodes, aorticopulmonary, and prevascular lymph nodes. No evidence of metastatic disease in the abdomen and pelvis or brain. Started Ibrance and letrozole on 9/21/18.   -CT from 8/10 showed no disease in CAP, though had several new b/l LAD in her neck. Unclear if these represents metastatic disease or is reactive. With her tumor markers still improving and no other disease seen, favor that these may be reactive. Will continue with same treatment and repeat imaging in 2 months  (She has a contrast allergy and requires premeds for CT scans).   -labs are adequate to proceed with next cycle. Did need to delay last cycle due to neutropenia  -will follow-up in one month with labs    Bone health: No need for bisphosphonates at this time as she has not known bone mets. Continue calcium and vitamin D. Consider DEXA scan with long term letrozole use    Depression: On zoloft 200 mg and remeron at bedtime. Continue to follow-up with Sari Holliday. Variable with COVID, though feels she is overall doing ok    Hypothyroidism: Established care with endocrinology here on 6/29/20.  Decreased her levothyroxine to 175 mcg (changed about a month ago). Continue to be managed by them    Thrush: Unable to exam though PCP previously had seen and dx. Has been occurring about once a month. Currently on Advair for asthma. Likely having increased thrush during her catherine for ibrance. Continue to monitor and can use clotrimazole prn. Enforced rinsing her mouth after Advair    Phone Call Duration: 15 minutes    Helga Fernando PA-C  [facility]  [address] [place] [id number] [phone]

## 2020-09-09 ENCOUNTER — TELEPHONE (OUTPATIENT)
Dept: PULMONOLOGY | Facility: CLINIC | Age: 50
End: 2020-09-09

## 2020-09-09 DIAGNOSIS — B37.0 THRUSH: ICD-10-CM

## 2020-09-09 DIAGNOSIS — J45.20 MILD INTERMITTENT ASTHMA WITHOUT COMPLICATION: ICD-10-CM

## 2020-09-09 DIAGNOSIS — C50.919 METASTATIC BREAST CANCER: ICD-10-CM

## 2020-09-09 DIAGNOSIS — Z17.0 MALIGNANT NEOPLASM OF UPPER-OUTER QUADRANT OF LEFT BREAST IN FEMALE, ESTROGEN RECEPTOR POSITIVE (H): ICD-10-CM

## 2020-09-09 DIAGNOSIS — C50.412 MALIGNANT NEOPLASM OF UPPER-OUTER QUADRANT OF LEFT BREAST IN FEMALE, ESTROGEN RECEPTOR POSITIVE (H): ICD-10-CM

## 2020-09-09 RX ORDER — ALBUTEROL SULFATE 90 UG/1
2 AEROSOL, METERED RESPIRATORY (INHALATION) EVERY 6 HOURS
Qty: 1 INHALER | Refills: 0 | OUTPATIENT
Start: 2020-09-09

## 2020-09-09 NOTE — TELEPHONE ENCOUNTER
Spoke with patient and explained that clinic will refill one Advair HFA and one Albuterol inhaler as she needs return apt for further refills. Gave her apt number and also explained that [facility] needs a PA and that is why Advair HFA ordered.

## 2020-09-09 NOTE — TELEPHONE ENCOUNTER
Central Prior Authorization Team   Phone: [phone]      Would like to confirm the med/strength the P/A request should be for. Request came through as Flutic/Salmet 113-14mcg.    Fluticasone-Salmeterol 113-14mcg ([facility] Respiclick), shows as d/c'd on 07/10 on medication list.  Fluticasone-Salmeterol 115-21mcg (Advair HFA), shows as still active on medication list.

## 2020-09-09 NOTE — TELEPHONE ENCOUNTER
Left message with patient to state that clinic can not refill pulmonary prescriptions as has not been seen in clinic since April of 2019. Refused refill.

## 2020-09-09 NOTE — TELEPHONE ENCOUNTER
Prior Authorization Retail Medication Request    Medication/Dose: Fluticasone Salmeterol 113-14MCG   ICD code (if different than what is on RX):    Previously Tried and Failed:    Rationale:      Insurance Name:    Insurance ID:        Pharmacy Information (if different than what is on RX)  Name:  [facility]  Phone:  [phone]

## 2020-09-10 DIAGNOSIS — E06.3 HYPOTHYROIDISM DUE TO HASHIMOTO'S THYROIDITIS: ICD-10-CM

## 2020-09-10 DIAGNOSIS — Z17.0 MALIGNANT NEOPLASM OF UPPER-OUTER QUADRANT OF LEFT BREAST IN FEMALE, ESTROGEN RECEPTOR POSITIVE (H): ICD-10-CM

## 2020-09-10 DIAGNOSIS — C50.412 MALIGNANT NEOPLASM OF UPPER-OUTER QUADRANT OF LEFT BREAST IN FEMALE, ESTROGEN RECEPTOR POSITIVE (H): ICD-10-CM

## 2020-09-10 LAB
ALBUMIN SERPL-MCNC: 3.8 G/DL (ref 3.4–5)
ALP SERPL-CCNC: 73 U/L (ref 40–150)
ALT SERPL W P-5'-P-CCNC: 31 U/L (ref 0–50)
ANION GAP SERPL CALCULATED.3IONS-SCNC: 6 MMOL/L (ref 3–14)
AST SERPL W P-5'-P-CCNC: 24 U/L (ref 0–45)
BASOPHILS # BLD AUTO: 0.1 10E9/L (ref 0–0.2)
BASOPHILS NFR BLD AUTO: 2.1 %
BILIRUB SERPL-MCNC: 0.5 MG/DL (ref 0.2–1.3)
BUN SERPL-MCNC: 16 MG/DL (ref 7–30)
CALCIUM SERPL-MCNC: 9.5 MG/DL (ref 8.5–10.1)
CHLORIDE SERPL-SCNC: 107 MMOL/L (ref 94–109)
CO2 SERPL-SCNC: 26 MMOL/L (ref 20–32)
CREAT SERPL-MCNC: 1.08 MG/DL (ref 0.52–1.04)
DIFFERENTIAL METHOD BLD: ABNORMAL
EOSINOPHIL # BLD AUTO: 0.1 10E9/L (ref 0–0.7)
EOSINOPHIL NFR BLD AUTO: 2.9 %
ERYTHROCYTE [DISTWIDTH] IN BLOOD BY AUTOMATED COUNT: 14.1 % (ref 10–15)
GFR SERPL CREATININE-BSD FRML MDRD: 60 ML/MIN/{1.73_M2}
GLUCOSE SERPL-MCNC: 112 MG/DL (ref 70–99)
HCT VFR BLD AUTO: 38.5 % (ref 35–47)
HGB BLD-MCNC: 13.2 G/DL (ref 11.7–15.7)
IMM GRANULOCYTES # BLD: 0 10E9/L (ref 0–0.4)
IMM GRANULOCYTES NFR BLD: 0 %
LYMPHOCYTES # BLD AUTO: 1.1 10E9/L (ref 0.8–5.3)
LYMPHOCYTES NFR BLD AUTO: 47.1 %
MCH RBC QN AUTO: 33.4 PG (ref 26.5–33)
MCHC RBC AUTO-ENTMCNC: 34.3 G/DL (ref 31.5–36.5)
MCV RBC AUTO: 98 FL (ref 78–100)
MONOCYTES # BLD AUTO: 0.3 10E9/L (ref 0–1.3)
MONOCYTES NFR BLD AUTO: 12.1 %
NEUTROPHILS # BLD AUTO: 0.9 10E9/L (ref 1.6–8.3)
NEUTROPHILS NFR BLD AUTO: 35.8 %
NRBC # BLD AUTO: 0 10*3/UL
NRBC BLD AUTO-RTO: 0 /100
PLATELET # BLD AUTO: 138 10E9/L (ref 150–450)
POTASSIUM SERPL-SCNC: 4.1 MMOL/L (ref 3.4–5.3)
PROT SERPL-MCNC: 7.8 G/DL (ref 6.8–8.8)
RBC # BLD AUTO: 3.95 10E12/L (ref 3.8–5.2)
SODIUM SERPL-SCNC: 139 MMOL/L (ref 133–144)
T4 FREE SERPL-MCNC: 0.78 NG/DL (ref 0.76–1.46)
TSH SERPL DL<=0.005 MIU/L-ACNC: 2.51 MU/L (ref 0.4–4)
WBC # BLD AUTO: 2.4 10E9/L (ref 4–11)

## 2020-09-11 ENCOUNTER — VIRTUAL VISIT (OUTPATIENT)
Dept: ONCOLOGY | Facility: CLINIC | Age: 50
End: 2020-09-11
Attending: INTERNAL MEDICINE
Payer: COMMERCIAL

## 2020-09-11 DIAGNOSIS — C50.412 MALIGNANT NEOPLASM OF UPPER-OUTER QUADRANT OF LEFT BREAST IN FEMALE, ESTROGEN RECEPTOR POSITIVE (H): Primary | ICD-10-CM

## 2020-09-11 DIAGNOSIS — Z17.0 MALIGNANT NEOPLASM OF UPPER-OUTER QUADRANT OF LEFT BREAST IN FEMALE, ESTROGEN RECEPTOR POSITIVE (H): Primary | ICD-10-CM

## 2020-09-11 PROCEDURE — 99213 OFFICE O/P EST LOW 20 MIN: CPT | Mod: TEL | Performed by: PHYSICIAN ASSISTANT

## 2020-09-11 PROCEDURE — 40001009 ZZH VIDEO/TELEPHONE VISIT; NO CHARGE

## 2020-09-11 RX ORDER — CLOTRIMAZOLE 10 MG/1
LOZENGE ORAL
Qty: 60 LOZENGE | OUTPATIENT
Start: 2020-09-11

## 2020-09-11 NOTE — PROGRESS NOTES
"Jade Collins is a 50 year old female who is being evaluated via a billable telephone visit.      The patient has been notified of following:     \"This telephone visit will be conducted via a call between you and your physician/provider. We have found that certain health care needs can be provided without the need for a physical exam.  This service lets us provide the care you need with a short phone conversation.  If a prescription is necessary we can send it directly to your pharmacy.  If lab work is needed we can place an order for that and you can then stop by our lab to have the test done at a later time.    Telephone visits are billed at different rates depending on your insurance coverage. During this emergency period, for some insurers they may be billed the same as an in-person visit.  Please reach out to your insurance provider with any questions.    If during the course of the call the physician/provider feels a telephone visit is not appropriate, you will not be charged for this service.\"    Patient has given verbal consent for Telephone visit?  Yes    What phone number would you like to be contacted at? [phone]    How would you like to obtain your AVS? MyChart     I have reviewed and updated the patient's allergies and medication list. Patient was asked to provide any patient recorded vital signs, height and/or weight.  Please see in \"Patient Reported Vital Signs\" tab information.        Concerns: Patient has no new concerns.      Refills: None           Sb Rebolledo, EMT      "

## 2020-09-11 NOTE — LETTER
"    9/11/2020         RE: Jade Collins  [address]  [facility] [address]      Jade Collins is a 50 year old female who is being evaluated via a billable telephone visit.      The patient has been notified of following:     \"This telephone visit will be conducted via a call between you and your physician/provider. We have found that certain health care needs can be provided without the need for a physical exam.  This service lets us provide the care you need with a short phone conversation.  If a prescription is necessary we can send it directly to your pharmacy.  If lab work is needed we can place an order for that and you can then stop by our lab to have the test done at a later time.    Telephone visits are billed at different rates depending on your insurance coverage. During this emergency period, for some insurers they may be billed the same as an in-person visit.  Please reach out to your insurance provider with any questions.    If during the course of the call the physician/provider feels a telephone visit is not appropriate, you will not be charged for this service.\"    Patient has given verbal consent for Telephone visit?  Yes    What phone number would you like to be contacted at? [phone]    How would you like to obtain your AVS? Soledad     I have reviewed and updated the patient's allergies and medication list. Patient was asked to provide any patient recorded vital signs, height and/or weight.  Please see in \"Patient Reported Vital Signs\" tab information.        Concerns: Patient has no new concerns.      Refills: None           KATH Coulter        Oncology/Hematology Visit Note  Sep 11, 2020    Reason for Visit: follow up of recurrent metastatic ER-positive HER2 negative breast cancer    History of Present Illness: Jade Collins is a 50 year old female with a history of left breast cancer diagnosed in 12/2013 in [place] area and Dr. David Redd was her medical oncologist. Biopsy " showed tumor to be ER positive, FL positive and HER2 negative. She underwent lumpectomy and SLNB by Dr. Tasha Segura in [place]. [place] lymph node was noted to be involved with tumor but no lymph node dissection done at that time. She subsequently underwent adjuvant ddAC x 4 cycles and Taxol x 12 weeks, competed in 9/11/2014. She had post lumpectomy radiation completed in 11/23/2014 by Dr. Manley. She was begun on an AI in 11/2014, but does not recall name.     She was then switched within about a month or so to anastrozole and remained on anastrozole from 11/2014-02/2017.  She has never received tamoxifen. She then moved to [place], [place] in 03/2017.  She saw a gynecologist there and underwent a IRIS/BSO by Dr. Zendejas.  She then also saw Dr. Linares in [place], [place], who is an oncologist.  His interpretation of the pathology report was that she had triple-negative breast cancer.  It is possible that she may have had low estrogen receptor positivity, but the anastrozole was then discontinued in 02/2017.  She then underwent the IRIS/BSO in 03/2017.  This was done laparoscopically.        She then remained off of all hormonal therapy and in 09/2017 moved to [place].  She remained off hormonal therapy and did not have Medical Oncology followup initially.        [place] [employer] and noticed lymph nodes in the left neck about 8 weeks ago.  She then went to see Dr. Norberto Brand who performed a PET/CT scan and the patient also underwent a brain MRI for staging.  The PET/CT scan performed 08/03/2018 which showed mildly enlarged left posterior cervical lymph nodes, largest measuring 1.5 x 1.0 cm and mildly enlarged aorticopulmonary and prevascular lymph nodes that were noted on contrast-enhanced CT scan seen less well on the PET scan.  No evidence of metastatic disease in the abdomen and pelvis.  She also underwent a brain MRI that showed no evidence of intracranial metastatic disease.  She underwent a biopsy of one  of the lymph nodes in the left neck which showed metastatic adenocarcinoma consistent with breast origin, which was estrogen-receptor positive.  The tumor cells were positive for CK7, ER and GA consistent with metastatic breast carcinoma.  Estrogen receptor showed strong average nuclear intensity in 95% of carcinoma cells, progesterone receptor moderate average nuclear intensity in 70% of the carcinoma.  A GATA3 stain was apparently not performed.     She started Ibrance on 9/21/18 and letrozole on 9/23/18. She had improved disease on restaging on 11/26. Last restaging on 10/15/19 with stable disease.     Please see Dr. Bhatt's previous notes for further details on the patient's history. She comes in today for routine follow up.    Interval History:  -she has been more fatigued the last couple days. Unclear why. Sleeping about 10 hours at night.  -No f/c  -Eating and drinking fine  -No  symptoms  -No SOB, CP or cough  -Had increased GERD a couple of weeks ago  -No N/V. Normal stools  -No new LAD  -Thrush is now cleared      ROS: 10 point ROS neg other than the symptoms noted above in the HPI.      Current Outpatient Medications   Medication Sig Dispense Refill     ACETAMINOPHEN PO Take 650 mg by mouth every 4 hours as needed for pain       albuterol (PROAIR HFA/PROVENTIL HFA/VENTOLIN HFA) 108 (90 Base) MCG/ACT inhaler Inhale 2 puffs into the lungs every 6 hours 1 Inhaler 0     albuterol (PROVENTIL) (2.5 MG/3ML) 0.083% neb solution Take 1 vial (2.5 mg) by nebulization every 6 hours as needed for shortness of breath / dyspnea or wheezing 30 vial 0     aspirin (ASA) 81 MG chewable tablet Take 81 mg by mouth daily       calcium carbonate (TUMS) 500 MG chewable tablet Take 1 chew tab by mouth as needed for heartburn       Calcium Citrate-Vitamin D (CALCIUM + D PO) Take 250 mg by mouth 2 times daily        clotrimazole (MYCELEX) 10 MG lozenge Place 1 lozenge (10 mg) inside cheek 5 times daily 60 each 0      fluticasone-salmeterol (ADVAIR-HFA) 115-21 MCG/ACT inhaler Inhale 2 puffs into the lungs 2 times daily 1 Inhaler 2     levothyroxine (SYNTHROID/LEVOTHROID) 175 MCG tablet Take 1 tablet (175 mcg) by mouth daily 90 tablet 3     loratadine (CLARITIN) 10 MG tablet Take 1 tablet (10 mg) by mouth daily 90 tablet 3     methylPREDNISolone (MEDROL) 16 MG tablet Take 32 mg by mouth 12 hours before the procedure and repeat 32 mg by mouth 2 hours before the procedure to prevent contrast reaction. 4 tablet 3     mirtazapine (REMERON) 15 MG tablet TK 1 T PO HS 30 tablet 3     Multiple Vitamins-Minerals (MULTIVITAMIN ADULT PO)        ondansetron (ZOFRAN-ODT) 4 MG ODT tab Take 4 mg by mouth       palbociclib (IBRANCE) 125 MG tablet Take 1 tablet (125 mg) by mouth daily with food Take for 21 days, then 7 days off. Avoid grapefruit. Do not open/crush/chew capsule. 21 capsule 0     palbociclib (IBRANCE) 125 MG tablet Take 1 tablet (125 mg) by mouth daily with food Take for 21 days, then 7 days off. Avoid grapefruit. Do not open/crush/chew capsule. 21 capsule 0     prochlorperazine (COMPAZINE) 10 MG tablet Take 1 tablet (10 mg) by mouth every 6 hours as needed for nausea or vomiting 30 tablet 0     sertraline (ZOLOFT) 100 MG tablet Take 1 tablet (100 mg) by mouth 2 times daily 60 tablet 3     triamcinolone (NASACORT) 55 MCG/ACT inhaler Spray 1 spray into both nostrils daily        VITAMIN D, CHOLECALCIFEROL, PO Take 2,000 Units by mouth daily        letrozole (FEMARA) 2.5 MG tablet Take 1 tablet (2.5 mg) by mouth daily for 28 days 28 tablet 0     letrozole (FEMARA) 2.5 MG tablet Take 1 tablet (2.5 mg) by mouth daily for 28 days 28 tablet 0       Physical Examination:  General: The patient is a pleasant female in no acute distress.  There were no vitals taken for this visit.  Wt Readings from Last 10 Encounters:   06/09/20 138.6 kg (305 lb 9.6 oz)   03/12/20 139.5 kg (307 lb 8 oz)   02/13/20 138.7 kg (305 lb 11.2 oz)   01/16/20 137.6 kg  (303 lb 4.8 oz)   12/18/19 140.3 kg (309 lb 4.8 oz)   11/15/19 141.7 kg (312 lb 4.8 oz)   10/17/19 139.5 kg (307 lb 8 oz)   09/19/19 137.4 kg (303 lb)   08/22/19 137.1 kg (302 lb 3.2 oz)   07/25/19 137.1 kg (302 lb 3.2 oz)     Objective:  General: patient sounds in no audible acute distress, alert and oriented, speech clear and fluid  Resp: Speaking in full sentences, no audible respiratory distress, no cough, no audible wheeze  Psych: able to articulate logical thoughts, able to abstract reason, no tangential thoughts, no hallucinations or delusions  Her affect is normal    Laboratory Data:      9/10/2020 12:06   Sodium 139   Potassium 4.1   Chloride 107   Carbon Dioxide 26   Urea Nitrogen 16   Creatinine 1.08 (H)   GFR Estimate 60 (L)   GFR Estimate If Black 69   Calcium 9.5   Anion Gap 6   Albumin 3.8   Protein Total 7.8   Bilirubin Total 0.5   Alkaline Phosphatase 73   ALT 31   AST 24   T4 Free 0.78   TSH 2.51   Glucose 112 (H)   WBC 2.4 (L)   Hemoglobin 13.2   Hematocrit 38.5   Platelet Count 138 (L)   RBC Count 3.95   MCV 98   MCH 33.4 (H)   MCHC 34.3   RDW 14.1   Diff Method Automated Method   % Neutrophils 35.8   % Lymphocytes 47.1   % Monocytes 12.1   % Eosinophils 2.9   % Basophils 2.1   % Immature Granulocytes 0.0   Nucleated RBCs 0   Absolute Neutrophil 0.9 (L)   Absolute Lymphocytes 1.1   Absolute Monocytes 0.3   Absolute Eosinophils 0.1   Absolute Basophils 0.1   Abs Immature Granulocytes 0.0   Absolute Nucleated RBC 0.0         Assessment and Plan:    Recurrent, metastatic ER-positive, HER2 negative breast cancer: History of left breast cancer s/p lumpectomy and SNLB in 2014. S/p 4 cycles ddAC and 12 weeks of Taxol. On AI from 11/2014-2/2017. Recurrence on PET/CT scan performed 08/03/2018 and then biopsy to left posterior cervical lymph nodes, aorticopulmonary, and prevascular lymph nodes. No evidence of metastatic disease in the abdomen and pelvis or brain. Started Ibrance and letrozole on 9/21/18.    -CT from 8/10 showed no disease in CAP, though had several new b/l LAD in her neck. Unclear if these represents metastatic disease or is reactive. With her tumor markers still improving and no other disease seen, favor that these may be reactive. Will continue with same treatment and repeat imaging in 2 months  (She has a contrast allergy and requires premeds for CT scans).   -Neutropenic again. This is the second time recently that she has needed to be delayed due to neutropenia. Will discuss with Dr. Bhatt about whether we need to dose reduce her Ibrance  -will have her repeat labs in one week and then will restart Ibrance if ANC > 1.0 (again will check on the dosing)  -reviewed neutropenic precautions   -will follow-up with Dr. Bhatt in 1 month with imaging prior    Bone health: No need for bisphosphonates at this time as she has not known bone mets. Continue calcium and vitamin D. Consider DEXA scan with long term letrozole use    Depression: On zoloft 200 mg and remeron at bedtime. Continue to follow-up with Sari Holliday. Variable with COVID, though feels she is overall doing ok. Stable    Hypothyroidism: Established care with endocrinology here on 6/29/20. Decreased her levothyroxine to 175 mcg (changed about a month ago). Continue to be managed by them    Thrush: Unable to exam though PCP previously had seen and dx. Currently on Advair for asthma. Likely having increased thrush during her catherine for ibrance. Continue to monitor and can use clotrimazole prn. Enforced rinsing her mouth after Advair. Has now resolved    Fatigue: Started a couple days ago. Feels more tired and sleeping 10 hours at night. No new infectious symptoms. Unclear cause. Will call if she starts to have localizing symptoms     Phone Call Duration: 11 minutes    Helga Fernando PA-C  [facility]  [address] [place] [id number] [phone]                      Helga Fernando PA-C

## 2020-09-11 NOTE — LETTER
"9/11/2020     RE: Jade Collins  [address]  [facility] [address]    Dear Colleague,    Thank you for referring your patient, Jade Collins, to the [facility]. Please see a copy of my visit note below.    Jade Collins is a 50 year old female who is being evaluated via a billable telephone visit.      The patient has been notified of following:     \"This telephone visit will be conducted via a call between you and your physician/provider. We have found that certain health care needs can be provided without the need for a physical exam.  This service lets us provide the care you need with a short phone conversation.  If a prescription is necessary we can send it directly to your pharmacy.  If lab work is needed we can place an order for that and you can then stop by our lab to have the test done at a later time.    Telephone visits are billed at different rates depending on your insurance coverage. During this emergency period, for some insurers they may be billed the same as an in-person visit.  Please reach out to your insurance provider with any questions.    If during the course of the call the physician/provider feels a telephone visit is not appropriate, you will not be charged for this service.\"    Patient has given verbal consent for Telephone visit?  Yes    What phone number would you like to be contacted at? [phone]    How would you like to obtain your AVS? Soledad     I have reviewed and updated the patient's allergies and medication list. Patient was asked to provide any patient recorded vital signs, height and/or weight.  Please see in \"Patient Reported Vital Signs\" tab information.        Concerns: Patient has no new concerns.      Refills: None           KATH Coulter        Oncology/Hematology Visit Note  Sep 11, 2020    Reason for Visit: follow up of recurrent metastatic ER-positive HER2 negative breast cancer    History of Present Illness: Jade Collins is a 50 year " old female with a history of left breast cancer diagnosed in 12/2013 in [place] area and Dr. David Redd was her medical oncologist. Biopsy showed tumor to be ER positive, [place] positive and HER2 negative. She underwent lumpectomy and SLNB by Dr. Tasha Segura in [place]. [place] lymph node was noted to be involved with tumor but no lymph node dissection done at that time. She subsequently underwent adjuvant ddAC x 4 cycles and Taxol x 12 weeks, competed in 9/11/2014. She had post lumpectomy radiation completed in 11/23/2014 by Dr. Manley. She was begun on an AI in 11/2014, but does not recall name.     She was then switched within about a month or so to anastrozole and remained on anastrozole from 11/2014-02/2017.  She has never received tamoxifen. She then moved to [place], [place] in 03/2017.  She saw a gynecologist there and underwent a IRIS/BSO by Dr. Zendejas.  She then also saw Dr. Linares in [place], [place], who is an oncologist.  His interpretation of the pathology report was that she had triple-negative breast cancer.  It is possible that she may have had low estrogen receptor positivity, but the anastrozole was then discontinued in 02/2017.  She then underwent the IRIS/BSO in 03/2017.  This was done laparoscopically.        She then remained off of all hormonal therapy and in 09/2017 moved to [place].  She remained off hormonal therapy and did not have Medical Oncology followup initially.        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  She then went to see Dr. Norberto Brand who performed a PET/CT scan and the patient also underwent a brain MRI for staging.  The PET/CT scan performed 08/03/2018 which showed mildly enlarged left posterior cervical lymph nodes, largest measuring 1.5 x 1.0 cm and mildly enlarged aorticopulmonary and prevascular lymph nodes that were noted on contrast-enhanced CT scan seen less well on the PET scan.  No evidence of metastatic disease in the  abdomen and pelvis.  She also underwent a brain MRI that showed no evidence of intracranial metastatic disease.  She underwent a biopsy of one of the lymph nodes in the left neck which showed metastatic adenocarcinoma consistent with breast origin, which was estrogen-receptor positive.  The tumor cells were positive for CK7, ER and DE consistent with metastatic breast carcinoma.  Estrogen receptor showed strong average nuclear intensity in 95% of carcinoma cells, progesterone receptor moderate average nuclear intensity in 70% of the carcinoma.  A GATA3 stain was apparently not performed.     She started Ibrance on 9/21/18 and letrozole on 9/23/18. She had improved disease on restaging on 11/26. Last restaging on 10/15/19 with stable disease.     Please see Dr. Bhatt's previous notes for further details on the patient's history. She comes in today for routine follow up.    Interval History:  -she has been more fatigued the last couple days. Unclear why. Sleeping about 10 hours at night.  -No f/c  -Eating and drinking fine  -No  symptoms  -No SOB, CP or cough  -Had increased GERD a couple of weeks ago  -No N/V. Normal stools  -No new LAD  -Thrush is now cleared      ROS: 10 point ROS neg other than the symptoms noted above in the HPI.      Current Outpatient Medications   Medication Sig Dispense Refill     ACETAMINOPHEN PO Take 650 mg by mouth every 4 hours as needed for pain       albuterol (PROAIR HFA/PROVENTIL HFA/VENTOLIN HFA) 108 (90 Base) MCG/ACT inhaler Inhale 2 puffs into the lungs every 6 hours 1 Inhaler 0     albuterol (PROVENTIL) (2.5 MG/3ML) 0.083% neb solution Take 1 vial (2.5 mg) by nebulization every 6 hours as needed for shortness of breath / dyspnea or wheezing 30 vial 0     aspirin (ASA) 81 MG chewable tablet Take 81 mg by mouth daily       calcium carbonate (TUMS) 500 MG chewable tablet Take 1 chew tab by mouth as needed for heartburn       Calcium Citrate-Vitamin D (CALCIUM + D PO) Take 250 mg  by mouth 2 times daily        clotrimazole (MYCELEX) 10 MG lozenge Place 1 lozenge (10 mg) inside cheek 5 times daily 60 each 0     fluticasone-salmeterol (ADVAIR-HFA) 115-21 MCG/ACT inhaler Inhale 2 puffs into the lungs 2 times daily 1 Inhaler 2     levothyroxine (SYNTHROID/LEVOTHROID) 175 MCG tablet Take 1 tablet (175 mcg) by mouth daily 90 tablet 3     loratadine (CLARITIN) 10 MG tablet Take 1 tablet (10 mg) by mouth daily 90 tablet 3     methylPREDNISolone (MEDROL) 16 MG tablet Take 32 mg by mouth 12 hours before the procedure and repeat 32 mg by mouth 2 hours before the procedure to prevent contrast reaction. 4 tablet 3     mirtazapine (REMERON) 15 MG tablet TK 1 T PO HS 30 tablet 3     Multiple Vitamins-Minerals (MULTIVITAMIN ADULT PO)        ondansetron (ZOFRAN-ODT) 4 MG ODT tab Take 4 mg by mouth       palbociclib (IBRANCE) 125 MG tablet Take 1 tablet (125 mg) by mouth daily with food Take for 21 days, then 7 days off. Avoid grapefruit. Do not open/crush/chew capsule. 21 capsule 0     palbociclib (IBRANCE) 125 MG tablet Take 1 tablet (125 mg) by mouth daily with food Take for 21 days, then 7 days off. Avoid grapefruit. Do not open/crush/chew capsule. 21 capsule 0     prochlorperazine (COMPAZINE) 10 MG tablet Take 1 tablet (10 mg) by mouth every 6 hours as needed for nausea or vomiting 30 tablet 0     sertraline (ZOLOFT) 100 MG tablet Take 1 tablet (100 mg) by mouth 2 times daily 60 tablet 3     triamcinolone (NASACORT) 55 MCG/ACT inhaler Spray 1 spray into both nostrils daily        VITAMIN D, CHOLECALCIFEROL, PO Take 2,000 Units by mouth daily        letrozole (FEMARA) 2.5 MG tablet Take 1 tablet (2.5 mg) by mouth daily for 28 days 28 tablet 0     letrozole (FEMARA) 2.5 MG tablet Take 1 tablet (2.5 mg) by mouth daily for 28 days 28 tablet 0       Physical Examination:  General: The patient is a pleasant female in no acute distress.  There were no vitals taken for this visit.  Wt Readings from Last 10  Encounters:   06/09/20 138.6 kg (305 lb 9.6 oz)   03/12/20 139.5 kg (307 lb 8 oz)   02/13/20 138.7 kg (305 lb 11.2 oz)   01/16/20 137.6 kg (303 lb 4.8 oz)   12/18/19 140.3 kg (309 lb 4.8 oz)   11/15/19 141.7 kg (312 lb 4.8 oz)   10/17/19 139.5 kg (307 lb 8 oz)   09/19/19 137.4 kg (303 lb)   08/22/19 137.1 kg (302 lb 3.2 oz)   07/25/19 137.1 kg (302 lb 3.2 oz)     Objective:  General: patient sounds in no audible acute distress, alert and oriented, speech clear and fluid  Resp: Speaking in full sentences, no audible respiratory distress, no cough, no audible wheeze  Psych: able to articulate logical thoughts, able to abstract reason, no tangential thoughts, no hallucinations or delusions  Her affect is normal    Laboratory Data:      9/10/2020 12:06   Sodium 139   Potassium 4.1   Chloride 107   Carbon Dioxide 26   Urea Nitrogen 16   Creatinine 1.08 (H)   GFR Estimate 60 (L)   GFR Estimate If Black 69   Calcium 9.5   Anion Gap 6   Albumin 3.8   Protein Total 7.8   Bilirubin Total 0.5   Alkaline Phosphatase 73   ALT 31   AST 24   T4 Free 0.78   TSH 2.51   Glucose 112 (H)   WBC 2.4 (L)   Hemoglobin 13.2   Hematocrit 38.5   Platelet Count 138 (L)   RBC Count 3.95   MCV 98   MCH 33.4 (H)   MCHC 34.3   RDW 14.1   Diff Method Automated Method   % Neutrophils 35.8   % Lymphocytes 47.1   % Monocytes 12.1   % Eosinophils 2.9   % Basophils 2.1   % Immature Granulocytes 0.0   Nucleated RBCs 0   Absolute Neutrophil 0.9 (L)   Absolute Lymphocytes 1.1   Absolute Monocytes 0.3   Absolute Eosinophils 0.1   Absolute Basophils 0.1   Abs Immature Granulocytes 0.0   Absolute Nucleated RBC 0.0         Assessment and Plan:    Recurrent, metastatic ER-positive, HER2 negative breast cancer: History of left breast cancer s/p lumpectomy and SNLB in 2014. S/p 4 cycles ddAC and 12 weeks of Taxol. On AI from 11/2014-2/2017. Recurrence on PET/CT scan performed 08/03/2018 and then biopsy to left posterior cervical lymph nodes, aorticopulmonary, and  prevascular lymph nodes. No evidence of metastatic disease in the abdomen and pelvis or brain. Started Ibrance and letrozole on 9/21/18.   -CT from 8/10 showed no disease in CAP, though had several new b/l LAD in her neck. Unclear if these represents metastatic disease or is reactive. With her tumor markers still improving and no other disease seen, favor that these may be reactive. Will continue with same treatment and repeat imaging in 2 months  (She has a contrast allergy and requires premeds for CT scans).   -Neutropenic again. This is the second time recently that she has needed to be delayed due to neutropenia. Will discuss with Dr. Bhatt about whether we need to dose reduce her Ibrance  -will have her repeat labs in one week and then will restart Ibrance if ANC > 1.0 (again will check on the dosing)  -reviewed neutropenic precautions   -will follow-up with Dr. Bhatt in 1 month with imaging prior    Bone health: No need for bisphosphonates at this time as she has not known bone mets. Continue calcium and vitamin D. Consider DEXA scan with long term letrozole use    Depression: On zoloft 200 mg and remeron at bedtime. Continue to follow-up with Sari Holliday. Variable with COVID, though feels she is overall doing ok. Stable    Hypothyroidism: Established care with endocrinology here on 6/29/20. Decreased her levothyroxine to 175 mcg (changed about a month ago). Continue to be managed by them    Thrush: Unable to exam though PCP previously had seen and dx. Currently on Advair for asthma. Likely having increased thrush during her catherine for ibrance. Continue to monitor and can use clotrimazole prn. Enforced rinsing her mouth after Advair. Has now resolved    Fatigue: Started a couple days ago. Feels more tired and sleeping 10 hours at night. No new infectious symptoms. Unclear cause. Will call if she starts to have localizing symptoms     Phone Call Duration: 11 minutes    Helga Fernando PA-C  [facility]  [address] [address]  [phone]

## 2020-09-11 NOTE — PROGRESS NOTES
Oncology/Hematology Visit Note  Sep 11, 2020    Reason for Visit: follow up of recurrent metastatic ER-positive HER2 negative breast cancer    History of Present Illness: Jade Collins is a 50 year old female with a history of left breast cancer diagnosed in 12/2013 in [place] area and Dr. David Redd was her medical oncologist. Biopsy showed tumor to be ER positive, VT positive and HER2 negative. She underwent lumpectomy and SLNB by Dr. Tasha Segura in [place]. Wiggins lymph node was noted to be involved with tumor but no lymph node dissection done at that time. She subsequently underwent adjuvant ddAC x 4 cycles and Taxol x 12 weeks, competed in 9/11/2014. She had post lumpectomy radiation completed in 11/23/2014 by Dr. Manley. She was begun on an AI in 11/2014, but does not recall name.     She was then switched within about a month or so to anastrozole and remained on anastrozole from 11/2014-02/2017.  She has never received tamoxifen. She then moved to [place], [place] in 03/2017.  She saw a gynecologist there and underwent a IRIS/BSO by Dr. Zendejas.  She then also saw Dr. Linares in [place], [place], who is an oncologist.  His interpretation of the pathology report was that she had triple-negative breast cancer.  It is possible that she may have had low estrogen receptor positivity, but the anastrozole was then discontinued in 02/2017.  She then underwent the IRIS/BSO in 03/2017.  This was done laparoscopically.        She then remained off of all hormonal therapy and in 09/2017 moved to [place].  She remained off hormonal therapy and did not have Medical Oncology followup initially.        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  She then went to see Dr. Norberto Brand who performed a PET/CT scan and the patient also underwent a brain MRI for staging.  The PET/CT scan performed 08/03/2018 which showed mildly enlarged left posterior cervical lymph nodes, largest measuring  1.5 x 1.0 cm and mildly enlarged aorticopulmonary and prevascular lymph nodes that were noted on contrast-enhanced CT scan seen less well on the PET scan.  No evidence of metastatic disease in the abdomen and pelvis.  She also underwent a brain MRI that showed no evidence of intracranial metastatic disease.  She underwent a biopsy of one of the lymph nodes in the left neck which showed metastatic adenocarcinoma consistent with breast origin, which was estrogen-receptor positive.  The tumor cells were positive for CK7, ER and ND consistent with metastatic breast carcinoma.  Estrogen receptor showed strong average nuclear intensity in 95% of carcinoma cells, progesterone receptor moderate average nuclear intensity in 70% of the carcinoma.  A GATA3 stain was apparently not performed.     She started Ibrance on 9/21/18 and letrozole on 9/23/18. She had improved disease on restaging on 11/26. Last restaging on 10/15/19 with stable disease.     Please see Dr. Bhatt's previous notes for further details on the patient's history. She comes in today for routine follow up.    Interval History:  -she has been more fatigued the last couple days. Unclear why. Sleeping about 10 hours at night.  -No f/c  -Eating and drinking fine  -No  symptoms  -No SOB, CP or cough  -Had increased GERD a couple of weeks ago  -No N/V. Normal stools  -No new LAD  -Thrush is now cleared      ROS: 10 point ROS neg other than the symptoms noted above in the HPI.      Current Outpatient Medications   Medication Sig Dispense Refill     ACETAMINOPHEN PO Take 650 mg by mouth every 4 hours as needed for pain       albuterol (PROAIR HFA/PROVENTIL HFA/VENTOLIN HFA) 108 (90 Base) MCG/ACT inhaler Inhale 2 puffs into the lungs every 6 hours 1 Inhaler 0     albuterol (PROVENTIL) (2.5 MG/3ML) 0.083% neb solution Take 1 vial (2.5 mg) by nebulization every 6 hours as needed for shortness of breath / dyspnea or wheezing 30 vial 0     aspirin (ASA) 81 MG chewable  tablet Take 81 mg by mouth daily       calcium carbonate (TUMS) 500 MG chewable tablet Take 1 chew tab by mouth as needed for heartburn       Calcium Citrate-Vitamin D (CALCIUM + D PO) Take 250 mg by mouth 2 times daily        clotrimazole (MYCELEX) 10 MG lozenge Place 1 lozenge (10 mg) inside cheek 5 times daily 60 each 0     fluticasone-salmeterol (ADVAIR-HFA) 115-21 MCG/ACT inhaler Inhale 2 puffs into the lungs 2 times daily 1 Inhaler 2     levothyroxine (SYNTHROID/LEVOTHROID) 175 MCG tablet Take 1 tablet (175 mcg) by mouth daily 90 tablet 3     loratadine (CLARITIN) 10 MG tablet Take 1 tablet (10 mg) by mouth daily 90 tablet 3     methylPREDNISolone (MEDROL) 16 MG tablet Take 32 mg by mouth 12 hours before the procedure and repeat 32 mg by mouth 2 hours before the procedure to prevent contrast reaction. 4 tablet 3     mirtazapine (REMERON) 15 MG tablet TK 1 T PO HS 30 tablet 3     Multiple Vitamins-Minerals (MULTIVITAMIN ADULT PO)        ondansetron (ZOFRAN-ODT) 4 MG ODT tab Take 4 mg by mouth       palbociclib (IBRANCE) 125 MG tablet Take 1 tablet (125 mg) by mouth daily with food Take for 21 days, then 7 days off. Avoid grapefruit. Do not open/crush/chew capsule. 21 capsule 0     palbociclib (IBRANCE) 125 MG tablet Take 1 tablet (125 mg) by mouth daily with food Take for 21 days, then 7 days off. Avoid grapefruit. Do not open/crush/chew capsule. 21 capsule 0     prochlorperazine (COMPAZINE) 10 MG tablet Take 1 tablet (10 mg) by mouth every 6 hours as needed for nausea or vomiting 30 tablet 0     sertraline (ZOLOFT) 100 MG tablet Take 1 tablet (100 mg) by mouth 2 times daily 60 tablet 3     triamcinolone (NASACORT) 55 MCG/ACT inhaler Spray 1 spray into both nostrils daily        VITAMIN D, CHOLECALCIFEROL, PO Take 2,000 Units by mouth daily        letrozole (FEMARA) 2.5 MG tablet Take 1 tablet (2.5 mg) by mouth daily for 28 days 28 tablet 0     letrozole (FEMARA) 2.5 MG tablet Take 1 tablet (2.5 mg) by mouth  daily for 28 days 28 tablet 0       Physical Examination:  General: The patient is a pleasant female in no acute distress.  There were no vitals taken for this visit.  Wt Readings from Last 10 Encounters:   06/09/20 138.6 kg (305 lb 9.6 oz)   03/12/20 139.5 kg (307 lb 8 oz)   02/13/20 138.7 kg (305 lb 11.2 oz)   01/16/20 137.6 kg (303 lb 4.8 oz)   12/18/19 140.3 kg (309 lb 4.8 oz)   11/15/19 141.7 kg (312 lb 4.8 oz)   10/17/19 139.5 kg (307 lb 8 oz)   09/19/19 137.4 kg (303 lb)   08/22/19 137.1 kg (302 lb 3.2 oz)   07/25/19 137.1 kg (302 lb 3.2 oz)     Objective:  General: patient sounds in no audible acute distress, alert and oriented, speech clear and fluid  Resp: Speaking in full sentences, no audible respiratory distress, no cough, no audible wheeze  Psych: able to articulate logical thoughts, able to abstract reason, no tangential thoughts, no hallucinations or delusions  Her affect is normal    Laboratory Data:      9/10/2020 12:06   Sodium 139   Potassium 4.1   Chloride 107   Carbon Dioxide 26   Urea Nitrogen 16   Creatinine 1.08 (H)   GFR Estimate 60 (L)   GFR Estimate If Black 69   Calcium 9.5   Anion Gap 6   Albumin 3.8   Protein Total 7.8   Bilirubin Total 0.5   Alkaline Phosphatase 73   ALT 31   AST 24   T4 Free 0.78   TSH 2.51   Glucose 112 (H)   WBC 2.4 (L)   Hemoglobin 13.2   Hematocrit 38.5   Platelet Count 138 (L)   RBC Count 3.95   MCV 98   MCH 33.4 (H)   MCHC 34.3   RDW 14.1   Diff Method Automated Method   % Neutrophils 35.8   % Lymphocytes 47.1   % Monocytes 12.1   % Eosinophils 2.9   % Basophils 2.1   % Immature Granulocytes 0.0   Nucleated RBCs 0   Absolute Neutrophil 0.9 (L)   Absolute Lymphocytes 1.1   Absolute Monocytes 0.3   Absolute Eosinophils 0.1   Absolute Basophils 0.1   Abs Immature Granulocytes 0.0   Absolute Nucleated RBC 0.0         Assessment and Plan:    Recurrent, metastatic ER-positive, HER2 negative breast cancer: History of left breast cancer s/p lumpectomy and SNLB in 2014.  S/p 4 cycles ddAC and 12 weeks of Taxol. On AI from 11/2014-2/2017. Recurrence on PET/CT scan performed 08/03/2018 and then biopsy to left posterior cervical lymph nodes, aorticopulmonary, and prevascular lymph nodes. No evidence of metastatic disease in the abdomen and pelvis or brain. Started Ibrance and letrozole on 9/21/18.   -CT from 8/10 showed no disease in CAP, though had several new b/l LAD in her neck. Unclear if these represents metastatic disease or is reactive. With her tumor markers still improving and no other disease seen, favor that these may be reactive. Will continue with same treatment and repeat imaging in 2 months  (She has a contrast allergy and requires premeds for CT scans).   -Neutropenic again. This is the second time recently that she has needed to be delayed due to neutropenia. Will discuss with Dr. Bhatt about whether we need to dose reduce her Ibrance  -will have her repeat labs in one week and then will restart Ibrance if ANC > 1.0 (again will check on the dosing)  -reviewed neutropenic precautions   -will follow-up with Dr. Bhatt in 1 month with imaging prior    Bone health: No need for bisphosphonates at this time as she has not known bone mets. Continue calcium and vitamin D. Consider DEXA scan with long term letrozole use    Depression: On zoloft 200 mg and remeron at bedtime. Continue to follow-up with Sari Holliday. Variable with COVID, though feels she is overall doing ok. Stable    Hypothyroidism: Established care with endocrinology here on 6/29/20. Decreased her levothyroxine to 175 mcg (changed about a month ago). Continue to be managed by them    Thrush: Unable to exam though PCP previously had seen and dx. Currently on Advair for asthma. Likely having increased thrush during her catherine for ibrance. Continue to monitor and can use clotrimazole prn. Enforced rinsing her mouth after Advair. Has now resolved    Fatigue: Started a couple days ago. Feels more tired and sleeping  10 hours at night. No new infectious symptoms. Unclear cause. Will call if she starts to have localizing symptoms     Phone Call Duration: 11 minutes    Helga Fernando PA-C  [facility]  [address] [place] [id number] [phone]

## 2020-09-17 ENCOUNTER — TELEPHONE (OUTPATIENT)
Dept: ONCOLOGY | Facility: CLINIC | Age: 50
End: 2020-09-17

## 2020-09-17 ENCOUNTER — MYC MEDICAL ADVICE (OUTPATIENT)
Dept: ENDOCRINOLOGY | Facility: CLINIC | Age: 50
End: 2020-09-17

## 2020-09-17 DIAGNOSIS — C50.412 MALIGNANT NEOPLASM OF UPPER-OUTER QUADRANT OF LEFT BREAST IN FEMALE, ESTROGEN RECEPTOR POSITIVE (H): Primary | ICD-10-CM

## 2020-09-17 DIAGNOSIS — Z17.0 MALIGNANT NEOPLASM OF UPPER-OUTER QUADRANT OF LEFT BREAST IN FEMALE, ESTROGEN RECEPTOR POSITIVE (H): Primary | ICD-10-CM

## 2020-09-17 DIAGNOSIS — C50.412 MALIGNANT NEOPLASM OF UPPER-OUTER QUADRANT OF LEFT BREAST IN FEMALE, ESTROGEN RECEPTOR POSITIVE (H): ICD-10-CM

## 2020-09-17 DIAGNOSIS — E06.3 HYPOTHYROIDISM DUE TO HASHIMOTO'S THYROIDITIS: ICD-10-CM

## 2020-09-17 DIAGNOSIS — Z17.0 MALIGNANT NEOPLASM OF UPPER-OUTER QUADRANT OF LEFT BREAST IN FEMALE, ESTROGEN RECEPTOR POSITIVE (H): ICD-10-CM

## 2020-09-17 LAB
ALBUMIN SERPL-MCNC: 4.1 G/DL (ref 3.4–5)
ALP SERPL-CCNC: 82 U/L (ref 40–150)
ALT SERPL W P-5'-P-CCNC: 50 U/L (ref 0–50)
ANION GAP SERPL CALCULATED.3IONS-SCNC: 8 MMOL/L (ref 3–14)
AST SERPL W P-5'-P-CCNC: 36 U/L (ref 0–45)
BASOPHILS # BLD AUTO: 0.1 10E9/L (ref 0–0.2)
BASOPHILS NFR BLD AUTO: 2.3 %
BILIRUB SERPL-MCNC: 0.5 MG/DL (ref 0.2–1.3)
BUN SERPL-MCNC: 9 MG/DL (ref 7–30)
CALCIUM SERPL-MCNC: 9.4 MG/DL (ref 8.5–10.1)
CANCER AG27-29 SERPL-ACNC: 19 U/ML (ref 0–39)
CEA SERPL-MCNC: <0.5 UG/L (ref 0–2.5)
CHLORIDE SERPL-SCNC: 106 MMOL/L (ref 94–109)
CO2 SERPL-SCNC: 24 MMOL/L (ref 20–32)
CREAT SERPL-MCNC: 1.07 MG/DL (ref 0.52–1.04)
DIFFERENTIAL METHOD BLD: ABNORMAL
EOSINOPHIL # BLD AUTO: 0.1 10E9/L (ref 0–0.7)
EOSINOPHIL NFR BLD AUTO: 3.3 %
ERYTHROCYTE [DISTWIDTH] IN BLOOD BY AUTOMATED COUNT: 13.9 % (ref 10–15)
GFR SERPL CREATININE-BSD FRML MDRD: 60 ML/MIN/{1.73_M2}
GLUCOSE SERPL-MCNC: 132 MG/DL (ref 70–99)
HCT VFR BLD AUTO: 41.4 % (ref 35–47)
HGB BLD-MCNC: 13.9 G/DL (ref 11.7–15.7)
IMM GRANULOCYTES # BLD: 0 10E9/L (ref 0–0.4)
IMM GRANULOCYTES NFR BLD: 0.5 %
LYMPHOCYTES # BLD AUTO: 1.5 10E9/L (ref 0.8–5.3)
LYMPHOCYTES NFR BLD AUTO: 38.9 %
MCH RBC QN AUTO: 32.9 PG (ref 26.5–33)
MCHC RBC AUTO-ENTMCNC: 33.6 G/DL (ref 31.5–36.5)
MCV RBC AUTO: 98 FL (ref 78–100)
MONOCYTES # BLD AUTO: 0.3 10E9/L (ref 0–1.3)
MONOCYTES NFR BLD AUTO: 8.7 %
NEUTROPHILS # BLD AUTO: 1.8 10E9/L (ref 1.6–8.3)
NEUTROPHILS NFR BLD AUTO: 46.3 %
NRBC # BLD AUTO: 0 10*3/UL
NRBC BLD AUTO-RTO: 0 /100
PLATELET # BLD AUTO: 182 10E9/L (ref 150–450)
POTASSIUM SERPL-SCNC: 4.2 MMOL/L (ref 3.4–5.3)
PROT SERPL-MCNC: 8.4 G/DL (ref 6.8–8.8)
RBC # BLD AUTO: 4.22 10E12/L (ref 3.8–5.2)
SODIUM SERPL-SCNC: 138 MMOL/L (ref 133–144)
WBC # BLD AUTO: 3.9 10E9/L (ref 4–11)

## 2020-09-17 RX ORDER — LETROZOLE 2.5 MG/1
2.5 TABLET, FILM COATED ORAL DAILY
Qty: 28 TABLET | Refills: 0 | Status: SHIPPED | OUTPATIENT
Start: 2020-09-17 | End: 2020-10-16

## 2020-09-17 NOTE — ORAL ONC MGMT
Oral Chemotherapy Monitoring Program  Lab Follow Up    Patient currently on Ibrance therapy for breast cancer.    Reviewed lab results from 9/17/20.    Labs:  _  Result Component Current Result Ref Range   Sodium 138 (9/17/2020) 133 - 144 mmol/L     _  Result Component Current Result Ref Range   Potassium 4.2 (9/17/2020) 3.4 - 5.3 mmol/L     _  Result Component Current Result Ref Range   Calcium 9.4 (9/17/2020) 8.5 - 10.1 mg/dL     No results found for Mag within last 30 days.     No results found for Phos within last 30 days.     _  Result Component Current Result Ref Range   Albumin 4.1 (9/17/2020) 3.4 - 5.0 g/dL     _  Result Component Current Result Ref Range   Urea Nitrogen 9 (9/17/2020) 7 - 30 mg/dL     _  Result Component Current Result Ref Range   Creatinine 1.07 (H) (9/17/2020) 0.52 - 1.04 mg/dL       _  Result Component Current Result Ref Range   AST 36 (9/17/2020) 0 - 45 U/L     _  Result Component Current Result Ref Range   ALT 50 (9/17/2020) 0 - 50 U/L     _  Result Component Current Result Ref Range   Bilirubin Total 0.5 (9/17/2020) 0.2 - 1.3 mg/dL       _  Result Component Current Result Ref Range   WBC 3.9 (L) (9/17/2020) 4.0 - 11.0 10e9/L     _  Result Component Current Result Ref Range   Hemoglobin 13.9 (9/17/2020) 11.7 - 15.7 g/dL     _  Result Component Current Result Ref Range   Platelet Count 182 (9/17/2020) 150 - 450 10e9/L     _  Result Component Current Result Ref Range   Absolute Neutrophil 1.8 (9/17/2020) 1.6 - 8.3 10e9/L       Assessment & Plan:  No concerning abnormalities.     Ibrance was on HOLD for ANC of 0.9 on 9/10, ANC was 1.8 today (9/17).     Per sushant Molina to start Ibrance cycle when delivered.     Questions answered to patient's satisfaction.    Placed call to Jade in follow up of plan. Review labs and plan. She will start next Ibrance cycle 9/18. [facility] will rush delivery for 9/18.     Follow-Up:  10/22: Dr. Nova lynch and labs.       Thank you  for the opportunity to participate in the care of the above patient,  Jose Swain, PharmD  Hematology/Oncology Clinical Pharmacist  [facility]  [phone]

## 2020-09-18 ENCOUNTER — TELEPHONE (OUTPATIENT)
Dept: ONCOLOGY | Facility: CLINIC | Age: 50
End: 2020-09-18

## 2020-09-18 DIAGNOSIS — C50.412 MALIGNANT NEOPLASM OF UPPER-OUTER QUADRANT OF LEFT BREAST IN FEMALE, ESTROGEN RECEPTOR POSITIVE (H): Primary | ICD-10-CM

## 2020-09-18 DIAGNOSIS — Z17.0 MALIGNANT NEOPLASM OF UPPER-OUTER QUADRANT OF LEFT BREAST IN FEMALE, ESTROGEN RECEPTOR POSITIVE (H): Primary | ICD-10-CM

## 2020-09-18 NOTE — ORAL ONC MGMT
Contacted by Dr. Bhatt wanting to reduce ibrance dose from 125mg daily to 100 mg daily.  Orders updated, signed by Dr. Bhatt and released to [facility].      Called patient to discuss change in dose.  She already received the 125mg dose prescription this afternoon but had not started.  Instructed patient to not take 125mg prescription and that a new prescription would be coming either Monday or Tuesday next week for the 100mg dose.    Joel Faulkner, PharmD.

## 2020-09-18 NOTE — TELEPHONE ENCOUNTER
Jade     Supposed to start the palbociclib today but will hold the 125 mg and reduce the dose to 100 mg 21 days on and 7 days off.  I discussed that the palbociclib dose of 100 mg is still expected to be effective but with less suppression of the neutrophils.  She is in agreement with the plan.     Wilfredo

## 2020-09-20 RX ORDER — LEVOTHYROXINE SODIUM 175 UG/1
175 TABLET ORAL DAILY
Qty: 90 TABLET | Refills: 3 | Status: SHIPPED | OUTPATIENT
Start: 2020-09-20 | End: 2021-07-05

## 2020-09-27 DIAGNOSIS — T50.8X5D ALLERGIC REACTION TO CONTRAST MATERIAL, SUBSEQUENT ENCOUNTER: ICD-10-CM

## 2020-09-27 DIAGNOSIS — Z17.0 MALIGNANT NEOPLASM OF UPPER-OUTER QUADRANT OF LEFT BREAST IN FEMALE, ESTROGEN RECEPTOR POSITIVE (H): ICD-10-CM

## 2020-09-27 DIAGNOSIS — C50.412 MALIGNANT NEOPLASM OF UPPER-OUTER QUADRANT OF LEFT BREAST IN FEMALE, ESTROGEN RECEPTOR POSITIVE (H): ICD-10-CM

## 2020-09-28 DIAGNOSIS — J45.909 UNCOMPLICATED ASTHMA, UNSPECIFIED ASTHMA SEVERITY, UNSPECIFIED WHETHER PERSISTENT: ICD-10-CM

## 2020-09-28 RX ORDER — ALBUTEROL SULFATE 0.83 MG/ML
2.5 SOLUTION RESPIRATORY (INHALATION) EVERY 6 HOURS PRN
Qty: 30 VIAL | Refills: 0 | Status: SHIPPED | OUTPATIENT
Start: 2020-09-28

## 2020-09-28 RX ORDER — METHYLPREDNISOLONE 32 MG/1
TABLET ORAL
Qty: 2 TABLET | Refills: 3 | Status: SHIPPED | OUTPATIENT
Start: 2020-09-28 | End: 2021-10-07

## 2020-10-01 DIAGNOSIS — J45.20 MILD INTERMITTENT ASTHMA WITHOUT COMPLICATION: ICD-10-CM

## 2020-10-01 RX ORDER — ALBUTEROL SULFATE 90 UG/1
2 AEROSOL, METERED RESPIRATORY (INHALATION) EVERY 6 HOURS
Qty: 1 INHALER | Refills: 0 | Status: SHIPPED | OUTPATIENT
Start: 2020-10-01 | End: 2022-03-30

## 2020-10-13 ENCOUNTER — TELEPHONE (OUTPATIENT)
Dept: ONCOLOGY | Facility: CLINIC | Age: 50
End: 2020-10-13

## 2020-10-13 NOTE — TELEPHONE ENCOUNTER
[facility] Prior Authorization Team   Phone: [phone]  Fax: [phone]    PA Initiation    Medication: Ibrance  Insurance Company: [employer] - Phone [phone] Fax [phone]  Pharmacy Filling the Rx: [facility] - [place], [place] - [address]  Filling Pharmacy Phone: [phone]  Filling Pharmacy Fax: [phone]  Start Date: 10/13/2020

## 2020-10-16 DIAGNOSIS — Z17.0 MALIGNANT NEOPLASM OF UPPER-OUTER QUADRANT OF LEFT BREAST IN FEMALE, ESTROGEN RECEPTOR POSITIVE (H): Primary | ICD-10-CM

## 2020-10-16 DIAGNOSIS — C50.412 MALIGNANT NEOPLASM OF UPPER-OUTER QUADRANT OF LEFT BREAST IN FEMALE, ESTROGEN RECEPTOR POSITIVE (H): Primary | ICD-10-CM

## 2020-10-16 RX ORDER — LETROZOLE 2.5 MG/1
2.5 TABLET, FILM COATED ORAL DAILY
Qty: 28 TABLET | Refills: 0 | Status: SHIPPED | OUTPATIENT
Start: 2020-10-16 | End: 2020-11-13

## 2020-10-18 NOTE — PROGRESS NOTES
"Jade Collins is a 50 year old female who is being evaluated via a billable telephone visit.      The patient has been notified of following:     \"This telephone visit will be conducted via a call between you and your physician/provider. We have found that certain health care needs can be provided without the need for a physical exam.  This service lets us provide the care you need with a short phone conversation.  If a prescription is necessary we can send it directly to your pharmacy.  If lab work is needed we can place an order for that and you can then stop by our lab to have the test done at a later time.    Telephone visits are billed at different rates depending on your insurance coverage. During this emergency period, for some insurers they may be billed the same as an in-person visit.  Please reach out to your insurance provider with any questions.    If during the course of the call the physician/provider feels a telephone visit is not appropriate, you will not be charged for this service.\"    Patient has given verbal consent for Telephone visit?  Yes    What phone number would you like to be contacted at? [phone]    How would you like to obtain your AVS? Soledad    Vitals - Patient Reported  Weight (Patient Reported): 138.3 kg (305 lb)  Height (Patient Reported): 175.3 cm (5' 9\")  BMI (Based on Pt Reported Ht/Wt): 45.04  Pain Score: No Pain (0)    Refills: Letrozole and iBrance     Ashwini Mejia CMA October 22, 2020  9:50 AM     Phone call duration: 8 minutes (10:03-10:11)    Jovon Taylor MD    HISTORY OF PRESENT ILLNESS:  Jade is a 48-year-old woman with metastatic breast cancer who comes to our clinic for recommendations for further treatment.  She is referred by Dr. Norberto Brand at [facility].  Jade would like to continue her care at the [facility].       Jade was diagnosed with breast cancer with a lump found in the upper-inner quadrant of the left breast.  She was " diagnosed in 12/2013 in the [place] area and Dr. David Redd was her medical oncologist.  Biopsy of the tumor showed it to be ER positive, MD positive, and HER-2 negative.  We do not have any of the original pathology and we need to obtain those reports.  She underwent a lumpectomy performed by Dr. Tasha Segura who is a surgeon in the [place] area.  She also had a sentinel lymph node which was noted to be involved with tumor but there was no lymph node dissection done at that time.  [id number].  She subsequently underwent adjuvant chemotherapy with dose-dense AC for 4 cycles and Taxol for 12 weeks, completed 09/11/2014.        She then had radiation therapy post lumpectomy to the left breast, which was completed 11/23/2014 by Dr. Manley.        She was then begun on an aromatase inhibitor, the name of which she does not remember.  The aromatase inhibitor therapy was begun in 11/2014.  She was then switched within about a month or so to anastrozole and remained on anastrozole from 11/2014-02/2017.  She has never received tamoxifen.        She then moved to [place], [place] in 03/2017.  She saw a gynecologist there and underwent a IRIS/BSO by Dr. Zendejas.  She then also saw Dr. Linares in [place], [place], who is an oncologist.  His interpretation of the pathology report was that she had triple-negative breast cancer.  It is possible that she may have had low estrogen receptor positivity, but the anastrozole was then discontinued in 02/2017.  She then underwent the IRIS/BSO in 03/2017.  This was done laparoscopically.        She then remained off of all hormonal therapy and in 09/2017 moved to [place].  She remained off hormonal therapy and did not have Medical Oncology followup initially.        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  She then went to see Dr. Norberto Brand who performed a PET/CT scan and the patient also underwent a brain MRI for staging.  The PET/CT scan  performed 08/03/2018 showed in the neck there were several mildly metabolic active and large prominent left posterior cervical lymph nodes.  The largest is located posterior to the left sternocleidomastoid muscle and measures 1.5 x 1 cm in size.  This demonstrate modest FDG uptake.  The other lymph nodes are smaller and demonstrate mild FDG uptake.  The prominent prevascular and aortopulmonary lymph nodes were also seen on the contrast-enhanced scan were less well seen on the PET.  The largest lymph node visualized on the PET scan was 1.1 x 0.8 cm and demonstrated mild FDG uptake.  This was in the periaortic area just anterior to the aortic arch.  No lung nodules.  No lung infiltrates.  No pleural effusion.  There was no uptake of FDG in the bones.  In summary, there were mildly enlarged left posterior cervical lymph nodes, largest measuring 1.5 x 1.0 cm and mildly enlarged aorticopulmonary and prevascular lymph nodes that were noted on contrast-enhanced CT scan seen less well on the PET scan.  No evidence of metastatic disease in the abdomen and pelvis.  She also underwent a brain MRI that showed no evidence of intracranial metastatic disease.  She underwent a biopsy of one of the lymph nodes in the left neck which showed metastatic adenocarcinoma consistent with breast origin, which was estrogen-receptor positive.  The tumor cells were positive for CK7, ER and [place] consistent with metastatic breast carcinoma.  Estrogen receptor showed strong average nuclear intensity in 95% of carcinoma cells, progesterone receptor moderate average nuclear intensity in 70% of the carcinoma.  A GATA3 stain was apparently not performed.       TREATMENT HISTORY:  A. Tamoxifen  B. Letrozole and palbociclib started 9-18-18.     INTERVAL HISTORY:    Feeling good with lower dose of palbociclib. No recent infections, no fevers, chills.  Still has joint aches and pain about an hour after her dose, but she is able to sleep through it.  Energy  is pretty good.  Getting out once daily at least to walk.  Exercising with walking.  Appetite is OK.  Eating 1-2 meals daily due to sleeping in.  Not particularly hungry, food is tasting good.  No swelling.  Some SOB at baseline from asthma if walking too fast, uses albuterol inhaler as needed.  No chest pain.  No bleeding or bruising.  No new rashes or yellowing of skin/eyes.       A 10-point review of systems was performed and was negative with the exception of pertinent positives noted above.     OBJECTIVE DATA:    Imaging studies:    CT CAP 10/20/20                                                                   IMPRESSION: In this patient with history of breast cancer, the current  scan shows:     1. No evidence of metastatic disease in the chest, abdomen or pelvis.     2. Hepatomegaly with diffuse steatosis.    CT neck 10/20/20    Impression:  Stable size of bilateral level 1B, level 2A, and level 2B lymph nodes,  which may be reactive or malignant in etiology. No new enlarged lymph  nodes. Continued interval follow-up is suggested.      Labs: CMP WNL on 10/20/20    WBC 3.6  Hgb 12.4  Plt 152  ANC 2.6     ASSESSMENT AND PLAN:  1. Recurrent, metastatic ER-positive, HER2 negative breast cancer: History of left breast cancer s/p lumpectomy and SNLB in 2014. S/p 4 cycles ddAC and 12 weeks of Taxol. On AI from 11/2014-2/2017. Recurrence on PET/CT scan performed 08/03/2018 and then biopsy to left posterior cervical lymph nodes, aorticopulmonary, and prevascular lymph nodes were positive. No evidence of metastatic disease in the abdomen and pelvis or brain. Started Ibrance and letrozole on 9/21/18. Tolerating well aside from some hot flashes and mild joint stiffness. Last restaging 7/22 with stable disease.  Lymphadenopathy in her neck has clinically resolved and remain stable on repeat imaging from 10/2020.  She continues to tolerate medications well and has had good response, so we will not make additional  changes.   2. Overall doing well.  No signs of liver injury.  RUQ tenderness resolved.  3.  Diet and exercise. We also discussed eating less sugar and getting 150 minutes of exercise per week.  She will try to incorporate these recommendations into her lifestyle.  She previously saw Dr. Baez in [place] but is now looking for a new PCP more local to her new apartment.   4.  She has not gone to Psychiatry despite our referral to them several months ago.  She is thinking about going again.  Sertraline is working ok for her anxiety.  On Zoloft 200 mg with improvement in symptoms. Also on Remeron at bedtime.Sees Sari Holliday.  5. Asthma: Seen by Dr. Hay. Per chart on fluticasone-salmeterol inhaler BID. She does not recall the name of this. Also has albuterol prn.  6.  Follow up with SONIA phone visit with CBC, CMP November 19, follow up with me by phone visit December 17 with CBC, CMP, follow up with SONIA by phone visit January 14, CBC, CMP. Follow up with me by phone visit February 11 with CBC, CMP and CT CAP February 10.   Thank you for allowing us to participate in this patient's care.       Thank you for allowing us to participate in this patient's care.  The patient was seen and evaluated by me.  I discussed the patient with the fellow and agree with the findings and plan in the note, which was edited by me.    Sincerely,     Wilfredo Bhatt MD  Professor  [facility]  [phone].          Patient seen and discussed with Dr. Bhatt.     Jovon Taylor MD, PhD  Hematology/Oncology Fellow  Pager: [phone]                  I spent 8 minutes with the patient more than 50% of which was in counseling and coordination of care.

## 2020-10-20 ENCOUNTER — TELEPHONE (OUTPATIENT)
Dept: ONCOLOGY | Facility: CLINIC | Age: 50
End: 2020-10-20

## 2020-10-20 ENCOUNTER — ANCILLARY PROCEDURE (OUTPATIENT)
Dept: CT IMAGING | Facility: CLINIC | Age: 50
End: 2020-10-20
Attending: PHYSICIAN ASSISTANT
Payer: COMMERCIAL

## 2020-10-20 DIAGNOSIS — C50.919 METASTATIC BREAST CANCER: ICD-10-CM

## 2020-10-20 DIAGNOSIS — C50.412 MALIGNANT NEOPLASM OF UPPER-OUTER QUADRANT OF LEFT BREAST IN FEMALE, ESTROGEN RECEPTOR POSITIVE (H): ICD-10-CM

## 2020-10-20 DIAGNOSIS — Z17.0 MALIGNANT NEOPLASM OF UPPER-OUTER QUADRANT OF LEFT BREAST IN FEMALE, ESTROGEN RECEPTOR POSITIVE (H): ICD-10-CM

## 2020-10-20 LAB
ALBUMIN SERPL-MCNC: 3.6 G/DL (ref 3.4–5)
ALP SERPL-CCNC: 69 U/L (ref 40–150)
ALT SERPL W P-5'-P-CCNC: 36 U/L (ref 0–50)
ANION GAP SERPL CALCULATED.3IONS-SCNC: 6 MMOL/L (ref 3–14)
AST SERPL W P-5'-P-CCNC: 24 U/L (ref 0–45)
BASOPHILS # BLD AUTO: 0 10E9/L (ref 0–0.2)
BASOPHILS NFR BLD AUTO: 0.6 %
BILIRUB SERPL-MCNC: 0.4 MG/DL (ref 0.2–1.3)
BUN SERPL-MCNC: 12 MG/DL (ref 7–30)
CALCIUM SERPL-MCNC: 9 MG/DL (ref 8.5–10.1)
CANCER AG27-29 SERPL-ACNC: 17 U/ML (ref 0–39)
CEA SERPL-MCNC: <0.5 UG/L (ref 0–2.5)
CHLORIDE SERPL-SCNC: 100 MMOL/L (ref 94–109)
CO2 SERPL-SCNC: 27 MMOL/L (ref 20–32)
CREAT SERPL-MCNC: 0.89 MG/DL (ref 0.52–1.04)
DIFFERENTIAL METHOD BLD: ABNORMAL
EOSINOPHIL # BLD AUTO: 0 10E9/L (ref 0–0.7)
EOSINOPHIL NFR BLD AUTO: 0 %
ERYTHROCYTE [DISTWIDTH] IN BLOOD BY AUTOMATED COUNT: 14.2 % (ref 10–15)
GFR SERPL CREATININE-BSD FRML MDRD: 75 ML/MIN/{1.73_M2}
GLUCOSE SERPL-MCNC: 109 MG/DL (ref 70–99)
HCT VFR BLD AUTO: 37.1 % (ref 35–47)
HGB BLD-MCNC: 12.4 G/DL (ref 11.7–15.7)
IMM GRANULOCYTES # BLD: 0 10E9/L (ref 0–0.4)
IMM GRANULOCYTES NFR BLD: 0.3 %
LYMPHOCYTES # BLD AUTO: 0.7 10E9/L (ref 0.8–5.3)
LYMPHOCYTES NFR BLD AUTO: 20 %
MCH RBC QN AUTO: 32.5 PG (ref 26.5–33)
MCHC RBC AUTO-ENTMCNC: 33.4 G/DL (ref 31.5–36.5)
MCV RBC AUTO: 97 FL (ref 78–100)
MONOCYTES # BLD AUTO: 0.3 10E9/L (ref 0–1.3)
MONOCYTES NFR BLD AUTO: 7.8 %
NEUTROPHILS # BLD AUTO: 2.6 10E9/L (ref 1.6–8.3)
NEUTROPHILS NFR BLD AUTO: 71.3 %
NRBC # BLD AUTO: 0 10*3/UL
NRBC BLD AUTO-RTO: 0 /100
PLATELET # BLD AUTO: 152 10E9/L (ref 150–450)
POTASSIUM SERPL-SCNC: 3.8 MMOL/L (ref 3.4–5.3)
PROT SERPL-MCNC: 7.6 G/DL (ref 6.8–8.8)
RBC # BLD AUTO: 3.82 10E12/L (ref 3.8–5.2)
SODIUM SERPL-SCNC: 133 MMOL/L (ref 133–144)
WBC # BLD AUTO: 3.6 10E9/L (ref 4–11)

## 2020-10-20 PROCEDURE — 85025 COMPLETE CBC W/AUTO DIFF WBC: CPT | Performed by: PATHOLOGY

## 2020-10-20 PROCEDURE — 86300 IMMUNOASSAY TUMOR CA 15-3: CPT | Mod: 90 | Performed by: PATHOLOGY

## 2020-10-20 PROCEDURE — 82378 CARCINOEMBRYONIC ANTIGEN: CPT | Mod: 90 | Performed by: PATHOLOGY

## 2020-10-20 PROCEDURE — 99000 SPECIMEN HANDLING OFFICE-LAB: CPT | Performed by: PATHOLOGY

## 2020-10-20 PROCEDURE — 36415 COLL VENOUS BLD VENIPUNCTURE: CPT | Performed by: PATHOLOGY

## 2020-10-20 PROCEDURE — 70491 CT SOFT TISSUE NECK W/DYE: CPT | Mod: GC | Performed by: RADIOLOGY

## 2020-10-20 PROCEDURE — 80053 COMPREHEN METABOLIC PANEL: CPT | Performed by: PATHOLOGY

## 2020-10-20 PROCEDURE — 74177 CT ABD & PELVIS W/CONTRAST: CPT | Performed by: STUDENT IN AN ORGANIZED HEALTH CARE EDUCATION/TRAINING PROGRAM

## 2020-10-20 PROCEDURE — 71260 CT THORAX DX C+: CPT | Performed by: STUDENT IN AN ORGANIZED HEALTH CARE EDUCATION/TRAINING PROGRAM

## 2020-10-20 RX ORDER — IOPAMIDOL 755 MG/ML
135 INJECTION, SOLUTION INTRAVASCULAR ONCE
Status: COMPLETED | OUTPATIENT
Start: 2020-10-20 | End: 2020-10-20

## 2020-10-20 RX ADMIN — IOPAMIDOL 135 ML: 755 INJECTION, SOLUTION INTRAVASCULAR at 12:21

## 2020-10-20 NOTE — ORAL ONC MGMT
Oral Chemotherapy Monitoring Program  Lab Follow Up    Patient currently on Ibreance therapy for breast cancer.    Reviewed lab results from 10/20/2020.    Labs:  _  Result Component Current Result Ref Range   Sodium 133 (10/20/2020) 133 - 144 mmol/L     _  Result Component Current Result Ref Range   Potassium 3.8 (10/20/2020) 3.4 - 5.3 mmol/L     _  Result Component Current Result Ref Range   Calcium 9.0 (10/20/2020) 8.5 - 10.1 mg/dL     No results found for Mag within last 30 days.     No results found for Phos within last 30 days.     _  Result Component Current Result Ref Range   Albumin 3.6 (10/20/2020) 3.4 - 5.0 g/dL     _  Result Component Current Result Ref Range   Urea Nitrogen 12 (10/20/2020) 7 - 30 mg/dL     _  Result Component Current Result Ref Range   Creatinine 0.89 (10/20/2020) 0.52 - 1.04 mg/dL       _  Result Component Current Result Ref Range   AST 24 (10/20/2020) 0 - 45 U/L     _  Result Component Current Result Ref Range   ALT 36 (10/20/2020) 0 - 50 U/L     _  Result Component Current Result Ref Range   Bilirubin Total 0.4 (10/20/2020) 0.2 - 1.3 mg/dL       _  Result Component Current Result Ref Range   WBC 3.6 (L) (10/20/2020) 4.0 - 11.0 10e9/L     _  Result Component Current Result Ref Range   Hemoglobin 12.4 (10/20/2020) 11.7 - 15.7 g/dL     _  Result Component Current Result Ref Range   Platelet Count 152 (10/20/2020) 150 - 450 10e9/L     _  Result Component Current Result Ref Range   Absolute Neutrophil 2.6 (10/20/2020) 1.6 - 8.3 10e9/L       Assessment & Plan:  No concerning abnormalities. Continue plan.     Follow-Up:  10/22: Dr Bhatt appointment    Jose Swain, PharmD  Hematology/Oncology Clinical Pharmacist  [facility]  [phone]

## 2020-10-22 ENCOUNTER — TELEPHONE (OUTPATIENT)
Dept: ONCOLOGY | Facility: CLINIC | Age: 50
End: 2020-10-22

## 2020-10-22 ENCOUNTER — VIRTUAL VISIT (OUTPATIENT)
Dept: ONCOLOGY | Facility: CLINIC | Age: 50
End: 2020-10-22
Attending: INTERNAL MEDICINE
Payer: COMMERCIAL

## 2020-10-22 DIAGNOSIS — C50.919 METASTATIC BREAST CANCER: ICD-10-CM

## 2020-10-22 DIAGNOSIS — Z17.0 MALIGNANT NEOPLASM OF UPPER-OUTER QUADRANT OF LEFT BREAST IN FEMALE, ESTROGEN RECEPTOR POSITIVE (H): ICD-10-CM

## 2020-10-22 DIAGNOSIS — C50.412 MALIGNANT NEOPLASM OF UPPER-OUTER QUADRANT OF LEFT BREAST IN FEMALE, ESTROGEN RECEPTOR POSITIVE (H): ICD-10-CM

## 2020-10-22 PROCEDURE — 99214 OFFICE O/P EST MOD 30 MIN: CPT | Mod: TEL | Performed by: INTERNAL MEDICINE

## 2020-10-22 NOTE — TELEPHONE ENCOUNTER
Oral Chemotherapy Monitoring Program    Subjective/Objective:  Jaed Collins is a 50 year old female contacted by phone for a follow-up visit for oral chemotherapy.  Jade confirms taking Ibrance 100 mg daily x 21 days on 7 days off. She denies missed doses, new or worsening side effects, medication changes, or recent hospitalizations or ED visits.  Her next cycle start tomorrow, 10/23.    ORAL CHEMOTHERAPY 3/17/2020 4/15/2020 7/17/2020 7/17/2020 9/18/2020 10/20/2020 10/22/2020   Assessment Type - - - - - Lab Monitoring Quarterly Follow up   Diagnosis Code - - - - - Breast Cancer Breast Cancer   Providers - - - - - [facility] Name[facility]Location - - - - [facility]   Drug Name Ibrance (Palbociclib) Ibrance (Palbociclib) Ibrance (Palbociclib) Ibrance (Palbociclib) Ibrance (Palbociclib) Ibrance (Palbociclib) Ibrance (Palbociclib)   Dose - - - - - 100 mg 100 mg   Start Date of Last Cycle [id number]  [id number]   Planned next cycle start date [id number]   Doses missed in last 2 weeks 0 0 - 0 0 - 0   Adherence Assessment Adherent Adherent - Adherent Adherent - Adherent   Adverse Effects No AE identified during assessment No AE identified during assessment Decreased neutrophil count Decreased neutrophil count No AE identified during assessment - No AE identified during assessment   Nausea - - - - - - -   Pharmacist Intervention(nausea) - - - - - - -   Diarrhea - - - - - - -   Pharmacist Intervention(diarrhea) - - - - - - -   Oral Mucositis - - - - - - -   Pharmacist Intervention(oral mucositis) - - - - - - -   Fatigue - - - - - - -   Pharmacist Intervention(fatigue) - - - - - - -   Neutropenia - - Grade 3 Resolved due to intervention - - -   Pharmacist Intervention(neutropenia) - - Yes - - - -   Intervention(s) - - Recommend drug hold - - - -   Home BPs - - - - - - -   Any new drug interactions? No No - No No - No   Is the dose as ordered appropriate for the patient?  "Yes Yes - Yes Yes - Yes   Is the patient currently in pain? - - - - - - -   Does the patient feel the pain is currently being managed by a provider? - - - - - - -   Has the patient been assessed within the past 6 months for depression? - - - - - - -   Has the patient missed any days of school, work, or other routine activity? No No - No No - -       Last PHQ-2 Score on record:   PHQ-2 ( 1999 [employer]) 9/18/2018   Q1: Little interest or pleasure in doing things 1   Q2: Feeling down, depressed or hopeless 1   PHQ-2 Score 2     Vitals:  BP:   BP Readings from Last 1 Encounters:   06/09/20 138/85     Wt Readings from Last 1 Encounters:   06/09/20 138.6 kg (305 lb 9.6 oz)     Estimated body surface area is 2.58 meters squared as calculated from the following:    Height as of 3/12/20: 1.727 m (5' 7.99\").    Weight as of 6/9/20: 138.6 kg (305 lb 9.6 oz).    Labs:  _  Result Component Current Result Ref Range   Sodium 133 (10/20/2020) 133 - 144 mmol/L     _  Result Component Current Result Ref Range   Potassium 3.8 (10/20/2020) 3.4 - 5.3 mmol/L     _  Result Component Current Result Ref Range   Calcium 9.0 (10/20/2020) 8.5 - 10.1 mg/dL     No results found for Mag within last 30 days.     No results found for Phos within last 30 days.     _  Result Component Current Result Ref Range   Albumin 3.6 (10/20/2020) 3.4 - 5.0 g/dL     _  Result Component Current Result Ref Range   Urea Nitrogen 12 (10/20/2020) 7 - 30 mg/dL     _  Result Component Current Result Ref Range   Creatinine 0.89 (10/20/2020) 0.52 - 1.04 mg/dL       _  Result Component Current Result Ref Range   AST 24 (10/20/2020) 0 - 45 U/L     _  Result Component Current Result Ref Range   ALT 36 (10/20/2020) 0 - 50 U/L     _  Result Component Current Result Ref Range   Bilirubin Total 0.4 (10/20/2020) 0.2 - 1.3 mg/dL       _  Result Component Current Result Ref Range   WBC 3.6 (L) (10/20/2020) 4.0 - 11.0 10e9/L     _  Result Component Current Result Ref Range "   Hemoglobin 12.4 (10/20/2020) 11.7 - 15.7 g/dL     _  Result Component Current Result Ref Range   Platelet Count 152 (10/20/2020) 150 - 450 10e9/L     _  Result Component Current Result Ref Range   Absolute Neutrophil 2.6 (10/20/2020) 1.6 - 8.3 10e9/L       Assessment/Plan:  Jade continues to tolerate Ibrance well.   PDC=0.91, no concerns with adherence.     Follow-Up:  11/16/20: due for monthly labs  1/08/21: quarterly assessment    Refill Due:  [facility] will deliver Ibrance and Letrozole on 10/23.     Carmen Clark, PharmD  Hematology/Oncology Clinical Pharmacist  [facility]  [facility]  [phone]

## 2020-10-22 NOTE — LETTER
"    10/22/2020         RE: Jade Collins  [address]  [facility] [place] [address]        Dear Colleague,    Thank you for referring your patient, Jade Collins, to the [facility]. Please see a copy of my visit note below.    Jade Collins is a 50 year old female who is being evaluated via a billable telephone visit.      The patient has been notified of following:     \"This telephone visit will be conducted via a call between you and your physician/provider. We have found that certain health care needs can be provided without the need for a physical exam.  This service lets us provide the care you need with a short phone conversation.  If a prescription is necessary we can send it directly to your pharmacy.  If lab work is needed we can place an order for that and you can then stop by our lab to have the test done at a later time.    Telephone visits are billed at different rates depending on your insurance coverage. During this emergency period, for some insurers they may be billed the same as an in-person visit.  Please reach out to your insurance provider with any questions.    If during the course of the call the physician/provider feels a telephone visit is not appropriate, you will not be charged for this service.\"    Patient has given verbal consent for Telephone visit?  Yes    What phone number would you like to be contacted at? [phone]    How would you like to obtain your AVS? MyChart    Vitals - Patient Reported  Weight (Patient Reported): 138.3 kg (305 lb)  Height (Patient Reported): 175.3 cm (5' 9\")  BMI (Based on Pt Reported Ht/Wt): 45.04  Pain Score: No Pain (0)    Refills: Letrozole and iBrance     Ashwini Mejia CMA October 22, 2020  9:50 AM     Phone call duration: 8 minutes (10:03-10:11)    Jovon Taylor MD    HISTORY OF PRESENT ILLNESS:  Jade is a 48-year-old woman with metastatic breast cancer who comes to our clinic for recommendations for further " treatment.  She is referred by Dr. Norberto Brand at [facility].  Jade would like to continue her care at the [facility].       Jade was diagnosed with breast cancer with a lump found in the upper-inner quadrant of the left breast.  She was diagnosed in 12/2013 in the [place] area and Dr. David Redd was her medical oncologist.  Biopsy of the tumor showed it to be ER positive, CT positive, and HER-2 negative.  We do not have any of the original pathology and we need to obtain those reports.  She underwent a lumpectomy performed by Dr. Tasha Segura who is a surgeon in the [place] area.  She also had a sentinel lymph node which was noted to be involved with tumor but there was no lymph node dissection done at that time.  [id number].  She subsequently underwent adjuvant chemotherapy with dose-dense AC for 4 cycles and Taxol for 12 weeks, completed 09/11/2014.        She then had radiation therapy post lumpectomy to the left breast, which was completed 11/23/2014 by Dr. Manley.        She was then begun on an aromatase inhibitor, the name of which she does not remember.  The aromatase inhibitor therapy was begun in 11/2014.  She was then switched within about a month or so to anastrozole and remained on anastrozole from 11/2014-02/2017.  She has never received tamoxifen.        She then moved to [place], [place] in 03/2017.  She saw a gynecologist there and underwent a IRIS/BSO by Dr. Zendejas.  She then also saw Dr. Linares in [place], [place], who is an oncologist.  His interpretation of the pathology report was that she had triple-negative breast cancer.  It is possible that she may have had low estrogen receptor positivity, but the anastrozole was then discontinued in 02/2017.  She then underwent the IRIS/BSO in 03/2017.  This was done laparoscopically.        She then remained off of all hormonal therapy and in 09/2017 moved to [place].  She remained off hormonal therapy and did not have Medical Oncology  followup initially.        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  She then went to see Dr. Norberto Brand who performed a PET/CT scan and the patient also underwent a brain MRI for staging.  The PET/CT scan performed 08/03/2018 showed in the neck there were several mildly metabolic active and large prominent left posterior cervical lymph nodes.  The largest is located posterior to the left sternocleidomastoid muscle and measures 1.5 x 1 cm in size.  This demonstrate modest FDG uptake.  The other lymph nodes are smaller and demonstrate mild FDG uptake.  The prominent prevascular and aortopulmonary lymph nodes were also seen on the contrast-enhanced scan were less well seen on the PET.  The largest lymph node visualized on the PET scan was 1.1 x 0.8 cm and demonstrated mild FDG uptake.  This was in the periaortic area just anterior to the aortic arch.  No lung nodules.  No lung infiltrates.  No pleural effusion.  There was no uptake of FDG in the bones.  In summary, there were mildly enlarged left posterior cervical lymph nodes, largest measuring 1.5 x 1.0 cm and mildly enlarged aorticopulmonary and prevascular lymph nodes that were noted on contrast-enhanced CT scan seen less well on the PET scan.  No evidence of metastatic disease in the abdomen and pelvis.  She also underwent a brain MRI that showed no evidence of intracranial metastatic disease.  She underwent a biopsy of one of the lymph nodes in the left neck which showed metastatic adenocarcinoma consistent with breast origin, which was estrogen-receptor positive.  The tumor cells were positive for CK7, ER and ME consistent with metastatic breast carcinoma.  Estrogen receptor showed strong average nuclear intensity in 95% of carcinoma cells, progesterone receptor moderate average nuclear intensity in 70% of the carcinoma.  A GATA3 stain was apparently not performed.       TREATMENT HISTORY:  A. Tamoxifen  B. Letrozole  and palbociclib started 9-18-18.     INTERVAL HISTORY:    Feeling good with lower dose of palbociclib. No recent infections, no fevers, chills.  Still has joint aches and pain about an hour after her dose, but she is able to sleep through it.  Energy is pretty good.  Getting out once daily at least to walk.  Exercising with walking.  Appetite is OK.  Eating 1-2 meals daily due to sleeping in.  Not particularly hungry, food is tasting good.  No swelling.  Some SOB at baseline from asthma if walking too fast, uses albuterol inhaler as needed.  No chest pain.  No bleeding or bruising.  No new rashes or yellowing of skin/eyes.       A 10-point review of systems was performed and was negative with the exception of pertinent positives noted above.     OBJECTIVE DATA:    Imaging studies:    CT CAP 10/20/20                                                                   IMPRESSION: In this patient with history of breast cancer, the current  scan shows:     1. No evidence of metastatic disease in the chest, abdomen or pelvis.     2. Hepatomegaly with diffuse steatosis.    CT neck 10/20/20    Impression:  Stable size of bilateral level 1B, level 2A, and level 2B lymph nodes,  which may be reactive or malignant in etiology. No new enlarged lymph  nodes. Continued interval follow-up is suggested.      Labs: CMP WNL on 10/20/20    WBC 3.6  Hgb 12.4  Plt 152  ANC 2.6     ASSESSMENT AND PLAN:  1. Recurrent, metastatic ER-positive, HER2 negative breast cancer: History of left breast cancer s/p lumpectomy and SNLB in 2014. S/p 4 cycles ddAC and 12 weeks of Taxol. On AI from 11/2014-2/2017. Recurrence on PET/CT scan performed 08/03/2018 and then biopsy to left posterior cervical lymph nodes, aorticopulmonary, and prevascular lymph nodes were positive. No evidence of metastatic disease in the abdomen and pelvis or brain. Started Ibrance and letrozole on 9/21/18. Tolerating well aside from some hot flashes and mild joint stiffness.  Last restaging 7/22 with stable disease.  Lymphadenopathy in her neck has clinically resolved and remain stable on repeat imaging from 10/2020.  She continues to tolerate medications well and has had good response, so we will not make additional changes.   2. Overall doing well.  No signs of liver injury.  RUQ tenderness resolved.  3.  Diet and exercise. We also discussed eating less sugar and getting 150 minutes of exercise per week.  She will try to incorporate these recommendations into her lifestyle.  She previously saw Dr. Baez in [place] but is now looking for a new PCP more local to her new apartment.   4.  She has not gone to Psychiatry despite our referral to them several months ago.  She is thinking about going again.  Sertraline is working ok for her anxiety.  On Zoloft 200 mg with improvement in symptoms. Also on Remeron at bedtime.Sees Sari Holliday.  5. Asthma: Seen by Dr. Hay. Per chart on fluticasone-salmeterol inhaler BID. She does not recall the name of this. Also has albuterol prn.  6.  Follow up with SONIA phone visit with CBC, CMP November 19, follow up with me by phone visit December 17 with CBC, CMP, follow up with SONIA by phone visit January 14, CBC, CMP. Follow up with me by phone visit February 11 with CBC, CMP and CT CAP February 10.   Thank you for allowing us to participate in this patient's care.       Thank you for allowing us to participate in this patient's care.  The patient was seen and evaluated by me.  I discussed the patient with the fellow and agree with the findings and plan in the note, which was edited by me.    Sincerely,     Wilfredo Bhatt MD  Professor  [facility]  [phone].          Patient seen and discussed with Dr. Bhatt.     Jovon Taylor MD, PhD  Hematology/Oncology Fellow  Pager: [phone]                  I spent 8 minutes with the patient more than 50% of which was in counseling and coordination of care.        Again, thank you for allowing me to  participate in the care of your patient.        Sincerely,        Wilfredo Bhatt MD

## 2020-11-02 DIAGNOSIS — J45.909 ASTHMA: ICD-10-CM

## 2020-11-02 RX ORDER — FLUTICASONE PROPIONATE AND SALMETEROL XINAFOATE 115; 21 UG/1; UG/1
2 AEROSOL, METERED RESPIRATORY (INHALATION) 2 TIMES DAILY
Qty: 1 INHALER | Refills: 2 | OUTPATIENT
Start: 2020-11-02

## 2020-11-09 DIAGNOSIS — C50.412 MALIGNANT NEOPLASM OF UPPER-OUTER QUADRANT OF LEFT BREAST IN FEMALE, ESTROGEN RECEPTOR POSITIVE (H): Primary | ICD-10-CM

## 2020-11-09 DIAGNOSIS — Z17.0 MALIGNANT NEOPLASM OF UPPER-OUTER QUADRANT OF LEFT BREAST IN FEMALE, ESTROGEN RECEPTOR POSITIVE (H): Primary | ICD-10-CM

## 2020-11-13 DIAGNOSIS — Z17.0 MALIGNANT NEOPLASM OF UPPER-OUTER QUADRANT OF LEFT BREAST IN FEMALE, ESTROGEN RECEPTOR POSITIVE (H): Primary | ICD-10-CM

## 2020-11-13 DIAGNOSIS — C50.412 MALIGNANT NEOPLASM OF UPPER-OUTER QUADRANT OF LEFT BREAST IN FEMALE, ESTROGEN RECEPTOR POSITIVE (H): Primary | ICD-10-CM

## 2020-11-13 RX ORDER — LETROZOLE 2.5 MG/1
2.5 TABLET, FILM COATED ORAL DAILY
Qty: 28 TABLET | Refills: 0 | Status: SHIPPED | OUTPATIENT
Start: 2020-11-13 | End: 2020-12-22

## 2020-11-18 DIAGNOSIS — C50.412 MALIGNANT NEOPLASM OF UPPER-OUTER QUADRANT OF LEFT BREAST IN FEMALE, ESTROGEN RECEPTOR POSITIVE (H): ICD-10-CM

## 2020-11-18 DIAGNOSIS — Z17.0 MALIGNANT NEOPLASM OF UPPER-OUTER QUADRANT OF LEFT BREAST IN FEMALE, ESTROGEN RECEPTOR POSITIVE (H): ICD-10-CM

## 2020-11-19 DIAGNOSIS — Z17.0 MALIGNANT NEOPLASM OF UPPER-OUTER QUADRANT OF LEFT BREAST IN FEMALE, ESTROGEN RECEPTOR POSITIVE (H): ICD-10-CM

## 2020-11-19 DIAGNOSIS — C50.412 MALIGNANT NEOPLASM OF UPPER-OUTER QUADRANT OF LEFT BREAST IN FEMALE, ESTROGEN RECEPTOR POSITIVE (H): ICD-10-CM

## 2020-11-19 LAB
ALBUMIN SERPL-MCNC: 4 G/DL (ref 3.4–5)
ALP SERPL-CCNC: 73 U/L (ref 40–150)
ALT SERPL W P-5'-P-CCNC: 39 U/L (ref 0–50)
ANION GAP SERPL CALCULATED.3IONS-SCNC: 5 MMOL/L (ref 3–14)
AST SERPL W P-5'-P-CCNC: 27 U/L (ref 0–45)
BASOPHILS # BLD AUTO: 0 10E9/L (ref 0–0.2)
BASOPHILS NFR BLD AUTO: 1.4 %
BILIRUB SERPL-MCNC: 0.6 MG/DL (ref 0.2–1.3)
BUN SERPL-MCNC: 13 MG/DL (ref 7–30)
CALCIUM SERPL-MCNC: 9.9 MG/DL (ref 8.5–10.1)
CHLORIDE SERPL-SCNC: 104 MMOL/L (ref 94–109)
CO2 SERPL-SCNC: 27 MMOL/L (ref 20–32)
CREAT SERPL-MCNC: 1.04 MG/DL (ref 0.52–1.04)
DIFFERENTIAL METHOD BLD: ABNORMAL
EOSINOPHIL # BLD AUTO: 0.1 10E9/L (ref 0–0.7)
EOSINOPHIL NFR BLD AUTO: 2.3 %
ERYTHROCYTE [DISTWIDTH] IN BLOOD BY AUTOMATED COUNT: 13.9 % (ref 10–15)
GFR SERPL CREATININE-BSD FRML MDRD: 62 ML/MIN/{1.73_M2}
GLUCOSE SERPL-MCNC: 102 MG/DL (ref 70–99)
HCT VFR BLD AUTO: 39.5 % (ref 35–47)
HGB BLD-MCNC: 13.5 G/DL (ref 11.7–15.7)
IMM GRANULOCYTES # BLD: 0 10E9/L (ref 0–0.4)
IMM GRANULOCYTES NFR BLD: 0.5 %
LYMPHOCYTES # BLD AUTO: 0.9 10E9/L (ref 0.8–5.3)
LYMPHOCYTES NFR BLD AUTO: 43.1 %
MCH RBC QN AUTO: 32.7 PG (ref 26.5–33)
MCHC RBC AUTO-ENTMCNC: 34.2 G/DL (ref 31.5–36.5)
MCV RBC AUTO: 96 FL (ref 78–100)
MONOCYTES # BLD AUTO: 0.3 10E9/L (ref 0–1.3)
MONOCYTES NFR BLD AUTO: 11.6 %
NEUTROPHILS # BLD AUTO: 0.9 10E9/L (ref 1.6–8.3)
NEUTROPHILS NFR BLD AUTO: 41.1 %
NRBC # BLD AUTO: 0 10*3/UL
NRBC BLD AUTO-RTO: 0 /100
PLATELET # BLD AUTO: 133 10E9/L (ref 150–450)
POTASSIUM SERPL-SCNC: 4.2 MMOL/L (ref 3.4–5.3)
PROT SERPL-MCNC: 8.2 G/DL (ref 6.8–8.8)
RBC # BLD AUTO: 4.13 10E12/L (ref 3.8–5.2)
SODIUM SERPL-SCNC: 136 MMOL/L (ref 133–144)
WBC # BLD AUTO: 2.2 10E9/L (ref 4–11)

## 2020-11-19 PROCEDURE — 36415 COLL VENOUS BLD VENIPUNCTURE: CPT | Performed by: PATHOLOGY

## 2020-11-19 PROCEDURE — 80053 COMPREHEN METABOLIC PANEL: CPT | Performed by: PATHOLOGY

## 2020-11-19 PROCEDURE — 85025 COMPLETE CBC W/AUTO DIFF WBC: CPT | Performed by: PATHOLOGY

## 2020-11-20 ENCOUNTER — VIRTUAL VISIT (OUTPATIENT)
Dept: ONCOLOGY | Facility: CLINIC | Age: 50
End: 2020-11-20
Attending: PHYSICIAN ASSISTANT
Payer: MEDICARE

## 2020-11-20 ENCOUNTER — PATIENT OUTREACH (OUTPATIENT)
Dept: CARE COORDINATION | Facility: CLINIC | Age: 50
End: 2020-11-20

## 2020-11-20 DIAGNOSIS — C50.412 MALIGNANT NEOPLASM OF UPPER-OUTER QUADRANT OF LEFT BREAST IN FEMALE, ESTROGEN RECEPTOR POSITIVE (H): Primary | ICD-10-CM

## 2020-11-20 DIAGNOSIS — Z17.0 MALIGNANT NEOPLASM OF UPPER-OUTER QUADRANT OF LEFT BREAST IN FEMALE, ESTROGEN RECEPTOR POSITIVE (H): Primary | ICD-10-CM

## 2020-11-20 DIAGNOSIS — F41.9 ANXIETY: ICD-10-CM

## 2020-11-20 DIAGNOSIS — F32.A DEPRESSION, UNSPECIFIED DEPRESSION TYPE: ICD-10-CM

## 2020-11-20 PROCEDURE — 999N001193 HC VIDEO/TELEPHONE VISIT; NO CHARGE

## 2020-11-20 PROCEDURE — 99443 PR PHYSICIAN TELEPHONE EVALUATION 21-30 MIN: CPT | Mod: 95 | Performed by: PHYSICIAN ASSISTANT

## 2020-11-20 NOTE — LETTER
"    11/20/2020         RE: Jade Collins  [address]  [facility] [address]      Jade Collins is a 50 year old female who is being evaluated via a billable telephone visit.      The patient has been notified of following:     \"This telephone visit will be conducted via a call between you and your physician/provider. We have found that certain health care needs can be provided without the need for a physical exam.  This service lets us provide the care you need with a short phone conversation.  If a prescription is necessary we can send it directly to your pharmacy.  If lab work is needed we can place an order for that and you can then stop by our lab to have the test done at a later time.    Telephone visits are billed at different rates depending on your insurance coverage. During this emergency period, for some insurers they may be billed the same as an in-person visit.  Please reach out to your insurance provider with any questions.    If during the course of the call the physician/provider feels a telephone visit is not appropriate, you will not be charged for this service.\"    Patient has given verbal consent for Telephone visit?  Yes    What phone number would you like to be contacted at? [phone]    How would you like to obtain your AVS? MyChart      I have reviewed and updated the patient's allergies and medication list. Patient was asked if they had any patient reported vital signs to present, if yes, please see documented vitals.  Patient was also asked for their current weight and height, if presented, documented in vitals.      Concerns: Patient states there are no new concerns to discuss with provider.  Helga was not notified.      Refills: No refills       Kandi Mancera CMA ([facility])        Oncology/Hematology Visit Note  Nov 20, 2020    Reason for Visit: follow up of recurrent metastatic ER-positive HER2 negative breast cancer    History of Present Illness: Jade Collins is a 50 year old female " with a history of left breast cancer diagnosed in 12/2013 in [place] area and Dr. David Redd was her medical oncologist. Biopsy showed tumor to be ER positive, [place] positive and HER2 negative. She underwent lumpectomy and SLNB by Dr. Tasha Segura in [place]. [place] lymph node was noted to be involved with tumor but no lymph node dissection done at that time. She subsequently underwent adjuvant ddAC x 4 cycles and Taxol x 12 weeks, competed in 9/11/2014. She had post lumpectomy radiation completed in 11/23/2014 by Dr. Manley. She was begun on an AI in 11/2014, but does not recall name.     She was then switched within about a month or so to anastrozole and remained on anastrozole from 11/2014-02/2017.  She has never received tamoxifen. She then moved to [place], [place] in 03/2017.  She saw a gynecologist there and underwent a IRIS/BSO by Dr. Zendejas.  She then also saw Dr. Linares in [place], [place], who is an oncologist.  His interpretation of the pathology report was that she had triple-negative breast cancer.  It is possible that she may have had low estrogen receptor positivity, but the anastrozole was then discontinued in 02/2017.  She then underwent the IRIS/BSO in 03/2017.  This was done laparoscopically.        She then remained off of all hormonal therapy and in 09/2017 moved to [place].  She remained off hormonal therapy and did not have Medical Oncology followup initially.        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  She then went to see Dr. Norberto Brand who performed a PET/CT scan and the patient also underwent a brain MRI for staging.  The PET/CT scan performed 08/03/2018 which showed mildly enlarged left posterior cervical lymph nodes, largest measuring 1.5 x 1.0 cm and mildly enlarged aorticopulmonary and prevascular lymph nodes that were noted on contrast-enhanced CT scan seen less well on the PET scan.  No evidence of metastatic disease in the abdomen  and pelvis.  She also underwent a brain MRI that showed no evidence of intracranial metastatic disease.  She underwent a biopsy of one of the lymph nodes in the left neck which showed metastatic adenocarcinoma consistent with breast origin, which was estrogen-receptor positive.  The tumor cells were positive for CK7, ER and VT consistent with metastatic breast carcinoma.  Estrogen receptor showed strong average nuclear intensity in 95% of carcinoma cells, progesterone receptor moderate average nuclear intensity in 70% of the carcinoma.  A GATA3 stain was apparently not performed.     She started Ibrance on 9/21/18 and letrozole on 9/23/18.  Last restaging on 10/20/20 with stable disease.     Please see Dr. Bhatt's previous notes for further details on the patient's history. She comes in today for routine follow up.    Interval History:  -She is overall doing well. No new symptoms to report  -She continues to struggle with anxiety/depression in this COVID season. She just discussed this with her PCP and they started her on new meds and referred her to psych which she has not followed up with yet  -She denies f/c  -no bone pain  -no new lumps or bumps  -otherwise is tolerating treatment well      ROS: 10 point ROS neg other than the symptoms noted above in the HPI.      Current Outpatient Medications   Medication Sig Dispense Refill     albuterol (PROAIR HFA/PROVENTIL HFA/VENTOLIN HFA) 108 (90 Base) MCG/ACT inhaler Inhale 2 puffs into the lungs every 6 hours 1 Inhaler 0     albuterol (PROVENTIL) (2.5 MG/3ML) 0.083% neb solution Take 1 vial (2.5 mg) by nebulization every 6 hours as needed for shortness of breath / dyspnea or wheezing 30 vial 0     calcium carbonate (TUMS) 500 MG chewable tablet Take 1 chew tab by mouth as needed for heartburn       Calcium Citrate-Vitamin D (CALCIUM + D PO) Take 250 mg by mouth 2 times daily        clotrimazole (MYCELEX) 10 MG lozenge Place 1 lozenge (10 mg) inside cheek 5 times daily  60 each 0     fluticasone-salmeterol (ADVAIR-HFA) 115-21 MCG/ACT inhaler Inhale 2 puffs into the lungs 2 times daily 1 Inhaler 2     letrozole (FEMARA) 2.5 MG tablet Take 1 tablet (2.5 mg) by mouth daily for 28 days 28 tablet 0     levothyroxine (SYNTHROID/LEVOTHROID) 175 MCG tablet Take 1 tablet (175 mcg) by mouth daily 90 tablet 3     loratadine (CLARITIN) 10 MG tablet Take 1 tablet (10 mg) by mouth daily 90 tablet 3     methylPREDNISolone (MEDROL) 32 MG tablet TAKE 1 TABLET BY MOUTH 12 HOURS BEFORE IMAGING AND REPEAT 1 TABLET 2 HOURS BEFORE IMAGING TO PREVENT CONTRAST REACTION 2 tablet 3     mirtazapine (REMERON) 15 MG tablet TK 1 T PO HS 30 tablet 3     Multiple Vitamins-Minerals (MULTIVITAMIN ADULT PO)        palbociclib (IBRANCE) 100 MG tablet Take 1 tablet (100 mg) by mouth daily with food Take for 21 days, then 7 days off. 21 tablet 0     sertraline (ZOLOFT) 100 MG tablet Take 1 tablet (100 mg) by mouth 2 times daily 60 tablet 3     triamcinolone (NASACORT) 55 MCG/ACT inhaler Spray 1 spray into both nostrils daily        VITAMIN D, CHOLECALCIFEROL, PO Take 2,000 Units by mouth daily          Physical Examination:  General: The patient is a pleasant female in no acute distress.  There were no vitals taken for this visit.  Wt Readings from Last 10 Encounters:   06/09/20 138.6 kg (305 lb 9.6 oz)   03/12/20 139.5 kg (307 lb 8 oz)   02/13/20 138.7 kg (305 lb 11.2 oz)   01/16/20 137.6 kg (303 lb 4.8 oz)   12/18/19 140.3 kg (309 lb 4.8 oz)   11/15/19 141.7 kg (312 lb 4.8 oz)   10/17/19 139.5 kg (307 lb 8 oz)   09/19/19 137.4 kg (303 lb)   08/22/19 137.1 kg (302 lb 3.2 oz)   07/25/19 137.1 kg (302 lb 3.2 oz)     Objective:  General: patient sounds in no audible acute distress, alert and oriented, speech clear and fluid  Resp: Speaking in full sentences, no audible respiratory distress, no cough, no audible wheeze  Psych: able to articulate logical thoughts, able to abstract reason, no tangential thoughts, no  hallucinations or delusions  Her affect is normal    Laboratory Data:      11/19/2020 13:36   Sodium 136   Potassium 4.2   Chloride 104   Carbon Dioxide 27   Urea Nitrogen 13   Creatinine 1.04   GFR Estimate 62   GFR Estimate If Black 72   Calcium 9.9   Anion Gap 5   Albumin 4.0   Protein Total 8.2   Bilirubin Total 0.6   Alkaline Phosphatase 73   ALT 39   AST 27   Glucose 102 (H)   WBC 2.2 (L)   Hemoglobin 13.5   Hematocrit 39.5   Platelet Count 133 (L)   RBC Count 4.13   MCV 96   MCH 32.7   MCHC 34.2   RDW 13.9   Diff Method Automated Method   % Neutrophils 41.1   % Lymphocytes 43.1   % Monocytes 11.6   % Eosinophils 2.3   % Basophils 1.4   % Immature Granulocytes 0.5   Nucleated RBCs 0   Absolute Neutrophil 0.9 (L)   Absolute Lymphocytes 0.9   Absolute Monocytes 0.3   Absolute Eosinophils 0.1   Absolute Basophils 0.0   Abs Immature Granulocytes 0.0   Absolute Nucleated RBC 0.0       Assessment and Plan:    Recurrent, metastatic ER-positive, HER2 negative breast cancer: History of left breast cancer s/p lumpectomy and SNLB in 2014. S/p 4 cycles ddAC and 12 weeks of Taxol. On AI from 11/2014-2/2017. Recurrence on PET/CT scan performed 08/03/2018 and then biopsy to left posterior cervical lymph nodes, aorticopulmonary, and prevascular lymph nodes. No evidence of metastatic disease in the abdomen and pelvis or brain. Started Ibrance and letrozole on 9/21/18.   -CT from 10/20/20 showed stable disease. Continuing same treatment   -Ibrance was decreased to 100 mg daily in Sept 2020 due to ongoing episodes of neutropenia.   -Neutropenic again today. This is the second time recently that she has needed to be delayed due to neutropenia. Will discuss with Dr. Bhatt about whether we need to further dose reduce her Ibrance  -Will have her hold starting Ibrance today. Will have her repeat labs in one week and then will restart Ibrance if ANC > 1.0 (again will check on the dosing)  -reviewed neutropenic precautions   -will  follow-up with Dr. Bhatt in 1 month     Addendum: Per Dr. Bhatt, will keep dose the same    Bone health: No need for bisphosphonates at this time as she has not known bone mets. Continue calcium and vitamin D. Consider DEXA scan with long term letrozole use    Depression, Anxiety: On zoloft 200 mg and remeron at bedtime. Talked about this with her PCP. She was given buspar and ativan prn. Going to follow-up again with her PCP in 4 weeks. She also has a referral for a psychiatrist. I reinforced that I felt these thing should be helpful for her     Hypothyroidism: Established care with endocrinology here on 6/29/20. Decreased her levothyroxine to 175 mcg. Continue to be managed by endo. Not discussed today    Received flu shot for 5417-3192 influenza season. Ok to get shingles vaccine.      Phone Call Duration: 24 minutes    Helga Fernando PA-C  [facility]  [address] [place] [id number] [phone]

## 2020-11-20 NOTE — Clinical Note
"    11/20/2020         RE: Jade Collins  [address]  [facility] [address]        Dear Colleague,    Thank you for referring your patient, Jade Collins, to the [facility]. Please see a copy of my visit note below.    Jade Collins is a 50 year old female who is being evaluated via a billable telephone visit.      The patient has been notified of following:     \"This telephone visit will be conducted via a call between you and your physician/provider. We have found that certain health care needs can be provided without the need for a physical exam.  This service lets us provide the care you need with a short phone conversation.  If a prescription is necessary we can send it directly to your pharmacy.  If lab work is needed we can place an order for that and you can then stop by our lab to have the test done at a later time.    Telephone visits are billed at different rates depending on your insurance coverage. During this emergency period, for some insurers they may be billed the same as an in-person visit.  Please reach out to your insurance provider with any questions.    If during the course of the call the physician/provider feels a telephone visit is not appropriate, you will not be charged for this service.\"    Patient has given verbal consent for Telephone visit?  Yes    What phone number would you like to be contacted at? [phone]    How would you like to obtain your AVS? [place]      I have reviewed and updated the patient's allergies and medication list. Patient was asked if they had any patient reported vital signs to present, if yes, please see documented vitals.  Patient was also asked for their current weight and height, if presented, documented in vitals.      Concerns: Patient states there are no new concerns to discuss with provider.  Helga was not notified.      Refills: No refills       Kandi Mancera CMA ([facility])        Oncology/Hematology Visit Note  Nov 20, " 2020    Reason for Visit: follow up of recurrent metastatic ER-positive HER2 negative breast cancer    History of Present Illness: Jade Collins is a 50 year old female with a history of left breast cancer diagnosed in 12/2013 in [place] area and Dr. David Redd was her medical oncologist. Biopsy showed tumor to be ER positive, [place] positive and HER2 negative. She underwent lumpectomy and SLNB by Dr. Tasha Segura in [place]. [place] lymph node was noted to be involved with tumor but no lymph node dissection done at that time. She subsequently underwent adjuvant ddAC x 4 cycles and Taxol x 12 weeks, competed in 9/11/2014. She had post lumpectomy radiation completed in 11/23/2014 by Dr. Manley. She was begun on an AI in 11/2014, but does not recall name.     She was then switched within about a month or so to anastrozole and remained on anastrozole from 11/2014-02/2017.  She has never received tamoxifen. She then moved to [place], [place] in 03/2017.  She saw a gynecologist there and underwent a IRIS/BSO by Dr. Zendejas.  She then also saw Dr. Linares in [place], [place], who is an oncologist.  His interpretation of the pathology report was that she had triple-negative breast cancer.  It is possible that she may have had low estrogen receptor positivity, but the anastrozole was then discontinued in 02/2017.  She then underwent the IRIS/BSO in 03/2017.  This was done laparoscopically.        She then remained off of all hormonal therapy and in 09/2017 moved to [place].  She remained off hormonal therapy and did not have Medical Oncology followup initially.        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  She then went to see Dr. Norberto Brand who performed a PET/CT scan and the patient also underwent a brain MRI for staging.  The PET/CT scan performed 08/03/2018 which showed mildly enlarged left posterior cervical lymph nodes, largest measuring 1.5 x 1.0 cm and mildly enlarged  aorticopulmonary and prevascular lymph nodes that were noted on contrast-enhanced CT scan seen less well on the PET scan.  No evidence of metastatic disease in the abdomen and pelvis.  She also underwent a brain MRI that showed no evidence of intracranial metastatic disease.  She underwent a biopsy of one of the lymph nodes in the left neck which showed metastatic adenocarcinoma consistent with breast origin, which was estrogen-receptor positive.  The tumor cells were positive for CK7, ER and AR consistent with metastatic breast carcinoma.  Estrogen receptor showed strong average nuclear intensity in 95% of carcinoma cells, progesterone receptor moderate average nuclear intensity in 70% of the carcinoma.  A GATA3 stain was apparently not performed.     She started Ibrance on 9/21/18 and letrozole on 9/23/18. She had improved disease on restaging on 11/26. Last restaging on 10/15/19 with stable disease.     Please see Dr. Bhatt's previous notes for further details on the patient's history. She comes in today for routine follow up.    Interval History:       ROS: 10 point ROS neg other than the symptoms noted above in the HPI.      Current Outpatient Medications   Medication Sig Dispense Refill     albuterol (PROAIR HFA/PROVENTIL HFA/VENTOLIN HFA) 108 (90 Base) MCG/ACT inhaler Inhale 2 puffs into the lungs every 6 hours 1 Inhaler 0     albuterol (PROVENTIL) (2.5 MG/3ML) 0.083% neb solution Take 1 vial (2.5 mg) by nebulization every 6 hours as needed for shortness of breath / dyspnea or wheezing 30 vial 0     calcium carbonate (TUMS) 500 MG chewable tablet Take 1 chew tab by mouth as needed for heartburn       Calcium Citrate-Vitamin D (CALCIUM + D PO) Take 250 mg by mouth 2 times daily        clotrimazole (MYCELEX) 10 MG lozenge Place 1 lozenge (10 mg) inside cheek 5 times daily 60 each 0     fluticasone-salmeterol (ADVAIR-HFA) 115-21 MCG/ACT inhaler Inhale 2 puffs into the lungs 2 times daily 1 Inhaler 2      letrozole (FEMARA) 2.5 MG tablet Take 1 tablet (2.5 mg) by mouth daily for 28 days 28 tablet 0     levothyroxine (SYNTHROID/LEVOTHROID) 175 MCG tablet Take 1 tablet (175 mcg) by mouth daily 90 tablet 3     loratadine (CLARITIN) 10 MG tablet Take 1 tablet (10 mg) by mouth daily 90 tablet 3     methylPREDNISolone (MEDROL) 32 MG tablet TAKE 1 TABLET BY MOUTH 12 HOURS BEFORE IMAGING AND REPEAT 1 TABLET 2 HOURS BEFORE IMAGING TO PREVENT CONTRAST REACTION 2 tablet 3     mirtazapine (REMERON) 15 MG tablet TK 1 T PO HS 30 tablet 3     Multiple Vitamins-Minerals (MULTIVITAMIN ADULT PO)        palbociclib (IBRANCE) 100 MG tablet Take 1 tablet (100 mg) by mouth daily with food Take for 21 days, then 7 days off. 21 tablet 0     sertraline (ZOLOFT) 100 MG tablet Take 1 tablet (100 mg) by mouth 2 times daily 60 tablet 3     triamcinolone (NASACORT) 55 MCG/ACT inhaler Spray 1 spray into both nostrils daily        VITAMIN D, CHOLECALCIFEROL, PO Take 2,000 Units by mouth daily          Physical Examination:  General: The patient is a pleasant female in no acute distress.  There were no vitals taken for this visit.  Wt Readings from Last 10 Encounters:   06/09/20 138.6 kg (305 lb 9.6 oz)   03/12/20 139.5 kg (307 lb 8 oz)   02/13/20 138.7 kg (305 lb 11.2 oz)   01/16/20 137.6 kg (303 lb 4.8 oz)   12/18/19 140.3 kg (309 lb 4.8 oz)   11/15/19 141.7 kg (312 lb 4.8 oz)   10/17/19 139.5 kg (307 lb 8 oz)   09/19/19 137.4 kg (303 lb)   08/22/19 137.1 kg (302 lb 3.2 oz)   07/25/19 137.1 kg (302 lb 3.2 oz)     Objective:  General: patient sounds in no audible acute distress, alert and oriented, speech clear and fluid  Resp: Speaking in full sentences, no audible respiratory distress, no cough, no audible wheeze  Psych: able to articulate logical thoughts, able to abstract reason, no tangential thoughts, no hallucinations or delusions  Her affect is normal    Laboratory Data:         Assessment and Plan:    Recurrent, metastatic ER-positive,  HER2 negative breast cancer: History of left breast cancer s/p lumpectomy and SNLB in 2014. S/p 4 cycles ddAC and 12 weeks of Taxol. On AI from 11/2014-2/2017. Recurrence on PET/CT scan performed 08/03/2018 and then biopsy to left posterior cervical lymph nodes, aorticopulmonary, and prevascular lymph nodes. No evidence of metastatic disease in the abdomen and pelvis or brain. Started Ibrance and letrozole on 9/21/18.   -CT from 8/10 showed no disease in CAP, though had several new b/l LAD in her neck. Unclear if these represents metastatic disease or is reactive. With her tumor markers still improving and no other disease seen, favor that these may be reactive. Will continue with same treatment and repeat imaging in 2 months  (She has a contrast allergy and requires premeds for CT scans).   -Neutropenic again. This is the second time recently that she has needed to be delayed due to neutropenia. Will discuss with Dr. Bhatt about whether we need to dose reduce her Ibrance  -will have her repeat labs in one week and then will restart Ibrance if ANC > 1.0 (again will check on the dosing)  -reviewed neutropenic precautions   -will follow-up with Dr. Bhatt in 1 month     Bone health: No need for bisphosphonates at this time as she has not known bone mets. Continue calcium and vitamin D. Consider DEXA scan with long term letrozole use    Depression, Anxiety: On zoloft 200 mg and remeron at bedtime. Talked about this with her PCP. She was given buspar and ativan prn. Going to follow-up again with her PCP in 4 weeks. She also has a referral for a psychiatrist     Hypothyroidism: Established care with endocrinology here on 6/29/20. Decreased her levothyroxine to 175 mcg (changed about a month ago). Continue to be managed by them    Received flu shot for 2189-3831 influenza season. Ok to get shingles vaccine.      Phone Call Duration: 24 minutes    Helga Fernando PA-C  [facility]  [address] [place] [address]  [place] [address]  [phone]                      Again, thank you for allowing me to participate in the care of your patient.        Sincerely,        Helga Fernando PA-C

## 2020-11-20 NOTE — PROGRESS NOTES
Oncology/Hematology Visit Note  Nov 20, 2020    Reason for Visit: follow up of recurrent metastatic ER-positive HER2 negative breast cancer    History of Present Illness: Jade Collins is a 50 year old female with a history of left breast cancer diagnosed in 12/2013 in [place] area and Dr. David Redd was her medical oncologist. Biopsy showed tumor to be ER positive, WI positive and HER2 negative. She underwent lumpectomy and SLNB by Dr. Tasha Segura in [place]. [place] lymph node was noted to be involved with tumor but no lymph node dissection done at that time. She subsequently underwent adjuvant ddAC x 4 cycles and Taxol x 12 weeks, competed in 9/11/2014. She had post lumpectomy radiation completed in 11/23/2014 by Dr. Manley. She was begun on an AI in 11/2014, but does not recall name.     She was then switched within about a month or so to anastrozole and remained on anastrozole from 11/2014-02/2017.  She has never received tamoxifen. She then moved to [place], [place] in 03/2017.  She saw a gynecologist there and underwent a IRIS/BSO by Dr. Zendejas.  She then also saw Dr. Linares in [place], [place], who is an oncologist.  His interpretation of the pathology report was that she had triple-negative breast cancer.  It is possible that she may have had low estrogen receptor positivity, but the anastrozole was then discontinued in 02/2017.  She then underwent the IRIS/BSO in 03/2017.  This was done laparoscopically.        She then remained off of all hormonal therapy and in 09/2017 moved to [place].  She remained off hormonal therapy and did not have Medical Oncology followup initially.        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  She then went to see Dr. Norberto Brand who performed a PET/CT scan and the patient also underwent a brain MRI for staging.  The PET/CT scan performed 08/03/2018 which showed mildly enlarged left posterior cervical lymph nodes, largest measuring  1.5 x 1.0 cm and mildly enlarged aorticopulmonary and prevascular lymph nodes that were noted on contrast-enhanced CT scan seen less well on the PET scan.  No evidence of metastatic disease in the abdomen and pelvis.  She also underwent a brain MRI that showed no evidence of intracranial metastatic disease.  She underwent a biopsy of one of the lymph nodes in the left neck which showed metastatic adenocarcinoma consistent with breast origin, which was estrogen-receptor positive.  The tumor cells were positive for CK7, ER and WA consistent with metastatic breast carcinoma.  Estrogen receptor showed strong average nuclear intensity in 95% of carcinoma cells, progesterone receptor moderate average nuclear intensity in 70% of the carcinoma.  A GATA3 stain was apparently not performed.     She started Ibrance on 9/21/18 and letrozole on 9/23/18.  Last restaging on 10/20/20 with stable disease.     Please see Dr. Bhatt's previous notes for further details on the patient's history. She comes in today for routine follow up.    Interval History:  -She is overall doing well. No new symptoms to report  -She continues to struggle with anxiety/depression in this COVID season. She just discussed this with her PCP and they started her on new meds and referred her to psych which she has not followed up with yet  -She denies f/c  -no bone pain  -no new lumps or bumps  -otherwise is tolerating treatment well      ROS: 10 point ROS neg other than the symptoms noted above in the HPI.      Current Outpatient Medications   Medication Sig Dispense Refill     albuterol (PROAIR HFA/PROVENTIL HFA/VENTOLIN HFA) 108 (90 Base) MCG/ACT inhaler Inhale 2 puffs into the lungs every 6 hours 1 Inhaler 0     albuterol (PROVENTIL) (2.5 MG/3ML) 0.083% neb solution Take 1 vial (2.5 mg) by nebulization every 6 hours as needed for shortness of breath / dyspnea or wheezing 30 vial 0     calcium carbonate (TUMS) 500 MG chewable tablet Take 1 chew tab by  mouth as needed for heartburn       Calcium Citrate-Vitamin D (CALCIUM + D PO) Take 250 mg by mouth 2 times daily        clotrimazole (MYCELEX) 10 MG lozenge Place 1 lozenge (10 mg) inside cheek 5 times daily 60 each 0     fluticasone-salmeterol (ADVAIR-HFA) 115-21 MCG/ACT inhaler Inhale 2 puffs into the lungs 2 times daily 1 Inhaler 2     letrozole (FEMARA) 2.5 MG tablet Take 1 tablet (2.5 mg) by mouth daily for 28 days 28 tablet 0     levothyroxine (SYNTHROID/LEVOTHROID) 175 MCG tablet Take 1 tablet (175 mcg) by mouth daily 90 tablet 3     loratadine (CLARITIN) 10 MG tablet Take 1 tablet (10 mg) by mouth daily 90 tablet 3     methylPREDNISolone (MEDROL) 32 MG tablet TAKE 1 TABLET BY MOUTH 12 HOURS BEFORE IMAGING AND REPEAT 1 TABLET 2 HOURS BEFORE IMAGING TO PREVENT CONTRAST REACTION 2 tablet 3     mirtazapine (REMERON) 15 MG tablet TK 1 T PO HS 30 tablet 3     Multiple Vitamins-Minerals (MULTIVITAMIN ADULT PO)        palbociclib (IBRANCE) 100 MG tablet Take 1 tablet (100 mg) by mouth daily with food Take for 21 days, then 7 days off. 21 tablet 0     sertraline (ZOLOFT) 100 MG tablet Take 1 tablet (100 mg) by mouth 2 times daily 60 tablet 3     triamcinolone (NASACORT) 55 MCG/ACT inhaler Spray 1 spray into both nostrils daily        VITAMIN D, CHOLECALCIFEROL, PO Take 2,000 Units by mouth daily          Physical Examination:  General: The patient is a pleasant female in no acute distress.  There were no vitals taken for this visit.  Wt Readings from Last 10 Encounters:   06/09/20 138.6 kg (305 lb 9.6 oz)   03/12/20 139.5 kg (307 lb 8 oz)   02/13/20 138.7 kg (305 lb 11.2 oz)   01/16/20 137.6 kg (303 lb 4.8 oz)   12/18/19 140.3 kg (309 lb 4.8 oz)   11/15/19 141.7 kg (312 lb 4.8 oz)   10/17/19 139.5 kg (307 lb 8 oz)   09/19/19 137.4 kg (303 lb)   08/22/19 137.1 kg (302 lb 3.2 oz)   07/25/19 137.1 kg (302 lb 3.2 oz)     Objective:  General: patient sounds in no audible acute distress, alert and oriented, speech clear  and fluid  Resp: Speaking in full sentences, no audible respiratory distress, no cough, no audible wheeze  Psych: able to articulate logical thoughts, able to abstract reason, no tangential thoughts, no hallucinations or delusions  Her affect is normal    Laboratory Data:      11/19/2020 13:36   Sodium 136   Potassium 4.2   Chloride 104   Carbon Dioxide 27   Urea Nitrogen 13   Creatinine 1.04   GFR Estimate 62   GFR Estimate If Black 72   Calcium 9.9   Anion Gap 5   Albumin 4.0   Protein Total 8.2   Bilirubin Total 0.6   Alkaline Phosphatase 73   ALT 39   AST 27   Glucose 102 (H)   WBC 2.2 (L)   Hemoglobin 13.5   Hematocrit 39.5   Platelet Count 133 (L)   RBC Count 4.13   MCV 96   MCH 32.7   MCHC 34.2   RDW 13.9   Diff Method Automated Method   % Neutrophils 41.1   % Lymphocytes 43.1   % Monocytes 11.6   % Eosinophils 2.3   % Basophils 1.4   % Immature Granulocytes 0.5   Nucleated RBCs 0   Absolute Neutrophil 0.9 (L)   Absolute Lymphocytes 0.9   Absolute Monocytes 0.3   Absolute Eosinophils 0.1   Absolute Basophils 0.0   Abs Immature Granulocytes 0.0   Absolute Nucleated RBC 0.0       Assessment and Plan:    Recurrent, metastatic ER-positive, HER2 negative breast cancer: History of left breast cancer s/p lumpectomy and SNLB in 2014. S/p 4 cycles ddAC and 12 weeks of Taxol. On AI from 11/2014-2/2017. Recurrence on PET/CT scan performed 08/03/2018 and then biopsy to left posterior cervical lymph nodes, aorticopulmonary, and prevascular lymph nodes. No evidence of metastatic disease in the abdomen and pelvis or brain. Started Ibrance and letrozole on 9/21/18.   -CT from 10/20/20 showed stable disease. Continuing same treatment   -Ibrance was decreased to 100 mg daily in Sept 2020 due to ongoing episodes of neutropenia.   -Neutropenic again today. This is the second time recently that she has needed to be delayed due to neutropenia. Will discuss with Dr. Bhatt about whether we need to further dose reduce her  Ibrance  -Will have her hold starting Ibrance today. Will have her repeat labs in one week and then will restart Ibrance if ANC > 1.0 (again will check on the dosing)  -reviewed neutropenic precautions   -will follow-up with Dr. Bhatt in 1 month     Addendum: Per Dr. Bhatt, will keep dose the same    Bone health: No need for bisphosphonates at this time as she has not known bone mets. Continue calcium and vitamin D. Consider DEXA scan with long term letrozole use    Depression, Anxiety: On zoloft 200 mg and remeron at bedtime. Talked about this with her PCP. She was given buspar and ativan prn. Going to follow-up again with her PCP in 4 weeks. She also has a referral for a psychiatrist. I reinforced that I felt these thing should be helpful for her     Hypothyroidism: Established care with endocrinology here on 6/29/20. Decreased her levothyroxine to 175 mcg. Continue to be managed by endo. Not discussed today    Received flu shot for 5513-2668 influenza season. Ok to get shingles vaccine.      Phone Call Duration: 24 minutes    Helga Fernando PA-C  [facility]  [address] [place] [address] [id number] [phone]

## 2020-11-20 NOTE — PROGRESS NOTES
Oncology Distress Screening Follow-up  Clinical Social Work  [facility]    Identified Concern and Score From Distress Screenin. How concerned are you about your ability to eat?   0    2. How concerned are you about unintended weight loss or your current weight?   0    3. How concerned are you about feeling depressed or very sad?   8Abnormal   has seen primary dr for this,    4. How concerned are you about feeling anxious or very scared?   [facility]   has seen primary dr for this,    5. Do you struggle with the loss of meaning and bautista in your life?       Not at all    6. How concerned are you about work and home life issues that may be affected by your cancer?   0    7. How concerned are you about knowing what resources are available to help you?   0    8. Do you currently have what you would describe as Episcopal or spiritual struggles?              Not at all          Date of Distress Screenin2020      Intervention/Education Provided:: SHERMAN called and left message for Jade, introducing self and reason for call. SHERMAN acknowledged Jade's note that she had addressed concerns with PCP, but sherman still offered support and encouraged Jade to call back with any resource needs or questions.      Follow-up Required: SHERMAN will await Jade's call back.         ROMULO Frank, [facility]  Clinical , Adult Oncology  Phone: [phone]

## 2020-11-20 NOTE — PROGRESS NOTES
"Jade Collins is a 50 year old female who is being evaluated via a billable telephone visit.      The patient has been notified of following:     \"This telephone visit will be conducted via a call between you and your physician/provider. We have found that certain health care needs can be provided without the need for a physical exam.  This service lets us provide the care you need with a short phone conversation.  If a prescription is necessary we can send it directly to your pharmacy.  If lab work is needed we can place an order for that and you can then stop by our lab to have the test done at a later time.    Telephone visits are billed at different rates depending on your insurance coverage. During this emergency period, for some insurers they may be billed the same as an in-person visit.  Please reach out to your insurance provider with any questions.    If during the course of the call the physician/provider feels a telephone visit is not appropriate, you will not be charged for this service.\"    Patient has given verbal consent for Telephone visit?  Yes    What phone number would you like to be contacted at? [phone]    How would you like to obtain your AVS? MyChart      I have reviewed and updated the patient's allergies and medication list. Patient was asked if they had any patient reported vital signs to present, if yes, please see documented vitals.  Patient was also asked for their current weight and height, if presented, documented in vitals.      Concerns: Patient states there are no new concerns to discuss with provider.  Helga was not notified.      Refills: No refills       Kandi Mancera CMA ([facility])      "

## 2020-11-25 DIAGNOSIS — C50.412 MALIGNANT NEOPLASM OF UPPER-OUTER QUADRANT OF LEFT BREAST IN FEMALE, ESTROGEN RECEPTOR POSITIVE (H): ICD-10-CM

## 2020-11-25 DIAGNOSIS — Z17.0 MALIGNANT NEOPLASM OF UPPER-OUTER QUADRANT OF LEFT BREAST IN FEMALE, ESTROGEN RECEPTOR POSITIVE (H): ICD-10-CM

## 2020-11-25 LAB
ALBUMIN SERPL-MCNC: 3.8 G/DL (ref 3.4–5)
ALP SERPL-CCNC: 79 U/L (ref 40–150)
ALT SERPL W P-5'-P-CCNC: 42 U/L (ref 0–50)
ANION GAP SERPL CALCULATED.3IONS-SCNC: 5 MMOL/L (ref 3–14)
AST SERPL W P-5'-P-CCNC: 31 U/L (ref 0–45)
BASOPHILS # BLD AUTO: 0 10E9/L (ref 0–0.2)
BASOPHILS NFR BLD AUTO: 1.2 %
BILIRUB SERPL-MCNC: 0.4 MG/DL (ref 0.2–1.3)
BUN SERPL-MCNC: 12 MG/DL (ref 7–30)
CALCIUM SERPL-MCNC: 9.8 MG/DL (ref 8.5–10.1)
CANCER AG27-29 SERPL-ACNC: 16 U/ML (ref 0–39)
CEA SERPL-MCNC: <0.5 UG/L (ref 0–2.5)
CHLORIDE SERPL-SCNC: 106 MMOL/L (ref 94–109)
CO2 SERPL-SCNC: 28 MMOL/L (ref 20–32)
CREAT SERPL-MCNC: 0.88 MG/DL (ref 0.52–1.04)
DIFFERENTIAL METHOD BLD: ABNORMAL
EOSINOPHIL # BLD AUTO: 0.1 10E9/L (ref 0–0.7)
EOSINOPHIL NFR BLD AUTO: 2.5 %
ERYTHROCYTE [DISTWIDTH] IN BLOOD BY AUTOMATED COUNT: 13.7 % (ref 10–15)
GFR SERPL CREATININE-BSD FRML MDRD: 76 ML/MIN/{1.73_M2}
GLUCOSE SERPL-MCNC: 145 MG/DL (ref 70–99)
HCT VFR BLD AUTO: 38.5 % (ref 35–47)
HGB BLD-MCNC: 13.1 G/DL (ref 11.7–15.7)
IMM GRANULOCYTES # BLD: 0 10E9/L (ref 0–0.4)
IMM GRANULOCYTES NFR BLD: 0.6 %
LYMPHOCYTES # BLD AUTO: 1.2 10E9/L (ref 0.8–5.3)
LYMPHOCYTES NFR BLD AUTO: 37 %
MCH RBC QN AUTO: 32 PG (ref 26.5–33)
MCHC RBC AUTO-ENTMCNC: 34 G/DL (ref 31.5–36.5)
MCV RBC AUTO: 94 FL (ref 78–100)
MONOCYTES # BLD AUTO: 0.3 10E9/L (ref 0–1.3)
MONOCYTES NFR BLD AUTO: 9.3 %
NEUTROPHILS # BLD AUTO: 1.6 10E9/L (ref 1.6–8.3)
NEUTROPHILS NFR BLD AUTO: 49.4 %
PLATELET # BLD AUTO: 165 10E9/L (ref 150–450)
POTASSIUM SERPL-SCNC: 3.8 MMOL/L (ref 3.4–5.3)
PROT SERPL-MCNC: 7.9 G/DL (ref 6.8–8.8)
RBC # BLD AUTO: 4.1 10E12/L (ref 3.8–5.2)
SODIUM SERPL-SCNC: 139 MMOL/L (ref 133–144)
WBC # BLD AUTO: 3.2 10E9/L (ref 4–11)

## 2020-11-25 PROCEDURE — 86300 IMMUNOASSAY TUMOR CA 15-3: CPT | Performed by: INTERNAL MEDICINE

## 2020-11-25 PROCEDURE — 85025 COMPLETE CBC W/AUTO DIFF WBC: CPT | Performed by: INTERNAL MEDICINE

## 2020-11-25 PROCEDURE — 80053 COMPREHEN METABOLIC PANEL: CPT | Performed by: INTERNAL MEDICINE

## 2020-11-25 PROCEDURE — 82378 CARCINOEMBRYONIC ANTIGEN: CPT | Performed by: INTERNAL MEDICINE

## 2020-11-27 ENCOUNTER — TELEPHONE (OUTPATIENT)
Dept: ONCOLOGY | Facility: CLINIC | Age: 50
End: 2020-11-27

## 2020-11-27 NOTE — ORAL ONC MGMT
Patient has been holding palbociclib x 1 week due to neutropenia with ANC of 0.9 on 11/19.     ANC 1.6 on 11/25. Per Dr. Bhatt in basket, ok to resume current dose once neutropenic resolves.     Called patient to communicate ok to resume palbociclib 100 mg daily today 11/27/20.   Refill information updated.     Carmen Peck, StacyD   PGY-2 Oncology Pharmacy Resident   11/27/20 0800

## 2020-12-07 RX ORDER — METHYLPREDNISOLONE 32 MG/1
TABLET ORAL
Qty: 2 TABLET | Refills: 4 | Status: SHIPPED | OUTPATIENT
Start: 2020-12-07 | End: 2021-05-13

## 2020-12-21 DIAGNOSIS — Z17.0 MALIGNANT NEOPLASM OF UPPER-OUTER QUADRANT OF LEFT BREAST IN FEMALE, ESTROGEN RECEPTOR POSITIVE (H): Primary | ICD-10-CM

## 2020-12-21 DIAGNOSIS — C50.412 MALIGNANT NEOPLASM OF UPPER-OUTER QUADRANT OF LEFT BREAST IN FEMALE, ESTROGEN RECEPTOR POSITIVE (H): Primary | ICD-10-CM

## 2020-12-22 ENCOUNTER — TELEPHONE (OUTPATIENT)
Dept: ONCOLOGY | Facility: CLINIC | Age: 50
End: 2020-12-22

## 2020-12-22 DIAGNOSIS — Z17.0 MALIGNANT NEOPLASM OF UPPER-OUTER QUADRANT OF LEFT BREAST IN FEMALE, ESTROGEN RECEPTOR POSITIVE (H): Primary | ICD-10-CM

## 2020-12-22 DIAGNOSIS — Z17.0 MALIGNANT NEOPLASM OF UPPER-OUTER QUADRANT OF LEFT BREAST IN FEMALE, ESTROGEN RECEPTOR POSITIVE (H): ICD-10-CM

## 2020-12-22 DIAGNOSIS — C50.412 MALIGNANT NEOPLASM OF UPPER-OUTER QUADRANT OF LEFT BREAST IN FEMALE, ESTROGEN RECEPTOR POSITIVE (H): ICD-10-CM

## 2020-12-22 DIAGNOSIS — C50.412 MALIGNANT NEOPLASM OF UPPER-OUTER QUADRANT OF LEFT BREAST IN FEMALE, ESTROGEN RECEPTOR POSITIVE (H): Primary | ICD-10-CM

## 2020-12-22 LAB
ALBUMIN SERPL-MCNC: 4.2 G/DL (ref 3.4–5)
ALP SERPL-CCNC: 76 U/L (ref 40–150)
ALT SERPL W P-5'-P-CCNC: 36 U/L (ref 0–50)
ANION GAP SERPL CALCULATED.3IONS-SCNC: 4 MMOL/L (ref 3–14)
AST SERPL W P-5'-P-CCNC: 23 U/L (ref 0–45)
BASOPHILS # BLD AUTO: 0.1 10E9/L (ref 0–0.2)
BASOPHILS NFR BLD AUTO: 1.7 %
BILIRUB SERPL-MCNC: 0.5 MG/DL (ref 0.2–1.3)
BUN SERPL-MCNC: 10 MG/DL (ref 7–30)
CALCIUM SERPL-MCNC: 9.9 MG/DL (ref 8.5–10.1)
CANCER AG27-29 SERPL-ACNC: 19 U/ML (ref 0–39)
CEA SERPL-MCNC: 0.5 UG/L (ref 0–2.5)
CHLORIDE SERPL-SCNC: 108 MMOL/L (ref 94–109)
CO2 SERPL-SCNC: 29 MMOL/L (ref 20–32)
CREAT SERPL-MCNC: 1.09 MG/DL (ref 0.52–1.04)
DIFFERENTIAL METHOD BLD: ABNORMAL
EOSINOPHIL # BLD AUTO: 0.1 10E9/L (ref 0–0.7)
EOSINOPHIL NFR BLD AUTO: 3.1 %
ERYTHROCYTE [DISTWIDTH] IN BLOOD BY AUTOMATED COUNT: 13.7 % (ref 10–15)
GFR SERPL CREATININE-BSD FRML MDRD: 59 ML/MIN/{1.73_M2}
GLUCOSE SERPL-MCNC: 117 MG/DL (ref 70–99)
HCT VFR BLD AUTO: 38.7 % (ref 35–47)
HGB BLD-MCNC: 13.4 G/DL (ref 11.7–15.7)
IMM GRANULOCYTES # BLD: 0 10E9/L (ref 0–0.4)
IMM GRANULOCYTES NFR BLD: 0 %
LYMPHOCYTES # BLD AUTO: 1.2 10E9/L (ref 0.8–5.3)
LYMPHOCYTES NFR BLD AUTO: 40.6 %
MCH RBC QN AUTO: 32.1 PG (ref 26.5–33)
MCHC RBC AUTO-ENTMCNC: 34.6 G/DL (ref 31.5–36.5)
MCV RBC AUTO: 93 FL (ref 78–100)
MONOCYTES # BLD AUTO: 0.2 10E9/L (ref 0–1.3)
MONOCYTES NFR BLD AUTO: 6.9 %
NEUTROPHILS # BLD AUTO: 1.4 10E9/L (ref 1.6–8.3)
NEUTROPHILS NFR BLD AUTO: 47.7 %
PLATELET # BLD AUTO: 145 10E9/L (ref 150–450)
POTASSIUM SERPL-SCNC: 4 MMOL/L (ref 3.4–5.3)
PROT SERPL-MCNC: 8 G/DL (ref 6.8–8.8)
RBC # BLD AUTO: 4.17 10E12/L (ref 3.8–5.2)
SODIUM SERPL-SCNC: 141 MMOL/L (ref 133–144)
WBC # BLD AUTO: 2.9 10E9/L (ref 4–11)

## 2020-12-22 PROCEDURE — 86300 IMMUNOASSAY TUMOR CA 15-3: CPT | Performed by: INTERNAL MEDICINE

## 2020-12-22 PROCEDURE — 80053 COMPREHEN METABOLIC PANEL: CPT | Performed by: INTERNAL MEDICINE

## 2020-12-22 PROCEDURE — 36415 COLL VENOUS BLD VENIPUNCTURE: CPT | Performed by: INTERNAL MEDICINE

## 2020-12-22 PROCEDURE — 85025 COMPLETE CBC W/AUTO DIFF WBC: CPT | Performed by: INTERNAL MEDICINE

## 2020-12-22 PROCEDURE — 82378 CARCINOEMBRYONIC ANTIGEN: CPT | Performed by: INTERNAL MEDICINE

## 2020-12-22 RX ORDER — LETROZOLE 2.5 MG/1
2.5 TABLET, FILM COATED ORAL DAILY
Qty: 28 TABLET | Refills: 0 | Status: SHIPPED | OUTPATIENT
Start: 2020-12-22 | End: 2021-01-18

## 2020-12-22 RX ORDER — LETROZOLE 2.5 MG/1
2.5 TABLET, FILM COATED ORAL DAILY
Qty: 28 TABLET | Refills: 0 | Status: CANCELLED | OUTPATIENT
Start: 2020-12-22 | End: 2021-01-19

## 2020-12-22 NOTE — ORAL ONC MGMT
Oral Chemotherapy Monitoring Program     Placed call to patient in follow up of Ibrance therapy.     Left message to please call back in follow-up of therapy. No patient or drug names were mentioned.     Carmen KumarD  [facility]  [phone]  December 22, 2020

## 2020-12-23 ENCOUNTER — TELEPHONE (OUTPATIENT)
Dept: ONCOLOGY | Facility: CLINIC | Age: 50
End: 2020-12-23

## 2020-12-23 NOTE — PROGRESS NOTES
"Jade Collins is a 50 year old female who is being evaluated via a billable telephone visit.      The patient has been notified of following:     \"This telephone visit will be conducted via a call between you and your physician/provider. We have found that certain health care needs can be provided without the need for a physical exam.  This service lets us provide the care you need with a short phone conversation.  If a prescription is necessary we can send it directly to your pharmacy.  If lab work is needed we can place an order for that and you can then stop by our lab to have the test done at a later time.    Telephone visits are billed at different rates depending on your insurance coverage. During this emergency period, for some insurers they may be billed the same as an in-person visit.  Please reach out to your insurance provider with any questions.    If during the course of the call the physician/provider feels a telephone visit is not appropriate, you will not be charged for this service.\"    Patient has given verbal consent for Telephone visit?  Yes    What phone number would you like to be contacted at? [phone]    How would you like to obtain your AVS? KARENA Clark    Phone call duration: 10 minutes            HISTORY OF PRESENT ILLNESS:  Jade is a 50-year-old woman with metastatic breast cancer who comes to our clinic for recommendations for further treatment.  She is referred by Dr. Norberto Brand at [facility].  Jade would like to continue her care at the [facility].       Jade was diagnosed with breast cancer with a lump found in the upper-inner quadrant of the left breast.  She was diagnosed in 12/2013 in the [place] area and Dr. David Redd was her medical oncologist.  Biopsy of the tumor showed it to be ER positive, TX positive, and HER-2 negative.  We do not have any of the original pathology and we need to obtain those reports.  She underwent a lumpectomy " performed by Dr. Tasha Segura who is a surgeon in the [place] area.  She also had a sentinel lymph node which was noted to be involved with tumor but there was no lymph node dissection done at that time.  [id number].  She subsequently underwent adjuvant chemotherapy with dose-dense AC for 4 cycles and Taxol for 12 weeks, completed 09/11/2014.        She then had radiation therapy post lumpectomy to the left breast, which was completed 11/23/2014 by Dr. Manley.        She was then begun on an aromatase inhibitor, the name of which she does not remember.  The aromatase inhibitor therapy was begun in 11/2014.  She was then switched within about a month or so to anastrozole and remained on anastrozole from 11/2014-02/2017.  She has never received tamoxifen.        She then moved to [place], [place] in 03/2017.  She saw a gynecologist there and underwent a IRIS/BSO by Dr. Zendejas.  She then also saw Dr. Linares in [place], [place], who is an oncologist.  His interpretation of the pathology report was that she had triple-negative breast cancer.  It is possible that she may have had low estrogen receptor positivity, but the anastrozole was then discontinued in 02/2017.  She then underwent the IRIS/BSO in 03/2017.  This was done laparoscopically.        She then remained off of all hormonal therapy and in 09/2017 moved to [place].  She remained off hormonal therapy and did not have Medical Oncology followup initially.        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  She then went to see Dr. Norberto Brand who performed a PET/CT scan and the patient also underwent a brain MRI for staging.  The PET/CT scan performed 08/03/2018 showed in the neck there were several mildly metabolic active and large prominent left posterior cervical lymph nodes.  The largest is located posterior to the left sternocleidomastoid muscle and measures 1.5 x 1 cm in size.  This demonstrate modest FDG uptake.  The other  lymph nodes are smaller and demonstrate mild FDG uptake.  The prominent prevascular and aortopulmonary lymph nodes were also seen on the contrast-enhanced scan were less well seen on the PET.  The largest lymph node visualized on the PET scan was 1.1 x 0.8 cm and demonstrated mild FDG uptake.  This was in the periaortic area just anterior to the aortic arch.  No lung nodules.  No lung infiltrates.  No pleural effusion.  There was no uptake of FDG in the bones.  In summary, there were mildly enlarged left posterior cervical lymph nodes, largest measuring 1.5 x 1.0 cm and mildly enlarged aorticopulmonary and prevascular lymph nodes that were noted on contrast-enhanced CT scan seen less well on the PET scan.  No evidence of metastatic disease in the abdomen and pelvis.  She also underwent a brain MRI that showed no evidence of intracranial metastatic disease.  She underwent a biopsy of one of the lymph nodes in the left neck which showed metastatic adenocarcinoma consistent with breast origin, which was estrogen-receptor positive.  The tumor cells were positive for CK7, ER and NC consistent with metastatic breast carcinoma.  Estrogen receptor showed strong average nuclear intensity in 95% of carcinoma cells, progesterone receptor moderate average nuclear intensity in 70% of the carcinoma.  A GATA3 stain was apparently not performed.       TREATMENT HISTORY:  A. Tamoxifen  B. Letrozole and palbociclib started 9-18-18.     INTERVAL HISTORY:   No pain, fatigue.  She has depression and anxiety and saw Dr. Wesly Pate at [facility] her new PCP and began buspirone and prn lorazepam added to her sertraline. She was referred to psychiatrist Dr. Eagle Yap. Depression and anxiety are under better control at this time. Otherwise she is doing well.  Lymph nodes in her neck have not been palpable and have not bothered her.       REVIEW OF SYSTEMS:  A 10-point review of systems was performed and was negative with the  exception of pertinent positives noted above.     OBJECTIVE DATA:     Imaging studies:     CT CAP 10/20/20                                                                   IMPRESSION: In this patient with history of breast cancer, the current  scan shows:     1. No evidence of metastatic disease in the chest, abdomen or pelvis.     2. Hepatomegaly with diffuse steatosis.     CT neck 10/20/20     Impression:  Stable size of bilateral level 1B, level 2A, and level 2B lymph nodes,  which may be reactive or malignant in etiology. No new enlarged lymph  nodes. Continued interval follow-up is suggested.        Labs:      ASSESSMENT AND PLAN:  1. Recurrent, metastatic ER-positive, HER2 negative breast cancer: History of left breast cancer s/p lumpectomy and SNLB in 2014. S/p 4 cycles ddAC and 12 weeks of Taxol. On AI from 11/2014-2/2017. Recurrence on PET/CT scan performed 08/03/2018 and then biopsy to left posterior cervical lymph nodes, aorticopulmonary, and prevascular lymph nodes were positive. No evidence of metastatic disease in the abdomen and pelvis or brain. Started Ibrance and letrozole on 9/21/18. Tolerating well aside from some hot flashes and mild joint stiffness. Restaging in October showed stable disease.  Lymphadenopathy in her neck has clinically resolved and remain stable on repeat imaging from 10/2020.  She continues to tolerate medications well and has had good response, so we will not make additional changes.   2. Overall doing well.  No signs of liver injury.  RUQ tenderness resolved.  3.  Imaging and interval.  She would like to perform imaging on a 4 month basis.  I think this is reasonable. Allergy to contrast and she needs premedication and needs CT CAP and CT neck.   4.  Diet and exercise. We also discussed eating less sugar and getting 150 minutes of exercise per week.  She will try to incorporate these recommendations into her lifestyle.  PCP Dr. Dr. Wesly Pate at [facility].  5.   Depression and anxiety. She saw Dr. Pate her new PCP and her regimen was changed to add buspirone 7.5 mg twice daily to sertraline 200 mg once per added. Lorazepam prn anxiety was added.  She has improvement of her symptoms of depression and anxiety.   6.  Hypothyroid. Levothyroxine 0.175 mg per day.     7. Asthma: Seen by Dr. Hay. Per chart on fluticasone-salmeterol inhaler BID. She does not recall the name of this. Also has albuterol prn.  8.  Follow up with SONIA by phone visit January 21, CBC, CMP, TSH. Follow up with me by phone visit February 18 with CBC, CMP and CT CAP and neck with premedication February 16 at [place].    Thank you for allowing us to participate in this patient's care.      Sincerely,      Wilfredo Bhatt MD  Professor  [facility]  [phone].        9:52-10:02  I spent 10  minutes with the patient more than 50% of which was in counseling and coordination of care.

## 2020-12-24 ENCOUNTER — VIRTUAL VISIT (OUTPATIENT)
Dept: ONCOLOGY | Facility: CLINIC | Age: 50
End: 2020-12-24
Attending: INTERNAL MEDICINE
Payer: COMMERCIAL

## 2020-12-24 DIAGNOSIS — E03.9 HYPOTHYROIDISM, UNSPECIFIED TYPE: ICD-10-CM

## 2020-12-24 DIAGNOSIS — C50.412 MALIGNANT NEOPLASM OF UPPER-OUTER QUADRANT OF LEFT BREAST IN FEMALE, ESTROGEN RECEPTOR POSITIVE (H): Primary | ICD-10-CM

## 2020-12-24 DIAGNOSIS — Z17.0 MALIGNANT NEOPLASM OF UPPER-OUTER QUADRANT OF LEFT BREAST IN FEMALE, ESTROGEN RECEPTOR POSITIVE (H): Primary | ICD-10-CM

## 2020-12-24 PROCEDURE — 999N001193 HC VIDEO/TELEPHONE VISIT; NO CHARGE

## 2020-12-24 PROCEDURE — 99213 OFFICE O/P EST LOW 20 MIN: CPT | Mod: TEL | Performed by: INTERNAL MEDICINE

## 2020-12-24 NOTE — LETTER
"12/24/2020      RE: Jade Collins  [address]  [facility] [address]       Jade Collins is a 50 year old female who is being evaluated via a billable telephone visit.      The patient has been notified of following:     \"This telephone visit will be conducted via a call between you and your physician/provider. We have found that certain health care needs can be provided without the need for a physical exam.  This service lets us provide the care you need with a short phone conversation.  If a prescription is necessary we can send it directly to your pharmacy.  If lab work is needed we can place an order for that and you can then stop by our lab to have the test done at a later time.    Telephone visits are billed at different rates depending on your insurance coverage. During this emergency period, for some insurers they may be billed the same as an in-person visit.  Please reach out to your insurance provider with any questions.    If during the course of the call the physician/provider feels a telephone visit is not appropriate, you will not be charged for this service.\"    Patient has given verbal consent for Telephone visit?  Yes    What phone number would you like to be contacted at? [phone]    How would you like to obtain your AVS? KARENA Clark    Phone call duration: 10 minutes            HISTORY OF PRESENT ILLNESS:  Jade is a 50-year-old woman with metastatic breast cancer who comes to our clinic for recommendations for further treatment.  She is referred by Dr. Norberto Brand at [facility].  Jade would like to continue her care at the [facility].       Jade was diagnosed with breast cancer with a lump found in the upper-inner quadrant of the left breast.  She was diagnosed in 12/2013 in the [place] area and Dr. David Redd was her medical oncologist.  Biopsy of the tumor showed it to be ER positive, TN positive, and HER-2 negative.  We do not have any of the " original pathology and we need to obtain those reports.  She underwent a lumpectomy performed by Dr. Tasha Segura who is a surgeon in the [place] area.  She also had a sentinel lymph node which was noted to be involved with tumor but there was no lymph node dissection done at that time.  [id number].  She subsequently underwent adjuvant chemotherapy with dose-dense AC for 4 cycles and Taxol for 12 weeks, completed 09/11/2014.        She then had radiation therapy post lumpectomy to the left breast, which was completed 11/23/2014 by Dr. Manley.        She was then begun on an aromatase inhibitor, the name of which she does not remember.  The aromatase inhibitor therapy was begun in 11/2014.  She was then switched within about a month or so to anastrozole and remained on anastrozole from 11/2014-02/2017.  She has never received tamoxifen.        She then moved to [place], [place] in 03/2017.  She saw a gynecologist there and underwent a IRIS/BSO by Dr. Zendejas.  She then also saw Dr. Linares in [place], [place], who is an oncologist.  His interpretation of the pathology report was that she had triple-negative breast cancer.  It is possible that she may have had low estrogen receptor positivity, but the anastrozole was then discontinued in 02/2017.  She then underwent the IRIS/BSO in 03/2017.  This was done laparoscopically.        She then remained off of all hormonal therapy and in 09/2017 moved to [place].  She remained off hormonal therapy and did not have Medical Oncology followup initially.        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  She then went to see Dr. Norberto Brand who performed a PET/CT scan and the patient also underwent a brain MRI for staging.  The PET/CT scan performed 08/03/2018 showed in the neck there were several mildly metabolic active and large prominent left posterior cervical lymph nodes.  The largest is located posterior to the left sternocleidomastoid  muscle and measures 1.5 x 1 cm in size.  This demonstrate modest FDG uptake.  The other lymph nodes are smaller and demonstrate mild FDG uptake.  The prominent prevascular and aortopulmonary lymph nodes were also seen on the contrast-enhanced scan were less well seen on the PET.  The largest lymph node visualized on the PET scan was 1.1 x 0.8 cm and demonstrated mild FDG uptake.  This was in the periaortic area just anterior to the aortic arch.  No lung nodules.  No lung infiltrates.  No pleural effusion.  There was no uptake of FDG in the bones.  In summary, there were mildly enlarged left posterior cervical lymph nodes, largest measuring 1.5 x 1.0 cm and mildly enlarged aorticopulmonary and prevascular lymph nodes that were noted on contrast-enhanced CT scan seen less well on the PET scan.  No evidence of metastatic disease in the abdomen and pelvis.  She also underwent a brain MRI that showed no evidence of intracranial metastatic disease.  She underwent a biopsy of one of the lymph nodes in the left neck which showed metastatic adenocarcinoma consistent with breast origin, which was estrogen-receptor positive.  The tumor cells were positive for CK7, ER and AR consistent with metastatic breast carcinoma.  Estrogen receptor showed strong average nuclear intensity in 95% of carcinoma cells, progesterone receptor moderate average nuclear intensity in 70% of the carcinoma.  A GATA3 stain was apparently not performed.       TREATMENT HISTORY:  A. Tamoxifen  B. Letrozole and palbociclib started 9-18-18.     INTERVAL HISTORY:   No pain, fatigue.  She has depression and anxiety and saw Dr. Wesly Pate at [facility] her new PCP and began buspirone and prn lorazepam added to her sertraline. She was referred to psychiatrist Dr. Eagle Yap. Depression and anxiety are under better control at this time. Otherwise she is doing well.  Lymph nodes in her neck have not been palpable and have not bothered her.       REVIEW  OF SYSTEMS:  A 10-point review of systems was performed and was negative with the exception of pertinent positives noted above.     OBJECTIVE DATA:     Imaging studies:     CT CAP 10/20/20                                                                   IMPRESSION: In this patient with history of breast cancer, the current  scan shows:     1. No evidence of metastatic disease in the chest, abdomen or pelvis.     2. Hepatomegaly with diffuse steatosis.     CT neck 10/20/20     Impression:  Stable size of bilateral level 1B, level 2A, and level 2B lymph nodes,  which may be reactive or malignant in etiology. No new enlarged lymph  nodes. Continued interval follow-up is suggested.        Labs:      ASSESSMENT AND PLAN:  1. Recurrent, metastatic ER-positive, HER2 negative breast cancer: History of left breast cancer s/p lumpectomy and SNLB in 2014. S/p 4 cycles ddAC and 12 weeks of Taxol. On AI from 11/2014-2/2017. Recurrence on PET/CT scan performed 08/03/2018 and then biopsy to left posterior cervical lymph nodes, aorticopulmonary, and prevascular lymph nodes were positive. No evidence of metastatic disease in the abdomen and pelvis or brain. Started Ibrance and letrozole on 9/21/18. Tolerating well aside from some hot flashes and mild joint stiffness. Restaging in October showed stable disease.  Lymphadenopathy in her neck has clinically resolved and remain stable on repeat imaging from 10/2020.  She continues to tolerate medications well and has had good response, so we will not make additional changes.   2. Overall doing well.  No signs of liver injury.  RUQ tenderness resolved.  3.  Imaging and interval.  She would like to perform imaging on a 4 month basis.  I think this is reasonable. Allergy to contrast and she needs premedication and needs CT CAP and CT neck.   4.  Diet and exercise. We also discussed eating less sugar and getting 150 minutes of exercise per week.  She will try to incorporate these  recommendations into her lifestyle.  PCP . Dr. Wesly Pate at [facility].  5.  Depression and anxiety. She saw Dr. Pate her new PCP and her regimen was changed to add buspirone 7.5 mg twice daily to sertraline 200 mg once per added. Lorazepam prn anxiety was added.  She has improvement of her symptoms of depression and anxiety.   6.  Hypothyroid. Levothyroxine 0.175 mg per day.     7. Asthma: Seen by Dr. Hay. Per chart on fluticasone-salmeterol inhaler BID. She does not recall the name of this. Also has albuterol prn.  8.  Follow up with SONIA by phone visit January 21, CBC, CMP, TSH. Follow up with me by phone visit February 18 with CBC, CMP and CT CAP and neck with premedication February 16 at [place].    Thank you for allowing us to participate in this patient's care.      Sincerely,      Wilfredo Bhatt MD  Professor  [facility]  [phone].        9:52-10:02  I spent 10  minutes with the patient more than 50% of which was in counseling and coordination of care.      Wilfredo Bhatt MD

## 2020-12-24 NOTE — LETTER
"    12/24/2020         RE: Jade Collins  [address]  [facility] [address]        Dear Colleague,    Thank you for referring your patient, Jade Collins, to the [facility]. Please see a copy of my visit note below.    Jade Collins is a 50 year old female who is being evaluated via a billable telephone visit.      The patient has been notified of following:     \"This telephone visit will be conducted via a call between you and your physician/provider. We have found that certain health care needs can be provided without the need for a physical exam.  This service lets us provide the care you need with a short phone conversation.  If a prescription is necessary we can send it directly to your pharmacy.  If lab work is needed we can place an order for that and you can then stop by our lab to have the test done at a later time.    Telephone visits are billed at different rates depending on your insurance coverage. During this emergency period, for some insurers they may be billed the same as an in-person visit.  Please reach out to your insurance provider with any questions.    If during the course of the call the physician/provider feels a telephone visit is not appropriate, you will not be charged for this service.\"    Patient has given verbal consent for Telephone visit?  Yes    What phone number would you like to be contacted at? [phone]    How would you like to obtain your AVS? KARENA Clark    Phone call duration: 10 minutes            HISTORY OF PRESENT ILLNESS:  Jade is a 50-year-old woman with metastatic breast cancer who comes to our clinic for recommendations for further treatment.  She is referred by Dr. Norberto Brand at [facility].  Jade would like to continue her care at the [facility].       Jade was diagnosed with breast cancer with a lump found in the upper-inner quadrant of the left breast.  She was diagnosed in 12/2013 in the " [place] area and Dr. David Redd was her medical oncologist.  Biopsy of the tumor showed it to be ER positive, DC positive, and HER-2 negative.  We do not have any of the original pathology and we need to obtain those reports.  She underwent a lumpectomy performed by Dr. Tasha Segura who is a surgeon in the [place] area.  She also had a sentinel lymph node which was noted to be involved with tumor but there was no lymph node dissection done at that time.  [id number].  She subsequently underwent adjuvant chemotherapy with dose-dense AC for 4 cycles and Taxol for 12 weeks, completed 09/11/2014.        She then had radiation therapy post lumpectomy to the left breast, which was completed 11/23/2014 by Dr. Manley.        She was then begun on an aromatase inhibitor, the name of which she does not remember.  The aromatase inhibitor therapy was begun in 11/2014.  She was then switched within about a month or so to anastrozole and remained on anastrozole from 11/2014-02/2017.  She has never received tamoxifen.        She then moved to [place], [place] in 03/2017.  She saw a gynecologist there and underwent a IRIS/BSO by Dr. Zendejas.  She then also saw Dr. Linares in [place], [place], who is an oncologist.  His interpretation of the pathology report was that she had triple-negative breast cancer.  It is possible that she may have had low estrogen receptor positivity, but the anastrozole was then discontinued in 02/2017.  She then underwent the IRIS/BSO in 03/2017.  This was done laparoscopically.        She then remained off of all hormonal therapy and in 09/2017 moved to [place].  She remained off hormonal therapy and did not have Medical Oncology followup initially.        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  She then went to see Dr. Norberto Brand who performed a PET/CT scan and the patient also underwent a brain MRI for staging.  The PET/CT scan performed 08/03/2018 showed in the  neck there were several mildly metabolic active and large prominent left posterior cervical lymph nodes.  The largest is located posterior to the left sternocleidomastoid muscle and measures 1.5 x 1 cm in size.  This demonstrate modest FDG uptake.  The other lymph nodes are smaller and demonstrate mild FDG uptake.  The prominent prevascular and aortopulmonary lymph nodes were also seen on the contrast-enhanced scan were less well seen on the PET.  The largest lymph node visualized on the PET scan was 1.1 x 0.8 cm and demonstrated mild FDG uptake.  This was in the periaortic area just anterior to the aortic arch.  No lung nodules.  No lung infiltrates.  No pleural effusion.  There was no uptake of FDG in the bones.  In summary, there were mildly enlarged left posterior cervical lymph nodes, largest measuring 1.5 x 1.0 cm and mildly enlarged aorticopulmonary and prevascular lymph nodes that were noted on contrast-enhanced CT scan seen less well on the PET scan.  No evidence of metastatic disease in the abdomen and pelvis.  She also underwent a brain MRI that showed no evidence of intracranial metastatic disease.  She underwent a biopsy of one of the lymph nodes in the left neck which showed metastatic adenocarcinoma consistent with breast origin, which was estrogen-receptor positive.  The tumor cells were positive for CK7, ER and ME consistent with metastatic breast carcinoma.  Estrogen receptor showed strong average nuclear intensity in 95% of carcinoma cells, progesterone receptor moderate average nuclear intensity in 70% of the carcinoma.  A GATA3 stain was apparently not performed.       TREATMENT HISTORY:  A. Tamoxifen  B. Letrozole and palbociclib started 9-18-18.     INTERVAL HISTORY:   No pain, fatigue.  She has depression and anxiety and saw Dr. Wesly Pate at [facility] her new PCP and began buspirone and prn lorazepam added to her sertraline. She was referred to psychiatrist Dr. Eagle Yap.  Depression and anxiety are under better control at this time. Otherwise she is doing well.  Lymph nodes in her neck have not been palpable and have not bothered her.       REVIEW OF SYSTEMS:  A 10-point review of systems was performed and was negative with the exception of pertinent positives noted above.     OBJECTIVE DATA:     Imaging studies:     CT CAP 10/20/20                                                                   IMPRESSION: In this patient with history of breast cancer, the current  scan shows:     1. No evidence of metastatic disease in the chest, abdomen or pelvis.     2. Hepatomegaly with diffuse steatosis.     CT neck 10/20/20     Impression:  Stable size of bilateral level 1B, level 2A, and level 2B lymph nodes,  which may be reactive or malignant in etiology. No new enlarged lymph  nodes. Continued interval follow-up is suggested.        Labs:      ASSESSMENT AND PLAN:  1. Recurrent, metastatic ER-positive, HER2 negative breast cancer: History of left breast cancer s/p lumpectomy and SNLB in 2014. S/p 4 cycles ddAC and 12 weeks of Taxol. On AI from 11/2014-2/2017. Recurrence on PET/CT scan performed 08/03/2018 and then biopsy to left posterior cervical lymph nodes, aorticopulmonary, and prevascular lymph nodes were positive. No evidence of metastatic disease in the abdomen and pelvis or brain. Started Ibrance and letrozole on 9/21/18. Tolerating well aside from some hot flashes and mild joint stiffness. Restaging in October showed stable disease.  Lymphadenopathy in her neck has clinically resolved and remain stable on repeat imaging from 10/2020.  She continues to tolerate medications well and has had good response, so we will not make additional changes.   2. Overall doing well.  No signs of liver injury.  RUQ tenderness resolved.  3.  Imaging and interval.  She would like to perform imaging on a 4 month basis.  I think this is reasonable. Allergy to contrast and she needs premedication and  needs CT CAP and CT neck.   4.  Diet and exercise. We also discussed eating less sugar and getting 150 minutes of exercise per week.  She will try to incorporate these recommendations into her lifestyle.  PCP . Dr. Wesly Pate at [facility].  5.  Depression and anxiety. She saw Dr. Pate her new PCP and her regimen was changed to add buspirone 7.5 mg twice daily to sertraline 200 mg once per added. Lorazepam prn anxiety was added.  She has improvement of her symptoms of depression and anxiety.   6.  Hypothyroid. Levothyroxine 0.175 mg per day.     7. Asthma: Seen by Dr. Hay. Per chart on fluticasone-salmeterol inhaler BID. She does not recall the name of this. Also has albuterol prn.  8.  Follow up with SONIA by phone visit January 21, CBC, CMP, TSH. Follow up with me by phone visit February 18 with CBC, CMP and CT CAP and neck with premedication February 16 at [place].    Thank you for allowing us to participate in this patient's care.      Sincerely,      Wilfredo Bhatt MD  Professor  [facility]  [phone].        9:52-10:02  I spent 10  minutes with the patient more than 50% of which was in counseling and coordination of care.      Again, thank you for allowing me to participate in the care of your patient.        Sincerely,        Wilfredo Bhatt MD

## 2021-01-03 ENCOUNTER — HEALTH MAINTENANCE LETTER (OUTPATIENT)
Age: 51
End: 2021-01-03

## 2021-01-17 DIAGNOSIS — Z17.0 MALIGNANT NEOPLASM OF UPPER-OUTER QUADRANT OF LEFT BREAST IN FEMALE, ESTROGEN RECEPTOR POSITIVE (H): Primary | ICD-10-CM

## 2021-01-17 DIAGNOSIS — C50.412 MALIGNANT NEOPLASM OF UPPER-OUTER QUADRANT OF LEFT BREAST IN FEMALE, ESTROGEN RECEPTOR POSITIVE (H): Primary | ICD-10-CM

## 2021-01-18 DIAGNOSIS — C50.412 MALIGNANT NEOPLASM OF UPPER-OUTER QUADRANT OF LEFT BREAST IN FEMALE, ESTROGEN RECEPTOR POSITIVE (H): Primary | ICD-10-CM

## 2021-01-18 DIAGNOSIS — Z17.0 MALIGNANT NEOPLASM OF UPPER-OUTER QUADRANT OF LEFT BREAST IN FEMALE, ESTROGEN RECEPTOR POSITIVE (H): Primary | ICD-10-CM

## 2021-01-18 RX ORDER — LETROZOLE 2.5 MG/1
2.5 TABLET, FILM COATED ORAL DAILY
Qty: 28 TABLET | Refills: 0 | Status: SHIPPED | OUTPATIENT
Start: 2021-01-18 | End: 2021-02-18

## 2021-01-19 ENCOUNTER — TELEPHONE (OUTPATIENT)
Dept: ONCOLOGY | Facility: CLINIC | Age: 51
End: 2021-01-19

## 2021-01-19 DIAGNOSIS — E03.9 HYPOTHYROIDISM, UNSPECIFIED TYPE: ICD-10-CM

## 2021-01-19 DIAGNOSIS — C50.412 MALIGNANT NEOPLASM OF UPPER-OUTER QUADRANT OF LEFT BREAST IN FEMALE, ESTROGEN RECEPTOR POSITIVE (H): ICD-10-CM

## 2021-01-19 DIAGNOSIS — Z17.0 MALIGNANT NEOPLASM OF UPPER-OUTER QUADRANT OF LEFT BREAST IN FEMALE, ESTROGEN RECEPTOR POSITIVE (H): ICD-10-CM

## 2021-01-19 LAB
ALBUMIN SERPL-MCNC: 4.1 G/DL (ref 3.4–5)
ALP SERPL-CCNC: 74 U/L (ref 40–150)
ALT SERPL W P-5'-P-CCNC: 38 U/L (ref 0–50)
ANION GAP SERPL CALCULATED.3IONS-SCNC: 6 MMOL/L (ref 3–14)
AST SERPL W P-5'-P-CCNC: 30 U/L (ref 0–45)
BASOPHILS # BLD AUTO: 0.1 10E9/L (ref 0–0.2)
BASOPHILS NFR BLD AUTO: 1.8 %
BILIRUB SERPL-MCNC: 0.4 MG/DL (ref 0.2–1.3)
BUN SERPL-MCNC: 12 MG/DL (ref 7–30)
CALCIUM SERPL-MCNC: 9.3 MG/DL (ref 8.5–10.1)
CANCER AG27-29 SERPL-ACNC: 17 U/ML (ref 0–39)
CEA SERPL-MCNC: <0.5 UG/L (ref 0–2.5)
CHLORIDE SERPL-SCNC: 106 MMOL/L (ref 94–109)
CO2 SERPL-SCNC: 26 MMOL/L (ref 20–32)
CREAT SERPL-MCNC: 1.01 MG/DL (ref 0.52–1.04)
DIFFERENTIAL METHOD BLD: ABNORMAL
EOSINOPHIL # BLD AUTO: 0.1 10E9/L (ref 0–0.7)
EOSINOPHIL NFR BLD AUTO: 2.5 %
ERYTHROCYTE [DISTWIDTH] IN BLOOD BY AUTOMATED COUNT: 14 % (ref 10–15)
GFR SERPL CREATININE-BSD FRML MDRD: 64 ML/MIN/{1.73_M2}
GLUCOSE SERPL-MCNC: 109 MG/DL (ref 70–99)
HCT VFR BLD AUTO: 37.8 % (ref 35–47)
HGB BLD-MCNC: 12.8 G/DL (ref 11.7–15.7)
IMM GRANULOCYTES # BLD: 0 10E9/L (ref 0–0.4)
IMM GRANULOCYTES NFR BLD: 0.4 %
LYMPHOCYTES # BLD AUTO: 1.1 10E9/L (ref 0.8–5.3)
LYMPHOCYTES NFR BLD AUTO: 40.6 %
MCH RBC QN AUTO: 32.4 PG (ref 26.5–33)
MCHC RBC AUTO-ENTMCNC: 33.9 G/DL (ref 31.5–36.5)
MCV RBC AUTO: 96 FL (ref 78–100)
MONOCYTES # BLD AUTO: 0.2 10E9/L (ref 0–1.3)
MONOCYTES NFR BLD AUTO: 7.8 %
NEUTROPHILS # BLD AUTO: 1.3 10E9/L (ref 1.6–8.3)
NEUTROPHILS NFR BLD AUTO: 46.9 %
PLATELET # BLD AUTO: 143 10E9/L (ref 150–450)
POTASSIUM SERPL-SCNC: 4.1 MMOL/L (ref 3.4–5.3)
PROT SERPL-MCNC: 7.8 G/DL (ref 6.8–8.8)
RBC # BLD AUTO: 3.95 10E12/L (ref 3.8–5.2)
SODIUM SERPL-SCNC: 138 MMOL/L (ref 133–144)
TSH SERPL DL<=0.005 MIU/L-ACNC: 3.67 MU/L (ref 0.4–4)
WBC # BLD AUTO: 2.8 10E9/L (ref 4–11)

## 2021-01-19 PROCEDURE — 85025 COMPLETE CBC W/AUTO DIFF WBC: CPT | Performed by: INTERNAL MEDICINE

## 2021-01-19 PROCEDURE — 86300 IMMUNOASSAY TUMOR CA 15-3: CPT | Performed by: INTERNAL MEDICINE

## 2021-01-19 PROCEDURE — 80053 COMPREHEN METABOLIC PANEL: CPT | Performed by: INTERNAL MEDICINE

## 2021-01-19 PROCEDURE — 84443 ASSAY THYROID STIM HORMONE: CPT | Performed by: INTERNAL MEDICINE

## 2021-01-19 PROCEDURE — 36415 COLL VENOUS BLD VENIPUNCTURE: CPT | Performed by: INTERNAL MEDICINE

## 2021-01-19 PROCEDURE — 82378 CARCINOEMBRYONIC ANTIGEN: CPT | Performed by: INTERNAL MEDICINE

## 2021-01-19 NOTE — ORAL ONC MGMT
Oral Chemotherapy Monitoring Program     Placed call to patient in follow up of oral chemotherapy. Left message requesting call back. No drug names were mentioned. Will update when response received.     Shawna Espino, PharmD, BCOP  Hematology/Oncology Clinical Pharmacist  [facility]  [facility]

## 2021-01-21 ENCOUNTER — VIRTUAL VISIT (OUTPATIENT)
Dept: ONCOLOGY | Facility: CLINIC | Age: 51
End: 2021-01-21
Attending: NURSE PRACTITIONER
Payer: COMMERCIAL

## 2021-01-21 DIAGNOSIS — R00.2 PALPITATIONS: ICD-10-CM

## 2021-01-21 DIAGNOSIS — F41.8 DEPRESSION WITH ANXIETY: ICD-10-CM

## 2021-01-21 DIAGNOSIS — R63.5 WEIGHT GAIN: ICD-10-CM

## 2021-01-21 DIAGNOSIS — R19.7 DIARRHEA, UNSPECIFIED TYPE: ICD-10-CM

## 2021-01-21 DIAGNOSIS — C50.412 MALIGNANT NEOPLASM OF UPPER-OUTER QUADRANT OF LEFT BREAST IN FEMALE, ESTROGEN RECEPTOR POSITIVE (H): Primary | ICD-10-CM

## 2021-01-21 DIAGNOSIS — Z17.0 MALIGNANT NEOPLASM OF UPPER-OUTER QUADRANT OF LEFT BREAST IN FEMALE, ESTROGEN RECEPTOR POSITIVE (H): Primary | ICD-10-CM

## 2021-01-21 DIAGNOSIS — G44.209 TENSION HEADACHE: ICD-10-CM

## 2021-01-21 PROCEDURE — 99214 OFFICE O/P EST MOD 30 MIN: CPT | Mod: TEL | Performed by: NURSE PRACTITIONER

## 2021-01-21 PROCEDURE — 999N001193 HC VIDEO/TELEPHONE VISIT; NO CHARGE

## 2021-01-21 RX ORDER — BUSPIRONE HYDROCHLORIDE 5 MG/1
TABLET ORAL
COMMUNITY
Start: 2020-12-29 | End: 2022-06-07

## 2021-01-21 RX ORDER — LORAZEPAM 1 MG/1
1 TABLET ORAL
COMMUNITY
Start: 2020-11-18 | End: 2022-06-07

## 2021-01-21 NOTE — LETTER
"    1/21/2021         RE: Jade Collins  [address]  [facility] [address]        Dear Colleague,    Thank you for referring your patient, Jade Collins, to the [facility]. Please see a copy of my visit note below.    Jade is a 50 year old who is being evaluated via a billable telephone visit.      What phone number would you like to be contacted at? [phone]    How would you like to obtain your AVS? [place]     Vitals - Patient Reported  Weight (Patient Reported): 140.6 kg (310 lb)  Height (Patient Reported): 172.7 cm (5' 7.99\")  BMI (Based on Pt Reported Ht/Wt): 47.15  Pain Score: No Pain (0)    Lauri Vargas LPN    Length of call: 14 min       Reason for Visit: f/u for metastatic breast cancer    Oncology HPI:   Jade was diagnosed with breast cancer with a lump found in the upper-inner quadrant of the left breast.  She was diagnosed in 12/2013 in the [place] area and Dr. David Redd was her medical oncologist.  Biopsy of the tumor showed it to be ER positive, NE positive, and HER-2 negative.  We do not have any of the original pathology. She underwent a lumpectomy performed by Dr. Tasha Segura who is a surgeon in the [place] area.  She also had a sentinel lymph node which was noted to be involved with tumor but there was no lymph node dissection done at that time.  [id number].  She subsequently underwent adjuvant chemotherapy with dose-dense AC for 4 cycles and Taxol for 12 weeks, completed 09/11/2014.        She then had radiation therapy post lumpectomy to the left breast, which was completed 11/23/2014 by Dr. Manley.        She was then begun on an aromatase inhibitor, the name of which she does not remember.  The aromatase inhibitor therapy was begun in 11/2014.  She was then switched within about a month or so to anastrozole and remained on anastrozole from 11/2014-02/2017.  She has never received tamoxifen.        She then moved to [place], [place] in 03/2017.  She " saw a gynecologist there and underwent a IRIS/BSO by Dr. Zendejas.  She then also saw Dr. Linares in [place], [place], who is an oncologist.  His interpretation of the pathology report was that she had triple-negative breast cancer.  It is possible that she may have had low estrogen receptor positivity, but the anastrozole was then discontinued in 02/2017.  She then underwent the IRIS/BSO in 03/2017.  This was done laparoscopically.        She then remained off of all hormonal therapy and in 09/2017 moved to [place].  She remained off hormonal therapy and did not have Medical Oncology followup initially.        She was driving for [place] and noticed lymph nodes in the left neck about in 06/2018.  She then went to see Dr. Norberto Brand who performed a PET/CT scan and the patient also underwent a brain MRI for staging.  The PET/CT scan performed 08/03/2018 showed in the neck there were several mildly metabolic active and large prominent left posterior cervical lymph nodes.  The largest is located posterior to the left sternocleidomastoid muscle and measures 1.5 x 1 cm in size.  This demonstrate modest FDG uptake.  The other lymph nodes are smaller and demonstrate mild FDG uptake.  The prominent prevascular and aortopulmonary lymph nodes were also seen on the contrast-enhanced scan were less well seen on the PET.  The largest lymph node visualized on the PET scan was 1.1 x 0.8 cm and demonstrated mild FDG uptake.  This was in the periaortic area just anterior to the aortic arch.  No lung nodules.  No lung infiltrates.  No pleural effusion.  There was no uptake of FDG in the bones.  In summary, there were mildly enlarged left posterior cervical lymph nodes, largest measuring 1.5 x 1.0 cm and mildly enlarged aorticopulmonary and prevascular lymph nodes that were noted on contrast-enhanced CT scan seen less well on the PET scan.  No evidence of metastatic disease in the abdomen and pelvis.  She also underwent a brain MRI that showed  "no evidence of intracranial metastatic disease.  She underwent a biopsy of one of the lymph nodes in the left neck which showed metastatic adenocarcinoma consistent with breast origin, which was estrogen-receptor positive.  The tumor cells were positive for CK7, ER and MT consistent with metastatic breast carcinoma.  Estrogen receptor showed strong average nuclear intensity in 95% of carcinoma cells, progesterone receptor moderate average nuclear intensity in 70% of the carcinoma.  A GATA3 stain was not performed.       TREATMENT HISTORY:  A. Tamoxifen  B. Letrozole and palbociclib started 9-18-18.    Interval history:   -Patient reports she is feeling well. Feels treatment is going well. Energy is 75%- sleeps 8-10 hours at night  -Reports she has arthralgias on letrozole. Reports stretching helps  -Denies nausea and vomiting. Is having diarrhea about 3 times per week- usually 1 per day.   -Denies appetite changes. 5-10 lbs of weight gain.   -Reports nipple pain intermittently in both left and right breast. If lying flat, has a \"sharp cramp\" \"between belly and breat\". Denies nipple discharge or inversion.   -Has been having headache twice per week for a few hours. Goes away on its own.   -Reports palpitations if she laughs too hard, starts coughing, with body shaking. Once or twice per week.   -Mood is good since starting buspar and sertraline. Taking lorazepam once per week  -Denies fevers, chills, rashes, new lumps/bumps, breast redness or dimpling, breast pain, vision changes, mucositis, sore throat, cough, dyspnea, chest pain, palpitations, peripheral edema, hematuria, dysuria, dizziness, weakness, and unusual bleeding.        Exam:  not currently breastfeeding.  Wt Readings from Last 4 Encounters:   06/09/20 138.6 kg (305 lb 9.6 oz)   03/12/20 139.5 kg (307 lb 8 oz)   02/13/20 138.7 kg (305 lb 11.2 oz)   01/16/20 137.6 kg (303 lb 4.8 oz)     There were no vitals taken for this visit.   General: alert and no " "distress   Psych: Alert and oriented times; coherent speech, normal rate and volume, able to articulate logical thoughts, able to abstract reason, no tangential thoughts, no hallucinations or delusions  Patient's affect is appropriate.    Pulm: Speaking in full sentences, unlabored, no audible wheezes or cough.   The rest of a comprehensive physical examination is deferred due to PHE (public health emergency) video restrictions\"     Labs:   Results for ANAHI DOBSON (MRN [id number]) as of 1/21/2021 09:08   Ref. Range 1/19/2021 12:11   Sodium Latest Ref Range: 133 - 144 mmol/L 138   Potassium Latest Ref Range: 3.4 - 5.3 mmol/L 4.1   Chloride Latest Ref Range: 94 - 109 mmol/L 106   Carbon Dioxide Latest Ref Range: 20 - 32 mmol/L 26   Urea Nitrogen Latest Ref Range: 7 - 30 mg/dL 12   Creatinine Latest Ref Range: 0.52 - 1.04 mg/dL 1.01   GFR Estimate Latest Ref Range: >60 mL/min/1.73_m2 64   GFR Estimate If Black Latest Ref Range: >60 mL/min/1.73_m2 75   Calcium Latest Ref Range: 8.5 - 10.1 mg/dL 9.3   Anion Gap Latest Ref Range: 3 - 14 mmol/L 6   Albumin Latest Ref Range: 3.4 - 5.0 g/dL 4.1   Protein Total Latest Ref Range: 6.8 - 8.8 g/dL 7.8   Bilirubin Total Latest Ref Range: 0.2 - 1.3 mg/dL 0.4   Alkaline Phosphatase Latest Ref Range: 40 - 150 U/L 74   ALT Latest Ref Range: 0 - 50 U/L 38   AST Latest Ref Range: 0 - 45 U/L 30   CA 27-29 Latest Ref Range: 0 - 39 U/mL 17   TSH Latest Ref Range: 0.40 - 4.00 mU/L 3.67   Glucose Latest Ref Range: 70 - 99 mg/dL 109 (H)   WBC Latest Ref Range: 4.0 - 11.0 10e9/L 2.8 (L)   Hemoglobin Latest Ref Range: 11.7 - 15.7 g/dL 12.8   Hematocrit Latest Ref Range: 35.0 - 47.0 % 37.8   Platelet Count Latest Ref Range: 150 - 450 10e9/L 143 (L)   RBC Count Latest Ref Range: 3.8 - 5.2 10e12/L 3.95   MCV Latest Ref Range: 78 - 100 fl 96   MCH Latest Ref Range: 26.5 - 33.0 pg 32.4   MCHC Latest Ref Range: 31.5 - 36.5 g/dL 33.9   RDW Latest Ref Range: 10.0 - 15.0 % 14.0   Diff Method Unknown " "Automated Method   % Neutrophils Latest Units: % 46.9   % Lymphocytes Latest Units: % 40.6   % Monocytes Latest Units: % 7.8   % Eosinophils Latest Units: % 2.5   % Basophils Latest Units: % 1.8   % Immature Granulocytes Latest Units: % 0.4   Absolute Neutrophil Latest Ref Range: 1.6 - 8.3 10e9/L 1.3 (L)   Absolute Lymphocytes Latest Ref Range: 0.8 - 5.3 10e9/L 1.1   Absolute Monocytes Latest Ref Range: 0.0 - 1.3 10e9/L 0.2   Absolute Eosinophils Latest Ref Range: 0.0 - 0.7 10e9/L 0.1   Absolute Basophils Latest Ref Range: 0.0 - 0.2 10e9/L 0.1   Abs Immature Granulocytes Latest Ref Range: 0 - 0.4 10e9/L 0.0   CEA Latest Ref Range: 0 - 2.5 ug/L <0.5       Assessment/plan:   #Recurrent, metastatic ER-positive, HER2 negative breast cancer: -History of left breast cancer s/p lumpectomy and SNLB in 2014. S/p 4 cycles ddAC and 12 weeks of Taxol. On AI from 11/2014-2/2017.  -Recurrence on PET/CT scan performed 08/03/2018 and then biopsy to left posterior cervical lymph nodes, aorticopulmonary, and prevascular lymph nodes were positive. No evidence of metastatic disease in the abdomen and pelvis or brain. Started Ibrance and letrozole on 9/21/18. Tolerating well aside from some hot flashes and mild joint stiffness.   -Restaging in October showed stable disease.    -Today, report new pain in bilateral nipples, recommended she be seen for in person breast exam   -Has upcoming imaging in mid February followed by appt with Dr. Bhatt     #Palpitations   -Reports palpitations if she laughs too hard, starts coughing, with body shaking. Happening once or twice per week.   -Recommend in person exam for heart auscultation and EKG, possibly Holter    #Weight Gain   -Likely secondary to letrozole and pt admittedly eating more \"junk food\" over  holidays   -Declines to meet with nutritionist     #Depression and anxiety  -She saw Dr. Pate her new PCP and her regimen was changed to add buspirone 7.5 mg twice daily to sertraline 200 mg " once per added. Lorazepam prn anxiety was added.  She has improvement of her symptoms of depression and anxiety.     #Headaches   -Chronic, 1-2 times per week. Not bothersome. Will continue to monitor     #Diarrhea   -1 episode, approc 3 times per week. Not bothersome. Can take imodium if gets any worse      Not Discussed:   #Hypothyroid. Levothyroxine 0.175 mg per day.         SCOTT Wynn, CNP  2021      Chart Review: 14 minutes   Length of Visit: 14 minutes   Care Coordination: 3 minutes (scheduling- not on day of encounter)   Chartin minutes (not on day of encounter)        Again, thank you for allowing me to participate in the care of your patient.        Sincerely,        SCOTT Wynn CNP

## 2021-01-21 NOTE — PROGRESS NOTES
"Jade is a 50 year old who is being evaluated via a billable telephone visit.      What phone number would you like to be contacted at? [phone]    How would you like to obtain your AVS? MyChart     Vitals - Patient Reported  Weight (Patient Reported): 140.6 kg (310 lb)  Height (Patient Reported): 172.7 cm (5' 7.99\")  BMI (Based on Pt Reported Ht/Wt): 47.15  Pain Score: No Pain (0)    Lauri Vargas LPN    Length of call: 14 min       Reason for Visit: f/u for metastatic breast cancer    Oncology HPI:   Jade was diagnosed with breast cancer with a lump found in the upper-inner quadrant of the left breast.  She was diagnosed in 12/2013 in the [place] area and Dr. David Redd was her medical oncologist.  Biopsy of the tumor showed it to be ER positive, NC positive, and HER-2 negative.  We do not have any of the original pathology. She underwent a lumpectomy performed by Dr. Tasha Segura who is a surgeon in the [place] area.  She also had a sentinel lymph node which was noted to be involved with tumor but there was no lymph node dissection done at that time.  [id number].  She subsequently underwent adjuvant chemotherapy with dose-dense AC for 4 cycles and Taxol for 12 weeks, completed 09/11/2014.        She then had radiation therapy post lumpectomy to the left breast, which was completed 11/23/2014 by Dr. Manley.        She was then begun on an aromatase inhibitor, the name of which she does not remember.  The aromatase inhibitor therapy was begun in 11/2014.  She was then switched within about a month or so to anastrozole and remained on anastrozole from 11/2014-02/2017.  She has never received tamoxifen.        She then moved to [place], [place] in 03/2017.  She saw a gynecologist there and underwent a IRIS/BSO by Dr. Zendejas.  She then also saw Dr. Linares in [place], [place], who is an oncologist.  His interpretation of the pathology report was that she had triple-negative breast cancer.  It is possible that she " may have had low estrogen receptor positivity, but the anastrozole was then discontinued in 02/2017.  She then underwent the IRIS/BSO in 03/2017.  This was done laparoscopically.        She then remained off of all hormonal therapy and in 09/2017 moved to [place].  She remained off hormonal therapy and did not have Medical Oncology followup initially.        She was driving for [place] and noticed lymph nodes in the left neck about in 06/2018.  She then went to see Dr. Norberto Brand who performed a PET/CT scan and the patient also underwent a brain MRI for staging.  The PET/CT scan performed 08/03/2018 showed in the neck there were several mildly metabolic active and large prominent left posterior cervical lymph nodes.  The largest is located posterior to the left sternocleidomastoid muscle and measures 1.5 x 1 cm in size.  This demonstrate modest FDG uptake.  The other lymph nodes are smaller and demonstrate mild FDG uptake.  The prominent prevascular and aortopulmonary lymph nodes were also seen on the contrast-enhanced scan were less well seen on the PET.  The largest lymph node visualized on the PET scan was 1.1 x 0.8 cm and demonstrated mild FDG uptake.  This was in the periaortic area just anterior to the aortic arch.  No lung nodules.  No lung infiltrates.  No pleural effusion.  There was no uptake of FDG in the bones.  In summary, there were mildly enlarged left posterior cervical lymph nodes, largest measuring 1.5 x 1.0 cm and mildly enlarged aorticopulmonary and prevascular lymph nodes that were noted on contrast-enhanced CT scan seen less well on the PET scan.  No evidence of metastatic disease in the abdomen and pelvis.  She also underwent a brain MRI that showed no evidence of intracranial metastatic disease.  She underwent a biopsy of one of the lymph nodes in the left neck which showed metastatic adenocarcinoma consistent with breast origin, which was estrogen-receptor positive.  The tumor cells were  "positive for CK7, ER and WV consistent with metastatic breast carcinoma.  Estrogen receptor showed strong average nuclear intensity in 95% of carcinoma cells, progesterone receptor moderate average nuclear intensity in 70% of the carcinoma.  A GATA3 stain was not performed.       TREATMENT HISTORY:  A. Tamoxifen  B. Letrozole and palbociclib started 9-18-18.    Interval history:   -Patient reports she is feeling well. Feels treatment is going well. Energy is 75%- sleeps 8-10 hours at night  -Reports she has arthralgias on letrozole. Reports stretching helps  -Denies nausea and vomiting. Is having diarrhea about 3 times per week- usually 1 per day.   -Denies appetite changes. 5-10 lbs of weight gain.   -Reports nipple pain intermittently in both left and right breast. If lying flat, has a \"sharp cramp\" \"between belly and breat\". Denies nipple discharge or inversion.   -Has been having headache twice per week for a few hours. Goes away on its own.   -Reports palpitations if she laughs too hard, starts coughing, with body shaking. Once or twice per week.   -Mood is good since starting buspar and sertraline. Taking lorazepam once per week  -Denies fevers, chills, rashes, new lumps/bumps, breast redness or dimpling, breast pain, vision changes, mucositis, sore throat, cough, dyspnea, chest pain, palpitations, peripheral edema, hematuria, dysuria, dizziness, weakness, and unusual bleeding.        Exam:  not currently breastfeeding.  Wt Readings from Last 4 Encounters:   06/09/20 138.6 kg (305 lb 9.6 oz)   03/12/20 139.5 kg (307 lb 8 oz)   02/13/20 138.7 kg (305 lb 11.2 oz)   01/16/20 137.6 kg (303 lb 4.8 oz)     There were no vitals taken for this visit.   General: alert and no distress   Psych: Alert and oriented times; coherent speech, normal rate and volume, able to articulate logical thoughts, able to abstract reason, no tangential thoughts, no hallucinations or delusions  Patient's affect is appropriate.    Pulm: " "Speaking in full sentences, unlabored, no audible wheezes or cough.   The rest of a comprehensive physical examination is deferred due to PHE (public health emergency) video restrictions\"     Labs:   Results for ANAHI DOBSON (MRN [id number]) as of 1/21/2021 09:08   Ref. Range 1/19/2021 12:11   Sodium Latest Ref Range: 133 - 144 mmol/L 138   Potassium Latest Ref Range: 3.4 - 5.3 mmol/L 4.1   Chloride Latest Ref Range: 94 - 109 mmol/L 106   Carbon Dioxide Latest Ref Range: 20 - 32 mmol/L 26   Urea Nitrogen Latest Ref Range: 7 - 30 mg/dL 12   Creatinine Latest Ref Range: 0.52 - 1.04 mg/dL 1.01   GFR Estimate Latest Ref Range: >60 mL/min/1.73_m2 64   GFR Estimate If Black Latest Ref Range: >60 mL/min/1.73_m2 75   Calcium Latest Ref Range: 8.5 - 10.1 mg/dL 9.3   Anion Gap Latest Ref Range: 3 - 14 mmol/L 6   Albumin Latest Ref Range: 3.4 - 5.0 g/dL 4.1   Protein Total Latest Ref Range: 6.8 - 8.8 g/dL 7.8   Bilirubin Total Latest Ref Range: 0.2 - 1.3 mg/dL 0.4   Alkaline Phosphatase Latest Ref Range: 40 - 150 U/L 74   ALT Latest Ref Range: 0 - 50 U/L 38   AST Latest Ref Range: 0 - 45 U/L 30   CA 27-29 Latest Ref Range: 0 - 39 U/mL 17   TSH Latest Ref Range: 0.40 - 4.00 mU/L 3.67   Glucose Latest Ref Range: 70 - 99 mg/dL 109 (H)   WBC Latest Ref Range: 4.0 - 11.0 10e9/L 2.8 (L)   Hemoglobin Latest Ref Range: 11.7 - 15.7 g/dL 12.8   Hematocrit Latest Ref Range: 35.0 - 47.0 % 37.8   Platelet Count Latest Ref Range: 150 - 450 10e9/L 143 (L)   RBC Count Latest Ref Range: 3.8 - 5.2 10e12/L 3.95   MCV Latest Ref Range: 78 - 100 fl 96   MCH Latest Ref Range: 26.5 - 33.0 pg 32.4   MCHC Latest Ref Range: 31.5 - 36.5 g/dL 33.9   RDW Latest Ref Range: 10.0 - 15.0 % 14.0   Diff Method Unknown Automated Method   % Neutrophils Latest Units: % 46.9   % Lymphocytes Latest Units: % 40.6   % Monocytes Latest Units: % 7.8   % Eosinophils Latest Units: % 2.5   % Basophils Latest Units: % 1.8   % Immature Granulocytes Latest Units: % 0.4 " "  Absolute Neutrophil Latest Ref Range: 1.6 - 8.3 10e9/L 1.3 (L)   Absolute Lymphocytes Latest Ref Range: 0.8 - 5.3 10e9/L 1.1   Absolute Monocytes Latest Ref Range: 0.0 - 1.3 10e9/L 0.2   Absolute Eosinophils Latest Ref Range: 0.0 - 0.7 10e9/L 0.1   Absolute Basophils Latest Ref Range: 0.0 - 0.2 10e9/L 0.1   Abs Immature Granulocytes Latest Ref Range: 0 - 0.4 10e9/L 0.0   CEA Latest Ref Range: 0 - 2.5 ug/L <0.5       Assessment/plan:   #Recurrent, metastatic ER-positive, HER2 negative breast cancer: -History of left breast cancer s/p lumpectomy and SNLB in 2014. S/p 4 cycles ddAC and 12 weeks of Taxol. On AI from 11/2014-2/2017.  -Recurrence on PET/CT scan performed 08/03/2018 and then biopsy to left posterior cervical lymph nodes, aorticopulmonary, and prevascular lymph nodes were positive. No evidence of metastatic disease in the abdomen and pelvis or brain. Started Ibrance and letrozole on 9/21/18. Tolerating well aside from some hot flashes and mild joint stiffness.   -Restaging in October showed stable disease.    -Today, report new pain in bilateral nipples, recommended she be seen for in person breast exam   -Has upcoming imaging in mid February followed by appt with Dr. Bhatt     #Palpitations   -Reports palpitations if she laughs too hard, starts coughing, with body shaking. Happening once or twice per week.   -Recommend in person exam for heart auscultation and EKG, possibly Holter    #Weight Gain   -Likely secondary to letrozole and pt admittedly eating more \"junk food\" over  holidays   -Declines to meet with nutritionist     #Depression and anxiety  -She saw Dr. Pate her new PCP and her regimen was changed to add buspirone 7.5 mg twice daily to sertraline 200 mg once per added. Lorazepam prn anxiety was added.  She has improvement of her symptoms of depression and anxiety.     #Headaches   -Chronic, 1-2 times per week. Not bothersome. Will continue to monitor     #Diarrhea   -1 episode, approc 3 times " per week. Not bothersome. Can take imodium if gets any worse      Not Discussed:   #Hypothyroid. Levothyroxine 0.175 mg per day.         SCOTT Wynn, CNP  2021      Chart Review: 14 minutes   Length of Visit: 14 minutes   Care Coordination: 3 minutes (scheduling- not on day of encounter)   Chartin minutes (not on day of encounter)

## 2021-01-28 ENCOUNTER — ONCOLOGY VISIT (OUTPATIENT)
Dept: ONCOLOGY | Facility: CLINIC | Age: 51
End: 2021-01-28
Attending: PHYSICIAN ASSISTANT
Payer: COMMERCIAL

## 2021-01-28 VITALS
OXYGEN SATURATION: 96 % | WEIGHT: 293 LBS | SYSTOLIC BLOOD PRESSURE: 139 MMHG | TEMPERATURE: 98.3 F | BODY MASS INDEX: 48.67 KG/M2 | HEART RATE: 91 BPM | DIASTOLIC BLOOD PRESSURE: 68 MMHG | RESPIRATION RATE: 20 BRPM

## 2021-01-28 DIAGNOSIS — N63.0 LUMP OR MASS IN BREAST: Primary | ICD-10-CM

## 2021-01-28 DIAGNOSIS — R00.2 PALPITATIONS: ICD-10-CM

## 2021-01-28 DIAGNOSIS — C50.012 MALIGNANT NEOPLASM OF NIPPLE AND AREOLA, LEFT FEMALE BREAST (H): ICD-10-CM

## 2021-01-28 PROCEDURE — 93010 ELECTROCARDIOGRAM REPORT: CPT | Performed by: INTERNAL MEDICINE

## 2021-01-28 PROCEDURE — G0463 HOSPITAL OUTPT CLINIC VISIT: HCPCS

## 2021-01-28 PROCEDURE — 93005 ELECTROCARDIOGRAM TRACING: CPT

## 2021-01-28 PROCEDURE — 99215 OFFICE O/P EST HI 40 MIN: CPT | Performed by: PHYSICIAN ASSISTANT

## 2021-01-28 ASSESSMENT — PAIN SCALES - GENERAL: PAINLEVEL: SEVERE PAIN (6)

## 2021-01-28 NOTE — PROGRESS NOTES
Oncology/Hematology Visit Note  Jan 28, 2021    Reason for Visit: follow up of recurrent metastatic ER-positive HER2 negative breast cancer    History of Present Illness: Jade Collins is a 50 year old female with a history of left breast cancer diagnosed in 12/2013 in [place] area and Dr. David Redd was her medical oncologist. Biopsy showed tumor to be ER positive, SC positive and HER2 negative. She underwent lumpectomy and SLNB by Dr. Tasha Segura in [place]. [place] lymph node was noted to be involved with tumor but no lymph node dissection done at that time. She subsequently underwent adjuvant ddAC x 4 cycles and Taxol x 12 weeks, competed in 9/11/2014. She had post lumpectomy radiation completed in 11/23/2014 by Dr. Manley. She was begun on an AI in 11/2014, but does not recall name.     She was then switched within about a month or so to anastrozole and remained on anastrozole from 11/2014-02/2017.  She has never received tamoxifen. She then moved to [place], [place] in 03/2017.  She saw a gynecologist there and underwent a IRIS/BSO by Dr. Zendejas.  She then also saw Dr. Linares in [place], [place], who is an oncologist.  His interpretation of the pathology report was that she had triple-negative breast cancer.  It is possible that she may have had low estrogen receptor positivity, but the anastrozole was then discontinued in 02/2017.  She then underwent the IRIS/BSO in 03/2017.  This was done laparoscopically.        She then remained off of all hormonal therapy and in 09/2017 moved to [place].  She remained off hormonal therapy and did not have Medical Oncology followup initially.        She was driving for [place] and noticed lymph nodes in the left neck about 8 weeks ago.  She then went to see Dr. Norberto Brand who performed a PET/CT scan and the patient also underwent a brain MRI for staging.  The PET/CT scan performed 08/03/2018 which showed mildly enlarged left posterior cervical lymph nodes, largest measuring  "1.5 x 1.0 cm and mildly enlarged aorticopulmonary and prevascular lymph nodes that were noted on contrast-enhanced CT scan seen less well on the PET scan.  No evidence of metastatic disease in the abdomen and pelvis.  She also underwent a brain MRI that showed no evidence of intracranial metastatic disease.  She underwent a biopsy of one of the lymph nodes in the left neck which showed metastatic adenocarcinoma consistent with breast origin, which was estrogen-receptor positive.  The tumor cells were positive for CK7, ER and IA consistent with metastatic breast carcinoma.  Estrogen receptor showed strong average nuclear intensity in 95% of carcinoma cells, progesterone receptor moderate average nuclear intensity in 70% of the carcinoma.  A GATA3 stain was apparently not performed.     She started Ibrance on 9/21/18 and letrozole on 9/23/18. She had improved disease on restaging on 11/26. Last restaging on 10/20/20 with stable disease.     She was seen by Toshia Figueroa last week who requested an in person visit to follow-up on a few issues.     Interval History:  Jade is overall feeling well. These are the pending issues today:     1. Noticed a small lymph node posterior base of neck on L about a week ago. She is concerned as this is where her disease relapsed in 2018.  2. She has had intermittent breast \"tingling\" and pain, worse with caffeine and sugar. This is bilateral but more notable on L. She also feels a firm area on L inferior to lumpectomy incision. No other lumps/bumps. No nipple discharge.   3. Back in February 2020, she was reclining slightly, talking on the phone then started to laugh. Laugh triggered heavy cough and then she felt palpitations, had full body jerking, and had LOC and subsequent disorientation. She had reported to Helga Fernando who ordered brain MRI 2/13/20 (negative). Jade followed up with PCP for an exam which was normal. No follow-up after this. Since then about 3x a week, she will " laugh then cough and feel palpitations and have a few all over myoclonic jerks without loss of consciousness or presyncope. No SOB or CP.     Current Outpatient Medications   Medication Sig Dispense Refill     albuterol (PROAIR HFA/PROVENTIL HFA/VENTOLIN HFA) 108 (90 Base) MCG/ACT inhaler Inhale 2 puffs into the lungs every 6 hours 1 Inhaler 0     albuterol (PROVENTIL) (2.5 MG/3ML) 0.083% neb solution Take 1 vial (2.5 mg) by nebulization every 6 hours as needed for shortness of breath / dyspnea or wheezing 30 vial 0     busPIRone (BUSPAR) 5 MG tablet        calcium carbonate (TUMS) 500 MG chewable tablet Take 1 chew tab by mouth as needed for heartburn       Calcium Citrate-Vitamin D (CALCIUM + D PO) Take 250 mg by mouth 2 times daily        clotrimazole (MYCELEX) 10 MG lozenge Place 1 lozenge (10 mg) inside cheek 5 times daily 60 each 0     fluticasone-salmeterol (ADVAIR-HFA) 115-21 MCG/ACT inhaler Inhale 2 puffs into the lungs 2 times daily 1 Inhaler 2     letrozole (FEMARA) 2.5 MG tablet Take 1 tablet (2.5 mg) by mouth daily for 28 days 28 tablet 0     levothyroxine (SYNTHROID/LEVOTHROID) 175 MCG tablet Take 1 tablet (175 mcg) by mouth daily 90 tablet 3     loratadine (CLARITIN) 10 MG tablet Take 1 tablet (10 mg) by mouth daily 90 tablet 3     LORazepam (ATIVAN) 1 MG tablet Take 1 mg by mouth       mirtazapine (REMERON) 15 MG tablet TK 1 T PO HS 30 tablet 3     Multiple Vitamins-Minerals (MULTIVITAMIN ADULT PO)        palbociclib (IBRANCE) 100 MG tablet Take 1 tablet (100 mg) by mouth daily with food Take for 21 days, then 7 days off. Avoid grapefruit. Do not open/crush/chew capsule. 21 tablet 0     sertraline (ZOLOFT) 100 MG tablet Take 1 tablet (100 mg) by mouth 2 times daily 60 tablet 3     triamcinolone (NASACORT) 55 MCG/ACT inhaler Spray 1 spray into both nostrils daily        VITAMIN D, CHOLECALCIFEROL, PO Take 2,000 Units by mouth daily        methylPREDNISolone (MEDROL) 32 MG tablet Take 1 tablet (32 mg)  12 hours and 2 hours before IV contrast. (Patient not taking: Reported on 1/28/2021) 2 tablet 4     methylPREDNISolone (MEDROL) 32 MG tablet TAKE 1 TABLET BY MOUTH 12 HOURS BEFORE IMAGING AND REPEAT 1 TABLET 2 HOURS BEFORE IMAGING TO PREVENT CONTRAST REACTION (Patient not taking: Reported on 1/28/2021) 2 tablet 3       Physical Examination:  General: The patient is a pleasant female in no acute distress.  /68   Pulse 91   Temp 98.3  F (36.8  C) (Oral)   Resp 20   Wt 145.2 kg (320 lb)   SpO2 96%   BMI 48.67 kg/m    Wt Readings from Last 10 Encounters:   01/28/21 145.2 kg (320 lb)   06/09/20 138.6 kg (305 lb 9.6 oz)   03/12/20 139.5 kg (307 lb 8 oz)   02/13/20 138.7 kg (305 lb 11.2 oz)   01/16/20 137.6 kg (303 lb 4.8 oz)   12/18/19 140.3 kg (309 lb 4.8 oz)   11/15/19 141.7 kg (312 lb 4.8 oz)   10/17/19 139.5 kg (307 lb 8 oz)   09/19/19 137.4 kg (303 lb)   08/22/19 137.1 kg (302 lb 3.2 oz)     HEENT: EOMI, PERRL. Sclerae are anicteric.   Lymph: Very small pea sized lymph node L posterior cervical base of neck. No other palpable LAD.   Breast: Left breast has some hyperpigmentation and visible pores. There is some mild lymphedema around NAC and L medial breast. Inferior to lumpectomy incision about 9-10 o'clock and about 2 cm from NAC there is a firmer density that is not consistent with breast mound. R breast benign. No axillary LAD   Heart: Regular rate and rhythm.   Lungs: Clear to auscultation bilaterally.    Abdomen: Bowel sounds present, soft, nontender with no palpable hepatosplenomegaly or masses.  Extremities: No lower extremity edema noted bilaterally.   Neuro: Cranial nerves II through XII are grossly intact.       Laboratory Data:    1/19/2021 12:11   Sodium 138   Potassium 4.1   Chloride 106   Carbon Dioxide 26   Urea Nitrogen 12   Creatinine 1.01   GFR Estimate 64   GFR Estimate If Black 75   Calcium 9.3   Anion Gap 6   Albumin 4.1   Protein Total 7.8   Bilirubin Total 0.4   Alkaline  Phosphatase 74   ALT 38   AST 30   CA 27-29 17   TSH 3.67   Glucose 109 (H)   WBC 2.8 (L)   Hemoglobin 12.8   Hematocrit 37.8   Platelet Count 143 (L)   RBC Count 3.95   MCV 96   MCH 32.4   MCHC 33.9   RDW 14.0   Diff Method Automated Method   % Neutrophils 46.9   % Lymphocytes 40.6   % Monocytes 7.8   % Eosinophils 2.5   % Basophils 1.8   % Immature Granulocytes 0.4   Absolute Neutrophil 1.3 (L)   Absolute Lymphocytes 1.1   Absolute Monocytes 0.2   Absolute Eosinophils 0.1   Absolute Basophils 0.1   Abs Immature Granulocytes 0.0   CEA <0.5     EKG NSR     Assessment and Plan:    Recurrent, metastatic ER-positive, HER2 negative breast cancer: History of left breast cancer s/p lumpectomy and SNLB in 2014. S/p 4 cycles ddAC and 12 weeks of Taxol. On AI from 11/2014-2/2017. Recurrence on PET/CT scan performed 08/03/2018 and then biopsy to left posterior cervical lymph nodes, aorticopulmonary, and prevascular lymph nodes. No evidence of metastatic disease in the abdomen and pelvis or brain. Started Ibrance and letrozole on 9/21/18. Last restaging in October was stable, markers and labs have been stable.   -has palpated small cervical node. Next restaging is in a few weeks. I think okay to wait until then. She has CT NCAP planned which is appropriate.     Breast pain/L breast lump: Will schedule diagnostic mammo + US. Almost has the feeling of a seroma but it is too far out from her lumpectomy to be this. Discussed cutting back on sugar and caffeine to help breast pain. Evening primrose oil can help too.     Palpitations/myoclonus with history of syncope: Always triggered by laughing-->coughing suggesting some autonomic dysfunction. EKG today was okay. Recommend evaluation by Cards. Will probably need tilt table, holter assessment. May then need to see neurology if this is unrevealing to assess myoclonus. No clearly offensive meds besides maybe mirtazapine (not sure if she is still taking this based on refill date) or  sertraline.     45 minutes spent on the date of the encounter doing chart review, review of test results, patient visit and documentation       Cecilia Goins PA-C  [facility] [address] [address] [phone]

## 2021-01-28 NOTE — NURSING NOTE
EKG was performed today.  Order written by Cecilia Goins was completed today. Name and  verified with patient.  Patient was checked into next appt.    Kandi Mancera CMA (AAMA)

## 2021-01-28 NOTE — NURSING NOTE
"Oncology Rooming Note    January 28, 2021 11:51 AM   Jade Collins is a 50 year old female who presents for:    Chief Complaint   Patient presents with     Oncology Clinic Visit     Return; Breast Ca- pain in left breast     Initial Vitals: /68   Pulse 91   Temp 98.3  F (36.8  C) (Oral)   Resp 20   Wt 145.2 kg (320 lb)   SpO2 96%   BMI 48.67 kg/m   Estimated body mass index is 48.67 kg/m  as calculated from the following:    Height as of 3/12/20: 1.727 m (5' 7.99\").    Weight as of this encounter: 145.2 kg (320 lb). Body surface area is 2.64 meters squared.  Severe Pain (6) Comment: Data Unavailable   No LMP recorded. Patient is postmenopausal.  Allergies reviewed: Yes  Medications reviewed: Yes    Medications: Medication refills not needed today.  Pharmacy name entered into EPIC:    [id number] - [place], [place] - [address] AT [facility] [address] [facility] [address] [facility] - [place], [place] - [address]  [facility] #[id number] - [place], [place] - [address] AT [facility] [address]          Kandi Mancera CMA              "

## 2021-01-28 NOTE — LETTER
1/28/2021         RE: Jade Collins  [address]  [facility] [address]      Oncology/Hematology Visit Note  Jan 28, 2021    Reason for Visit: follow up of recurrent metastatic ER-positive HER2 negative breast cancer    History of Present Illness: Jade Collins is a 50 year old female with a history of left breast cancer diagnosed in 12/2013 in [place] area and Dr. David Redd was her medical oncologist. Biopsy showed tumor to be ER positive, KS positive and HER2 negative. She underwent lumpectomy and SLNB by Dr. Tasha Segura in [place]. [place] lymph node was noted to be involved with tumor but no lymph node dissection done at that time. She subsequently underwent adjuvant ddAC x 4 cycles and Taxol x 12 weeks, competed in 9/11/2014. She had post lumpectomy radiation completed in 11/23/2014 by Dr. Manley. She was begun on an AI in 11/2014, but does not recall name.     She was then switched within about a month or so to anastrozole and remained on anastrozole from 11/2014-02/2017.  She has never received tamoxifen. She then moved to [place], [place] in 03/2017.  She saw a gynecologist there and underwent a IRIS/BSO by Dr. Zendejas.  She then also saw Dr. Linares in [place], [place], who is an oncologist.  His interpretation of the pathology report was that she had triple-negative breast cancer.  It is possible that she may have had low estrogen receptor positivity, but the anastrozole was then discontinued in 02/2017.  She then underwent the IRIS/BSO in 03/2017.  This was done laparoscopically.        She then remained off of all hormonal therapy and in 09/2017 moved to [place].  She remained off hormonal therapy and did not have Medical Oncology followup initially.        She was driving for [place] and noticed lymph nodes in the left neck about 8 weeks ago.  She then went to see Dr. Norberto Brand who performed a PET/CT scan and the patient also underwent a brain MRI for staging.  The PET/CT scan performed  "08/03/2018 which showed mildly enlarged left posterior cervical lymph nodes, largest measuring 1.5 x 1.0 cm and mildly enlarged aorticopulmonary and prevascular lymph nodes that were noted on contrast-enhanced CT scan seen less well on the PET scan.  No evidence of metastatic disease in the abdomen and pelvis.  She also underwent a brain MRI that showed no evidence of intracranial metastatic disease.  She underwent a biopsy of one of the lymph nodes in the left neck which showed metastatic adenocarcinoma consistent with breast origin, which was estrogen-receptor positive.  The tumor cells were positive for CK7, ER and [place] consistent with metastatic breast carcinoma.  Estrogen receptor showed strong average nuclear intensity in 95% of carcinoma cells, progesterone receptor moderate average nuclear intensity in 70% of the carcinoma.  A GATA3 stain was apparently not performed.     She started Ibrance on 9/21/18 and letrozole on 9/23/18. She had improved disease on restaging on 11/26. Last restaging on 10/20/20 with stable disease.     She was seen by Toshia Figueroa last week who requested an in person visit to follow-up on a few issues.     Interval History:  Jade is overall feeling well. These are the pending issues today:     1. Noticed a small lymph node posterior base of neck on L about a week ago. She is concerned as this is where her disease relapsed in 2018.  2. She has had intermittent breast \"tingling\" and pain, worse with caffeine and sugar. This is bilateral but more notable on L. She also feels a firm area on L inferior to lumpectomy incision. No other lumps/bumps. No nipple discharge.   3. Back in February 2020, she was reclining slightly, talking on the phone then started to laugh. Laugh triggered heavy cough and then she felt palpitations, had full body jerking, and had LOC and subsequent disorientation. She had reported to Helga Fernando who ordered brain MRI 2/13/20 (negative). Jade followed up with " PCP for an exam which was normal. No follow-up after this. Since then about 3x a week, she will laugh then cough and feel palpitations and have a few all over myoclonic jerks without loss of consciousness or presyncope. No SOB or CP.     Current Outpatient Medications   Medication Sig Dispense Refill     albuterol (PROAIR HFA/PROVENTIL HFA/VENTOLIN HFA) 108 (90 Base) MCG/ACT inhaler Inhale 2 puffs into the lungs every 6 hours 1 Inhaler 0     albuterol (PROVENTIL) (2.5 MG/3ML) 0.083% neb solution Take 1 vial (2.5 mg) by nebulization every 6 hours as needed for shortness of breath / dyspnea or wheezing 30 vial 0     busPIRone (BUSPAR) 5 MG tablet        calcium carbonate (TUMS) 500 MG chewable tablet Take 1 chew tab by mouth as needed for heartburn       Calcium Citrate-Vitamin D (CALCIUM + D PO) Take 250 mg by mouth 2 times daily        clotrimazole (MYCELEX) 10 MG lozenge Place 1 lozenge (10 mg) inside cheek 5 times daily 60 each 0     fluticasone-salmeterol (ADVAIR-HFA) 115-21 MCG/ACT inhaler Inhale 2 puffs into the lungs 2 times daily 1 Inhaler 2     letrozole (FEMARA) 2.5 MG tablet Take 1 tablet (2.5 mg) by mouth daily for 28 days 28 tablet 0     levothyroxine (SYNTHROID/LEVOTHROID) 175 MCG tablet Take 1 tablet (175 mcg) by mouth daily 90 tablet 3     loratadine (CLARITIN) 10 MG tablet Take 1 tablet (10 mg) by mouth daily 90 tablet 3     LORazepam (ATIVAN) 1 MG tablet Take 1 mg by mouth       mirtazapine (REMERON) 15 MG tablet TK 1 T PO HS 30 tablet 3     Multiple Vitamins-Minerals (MULTIVITAMIN ADULT PO)        palbociclib (IBRANCE) 100 MG tablet Take 1 tablet (100 mg) by mouth daily with food Take for 21 days, then 7 days off. Avoid grapefruit. Do not open/crush/chew capsule. 21 tablet 0     sertraline (ZOLOFT) 100 MG tablet Take 1 tablet (100 mg) by mouth 2 times daily 60 tablet 3     triamcinolone (NASACORT) 55 MCG/ACT inhaler Spray 1 spray into both nostrils daily        VITAMIN D, CHOLECALCIFEROL, PO Take  2,000 Units by mouth daily        methylPREDNISolone (MEDROL) 32 MG tablet Take 1 tablet (32 mg) 12 hours and 2 hours before IV contrast. (Patient not taking: Reported on 1/28/2021) 2 tablet 4     methylPREDNISolone (MEDROL) 32 MG tablet TAKE 1 TABLET BY MOUTH 12 HOURS BEFORE IMAGING AND REPEAT 1 TABLET 2 HOURS BEFORE IMAGING TO PREVENT CONTRAST REACTION (Patient not taking: Reported on 1/28/2021) 2 tablet 3       Physical Examination:  General: The patient is a pleasant female in no acute distress.  /68   Pulse 91   Temp 98.3  F (36.8  C) (Oral)   Resp 20   Wt 145.2 kg (320 lb)   SpO2 96%   BMI 48.67 kg/m    Wt Readings from Last 10 Encounters:   01/28/21 145.2 kg (320 lb)   06/09/20 138.6 kg (305 lb 9.6 oz)   03/12/20 139.5 kg (307 lb 8 oz)   02/13/20 138.7 kg (305 lb 11.2 oz)   01/16/20 137.6 kg (303 lb 4.8 oz)   12/18/19 140.3 kg (309 lb 4.8 oz)   11/15/19 141.7 kg (312 lb 4.8 oz)   10/17/19 139.5 kg (307 lb 8 oz)   09/19/19 137.4 kg (303 lb)   08/22/19 137.1 kg (302 lb 3.2 oz)     HEENT: EOMI, PERRL. Sclerae are anicteric.   Lymph: Very small pea sized lymph node L posterior cervical base of neck. No other palpable LAD.   Breast: Left breast has some hyperpigmentation and visible pores. There is some mild lymphedema around NAC and L medial breast. Inferior to lumpectomy incision about 9-10 o'clock and about 2 cm from NAC there is a firmer density that is not consistent with breast mound. R breast benign. No axillary LAD   Heart: Regular rate and rhythm.   Lungs: Clear to auscultation bilaterally.    Abdomen: Bowel sounds present, soft, nontender with no palpable hepatosplenomegaly or masses.  Extremities: No lower extremity edema noted bilaterally.   Neuro: Cranial nerves II through XII are grossly intact.       Laboratory Data:    1/19/2021 12:11   Sodium 138   Potassium 4.1   Chloride 106   Carbon Dioxide 26   Urea Nitrogen 12   Creatinine 1.01   GFR Estimate 64   GFR Estimate If Black 75    Calcium 9.3   Anion Gap 6   Albumin 4.1   Protein Total 7.8   Bilirubin Total 0.4   Alkaline Phosphatase 74   ALT 38   AST 30   CA 27-29 17   TSH 3.67   Glucose 109 (H)   WBC 2.8 (L)   Hemoglobin 12.8   Hematocrit 37.8   Platelet Count 143 (L)   RBC Count 3.95   MCV 96   MCH 32.4   MCHC 33.9   RDW 14.0   Diff Method Automated Method   % Neutrophils 46.9   % Lymphocytes 40.6   % Monocytes 7.8   % Eosinophils 2.5   % Basophils 1.8   % Immature Granulocytes 0.4   Absolute Neutrophil 1.3 (L)   Absolute Lymphocytes 1.1   Absolute Monocytes 0.2   Absolute Eosinophils 0.1   Absolute Basophils 0.1   Abs Immature Granulocytes 0.0   CEA <0.5     EKG NSR     Assessment and Plan:    Recurrent, metastatic ER-positive, HER2 negative breast cancer: History of left breast cancer s/p lumpectomy and SNLB in 2014. S/p 4 cycles ddAC and 12 weeks of Taxol. On AI from 11/2014-2/2017. Recurrence on PET/CT scan performed 08/03/2018 and then biopsy to left posterior cervical lymph nodes, aorticopulmonary, and prevascular lymph nodes. No evidence of metastatic disease in the abdomen and pelvis or brain. Started Ibrance and letrozole on 9/21/18. Last restaging in October was stable, markers and labs have been stable.   -has palpated small cervical node. Next restaging is in a few weeks. I think okay to wait until then. She has CT NCAP planned which is appropriate.     Breast pain/L breast lump: Will schedule diagnostic mammo + US. Almost has the feeling of a seroma but it is too far out from her lumpectomy to be this. Discussed cutting back on sugar and caffeine to help breast pain. Evening primrose oil can help too.     Palpitations/myoclonus with history of syncope: Always triggered by laughing-->coughing suggesting some autonomic dysfunction. EKG today was okay. Recommend evaluation by Cards. Will probably need tilt table, holter assessment. May then need to see neurology if this is unrevealing to assess myoclonus. No clearly offensive  meds besides maybe mirtazapine (not sure if she is still taking this based on refill date) or sertraline.     45 minutes spent on the date of the encounter doing chart review, review of test results, patient visit and documentation       Cecilia Goins PA-C  [facility] [address] [address]  [phone]                        Cecilia Goins PA-C

## 2021-01-29 LAB — INTERPRETATION ECG - MUSE: NORMAL

## 2021-02-16 ENCOUNTER — ANCILLARY PROCEDURE (OUTPATIENT)
Dept: CT IMAGING | Facility: CLINIC | Age: 51
End: 2021-02-16
Attending: INTERNAL MEDICINE
Payer: COMMERCIAL

## 2021-02-16 ENCOUNTER — ANCILLARY PROCEDURE (OUTPATIENT)
Dept: MAMMOGRAPHY | Facility: CLINIC | Age: 51
End: 2021-02-16
Attending: PHYSICIAN ASSISTANT
Payer: COMMERCIAL

## 2021-02-16 ENCOUNTER — ANCILLARY PROCEDURE (OUTPATIENT)
Dept: ULTRASOUND IMAGING | Facility: CLINIC | Age: 51
End: 2021-02-16
Attending: PHYSICIAN ASSISTANT
Payer: COMMERCIAL

## 2021-02-16 DIAGNOSIS — Z17.0 MALIGNANT NEOPLASM OF UPPER-OUTER QUADRANT OF LEFT BREAST IN FEMALE, ESTROGEN RECEPTOR POSITIVE (H): ICD-10-CM

## 2021-02-16 DIAGNOSIS — N63.0 LUMP OR MASS IN BREAST: ICD-10-CM

## 2021-02-16 DIAGNOSIS — C50.412 MALIGNANT NEOPLASM OF UPPER-OUTER QUADRANT OF LEFT BREAST IN FEMALE, ESTROGEN RECEPTOR POSITIVE (H): ICD-10-CM

## 2021-02-16 DIAGNOSIS — C50.012 MALIGNANT NEOPLASM OF NIPPLE AND AREOLA, LEFT FEMALE BREAST (H): ICD-10-CM

## 2021-02-16 DIAGNOSIS — E11.9 TYPE 2 DIABETES MELLITUS WITHOUT COMPLICATION, WITHOUT LONG-TERM CURRENT USE OF INSULIN (H): ICD-10-CM

## 2021-02-16 LAB
ALBUMIN SERPL-MCNC: 4 G/DL (ref 3.4–5)
ALP SERPL-CCNC: 75 U/L (ref 40–150)
ALT SERPL W P-5'-P-CCNC: 34 U/L (ref 0–50)
ANION GAP SERPL CALCULATED.3IONS-SCNC: 5 MMOL/L (ref 3–14)
AST SERPL W P-5'-P-CCNC: 21 U/L (ref 0–45)
BASOPHILS # BLD AUTO: 0 10E9/L (ref 0–0.2)
BASOPHILS NFR BLD AUTO: 0.6 %
BILIRUB SERPL-MCNC: 0.3 MG/DL (ref 0.2–1.3)
BUN SERPL-MCNC: 13 MG/DL (ref 7–30)
CALCIUM SERPL-MCNC: 9.4 MG/DL (ref 8.5–10.1)
CHLORIDE SERPL-SCNC: 106 MMOL/L (ref 94–109)
CO2 SERPL-SCNC: 27 MMOL/L (ref 20–32)
CREAT SERPL-MCNC: 0.96 MG/DL (ref 0.52–1.04)
DIFFERENTIAL METHOD BLD: ABNORMAL
EOSINOPHIL # BLD AUTO: 0 10E9/L (ref 0–0.7)
EOSINOPHIL NFR BLD AUTO: 0 %
ERYTHROCYTE [DISTWIDTH] IN BLOOD BY AUTOMATED COUNT: 13.7 % (ref 10–15)
GFR SERPL CREATININE-BSD FRML MDRD: 69 ML/MIN/{1.73_M2}
GLUCOSE SERPL-MCNC: 130 MG/DL (ref 70–99)
HCT VFR BLD AUTO: 39 % (ref 35–47)
HGB BLD-MCNC: 13.3 G/DL (ref 11.7–15.7)
IMM GRANULOCYTES # BLD: 0 10E9/L (ref 0–0.4)
IMM GRANULOCYTES NFR BLD: 0.6 %
LYMPHOCYTES # BLD AUTO: 0.6 10E9/L (ref 0.8–5.3)
LYMPHOCYTES NFR BLD AUTO: 18.1 %
MCH RBC QN AUTO: 32.4 PG (ref 26.5–33)
MCHC RBC AUTO-ENTMCNC: 34.1 G/DL (ref 31.5–36.5)
MCV RBC AUTO: 95 FL (ref 78–100)
MONOCYTES # BLD AUTO: 0.1 10E9/L (ref 0–1.3)
MONOCYTES NFR BLD AUTO: 3.8 %
NEUTROPHILS # BLD AUTO: 2.6 10E9/L (ref 1.6–8.3)
NEUTROPHILS NFR BLD AUTO: 76.9 %
PLATELET # BLD AUTO: 158 10E9/L (ref 150–450)
POTASSIUM SERPL-SCNC: 4.7 MMOL/L (ref 3.4–5.3)
PROT SERPL-MCNC: 8.1 G/DL (ref 6.8–8.8)
RBC # BLD AUTO: 4.1 10E12/L (ref 3.8–5.2)
SODIUM SERPL-SCNC: 138 MMOL/L (ref 133–144)
WBC # BLD AUTO: 3.4 10E9/L (ref 4–11)

## 2021-02-16 PROCEDURE — 80053 COMPREHEN METABOLIC PANEL: CPT | Performed by: INTERNAL MEDICINE

## 2021-02-16 PROCEDURE — 83036 HEMOGLOBIN GLYCOSYLATED A1C: CPT | Performed by: INTERNAL MEDICINE

## 2021-02-16 PROCEDURE — 36415 COLL VENOUS BLD VENIPUNCTURE: CPT | Performed by: INTERNAL MEDICINE

## 2021-02-16 PROCEDURE — 70491 CT SOFT TISSUE NECK W/DYE: CPT | Performed by: RADIOLOGY

## 2021-02-16 PROCEDURE — 74177 CT ABD & PELVIS W/CONTRAST: CPT | Mod: TC | Performed by: RADIOLOGY

## 2021-02-16 PROCEDURE — 85025 COMPLETE CBC W/AUTO DIFF WBC: CPT | Performed by: INTERNAL MEDICINE

## 2021-02-16 PROCEDURE — 77066 DX MAMMO INCL CAD BI: CPT | Performed by: STUDENT IN AN ORGANIZED HEALTH CARE EDUCATION/TRAINING PROGRAM

## 2021-02-16 PROCEDURE — 71260 CT THORAX DX C+: CPT | Mod: TC | Performed by: RADIOLOGY

## 2021-02-16 PROCEDURE — G0279 TOMOSYNTHESIS, MAMMO: HCPCS | Performed by: STUDENT IN AN ORGANIZED HEALTH CARE EDUCATION/TRAINING PROGRAM

## 2021-02-16 PROCEDURE — 76642 ULTRASOUND BREAST LIMITED: CPT | Mod: LT | Performed by: STUDENT IN AN ORGANIZED HEALTH CARE EDUCATION/TRAINING PROGRAM

## 2021-02-16 RX ORDER — IOPAMIDOL 755 MG/ML
135 INJECTION, SOLUTION INTRAVASCULAR ONCE
Status: COMPLETED | OUTPATIENT
Start: 2021-02-16 | End: 2021-02-16

## 2021-02-16 RX ADMIN — IOPAMIDOL 135 ML: 755 INJECTION, SOLUTION INTRAVASCULAR at 12:00

## 2021-02-18 ENCOUNTER — VIRTUAL VISIT (OUTPATIENT)
Dept: ONCOLOGY | Facility: CLINIC | Age: 51
End: 2021-02-18
Attending: INTERNAL MEDICINE
Payer: COMMERCIAL

## 2021-02-18 DIAGNOSIS — Z17.0 MALIGNANT NEOPLASM OF UPPER-OUTER QUADRANT OF LEFT BREAST IN FEMALE, ESTROGEN RECEPTOR POSITIVE (H): Primary | ICD-10-CM

## 2021-02-18 DIAGNOSIS — C50.412 MALIGNANT NEOPLASM OF UPPER-OUTER QUADRANT OF LEFT BREAST IN FEMALE, ESTROGEN RECEPTOR POSITIVE (H): Primary | ICD-10-CM

## 2021-02-18 DIAGNOSIS — E66.01 CLASS 2 SEVERE OBESITY WITH SERIOUS COMORBIDITY AND BODY MASS INDEX (BMI) OF 39.0 TO 39.9 IN ADULT, UNSPECIFIED OBESITY TYPE (H): ICD-10-CM

## 2021-02-18 DIAGNOSIS — E11.9 TYPE 2 DIABETES MELLITUS WITHOUT COMPLICATION, WITHOUT LONG-TERM CURRENT USE OF INSULIN (H): ICD-10-CM

## 2021-02-18 DIAGNOSIS — C50.412 MALIGNANT NEOPLASM OF UPPER-OUTER QUADRANT OF LEFT BREAST IN FEMALE, ESTROGEN RECEPTOR POSITIVE (H): ICD-10-CM

## 2021-02-18 DIAGNOSIS — E66.812 CLASS 2 SEVERE OBESITY WITH SERIOUS COMORBIDITY AND BODY MASS INDEX (BMI) OF 39.0 TO 39.9 IN ADULT, UNSPECIFIED OBESITY TYPE (H): ICD-10-CM

## 2021-02-18 DIAGNOSIS — Z17.0 MALIGNANT NEOPLASM OF UPPER-OUTER QUADRANT OF LEFT BREAST IN FEMALE, ESTROGEN RECEPTOR POSITIVE (H): ICD-10-CM

## 2021-02-18 LAB — HBA1C MFR BLD: 5.6 % (ref 0–5.6)

## 2021-02-18 PROCEDURE — 999N001193 HC VIDEO/TELEPHONE VISIT; NO CHARGE

## 2021-02-18 PROCEDURE — 99214 OFFICE O/P EST MOD 30 MIN: CPT | Mod: TEL | Performed by: INTERNAL MEDICINE

## 2021-02-18 RX ORDER — LETROZOLE 2.5 MG/1
2.5 TABLET, FILM COATED ORAL DAILY
Qty: 28 TABLET | Refills: 0 | Status: CANCELLED | OUTPATIENT
Start: 2021-02-18 | End: 2021-03-18

## 2021-02-18 RX ORDER — PALBOCICLIB 100 MG/1
TABLET, FILM COATED ORAL
Qty: 21 TABLET | Refills: 0 | OUTPATIENT
Start: 2021-02-18

## 2021-02-18 RX ORDER — LETROZOLE 2.5 MG/1
2.5 TABLET, FILM COATED ORAL DAILY
Qty: 28 TABLET | Refills: 0 | Status: SHIPPED | OUTPATIENT
Start: 2021-02-18 | End: 2021-03-16

## 2021-02-18 RX ORDER — LETROZOLE 2.5 MG/1
2.5 TABLET, FILM COATED ORAL DAILY
Qty: 28 TABLET | Refills: 0 | OUTPATIENT
Start: 2021-02-18 | End: 2021-03-18

## 2021-02-18 NOTE — PROGRESS NOTES
Jade is a 50 year old who is being evaluated via a billable telephone visit.      What phone number would you like to be contacted at? [phone]  How would you like to obtain your AVS? Smitarjamauri  Phone call duration: 23 minutes    REFLLS OF IBRANCE, AND LETROZOLE NEEDED    KARENA Rg       HISTORY OF PRESENT ILLNESS:  Jade is a 50-year-old woman with metastatic breast cancer who comes to our clinic for recommendations for further treatment.  She is referred by Dr. Norberto Brand at [facility].  Jade would like to continue her care at the [facility].       Jade was diagnosed with breast cancer with a lump found in the upper-inner quadrant of the left breast.  She was diagnosed in 12/2013 in the [place] area and Dr. David Redd was her medical oncologist.  Biopsy of the tumor showed it to be ER positive, MA positive, and HER-2 negative.  We do not have any of the original pathology and we need to obtain those reports.  She underwent a lumpectomy performed by Dr. Tasha Segura who is a surgeon in the [place] area.  She also had a sentinel lymph node which was noted to be involved with tumor but there was no lymph node dissection done at that time.  [id number].  She subsequently underwent adjuvant chemotherapy with dose-dense AC for 4 cycles and Taxol for 12 weeks, completed 09/11/2014.        She then had radiation therapy post lumpectomy to the left breast, which was completed 11/23/2014 by Dr. Manley.        She was then begun on an aromatase inhibitor, the name of which she does not remember.  The aromatase inhibitor therapy was begun in 11/2014.  She was then switched within about a month or so to anastrozole and remained on anastrozole from 11/2014-02/2017.  She has never received tamoxifen.        She then moved to [place], [place] in 03/2017.  She saw a gynecologist there and underwent a IRIS/BSO by Dr. Zendejas.  She then also saw Dr. Linares in [place], [place], who is an oncologist.  His  interpretation of the pathology report was that she had triple-negative breast cancer.  It is possible that she may have had low estrogen receptor positivity, but the anastrozole was then discontinued in 02/2017.  She then underwent the IRIS/BSO in 03/2017.  This was done laparoscopically.        She then remained off of all hormonal therapy and in 09/2017 moved to [place].  She remained off hormonal therapy and did not have Medical Oncology followup initially.        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  She then went to see Dr. Norberto Brand who performed a PET/CT scan and the patient also underwent a brain MRI for staging.  The PET/CT scan performed 08/03/2018 showed in the neck there were several mildly metabolic active and large prominent left posterior cervical lymph nodes.  The largest is located posterior to the left sternocleidomastoid muscle and measures 1.5 x 1 cm in size.  This demonstrate modest FDG uptake.  The other lymph nodes are smaller and demonstrate mild FDG uptake.  The prominent prevascular and aortopulmonary lymph nodes were also seen on the contrast-enhanced scan were less well seen on the PET.  The largest lymph node visualized on the PET scan was 1.1 x 0.8 cm and demonstrated mild FDG uptake.  This was in the periaortic area just anterior to the aortic arch.  No lung nodules.  No lung infiltrates.  No pleural effusion.  There was no uptake of FDG in the bones.  In summary, there were mildly enlarged left posterior cervical lymph nodes, largest measuring 1.5 x 1.0 cm and mildly enlarged aorticopulmonary and prevascular lymph nodes that were noted on contrast-enhanced CT scan seen less well on the PET scan.  No evidence of metastatic disease in the abdomen and pelvis.  She also underwent a brain MRI that showed no evidence of intracranial metastatic disease.  She underwent a biopsy of one of the lymph nodes in the left neck which showed  metastatic adenocarcinoma consistent with breast origin, which was estrogen-receptor positive.  The tumor cells were positive for CK7, ER and NV consistent with metastatic breast carcinoma.  Estrogen receptor showed strong average nuclear intensity in 95% of carcinoma cells, progesterone receptor moderate average nuclear intensity in 70% of the carcinoma.  A GATA3 stain was apparently not performed.       TREATMENT HISTORY:  A. Tamoxifen  B. Letrozole and palbociclib started 9-18-18.     INTERVAL HISTORY:  Jade has been doing quite well.  We discussed that the CT scan showed insignificant increase of the short axis of 3 lymph nodes in the left neck, all of which were smaller than 1 cm in short axis.  These lymph nodes are asymptomatic in contrast to her initial presentation with bulky lymphadenopathy in the left neck back in 2018.  She feels comfortable continuing with the current treatment plan with palbociclib 100 g daily 21 days on and 7 days off in combination with letrozole.  She has been on this regimen for 28 months with good symptom control.  I am also comfortable with this plan.    She does have minor joint stiffness related to the letrozole.  Gets better with movement.  She has occasional low back pain related to the known degenerative joint disease.  She wants to know whether she can have bariatric surgery because of weight control problems.  She does have some depression and anxiety which has responded well to BuSpar twice daily.  She takes lorazepam very occasionally for situational anxiety.  She does have occasional discomfort in the left breast which can be as bad as 8 out of 10 lasting for few minutes and is usually related to positional change.  For example she can be out walking and then come back to her apartment and then have the discomfort for a few minutes.  She did have breast imaging which showed scar tissue at the lumpectomy site in the left breast and no mass was seen on the  ultrasound.    The Solu-Medrol premedication before contrast is working well.  No problems related to IV contrast with premedication.     REVIEW OF SYSTEMS:   She denies fevers or chills, cough, chest pain, shortness of breath, nausea, vomiting, constipation, diarrhea, bone pain, headache.  A 10-point review of systems was performed and was negative with the exception of pertinent positives noted above.     OBJECTIVE DATA:  This was a phone visit and a physical exam was not performed today.  Mood was normal.    Imaging studies:     CT CAP 2-17-21.                                                                    IMPRESSION:  1.  Nothing for tumor recurrence or metastatic disease in the chest, abdomen, or pelvis.     2.  No significant change since 10/20/2020.    CT neck 2-17-21  Findings:      Visualized brain parenchyma is normal. Small fluid levels in bilateral  sphenoid locules, the remainder of the paranasal sinuses are clear.  Mastoid air cells are clear. Pharyngeal mucosal spaces are normal.  Submandibular glands, parotid glands are normal. Thyroid gland is not  visualized.     Regarding the lymph nodes, a left level Ib and a right level 2A  subcentimeter lymph nodes with fatty odette are unchanged. There are  bilateral level 2A lymph nodes the larger on the left measuring 1.2 x  0.7 cm, unchanged. These are not abnormal as per size criteria or  their CT appearances. However there are several closely adjacent left  level 2B lymphadenopathy. The larger one measuring 1.4 cm, the other  one measuring 1.1 cm in longer axial dimensions (series 3, image 44  and 46). These respectively previously measured 1.1 and 0.9 cm. There  is also a left level 5 lymph node that measures 0.7 cm, enlarged since  prior. There is a 9 mm left level 4 lymph node, previously on  10/22/2028 measures 8mm. These lymph nodes' enhancement pattern and  interval growth is slightly suspicious.   Lung apices are clear. No osseous lesions.                                                                       IMPRESSION: Slight interval enlargement of left level IIb lymph nodes.  Given their interval increase in size and their enhancement pattern  are worrisome for recurrent metastatic nodes.     RODERICK ROWE MD    I reviewed the CT scan of the neck and measured the short axis of lymph nodes.  10/20/2020 series 3 showed short axis of 6 mm, 7.3 mm, 6.3 mm.  The lymph nodes visualized series 3 2/16/2021 image 51 7.3 mm image 46 6.5 mm image 44 8.2 mm.  In summary there was no significant change of the short axis of these lymph nodes and they all remained under 10 mm in short axis.     Labs:   CBC was within normal limits.  CMP was within normal limits.  Hemoglobin A1c 5.6.     ASSESSMENT AND PLAN:  1. Recurrent, metastatic ER-positive, HER2 negative breast cancer: History of left breast cancer s/p lumpectomy and SNLB in 2014. S/p 4 cycles ddAC and 12 weeks of Taxol. On AI from 11/2014-2/2017. Recurrence on PET/CT scan performed 08/03/2018 and then biopsy to left posterior cervical lymph nodes, aorticopulmonary, and prevascular lymph nodes were positive. No evidence of metastatic disease in the abdomen and pelvis or brain. Started Ibrance and letrozole on 9/21/18. Tolerating well aside from some hot flashes and mild joint stiffness.   2. Restaging this week showed stable disease in the neck and no evidence of metastases in the chest abdomen and pelvis.  Lymphadenopathy in her neck has clinically resolved and is stable on imaging comparing February 2021 with October 2020.  Nonetheless I would move up the interval of CT follow-up to a 3-month interval rather than 4 months.  She continues to tolerate palbociclib  well and has had stable disease, so we will not make additional changes. Refills given of palbociclib 100 mg 21 days on and 7 off and letrozole.  She has been on palbociclib and letrozole for 28 months with relatively stable disease.   2. Overall doing  well.  No signs of liver injury.  RUQ tenderness resolved  3.  Imaging and interval.  She would like to perform imaging on a 3 month basis.  I think this is reasonable. Allergy to contrast and she needs premedication and needs CT CAP and CT neck.   4.  Diet and exercise. We also discussed eating less sugar and getting 150 minutes of exercise per week.  She will try to incorporate these recommendations into her lifestyle.  PCP . Dr. Wesly Pate at [facility].  5.  Depression and anxiety. She saw Dr. Pate her new PCP and her regimen was changed to add buspirone 7.5 mg twice daily to sertraline 200 mg once per added. Lorazepam prn anxiety was added.  She has improvement of her symptoms of depression and anxiety.   6.  Hypothyroid. Levothyroxine 0.175 mg per day.     7. Asthma: Seen by Dr. Hay. Per chart on fluticasone-salmeterol inhaler BID. She does not recall the name of this. Also has albuterol prn.  8. She has had a flu shot.  I recommended a COVID vaccine when available.    9.  Follow up with SONIA by phone visit 3-18 and 4-15, CBC, CMP, TSH. Follow up with me by phone visit 5-13 with CBC, CMP and CT CAP and neck with premedication at [place] on 5-13.  Bariatric surgery consult.      Thank you for allowing us to participate in this patient's care.      Sincerely,      Wilfredo Bhatt MD  Professor  [facility]  [phone].        10:21-10:44  I spent  22 minutes with the patient more than 50% of which was in counseling and coordination of care.

## 2021-02-18 NOTE — LETTER
2/18/2021         RE: Jade Collins  [address]  [place] [address]        Dear Colleague,    Thank you for referring your patient, Jade Collins, to the [facility]. Please see a copy of my visit note below.    Jade is a 50 year old who is being evaluated via a billable telephone visit.      What phone number would you like to be contacted at? [phone]  How would you like to obtain your AVS? MyChart  Phone call duration: 23 minutes    REFLLS OF IBRANCE, AND LETROZOLE NEEDED    KARENA Rg       HISTORY OF PRESENT ILLNESS:  Jade is a 50-year-old woman with metastatic breast cancer who comes to our clinic for recommendations for further treatment.  She is referred by Dr. Norberto Brand at [facility].  Jade would like to continue her care at the [facility].       Jade was diagnosed with breast cancer with a lump found in the upper-inner quadrant of the left breast.  She was diagnosed in 12/2013 in the [place] area and Dr. David Redd was her medical oncologist.  Biopsy of the tumor showed it to be ER positive, NV positive, and HER-2 negative.  We do not have any of the original pathology and we need to obtain those reports.  She underwent a lumpectomy performed by Dr. Tasha Segura who is a surgeon in the [place] area.  She also had a sentinel lymph node which was noted to be involved with tumor but there was no lymph node dissection done at that time.  [id number].  She subsequently underwent adjuvant chemotherapy with dose-dense AC for 4 cycles and Taxol for 12 weeks, completed 09/11/2014.        She then had radiation therapy post lumpectomy to the left breast, which was completed 11/23/2014 by Dr. Manley.        She was then begun on an aromatase inhibitor, the name of which she does not remember.  The aromatase inhibitor therapy was begun in 11/2014.  She was then switched within about a month or so to anastrozole and remained on anastrozole from  11/2014-02/2017.  She has never received tamoxifen.        She then moved to [place], [place] in 03/2017.  She saw a gynecologist there and underwent a IRIS/BSO by Dr. Zendejas.  She then also saw Dr. Linares in [place], [place], who is an oncologist.  His interpretation of the pathology report was that she had triple-negative breast cancer.  It is possible that she may have had low estrogen receptor positivity, but the anastrozole was then discontinued in 02/2017.  She then underwent the IRIS/BSO in 03/2017.  This was done laparoscopically.        She then remained off of all hormonal therapy and in 09/2017 moved to [place].  She remained off hormonal therapy and did not have Medical Oncology followup initially.        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  She then went to see Dr. Norberto Brand who performed a PET/CT scan and the patient also underwent a brain MRI for staging.  The PET/CT scan performed 08/03/2018 showed in the neck there were several mildly metabolic active and large prominent left posterior cervical lymph nodes.  The largest is located posterior to the left sternocleidomastoid muscle and measures 1.5 x 1 cm in size.  This demonstrate modest FDG uptake.  The other lymph nodes are smaller and demonstrate mild FDG uptake.  The prominent prevascular and aortopulmonary lymph nodes were also seen on the contrast-enhanced scan were less well seen on the PET.  The largest lymph node visualized on the PET scan was 1.1 x 0.8 cm and demonstrated mild FDG uptake.  This was in the periaortic area just anterior to the aortic arch.  No lung nodules.  No lung infiltrates.  No pleural effusion.  There was no uptake of FDG in the bones.  In summary, there were mildly enlarged left posterior cervical lymph nodes, largest measuring 1.5 x 1.0 cm and mildly enlarged aorticopulmonary and prevascular lymph nodes that were noted on contrast-enhanced CT scan seen less well on the PET  scan.  No evidence of metastatic disease in the abdomen and pelvis.  She also underwent a brain MRI that showed no evidence of intracranial metastatic disease.  She underwent a biopsy of one of the lymph nodes in the left neck which showed metastatic adenocarcinoma consistent with breast origin, which was estrogen-receptor positive.  The tumor cells were positive for CK7, ER and NC consistent with metastatic breast carcinoma.  Estrogen receptor showed strong average nuclear intensity in 95% of carcinoma cells, progesterone receptor moderate average nuclear intensity in 70% of the carcinoma.  A GATA3 stain was apparently not performed.       TREATMENT HISTORY:  A. Tamoxifen  B. Letrozole and palbociclib started 9-18-18.     INTERVAL HISTORY:  Jade has been doing quite well.  We discussed that the CT scan showed insignificant increase of the short axis of 3 lymph nodes in the left neck, all of which were smaller than 1 cm in short axis.  These lymph nodes are asymptomatic in contrast to her initial presentation with bulky lymphadenopathy in the left neck back in 2018.  She feels comfortable continuing with the current treatment plan with palbociclib 100 g daily 21 days on and 7 days off in combination with letrozole.  She has been on this regimen for 28 months with good symptom control.  I am also comfortable with this plan.    She does have minor joint stiffness related to the letrozole.  Gets better with movement.  She has occasional low back pain related to the known degenerative joint disease.  She wants to know whether she can have bariatric surgery because of weight control problems.  She does have some depression and anxiety which has responded well to BuSpar twice daily.  She takes lorazepam very occasionally for situational anxiety.  She does have occasional discomfort in the left breast which can be as bad as 8 out of 10 lasting for few minutes and is usually related to positional change.  For example she  can be out walking and then come back to her apartment and then have the discomfort for a few minutes.  She did have breast imaging which showed scar tissue at the lumpectomy site in the left breast and no mass was seen on the ultrasound.    The Solu-Medrol premedication before contrast is working well.  No problems related to IV contrast with premedication.     REVIEW OF SYSTEMS:   She denies fevers or chills, cough, chest pain, shortness of breath, nausea, vomiting, constipation, diarrhea, bone pain, headache.  A 10-point review of systems was performed and was negative with the exception of pertinent positives noted above.     OBJECTIVE DATA:  This was a phone visit and a physical exam was not performed today.  Mood was normal.    Imaging studies:     CT CAP 2-17-21.                                                                    IMPRESSION:  1.  Nothing for tumor recurrence or metastatic disease in the chest, abdomen, or pelvis.     2.  No significant change since 10/20/2020.    CT neck 2-17-21  Findings:      Visualized brain parenchyma is normal. Small fluid levels in bilateral  sphenoid locules, the remainder of the paranasal sinuses are clear.  Mastoid air cells are clear. Pharyngeal mucosal spaces are normal.  Submandibular glands, parotid glands are normal. Thyroid gland is not  visualized.     Regarding the lymph nodes, a left level Ib and a right level 2A  subcentimeter lymph nodes with fatty odette are unchanged. There are  bilateral level 2A lymph nodes the larger on the left measuring 1.2 x  0.7 cm, unchanged. These are not abnormal as per size criteria or  their CT appearances. However there are several closely adjacent left  level 2B lymphadenopathy. The larger one measuring 1.4 cm, the other  one measuring 1.1 cm in longer axial dimensions (series 3, image 44  and 46). These respectively previously measured 1.1 and 0.9 cm. There  is also a left level 5 lymph node that measures 0.7 cm, enlarged  since  prior. There is a 9 mm left level 4 lymph node, previously on  10/22/2028 measures 8mm. These lymph nodes' enhancement pattern and  interval growth is slightly suspicious.   Lung apices are clear. No osseous lesions.                                                                      IMPRESSION: Slight interval enlargement of left level IIb lymph nodes.  Given their interval increase in size and their enhancement pattern  are worrisome for recurrent metastatic nodes.     RODERICK ROWE MD    I reviewed the CT scan of the neck and measured the short axis of lymph nodes.  10/20/2020 series 3 showed short axis of 6 mm, 7.3 mm, 6.3 mm.  The lymph nodes visualized series 3 2/16/2021 image 51 7.3 mm image 46 6.5 mm image 44 8.2 mm.  In summary there was no significant change of the short axis of these lymph nodes and they all remained under 10 mm in short axis.     Labs:   CBC was within normal limits.  CMP was within normal limits.  Hemoglobin A1c 5.6.     ASSESSMENT AND PLAN:  1. Recurrent, metastatic ER-positive, HER2 negative breast cancer: History of left breast cancer s/p lumpectomy and SNLB in 2014. S/p 4 cycles ddAC and 12 weeks of Taxol. On AI from 11/2014-2/2017. Recurrence on PET/CT scan performed 08/03/2018 and then biopsy to left posterior cervical lymph nodes, aorticopulmonary, and prevascular lymph nodes were positive. No evidence of metastatic disease in the abdomen and pelvis or brain. Started Ibrance and letrozole on 9/21/18. Tolerating well aside from some hot flashes and mild joint stiffness.   2. Restaging this week showed stable disease in the neck and no evidence of metastases in the chest abdomen and pelvis.  Lymphadenopathy in her neck has clinically resolved and is stable on imaging comparing February 2021 with October 2020.  Nonetheless I would move up the interval of CT follow-up to a 3-month interval rather than 4 months.  She continues to tolerate palbociclib  well and has had stable  disease, so we will not make additional changes. Refills given of palbociclib 100 mg 21 days on and 7 off and letrozole.  She has been on palbociclib and letrozole for 28 months with relatively stable disease.   2. Overall doing well.  No signs of liver injury.  RUQ tenderness resolved  3.  Imaging and interval.  She would like to perform imaging on a 3 month basis.  I think this is reasonable. Allergy to contrast and she needs premedication and needs CT CAP and CT neck.   4.  Diet and exercise. We also discussed eating less sugar and getting 150 minutes of exercise per week.  She will try to incorporate these recommendations into her lifestyle.  PCP Dr. Dr. Wesly Pate at [facility].  5.  Depression and anxiety. She saw Dr. Pate her new PCP and her regimen was changed to add buspirone 7.5 mg twice daily to sertraline 200 mg once per added. Lorazepam prn anxiety was added.  She has improvement of her symptoms of depression and anxiety.   6.  Hypothyroid. Levothyroxine 0.175 mg per day.     7. Asthma: Seen by Dr. Hay. Per chart on fluticasone-salmeterol inhaler BID. She does not recall the name of this. Also has albuterol prn.  8. She has had a flu shot.  I recommended a COVID vaccine when available.    9.  Follow up with SONIA by phone visit 3-18 and 4-15, CBC, CMP, TSH. Follow up with me by phone visit 5-13 with CBC, CMP and CT CAP and neck with premedication at [place] on 5-13.  Bariatric surgery consult.      Thank you for allowing us to participate in this patient's care.      Sincerely,      Wilfredo Bhatt MD  Professor  [facility]  [phone].        10:21-10:44  I spent  22 minutes with the patient more than 50% of which was in counseling and coordination of care.      Again, thank you for allowing me to participate in the care of your patient.        Sincerely,        Wilfredo Bhatt MD

## 2021-02-26 ENCOUNTER — TELEPHONE (OUTPATIENT)
Dept: SURGERY | Facility: CLINIC | Age: 51
End: 2021-02-26

## 2021-02-26 NOTE — TELEPHONE ENCOUNTER
Reason for call:  Other   Patient called regarding (reason for call): Patient calling after talking to her insurance to inform us that prior authorization is needed. [facility] [phone]  Additional comments: none    Phone number to reach patient:  Home number on file [phone] (home)    Best Time:  anytime    Can we leave a detailed message on this number?  YES    Travel screening: Not Applicable

## 2021-03-07 DIAGNOSIS — Z17.0 MALIGNANT NEOPLASM OF UPPER-OUTER QUADRANT OF LEFT BREAST IN FEMALE, ESTROGEN RECEPTOR POSITIVE (H): Primary | ICD-10-CM

## 2021-03-07 DIAGNOSIS — C50.412 MALIGNANT NEOPLASM OF UPPER-OUTER QUADRANT OF LEFT BREAST IN FEMALE, ESTROGEN RECEPTOR POSITIVE (H): Primary | ICD-10-CM

## 2021-03-16 DIAGNOSIS — Z17.0 MALIGNANT NEOPLASM OF UPPER-OUTER QUADRANT OF LEFT BREAST IN FEMALE, ESTROGEN RECEPTOR POSITIVE (H): ICD-10-CM

## 2021-03-16 DIAGNOSIS — C50.412 MALIGNANT NEOPLASM OF UPPER-OUTER QUADRANT OF LEFT BREAST IN FEMALE, ESTROGEN RECEPTOR POSITIVE (H): ICD-10-CM

## 2021-03-16 DIAGNOSIS — Z17.0 MALIGNANT NEOPLASM OF UPPER-OUTER QUADRANT OF LEFT BREAST IN FEMALE, ESTROGEN RECEPTOR POSITIVE (H): Primary | ICD-10-CM

## 2021-03-16 DIAGNOSIS — C50.412 MALIGNANT NEOPLASM OF UPPER-OUTER QUADRANT OF LEFT BREAST IN FEMALE, ESTROGEN RECEPTOR POSITIVE (H): Primary | ICD-10-CM

## 2021-03-16 LAB
ALBUMIN SERPL-MCNC: 4.3 G/DL (ref 3.4–5)
ALP SERPL-CCNC: 78 U/L (ref 40–150)
ALT SERPL W P-5'-P-CCNC: 49 U/L (ref 0–50)
ANION GAP SERPL CALCULATED.3IONS-SCNC: 7 MMOL/L (ref 3–14)
AST SERPL W P-5'-P-CCNC: 35 U/L (ref 0–45)
BASOPHILS # BLD AUTO: 0.1 10E9/L (ref 0–0.2)
BASOPHILS NFR BLD AUTO: 1.7 %
BILIRUB SERPL-MCNC: 0.6 MG/DL (ref 0.2–1.3)
BUN SERPL-MCNC: 16 MG/DL (ref 7–30)
CALCIUM SERPL-MCNC: 10.7 MG/DL (ref 8.5–10.1)
CANCER AG27-29 SERPL-ACNC: 16 U/ML (ref 0–39)
CEA SERPL-MCNC: 0.5 UG/L (ref 0–2.5)
CHLORIDE SERPL-SCNC: 103 MMOL/L (ref 94–109)
CO2 SERPL-SCNC: 28 MMOL/L (ref 20–32)
CREAT SERPL-MCNC: 1.21 MG/DL (ref 0.52–1.04)
DIFFERENTIAL METHOD BLD: ABNORMAL
EOSINOPHIL # BLD AUTO: 0.1 10E9/L (ref 0–0.7)
EOSINOPHIL NFR BLD AUTO: 4.1 %
ERYTHROCYTE [DISTWIDTH] IN BLOOD BY AUTOMATED COUNT: 13.7 % (ref 10–15)
GFR SERPL CREATININE-BSD FRML MDRD: 52 ML/MIN/{1.73_M2}
GLUCOSE SERPL-MCNC: 149 MG/DL (ref 70–99)
HCT VFR BLD AUTO: 40.3 % (ref 35–47)
HGB BLD-MCNC: 14 G/DL (ref 11.7–15.7)
IMM GRANULOCYTES # BLD: 0 10E9/L (ref 0–0.4)
IMM GRANULOCYTES NFR BLD: 0.3 %
LYMPHOCYTES # BLD AUTO: 1.3 10E9/L (ref 0.8–5.3)
LYMPHOCYTES NFR BLD AUTO: 43 %
MCH RBC QN AUTO: 33.1 PG (ref 26.5–33)
MCHC RBC AUTO-ENTMCNC: 34.7 G/DL (ref 31.5–36.5)
MCV RBC AUTO: 95 FL (ref 78–100)
MONOCYTES # BLD AUTO: 0.2 10E9/L (ref 0–1.3)
MONOCYTES NFR BLD AUTO: 7.2 %
NEUTROPHILS # BLD AUTO: 1.3 10E9/L (ref 1.6–8.3)
NEUTROPHILS NFR BLD AUTO: 43.7 %
PLATELET # BLD AUTO: 156 10E9/L (ref 150–450)
POTASSIUM SERPL-SCNC: 4.3 MMOL/L (ref 3.4–5.3)
PROT SERPL-MCNC: 8.3 G/DL (ref 6.8–8.8)
RBC # BLD AUTO: 4.23 10E12/L (ref 3.8–5.2)
SODIUM SERPL-SCNC: 138 MMOL/L (ref 133–144)
WBC # BLD AUTO: 2.9 10E9/L (ref 4–11)

## 2021-03-16 PROCEDURE — 80053 COMPREHEN METABOLIC PANEL: CPT | Performed by: INTERNAL MEDICINE

## 2021-03-16 PROCEDURE — 82378 CARCINOEMBRYONIC ANTIGEN: CPT | Performed by: INTERNAL MEDICINE

## 2021-03-16 PROCEDURE — 85025 COMPLETE CBC W/AUTO DIFF WBC: CPT | Performed by: INTERNAL MEDICINE

## 2021-03-16 PROCEDURE — 36415 COLL VENOUS BLD VENIPUNCTURE: CPT | Performed by: INTERNAL MEDICINE

## 2021-03-16 PROCEDURE — 86300 IMMUNOASSAY TUMOR CA 15-3: CPT | Performed by: INTERNAL MEDICINE

## 2021-03-16 RX ORDER — LETROZOLE 2.5 MG/1
2.5 TABLET, FILM COATED ORAL DAILY
Qty: 28 TABLET | Refills: 0 | Status: SHIPPED | OUTPATIENT
Start: 2021-03-16 | End: 2021-04-09

## 2021-03-17 ENCOUNTER — PATIENT OUTREACH (OUTPATIENT)
Dept: ONCOLOGY | Facility: CLINIC | Age: 51
End: 2021-03-17

## 2021-03-17 DIAGNOSIS — C50.919 METASTATIC BREAST CANCER: Primary | ICD-10-CM

## 2021-03-17 NOTE — PROGRESS NOTES
"Spoke to patient with upcoming imaging with her allergy to contrast to remind her to  her Medrol pack to take as Dr Bhatt prescribed prior to the imaging. Patient has what she thinks is \"pink eye\" and a \"UTI.\" Anc yesterday was 1.3 asking about starting Ibrance. Gave telephone number to contact the Chemo Pharm D and will see her PMD today to verify her infections.  Answered all patient's questions and verbalized understanding. Farheen Price RN, BSN.   "

## 2021-03-18 ENCOUNTER — TELEPHONE (OUTPATIENT)
Dept: ONCOLOGY | Facility: CLINIC | Age: 51
End: 2021-03-18

## 2021-03-18 NOTE — ORAL ONC MGMT
Oral Chemotherapy Monitoring Program     Placed call to patient in follow up of Ibrance therapy. Jade will said she plans start Nitrofurantoin for UTI and Ibrance cycle as well. All of Jade's questions were answered to her stated satisfaction. She thanked me for the call and care.    Case KumarD  [facility]  [phone]  March 18, 2021

## 2021-03-18 NOTE — TELEPHONE ENCOUNTER
Oral Chemotherapy Monitoring Program  Lab Follow Up    Reviewed lab results from 3/16/21. Jade called wondering if she was ok to start her next Ibrance cycle, given that her ANC is lower this month at 1.3. She also wondered if she was okay to get her COVID vaccine on Saturday.     ORAL CHEMOTHERAPY 12/22/2020 12/23/2020 1/18/2021 2/18/2021 3/15/2021 3/16/2021 3/18/2021   Assessment Type Refill Monthly Follow up Refill Refill Refill Refill Lab Monitoring   Diagnosis Code Breast Cancer Breast Cancer Breast Cancer Breast Cancer Breast Cancer Breast Cancer Breast Cancer   Providers Dr. Nova Bhatt   [facility]   Drug Name Ibrance (Palbociclib) Ibrance (Palbociclib) Ibrance (Palbociclib) Ibrance (Palbociclib) Ibrance (Palbociclib) - -   Dose 100 mg 100 mg 100 mg 100 mg 100 mg 100 mg 100 mg   Current Schedule Daily Daily Daily Daily Daily Daily Daily   Cycle Details 3 weeks on, 1 week off 3 weeks on, 1 week off 3 weeks on, 1 week off 3 weeks on, 1 week off 3 weeks on, 1 week off 3 weeks on, 1 week off 3 weeks on, 1 week off   Start Date of Last Cycle - - - - - - -   Planned next cycle start date - 12/25/2020 - - - - 3/19/2021   Doses missed in last 2 weeks - 0 - - - - -   Adherence Assessment - Adherent - - - - -   Adverse Effects - No AE identified during assessment - - - - -   Nausea - - - - - - -   Pharmacist Intervention(nausea) - - - - - - -   Diarrhea - - - - - - -   Pharmacist Intervention(diarrhea) - - - - - - -   Oral Mucositis - - - - - - -   Pharmacist Intervention(oral mucositis) - - - - - - -   Fatigue - - - - - - -   Pharmacist Intervention(fatigue) - - - - - - -   Neutropenia - - - - - - -   Pharmacist Intervention(neutropenia) - - - - - - -   Intervention(s) - - - - - - -   Home BPs - - - - - - -   Any new drug interactions? - Yes - - No - -   Pharmacist Intervention? - Yes - - - -  -   Intervention(s) - Patient Education - - - - -   Is the dose as ordered appropriate for the patient? - Yes - - Yes - -   Is the patient currently in pain? - No - - - - -   Does the patient feel the pain is currently being managed by a provider? - - - - - - -   Has the patient been assessed within the past 6 months for depression? - - - - - - -   Has the patient missed any days of school, work, or other routine activity? - - - - - - -       Labs:  _  Result Component Current Result Ref Range   Sodium 138 (3/16/2021) 133 - 144 mmol/L     _  Result Component Current Result Ref Range   Potassium 4.3 (3/16/2021) 3.4 - 5.3 mmol/L     _  Result Component Current Result Ref Range   Calcium 10.7 (H) (3/16/2021) 8.5 - 10.1 mg/dL     No results found for Mag within last 30 days.     No results found for Phos within last 30 days.     _  Result Component Current Result Ref Range   Albumin 4.3 (3/16/2021) 3.4 - 5.0 g/dL     _  Result Component Current Result Ref Range   Urea Nitrogen 16 (3/16/2021) 7 - 30 mg/dL     _  Result Component Current Result Ref Range   Creatinine 1.21 (H) (3/16/2021) 0.52 - 1.04 mg/dL     _  Result Component Current Result Ref Range   AST 35 (3/16/2021) 0 - 45 U/L     _  Result Component Current Result Ref Range   ALT 49 (3/16/2021) 0 - 50 U/L     _  Result Component Current Result Ref Range   Bilirubin Total 0.6 (3/16/2021) 0.2 - 1.3 mg/dL     _  Result Component Current Result Ref Range   WBC 2.9 (L) (3/16/2021) 4.0 - 11.0 10e9/L     _  Result Component Current Result Ref Range   Hemoglobin 14.0 (3/16/2021) 11.7 - 15.7 g/dL     _  Result Component Current Result Ref Range   Platelet Count 156 (3/16/2021) 150 - 450 10e9/L     _  Result Component Current Result Ref Range   Absolute Neutrophil 1.3 (L) (3/16/2021) 1.6 - 8.3 10e9/L       Assessment & Plan:  No concerning abnormalities. Patient is neutropenic, but ok to continue Ibrance cycle as planned. Next cycle starts 3/19 evening.     By the time I  called Jade, she had already cancelled her upcoming COVID vaccine appointment. She wants to wait until her immune system has recovered and until she has resolved her potential pink eye and UTI infections, I agreed this was a reasonable plan.     Questions answered to patient's satisfaction.    Follow-Up:  3/22 onc appt  4/8 monthly assessment    Carmen Clark, PharmD  Hematology/Oncology Clinical Pharmacist  [facility]  [facility]  [phone]

## 2021-03-22 ENCOUNTER — PATIENT OUTREACH (OUTPATIENT)
Dept: ONCOLOGY | Facility: CLINIC | Age: 51
End: 2021-03-22

## 2021-03-22 NOTE — PROGRESS NOTES
Called patient's home/cell and left a VM with Dr Bhatt's recommendations to have lab work in a week. Message sent to scheduling to arrange a lab appointment. Left my telephone number if she has any questions. Farheen Price RN, BSN Breast Center Nurse Coordinator

## 2021-03-24 ENCOUNTER — VIRTUAL VISIT (OUTPATIENT)
Dept: ONCOLOGY | Facility: CLINIC | Age: 51
End: 2021-03-24
Attending: PHYSICIAN ASSISTANT
Payer: COMMERCIAL

## 2021-03-24 DIAGNOSIS — R63.5 WEIGHT GAIN: ICD-10-CM

## 2021-03-24 DIAGNOSIS — C50.919 METASTATIC BREAST CANCER: Primary | ICD-10-CM

## 2021-03-24 PROCEDURE — 99214 OFFICE O/P EST MOD 30 MIN: CPT | Mod: TEL | Performed by: PHYSICIAN ASSISTANT

## 2021-03-24 PROCEDURE — 999N001193 HC VIDEO/TELEPHONE VISIT; NO CHARGE

## 2021-03-24 NOTE — LETTER
Date:November 13, 2021      Provider requested that no letter be sent. Do not send.       [facility]

## 2021-03-24 NOTE — LETTER
"    3/24/2021         RE: Jade Collins  [address]  [place] [address]        Dear Colleague,    Thank you for referring your patient, Jade Collins, to the [facility]. Please see a copy of my visit note below.    Jade is a 50 year old who is being evaluated via a billable telephone visit.      What phone number would you like to be contacted at? [phone]  How would you like to obtain your AVS? [place]ad     Vitals - Patient Reported  Weight (Patient Reported): 140.6 kg (310 lb)  Height (Patient Reported): 172.7 cm (5' 7.99\")  BMI (Based on Pt Reported Ht/Wt): 47.15  Pain Score: No Pain (0)    Aruna THURSTON    Phone call duration: 13 minutes    HISTORY OF PRESENT ILLNESS:  Jade is a 50-year-old woman with metastatic breast cancer who comes to our clinic for recommendations for further treatment.  She is referred by Dr. Norberto Brand at [facility].  Jade would like to continue her care at the [facility].       Jade was diagnosed with breast cancer with a lump found in the upper-inner quadrant of the left breast.  She was diagnosed in 12/2013 in the [place] area and Dr. David Redd was her medical oncologist.  Biopsy of the tumor showed it to be ER positive, TN positive, and HER-2 negative.  We do not have any of the original pathology and we need to obtain those reports.  She underwent a lumpectomy performed by Dr. Tasha Segura who is a surgeon in the [place] area.  She also had a sentinel lymph node which was noted to be involved with tumor but there was no lymph node dissection done at that time.  [id number].  She subsequently underwent adjuvant chemotherapy with dose-dense AC for 4 cycles and Taxol for 12 weeks, completed 09/11/2014.        She then had radiation therapy post lumpectomy to the left breast, which was completed 11/23/2014 by Dr. Manley.        She was then begun on an aromatase inhibitor, the name of which she does not remember.  The " aromatase inhibitor therapy was begun in 11/2014.  She was then switched within about a month or so to anastrozole and remained on anastrozole from 11/2014-02/2017.  She has never received tamoxifen.        She then moved to [place], [place] in 03/2017.  She saw a gynecologist there and underwent a IRIS/BSO by Dr. Zendejas.  She then also saw Dr. Linares in [place], [place], who is an oncologist.  His interpretation of the pathology report was that she had triple-negative breast cancer.  It is possible that she may have had low estrogen receptor positivity, but the anastrozole was then discontinued in 02/2017.  She then underwent the IRIS/BSO in 03/2017.  This was done laparoscopically.        She then remained off of all hormonal therapy and in 09/2017 moved to [place].  She remained off hormonal therapy and did not have Medical Oncology followup initially.        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  She then went to see Dr. Norberto Brand who performed a PET/CT scan and the patient also underwent a brain MRI for staging.  The PET/CT scan performed 08/03/2018 showed in the neck there were several mildly metabolic active and large prominent left posterior cervical lymph nodes.  The largest is located posterior to the left sternocleidomastoid muscle and measures 1.5 x 1 cm in size.  This demonstrate modest FDG uptake.  The other lymph nodes are smaller and demonstrate mild FDG uptake.  The prominent prevascular and aortopulmonary lymph nodes were also seen on the contrast-enhanced scan were less well seen on the PET.  The largest lymph node visualized on the PET scan was 1.1 x 0.8 cm and demonstrated mild FDG uptake.  This was in the periaortic area just anterior to the aortic arch.  No lung nodules.  No lung infiltrates.  No pleural effusion.  There was no uptake of FDG in the bones.  In summary, there were mildly enlarged left posterior cervical lymph nodes, largest measuring 1.5  x 1.0 cm and mildly enlarged aorticopulmonary and prevascular lymph nodes that were noted on contrast-enhanced CT scan seen less well on the PET scan.  No evidence of metastatic disease in the abdomen and pelvis.  She also underwent a brain MRI that showed no evidence of intracranial metastatic disease.  She underwent a biopsy of one of the lymph nodes in the left neck which showed metastatic adenocarcinoma consistent with breast origin, which was estrogen-receptor positive.  The tumor cells were positive for CK7, ER and NH consistent with metastatic breast carcinoma.  Estrogen receptor showed strong average nuclear intensity in 95% of carcinoma cells, progesterone receptor moderate average nuclear intensity in 70% of the carcinoma.  A GATA3 stain was apparently not performed.       TREATMENT HISTORY:  A. Tamoxifen  B. Letrozole and palbociclib started 9-18-18.     INTERVAL HISTORY:    Jade Dennis was met with for follow up over telephone.   - Started Ibrance cycle on 3/19/21. Notes some bone aches soon after taking both her Ibrance and her letrozole, but intentionally takes both of these at nighttime with a sleep aid to help with tolerability.  Otherwise she denies any other new symptoms related to her medications.  -She continues to have issues with her weight.  She is working on losing weight.  -She does not have any breast complaints at this time.  Denies any discomfort denies any discomfort.  -She does have chronic dyspnea on exertion.  Feels this is related to her weight.  She denies any change in this of recent.  No cough, fevers, chills, chest pain.  No shortness of breath at rest.  She is hoping to follow-up with her pulmonologist again.  She has their contact information and plans on making an appointment.  She plans on having her Covid vaccine sometime soon.  -Since her last visit with us.  She was seen by her primary care provider and was given eyedrops and advised to utilize warm compresses for a stye she  had developed.  This is now resolved.  She is also mentioning bladder fullness so they checked a UA with a questionable UTI.  She does report though, that the urine culture did not grow much so she only completed about a 4 to 5-day course of prescribed antibiotic.  This fullness has since resolved and she does not have any other urinary symptoms at this time.  No new back pain or side pain.  -The antibiotic was prescribed after her labs are drawn on March 16.  She denies any other new medication changes.  States she has been eating and drinking well and does not feel dehydrated.  -She otherwise denies persistent headaches, dizziness, and new pains throughout the body.     A 10-point review of systems was performed and was negative with the exception of pertinent positives noted above.     OBJECTIVE DATA:     Labs:   Results for ANAHI DOBSON (MRN [id number]) as of 3/24/2021 18:11   Ref. Range 3/16/2021 12:20   Sodium Latest Ref Range: 133 - 144 mmol/L 138   Potassium Latest Ref Range: 3.4 - 5.3 mmol/L 4.3   Chloride Latest Ref Range: 94 - 109 mmol/L 103   Carbon Dioxide Latest Ref Range: 20 - 32 mmol/L 28   Urea Nitrogen Latest Ref Range: 7 - 30 mg/dL 16   Creatinine Latest Ref Range: 0.52 - 1.04 mg/dL 1.21 (H)   GFR Estimate Latest Ref Range: >60 mL/min/1.73_m2 52 (L)   GFR Estimate If Black Latest Ref Range: >60 mL/min/1.73_m2 60 (L)   Calcium Latest Ref Range: 8.5 - 10.1 mg/dL 10.7 (H)   Anion Gap Latest Ref Range: 3 - 14 mmol/L 7   Albumin Latest Ref Range: 3.4 - 5.0 g/dL 4.3   Protein Total Latest Ref Range: 6.8 - 8.8 g/dL 8.3   Bilirubin Total Latest Ref Range: 0.2 - 1.3 mg/dL 0.6   Alkaline Phosphatase Latest Ref Range: 40 - 150 U/L 78   ALT Latest Ref Range: 0 - 50 U/L 49   AST Latest Ref Range: 0 - 45 U/L 35   CA 27-29 Latest Ref Range: 0 - 39 U/mL 16   Glucose Latest Ref Range: 70 - 99 mg/dL 149 (H)   WBC Latest Ref Range: 4.0 - 11.0 10e9/L 2.9 (L)   Hemoglobin Latest Ref Range: 11.7 - 15.7 g/dL 14.0    Hematocrit Latest Ref Range: 35.0 - 47.0 % 40.3   Platelet Count Latest Ref Range: 150 - 450 10e9/L 156   RBC Count Latest Ref Range: 3.8 - 5.2 10e12/L 4.23   MCV Latest Ref Range: 78 - 100 fl 95   MCH Latest Ref Range: 26.5 - 33.0 pg 33.1 (H)   MCHC Latest Ref Range: 31.5 - 36.5 g/dL 34.7   RDW Latest Ref Range: 10.0 - 15.0 % 13.7   Diff Method Unknown Automated Method   % Neutrophils Latest Units: % 43.7   % Lymphocytes Latest Units: % 43.0   % Monocytes Latest Units: % 7.2   % Eosinophils Latest Units: % 4.1   % Basophils Latest Units: % 1.7   % Immature Granulocytes Latest Units: % 0.3   Absolute Neutrophil Latest Ref Range: 1.6 - 8.3 10e9/L 1.3 (L)   Absolute Lymphocytes Latest Ref Range: 0.8 - 5.3 10e9/L 1.3   Absolute Monocytes Latest Ref Range: 0.0 - 1.3 10e9/L 0.2   Absolute Eosinophils Latest Ref Range: 0.0 - 0.7 10e9/L 0.1   Absolute Basophils Latest Ref Range: 0.0 - 0.2 10e9/L 0.1   Abs Immature Granulocytes Latest Ref Range: 0 - 0.4 10e9/L 0.0   CEA Latest Ref Range: 0 - 2.5 ug/L 0.5        ASSESSMENT AND PLAN:  1. Recurrent, metastatic ER-positive, HER2 negative breast cancer: History of left breast cancer s/p lumpectomy and SNLB in 2014. S/p 4 cycles ddAC and 12 weeks of Taxol. On AI from 11/2014-2/2017. Recurrence on PET/CT scan performed 08/03/2018 and then biopsy to left posterior cervical lymph nodes, aorticopulmonary, and prevascular lymph nodes were positive. No evidence of metastatic disease in the abdomen and pelvis or brain. Started Ibrance and letrozole on 9/21/18. Tolerating well aside from some hot flashes and mild joint stiffness.   - Restaging 2/16/21 showed stable disease in the neck and no evidence of metastases in the chest abdomen and pelvis.  Lymphadenopathy in her neck has clinically resolved and is stable on imaging comparing February 2021 with October 2020.  Plan for scans again in May. Allergy to contrast and she needs premedication and needs CT CAP and CT neck.   2.  Diet and  exercise. Previously discussed eating less sugar and getting 150 minutes of exercise per week.  She will try to incorporate these recommendations into her lifestyle.  PCP . Dr. Wesly Pate at [facility].  3.  Depression and anxiety. She saw Dr. Pate her new PCP and her regimen was changed to add buspirone 7.5 mg twice daily to sertraline 200 mg once per added. Lorazepam prn anxiety was added.  She has improvement of her symptoms of depression and anxiety.   4.  Hypothyroid. Levothyroxine 0.175 mg per day.     5. Asthma: Seen by Dr. Hay. Per chart on fluticasone-salmeterol inhaler BID. She does not recall the name of this. Also has albuterol prn. Advised to schedule follow up appointment.   6. She has had a flu shot.  I recommended a COVID vaccine- she will get this scheduled  7. Elevated Cr on labs 3/16. Unclear etiology will repeat this week or early next week.   8. Mild hypercalcemia. Advised to hold Ca supplement until repeat labs.     Sima Medel PA-C      Again, thank you for allowing me to participate in the care of your patient.        Sincerely,        Sima Medel PA-C

## 2021-03-24 NOTE — PROGRESS NOTES
"Jade is a 50 year old who is being evaluated via a billable telephone visit.      What phone number would you like to be contacted at? [phone]  How would you like to obtain your AVS? MyChart     Vitals - Patient Reported  Weight (Patient Reported): 140.6 kg (310 lb)  Height (Patient Reported): 172.7 cm (5' 7.99\")  BMI (Based on Pt Reported Ht/Wt): 47.15  Pain Score: No Pain (0)    Aruna THURSTON    Phone call duration: 13 minutes  "

## 2021-03-24 NOTE — PROGRESS NOTES
HISTORY OF PRESENT ILLNESS:  Jade is a 50-year-old woman with metastatic breast cancer who comes to our clinic for recommendations for further treatment.  She is referred by Dr. Norberto Brand at [facility].  Jade would like to continue her care at the [facility].       Jade was diagnosed with breast cancer with a lump found in the upper-inner quadrant of the left breast.  She was diagnosed in 12/2013 in the [place] area and Dr. David Redd was her medical oncologist.  Biopsy of the tumor showed it to be ER positive, OK positive, and HER-2 negative.  We do not have any of the original pathology and we need to obtain those reports.  She underwent a lumpectomy performed by Dr. Tasha Segura who is a surgeon in the [place] area.  She also had a sentinel lymph node which was noted to be involved with tumor but there was no lymph node dissection done at that time.  [id number].  She subsequently underwent adjuvant chemotherapy with dose-dense AC for 4 cycles and Taxol for 12 weeks, completed 09/11/2014.        She then had radiation therapy post lumpectomy to the left breast, which was completed 11/23/2014 by Dr. Manley.        She was then begun on an aromatase inhibitor, the name of which she does not remember.  The aromatase inhibitor therapy was begun in 11/2014.  She was then switched within about a month or so to anastrozole and remained on anastrozole from 11/2014-02/2017.  She has never received tamoxifen.        She then moved to [place], [place] in 03/2017.  She saw a gynecologist there and underwent a IRIS/BSO by Dr. Zendejas.  She then also saw Dr. Linares in [place], [place], who is an oncologist.  His interpretation of the pathology report was that she had triple-negative breast cancer.  It is possible that she may have had low estrogen receptor positivity, but the anastrozole was then discontinued in 02/2017.  She then underwent the IRIS/BSO in 03/2017.  This was done laparoscopically.        She then  remained off of all hormonal therapy and in 09/2017 moved to [place].  She remained off hormonal therapy and did not have Medical Oncology followup initially.        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  She then went to see Dr. Norberto Brand who performed a PET/CT scan and the patient also underwent a brain MRI for staging.  The PET/CT scan performed 08/03/2018 showed in the neck there were several mildly metabolic active and large prominent left posterior cervical lymph nodes.  The largest is located posterior to the left sternocleidomastoid muscle and measures 1.5 x 1 cm in size.  This demonstrate modest FDG uptake.  The other lymph nodes are smaller and demonstrate mild FDG uptake.  The prominent prevascular and aortopulmonary lymph nodes were also seen on the contrast-enhanced scan were less well seen on the PET.  The largest lymph node visualized on the PET scan was 1.1 x 0.8 cm and demonstrated mild FDG uptake.  This was in the periaortic area just anterior to the aortic arch.  No lung nodules.  No lung infiltrates.  No pleural effusion.  There was no uptake of FDG in the bones.  In summary, there were mildly enlarged left posterior cervical lymph nodes, largest measuring 1.5 x 1.0 cm and mildly enlarged aorticopulmonary and prevascular lymph nodes that were noted on contrast-enhanced CT scan seen less well on the PET scan.  No evidence of metastatic disease in the abdomen and pelvis.  She also underwent a brain MRI that showed no evidence of intracranial metastatic disease.  She underwent a biopsy of one of the lymph nodes in the left neck which showed metastatic adenocarcinoma consistent with breast origin, which was estrogen-receptor positive.  The tumor cells were positive for CK7, ER and MN consistent with metastatic breast carcinoma.  Estrogen receptor showed strong average nuclear intensity in 95% of carcinoma cells, progesterone receptor moderate average  nuclear intensity in 70% of the carcinoma.  A GATA3 stain was apparently not performed.       TREATMENT HISTORY:  A. Tamoxifen  B. Letrozole and palbociclib started 9-18-18.     INTERVAL HISTORY:    Jade Collins was met with for follow up over telephone.   - Started Ibrance cycle on 3/19/21. Notes some bone aches soon after taking both her Ibrance and her letrozole, but intentionally takes both of these at nighttime with a sleep aid to help with tolerability.  Otherwise she denies any other new symptoms related to her medications.  -She continues to have issues with her weight.  She is working on losing weight.  -She does not have any breast complaints at this time.  Denies any discomfort denies any discomfort.  -She does have chronic dyspnea on exertion.  Feels this is related to her weight.  She denies any change in this of recent.  No cough, fevers, chills, chest pain.  No shortness of breath at rest.  She is hoping to follow-up with her pulmonologist again.  She has their contact information and plans on making an appointment.  She plans on having her Covid vaccine sometime soon.  -Since her last visit with us.  She was seen by her primary care provider and was given eyedrops and advised to utilize warm compresses for a stye she had developed.  This is now resolved.  She is also mentioning bladder fullness so they checked a UA with a questionable UTI.  She does report though, that the urine culture did not grow much so she only completed about a 4 to 5-day course of prescribed antibiotic.  This fullness has since resolved and she does not have any other urinary symptoms at this time.  No new back pain or side pain.  -The antibiotic was prescribed after her labs are drawn on March 16.  She denies any other new medication changes.  States she has been eating and drinking well and does not feel dehydrated.  -She otherwise denies persistent headaches, dizziness, and new pains throughout the body.     A 10-point review  of systems was performed and was negative with the exception of pertinent positives noted above.     OBJECTIVE DATA:     Labs:   Results for ANAHI DOBSON (MRN [id number]) as of 3/24/2021 18:11   Ref. Range 3/16/2021 12:20   Sodium Latest Ref Range: 133 - 144 mmol/L 138   Potassium Latest Ref Range: 3.4 - 5.3 mmol/L 4.3   Chloride Latest Ref Range: 94 - 109 mmol/L 103   Carbon Dioxide Latest Ref Range: 20 - 32 mmol/L 28   Urea Nitrogen Latest Ref Range: 7 - 30 mg/dL 16   Creatinine Latest Ref Range: 0.52 - 1.04 mg/dL 1.21 (H)   GFR Estimate Latest Ref Range: >60 mL/min/1.73_m2 52 (L)   GFR Estimate If Black Latest Ref Range: >60 mL/min/1.73_m2 60 (L)   Calcium Latest Ref Range: 8.5 - 10.1 mg/dL 10.7 (H)   Anion Gap Latest Ref Range: 3 - 14 mmol/L 7   Albumin Latest Ref Range: 3.4 - 5.0 g/dL 4.3   Protein Total Latest Ref Range: 6.8 - 8.8 g/dL 8.3   Bilirubin Total Latest Ref Range: 0.2 - 1.3 mg/dL 0.6   Alkaline Phosphatase Latest Ref Range: 40 - 150 U/L 78   ALT Latest Ref Range: 0 - 50 U/L 49   AST Latest Ref Range: 0 - 45 U/L 35   CA 27-29 Latest Ref Range: 0 - 39 U/mL 16   Glucose Latest Ref Range: 70 - 99 mg/dL 149 (H)   WBC Latest Ref Range: 4.0 - 11.0 10e9/L 2.9 (L)   Hemoglobin Latest Ref Range: 11.7 - 15.7 g/dL 14.0   Hematocrit Latest Ref Range: 35.0 - 47.0 % 40.3   Platelet Count Latest Ref Range: 150 - 450 10e9/L 156   RBC Count Latest Ref Range: 3.8 - 5.2 10e12/L 4.23   MCV Latest Ref Range: 78 - 100 fl 95   MCH Latest Ref Range: 26.5 - 33.0 pg 33.1 (H)   MCHC Latest Ref Range: 31.5 - 36.5 g/dL 34.7   RDW Latest Ref Range: 10.0 - 15.0 % 13.7   Diff Method Unknown Automated Method   % Neutrophils Latest Units: % 43.7   % Lymphocytes Latest Units: % 43.0   % Monocytes Latest Units: % 7.2   % Eosinophils Latest Units: % 4.1   % Basophils Latest Units: % 1.7   % Immature Granulocytes Latest Units: % 0.3   Absolute Neutrophil Latest Ref Range: 1.6 - 8.3 10e9/L 1.3 (L)   Absolute Lymphocytes Latest Ref Range:  0.8 - 5.3 10e9/L 1.3   Absolute Monocytes Latest Ref Range: 0.0 - 1.3 10e9/L 0.2   Absolute Eosinophils Latest Ref Range: 0.0 - 0.7 10e9/L 0.1   Absolute Basophils Latest Ref Range: 0.0 - 0.2 10e9/L 0.1   Abs Immature Granulocytes Latest Ref Range: 0 - 0.4 10e9/L 0.0   CEA Latest Ref Range: 0 - 2.5 ug/L 0.5        ASSESSMENT AND PLAN:  1. Recurrent, metastatic ER-positive, HER2 negative breast cancer: History of left breast cancer s/p lumpectomy and SNLB in 2014. S/p 4 cycles ddAC and 12 weeks of Taxol. On AI from 11/2014-2/2017. Recurrence on PET/CT scan performed 08/03/2018 and then biopsy to left posterior cervical lymph nodes, aorticopulmonary, and prevascular lymph nodes were positive. No evidence of metastatic disease in the abdomen and pelvis or brain. Started Ibrance and letrozole on 9/21/18. Tolerating well aside from some hot flashes and mild joint stiffness.   - Restaging 2/16/21 showed stable disease in the neck and no evidence of metastases in the chest abdomen and pelvis.  Lymphadenopathy in her neck has clinically resolved and is stable on imaging comparing February 2021 with October 2020.  Plan for scans again in May. Allergy to contrast and she needs premedication and needs CT CAP and CT neck.   2.  Diet and exercise. Previously discussed eating less sugar and getting 150 minutes of exercise per week.  She will try to incorporate these recommendations into her lifestyle.  PCP Dr. Dr. Wesly Pate at [facility].  3.  Depression and anxiety. She saw Dr. Pate her new PCP and her regimen was changed to add buspirone 7.5 mg twice daily to sertraline 200 mg once per added. Lorazepam prn anxiety was added.  She has improvement of her symptoms of depression and anxiety.   4.  Hypothyroid. Levothyroxine 0.175 mg per day.     5. Asthma: Seen by Dr. Hay. Per chart on fluticasone-salmeterol inhaler BID. She does not recall the name of this. Also has albuterol prn. Advised to schedule follow up appointment.    6. She has had a flu shot.  I recommended a COVID vaccine- she will get this scheduled  7. Elevated Cr on labs 3/16. Unclear etiology will repeat this week or early next week.   8. Mild hypercalcemia. Advised to hold Ca supplement until repeat labs.     Sima Medel PA-C

## 2021-03-30 DIAGNOSIS — Z17.0 MALIGNANT NEOPLASM OF UPPER-OUTER QUADRANT OF LEFT BREAST IN FEMALE, ESTROGEN RECEPTOR POSITIVE (H): ICD-10-CM

## 2021-03-30 DIAGNOSIS — C50.412 MALIGNANT NEOPLASM OF UPPER-OUTER QUADRANT OF LEFT BREAST IN FEMALE, ESTROGEN RECEPTOR POSITIVE (H): ICD-10-CM

## 2021-03-30 DIAGNOSIS — R63.5 WEIGHT GAIN: ICD-10-CM

## 2021-03-30 DIAGNOSIS — C50.919 METASTATIC BREAST CANCER: ICD-10-CM

## 2021-03-30 LAB
ALBUMIN SERPL-MCNC: 4.1 G/DL (ref 3.4–5)
ALP SERPL-CCNC: 74 U/L (ref 40–150)
ALT SERPL W P-5'-P-CCNC: 56 U/L (ref 0–50)
ANION GAP SERPL CALCULATED.3IONS-SCNC: 4 MMOL/L (ref 3–14)
AST SERPL W P-5'-P-CCNC: 45 U/L (ref 0–45)
BASOPHILS # BLD AUTO: 0 10E9/L (ref 0–0.2)
BASOPHILS NFR BLD AUTO: 1.5 %
BILIRUB SERPL-MCNC: 0.4 MG/DL (ref 0.2–1.3)
BUN SERPL-MCNC: 15 MG/DL (ref 7–30)
CALCIUM SERPL-MCNC: 9.6 MG/DL (ref 8.5–10.1)
CANCER AG27-29 SERPL-ACNC: 14 U/ML (ref 0–39)
CEA SERPL-MCNC: <0.5 UG/L (ref 0–2.5)
CHLORIDE SERPL-SCNC: 109 MMOL/L (ref 94–109)
CO2 SERPL-SCNC: 26 MMOL/L (ref 20–32)
CREAT SERPL-MCNC: 1.18 MG/DL (ref 0.52–1.04)
DIFFERENTIAL METHOD BLD: ABNORMAL
EOSINOPHIL # BLD AUTO: 0.1 10E9/L (ref 0–0.7)
EOSINOPHIL NFR BLD AUTO: 3.4 %
ERYTHROCYTE [DISTWIDTH] IN BLOOD BY AUTOMATED COUNT: 13.3 % (ref 10–15)
GFR SERPL CREATININE-BSD FRML MDRD: 53 ML/MIN/{1.73_M2}
GLUCOSE SERPL-MCNC: 108 MG/DL (ref 70–99)
HCT VFR BLD AUTO: 37.9 % (ref 35–47)
HGB BLD-MCNC: 13 G/DL (ref 11.7–15.7)
IMM GRANULOCYTES # BLD: 0 10E9/L (ref 0–0.4)
IMM GRANULOCYTES NFR BLD: 0.4 %
LYMPHOCYTES # BLD AUTO: 1.1 10E9/L (ref 0.8–5.3)
LYMPHOCYTES NFR BLD AUTO: 41.8 %
MCH RBC QN AUTO: 32.7 PG (ref 26.5–33)
MCHC RBC AUTO-ENTMCNC: 34.3 G/DL (ref 31.5–36.5)
MCV RBC AUTO: 95 FL (ref 78–100)
MONOCYTES # BLD AUTO: 0.2 10E9/L (ref 0–1.3)
MONOCYTES NFR BLD AUTO: 8 %
NEUTROPHILS # BLD AUTO: 1.2 10E9/L (ref 1.6–8.3)
NEUTROPHILS NFR BLD AUTO: 44.9 %
PLATELET # BLD AUTO: 198 10E9/L (ref 150–450)
POTASSIUM SERPL-SCNC: 4.2 MMOL/L (ref 3.4–5.3)
PROT SERPL-MCNC: 7.9 G/DL (ref 6.8–8.8)
RBC # BLD AUTO: 3.98 10E12/L (ref 3.8–5.2)
SODIUM SERPL-SCNC: 139 MMOL/L (ref 133–144)
T4 FREE SERPL-MCNC: 0.81 NG/DL (ref 0.76–1.46)
TSH SERPL DL<=0.005 MIU/L-ACNC: 6.11 MU/L (ref 0.4–4)
WBC # BLD AUTO: 2.6 10E9/L (ref 4–11)

## 2021-03-30 PROCEDURE — 80053 COMPREHEN METABOLIC PANEL: CPT | Performed by: PHYSICIAN ASSISTANT

## 2021-03-30 PROCEDURE — 36415 COLL VENOUS BLD VENIPUNCTURE: CPT | Performed by: PHYSICIAN ASSISTANT

## 2021-03-30 PROCEDURE — 82378 CARCINOEMBRYONIC ANTIGEN: CPT | Performed by: PHYSICIAN ASSISTANT

## 2021-03-30 PROCEDURE — 84443 ASSAY THYROID STIM HORMONE: CPT | Performed by: PHYSICIAN ASSISTANT

## 2021-03-30 PROCEDURE — 85025 COMPLETE CBC W/AUTO DIFF WBC: CPT | Performed by: PHYSICIAN ASSISTANT

## 2021-03-30 PROCEDURE — 86300 IMMUNOASSAY TUMOR CA 15-3: CPT | Performed by: PHYSICIAN ASSISTANT

## 2021-03-30 PROCEDURE — 84439 ASSAY OF FREE THYROXINE: CPT | Performed by: PHYSICIAN ASSISTANT

## 2021-04-05 DIAGNOSIS — Z17.0 MALIGNANT NEOPLASM OF UPPER-OUTER QUADRANT OF LEFT BREAST IN FEMALE, ESTROGEN RECEPTOR POSITIVE (H): Primary | ICD-10-CM

## 2021-04-05 DIAGNOSIS — C50.412 MALIGNANT NEOPLASM OF UPPER-OUTER QUADRANT OF LEFT BREAST IN FEMALE, ESTROGEN RECEPTOR POSITIVE (H): Primary | ICD-10-CM

## 2021-04-09 ENCOUNTER — TELEPHONE (OUTPATIENT)
Dept: ONCOLOGY | Facility: CLINIC | Age: 51
End: 2021-04-09

## 2021-04-09 DIAGNOSIS — C50.412 MALIGNANT NEOPLASM OF UPPER-OUTER QUADRANT OF LEFT BREAST IN FEMALE, ESTROGEN RECEPTOR POSITIVE (H): Primary | ICD-10-CM

## 2021-04-09 DIAGNOSIS — Z17.0 MALIGNANT NEOPLASM OF UPPER-OUTER QUADRANT OF LEFT BREAST IN FEMALE, ESTROGEN RECEPTOR POSITIVE (H): Primary | ICD-10-CM

## 2021-04-09 RX ORDER — LETROZOLE 2.5 MG/1
2.5 TABLET, FILM COATED ORAL DAILY
Qty: 28 TABLET | Refills: 0 | Status: SHIPPED | OUTPATIENT
Start: 2021-04-09 | End: 2021-05-12

## 2021-04-09 NOTE — TELEPHONE ENCOUNTER
Oral Chemotherapy Monitoring Program     Placed call to patient in follow up of oral chemotherapy. Her next cycle starts 4/17/21. Labs upcoming on 4/13/21. Will wait to set up refill until after lab results come in.     Carmen Clark, PharmD  Hematology/Oncology Clinical Pharmacist  [facility]  [facility]

## 2021-04-13 DIAGNOSIS — C50.412 MALIGNANT NEOPLASM OF UPPER-OUTER QUADRANT OF LEFT BREAST IN FEMALE, ESTROGEN RECEPTOR POSITIVE (H): ICD-10-CM

## 2021-04-13 DIAGNOSIS — Z17.0 MALIGNANT NEOPLASM OF UPPER-OUTER QUADRANT OF LEFT BREAST IN FEMALE, ESTROGEN RECEPTOR POSITIVE (H): ICD-10-CM

## 2021-04-13 LAB
ALBUMIN SERPL-MCNC: 4.2 G/DL (ref 3.4–5)
ALP SERPL-CCNC: 77 U/L (ref 40–150)
ALT SERPL W P-5'-P-CCNC: 50 U/L (ref 0–50)
ANION GAP SERPL CALCULATED.3IONS-SCNC: 3 MMOL/L (ref 3–14)
AST SERPL W P-5'-P-CCNC: 39 U/L (ref 0–45)
BASOPHILS # BLD AUTO: 0 10E9/L (ref 0–0.2)
BASOPHILS NFR BLD AUTO: 1.5 %
BILIRUB SERPL-MCNC: 0.6 MG/DL (ref 0.2–1.3)
BUN SERPL-MCNC: 16 MG/DL (ref 7–30)
CALCIUM SERPL-MCNC: 9.9 MG/DL (ref 8.5–10.1)
CANCER AG27-29 SERPL-ACNC: 19 U/ML (ref 0–39)
CEA SERPL-MCNC: 1 UG/L (ref 0–2.5)
CHLORIDE SERPL-SCNC: 104 MMOL/L (ref 94–109)
CO2 SERPL-SCNC: 30 MMOL/L (ref 20–32)
CREAT SERPL-MCNC: 1.09 MG/DL (ref 0.52–1.04)
DIFFERENTIAL METHOD BLD: ABNORMAL
EOSINOPHIL # BLD AUTO: 0.1 10E9/L (ref 0–0.7)
EOSINOPHIL NFR BLD AUTO: 2.6 %
ERYTHROCYTE [DISTWIDTH] IN BLOOD BY AUTOMATED COUNT: 13.2 % (ref 10–15)
GFR SERPL CREATININE-BSD FRML MDRD: 59 ML/MIN/{1.73_M2}
GLUCOSE SERPL-MCNC: 113 MG/DL (ref 70–99)
HCT VFR BLD AUTO: 39.9 % (ref 35–47)
HGB BLD-MCNC: 13.8 G/DL (ref 11.7–15.7)
IMM GRANULOCYTES # BLD: 0 10E9/L (ref 0–0.4)
IMM GRANULOCYTES NFR BLD: 0.4 %
LYMPHOCYTES # BLD AUTO: 1.1 10E9/L (ref 0.8–5.3)
LYMPHOCYTES NFR BLD AUTO: 42.9 %
MCH RBC QN AUTO: 33.1 PG (ref 26.5–33)
MCHC RBC AUTO-ENTMCNC: 34.6 G/DL (ref 31.5–36.5)
MCV RBC AUTO: 96 FL (ref 78–100)
MONOCYTES # BLD AUTO: 0.2 10E9/L (ref 0–1.3)
MONOCYTES NFR BLD AUTO: 7.9 %
NEUTROPHILS # BLD AUTO: 1.2 10E9/L (ref 1.6–8.3)
NEUTROPHILS NFR BLD AUTO: 44.7 %
PLATELET # BLD AUTO: 145 10E9/L (ref 150–450)
POTASSIUM SERPL-SCNC: 4.6 MMOL/L (ref 3.4–5.3)
PROT SERPL-MCNC: 7.9 G/DL (ref 6.8–8.8)
RBC # BLD AUTO: 4.17 10E12/L (ref 3.8–5.2)
SODIUM SERPL-SCNC: 137 MMOL/L (ref 133–144)
WBC # BLD AUTO: 2.7 10E9/L (ref 4–11)

## 2021-04-13 PROCEDURE — 80053 COMPREHEN METABOLIC PANEL: CPT | Performed by: INTERNAL MEDICINE

## 2021-04-13 PROCEDURE — 36415 COLL VENOUS BLD VENIPUNCTURE: CPT | Performed by: INTERNAL MEDICINE

## 2021-04-13 PROCEDURE — 86300 IMMUNOASSAY TUMOR CA 15-3: CPT | Performed by: INTERNAL MEDICINE

## 2021-04-13 PROCEDURE — 82378 CARCINOEMBRYONIC ANTIGEN: CPT | Performed by: INTERNAL MEDICINE

## 2021-04-13 PROCEDURE — 85025 COMPLETE CBC W/AUTO DIFF WBC: CPT | Performed by: INTERNAL MEDICINE

## 2021-04-13 NOTE — TELEPHONE ENCOUNTER
Oral Chemotherapy Monitoring Program     Placed call to patient in follow up of oral chemotherapy. Left message requesting call back. No drug names were mentioned. Will update when response received.     Carmen Clark, PharmD  Hematology/Oncology Clinical Pharmacist  [facility]  [facility]

## 2021-04-14 NOTE — PROGRESS NOTES
"Jade is a 51 year old who is being evaluated via a billable telephone visit.      What phone number would you like to be contacted at? [phone]  How would you like to obtain your AVS? MyChart     Vitals - Patient Reported  Weight (Patient Reported): 142.9 kg (315 lb)  Height (Patient Reported): 175.3 cm (5' 9\")  BMI (Based on Pt Reported Ht/Wt): 46.52  Pain Score: No Pain (0)    Ashwini Mejia CMA April 15, 2021  7:44 AM     Phone call duration: 8 minutes        HISTORY OF PRESENT ILLNESS:  Jade is a 51-year-old woman with metastatic breast cancer who comes to our clinic for recommendations for further treatment.  She is referred by Dr. Norberto Brand at [facility].  Jade would like to continue her care at the [facility].       Jade was diagnosed with breast cancer with a lump found in the upper-inner quadrant of the left breast.  She was diagnosed in 12/2013 in the [place] area and Dr. David Redd was her medical oncologist.  Biopsy of the tumor showed it to be ER positive, [place] positive, and HER-2 negative.  We do not have any of the original pathology and we need to obtain those reports.  She underwent a lumpectomy performed by Dr. Tasha Segura who is a surgeon in the [place] area.  She also had a sentinel lymph node which was noted to be involved with tumor but there was no lymph node dissection done at that time.  [id number].  She subsequently underwent adjuvant chemotherapy with dose-dense AC for 4 cycles and Taxol for 12 weeks, completed 09/11/2014.        She then had radiation therapy post lumpectomy to the left breast, which was completed 11/23/2014 by Dr. Manley.        She was then begun on an aromatase inhibitor, the name of which she does not remember.  The aromatase inhibitor therapy was begun in 11/2014.  She was then switched within about a month or so to anastrozole and remained on anastrozole from 11/2014-02/2017.  She has never received tamoxifen.        She then moved to " [place], [place] in 03/2017.  She saw a gynecologist there and underwent a IRIS/BSO by Dr. Zendejas.  She then also saw Dr. Linares in [place], [place], who is an oncologist.  His interpretation of the pathology report was that she had triple-negative breast cancer.  It is possible that she may have had low estrogen receptor positivity, but the anastrozole was then discontinued in 02/2017.  She then underwent the IRIS/BSO in 03/2017.  This was done laparoscopically.        She then remained off of all hormonal therapy and in 09/2017 moved to [place].  She remained off hormonal therapy and did not have Medical Oncology followup initially.        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  She then went to see Dr. Norberto Brand who performed a PET/CT scan and the patient also underwent a brain MRI for staging.  The PET/CT scan performed 08/03/2018 showed in the neck there were several mildly metabolic active and large prominent left posterior cervical lymph nodes.  The largest is located posterior to the left sternocleidomastoid muscle and measures 1.5 x 1 cm in size.  This demonstrate modest FDG uptake.  The other lymph nodes are smaller and demonstrate mild FDG uptake.  The prominent prevascular and aortopulmonary lymph nodes were also seen on the contrast-enhanced scan were less well seen on the PET.  The largest lymph node visualized on the PET scan was 1.1 x 0.8 cm and demonstrated mild FDG uptake.  This was in the periaortic area just anterior to the aortic arch.  No lung nodules.  No lung infiltrates.  No pleural effusion.  There was no uptake of FDG in the bones.  In summary, there were mildly enlarged left posterior cervical lymph nodes, largest measuring 1.5 x 1.0 cm and mildly enlarged aorticopulmonary and prevascular lymph nodes that were noted on contrast-enhanced CT scan seen less well on the PET scan.  No evidence of metastatic disease in the abdomen and pelvis.  She also  underwent a brain MRI that showed no evidence of intracranial metastatic disease.  She underwent a biopsy of one of the lymph nodes in the left neck which showed metastatic adenocarcinoma consistent with breast origin, which was estrogen-receptor positive.  The tumor cells were positive for CK7, ER and HI consistent with metastatic breast carcinoma.  Estrogen receptor showed strong average nuclear intensity in 95% of carcinoma cells, progesterone receptor moderate average nuclear intensity in 70% of the carcinoma.  A GATA3 stain was apparently not performed.       TREATMENT HISTORY:  A. Tamoxifen  B. Letrozole and palbociclib started 9-18-18.  C. Pfizer COVID vaccination end of March, 2021 and 4-17.     INTERVAL HISTORY:  Jade has been doing quite well.  No pain.  Mild fatigue.  Depression and anxiety have been well controlled by medication. No fullness in the neck.      The Solu-Medrol premedication before contrast is working well.  No problems related to IV contrast with premedication.     REVIEW OF SYSTEMS:   She denies fevers or chills, cough, chest pain, shortness of breath, nausea, vomiting, constipation, diarrhea, bone pain, headache.  A 10-point review of systems was performed and was negative with the exception of pertinent positives noted above.     OBJECTIVE DATA:  This was a phone visit and a physical exam was not performed today.  Mood was normal.     Imaging studies:     CT CAP 2-17-21.                                                                    IMPRESSION:  1.  Nothing for tumor recurrence or metastatic disease in the chest, abdomen, or pelvis.     2.  No significant change since 10/20/2020.     CT neck 2-17-21  Findings:      Visualized brain parenchyma is normal. Small fluid levels in bilateral  sphenoid locules, the remainder of the paranasal sinuses are clear.  Mastoid air cells are clear. Pharyngeal mucosal spaces are normal.  Submandibular glands, parotid glands are normal. Thyroid gland  is not  visualized.     Regarding the lymph nodes, a left level Ib and a right level 2A  subcentimeter lymph nodes with fatty odette are unchanged. There are  bilateral level 2A lymph nodes the larger on the left measuring 1.2 x  0.7 cm, unchanged. These are not abnormal as per size criteria or  their CT appearances. However there are several closely adjacent left  level 2B lymphadenopathy. The larger one measuring 1.4 cm, the other  one measuring 1.1 cm in longer axial dimensions (series 3, image 44  and 46). These respectively previously measured 1.1 and 0.9 cm. There  is also a left level 5 lymph node that measures 0.7 cm, enlarged since  prior. There is a 9 mm left level 4 lymph node, previously on  10/22/2028 measures 8mm. These lymph nodes' enhancement pattern and  interval growth is slightly suspicious.   Lung apices are clear. No osseous lesions.                                                                      IMPRESSION: Slight interval enlargement of left level IIb lymph nodes.  Given their interval increase in size and their enhancement pattern  are worrisome for recurrent metastatic nodes.     RODERICK ROWE MD     I reviewed the CT scan of the neck and measured the short axis of lymph nodes.  10/20/2020 series 3 showed short axis of 6 mm, 7.3 mm, 6.3 mm.  The lymph nodes visualized series 3 2/16/2021 image 51 7.3 mm image 46 6.5 mm image 44 8.2 mm.  In summary there was no significant change of the short axis of these lymph nodes and they all remained under 10 mm in short axis.     Labs:   CBC was within normal limits.  CMP was within normal limits.  Hemoglobin A1c 5.6.     ASSESSMENT AND PLAN:  1. Recurrent, metastatic ER-positive, HER2 negative breast cancer: History of left breast cancer s/p lumpectomy and SNLB in 2014. S/p 4 cycles ddAC and 12 weeks of Taxol. On AI from 11/2014-2/2017. Recurrence on PET/CT scan performed 08/03/2018 and then biopsy to left posterior cervical lymph nodes,  aorticopulmonary, and prevascular lymph nodes were positive. No evidence of metastatic disease in the abdomen and pelvis or brain. Started Ibrance and letrozole on 9/21/18. Tolerating well aside from some hot flashes and mild joint stiffness.   2. Restaging will be in May.  2. Overall doing well.  No signs of liver injury.  RUQ tenderness resolved  3.  Imaging and interval.  She would like to perform imaging on a 3 month basis.  I think this is reasonable. Allergy to contrast and she needs premedication and needs CT CAP and CT neck.   4.  Diet and exercise. We also discussed eating less sugar and getting 150 minutes of exercise per week.  She will try to incorporate these recommendations into her lifestyle.  PCP . Dr. Wesly Pate at [facility].  5.  Depression and anxiety. She saw Dr. Pate her new PCP and her regimen was changed to add buspirone 7.5 mg twice daily to sertraline 200 mg once per added. Lorazepam prn anxiety was added.  She has improvement of her symptoms of depression and anxiety.   6.  Hypothyroid. Levothyroxine 0.175 mg per day.     7. Asthma: Seen by Dr. Hay. Per chart on fluticasone-salmeterol inhaler BID. She does not recall the name of this. Also has albuterol prn.  8. She has had a flu shot.  Had Pfizer COVID vaccination end of March, 2021 and second will be Saturday.  9.  She decided against bariatric surgery and will concentrate on diet and exercise.   10. Follow up with me by phone visit 5-13 with CBC, CMP, TSH and CT CAP and neck with premedication at [place] on 5-12.       Thank you for allowing us to participate in this patient's care.      Sincerely,      Wilfredo Bhatt MD  Professor  [facility]  [phone].        10:21-10:44  I spent  8 minutes with the patient more than 50% of which was in counseling and coordination of care.

## 2021-04-15 ENCOUNTER — VIRTUAL VISIT (OUTPATIENT)
Dept: ONCOLOGY | Facility: CLINIC | Age: 51
End: 2021-04-15
Attending: INTERNAL MEDICINE
Payer: COMMERCIAL

## 2021-04-15 DIAGNOSIS — E03.9 PRIMARY HYPOTHYROIDISM: ICD-10-CM

## 2021-04-15 DIAGNOSIS — Z17.0 MALIGNANT NEOPLASM OF UPPER-OUTER QUADRANT OF LEFT BREAST IN FEMALE, ESTROGEN RECEPTOR POSITIVE (H): Primary | ICD-10-CM

## 2021-04-15 DIAGNOSIS — C50.412 MALIGNANT NEOPLASM OF UPPER-OUTER QUADRANT OF LEFT BREAST IN FEMALE, ESTROGEN RECEPTOR POSITIVE (H): Primary | ICD-10-CM

## 2021-04-15 PROCEDURE — 999N001193 HC VIDEO/TELEPHONE VISIT; NO CHARGE

## 2021-04-15 PROCEDURE — 99214 OFFICE O/P EST MOD 30 MIN: CPT | Mod: TEL | Performed by: INTERNAL MEDICINE

## 2021-04-15 NOTE — LETTER
"    4/15/2021         RE: Jade Collins  [address]  [facility] [address]        Dear Colleague,    Thank you for referring your patient, Jade Collins, to the [facility]. Please see a copy of my visit note below.    Jade is a 51 year old who is being evaluated via a billable telephone visit.      What phone number would you like to be contacted at? [phone]  How would you like to obtain your AVS? Soledad     Vitals - Patient Reported  Weight (Patient Reported): 142.9 kg (315 lb)  Height (Patient Reported): 175.3 cm (5' 9\")  BMI (Based on Pt Reported Ht/Wt): 46.52  Pain Score: No Pain (0)    Ashwini Mejia CMA April 15, 2021  7:44 AM     Phone call duration: 8 minutes        HISTORY OF PRESENT ILLNESS:  Jade is a 51-year-old woman with metastatic breast cancer who comes to our clinic for recommendations for further treatment.  She is referred by Dr. Norberto Brand at [facility].  Jade would like to continue her care at the [facility].       Jade was diagnosed with breast cancer with a lump found in the upper-inner quadrant of the left breast.  She was diagnosed in 12/2013 in the [place] area and Dr. David Redd was her medical oncologist.  Biopsy of the tumor showed it to be ER positive, MT positive, and HER-2 negative.  We do not have any of the original pathology and we need to obtain those reports.  She underwent a lumpectomy performed by Dr. Tasha Segura who is a surgeon in the [place] area.  She also had a sentinel lymph node which was noted to be involved with tumor but there was no lymph node dissection done at that time.  [id number].  She subsequently underwent adjuvant chemotherapy with dose-dense AC for 4 cycles and Taxol for 12 weeks, completed 09/11/2014.        She then had radiation therapy post lumpectomy to the left breast, which was completed 11/23/2014 by Dr. Manley.        She was then begun on an aromatase inhibitor, the name of which " she does not remember.  The aromatase inhibitor therapy was begun in 11/2014.  She was then switched within about a month or so to anastrozole and remained on anastrozole from 11/2014-02/2017.  She has never received tamoxifen.        She then moved to [place], [place] in 03/2017.  She saw a gynecologist there and underwent a IRIS/BSO by Dr. Zendejas.  She then also saw Dr. Linares in [place], [place], who is an oncologist.  His interpretation of the pathology report was that she had triple-negative breast cancer.  It is possible that she may have had low estrogen receptor positivity, but the anastrozole was then discontinued in 02/2017.  She then underwent the IRIS/BSO in 03/2017.  This was done laparoscopically.        She then remained off of all hormonal therapy and in 09/2017 moved to [place].  She remained off hormonal therapy and did not have Medical Oncology followup initially.        She was driving for [place] and noticed lymph nodes in the left neck about 8 weeks ago.  She then went to see Dr. Norberto Brand who performed a PET/CT scan and the patient also underwent a brain MRI for staging.  The PET/CT scan performed 08/03/2018 showed in the neck there were several mildly metabolic active and large prominent left posterior cervical lymph nodes.  The largest is located posterior to the left sternocleidomastoid muscle and measures 1.5 x 1 cm in size.  This demonstrate modest FDG uptake.  The other lymph nodes are smaller and demonstrate mild FDG uptake.  The prominent prevascular and aortopulmonary lymph nodes were also seen on the contrast-enhanced scan were less well seen on the PET.  The largest lymph node visualized on the PET scan was 1.1 x 0.8 cm and demonstrated mild FDG uptake.  This was in the periaortic area just anterior to the aortic arch.  No lung nodules.  No lung infiltrates.  No pleural effusion.  There was no uptake of FDG in the bones.  In summary, there were mildly enlarged left posterior cervical lymph  nodes, largest measuring 1.5 x 1.0 cm and mildly enlarged aorticopulmonary and prevascular lymph nodes that were noted on contrast-enhanced CT scan seen less well on the PET scan.  No evidence of metastatic disease in the abdomen and pelvis.  She also underwent a brain MRI that showed no evidence of intracranial metastatic disease.  She underwent a biopsy of one of the lymph nodes in the left neck which showed metastatic adenocarcinoma consistent with breast origin, which was estrogen-receptor positive.  The tumor cells were positive for CK7, ER and KS consistent with metastatic breast carcinoma.  Estrogen receptor showed strong average nuclear intensity in 95% of carcinoma cells, progesterone receptor moderate average nuclear intensity in 70% of the carcinoma.  A GATA3 stain was apparently not performed.       TREATMENT HISTORY:  A. Tamoxifen  B. Letrozole and palbociclib started 9-18-18.  C. Pfizer COVID vaccination end of March, 2021 and 4-17.     INTERVAL HISTORY:  Jade has been doing quite well.  No pain.  Mild fatigue.  Depression and anxiety have been well controlled by medication. No fullness in the neck.      The Solu-Medrol premedication before contrast is working well.  No problems related to IV contrast with premedication.     REVIEW OF SYSTEMS:   She denies fevers or chills, cough, chest pain, shortness of breath, nausea, vomiting, constipation, diarrhea, bone pain, headache.  A 10-point review of systems was performed and was negative with the exception of pertinent positives noted above.     OBJECTIVE DATA:  This was a phone visit and a physical exam was not performed today.  Mood was normal.     Imaging studies:     CT CAP 2-17-21.                                                                    IMPRESSION:  1.  Nothing for tumor recurrence or metastatic disease in the chest, abdomen, or pelvis.     2.  No significant change since 10/20/2020.     CT neck 2-17-21  Findings:      Visualized brain  parenchyma is normal. Small fluid levels in bilateral  sphenoid locules, the remainder of the paranasal sinuses are clear.  Mastoid air cells are clear. Pharyngeal mucosal spaces are normal.  Submandibular glands, parotid glands are normal. Thyroid gland is not  visualized.     Regarding the lymph nodes, a left level Ib and a right level 2A  subcentimeter lymph nodes with fatty odette are unchanged. There are  bilateral level 2A lymph nodes the larger on the left measuring 1.2 x  0.7 cm, unchanged. These are not abnormal as per size criteria or  their CT appearances. However there are several closely adjacent left  level 2B lymphadenopathy. The larger one measuring 1.4 cm, the other  one measuring 1.1 cm in longer axial dimensions (series 3, image 44  and 46). These respectively previously measured 1.1 and 0.9 cm. There  is also a left level 5 lymph node that measures 0.7 cm, enlarged since  prior. There is a 9 mm left level 4 lymph node, previously on  10/22/2028 measures 8mm. These lymph nodes' enhancement pattern and  interval growth is slightly suspicious.   Lung apices are clear. No osseous lesions.                                                                      IMPRESSION: Slight interval enlargement of left level IIb lymph nodes.  Given their interval increase in size and their enhancement pattern  are worrisome for recurrent metastatic nodes.     RODERICK ROWE MD     I reviewed the CT scan of the neck and measured the short axis of lymph nodes.  10/20/2020 series 3 showed short axis of 6 mm, 7.3 mm, 6.3 mm.  The lymph nodes visualized series 3 2/16/2021 image 51 7.3 mm image 46 6.5 mm image 44 8.2 mm.  In summary there was no significant change of the short axis of these lymph nodes and they all remained under 10 mm in short axis.     Labs:   CBC was within normal limits.  CMP was within normal limits.  Hemoglobin A1c 5.6.     ASSESSMENT AND PLAN:  1. Recurrent, metastatic ER-positive, HER2 negative breast  cancer: History of left breast cancer s/p lumpectomy and SNLB in 2014. S/p 4 cycles ddAC and 12 weeks of Taxol. On AI from 11/2014-2/2017. Recurrence on PET/CT scan performed 08/03/2018 and then biopsy to left posterior cervical lymph nodes, aorticopulmonary, and prevascular lymph nodes were positive. No evidence of metastatic disease in the abdomen and pelvis or brain. Started Ibrance and letrozole on 9/21/18. Tolerating well aside from some hot flashes and mild joint stiffness.   2. Restaging will be in May.  2. Overall doing well.  No signs of liver injury.  RUQ tenderness resolved  3.  Imaging and interval.  She would like to perform imaging on a 3 month basis.  I think this is reasonable. Allergy to contrast and she needs premedication and needs CT CAP and CT neck.   4.  Diet and exercise. We also discussed eating less sugar and getting 150 minutes of exercise per week.  She will try to incorporate these recommendations into her lifestyle.  PCP . Dr. Wesly Pate at [facility].  5.  Depression and anxiety. She saw Dr. Pate her new PCP and her regimen was changed to add buspirone 7.5 mg twice daily to sertraline 200 mg once per added. Lorazepam prn anxiety was added.  She has improvement of her symptoms of depression and anxiety.   6.  Hypothyroid. Levothyroxine 0.175 mg per day.     7. Asthma: Seen by Dr. Hay. Per chart on fluticasone-salmeterol inhaler BID. She does not recall the name of this. Also has albuterol prn.  8. She has had a flu shot.  Had Pfizer COVID vaccination end of March, 2021 and second will be Saturday.  9.  She decided against bariatric surgery and will concentrate on diet and exercise.   10. Follow up with me by phone visit 5-13 with CBC, CMP, TSH and CT CAP and neck with premedication at [place] on 5-12.       Thank you for allowing us to participate in this patient's care.      Sincerely,      Wilfredo Bhatt MD  Professor  [facility]  [place]  [phone].        10:21-10:44  I spent  8 minutes with the patient more than 50% of which was in counseling and coordination of care.      Again, thank you for allowing me to participate in the care of your patient.        Sincerely,        Wilfredo Bhatt MD

## 2021-04-16 ENCOUNTER — TELEPHONE (OUTPATIENT)
Dept: ONCOLOGY | Facility: CLINIC | Age: 51
End: 2021-04-16

## 2021-04-16 NOTE — TELEPHONE ENCOUNTER
"Oral Chemotherapy Monitoring Program    Subjective/Objective:  Jade Collins is a 51 year old female contacted by phone for a follow-up visit for oral chemotherapy.  Jade spoke to Yolis Bridges for follow-up. Per Yolis, \"Spoke to Jade for TM. She continues on Ibrance 100mg daily. Cycle starts 4/16. Jade tolerates well, denies se, missed doses, changes to meds or hospitalizations. Refilled through tech, has no questions for TM today. Appreciative of follow up.\"    ORAL CHEMOTHERAPY 3/16/2021 3/18/2021 3/18/2021 4/9/2021 4/9/2021 4/13/2021 4/16/2021   Assessment Type Refill Lab Monitoring Other Refill Other Other Quarterly Follow up   Diagnosis Code Breast Cancer Breast Cancer Breast Cancer Breast Cancer Breast Cancer Breast Cancer Breast Cancer   Providers Dr. Nova Bhatt   [facility]   Drug Name - - - Ibrance (palbociclib) Ibrance (palbociclib) Ibrance (palbociclib) Ibrance (palbociclib)   Dose 100 mg 100 mg 100 mg 100 mg 100 mg 100 mg 100 mg   Current Schedule Daily Daily Daily Daily Daily Daily Daily   Cycle Details 3 weeks on, 1 week off 3 weeks on, 1 week off 3 weeks on, 1 week off 3 weeks on, 1 week off 3 weeks on, 1 week off 3 weeks on, 1 week off 3 weeks on, 1 week off   Start Date of Last Cycle - - - - 3/19/2021 - -   Planned next cycle start date - 3/19/2021 3/19/2021 - 4/17/2021 4/17/2021 4/16/2021   Doses missed in last 2 weeks - - - - - - 0   Adherence Assessment - - - - - - Adherent   Adverse Effects - - - - - - No AE identified during assessment   Nausea - - - - - - -   Pharmacist Intervention(nausea) - - - - - - -   Diarrhea - - - - - - -   Pharmacist Intervention(diarrhea) - - - - - - -   Oral Mucositis - - - - - - -   Pharmacist Intervention(oral mucositis) - - - - - - -   Fatigue - - - - - - -   Pharmacist Intervention(fatigue) - - - - - - -   Neutropenia - - - - - - - " "  Pharmacist Intervention(neutropenia) - - - - - - -   Intervention(s) - - - - - - -   Home BPs - - - - - - -   Any new drug interactions? - - - - - - No   Pharmacist Intervention? - - - - - - -   Intervention(s) - - - - - - -   Is the dose as ordered appropriate for the patient? - - - - - - Yes   Is the patient currently in pain? - - - - - - -   Does the patient feel the pain is currently being managed by a provider? - - - - - - -   Has the patient been assessed within the past 6 months for depression? - - - - - - -   Has the patient missed any days of school, work, or other routine activity? - - - - - - -       Last PHQ-2 Score on record:   PHQ-2 ( 1999 Pfizer) 9/18/2018   Q1: Little interest or pleasure in doing things 1   Q2: Feeling down, depressed or hopeless 1   PHQ-2 Score 2       Vitals:  BP:   BP Readings from Last 1 Encounters:   01/28/21 139/68     Wt Readings from Last 1 Encounters:   01/28/21 145.2 kg (320 lb)     Estimated body surface area is 2.64 meters squared as calculated from the following:    Height as of 3/12/20: 1.727 m (5' 7.99\").    Weight as of 1/28/21: 145.2 kg (320 lb).    Labs:  _  Result Component Current Result Ref Range   Sodium 137 (4/13/2021) 133 - 144 mmol/L     _  Result Component Current Result Ref Range   Potassium 4.6 (4/13/2021) 3.4 - 5.3 mmol/L     _  Result Component Current Result Ref Range   Calcium 9.9 (4/13/2021) 8.5 - 10.1 mg/dL     No results found for Mag within last 30 days.     No results found for Phos within last 30 days.     _  Result Component Current Result Ref Range   Albumin 4.2 (4/13/2021) 3.4 - 5.0 g/dL     _  Result Component Current Result Ref Range   Urea Nitrogen 16 (4/13/2021) 7 - 30 mg/dL     _  Result Component Current Result Ref Range   Creatinine 1.09 (H) (4/13/2021) 0.52 - 1.04 mg/dL     _  Result Component Current Result Ref Range   AST 39 (4/13/2021) 0 - 45 U/L     _  Result Component Current Result Ref Range   ALT 50 (4/13/2021) 0 - 50 U/L "     _  Result Component Current Result Ref Range   Bilirubin Total 0.6 (4/13/2021) 0.2 - 1.3 mg/dL     _  Result Component Current Result Ref Range   WBC 2.7 (L) (4/13/2021) 4.0 - 11.0 10e9/L     _  Result Component Current Result Ref Range   Hemoglobin 13.8 (4/13/2021) 11.7 - 15.7 g/dL     _  Result Component Current Result Ref Range   Platelet Count 145 (L) (4/13/2021) 150 - 450 10e9/L     _  Result Component Current Result Ref Range   Absolute Neutrophil 1.2 (L) (4/13/2021) 1.6 - 8.3 10e9/L       Assessment/Plan:  Jade is tolerating Ibrance well. Next cycle starts 4/16/21. PDC=0.93, no concerns with adherence.     Follow-Up:  5/11: Labs  5/13: Dr. Bhatt appt.   7/02: quarterly assessment    Carmen Clark, PharmD  Hematology/Oncology Clinical Pharmacist  [facility]  [facility]  [phone]

## 2021-04-27 DIAGNOSIS — J45.20 MILD INTERMITTENT ASTHMA WITHOUT COMPLICATION: ICD-10-CM

## 2021-04-27 RX ORDER — ALBUTEROL SULFATE 90 UG/1
2 AEROSOL, METERED RESPIRATORY (INHALATION) EVERY 6 HOURS
OUTPATIENT
Start: 2021-04-27

## 2021-04-27 NOTE — TELEPHONE ENCOUNTER
Spoke with patient and she will request for albuterol inhaler from PCP and gave her number to make return apt with Dr Hay.

## 2021-05-03 DIAGNOSIS — Z17.0 MALIGNANT NEOPLASM OF UPPER-OUTER QUADRANT OF LEFT BREAST IN FEMALE, ESTROGEN RECEPTOR POSITIVE (H): Primary | ICD-10-CM

## 2021-05-03 DIAGNOSIS — C50.412 MALIGNANT NEOPLASM OF UPPER-OUTER QUADRANT OF LEFT BREAST IN FEMALE, ESTROGEN RECEPTOR POSITIVE (H): Primary | ICD-10-CM

## 2021-05-11 ENCOUNTER — ANCILLARY PROCEDURE (OUTPATIENT)
Dept: CT IMAGING | Facility: CLINIC | Age: 51
End: 2021-05-11
Attending: INTERNAL MEDICINE
Payer: COMMERCIAL

## 2021-05-11 DIAGNOSIS — C50.412 MALIGNANT NEOPLASM OF UPPER-OUTER QUADRANT OF LEFT BREAST IN FEMALE, ESTROGEN RECEPTOR POSITIVE (H): ICD-10-CM

## 2021-05-11 DIAGNOSIS — C50.919 METASTATIC BREAST CANCER: ICD-10-CM

## 2021-05-11 DIAGNOSIS — E03.9 PRIMARY HYPOTHYROIDISM: ICD-10-CM

## 2021-05-11 DIAGNOSIS — Z17.0 MALIGNANT NEOPLASM OF UPPER-OUTER QUADRANT OF LEFT BREAST IN FEMALE, ESTROGEN RECEPTOR POSITIVE (H): ICD-10-CM

## 2021-05-11 LAB
ALBUMIN SERPL-MCNC: 4 G/DL (ref 3.4–5)
ALP SERPL-CCNC: 71 U/L (ref 40–150)
ALT SERPL W P-5'-P-CCNC: 47 U/L (ref 0–50)
ANION GAP SERPL CALCULATED.3IONS-SCNC: 4 MMOL/L (ref 3–14)
AST SERPL W P-5'-P-CCNC: 26 U/L (ref 0–45)
BASOPHILS # BLD AUTO: 0 10E9/L (ref 0–0.2)
BASOPHILS NFR BLD AUTO: 0.6 %
BILIRUB SERPL-MCNC: 0.3 MG/DL (ref 0.2–1.3)
BUN SERPL-MCNC: 16 MG/DL (ref 7–30)
CALCIUM SERPL-MCNC: 9.5 MG/DL (ref 8.5–10.1)
CANCER AG27-29 SERPL-ACNC: 13 U/ML (ref 0–39)
CEA SERPL-MCNC: 1.1 UG/L (ref 0–2.5)
CHLORIDE SERPL-SCNC: 107 MMOL/L (ref 94–109)
CO2 SERPL-SCNC: 27 MMOL/L (ref 20–32)
CREAT SERPL-MCNC: 0.95 MG/DL (ref 0.52–1.04)
DIFFERENTIAL METHOD BLD: ABNORMAL
EOSINOPHIL # BLD AUTO: 0 10E9/L (ref 0–0.7)
EOSINOPHIL NFR BLD AUTO: 0 %
ERYTHROCYTE [DISTWIDTH] IN BLOOD BY AUTOMATED COUNT: 13.1 % (ref 10–15)
GFR SERPL CREATININE-BSD FRML MDRD: 70 ML/MIN/{1.73_M2}
GLUCOSE SERPL-MCNC: 174 MG/DL (ref 70–99)
HCT VFR BLD AUTO: 38.8 % (ref 35–47)
HGB BLD-MCNC: 13.3 G/DL (ref 11.7–15.7)
IMM GRANULOCYTES # BLD: 0 10E9/L (ref 0–0.4)
IMM GRANULOCYTES NFR BLD: 0.6 %
LYMPHOCYTES # BLD AUTO: 1.2 10E9/L (ref 0.8–5.3)
LYMPHOCYTES NFR BLD AUTO: 34.2 %
MCH RBC QN AUTO: 33.1 PG (ref 26.5–33)
MCHC RBC AUTO-ENTMCNC: 34.3 G/DL (ref 31.5–36.5)
MCV RBC AUTO: 97 FL (ref 78–100)
MONOCYTES # BLD AUTO: 0.2 10E9/L (ref 0–1.3)
MONOCYTES NFR BLD AUTO: 6.8 %
NEUTROPHILS # BLD AUTO: 2 10E9/L (ref 1.6–8.3)
NEUTROPHILS NFR BLD AUTO: 57.8 %
PLATELET # BLD AUTO: 141 10E9/L (ref 150–450)
POTASSIUM SERPL-SCNC: 4.3 MMOL/L (ref 3.4–5.3)
PROT SERPL-MCNC: 7.8 G/DL (ref 6.8–8.8)
RBC # BLD AUTO: 4.02 10E12/L (ref 3.8–5.2)
SODIUM SERPL-SCNC: 138 MMOL/L (ref 133–144)
TSH SERPL DL<=0.005 MIU/L-ACNC: 1.75 MU/L (ref 0.4–4)
WBC # BLD AUTO: 3.4 10E9/L (ref 4–11)

## 2021-05-11 PROCEDURE — 70491 CT SOFT TISSUE NECK W/DYE: CPT | Performed by: RADIOLOGY

## 2021-05-11 PROCEDURE — 36415 COLL VENOUS BLD VENIPUNCTURE: CPT | Performed by: INTERNAL MEDICINE

## 2021-05-11 PROCEDURE — 86300 IMMUNOASSAY TUMOR CA 15-3: CPT | Performed by: INTERNAL MEDICINE

## 2021-05-11 PROCEDURE — 80053 COMPREHEN METABOLIC PANEL: CPT | Performed by: INTERNAL MEDICINE

## 2021-05-11 PROCEDURE — 85025 COMPLETE CBC W/AUTO DIFF WBC: CPT | Performed by: INTERNAL MEDICINE

## 2021-05-11 PROCEDURE — 82378 CARCINOEMBRYONIC ANTIGEN: CPT | Performed by: INTERNAL MEDICINE

## 2021-05-11 PROCEDURE — 74177 CT ABD & PELVIS W/CONTRAST: CPT | Performed by: RADIOLOGY

## 2021-05-11 PROCEDURE — 84443 ASSAY THYROID STIM HORMONE: CPT | Performed by: INTERNAL MEDICINE

## 2021-05-11 PROCEDURE — 71260 CT THORAX DX C+: CPT | Performed by: RADIOLOGY

## 2021-05-11 RX ORDER — IOPAMIDOL 755 MG/ML
135 INJECTION, SOLUTION INTRAVASCULAR ONCE
Status: COMPLETED | OUTPATIENT
Start: 2021-05-11 | End: 2021-05-11

## 2021-05-11 RX ADMIN — IOPAMIDOL 135 ML: 755 INJECTION, SOLUTION INTRAVASCULAR at 14:08

## 2021-05-12 DIAGNOSIS — Z17.0 MALIGNANT NEOPLASM OF UPPER-OUTER QUADRANT OF LEFT BREAST IN FEMALE, ESTROGEN RECEPTOR POSITIVE (H): Primary | ICD-10-CM

## 2021-05-12 DIAGNOSIS — C50.412 MALIGNANT NEOPLASM OF UPPER-OUTER QUADRANT OF LEFT BREAST IN FEMALE, ESTROGEN RECEPTOR POSITIVE (H): Primary | ICD-10-CM

## 2021-05-12 RX ORDER — LETROZOLE 2.5 MG/1
2.5 TABLET, FILM COATED ORAL DAILY
Qty: 28 TABLET | Refills: 0 | Status: SHIPPED | OUTPATIENT
Start: 2021-05-12 | End: 2021-06-09

## 2021-05-12 NOTE — PROGRESS NOTES
"Jade is a 51 year old who is being evaluated via a billable telephone visit.      What phone number would you like to be contacted at? [phone]  How would you like to obtain your AVS? MyChart   Vitals - Patient Reported  Weight (Patient Reported): 140.6 kg (310 lb)  Height (Patient Reported): 175.3 cm (5' 9\")  BMI (Based on Pt Reported Ht/Wt): 45.78  Pain Score: No Pain (0)      Yvette Ferguson CMA on 5/13/2021 at 8:38 AM    Phone call duration: 10 minutes      HISTORY OF PRESENT ILLNESS:  Jade is a 51-year-old woman with metastatic breast cancer who comes to our clinic for recommendations for further treatment.  She is referred by Dr. Norberto Brand at [facility].  Jade would like to continue her care at the [facility].       Jade was diagnosed with breast cancer with a lump found in the upper-inner quadrant of the left breast.  She was diagnosed in 12/2013 in the [place] area and Dr. David Redd was her medical oncologist.  Biopsy of the tumor showed it to be ER positive, WI positive, and HER-2 negative.  We do not have any of the original pathology and we need to obtain those reports.  She underwent a lumpectomy performed by Dr. Tasha Segura who is a surgeon in the [place] area.  She also had a sentinel lymph node which was noted to be involved with tumor but there was no lymph node dissection done at that time.  [id number].  She subsequently underwent adjuvant chemotherapy with dose-dense AC for 4 cycles and Taxol for 12 weeks, completed 09/11/2014.        She then had radiation therapy post lumpectomy to the left breast, which was completed 11/23/2014 by Dr. Manley.        She was then begun on an aromatase inhibitor, the name of which she does not remember.  The aromatase inhibitor therapy was begun in 11/2014.  She was then switched within about a month or so to anastrozole and remained on anastrozole from 11/2014-02/2017.  She has never received tamoxifen.        She then moved to " [place], [place] in 03/2017.  She saw a gynecologist there and underwent a IRIS/BSO by Dr. Zendejas.  She then also saw Dr. Linares in [place], [place], who is an oncologist.  His interpretation of the pathology report was that she had triple-negative breast cancer.  It is possible that she may have had low estrogen receptor positivity, but the anastrozole was then discontinued in 02/2017.  She then underwent the IRIS/BSO in 03/2017.  This was done laparoscopically.        She then remained off of all hormonal therapy and in 09/2017 moved to [place].  She remained off hormonal therapy and did not have Medical Oncology followup initially.        She was driving for [place] and noticed lymph nodes in the left neck about 8 weeks ago.  She then went to see Dr. Norberto Brand who performed a PET/CT scan and the patient also underwent a brain MRI for staging.  The PET/CT scan performed 08/03/2018 showed in the neck there were several mildly metabolic active and large prominent left posterior cervical lymph nodes.  The largest is located posterior to the left sternocleidomastoid muscle and measures 1.5 x 1 cm in size.  This demonstrate modest FDG uptake.  The other lymph nodes are smaller and demonstrate mild FDG uptake.  The prominent prevascular and aortopulmonary lymph nodes were also seen on the contrast-enhanced scan were less well seen on the PET.  The largest lymph node visualized on the PET scan was 1.1 x 0.8 cm and demonstrated mild FDG uptake.  This was in the periaortic area just anterior to the aortic arch.  No lung nodules.  No lung infiltrates.  No pleural effusion.  There was no uptake of FDG in the bones.  In summary, there were mildly enlarged left posterior cervical lymph nodes, largest measuring 1.5 x 1.0 cm and mildly enlarged aorticopulmonary and prevascular lymph nodes that were noted on contrast-enhanced CT scan seen less well on the PET scan.  No evidence of metastatic disease in the abdomen and pelvis.  She also  underwent a brain MRI that showed no evidence of intracranial metastatic disease.  She underwent a biopsy of one of the lymph nodes in the left neck which showed metastatic adenocarcinoma consistent with breast origin, which was estrogen-receptor positive.  The tumor cells were positive for CK7, ER and IL consistent with metastatic breast carcinoma.  Estrogen receptor showed strong average nuclear intensity in 95% of carcinoma cells, progesterone receptor moderate average nuclear intensity in 70% of the carcinoma.  A GATA3 stain was apparently not performed.       TREATMENT HISTORY:  A. Tamoxifen  B. Letrozole and palbociclib started 9-18-18.  C. Pfizer COVID vaccination end of March, 2021 and 4-17.     INTERVAL HISTORY:  Jade has been doing quite well.  No pain.  Mild fatigue.  Depression and anxiety have been well controlled by buspirone, sertraline, and mirtazapine. Mirtazapine helps her sleep at night. No fullness in the neck.      The Solu-Medrol premedication before contrast is working well.  No problems related to IV contrast with premedication.     She gets occasional hot flashes during the day and at night that are not too bothersome. She endorses mild joint aches about an hour after taking letrozole.     She has noticed some increased chest tightness and shortness of breath with exertion. This limits her activity but she is trying to walk as much as possible. She walks about 3 times per week. She takes advair, montelukast, and as needed albuterol for her asthma and is meeting with her pulmonologist soon to see if her medications can be further optimized. She does get benefit from the albuterol when she uses it.      REVIEW OF SYSTEMS:  A 10-point review of systems was performed and was negative with the exception of pertinent positives noted above.     OBJECTIVE DATA:  This was a phone visit and a physical exam was not performed today.  Mood was normal.     Imaging studies:     CT  CAP 5-11-21                                                                    IMPRESSION:  1.  No evidence of recurrent or metastatic disease in the chest, abdomen or pelvis.  2.  Hepatomegaly with hepatic steatosis.  3.  A few stable pulmonary nodules measuring up to 0.2 cm.     CT neck 5-11-21  Findings:   Evaluation of the mucosal space demonstrates no evident abnormality in  the nasopharynx, oropharynx, hypopharynx or the glottis. The tongue  base appears normal. The major salivary glands appear unremarkable.  The thyroid gland appears normal.      Cluster of prominent left level 2B lymph nodes, the largest measuring  approximately 10 x 8 mm (series 3 image 27), previously 13 x 7 mm when  measured in similar axis. Multiple additional prominent posterior  lateral to be nodes have slightly increased in size. For example,  there is a 9 x 6 mm nodule (series 3 image 26), previously 6 x 6 mm.  Bilateral anterior level 2A lymph nodes measuring approximately 12 x 9  and 15 x 6 mm on the right and left, respectively (series 3 image 32),  similar to prior.     Stable prominent left level Ib with a benign-appearing fatty hilum  (series 5 image 95).     The fascial spaces in the neck are intact bilaterally. The major  vascular structures in the neck appear unremarkable.     Evaluation of the osseous structures demonstrate no worrisome lytic or  sclerotic lesion. No overt spinal canal or neuroforaminal stenosis.  The visualized paranasal sinuses are clear. The mastoid air cells are  clear.      The visualized lung apices are clear.                                                                      Impression: In this patient with history of metastatic breast cancer:  Cluster of prominent left level IIb lymph nodes. The largest nodule is  grossly similar in size to 2/16/2021, however, multiple of the smaller  more posterior nodules have slightly increased. Most of the lymph  nodes are similar.      We reviewed the scan  and some of the smaller left level II lymph nodes appear slightly more prominent.      Labs:   WBC 3.4.  CMP was within normal limits except for glucose 174.  TSH within normal limits. CEA 1.1, CA27.29 13.     ASSESSMENT AND PLAN:  1. Recurrent, metastatic ER-positive, HER2 negative breast cancer: History of left breast cancer s/p lumpectomy and SNLB in 2014. S/p 4 cycles ddAC and 12 weeks of Taxol. On AI from 11/2014-2/2017. Recurrence on PET/CT scan performed 08/03/2018 and then biopsy to left posterior cervical lymph nodes, aorticopulmonary, and prevascular lymph nodes were positive. No evidence of metastatic disease in the abdomen and pelvis or brain. Started Ibrance and letrozole on 9/21/18. Tolerating well aside from some hot flashes and mild joint stiffness.  She continues on Ibrance and letrozole and is now nearly at 3 years.  2. Restaging shows essentially stable disease with possible very slight progression in left neck lymph nodes, no evidence of progression in remainder of body  2. Overall doing well.  No signs of liver injury.  RUQ tenderness resolved  3.  Imaging and interval.  She would like to perform imaging on a 3 month basis.  I think this is reasonable. Allergy to contrast and she needs premedication and needs CT CAP and CT neck.   4.  Diet and exercise. We also discussed eating less sugar and getting 150 minutes of exercise per week.  She will try to incorporate these recommendations into her lifestyle.  PCP Dr. Dr. Wesly Pate at [facility].  5.  Depression and anxiety. She saw Dr. Pate her new PCP and her regimen was changed to add buspirone 7.5 mg twice daily to sertraline 200 mg once per added. Lorazepam prn anxiety was added.  She has improvement of her symptoms of depression and anxiety.   6.  Hypothyroid. Levothyroxine 0.175 mg per day.     7. Asthma: Seen by Dr. Hay. Takes advair, montelukast, and albuterol as needed, noticing some slightly worsening symptoms lately. Meeting with  WALT Hay to discuss  8. She has had a flu shot.  Had Pfizer COVID vaccination in March and April 9.  She decided against bariatric surgery and will concentrate on diet and exercise.   10. Follow up. Follow-up with SONIA Marivel 10.  Follow-up with SONIA July 8 follow-up with me August 5.  All visits by phone.  CT CAP and CT neck August 4.  CBC, CMP, CEA, CA 27-29 all visits.       Thank you for allowing us to participate in this patient's care.  Thank you for allowing us to participate in this patient's care.  The patient was seen and evaluated by me.  I discussed the patient with the resident and agree with the findings and plan in the note, which was edited by me.   Sincerely,      Wilfredo Bhatt MD  Professor  [facility]  [phone].        9:20-9:40  I spent 20 minutes with the patient more than 50% of which was in counseling and coordination of care.

## 2021-05-13 ENCOUNTER — VIRTUAL VISIT (OUTPATIENT)
Dept: ONCOLOGY | Facility: CLINIC | Age: 51
End: 2021-05-13
Attending: NURSE PRACTITIONER
Payer: COMMERCIAL

## 2021-05-13 DIAGNOSIS — Z17.0 MALIGNANT NEOPLASM OF UPPER-OUTER QUADRANT OF LEFT BREAST IN FEMALE, ESTROGEN RECEPTOR POSITIVE (H): Primary | ICD-10-CM

## 2021-05-13 DIAGNOSIS — C50.412 MALIGNANT NEOPLASM OF UPPER-OUTER QUADRANT OF LEFT BREAST IN FEMALE, ESTROGEN RECEPTOR POSITIVE (H): Primary | ICD-10-CM

## 2021-05-13 PROCEDURE — 99442 PR PHYSICIAN TELEPHONE EVALUATION 11-20 MIN: CPT | Performed by: INTERNAL MEDICINE

## 2021-05-13 PROCEDURE — 999N001193 HC VIDEO/TELEPHONE VISIT; NO CHARGE

## 2021-05-13 RX ORDER — METHYLPREDNISOLONE 32 MG/1
TABLET ORAL
Qty: 2 TABLET | Refills: 4 | Status: SHIPPED | OUTPATIENT
Start: 2021-05-13 | End: 2021-10-07

## 2021-05-13 NOTE — LETTER
"    5/13/2021         RE: Jade Collins  [address]  [place] [place] [address]        Dear Colleague,    Thank you for referring your patient, Jade Collins, to the [facility]. Please see a copy of my visit note below.    Jade is a 51 year old who is being evaluated via a billable telephone visit.      What phone number would you like to be contacted at? [phone]  How would you like to obtain your AVS? [place]   Vitals - Patient Reported  Weight (Patient Reported): 140.6 kg (310 lb)  Height (Patient Reported): 175.3 cm (5' 9\")  BMI (Based on Pt Reported Ht/Wt): 45.78  Pain Score: No Pain (0)      Yvette Ferguson CMA on 5/13/2021 at 8:38 AM    Phone call duration: 10 minutes      HISTORY OF PRESENT ILLNESS:  Jade is a 51-year-old woman with metastatic breast cancer who comes to our clinic for recommendations for further treatment.  She is referred by Dr. Norberto Brand at [facility].  Jade would like to continue her care at the [facility].       Jade was diagnosed with breast cancer with a lump found in the upper-inner quadrant of the left breast.  She was diagnosed in 12/2013 in the [place] area and Dr. David Redd was her medical oncologist.  Biopsy of the tumor showed it to be ER positive, DC positive, and HER-2 negative.  We do not have any of the original pathology and we need to obtain those reports.  She underwent a lumpectomy performed by Dr. Tasha Segura who is a surgeon in the [place] area.  She also had a sentinel lymph node which was noted to be involved with tumor but there was no lymph node dissection done at that time.  [id number].  She subsequently underwent adjuvant chemotherapy with dose-dense AC for 4 cycles and Taxol for 12 weeks, completed 09/11/2014.        She then had radiation therapy post lumpectomy to the left breast, which was completed 11/23/2014 by Dr. Manley.        She was then begun on an aromatase inhibitor, the name of which " she does not remember.  The aromatase inhibitor therapy was begun in 11/2014.  She was then switched within about a month or so to anastrozole and remained on anastrozole from 11/2014-02/2017.  She has never received tamoxifen.        She then moved to [place], [place] in 03/2017.  She saw a gynecologist there and underwent a IRIS/BSO by Dr. Zendejas.  She then also saw Dr. Linares in [place], [place], who is an oncologist.  His interpretation of the pathology report was that she had triple-negative breast cancer.  It is possible that she may have had low estrogen receptor positivity, but the anastrozole was then discontinued in 02/2017.  She then underwent the IRIS/BSO in 03/2017.  This was done laparoscopically.        She then remained off of all hormonal therapy and in 09/2017 moved to [place].  She remained off hormonal therapy and did not have Medical Oncology followup initially.        She was driving for [place] and noticed lymph nodes in the left neck about 8 weeks ago.  She then went to see Dr. Norberto Brand who performed a PET/CT scan and the patient also underwent a brain MRI for staging.  The PET/CT scan performed 08/03/2018 showed in the neck there were several mildly metabolic active and large prominent left posterior cervical lymph nodes.  The largest is located posterior to the left sternocleidomastoid muscle and measures 1.5 x 1 cm in size.  This demonstrate modest FDG uptake.  The other lymph nodes are smaller and demonstrate mild FDG uptake.  The prominent prevascular and aortopulmonary lymph nodes were also seen on the contrast-enhanced scan were less well seen on the PET.  The largest lymph node visualized on the PET scan was 1.1 x 0.8 cm and demonstrated mild FDG uptake.  This was in the periaortic area just anterior to the aortic arch.  No lung nodules.  No lung infiltrates.  No pleural effusion.  There was no uptake of FDG in the bones.  In summary, there were mildly enlarged left posterior cervical lymph  nodes, largest measuring 1.5 x 1.0 cm and mildly enlarged aorticopulmonary and prevascular lymph nodes that were noted on contrast-enhanced CT scan seen less well on the PET scan.  No evidence of metastatic disease in the abdomen and pelvis.  She also underwent a brain MRI that showed no evidence of intracranial metastatic disease.  She underwent a biopsy of one of the lymph nodes in the left neck which showed metastatic adenocarcinoma consistent with breast origin, which was estrogen-receptor positive.  The tumor cells were positive for CK7, ER and CA consistent with metastatic breast carcinoma.  Estrogen receptor showed strong average nuclear intensity in 95% of carcinoma cells, progesterone receptor moderate average nuclear intensity in 70% of the carcinoma.  A GATA3 stain was apparently not performed.       TREATMENT HISTORY:  A. Tamoxifen  B. Letrozole and palbociclib started 9-18-18.  C. Pfizer COVID vaccination end of March, 2021 and 4-17.     INTERVAL HISTORY:  Jade has been doing quite well.  No pain.  Mild fatigue.  Depression and anxiety have been well controlled by buspirone, sertraline, and mirtazapine. Mirtazapine helps her sleep at night. No fullness in the neck.      The Solu-Medrol premedication before contrast is working well.  No problems related to IV contrast with premedication.     She gets occasional hot flashes during the day and at night that are not too bothersome. She endorses mild joint aches about an hour after taking letrozole.     She has noticed some increased chest tightness and shortness of breath with exertion. This limits her activity but she is trying to walk as much as possible. She walks about 3 times per week. She takes advair, montelukast, and as needed albuterol for her asthma and is meeting with her pulmonologist soon to see if her medications can be further optimized. She does get benefit from the albuterol when she uses it.      REVIEW OF SYSTEMS:  A 10-point review of  systems was performed and was negative with the exception of pertinent positives noted above.     OBJECTIVE DATA:  This was a phone visit and a physical exam was not performed today.  Mood was normal.     Imaging studies:     CT CAP 5-11-21                                                                    IMPRESSION:  1.  No evidence of recurrent or metastatic disease in the chest, abdomen or pelvis.  2.  Hepatomegaly with hepatic steatosis.  3.  A few stable pulmonary nodules measuring up to 0.2 cm.     CT neck 5-11-21  Findings:   Evaluation of the mucosal space demonstrates no evident abnormality in  the nasopharynx, oropharynx, hypopharynx or the glottis. The tongue  base appears normal. The major salivary glands appear unremarkable.  The thyroid gland appears normal.      Cluster of prominent left level 2B lymph nodes, the largest measuring  approximately 10 x 8 mm (series 3 image 27), previously 13 x 7 mm when  measured in similar axis. Multiple additional prominent posterior  lateral to be nodes have slightly increased in size. For example,  there is a 9 x 6 mm nodule (series 3 image 26), previously 6 x 6 mm.  Bilateral anterior level 2A lymph nodes measuring approximately 12 x 9  and 15 x 6 mm on the right and left, respectively (series 3 image 32),  similar to prior.     Stable prominent left level Ib with a benign-appearing fatty hilum  (series 5 image 95).     The fascial spaces in the neck are intact bilaterally. The major  vascular structures in the neck appear unremarkable.     Evaluation of the osseous structures demonstrate no worrisome lytic or  sclerotic lesion. No overt spinal canal or neuroforaminal stenosis.  The visualized paranasal sinuses are clear. The mastoid air cells are  clear.      The visualized lung apices are clear.                                                                      Impression: In this patient with history of metastatic breast cancer:  Cluster of prominent left  level IIb lymph nodes. The largest nodule is  grossly similar in size to 2/16/2021, however, multiple of the smaller  more posterior nodules have slightly increased. Most of the lymph  nodes are similar.      We reviewed the scan and some of the smaller left level II lymph nodes appear slightly more prominent.      Labs:   WBC 3.4.  CMP was within normal limits except for glucose 174.  TSH within normal limits. CEA 1.1, CA27.29 13.     ASSESSMENT AND PLAN:  1. Recurrent, metastatic ER-positive, HER2 negative breast cancer: History of left breast cancer s/p lumpectomy and SNLB in 2014. S/p 4 cycles ddAC and 12 weeks of Taxol. On AI from 11/2014-2/2017. Recurrence on PET/CT scan performed 08/03/2018 and then biopsy to left posterior cervical lymph nodes, aorticopulmonary, and prevascular lymph nodes were positive. No evidence of metastatic disease in the abdomen and pelvis or brain. Started Ibrance and letrozole on 9/21/18. Tolerating well aside from some hot flashes and mild joint stiffness.  She continues on Ibrance and letrozole and is now nearly at 3 years.  2. Restaging shows essentially stable disease with possible very slight progression in left neck lymph nodes, no evidence of progression in remainder of body  2. Overall doing well.  No signs of liver injury.  RUQ tenderness resolved  3.  Imaging and interval.  She would like to perform imaging on a 3 month basis.  I think this is reasonable. Allergy to contrast and she needs premedication and needs CT CAP and CT neck.   4.  Diet and exercise. We also discussed eating less sugar and getting 150 minutes of exercise per week.  She will try to incorporate these recommendations into her lifestyle.  PCP Dr. Dr. Wesly Pate at [facility].  5.  Depression and anxiety. She saw Dr. Pate her new PCP and her regimen was changed to add buspirone 7.5 mg twice daily to sertraline 200 mg once per added. Lorazepam prn anxiety was added.  She has improvement of her  symptoms of depression and anxiety.   6.  Hypothyroid. Levothyroxine 0.175 mg per day.     7. Asthma: Seen by Dr. Hay. Takes advair, montelukast, and albuterol as needed, noticing some slightly worsening symptoms lately. Meeting with WALT Hay to discuss  8. She has had a flu shot.  Had Pfizer COVID vaccination in March and April 9.  She decided against bariatric surgery and will concentrate on diet and exercise.   10. Follow up. Follow-up with SONIA Marivel 10.  Follow-up with SONIA July 8 follow-up with me August 5.  All visits by phone.  CT CAP and CT neck August 4.  CBC, CMP, CEA, CA 27-29 all visits.       Thank you for allowing us to participate in this patient's care.  Thank you for allowing us to participate in this patient's care.  The patient was seen and evaluated by me.  I discussed the patient with the resident and agree with the findings and plan in the note, which was edited by me.   Sincerely,      Wilfredo Bhatt MD  Professor  [facility]  [phone].        9:20-9:40  I spent 20 minutes with the patient more than 50% of which was in counseling and coordination of care.      Again, thank you for allowing me to participate in the care of your patient.        Sincerely,        Wilfredo Bhatt MD

## 2021-06-09 DIAGNOSIS — Z17.0 MALIGNANT NEOPLASM OF UPPER-OUTER QUADRANT OF LEFT BREAST IN FEMALE, ESTROGEN RECEPTOR POSITIVE (H): Primary | ICD-10-CM

## 2021-06-09 DIAGNOSIS — E03.9 PRIMARY HYPOTHYROIDISM: ICD-10-CM

## 2021-06-09 DIAGNOSIS — E11.9 TYPE 2 DIABETES MELLITUS WITHOUT COMPLICATION, WITHOUT LONG-TERM CURRENT USE OF INSULIN (H): ICD-10-CM

## 2021-06-09 DIAGNOSIS — Z17.0 MALIGNANT NEOPLASM OF UPPER-OUTER QUADRANT OF LEFT BREAST IN FEMALE, ESTROGEN RECEPTOR POSITIVE (H): ICD-10-CM

## 2021-06-09 DIAGNOSIS — C50.412 MALIGNANT NEOPLASM OF UPPER-OUTER QUADRANT OF LEFT BREAST IN FEMALE, ESTROGEN RECEPTOR POSITIVE (H): Primary | ICD-10-CM

## 2021-06-09 DIAGNOSIS — C50.412 MALIGNANT NEOPLASM OF UPPER-OUTER QUADRANT OF LEFT BREAST IN FEMALE, ESTROGEN RECEPTOR POSITIVE (H): ICD-10-CM

## 2021-06-09 LAB
ALBUMIN SERPL-MCNC: 4 G/DL (ref 3.4–5)
ALP SERPL-CCNC: 67 U/L (ref 40–150)
ALT SERPL W P-5'-P-CCNC: 45 U/L (ref 0–50)
ANION GAP SERPL CALCULATED.3IONS-SCNC: 7 MMOL/L (ref 3–14)
AST SERPL W P-5'-P-CCNC: 36 U/L (ref 0–45)
BILIRUB SERPL-MCNC: 0.4 MG/DL (ref 0.2–1.3)
BUN SERPL-MCNC: 12 MG/DL (ref 7–30)
CALCIUM SERPL-MCNC: 9.6 MG/DL (ref 8.5–10.1)
CANCER AG27-29 SERPL-ACNC: 14 U/ML (ref 0–39)
CEA SERPL-MCNC: 0.5 UG/L (ref 0–2.5)
CHLORIDE SERPL-SCNC: 104 MMOL/L (ref 94–109)
CO2 SERPL-SCNC: 26 MMOL/L (ref 20–32)
CREAT SERPL-MCNC: 1 MG/DL (ref 0.52–1.04)
DIFFERENTIAL METHOD BLD: ABNORMAL
EOSINOPHIL # BLD AUTO: 0.1 10E9/L (ref 0–0.7)
EOSINOPHIL NFR BLD AUTO: 2 %
ERYTHROCYTE [DISTWIDTH] IN BLOOD BY AUTOMATED COUNT: 13.2 % (ref 10–15)
GFR SERPL CREATININE-BSD FRML MDRD: 65 ML/MIN/{1.73_M2}
GLUCOSE SERPL-MCNC: 96 MG/DL (ref 70–99)
HBA1C MFR BLD: 5.8 % (ref 0–5.6)
HCT VFR BLD AUTO: 37.2 % (ref 35–47)
HGB BLD-MCNC: 12.9 G/DL (ref 11.7–15.7)
LYMPHOCYTES # BLD AUTO: 1.5 10E9/L (ref 0.8–5.3)
LYMPHOCYTES NFR BLD AUTO: 52 %
MCH RBC QN AUTO: 33.2 PG (ref 26.5–33)
MCHC RBC AUTO-ENTMCNC: 34.7 G/DL (ref 31.5–36.5)
MCV RBC AUTO: 96 FL (ref 78–100)
MONOCYTES # BLD AUTO: 0.4 10E9/L (ref 0–1.3)
MONOCYTES NFR BLD AUTO: 16 %
NEUTROPHILS # BLD AUTO: 0.8 10E9/L (ref 1.6–8.3)
NEUTROPHILS NFR BLD AUTO: 30 %
PLATELET # BLD AUTO: 133 10E9/L (ref 150–450)
POTASSIUM SERPL-SCNC: 4 MMOL/L (ref 3.4–5.3)
PROT SERPL-MCNC: 7.8 G/DL (ref 6.8–8.8)
RBC # BLD AUTO: 3.88 10E12/L (ref 3.8–5.2)
RBC MORPH BLD: ABNORMAL
SODIUM SERPL-SCNC: 137 MMOL/L (ref 133–144)
TSH SERPL DL<=0.005 MIU/L-ACNC: 1.68 MU/L (ref 0.4–4)
VARIANT LYMPHS BLD QL SMEAR: PRESENT
WBC # BLD AUTO: 2.8 10E9/L (ref 4–11)

## 2021-06-09 PROCEDURE — 83036 HEMOGLOBIN GLYCOSYLATED A1C: CPT | Performed by: INTERNAL MEDICINE

## 2021-06-09 PROCEDURE — 84443 ASSAY THYROID STIM HORMONE: CPT | Performed by: INTERNAL MEDICINE

## 2021-06-09 PROCEDURE — 36415 COLL VENOUS BLD VENIPUNCTURE: CPT | Performed by: INTERNAL MEDICINE

## 2021-06-09 PROCEDURE — 85025 COMPLETE CBC W/AUTO DIFF WBC: CPT | Performed by: INTERNAL MEDICINE

## 2021-06-09 PROCEDURE — 82378 CARCINOEMBRYONIC ANTIGEN: CPT | Performed by: INTERNAL MEDICINE

## 2021-06-09 PROCEDURE — 86300 IMMUNOASSAY TUMOR CA 15-3: CPT | Performed by: INTERNAL MEDICINE

## 2021-06-09 PROCEDURE — 80053 COMPREHEN METABOLIC PANEL: CPT | Performed by: INTERNAL MEDICINE

## 2021-06-09 RX ORDER — LETROZOLE 2.5 MG/1
2.5 TABLET, FILM COATED ORAL DAILY
Qty: 28 TABLET | Refills: 0 | Status: SHIPPED | OUTPATIENT
Start: 2021-06-09 | End: 2021-07-07

## 2021-06-10 ENCOUNTER — VIRTUAL VISIT (OUTPATIENT)
Dept: ONCOLOGY | Facility: CLINIC | Age: 51
End: 2021-06-10
Attending: PHYSICIAN ASSISTANT
Payer: COMMERCIAL

## 2021-06-10 DIAGNOSIS — C50.919 METASTATIC BREAST CANCER: Primary | ICD-10-CM

## 2021-06-10 DIAGNOSIS — C50.412 MALIGNANT NEOPLASM OF UPPER-OUTER QUADRANT OF LEFT BREAST IN FEMALE, ESTROGEN RECEPTOR POSITIVE (H): ICD-10-CM

## 2021-06-10 DIAGNOSIS — Z17.0 MALIGNANT NEOPLASM OF UPPER-OUTER QUADRANT OF LEFT BREAST IN FEMALE, ESTROGEN RECEPTOR POSITIVE (H): ICD-10-CM

## 2021-06-10 PROCEDURE — 999N001193 HC VIDEO/TELEPHONE VISIT; NO CHARGE

## 2021-06-10 PROCEDURE — 99214 OFFICE O/P EST MOD 30 MIN: CPT | Mod: TEL | Performed by: PHYSICIAN ASSISTANT

## 2021-06-10 RX ORDER — MONTELUKAST SODIUM 10 MG/1
TABLET ORAL
COMMUNITY
Start: 2021-05-29 | End: 2021-07-15

## 2021-06-10 NOTE — PROGRESS NOTES
"Jade is a 51 year old who is being evaluated via a billable telephone visit.      What phone number would you like to be contacted at? [phone]    How would you like to obtain your AVS? MyChart     Vitals - Patient Reported  Weight (Patient Reported): 142 kg (313 lb)  Height (Patient Reported): 175.3 cm (5' 9\")  BMI (Based on Pt Reported Ht/Wt): 46.22  Pain Score: Moderate Pain (5)  Pain Loc: (LEFT & RIGHT LEG - FELL OF CHAIR)    Lauri Vargas LPN      HISTORY OF PRESENT ILLNESS:  Jade is a 51-year-old woman with metastatic breast cancer who comes to our clinic for recommendations for further treatment.  She is referred by Dr. Norberto Brand at [facility].  Jade would like to continue her care at the [facility].       Jade was diagnosed with breast cancer with a lump found in the upper-inner quadrant of the left breast.  She was diagnosed in 12/2013 in the [place] area and Dr. David Redd was her medical oncologist.  Biopsy of the tumor showed it to be ER positive, AZ positive, and HER-2 negative.  We do not have any of the original pathology and we need to obtain those reports.  She underwent a lumpectomy performed by Dr. Tasha Segura who is a surgeon in the [place] area.  She also had a sentinel lymph node which was noted to be involved with tumor but there was no lymph node dissection done at that time.  [id number].  She subsequently underwent adjuvant chemotherapy with dose-dense AC for 4 cycles and Taxol for 12 weeks, completed 09/11/2014.        She then had radiation therapy post lumpectomy to the left breast, which was completed 11/23/2014 by Dr. Manley.        She was then begun on an aromatase inhibitor, the name of which she does not remember.  The aromatase inhibitor therapy was begun in 11/2014.  She was then switched within about a month or so to anastrozole and remained on anastrozole from 11/2014-02/2017.  She has never received tamoxifen.        She then moved to [place], [place] " in 03/2017.  She saw a gynecologist there and underwent a IRIS/BSO by Dr. Zendejas.  She then also saw Dr. Linares in [place], [place], who is an oncologist.  His interpretation of the pathology report was that she had triple-negative breast cancer.  It is possible that she may have had low estrogen receptor positivity, but the anastrozole was then discontinued in 02/2017.  She then underwent the IRIS/BSO in 03/2017.  This was done laparoscopically.        She then remained off of all hormonal therapy and in 09/2017 moved to [place].  She remained off hormonal therapy and did not have Medical Oncology followup initially.        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  She then went to see Dr. Norberto Brand who performed a PET/CT scan and the patient also underwent a brain MRI for staging.  The PET/CT scan performed 08/03/2018 showed in the neck there were several mildly metabolic active and large prominent left posterior cervical lymph nodes.  The largest is located posterior to the left sternocleidomastoid muscle and measures 1.5 x 1 cm in size.  This demonstrate modest FDG uptake.  The other lymph nodes are smaller and demonstrate mild FDG uptake.  The prominent prevascular and aortopulmonary lymph nodes were also seen on the contrast-enhanced scan were less well seen on the PET.  The largest lymph node visualized on the PET scan was 1.1 x 0.8 cm and demonstrated mild FDG uptake.  This was in the periaortic area just anterior to the aortic arch.  No lung nodules.  No lung infiltrates.  No pleural effusion.  There was no uptake of FDG in the bones.  In summary, there were mildly enlarged left posterior cervical lymph nodes, largest measuring 1.5 x 1.0 cm and mildly enlarged aorticopulmonary and prevascular lymph nodes that were noted on contrast-enhanced CT scan seen less well on the PET scan.  No evidence of metastatic disease in the abdomen and pelvis.  She also underwent a  brain MRI that showed no evidence of intracranial metastatic disease.  She underwent a biopsy of one of the lymph nodes in the left neck which showed metastatic adenocarcinoma consistent with breast origin, which was estrogen-receptor positive.  The tumor cells were positive for CK7, ER and GA consistent with metastatic breast carcinoma.  Estrogen receptor showed strong average nuclear intensity in 95% of carcinoma cells, progesterone receptor moderate average nuclear intensity in 70% of the carcinoma.  A GATA3 stain was apparently not performed.       TREATMENT HISTORY:  A. Tamoxifen  B. Letrozole and palbociclib started 9-18-18.     INTERVAL HISTORY:    Jade Collins was met with for follow up over telephone. She is feeling okay. She has cut back on soda and eating healthier to lower her blood sugars. She was happy with the result from yesterday. She is trying to walk more. Breathing has improved with Advair BID and then she doesn't need albuterol as often. Energy and stamina are better with this.     She fell over the weekend. She was working on a ceiling fan and fell off the chair. She hit her left leg on the table and R leg on the chair. She has had a lot of bruising and swelling and abrasions on both legs. She had PCP evaluate on Tuesday who ruled out infection. She has been washing daily and using triple antibiotic cream. Still has some bruising so she is using ice.     No fevers/chills. A little nausea after BMs. No vomiting. Bowels normal. No urinary concerns.     No new or different pain besides the legs. She does note the mini bone aches she has had in her arms, neck, and legs occur while she is on ibrance. She takes mirtazapine at night and this helps her sleep.  No lumps/bumps.        A 10-point review of systems was performed and was negative with the exception of pertinent positives noted above.     OBJECTIVE DATA:    Voice is clear and strong. No audible stridor, wheezing, or respiratory distress. The  remainder of PE was deferred due to PHE.        Labs:    6/9/2021 14:35   Sodium 137   Potassium 4.0   Chloride 104   Carbon Dioxide 26   Urea Nitrogen 12   Creatinine 1.00   GFR Estimate 65   GFR Estimate If Black 76   Calcium 9.6   Anion Gap 7   Albumin 4.0   Protein Total 7.8   Bilirubin Total 0.4   Alkaline Phosphatase 67   ALT 45   AST 36   Hemoglobin A1C 5.8 (H)   CA 27-29 14   TSH 1.68   Glucose 96   WBC 2.8 (L)   Hemoglobin 12.9   Hematocrit 37.2   Platelet Count 133 (L)   RBC Count 3.88   MCV 96   MCH 33.2 (H)   MCHC 34.7   RDW 13.2   Diff Method Manual Differential   % Neutrophils 30.0   % Lymphocytes 52.0   % Monocytes 16.0   % Eosinophils 2.0   Absolute Neutrophil 0.8 (L)   Absolute Lymphocytes 1.5   Absolute Monocytes 0.4   Absolute Eosinophils 0.1   RBC Morphology Consistent with reported results   Reactive Lymphs Present   CEA 0.5        ASSESSMENT AND PLAN:  1. Recurrent, metastatic ER-positive, HER2 negative breast cancer: History of left breast cancer s/p lumpectomy and SNLB in 2014. S/p 4 cycles ddAC and 12 weeks of Taxol. On AI from 11/2014-2/2017. Recurrence on PET/CT scan performed 08/03/2018 and then biopsy to left posterior cervical lymph nodes, aorticopulmonary, and prevascular lymph nodes were positive. No evidence of metastatic disease in the abdomen and pelvis or brain. Started Ibrance and letrozole on 9/21/18. Last restaging in May showed stable disease. Plan for re imaging in August.   -Due to start ibrance on 6/11. ANC yesterday was only 800. Will hold for another week and recheck labs next week on 6/16 or 6/17. Okay to start new cycle if ANC is equal or greater to 100 mg at same dose level.   - Allergy to contrast and she needs premedication and needs CT CAP and CT neck.   2.  Prediabetes: Reviewed Hgb A1c had increased again to 5.8 to prediabetes range. She is actively working on changing her diet and is walking more now that she has better asthma control and does not have to wear a  mask outside.   3. Leg abrasions: Had in person evaluation on 6/8. Continue to wash daily and apply triple antibiotic cream. Reviewed s/s of cellulitis to watch out for.   3.  Depression and anxiety. She saw Dr. Pate her new PCP and her regimen was changed to add buspirone 7.5 mg twice daily to sertraline 200 mg once per day added. Lorazepam prn anxiety was added.  She has improvement of her symptoms of depression and anxiety.   4.  Hypothyroid. Levothyroxine 175 mg per day.     5. Asthma: Seen by Dr. Hay. Per chart on fluticasone-salmeterol inhaler BID. Also has albuterol prn.   6. COVID vaccination: She completed both doses of Pfizer vaccine in April.     Phone call duration: 21 minutes     35 minutes spent on the date of the encounter doing chart review, review of test results, interpretation of tests, patient visit and documentation, in addition to 21 minutes spent on the phone with the patient.     Cecilia Goins PA-C

## 2021-06-10 NOTE — LETTER
"    6/10/2021         RE: Jade Collins  [address]  [facility] [address]        Dear Colleague,    Thank you for referring your patient, Jade Collins, to the [facility]. Please see a copy of my visit note below.    Jade is a 51 year old who is being evaluated via a billable telephone visit.      What phone number would you like to be contacted at? [phone]    How would you like to obtain your AVS? [place]     Vitals - Patient Reported  Weight (Patient Reported): 142 kg (313 lb)  Height (Patient Reported): 175.3 cm (5' 9\")  BMI (Based on Pt Reported Ht/Wt): 46.22  Pain Score: Moderate Pain (5)  Pain Loc: (LEFT & RIGHT LEG - FELL OF CHAIR)    Lauri Vargas LPN      HISTORY OF PRESENT ILLNESS:  Jade is a 51-year-old woman with metastatic breast cancer who comes to our clinic for recommendations for further treatment.  She is referred by Dr. Norberto Brand at [facility].  Jade would like to continue her care at the [facility].       Jade was diagnosed with breast cancer with a lump found in the upper-inner quadrant of the left breast.  She was diagnosed in 12/2013 in the [place] area and Dr. David Redd was her medical oncologist.  Biopsy of the tumor showed it to be ER positive, UT positive, and HER-2 negative.  We do not have any of the original pathology and we need to obtain those reports.  She underwent a lumpectomy performed by Dr. Tasha Segura who is a surgeon in the [place] area.  She also had a sentinel lymph node which was noted to be involved with tumor but there was no lymph node dissection done at that time.  [id number].  She subsequently underwent adjuvant chemotherapy with dose-dense AC for 4 cycles and Taxol for 12 weeks, completed 09/11/2014.        She then had radiation therapy post lumpectomy to the left breast, which was completed 11/23/2014 by Dr. Manley.        She was then begun on an aromatase inhibitor, the name of which she does " not remember.  The aromatase inhibitor therapy was begun in 11/2014.  She was then switched within about a month or so to anastrozole and remained on anastrozole from 11/2014-02/2017.  She has never received tamoxifen.        She then moved to [place], [place] in 03/2017.  She saw a gynecologist there and underwent a IRIS/BSO by Dr. Zendejas.  She then also saw Dr. Linares in [place], [place], who is an oncologist.  His interpretation of the pathology report was that she had triple-negative breast cancer.  It is possible that she may have had low estrogen receptor positivity, but the anastrozole was then discontinued in 02/2017.  She then underwent the IRIS/BSO in 03/2017.  This was done laparoscopically.        She then remained off of all hormonal therapy and in 09/2017 moved to [place].  She remained off hormonal therapy and did not have Medical Oncology followup initially.        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  She then went to see Dr. Norberto Brand who performed a PET/CT scan and the patient also underwent a brain MRI for staging.  The PET/CT scan performed 08/03/2018 showed in the neck there were several mildly metabolic active and large prominent left posterior cervical lymph nodes.  The largest is located posterior to the left sternocleidomastoid muscle and measures 1.5 x 1 cm in size.  This demonstrate modest FDG uptake.  The other lymph nodes are smaller and demonstrate mild FDG uptake.  The prominent prevascular and aortopulmonary lymph nodes were also seen on the contrast-enhanced scan were less well seen on the PET.  The largest lymph node visualized on the PET scan was 1.1 x 0.8 cm and demonstrated mild FDG uptake.  This was in the periaortic area just anterior to the aortic arch.  No lung nodules.  No lung infiltrates.  No pleural effusion.  There was no uptake of FDG in the bones.  In summary, there were mildly enlarged left posterior cervical lymph nodes,  largest measuring 1.5 x 1.0 cm and mildly enlarged aorticopulmonary and prevascular lymph nodes that were noted on contrast-enhanced CT scan seen less well on the PET scan.  No evidence of metastatic disease in the abdomen and pelvis.  She also underwent a brain MRI that showed no evidence of intracranial metastatic disease.  She underwent a biopsy of one of the lymph nodes in the left neck which showed metastatic adenocarcinoma consistent with breast origin, which was estrogen-receptor positive.  The tumor cells were positive for CK7, ER and CO consistent with metastatic breast carcinoma.  Estrogen receptor showed strong average nuclear intensity in 95% of carcinoma cells, progesterone receptor moderate average nuclear intensity in 70% of the carcinoma.  A GATA3 stain was apparently not performed.       TREATMENT HISTORY:  A. Tamoxifen  B. Letrozole and palbociclib started 9-18-18.     INTERVAL HISTORY:    Jade Collins was met with for follow up over telephone. She is feeling okay. She has cut back on soda and eating healthier to lower her blood sugars. She was happy with the result from yesterday. She is trying to walk more. Breathing has improved with Advair BID and then she doesn't need albuterol as often. Energy and stamina are better with this.     She fell over the weekend. She was working on a ceiling fan and fell off the chair. She hit her left leg on the table and R leg on the chair. She has had a lot of bruising and swelling and abrasions on both legs. She had PCP evaluate on Tuesday who ruled out infection. She has been washing daily and using triple antibiotic cream. Still has some bruising so she is using ice.     No fevers/chills. A little nausea after BMs. No vomiting. Bowels normal. No urinary concerns.     No new or different pain besides the legs. She does note the mini bone aches she has had in her arms, neck, and legs occur while she is on ibrance. She takes mirtazapine at night and this helps her  sleep.  No lumps/bumps.        A 10-point review of systems was performed and was negative with the exception of pertinent positives noted above.     OBJECTIVE DATA:    Voice is clear and strong. No audible stridor, wheezing, or respiratory distress. The remainder of PE was deferred due to PHE.        Labs:    6/9/2021 14:35   Sodium 137   Potassium 4.0   Chloride 104   Carbon Dioxide 26   Urea Nitrogen 12   Creatinine 1.00   GFR Estimate 65   GFR Estimate If Black 76   Calcium 9.6   Anion Gap 7   Albumin 4.0   Protein Total 7.8   Bilirubin Total 0.4   Alkaline Phosphatase 67   ALT 45   AST 36   Hemoglobin A1C 5.8 (H)   CA 27-29 14   TSH 1.68   Glucose 96   WBC 2.8 (L)   Hemoglobin 12.9   Hematocrit 37.2   Platelet Count 133 (L)   RBC Count 3.88   MCV 96   MCH 33.2 (H)   MCHC 34.7   RDW 13.2   Diff Method Manual Differential   % Neutrophils 30.0   % Lymphocytes 52.0   % Monocytes 16.0   % Eosinophils 2.0   Absolute Neutrophil 0.8 (L)   Absolute Lymphocytes 1.5   Absolute Monocytes 0.4   Absolute Eosinophils 0.1   RBC Morphology Consistent with reported results   Reactive Lymphs Present   CEA 0.5        ASSESSMENT AND PLAN:  1. Recurrent, metastatic ER-positive, HER2 negative breast cancer: History of left breast cancer s/p lumpectomy and SNLB in 2014. S/p 4 cycles ddAC and 12 weeks of Taxol. On AI from 11/2014-2/2017. Recurrence on PET/CT scan performed 08/03/2018 and then biopsy to left posterior cervical lymph nodes, aorticopulmonary, and prevascular lymph nodes were positive. No evidence of metastatic disease in the abdomen and pelvis or brain. Started Ibrance and letrozole on 9/21/18. Last restaging in May showed stable disease. Plan for re imaging in August.   -Due to start ibrance on 6/11. ANC yesterday was only 800. Will hold for another week and recheck labs next week on 6/16 or 6/17. Okay to start new cycle if ANC is equal or greater to 100 mg at same dose level.   - Allergy to contrast and she needs  premedication and needs CT CAP and CT neck.   2.  Prediabetes: Reviewed Hgb A1c had increased again to 5.8 to prediabetes range. She is actively working on changing her diet and is walking more now that she has better asthma control and does not have to wear a mask outside.   3. Leg abrasions: Had in person evaluation on 6/8. Continue to wash daily and apply triple antibiotic cream. Reviewed s/s of cellulitis to watch out for.   3.  Depression and anxiety. She saw Dr. Pate her new PCP and her regimen was changed to add buspirone 7.5 mg twice daily to sertraline 200 mg once per day added. Lorazepam prn anxiety was added.  She has improvement of her symptoms of depression and anxiety.   4.  Hypothyroid. Levothyroxine 175 mg per day.     5. Asthma: Seen by Dr. Hay. Per chart on fluticasone-salmeterol inhaler BID. Also has albuterol prn.   6. COVID vaccination: She completed both doses of Pfizer vaccine in April.     Phone call duration: 21 minutes     35 minutes spent on the date of the encounter doing chart review, review of test results, interpretation of tests, patient visit and documentation, in addition to 21 minutes spent on the phone with the patient.     Cecilia Goins PA-C       Again, thank you for allowing me to participate in the care of your patient.        Sincerely,        Cecilia Goins PA-C

## 2021-06-17 DIAGNOSIS — C50.919 METASTATIC BREAST CANCER: ICD-10-CM

## 2021-06-17 LAB
BASOPHILS # BLD AUTO: 0.1 10E9/L (ref 0–0.2)
BASOPHILS NFR BLD AUTO: 2.3 %
DIFFERENTIAL METHOD BLD: ABNORMAL
EOSINOPHIL # BLD AUTO: 0.1 10E9/L (ref 0–0.7)
EOSINOPHIL NFR BLD AUTO: 3.4 %
ERYTHROCYTE [DISTWIDTH] IN BLOOD BY AUTOMATED COUNT: 13.1 % (ref 10–15)
HCT VFR BLD AUTO: 37.9 % (ref 35–47)
HGB BLD-MCNC: 13.2 G/DL (ref 11.7–15.7)
IMM GRANULOCYTES # BLD: 0 10E9/L (ref 0–0.4)
IMM GRANULOCYTES NFR BLD: 0.6 %
LYMPHOCYTES # BLD AUTO: 1.1 10E9/L (ref 0.8–5.3)
LYMPHOCYTES NFR BLD AUTO: 29.8 %
MCH RBC QN AUTO: 33.3 PG (ref 26.5–33)
MCHC RBC AUTO-ENTMCNC: 34.8 G/DL (ref 31.5–36.5)
MCV RBC AUTO: 96 FL (ref 78–100)
MONOCYTES # BLD AUTO: 0.4 10E9/L (ref 0–1.3)
MONOCYTES NFR BLD AUTO: 12.5 %
NEUTROPHILS # BLD AUTO: 1.8 10E9/L (ref 1.6–8.3)
NEUTROPHILS NFR BLD AUTO: 51.4 %
PLATELET # BLD AUTO: 163 10E9/L (ref 150–450)
RBC # BLD AUTO: 3.96 10E12/L (ref 3.8–5.2)
WBC # BLD AUTO: 3.5 10E9/L (ref 4–11)

## 2021-06-17 PROCEDURE — 36415 COLL VENOUS BLD VENIPUNCTURE: CPT | Performed by: PHYSICIAN ASSISTANT

## 2021-06-17 PROCEDURE — 85025 COMPLETE CBC W/AUTO DIFF WBC: CPT | Performed by: PHYSICIAN ASSISTANT

## 2021-06-20 NOTE — LETTER
Letter by Ana Ag RN at      Author: Ana Ag RN Service: -- Author Type: --    Filed:  Encounter Date: 5/13/2020 Status: (Other)       5/13/2020        Jade Collins  [address]  [facility] [address]    This letter provides a written record that you were tested for COVID-19 on 5/11/20.     Your result was negative.    This means that we didnt find the virus that causes COVID-19 in your sample. A test may show negative when you do actually have the virus. This can happen when the virus is in the early stages of infection, before you feel illness symptoms.    Even if you dont have symptoms, they may still appear. For safety, its very important to follow these rules.    Keep yourself away from others (self-isolation):      Stay home. Dont go to work, school or anywhere else.     Stay in your own room (and use your own bathroom), if you can.    Stay away from others in your home. No hugging, kissing or shaking hands. No visitors.    Clean high touch surfaces often (doorknobs, counters, handles, etc.). Use a household cleaning spray or wipes.    Cover your mouth and nose with a mask, tissue or washcloth to avoid spreading germs.    Wash your hands and face often with soap and water.    Stay in self-isolation until you meet ALL of the guidelines below:    1. You have had no fever for at least 72 hours (that is 3 full days of no fever without the use of medicine that reduces fevers), AND  2. other symptoms (such as cough, shortness of breath) have gotten better, AND  3. at least 10 days have passed since your symptoms first appeared.    Going back to work  Check with your employer for any guidelines to follow for going back to work.    Employers: This document serves as formal notice that your employee tested negative for COVID-19, as of the testing date shown above.    For questions regarding this letter or your Negative COVID-19 result, call [phone] between 8A to 6:30P (M-F) and 10A to 6:30P  (weekends).

## 2021-07-01 DIAGNOSIS — E06.3 HYPOTHYROIDISM DUE TO HASHIMOTO'S THYROIDITIS: ICD-10-CM

## 2021-07-05 RX ORDER — LEVOTHYROXINE SODIUM 175 UG/1
175 TABLET ORAL DAILY
Qty: 90 TABLET | Refills: 3 | Status: SHIPPED | OUTPATIENT
Start: 2021-07-05 | End: 2021-11-18

## 2021-07-05 NOTE — TELEPHONE ENCOUNTER
LEVOTHYROXINE 0.175MG (175MCG) TABS   Last Written Prescription Date:  9/20/2020  Last Fill Quantity: 90,   # refills: 3  Last Office Visit : 6/29/2020  Future Office visit:  None    Routing refill request to provider for review/approval because:  Office Visit due      Refer to Provider for review       Claudia Brown RN  Central Triage Red Flags/Med Refills

## 2021-07-07 DIAGNOSIS — Z17.0 MALIGNANT NEOPLASM OF UPPER-OUTER QUADRANT OF LEFT BREAST IN FEMALE, ESTROGEN RECEPTOR POSITIVE (H): Primary | ICD-10-CM

## 2021-07-07 DIAGNOSIS — C50.412 MALIGNANT NEOPLASM OF UPPER-OUTER QUADRANT OF LEFT BREAST IN FEMALE, ESTROGEN RECEPTOR POSITIVE (H): Primary | ICD-10-CM

## 2021-07-07 RX ORDER — LETROZOLE 2.5 MG/1
2.5 TABLET, FILM COATED ORAL DAILY
Qty: 28 TABLET | Refills: 0 | Status: SHIPPED | OUTPATIENT
Start: 2021-07-07 | End: 2021-09-09

## 2021-07-14 ENCOUNTER — LAB (OUTPATIENT)
Dept: LAB | Facility: CLINIC | Age: 51
End: 2021-07-14
Payer: COMMERCIAL

## 2021-07-14 ENCOUNTER — TELEPHONE (OUTPATIENT)
Dept: ONCOLOGY | Facility: CLINIC | Age: 51
End: 2021-07-14

## 2021-07-14 DIAGNOSIS — Z17.0 MALIGNANT NEOPLASM OF UPPER-OUTER QUADRANT OF LEFT BREAST IN FEMALE, ESTROGEN RECEPTOR POSITIVE (H): ICD-10-CM

## 2021-07-14 DIAGNOSIS — E11.9 TYPE 2 DIABETES MELLITUS WITHOUT COMPLICATION, WITHOUT LONG-TERM CURRENT USE OF INSULIN (H): ICD-10-CM

## 2021-07-14 DIAGNOSIS — C50.412 MALIGNANT NEOPLASM OF UPPER-OUTER QUADRANT OF LEFT BREAST IN FEMALE, ESTROGEN RECEPTOR POSITIVE (H): ICD-10-CM

## 2021-07-14 DIAGNOSIS — E03.9 PRIMARY HYPOTHYROIDISM: ICD-10-CM

## 2021-07-14 LAB
ALBUMIN SERPL-MCNC: 3.9 G/DL (ref 3.4–5)
ALP SERPL-CCNC: 73 U/L (ref 40–150)
ALT SERPL W P-5'-P-CCNC: 41 U/L
ANION GAP SERPL CALCULATED.3IONS-SCNC: 6 MMOL/L (ref 3–14)
AST SERPL W P-5'-P-CCNC: 30 U/L (ref 0–45)
BASOPHILS # BLD AUTO: 0 10E3/UL (ref 0–0.2)
BASOPHILS NFR BLD AUTO: 1 %
BILIRUB SERPL-MCNC: 0.4 MG/DL (ref 0.2–1.3)
BUN SERPL-MCNC: 12 MG/DL (ref 7–30)
CALCIUM SERPL-MCNC: 9.4 MG/DL (ref 8.5–10.1)
CANCER AG27-29 SERPL-ACNC: 12 U/ML (ref 0–39)
CEA SERPL-MCNC: 0.8 UG/L (ref 0–2.5)
CHLORIDE BLD-SCNC: 106 MMOL/L
CO2 SERPL-SCNC: 27 MMOL/L (ref 20–32)
CREAT SERPL-MCNC: 0.99 MG/DL
EOSINOPHIL # BLD AUTO: 0.1 10E3/UL (ref 0–0.7)
EOSINOPHIL NFR BLD AUTO: 4 %
ERYTHROCYTE [DISTWIDTH] IN BLOOD BY AUTOMATED COUNT: 13.8 % (ref 10–15)
GFR SERPL CREATININE-BSD FRML MDRD: 66 ML/MIN/1.73M2
GLUCOSE BLD-MCNC: 118 MG/DL (ref 70–99)
HBA1C MFR BLD: 5.7 % (ref 0–5.6)
HCT VFR BLD AUTO: 37.8 % (ref 35–47)
HGB BLD-MCNC: 12.8 G/DL (ref 11.7–15.7)
LYMPHOCYTES # BLD AUTO: 1.4 10E3/UL (ref 0.8–5.3)
LYMPHOCYTES NFR BLD AUTO: 43 %
MCH RBC QN AUTO: 33.5 PG (ref 26.5–33)
MCHC RBC AUTO-ENTMCNC: 33.9 G/DL (ref 31.5–36.5)
MCV RBC AUTO: 99 FL (ref 78–100)
MONOCYTES # BLD AUTO: 0.3 10E3/UL (ref 0–1.3)
MONOCYTES NFR BLD AUTO: 9 %
NEUTROPHILS # BLD AUTO: 1.4 10E3/UL (ref 1.6–8.3)
NEUTROPHILS NFR BLD AUTO: 43 %
PLATELET # BLD AUTO: 159 10E3/UL (ref 150–450)
POTASSIUM BLD-SCNC: 4.2 MMOL/L (ref 3.4–5.3)
PROT SERPL-MCNC: 7.9 G/DL (ref 6.8–8.8)
RBC # BLD AUTO: 3.82 10E6/UL (ref 3.8–5.2)
SODIUM SERPL-SCNC: 139 MMOL/L (ref 133–144)
T4 FREE SERPL-MCNC: 0.83 NG/DL (ref 0.76–1.46)
TSH SERPL DL<=0.005 MIU/L-ACNC: 4.35 MU/L (ref 0.4–4)
WBC # BLD AUTO: 3.3 10E3/UL (ref 4–11)

## 2021-07-14 PROCEDURE — 85025 COMPLETE CBC W/AUTO DIFF WBC: CPT

## 2021-07-14 PROCEDURE — 80053 COMPREHEN METABOLIC PANEL: CPT

## 2021-07-14 PROCEDURE — 86300 IMMUNOASSAY TUMOR CA 15-3: CPT

## 2021-07-14 PROCEDURE — 84443 ASSAY THYROID STIM HORMONE: CPT

## 2021-07-14 PROCEDURE — 36415 COLL VENOUS BLD VENIPUNCTURE: CPT

## 2021-07-14 PROCEDURE — 83036 HEMOGLOBIN GLYCOSYLATED A1C: CPT

## 2021-07-14 PROCEDURE — 82378 CARCINOEMBRYONIC ANTIGEN: CPT

## 2021-07-14 PROCEDURE — 84439 ASSAY OF FREE THYROXINE: CPT

## 2021-07-14 NOTE — PROGRESS NOTES
Oral Chemotherapy Monitoring Program    Subjective/Objective:  Jade Collins is a 51 year old female contacted by phone for a follow-up visit for oral chemotherapy.  Pt confirms taking the appropriate dose of Ibrance, 100mg daily for 21 days, then 7 days off and letrozole 2.5mg daily. Denies missed doses, and recent hospital or ED visits. Patient has not had any recent medication changes.     Pt states she has been getting hot flashes during her Ibrance off week, she believes this is from the letrozole.    ORAL CHEMOTHERAPY 4/13/2021 4/16/2021 5/12/2021 5/14/2021 6/9/2021 7/7/2021 7/14/2021   Assessment Type Other Quarterly Follow up Refill Chart Review Refill Refill Quarterly Follow up   Diagnosis Code Breast Cancer Breast Cancer Breast Cancer Breast Cancer Breast Cancer Breast Cancer Breast Cancer   Providers Dr. Nova Bhatt   [facility]   Drug Name Ibrance (palbociclib) Ibrance (palbociclib) Ibrance (palbociclib) Ibrance (palbociclib) Ibrance (palbociclib) Ibrance (palbociclib) Ibrance (palbociclib)   Dose 100 mg 100 mg 100 mg 100 mg 100 mg 100 mg 100 mg   Current Schedule Daily Daily Daily Daily Daily Daily Daily   Cycle Details 3 weeks on, 1 week off 3 weeks on, 1 week off 3 weeks on, 1 week off 3 weeks on, 1 week off 3 weeks on, 1 week off 3 weeks on, 1 week off 3 weeks on, 1 week off   Start Date of Last Cycle - - - - - - -   Planned next cycle start date 4/17/2021 4/16/2021 - - - - 7/16/2021   Doses missed in last 2 weeks - 0 - - - - 0   Adherence Assessment - Adherent - - - - Adherent   Adverse Effects - No AE identified during assessment - - - - No AE identified during assessment   Nausea - - - - - - -   Pharmacist Intervention(nausea) - - - - - - -   Diarrhea - - - - - - -   Pharmacist Intervention(diarrhea) - - - - - - -   Oral Mucositis - - - - - - -   Pharmacist Intervention(oral  "mucositis) - - - - - - -   Fatigue - - - - - - -   Pharmacist Intervention(fatigue) - - - - - - -   Neutropenia - - - - - - -   Pharmacist Intervention(neutropenia) - - - - - - -   Intervention(s) - - - - - - -   Home BPs - - - - - - -   Any new drug interactions? - No - - - - No   Pharmacist Intervention? - - - - - - -   Intervention(s) - - - - - - -   Is the dose as ordered appropriate for the patient? - Yes - - - - Yes   Is the patient currently in pain? - - - - - - -   Does the patient feel the pain is currently being managed by a provider? - - - - - - -   Has the patient been assessed within the past 6 months for depression? - - - - - - -   Has the patient missed any days of school, work, or other routine activity? - - - - - - -   Since the last time we talked, have you been hospitalized or used the emergency room? - - - - - - No       Last PHQ-2 Score on record:   PHQ-2 ( 1999 Pfizer) 9/18/2018   Q1: Little interest or pleasure in doing things 1   Q2: Feeling down, depressed or hopeless 1   PHQ-2 Score 2       Vitals:  BP:   BP Readings from Last 1 Encounters:   01/28/21 139/68     Wt Readings from Last 1 Encounters:   01/28/21 145.2 kg (320 lb)     Estimated body surface area is 2.64 meters squared as calculated from the following:    Height as of 3/12/20: 1.727 m (5' 7.99\").    Weight as of 1/28/21: 145.2 kg (320 lb).    Labs:  _  Result Component Current Result Ref Range   Sodium 139 (7/14/2021) 133 - 144 mmol/L     _  Result Component Current Result Ref Range   Potassium 4.2 (7/14/2021) 3.4 - 5.3 mmol/L     _  Result Component Current Result Ref Range   Calcium 9.4 (7/14/2021) 8.5 - 10.1 mg/dL     No results found for Mag within last 30 days.     No results found for Phos within last 30 days.     _  Result Component Current Result Ref Range   Albumin 3.9 (7/14/2021) 3.4 - 5.0 g/dL     _  Result Component Current Result Ref Range   Urea Nitrogen 12 (7/14/2021) 7 - 30 mg/dL     _  Result Component Current " Result Ref Range   Creatinine 0.99 (7/14/2021) mg/dL     _  Result Component Current Result Ref Range   AST 30 (7/14/2021) 0 - 45 U/L     _  Result Component Current Result Ref Range   ALT 41 (7/14/2021) U/L     _  Result Component Current Result Ref Range   Bilirubin Total 0.4 (7/14/2021) 0.2 - 1.3 mg/dL     _  Result Component Current Result Ref Range   WBC Count 3.3 (L) (7/14/2021) 4.0 - 11.0 10e3/uL     _  Result Component Current Result Ref Range   Hemoglobin 12.8 (7/14/2021) 11.7 - 15.7 g/dL     _  Result Component Current Result Ref Range   Platelet Count 159 (7/14/2021) 150 - 450 10e3/uL     _  Result Component Current Result Ref Range   Absolute Neutrophil 1.8 (6/17/2021) 1.6 - 8.3 10e9/L     Assessment/Plan:  Pt is tolerating Ibrance and letrozole well, with some hot flashes.  Advised pt to discuss this with Carmen at her appt tomorrow.  Continue current therapy as planned.    Follow-Up:  7/15: Review visit with Carmen Jackson    Refill Due:  SP to deliver Ibrance 7/15 for 7/16 cycle start    Grace Myhre, PharmD  Hematology/Oncology Pharmacist  [facility]  [facility]  [phone]

## 2021-07-15 ENCOUNTER — DOCUMENTATION ONLY (OUTPATIENT)
Dept: ONCOLOGY | Facility: CLINIC | Age: 51
End: 2021-07-15

## 2021-07-15 ENCOUNTER — VIRTUAL VISIT (OUTPATIENT)
Dept: ONCOLOGY | Facility: CLINIC | Age: 51
End: 2021-07-15
Attending: INTERNAL MEDICINE
Payer: COMMERCIAL

## 2021-07-15 DIAGNOSIS — N95.1 MENOPAUSAL SYNDROME (HOT FLASHES): ICD-10-CM

## 2021-07-15 DIAGNOSIS — E03.9 PRIMARY HYPOTHYROIDISM: ICD-10-CM

## 2021-07-15 DIAGNOSIS — C50.412 MALIGNANT NEOPLASM OF UPPER-OUTER QUADRANT OF LEFT BREAST IN FEMALE, ESTROGEN RECEPTOR POSITIVE (H): Primary | ICD-10-CM

## 2021-07-15 DIAGNOSIS — Z17.0 MALIGNANT NEOPLASM OF UPPER-OUTER QUADRANT OF LEFT BREAST IN FEMALE, ESTROGEN RECEPTOR POSITIVE (H): Primary | ICD-10-CM

## 2021-07-15 DIAGNOSIS — R21 RASH: ICD-10-CM

## 2021-07-15 PROCEDURE — 99214 OFFICE O/P EST MOD 30 MIN: CPT | Mod: TEL | Performed by: PHYSICIAN ASSISTANT

## 2021-07-15 PROCEDURE — 999N001193 HC VIDEO/TELEPHONE VISIT; NO CHARGE

## 2021-07-15 RX ORDER — OXYBUTYNIN CHLORIDE 5 MG/1
5 TABLET ORAL 2 TIMES DAILY
Qty: 60 TABLET | Refills: 3 | Status: SHIPPED | OUTPATIENT
Start: 2021-07-15

## 2021-07-15 NOTE — LETTER
7/15/2021         RE: Jade Collins  [address]  [facility] [address]        Dear Colleague,    Thank you for referring your patient, Jade Collins, to the [facility]. Please see a copy of my visit note below.    Oncology/Hematology Visit Note  Jul 15, 2021    HISTORY OF PRESENT ILLNESS:  Jade is a 51 year old woman with metastatic breast cancer.      Jade was diagnosed with breast cancer with a lump found in the upper-inner quadrant of the left breast.  She was diagnosed in 12/2013 in the [place] area and Dr. David Redd was her medical oncologist.  Biopsy of the tumor showed it to be ER positive, WI positive, and HER-2 negative.  We do not have any of the original pathology and we need to obtain those reports.  She underwent a lumpectomy performed by Dr. Tsaha Segura who is a surgeon in the [place] area.  She also had a sentinel lymph node which was noted to be involved with tumor but there was no lymph node dissection done at that time.  [id number].  She subsequently underwent adjuvant chemotherapy with dose-dense AC for 4 cycles and Taxol for 12 weeks, completed 09/11/2014.        She then had radiation therapy post lumpectomy to the left breast, which was completed 11/23/2014 by Dr. Manley.        She was then begun on an aromatase inhibitor, the name of which she does not remember.  The aromatase inhibitor therapy was begun in 11/2014.  She was then switched within about a month or so to anastrozole and remained on anastrozole from 11/2014-02/2017.  She has never received tamoxifen.        She then moved to [place], [place] in 03/2017.  She saw a gynecologist there and underwent a IRIS/BSO by Dr. Zendejas.  She then also saw Dr. Linares in [place], [place], who is an oncologist.  His interpretation of the pathology report was that she had triple-negative breast cancer.  It is possible that she may have had low estrogen receptor positivity, but the anastrozole was then  discontinued in 02/2017.  She then underwent the IRIS/BSO in 03/2017.  This was done laparoscopically.        She then remained off of all hormonal therapy and in 09/2017 moved to [place].  She remained off hormonal therapy and did not have Medical Oncology followup initially.        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  She then went to see Dr. Norberto Brand who performed a PET/CT scan and the patient also underwent a brain MRI for staging.  The PET/CT scan performed 08/03/2018 showed in the neck there were several mildly metabolic active and large prominent left posterior cervical lymph nodes.  The largest is located posterior to the left sternocleidomastoid muscle and measures 1.5 x 1 cm in size.  This demonstrate modest FDG uptake.  The other lymph nodes are smaller and demonstrate mild FDG uptake.  The prominent prevascular and aortopulmonary lymph nodes were also seen on the contrast-enhanced scan were less well seen on the PET.  The largest lymph node visualized on the PET scan was 1.1 x 0.8 cm and demonstrated mild FDG uptake.  This was in the periaortic area just anterior to the aortic arch.  No lung nodules.  No lung infiltrates.  No pleural effusion.  There was no uptake of FDG in the bones.  In summary, there were mildly enlarged left posterior cervical lymph nodes, largest measuring 1.5 x 1.0 cm and mildly enlarged aorticopulmonary and prevascular lymph nodes that were noted on contrast-enhanced CT scan seen less well on the PET scan.  No evidence of metastatic disease in the abdomen and pelvis.  She also underwent a brain MRI that showed no evidence of intracranial metastatic disease.  She underwent a biopsy of one of the lymph nodes in the left neck which showed metastatic adenocarcinoma consistent with breast origin, which was estrogen-receptor positive.  The tumor cells were positive for CK7, ER and TN consistent with metastatic breast carcinoma.  " Estrogen receptor showed strong average nuclear intensity in 95% of carcinoma cells, progesterone receptor moderate average nuclear intensity in 70% of the carcinoma.  A GATA3 stain was apparently not performed.       TREATMENT HISTORY:  A. Tamoxifen  B. Letrozole and palbociclib started 9-18-18.     INTERVAL HISTORY:  -Has been doing okay.   -Has ongoing hot flashes, worse about 1 hour after letrozole with excessive sweating.   -Has some fatigue, unchanged. Sleep quality is variable with hot flashes. Takes mirtazapine to help with sleep.   -Has dyspnea on exertion related to asthma, managed with medication. Feels medications are working well.   -Eating and drinking well.   -Has toe numbness, attributes to circulation issues, unchanged.   -Has a rash on left hand with a raised bump on thumb and palms of hand. Bumps are itchy.   -Has bone aches with Ibrance. Does not need any pain medication.   -Goes for walks about 3 times per week and also enjoys painting canvases and rocks.     Review of Systems:  Patient denies any of the following except if noted above: fevers, chills, vision or hearing changes, chest pain, dyspnea at rest, abdominal pain, nausea, vomiting, diarrhea, constipation, urinary concerns, headaches, or issues with mood. She also denies lumps, bumps, rashes or skin lesions, bleeding or bruising issues.     Objective:  There were no vitals taken for this visit.  General: alert and no distress  Psych: Alert and oriented times; coherent speech, normal rate and volume, able to articulate logical thoughts, able to abstract reason, no tangential thoughts, no hallucinations or delusions  Patient's affect is appropriate.   Pulm: Speaking in full sentences, unlabored, no audible wheezes or cough.  The rest of a comprehensive physical examination is deferred due to PHE (public health emergency) video restrictions\"     Labs:   Most Recent 3 CBC's:Recent Labs   Lab Test 07/14/21  1433 06/17/21  1143 06/09/21  1435 "   WBC 3.3* 3.5* 2.8*   HGB 12.8 13.2 12.9   MCV 99 96 96    163 133*    Most Recent 3 BMP's:  Recent Labs   Lab Test 07/14/21  1433 06/09/21  1435 05/11/21  1233    137 138   POTASSIUM 4.2 4.0 4.3   CHLORIDE 106 104 107   CO2 27 26 27   BUN 12 12 16   CR 0.99 1.00 0.95   ANIONGAP 6 7 4   GIOVANNY 9.4 9.6 9.5   * 96 174*    Most Recent 2 LFT's:  Recent Labs   Lab Test 07/14/21  1433 06/09/21  1435   AST 30 36   ALT 41 45   ALKPHOS 73 67   BILITOTAL 0.4 0.4    Most Recent TSH and T4:  Recent Labs   Lab Test 07/14/21  1433   TSH 4.35*   T4 0.83     I reviewed the above labs today.       ASSESSMENT AND PLAN:  1. Recurrent, metastatic ER-positive, HER2 negative breast cancer: History of left breast cancer s/p lumpectomy and SNLB in 2014. S/p 4 cycles ddAC and 12 weeks of Taxol. On AI from 11/2014-2/2017. Recurrence on PET/CT scan performed 08/03/2018 and then biopsy to left posterior cervical lymph nodes, aorticopulmonary, and prevascular lymph nodes were positive. No evidence of metastatic disease in the abdomen and pelvis or brain. Started Ibrance and letrozole on 9/21/18. Last restaging in May 2021 showed stable disease. Plan for re imaging in August. Her counts are stable today. She will continue on Ibrance and letrozole at her same dosing.  - Allergy to contrast and she needs premedication and needs CT CAP and CT neck.   2.  Prediabetes: She is actively working on changing her diet and is walking more now that she has better asthma control and does not have to wear a mask outside.   3.  Depression and anxiety. She saw Dr. Pate her new PCP and her regimen was changed to add buspirone 7.5 mg twice daily to sertraline 200 mg once per day added. Lorazepam prn anxiety was added.  She has improvement of her symptoms of depression and anxiety. No acute concerns today.   4.  Hypothyroid. Levothyroxine 175 mg per day.   Last TSH was mildly elevated with normal free T4. No adjustments needed.  5. Asthma: Seen  by Dr. Hay. Per chart on fluticasone-salmeterol inhaler BID. Also has albuterol prn.   6. COVID vaccination: She completed both doses of Pfizer vaccine in April.   7. Hot flashes: Will trial oxybutynin 5 mg bid.  8. Rash: Unclear cause. Given itchy nature, will try hydrocortisone and topical Benadryl.    Carmen Jackson PA-C  [facility] [id number] [place] [address]  [phone]    Duration of call: 9 minutes    16 minutes spent on the date of the encounter doing chart review, review of test results, interpretation of tests and documentation, in addition to 9 minutes spent on the phone with the patient.           Again, thank you for allowing me to participate in the care of your patient.        Sincerely,        Carmen Jackson PA-C

## 2021-07-15 NOTE — PROGRESS NOTES
Oncology/Hematology Visit Note  Jul 15, 2021    HISTORY OF PRESENT ILLNESS:  Jade is a 51 year old woman with metastatic breast cancer.      Jade was diagnosed with breast cancer with a lump found in the upper-inner quadrant of the left breast.  She was diagnosed in 12/2013 in the [place] area and Dr. David Redd was her medical oncologist.  Biopsy of the tumor showed it to be ER positive, NC positive, and HER-2 negative.  We do not have any of the original pathology and we need to obtain those reports.  She underwent a lumpectomy performed by Dr. Tasha Segura who is a surgeon in the [place] area.  She also had a sentinel lymph node which was noted to be involved with tumor but there was no lymph node dissection done at that time.  [id number].  She subsequently underwent adjuvant chemotherapy with dose-dense AC for 4 cycles and Taxol for 12 weeks, completed 09/11/2014.        She then had radiation therapy post lumpectomy to the left breast, which was completed 11/23/2014 by Dr. Manley.        She was then begun on an aromatase inhibitor, the name of which she does not remember.  The aromatase inhibitor therapy was begun in 11/2014.  She was then switched within about a month or so to anastrozole and remained on anastrozole from 11/2014-02/2017.  She has never received tamoxifen.        She then moved to [place], [place] in 03/2017.  She saw a gynecologist there and underwent a IRIS/BSO by Dr. Zendejas.  She then also saw Dr. Linares in [place], [place], who is an oncologist.  His interpretation of the pathology report was that she had triple-negative breast cancer.  It is possible that she may have had low estrogen receptor positivity, but the anastrozole was then discontinued in 02/2017.  She then underwent the IRIS/BSO in 03/2017.  This was done laparoscopically.        She then remained off of all hormonal therapy and in 09/2017 moved to [place].  She remained off hormonal therapy and did not have Medical Oncology  followup initially.        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  She then went to see Dr. Norberto Brand who performed a PET/CT scan and the patient also underwent a brain MRI for staging.  The PET/CT scan performed 08/03/2018 showed in the neck there were several mildly metabolic active and large prominent left posterior cervical lymph nodes.  The largest is located posterior to the left sternocleidomastoid muscle and measures 1.5 x 1 cm in size.  This demonstrate modest FDG uptake.  The other lymph nodes are smaller and demonstrate mild FDG uptake.  The prominent prevascular and aortopulmonary lymph nodes were also seen on the contrast-enhanced scan were less well seen on the PET.  The largest lymph node visualized on the PET scan was 1.1 x 0.8 cm and demonstrated mild FDG uptake.  This was in the periaortic area just anterior to the aortic arch.  No lung nodules.  No lung infiltrates.  No pleural effusion.  There was no uptake of FDG in the bones.  In summary, there were mildly enlarged left posterior cervical lymph nodes, largest measuring 1.5 x 1.0 cm and mildly enlarged aorticopulmonary and prevascular lymph nodes that were noted on contrast-enhanced CT scan seen less well on the PET scan.  No evidence of metastatic disease in the abdomen and pelvis.  She also underwent a brain MRI that showed no evidence of intracranial metastatic disease.  She underwent a biopsy of one of the lymph nodes in the left neck which showed metastatic adenocarcinoma consistent with breast origin, which was estrogen-receptor positive.  The tumor cells were positive for CK7, ER and UT consistent with metastatic breast carcinoma.  Estrogen receptor showed strong average nuclear intensity in 95% of carcinoma cells, progesterone receptor moderate average nuclear intensity in 70% of the carcinoma.  A GATA3 stain was apparently not performed.       TREATMENT HISTORY:  A. Tamoxifen  B. Letrozole  "and palbociclib started 9-18-18.     INTERVAL HISTORY:  -Has been doing okay.   -Has ongoing hot flashes, worse about 1 hour after letrozole with excessive sweating.   -Has some fatigue, unchanged. Sleep quality is variable with hot flashes. Takes mirtazapine to help with sleep.   -Has dyspnea on exertion related to asthma, managed with medication. Feels medications are working well.   -Eating and drinking well.   -Has toe numbness, attributes to circulation issues, unchanged.   -Has a rash on left hand with a raised bump on thumb and palms of hand. Bumps are itchy.   -Has bone aches with Ibrance. Does not need any pain medication.   -Goes for walks about 3 times per week and also enjoys painting canvases and rocks.     Review of Systems:  Patient denies any of the following except if noted above: fevers, chills, vision or hearing changes, chest pain, dyspnea at rest, abdominal pain, nausea, vomiting, diarrhea, constipation, urinary concerns, headaches, or issues with mood. She also denies lumps, bumps, rashes or skin lesions, bleeding or bruising issues.     Objective:  There were no vitals taken for this visit.  General: alert and no distress  Psych: Alert and oriented times; coherent speech, normal rate and volume, able to articulate logical thoughts, able to abstract reason, no tangential thoughts, no hallucinations or delusions  Patient's affect is appropriate.   Pulm: Speaking in full sentences, unlabored, no audible wheezes or cough.  The rest of a comprehensive physical examination is deferred due to PHE (public health emergency) video restrictions\"     Labs:   Most Recent 3 CBC's:Recent Labs   Lab Test 07/14/21  1433 06/17/21  1143 06/09/21  1435   WBC 3.3* 3.5* 2.8*   HGB 12.8 13.2 12.9   MCV 99 96 96    163 133*    Most Recent 3 BMP's:  Recent Labs   Lab Test 07/14/21  1433 06/09/21  1435 05/11/21  1233    137 138   POTASSIUM 4.2 4.0 4.3   CHLORIDE 106 104 107   CO2 27 26 27   BUN 12 12 16 "   CR 0.99 1.00 0.95   ANIONGAP 6 7 4   GIOVANNY 9.4 9.6 9.5   * 96 174*    Most Recent 2 LFT's:  Recent Labs   Lab Test 07/14/21  1433 06/09/21  1435   AST 30 36   ALT 41 45   ALKPHOS 73 67   BILITOTAL 0.4 0.4    Most Recent TSH and T4:  Recent Labs   Lab Test 07/14/21  1433   TSH 4.35*   T4 0.83     I reviewed the above labs today.       ASSESSMENT AND PLAN:  1. Recurrent, metastatic ER-positive, HER2 negative breast cancer: History of left breast cancer s/p lumpectomy and SNLB in 2014. S/p 4 cycles ddAC and 12 weeks of Taxol. On AI from 11/2014-2/2017. Recurrence on PET/CT scan performed 08/03/2018 and then biopsy to left posterior cervical lymph nodes, aorticopulmonary, and prevascular lymph nodes were positive. No evidence of metastatic disease in the abdomen and pelvis or brain. Started Ibrance and letrozole on 9/21/18. Last restaging in May 2021 showed stable disease. Plan for re imaging in August. Her counts are stable today. She will continue on Ibrance and letrozole at her same dosing.  - Allergy to contrast and she needs premedication and needs CT CAP and CT neck.   2.  Prediabetes: She is actively working on changing her diet and is walking more now that she has better asthma control and does not have to wear a mask outside.   3.  Depression and anxiety. She saw Dr. Pate her new PCP and her regimen was changed to add buspirone 7.5 mg twice daily to sertraline 200 mg once per day added. Lorazepam prn anxiety was added.  She has improvement of her symptoms of depression and anxiety. No acute concerns today.   4.  Hypothyroid. Levothyroxine 175 mg per day.   Last TSH was mildly elevated with normal free T4. No adjustments needed.  5. Asthma: Seen by Dr. Hay. Per chart on fluticasone-salmeterol inhaler BID. Also has albuterol prn.   6. COVID vaccination: She completed both doses of Pfizer vaccine in April.   7. Hot flashes: Will trial oxybutynin 5 mg bid.  8. Rash: Unclear cause. Given itchy nature, will  try hydrocortisone and topical Benadryl.    Carmen Jackson PA-C  [facility]  [address], [place] [address] [phone]    Duration of call: 9 minutes    16 minutes spent on the date of the encounter doing chart review, review of test results, interpretation of tests and documentation, in addition to 9 minutes spent on the phone with the patient.

## 2021-08-05 DIAGNOSIS — Z17.0 MALIGNANT NEOPLASM OF UPPER-OUTER QUADRANT OF LEFT BREAST IN FEMALE, ESTROGEN RECEPTOR POSITIVE (H): Primary | ICD-10-CM

## 2021-08-05 DIAGNOSIS — C50.412 MALIGNANT NEOPLASM OF UPPER-OUTER QUADRANT OF LEFT BREAST IN FEMALE, ESTROGEN RECEPTOR POSITIVE (H): Primary | ICD-10-CM

## 2021-08-05 RX ORDER — LETROZOLE 2.5 MG/1
2.5 TABLET, FILM COATED ORAL DAILY
Qty: 28 TABLET | Refills: 0 | Status: SHIPPED | OUTPATIENT
Start: 2021-08-05 | End: 2021-09-09

## 2021-08-11 ENCOUNTER — LAB (OUTPATIENT)
Dept: LAB | Facility: CLINIC | Age: 51
End: 2021-08-11
Attending: INTERNAL MEDICINE
Payer: COMMERCIAL

## 2021-08-11 ENCOUNTER — HOSPITAL ENCOUNTER (OUTPATIENT)
Dept: CT IMAGING | Facility: CLINIC | Age: 51
End: 2021-08-11
Attending: INTERNAL MEDICINE
Payer: COMMERCIAL

## 2021-08-11 ENCOUNTER — TELEPHONE (OUTPATIENT)
Dept: ONCOLOGY | Facility: CLINIC | Age: 51
End: 2021-08-11

## 2021-08-11 DIAGNOSIS — Z17.0 MALIGNANT NEOPLASM OF UPPER-OUTER QUADRANT OF LEFT BREAST IN FEMALE, ESTROGEN RECEPTOR POSITIVE (H): ICD-10-CM

## 2021-08-11 DIAGNOSIS — C50.412 MALIGNANT NEOPLASM OF UPPER-OUTER QUADRANT OF LEFT BREAST IN FEMALE, ESTROGEN RECEPTOR POSITIVE (H): ICD-10-CM

## 2021-08-11 DIAGNOSIS — E03.9 PRIMARY HYPOTHYROIDISM: ICD-10-CM

## 2021-08-11 LAB
ALBUMIN SERPL-MCNC: 3.9 G/DL (ref 3.4–5)
ALP SERPL-CCNC: 66 U/L (ref 40–150)
ALT SERPL W P-5'-P-CCNC: 38 U/L (ref 0–50)
ANION GAP SERPL CALCULATED.3IONS-SCNC: 7 MMOL/L (ref 3–14)
AST SERPL W P-5'-P-CCNC: 24 U/L (ref 0–45)
BASOPHILS # BLD MANUAL: 0 10E3/UL (ref 0–0.2)
BASOPHILS NFR BLD MANUAL: 0 %
BILIRUB SERPL-MCNC: 0.5 MG/DL (ref 0.2–1.3)
BUN SERPL-MCNC: 17 MG/DL (ref 7–30)
CALCIUM SERPL-MCNC: 9.2 MG/DL (ref 8.5–10.1)
CANCER AG27-29 SERPL-ACNC: 20 U/ML (ref 0–39)
CEA SERPL-MCNC: 1.2 UG/L (ref 0–2.5)
CHLORIDE BLD-SCNC: 103 MMOL/L (ref 94–109)
CO2 SERPL-SCNC: 24 MMOL/L (ref 20–32)
CREAT SERPL-MCNC: 0.96 MG/DL (ref 0.52–1.04)
EOSINOPHIL # BLD MANUAL: 0 10E3/UL (ref 0–0.7)
EOSINOPHIL NFR BLD MANUAL: 0 %
ERYTHROCYTE [DISTWIDTH] IN BLOOD BY AUTOMATED COUNT: 14.1 % (ref 10–15)
GFR SERPL CREATININE-BSD FRML MDRD: 69 ML/MIN/1.73M2
GLUCOSE BLD-MCNC: 130 MG/DL (ref 70–99)
HCT VFR BLD AUTO: 37.2 % (ref 35–47)
HGB BLD-MCNC: 12.8 G/DL (ref 11.7–15.7)
LYMPHOCYTES # BLD MANUAL: 0.6 10E3/UL (ref 0.8–5.3)
LYMPHOCYTES NFR BLD MANUAL: 16 %
MCH RBC QN AUTO: 33.2 PG (ref 26.5–33)
MCHC RBC AUTO-ENTMCNC: 34.4 G/DL (ref 31.5–36.5)
MCV RBC AUTO: 97 FL (ref 78–100)
MONOCYTES # BLD MANUAL: 0.1 10E3/UL (ref 0–1.3)
MONOCYTES NFR BLD MANUAL: 3 %
NEUTROPHILS # BLD MANUAL: 3.1 10E3/UL (ref 1.6–8.3)
NEUTROPHILS NFR BLD MANUAL: 81 %
NRBC # BLD AUTO: 0 10E3/UL
NRBC BLD MANUAL-RTO: 1 %
PLAT MORPH BLD: ABNORMAL
PLATELET # BLD AUTO: 157 10E3/UL (ref 150–450)
POTASSIUM BLD-SCNC: 4.5 MMOL/L (ref 3.4–5.3)
PROT SERPL-MCNC: 7.3 G/DL (ref 6.8–8.8)
RBC # BLD AUTO: 3.85 10E6/UL (ref 3.8–5.2)
RBC MORPH BLD: ABNORMAL
SODIUM SERPL-SCNC: 134 MMOL/L (ref 133–144)
T4 FREE SERPL-MCNC: 0.94 NG/DL (ref 0.76–1.46)
TSH SERPL DL<=0.005 MIU/L-ACNC: 4.63 MU/L (ref 0.4–4)
WBC # BLD AUTO: 3.8 10E3/UL (ref 4–11)

## 2021-08-11 PROCEDURE — 250N000011 HC RX IP 250 OP 636: Performed by: INTERNAL MEDICINE

## 2021-08-11 PROCEDURE — 84443 ASSAY THYROID STIM HORMONE: CPT

## 2021-08-11 PROCEDURE — 85027 COMPLETE CBC AUTOMATED: CPT

## 2021-08-11 PROCEDURE — 71260 CT THORAX DX C+: CPT | Mod: 26 | Performed by: STUDENT IN AN ORGANIZED HEALTH CARE EDUCATION/TRAINING PROGRAM

## 2021-08-11 PROCEDURE — 70491 CT SOFT TISSUE NECK W/DYE: CPT | Mod: 26 | Performed by: RADIOLOGY

## 2021-08-11 PROCEDURE — 74177 CT ABD & PELVIS W/CONTRAST: CPT | Mod: 26 | Performed by: STUDENT IN AN ORGANIZED HEALTH CARE EDUCATION/TRAINING PROGRAM

## 2021-08-11 PROCEDURE — 82040 ASSAY OF SERUM ALBUMIN: CPT

## 2021-08-11 PROCEDURE — 36415 COLL VENOUS BLD VENIPUNCTURE: CPT

## 2021-08-11 PROCEDURE — 70491 CT SOFT TISSUE NECK W/DYE: CPT

## 2021-08-11 PROCEDURE — 86300 IMMUNOASSAY TUMOR CA 15-3: CPT

## 2021-08-11 PROCEDURE — 74177 CT ABD & PELVIS W/CONTRAST: CPT

## 2021-08-11 PROCEDURE — 84439 ASSAY OF FREE THYROXINE: CPT

## 2021-08-11 PROCEDURE — 82378 CARCINOEMBRYONIC ANTIGEN: CPT

## 2021-08-11 RX ORDER — IOPAMIDOL 755 MG/ML
135 INJECTION, SOLUTION INTRAVASCULAR ONCE
Status: COMPLETED | OUTPATIENT
Start: 2021-08-11 | End: 2021-08-11

## 2021-08-11 RX ADMIN — IOPAMIDOL 135 ML: 755 INJECTION, SOLUTION INTRAVENOUS at 14:17

## 2021-08-12 ENCOUNTER — VIRTUAL VISIT (OUTPATIENT)
Dept: ONCOLOGY | Facility: CLINIC | Age: 51
End: 2021-08-12
Attending: INTERNAL MEDICINE
Payer: COMMERCIAL

## 2021-08-12 DIAGNOSIS — Z17.0 MALIGNANT NEOPLASM OF UPPER-OUTER QUADRANT OF LEFT BREAST IN FEMALE, ESTROGEN RECEPTOR POSITIVE (H): ICD-10-CM

## 2021-08-12 DIAGNOSIS — R73.9 HYPERGLYCEMIA: Primary | ICD-10-CM

## 2021-08-12 DIAGNOSIS — C50.412 MALIGNANT NEOPLASM OF UPPER-OUTER QUADRANT OF LEFT BREAST IN FEMALE, ESTROGEN RECEPTOR POSITIVE (H): ICD-10-CM

## 2021-08-12 PROCEDURE — 999N001193 HC VIDEO/TELEPHONE VISIT; NO CHARGE

## 2021-08-12 PROCEDURE — 99442 PR PHYSICIAN TELEPHONE EVALUATION 11-20 MIN: CPT | Mod: 95 | Performed by: INTERNAL MEDICINE

## 2021-08-12 NOTE — TELEPHONE ENCOUNTER
I called and let her know that the CT showed stable findings. Recommended that she follow up with her endocrinologist at [facility] to adjust synthroid.     Wilfredo Bhatt MD

## 2021-08-12 NOTE — PROGRESS NOTES
"Jade is a 51 year old who is being evaluated via a billable telephone visit.      What phone number would you like to be contacted at? [phone]  How would you like to obtain your AVS? MyChart     Vitals - Patient Reported  Weight (Patient Reported): 140.6 kg (310 lb)  Height (Patient Reported): 175.3 cm (5' 9\")  BMI (Based on Pt Reported Ht/Wt): 45.78  Pain Score: Moderate Pain (5)  Pain Loc: Other - see comment (RASH ON BREAST AND ABDOMIN)    Aruna THURSTON    Phone call duration: 14 minutes     HISTORY OF PRESENT ILLNESS:  Jade is a 51-year-old woman with metastatic breast cancer who comes to our clinic for recommendations for further treatment.  She is referred by Dr. Norberto Brand at [facility].  Jade would like to continue her care at the [facility].       Jade was diagnosed with breast cancer with a lump found in the upper-inner quadrant of the left breast.  She was diagnosed in 12/2013 in the [place] area and Dr. David Redd was her medical oncologist.  Biopsy of the tumor showed it to be ER positive, WA positive, and HER-2 negative.  We do not have any of the original pathology and we need to obtain those reports.  She underwent a lumpectomy performed by Dr. Tasha Segura who is a surgeon in the [place] area.  She also had a sentinel lymph node which was noted to be involved with tumor but there was no lymph node dissection done at that time.  [id number].  She subsequently underwent adjuvant chemotherapy with dose-dense AC for 4 cycles and Taxol for 12 weeks, completed 09/11/2014.        She then had radiation therapy post lumpectomy to the left breast, which was completed 11/23/2014 by Dr. Manley.        She was then begun on an aromatase inhibitor, the name of which she does not remember.  The aromatase inhibitor therapy was begun in 11/2014.  She was then switched within about a month or so to anastrozole and remained on anastrozole from 11/2014-02/2017.  She has never received " tamoxifen.        She then moved to [place], [place] in 03/2017.  She saw a gynecologist there and underwent a IRIS/BSO by Dr. Zendejas.  She then also saw Dr. Linares in [place], [place], who is an oncologist.  His interpretation of the pathology report was that she had triple-negative breast cancer.  It is possible that she may have had low estrogen receptor positivity, but the anastrozole was then discontinued in 02/2017.  She then underwent the IRIS/BSO in 03/2017.  This was done laparoscopically.        She then remained off of all hormonal therapy and in 09/2017 moved to [place].  She remained off hormonal therapy and did not have Medical Oncology followup initially.        She was driving for [place] and noticed lymph nodes in the left neck about 8 weeks ago.  She then went to see Dr. Norberto Brand who performed a PET/CT scan and the patient also underwent a brain MRI for staging.  The PET/CT scan performed 08/03/2018 showed in the neck there were several mildly metabolic active and large prominent left posterior cervical lymph nodes.  The largest is located posterior to the left sternocleidomastoid muscle and measures 1.5 x 1 cm in size.  This demonstrate modest FDG uptake.  The other lymph nodes are smaller and demonstrate mild FDG uptake.  The prominent prevascular and aortopulmonary lymph nodes were also seen on the contrast-enhanced scan were less well seen on the PET.  The largest lymph node visualized on the PET scan was 1.1 x 0.8 cm and demonstrated mild FDG uptake.  This was in the periaortic area just anterior to the aortic arch.  No lung nodules.  No lung infiltrates.  No pleural effusion.  There was no uptake of FDG in the bones.  In summary, there were mildly enlarged left posterior cervical lymph nodes, largest measuring 1.5 x 1.0 cm and mildly enlarged aorticopulmonary and prevascular lymph nodes that were noted on contrast-enhanced CT scan seen less well on the PET scan.  No evidence of metastatic disease in  the abdomen and pelvis.  She also underwent a brain MRI that showed no evidence of intracranial metastatic disease.  She underwent a biopsy of one of the lymph nodes in the left neck which showed metastatic adenocarcinoma consistent with breast origin, which was estrogen-receptor positive.  The tumor cells were positive for CK7, ER and ME consistent with metastatic breast carcinoma.  Estrogen receptor showed strong average nuclear intensity in 95% of carcinoma cells, progesterone receptor moderate average nuclear intensity in 70% of the carcinoma.  A GATA3 stain was apparently not performed.       TREATMENT HISTORY:  A. Tamoxifen  B. Letrozole and palbociclib started 9-18-18.  C. Pfizer COVID vaccination end of March, 2021 and 4-17.     INTERVAL HISTORY:  Jade has been doing quite well.  No pain.  Mild fatigue.  Depression and anxiety have been well controlled by buspirone, sertraline, and mirtazapine. Mirtazapine helps her sleep at night. No fullness in the neck.  She does have some mild fatigue today because she was up late at night because of the methylprednisolone she takes to prevent IV contrast dye reaction.  The CT scan of the chest abdomen and pelvis and neck shows no progression.  She is very pleased with this result as are we.    She does note some mild redness of the outer portion of both breasts without lumps.      The Solu-Medrol premedication before contrast is working well. She has some insomnia and was quite fatigued today.     She gets occasional hot flashes during the day and at night that are not too bothersome. She endorses mild joint aches about an hour after taking letrozole.      She has noticed some increased chest tightness and shortness of breath with exertion. This limits her activity but she is trying to walk as much as possible. She walks about 3 times per week. She takes advair, montelukast, and as needed albuterol for her asthma and was encouraged to make an appointment with Dr. Hay  in pulmonology to see if her medications can be further optimized. She does get benefit from the albuterol when she uses it.  Dry cough and post nasal drip.     REVIEW OF SYSTEMS:  A 10-point review of systems was performed and was negative with the exception of pertinent positives noted above.     OBJECTIVE DATA:  This was a phone visit and a physical exam was not performed today.  Mood was normal.     Imaging studies:    CT CAP 8-11-21  IMPRESSION: In this patient with a history of left breast cancer  status post lumpectomy, chemotherapy and radiation currently on  Ibrance and letrozole:   1. There is no evidence for metastatic disease in the abdomen or  pelvis.  2. New scattered areas of groundglass attenuation and micronodularity  in both lungs, most prominent in the right upper lobe, right middle  lobe and the superior segment of the right lower lobe. Findings are  new since 5/11/2021, and are favored to be infectious or inflammatory  in etiology, possibly treatment related. Metastases felt less likely.  Consider continued surveillance by CT.  3. Hepatomegaly and steatosis.     I have personally reviewed the examination and initial interpretation  and I agree with the findings.     JAMISON MORGAN MD                                                                  CT neck 8-  Impression:  In this patient with HISTORY of metastatic breast cancer, there is no  interval enlargement of the left 2B, bilateral 2A, and posterior  lateral nodes. The other lymph nodes appear similar.     I have personally reviewed the examination and initial interpretation  and I agree with the findings.     NORRIS ZARAGOZA MD     Labs:   CBC normal. ANC 3.1.  CMP was within normal limits except for glucose 130. HbA1c 5.7 7-14-21. CEA 1.2, CA27.29 20.  TSH 4.63     ASSESSMENT AND PLAN:  1. Recurrent, metastatic ER-positive, HER2 negative breast cancer: History of left breast cancer s/p lumpectomy and SNLB in 2014. S/p 4  cycles ddAC and 12 weeks of Taxol. On AI from 11/2014-2/2017. Recurrence on PET/CT scan performed 08/03/2018 and then biopsy to left posterior cervical lymph nodes, aorticopulmonary, and prevascular lymph nodes were positive. No evidence of metastatic disease in the abdomen and pelvis or brain. Started Ibrance and letrozole on 9/21/18. Tolerating well aside from some hot flashes and mild joint stiffness.  She continues on Ibrance and letrozole and is now at 3 years.   2. Restaging shows essentially stable disease.  2. Overall doing well.  Fatty liver.  Borderline HbA1c discussed.  3.  Imaging and interval.  She would like to perform imaging on a 3 month basis.  I think this is reasonable. Allergy to contrast and she needs premedication and needs CT CAP and CT neck.   4.  Diet and exercise. We also discussed eating less sugar and getting 150 minutes of exercise per week.  She will try to incorporate these recommendations into her lifestyle.  PCP Dr. Dr. Wesly Pate at [facility].  5.  Depression and anxiety. She saw Dr. Pate her new PCP and her regimen was changed to add buspirone 7.5 mg twice daily to sertraline 200 mg once per added. Lorazepam prn anxiety was added.  She has improvement of her symptoms of depression and anxiety.   6.  Hypothyroid. Levothyroxine 0.175 mg per day.   She needs to go back to her PCP to see if this needs to be increased.   7. Asthma: Seen by Dr. Hay. Takes advair, montelukast, and albuterol as needed, noticing some slightly worsening symptoms lately. Meeting with WALT Hay to discuss  8. She has had a flu shot.  Had Pfizer COVID vaccination in March and April  9.  She decided against bariatric surgery and will concentrate on diet and exercise.   10.  Complaint of redness of outer aspects of both breasts without masses.  Mammogram and bilateral US this coming week and SONIA visit for breast exam.   10. Follow up. Follow-up with SONIA for breast exam this coming week with mammogram and  bilateral breast U/S.  Follow-up with SONIA September 9 and October 7 follow-up with me November 4.  All visits by phone.  CT CAP and CT neck November 10 and visit with by phone November 3 HbA1c.  CBC, CMP, CEA, CA 27-29 all visits.  Diabetes dietary counseling.      Thank you for allowing us to participate in this patient's care.     Sincerely,      Wilfredo Bhatt MD  Professor  [facility]  [phone].       8:32-8:46  I spent 14 minutes with the patient more than 50% of which was in counseling and coordination of care.

## 2021-08-12 NOTE — LETTER
"    8/12/2021         RE: Jade Collins  [address]  [facility] [address]        Dear Colleague,    Thank you for referring your patient, Jade Collins, to the [facility]. Please see a copy of my visit note below.    Jade is a 51 year old who is being evaluated via a billable telephone visit.      What phone number would you like to be contacted at? [phone]  How would you like to obtain your AVS? [place]     Vitals - Patient Reported  Weight (Patient Reported): 140.6 kg (310 lb)  Height (Patient Reported): 175.3 cm (5' 9\")  BMI (Based on Pt Reported Ht/Wt): 45.78  Pain Score: Moderate Pain (5)  Pain Loc: Other - see comment (RASH ON BREAST AND ABDOMIN)    Aruna Hernandez KARENA    Phone call duration: 14 minutes     HISTORY OF PRESENT ILLNESS:  Jade is a 51-year-old woman with metastatic breast cancer who comes to our clinic for recommendations for further treatment.  She is referred by Dr. Norberto Brand at [facility].  Jade would like to continue her care at the [facility].       Jade was diagnosed with breast cancer with a lump found in the upper-inner quadrant of the left breast.  She was diagnosed in 12/2013 in the [place] area and Dr. David Redd was her medical oncologist.  Biopsy of the tumor showed it to be ER positive, CA positive, and HER-2 negative.  We do not have any of the original pathology and we need to obtain those reports.  She underwent a lumpectomy performed by Dr. Tasha Segura who is a surgeon in the [place] area.  She also had a sentinel lymph node which was noted to be involved with tumor but there was no lymph node dissection done at that time.  [id number].  She subsequently underwent adjuvant chemotherapy with dose-dense AC for 4 cycles and Taxol for 12 weeks, completed 09/11/2014.        She then had radiation therapy post lumpectomy to the left breast, which was completed 11/23/2014 by Dr. Manley.        She was then begun on an " aromatase inhibitor, the name of which she does not remember.  The aromatase inhibitor therapy was begun in 11/2014.  She was then switched within about a month or so to anastrozole and remained on anastrozole from 11/2014-02/2017.  She has never received tamoxifen.        She then moved to [place], [place] in 03/2017.  She saw a gynecologist there and underwent a IRIS/BSO by Dr. Zendejas.  She then also saw Dr. Linares in [place], [place], who is an oncologist.  His interpretation of the pathology report was that she had triple-negative breast cancer.  It is possible that she may have had low estrogen receptor positivity, but the anastrozole was then discontinued in 02/2017.  She then underwent the IRIS/BSO in 03/2017.  This was done laparoscopically.        She then remained off of all hormonal therapy and in 09/2017 moved to [place].  She remained off hormonal therapy and did not have Medical Oncology followup initially.        She was driving for [place] and noticed lymph nodes in the left neck about 8 weeks ago.  She then went to see Dr. Norberto Brand who performed a PET/CT scan and the patient also underwent a brain MRI for staging.  The PET/CT scan performed 08/03/2018 showed in the neck there were several mildly metabolic active and large prominent left posterior cervical lymph nodes.  The largest is located posterior to the left sternocleidomastoid muscle and measures 1.5 x 1 cm in size.  This demonstrate modest FDG uptake.  The other lymph nodes are smaller and demonstrate mild FDG uptake.  The prominent prevascular and aortopulmonary lymph nodes were also seen on the contrast-enhanced scan were less well seen on the PET.  The largest lymph node visualized on the PET scan was 1.1 x 0.8 cm and demonstrated mild FDG uptake.  This was in the periaortic area just anterior to the aortic arch.  No lung nodules.  No lung infiltrates.  No pleural effusion.  There was no uptake of FDG in the bones.  In summary, there were mildly  enlarged left posterior cervical lymph nodes, largest measuring 1.5 x 1.0 cm and mildly enlarged aorticopulmonary and prevascular lymph nodes that were noted on contrast-enhanced CT scan seen less well on the PET scan.  No evidence of metastatic disease in the abdomen and pelvis.  She also underwent a brain MRI that showed no evidence of intracranial metastatic disease.  She underwent a biopsy of one of the lymph nodes in the left neck which showed metastatic adenocarcinoma consistent with breast origin, which was estrogen-receptor positive.  The tumor cells were positive for CK7, ER and IL consistent with metastatic breast carcinoma.  Estrogen receptor showed strong average nuclear intensity in 95% of carcinoma cells, progesterone receptor moderate average nuclear intensity in 70% of the carcinoma.  A GATA3 stain was apparently not performed.       TREATMENT HISTORY:  A. Tamoxifen  B. Letrozole and palbociclib started 9-18-18.  C. Pfizer COVID vaccination end of March, 2021 and 4-17.     INTERVAL HISTORY:  Jade has been doing quite well.  No pain.  Mild fatigue.  Depression and anxiety have been well controlled by buspirone, sertraline, and mirtazapine. Mirtazapine helps her sleep at night. No fullness in the neck.  She does have some mild fatigue today because she was up late at night because of the methylprednisolone she takes to prevent IV contrast dye reaction.  The CT scan of the chest abdomen and pelvis and neck shows no progression.  She is very pleased with this result as are we.    She does note some mild redness of the outer portion of both breasts without lumps.      The Solu-Medrol premedication before contrast is working well. She has some insomnia and was quite fatigued today.     She gets occasional hot flashes during the day and at night that are not too bothersome. She endorses mild joint aches about an hour after taking letrozole.      She has noticed some increased chest tightness and shortness  of breath with exertion. This limits her activity but she is trying to walk as much as possible. She walks about 3 times per week. She takes advair, montelukast, and as needed albuterol for her asthma and was encouraged to make an appointment with Dr. Hay in pulmonology to see if her medications can be further optimized. She does get benefit from the albuterol when she uses it.  Dry cough and post nasal drip.     REVIEW OF SYSTEMS:  A 10-point review of systems was performed and was negative with the exception of pertinent positives noted above.     OBJECTIVE DATA:  This was a phone visit and a physical exam was not performed today.  Mood was normal.     Imaging studies:    CT CAP 8-11-21  IMPRESSION: In this patient with a history of left breast cancer  status post lumpectomy, chemotherapy and radiation currently on  Ibrance and letrozole:   1. There is no evidence for metastatic disease in the abdomen or  pelvis.  2. New scattered areas of groundglass attenuation and micronodularity  in both lungs, most prominent in the right upper lobe, right middle  lobe and the superior segment of the right lower lobe. Findings are  new since 5/11/2021, and are favored to be infectious or inflammatory  in etiology, possibly treatment related. Metastases felt less likely.  Consider continued surveillance by CT.  3. Hepatomegaly and steatosis.     I have personally reviewed the examination and initial interpretation  and I agree with the findings.     JAMISON MORGAN MD                                                                  CT neck 8-  Impression:  In this patient with HISTORY of metastatic breast cancer, there is no  interval enlargement of the left 2B, bilateral 2A, and posterior  lateral nodes. The other lymph nodes appear similar.     I have personally reviewed the examination and initial interpretation  and I agree with the findings.     NORRIS ZARAGOZA MD     Labs:   CBC normal. ANC 3.1.  CMP was  within normal limits except for glucose 130. HbA1c 5.7 7-14-21. CEA 1.2, CA27.29 20.  TSH 4.63     ASSESSMENT AND PLAN:  1. Recurrent, metastatic ER-positive, HER2 negative breast cancer: History of left breast cancer s/p lumpectomy and SNLB in 2014. S/p 4 cycles ddAC and 12 weeks of Taxol. On AI from 11/2014-2/2017. Recurrence on PET/CT scan performed 08/03/2018 and then biopsy to left posterior cervical lymph nodes, aorticopulmonary, and prevascular lymph nodes were positive. No evidence of metastatic disease in the abdomen and pelvis or brain. Started Ibrance and letrozole on 9/21/18. Tolerating well aside from some hot flashes and mild joint stiffness.  She continues on Ibrance and letrozole and is now at 3 years.   2. Restaging shows essentially stable disease.  2. Overall doing well.  Fatty liver.  Borderline HbA1c discussed.  3.  Imaging and interval.  She would like to perform imaging on a 3 month basis.  I think this is reasonable. Allergy to contrast and she needs premedication and needs CT CAP and CT neck.   4.  Diet and exercise. We also discussed eating less sugar and getting 150 minutes of exercise per week.  She will try to incorporate these recommendations into her lifestyle.  PCP Dr. Dr. Wesly Pate at [facility].  5.  Depression and anxiety. She saw Dr. Pate her new PCP and her regimen was changed to add buspirone 7.5 mg twice daily to sertraline 200 mg once per added. Lorazepam prn anxiety was added.  She has improvement of her symptoms of depression and anxiety.   6.  Hypothyroid. Levothyroxine 0.175 mg per day.   She needs to go back to her PCP to see if this needs to be increased.   7. Asthma: Seen by Dr. Hay. Takes advair, montelukast, and albuterol as needed, noticing some slightly worsening symptoms lately. Meeting with WALT Hay to discuss  8. She has had a flu shot.  Had Pfizer COVID vaccination in March and April  9.  She decided against bariatric surgery and will concentrate on diet  and exercise.   10.  Complaint of redness of outer aspects of both breasts without masses.  Mammogram and bilateral US this coming week and SONIA visit for breast exam.   10. Follow up. Follow-up with SONIA for breast exam this coming week with mammogram and bilateral breast U/S.  Follow-up with SONIA September 9 and October 7 follow-up with me November 4.  All visits by phone.  CT CAP and CT neck November 10 and visit with by phone November 3 HbA1c.  CBC, CMP, CEA, CA 27-29 all visits.  Diabetes dietary counseling.      Thank you for allowing us to participate in this patient's care.     Sincerely,      Wilfredo Bahtt MD  Professor  [facility]  [phone].       8:32-8:46  I spent 14 minutes with the patient more than 50% of which was in counseling and coordination of care.      Again, thank you for allowing me to participate in the care of your patient.        Sincerely,        Wilfredo Bhatt MD

## 2021-08-13 DIAGNOSIS — C50.919 METASTATIC BREAST CANCER: Primary | ICD-10-CM

## 2021-08-16 ENCOUNTER — TELEPHONE (OUTPATIENT)
Dept: ONCOLOGY | Facility: CLINIC | Age: 51
End: 2021-08-16

## 2021-08-16 NOTE — TELEPHONE ENCOUNTER
Nutrition Education Scheduling Outreach #1:    Call to patient to schedule. Left message with phone number to call to schedule.    Plan for 2nd outreach attempt within 1 week.    Ana Lopez  [facility]  Diabetes and Nutrition Scheduling

## 2021-08-17 ENCOUNTER — VIRTUAL VISIT (OUTPATIENT)
Dept: ONCOLOGY | Facility: CLINIC | Age: 51
End: 2021-08-17
Attending: INTERNAL MEDICINE
Payer: COMMERCIAL

## 2021-08-17 DIAGNOSIS — Z17.0 MALIGNANT NEOPLASM OF UPPER-OUTER QUADRANT OF LEFT BREAST IN FEMALE, ESTROGEN RECEPTOR POSITIVE (H): ICD-10-CM

## 2021-08-17 DIAGNOSIS — R73.9 HYPERGLYCEMIA: ICD-10-CM

## 2021-08-17 DIAGNOSIS — C50.412 MALIGNANT NEOPLASM OF UPPER-OUTER QUADRANT OF LEFT BREAST IN FEMALE, ESTROGEN RECEPTOR POSITIVE (H): ICD-10-CM

## 2021-08-17 PROCEDURE — 97802 MEDICAL NUTRITION INDIV IN: CPT | Mod: TEL | Performed by: DIETITIAN, REGISTERED

## 2021-08-17 NOTE — LETTER
8/17/2021         RE: Jade Collins  [address]  [facility] [address]        Dear Colleague,    Thank you for referring your patient, Jade Collins, to the [facility]. Please see a copy of my visit note below.    Originating Location (pt. Location): Other car (parked)    Distant Location (provider location):  [facility]     Mode of Communication:  Telephone     CLINICAL NUTRITION SERVICES - ASSESSMENT NOTE    Jade Collins 51 year old referred for MNT related to   Hyperglycemia [R73.9]  - Primary       Malignant neoplasm of upper-outer quadrant of left breast in female, estrogen receptor positive (H) [C50.412, Z17.0]         Time Spent: 45 minutes  Visit Type: Initial   Pt accompanied by: self  Referring Physician: Dr. Bhatt    NUTRITION HISTORY  Factors affecting nutrition intake include:none  Current diet/appetite: regular/good  Chemotherapy: Letrozole; Palbociclib  Radiation: completed 2014  Surgery: lumpectomy 2013    Food Security: any concerns about having enough money to buy food or access to grocery stores? Yes, EBT card     Shops at [facility] or [facility] -1 time every 1-2 weeks (doesn't want to be around crowds) so may order online and  groceries     Patient feels she is addicted to sugar; HgbA1C elevated in July   Patient states the more I eat, the more I want sugars.  Patient states when she was a child she would spend the weekebd and summers at her grandma's and she allowed sugar and when she was with her mother it was salad and vegetables so felt it was punishment.  Ice cream (3 scoops in bowl or coffee mug-treat soothes anxiety or stress) and soda -Dr. Pepper or Pepsi (Shaw's large) 1 per day   Does not cook   Likes banana, cantaloupe, apples  Salad mix -adds to sandwiches     Takes meds at nights-1-2 AM; sleeps 9-10 AM    Diet Recall  Breakfast 11:00-12:00 leftover takeout; yogurt with granola and banana    Lunch Skips   "  Dinner Orange chicken with white rice or noodles; taquito with sour cream + 5 nachos chips (8:00-9:00) hamburger, turkey or tuna sandwich; frozen vegetables -eats entire bag; cereal with 2% milk (large bowl)    Snacks Ice cream    Beverages Soda      ANTHROPOMETRICS  Height: 5'9\"  Weight: 310 lbs   BMI: 45 kg/m2  Weight Status:  Obesity Grade III BMI >40  IBW: 145 lbs +/-10% (214%)  Weight History:   Wt Readings from Last 10 Encounters:   01/28/21 145.2 kg (320 lb)   06/09/20 138.6 kg (305 lb 9.6 oz)   03/12/20 139.5 kg (307 lb 8 oz)   02/13/20 138.7 kg (305 lb 11.2 oz)   01/16/20 137.6 kg (303 lb 4.8 oz)   12/18/19 140.3 kg (309 lb 4.8 oz)   11/15/19 141.7 kg (312 lb 4.8 oz)   10/17/19 139.5 kg (307 lb 8 oz)   09/19/19 137.4 kg (303 lb)   08/22/19 137.1 kg (302 lb 3.2 oz)     Dosing Weight: 145.2 kg    Medications:   Reviewed    Labs:   Labs reviewed    NUTRITION FOCUSED PHYSICAL ASSESSMENT FOR DIAGNOSING MALNUTRITION:  None observed due to telephone visit due to Covid 19 pandemic    ASSESSED NUTRITION NEEDS:  Estimated Energy Needs: 8525-9417 kcals (11-14 Kcal/Kg)  Justification: obese  Estimated Protein Needs: 79-99 grams protein (1.2-1.5 g pro/Kg)  Justification: preservation of lean body mass  Estimated Fluid Needs: 5615-4924  mL   Justification: maintenance    MALNUTRITION:  % Weight Loss:  None noted  % Intake:  No decreased intake noted  Subcutaneous Fat Loss:  None observed due to telephone visit due to COVID-19 pandemic   Muscle Loss:  None observed due to telephone visit due to COVID-19 pandemic   Fluid Retention:  None noted due to telephone visit due to COVID-19 pandemic     Malnutrition Diagnosis: Patient does not meet two of the above criteria necessary for diagnosing malnutrition    NUTRITION DIAGNOSIS:  Food and nutrition related knowledge deficit related to lack of prior exposure as evidenced by no previous need for food and nutrition related recommendations     INTERVENTIONS  Provided written & " verbal education:     - Discussed portion sizes, label reading, carbohydrate counting, exercise and behavior modification.  Instructed patient on label reading using label in car (soda).  Recommend 45-60 grams carbohydrates per meal for blood glucose reduction.  Encouraged patient to limit added sugars <24 grams per day (6 tsp).  Patient consumes 30 tsp added sugar per day (120 grams added sugar) from soda.    - Suggest patient eat first meal within 1 hour of waking and then every 4-5 hours for meals.  Patient to add fat and protein at meals to reduce blood glucose rise.    - Discussed emotional eating and alternatives.  Patient has worked with therapist in the past and would be willing to work again if feels needed.  - Discussed exercise and cancer fatigue.  Encouraged patient to include daily movement for improved energy levels.     Provided pt with corresponding education materials/handouts on:  Academy [employer] Count Your Carbs booklet     Pt verbalize understanding of materials provided during consult.   Patient Understanding: good  Expected patient engagement: good     Implementation  Composition of Meals and Snacks and General/healthful diet    Goals  1.  Aim for 45-60 grams carbohydrate per meal  2.  Eat 3 consistent meals per day with balanced marconutrients    Follow-Up Plans:   Patient to follow up in 4 weeks   RD name and number provided       MONITORING AND EVALUATION:  -Food/beverage intake  -Weight trends    Mary Smith, RD, LD  Clinical Dietitian  [facility]  [phone] (direct)        Again, thank you for allowing me to participate in the care of your patient.        Sincerely,        Zachariah Guerrero RD, LD

## 2021-08-17 NOTE — LETTER
8/17/2021         RE: Jade Collins  [address]  [facility] [address]        Dear Colleague,    Thank you for referring your patient, Jade Collins, to the [facility] [place]. Please see a copy of my visit note below.    Originating Location (pt. Location): Other car (parked)    Distant Location (provider location):  [facility]     Mode of Communication:  Telephone     CLINICAL NUTRITION SERVICES - ASSESSMENT NOTE    Jade Collins 51 year old referred for MNT related to   Hyperglycemia [R73.9]  - Primary       Malignant neoplasm of upper-outer quadrant of left breast in female, estrogen receptor positive (H) [C50.412, Z17.0]         Time Spent: 45 minutes  Visit Type: Initial   Pt accompanied by: self  Referring Physician: Dr. Bhatt    NUTRITION HISTORY  Factors affecting nutrition intake include:none  Current diet/appetite: regular/good  Chemotherapy: Letrozole; Palbociclib  Radiation: completed 2014  Surgery: lumpectomy 2013    Food Security: any concerns about having enough money to buy food or access to grocery stores? Yes, EBT card     Shops at [facility] or [facility] -1 time every 1-2 weeks (doesn't want to be around crowds) so may order online and  groceries     Patient feels she is addicted to sugar; HgbA1C elevated in July   Patient states the more I eat, the more I want sugars.  Patient states when she was a child she would spend the weekebd and summers at her grandma's and she allowed sugar and when she was with her mother it was salad and vegetables so felt it was punishment.  Ice cream (3 scoops in bowl or coffee mug-treat soothes anxiety or stress) and soda -Dr. Pepper or Pepsi (Shaw's large) 1 per day   Does not cook   Likes banana, cantaloupe, apples  Salad mix -adds to sandwiches     Takes meds at nights-1-2 AM; sleeps 9-10 AM    Diet Recall  Breakfast 11:00-12:00 leftover takeout; yogurt with granola and banana    Lunch Skips   "  Dinner Orange chicken with white rice or noodles; taquito with sour cream + 5 nachos chips (8:00-9:00) hamburger, turkey or tuna sandwich; frozen vegetables -eats entire bag; cereal with 2% milk (large bowl)    Snacks Ice cream    Beverages Soda      ANTHROPOMETRICS  Height: 5'9\"  Weight: 310 lbs   BMI: 45 kg/m2  Weight Status:  Obesity Grade III BMI >40  IBW: 145 lbs +/-10% (214%)  Weight History:   Wt Readings from Last 10 Encounters:   01/28/21 145.2 kg (320 lb)   06/09/20 138.6 kg (305 lb 9.6 oz)   03/12/20 139.5 kg (307 lb 8 oz)   02/13/20 138.7 kg (305 lb 11.2 oz)   01/16/20 137.6 kg (303 lb 4.8 oz)   12/18/19 140.3 kg (309 lb 4.8 oz)   11/15/19 141.7 kg (312 lb 4.8 oz)   10/17/19 139.5 kg (307 lb 8 oz)   09/19/19 137.4 kg (303 lb)   08/22/19 137.1 kg (302 lb 3.2 oz)     Dosing Weight: 145.2 kg    Medications:   Reviewed    Labs:   Labs reviewed    NUTRITION FOCUSED PHYSICAL ASSESSMENT FOR DIAGNOSING MALNUTRITION:  None observed due to telephone visit due to Covid 19 pandemic    ASSESSED NUTRITION NEEDS:  Estimated Energy Needs: 8174-3520 kcals (11-14 Kcal/Kg)  Justification: obese  Estimated Protein Needs: 79-99 grams protein (1.2-1.5 g pro/Kg)  Justification: preservation of lean body mass  Estimated Fluid Needs: 9600-4423  mL   Justification: maintenance    MALNUTRITION:  % Weight Loss:  None noted  % Intake:  No decreased intake noted  Subcutaneous Fat Loss:  None observed due to telephone visit due to COVID-19 pandemic   Muscle Loss:  None observed due to telephone visit due to COVID-19 pandemic   Fluid Retention:  None noted due to telephone visit due to COVID-19 pandemic     Malnutrition Diagnosis: Patient does not meet two of the above criteria necessary for diagnosing malnutrition    NUTRITION DIAGNOSIS:  Food and nutrition related knowledge deficit related to lack of prior exposure as evidenced by no previous need for food and nutrition related recommendations     INTERVENTIONS  Provided written & " verbal education:     - Discussed portion sizes, label reading, carbohydrate counting, exercise and behavior modification.  Instructed patient on label reading using label in car (soda).  Recommend 45-60 grams carbohydrates per meal for blood glucose reduction.  Encouraged patient to limit added sugars <24 grams per day (6 tsp).  Patient consumes 30 tsp added sugar per day (120 grams added sugar) from soda.    - Suggest patient eat first meal within 1 hour of waking and then every 4-5 hours for meals.  Patient to add fat and protein at meals to reduce blood glucose rise.    - Discussed emotional eating and alternatives.  Patient has worked with therapist in the past and would be willing to work again if feels needed.  - Discussed exercise and cancer fatigue.  Encouraged patient to include daily movement for improved energy levels.     Provided pt with corresponding education materials/handouts on:  Academy [employer] Count Your Carbs booklet     Pt verbalize understanding of materials provided during consult.   Patient Understanding: good  Expected patient engagement: good     Implementation  Composition of Meals and Snacks and General/healthful diet    Goals  1.  Aim for 45-60 grams carbohydrate per meal  2.  Eat 3 consistent meals per day with balanced marconutrients    Follow-Up Plans:   Patient to follow up in 4 weeks   RD name and number provided       MONITORING AND EVALUATION:  -Food/beverage intake  -Weight trends    Mary Smith, RD, LD  Clinical Dietitian  [facility]  [phone] (direct)        Again, thank you for allowing me to participate in the care of your patient.        Sincerely,        Zachariah Guerrero RD, LD

## 2021-08-17 NOTE — PROGRESS NOTES
Originating Location (pt. Location): Other car (parked)    Distant Location (provider location):  [facility]     Mode of Communication:  Telephone     CLINICAL NUTRITION SERVICES - ASSESSMENT NOTE    Jade Collins 51 year old referred for MNT related to   Hyperglycemia [R73.9]  - Primary       Malignant neoplasm of upper-outer quadrant of left breast in female, estrogen receptor positive (H) [C50.412, Z17.0]         Time Spent: 45 minutes  Visit Type: Initial   Pt accompanied by: self  Referring Physician: Dr. Bhatt    NUTRITION HISTORY  Factors affecting nutrition intake include:none  Current diet/appetite: regular/good  Chemotherapy: Letrozole; Palbociclib  Radiation: completed 2014  Surgery: lumpectomy 2013    Food Security: any concerns about having enough money to buy food or access to grocery stores? Yes, EBT card     Shops at [facility] or [facility] -1 time every 1-2 weeks (doesn't want to be around crowds) so may order online and  groceries     Patient feels she is addicted to sugar; HgbA1C elevated in July   Patient states the more I eat, the more I want sugars.  Patient states when she was a child she would spend the weekebd and summers at her [employer] and she allowed sugar and when she was with her mother it was salad and vegetables so felt it was punishment.  Ice cream (3 scoops in bowl or coffee mug-treat soothes anxiety or stress) and soda -Dr. Pepper or Pepsi (Shaw's large) 1 per day   Does not cook   Likes banana, cantaloupe, apples  Salad mix -adds to sandwiches     Takes meds at nights-1-2 AM; sleeps 9-10 AM    Diet Recall  Breakfast 11:00-12:00 leftover takeout; yogurt with granola and banana    Lunch Skips    Dinner Orange chicken with white rice or noodles; taquito with sour cream + 5 nachos chips (8:00-9:00) hamburger, turkey or tuna sandwich; frozen vegetables -eats entire bag; cereal with 2% milk (large bowl)    Snacks Ice cream    Beverages Soda   "    ANTHROPOMETRICS  Height: 5'9\"  Weight: 310 lbs   BMI: 45 kg/m2  Weight Status:  Obesity Grade III BMI >40  IBW: 145 lbs +/-10% (214%)  Weight History:   Wt Readings from Last 10 Encounters:   01/28/21 145.2 kg (320 lb)   06/09/20 138.6 kg (305 lb 9.6 oz)   03/12/20 139.5 kg (307 lb 8 oz)   02/13/20 138.7 kg (305 lb 11.2 oz)   01/16/20 137.6 kg (303 lb 4.8 oz)   12/18/19 140.3 kg (309 lb 4.8 oz)   11/15/19 141.7 kg (312 lb 4.8 oz)   10/17/19 139.5 kg (307 lb 8 oz)   09/19/19 137.4 kg (303 lb)   08/22/19 137.1 kg (302 lb 3.2 oz)     Dosing Weight: 145.2 kg    Medications:   Reviewed    Labs:   Labs reviewed    NUTRITION FOCUSED PHYSICAL ASSESSMENT FOR DIAGNOSING MALNUTRITION:  None observed due to telephone visit due to Covid 19 pandemic    ASSESSED NUTRITION NEEDS:  Estimated Energy Needs: 0536-6103 kcals (11-14 Kcal/Kg)  Justification: obese  Estimated Protein Needs: 79-99 grams protein (1.2-1.5 g pro/Kg)  Justification: preservation of lean body mass  Estimated Fluid Needs: 1160-0212  mL   Justification: maintenance    MALNUTRITION:  % Weight Loss:  None noted  % Intake:  No decreased intake noted  Subcutaneous Fat Loss:  None observed due to telephone visit due to COVID-19 pandemic   Muscle Loss:  None observed due to telephone visit due to COVID-19 pandemic   Fluid Retention:  None noted due to telephone visit due to COVID-19 pandemic     Malnutrition Diagnosis: Patient does not meet two of the above criteria necessary for diagnosing malnutrition    NUTRITION DIAGNOSIS:  Food and nutrition related knowledge deficit related to lack of prior exposure as evidenced by no previous need for food and nutrition related recommendations     INTERVENTIONS  Provided written & verbal education:     - Discussed portion sizes, label reading, carbohydrate counting, exercise and behavior modification.  Instructed patient on label reading using label in car (soda).  Recommend 45-60 grams carbohydrates per meal for blood " glucose reduction.  Encouraged patient to limit added sugars <24 grams per day (6 tsp).  Patient consumes 30 tsp added sugar per day (120 grams added sugar) from soda.    - Suggest patient eat first meal within 1 hour of waking and then every 4-5 hours for meals.  Patient to add fat and protein at meals to reduce blood glucose rise.    - Discussed emotional eating and alternatives.  Patient has worked with therapist in the past and would be willing to work again if feels needed.  - Discussed exercise and cancer fatigue.  Encouraged patient to include daily movement for improved energy levels.     Provided pt with corresponding education materials/handouts on:  Academy of Nutrition [employer] Dietetics Count Your Carbs booklet     Pt verbalize understanding of materials provided during consult.   Patient Understanding: good  Expected patient engagement: good     Implementation  Composition of Meals and Snacks and General/healthful diet    Goals  1.  Aim for 45-60 grams carbohydrate per meal  2.  Eat 3 consistent meals per day with balanced marconutrients    Follow-Up Plans:   Patient to follow up in 4 weeks   RD name and number provided       MONITORING AND EVALUATION:  -Food/beverage intake  -Weight trends    Mary Smith, RD, LD  Clinical Dietitian  [facility]  [phone] (direct)

## 2021-08-24 ENCOUNTER — ONCOLOGY VISIT (OUTPATIENT)
Dept: ONCOLOGY | Facility: CLINIC | Age: 51
End: 2021-08-24
Attending: PHYSICIAN ASSISTANT
Payer: COMMERCIAL

## 2021-08-24 VITALS
DIASTOLIC BLOOD PRESSURE: 86 MMHG | BODY MASS INDEX: 49.87 KG/M2 | TEMPERATURE: 98.3 F | WEIGHT: 293 LBS | SYSTOLIC BLOOD PRESSURE: 126 MMHG | HEART RATE: 104 BPM | OXYGEN SATURATION: 96 % | RESPIRATION RATE: 20 BRPM

## 2021-08-24 DIAGNOSIS — R21 RASH: Primary | ICD-10-CM

## 2021-08-24 PROCEDURE — G0463 HOSPITAL OUTPT CLINIC VISIT: HCPCS

## 2021-08-24 PROCEDURE — 99214 OFFICE O/P EST MOD 30 MIN: CPT | Performed by: PHYSICIAN ASSISTANT

## 2021-08-24 RX ORDER — MONTELUKAST SODIUM 10 MG/1
TABLET ORAL
COMMUNITY
Start: 2021-08-09

## 2021-08-24 RX ORDER — CLOTRIMAZOLE AND BETAMETHASONE DIPROPIONATE 10; .5 MG/ML; MG/ML
LOTION TOPICAL 2 TIMES DAILY
Qty: 30 ML | Refills: 0 | Status: SHIPPED | OUTPATIENT
Start: 2021-08-24 | End: 2021-09-08

## 2021-08-24 ASSESSMENT — PAIN SCALES - GENERAL: PAINLEVEL: SEVERE PAIN (7)

## 2021-08-24 NOTE — PROGRESS NOTES
Medical Oncology Follow-Up  Aug 24, 2021    HISTORY OF PRESENT ILLNESS:  Jade is a 51-year-old woman with metastatic breast cancer, ER positive    Jade was diagnosed with breast cancer with a lump found in the upper-inner quadrant of the left breast.  She was diagnosed in 12/2013 in the [place] area and Dr. David Redd was her medical oncologist.  Biopsy of the tumor showed it to be ER positive, WA positive, and HER-2 negative.  We do not have any of the original pathology and we need to obtain those reports.  She underwent a lumpectomy performed by Dr. Tasha Segura who is a surgeon in the [place] area.  She also had a sentinel lymph node which was noted to be involved with tumor but there was no lymph node dissection done at that time.  [id number].  She subsequently underwent adjuvant chemotherapy with dose-dense AC for 4 cycles and Taxol for 12 weeks, completed 09/11/2014.        She then had radiation therapy post lumpectomy to the left breast, which was completed 11/23/2014 by Dr. Manley.        She was then begun on an aromatase inhibitor, the name of which she does not remember.  The aromatase inhibitor therapy was begun in 11/2014.  She was then switched within about a month or so to anastrozole and remained on anastrozole from 11/2014-02/2017.  She has never received tamoxifen.        She then moved to [place], [place] in 03/2017.  She saw a gynecologist there and underwent a IRIS/BSO by Dr. Zendejas.  She then also saw Dr. Linares in [place], [place], who is an oncologist.  His interpretation of the pathology report was that she had triple-negative breast cancer.  It is possible that she may have had low estrogen receptor positivity, but the anastrozole was then discontinued in 02/2017.  She then underwent the IRIS/BSO in 03/2017.  This was done laparoscopically.        She then remained off of all hormonal therapy and in 09/2017 moved to [place].  She remained off hormonal therapy and did not have Medical  Oncology followup initially.        She was driving for [place] and noticed lymph nodes in the left neck about 8 weeks ago.  She then went to see Dr. Norberto Brand who performed a PET/CT scan and the patient also underwent a brain MRI for staging.  The PET/CT scan performed 08/03/2018 showed in the neck there were several mildly metabolic active and large prominent left posterior cervical lymph nodes.  The largest is located posterior to the left sternocleidomastoid muscle and measures 1.5 x 1 cm in size.  This demonstrate modest FDG uptake.  The other lymph nodes are smaller and demonstrate mild FDG uptake.  The prominent prevascular and aortopulmonary lymph nodes were also seen on the contrast-enhanced scan were less well seen on the PET.  The largest lymph node visualized on the PET scan was 1.1 x 0.8 cm and demonstrated mild FDG uptake.  This was in the periaortic area just anterior to the aortic arch.  No lung nodules.  No lung infiltrates.  No pleural effusion.  There was no uptake of FDG in the bones.  In summary, there were mildly enlarged left posterior cervical lymph nodes, largest measuring 1.5 x 1.0 cm and mildly enlarged aorticopulmonary and prevascular lymph nodes that were noted on contrast-enhanced CT scan seen less well on the PET scan.  No evidence of metastatic disease in the abdomen and pelvis.  She also underwent a brain MRI that showed no evidence of intracranial metastatic disease.  She underwent a biopsy of one of the lymph nodes in the left neck which showed metastatic adenocarcinoma consistent with breast origin, which was estrogen-receptor positive.  The tumor cells were positive for CK7, ER and SC consistent with metastatic breast carcinoma.  Estrogen receptor showed strong average nuclear intensity in 95% of carcinoma cells, progesterone receptor moderate average nuclear intensity in 70% of the carcinoma.  A GATA3 stain was apparently not performed.       TREATMENT HISTORY:  A. Tamoxifen  B.  Letrozole and palbociclib started 9-18-18.     INTERVAL HISTORY: Jade is here today for assessment of a rash. She was seen for this on 7/20 at [facility]. Was diagnosed as urticarial rash and was started on prednisone. She used calamine spray because was itchy. Rash has since recurred on bilateral breasts, lower abdomen, and L anterior lower leg. No new soaps, lotions, detergents besides getting a new mattress. No fevers/chills. No other new foods or supplements.       PHYSICAL EXAM:  /86   Pulse 104   Temp 98.3  F (36.8  C) (Oral)   Resp 20   Wt 148.7 kg (327 lb 14.4 oz)   SpO2 96%   BMI 49.87 kg/m    General: Alert, oriented, pleasant, NAD  HEENT: Normocephalic, atraumatic, no icterus.   Neck: No cervical or supraclavicular LAD.  Axillary: No LAD  Breast: Violaceous erythema with some scaling along central breasts. No palpable mass or nodularity or skin thickening   Abdomen: Some excoriations lower central abdomen with scaling flat macular lesions.   Neuro: CNII-XII grossly intact  Extremities: Some papular, scaling lesions L anterior tibia      Labs: Nothing new. Reviewed from 8/11        ASSESSMENT AND PLAN:  1. Recurrent, metastatic ER-positive, HER2 negative breast cancer: History of left breast cancer s/p lumpectomy and SNLB in 2014. S/p 4 cycles ddAC and 12 weeks of Taxol. On AI from 11/2014-2/2017. Recurrence on PET/CT scan performed 08/03/2018 and then biopsy to left posterior cervical lymph nodes, aorticopulmonary, and prevascular lymph nodes were positive. No evidence of metastatic disease in the abdomen and pelvis or brain. Started Ibrance and letrozole on 9/21/18. Last restaging in August showed stable disease.   - Allergy to contrast and she needs premedication and needs CT CAP and CT neck.   2. Rash on abdomen, b/l breast, lower L leg: ?Tinea. Will start clotrimazole-bethamethasone and refer to derm. Not consistent with drug rash.   3.  Depression and anxiety. She saw Dr. Pate her new  PCP and her regimen was changed to add buspirone 7.5 mg twice daily to sertraline 200 mg once per day added. Lorazepam prn anxiety was added.  She has improvement of her symptoms of depression and anxiety.   4.  Hypothyroid. Levothyroxine 175 mg per day.     5. Asthma: Seen by Dr. Hay. Per chart on fluticasone-salmeterol inhaler BID. Also has albuterol prn.   6. COVID vaccination: She completed both doses of Pfizer vaccine in April. Recommended having booster shot soon of third dose of Pfizer.     Cecilia Goins PA-C

## 2021-08-24 NOTE — LETTER
8/24/2021         RE: Jade Collins  [address]  [facility] [address]      Medical Oncology Follow-Up  Aug 24, 2021    HISTORY OF PRESENT ILLNESS:  Jade is a 51-year-old woman with metastatic breast cancer, ER positive    Jade was diagnosed with breast cancer with a lump found in the upper-inner quadrant of the left breast.  She was diagnosed in 12/2013 in the [place] area and Dr. David Redd was her medical oncologist.  Biopsy of the tumor showed it to be ER positive, ME positive, and HER-2 negative.  We do not have any of the original pathology and we need to obtain those reports.  She underwent a lumpectomy performed by Dr. Tasha Segura who is a surgeon in the [place] area.  She also had a sentinel lymph node which was noted to be involved with tumor but there was no lymph node dissection done at that time.  [id number].  She subsequently underwent adjuvant chemotherapy with dose-dense AC for 4 cycles and Taxol for 12 weeks, completed 09/11/2014.        She then had radiation therapy post lumpectomy to the left breast, which was completed 11/23/2014 by Dr. Manley.        She was then begun on an aromatase inhibitor, the name of which she does not remember.  The aromatase inhibitor therapy was begun in 11/2014.  She was then switched within about a month or so to anastrozole and remained on anastrozole from 11/2014-02/2017.  She has never received tamoxifen.        She then moved to [place], [place] in 03/2017.  She saw a gynecologist there and underwent a IRIS/BSO by Dr. Zendejas.  She then also saw Dr. Linares in [place], [place], who is an oncologist.  His interpretation of the pathology report was that she had triple-negative breast cancer.  It is possible that she may have had low estrogen receptor positivity, but the anastrozole was then discontinued in 02/2017.  She then underwent the IRIS/BSO in 03/2017.  This was done laparoscopically.        She then remained off of all hormonal therapy and in  09/2017 moved to [place].  She remained off hormonal therapy and did not have Medical Oncology followup initially.        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  She then went to see Dr. Norberto Brand who performed a PET/CT scan and the patient also underwent a brain MRI for staging.  The PET/CT scan performed 08/03/2018 showed in the neck there were several mildly metabolic active and large prominent left posterior cervical lymph nodes.  The largest is located posterior to the left sternocleidomastoid muscle and measures 1.5 x 1 cm in size.  This demonstrate modest FDG uptake.  The other lymph nodes are smaller and demonstrate mild FDG uptake.  The prominent prevascular and aortopulmonary lymph nodes were also seen on the contrast-enhanced scan were less well seen on the PET.  The largest lymph node visualized on the PET scan was 1.1 x 0.8 cm and demonstrated mild FDG uptake.  This was in the periaortic area just anterior to the aortic arch.  No lung nodules.  No lung infiltrates.  No pleural effusion.  There was no uptake of FDG in the bones.  In summary, there were mildly enlarged left posterior cervical lymph nodes, largest measuring 1.5 x 1.0 cm and mildly enlarged aorticopulmonary and prevascular lymph nodes that were noted on contrast-enhanced CT scan seen less well on the PET scan.  No evidence of metastatic disease in the abdomen and pelvis.  She also underwent a brain MRI that showed no evidence of intracranial metastatic disease.  She underwent a biopsy of one of the lymph nodes in the left neck which showed metastatic adenocarcinoma consistent with breast origin, which was estrogen-receptor positive.  The tumor cells were positive for CK7, ER and AZ consistent with metastatic breast carcinoma.  Estrogen receptor showed strong average nuclear intensity in 95% of carcinoma cells, progesterone receptor moderate average nuclear intensity in 70% of the carcinoma.   A GATA3 stain was apparently not performed.       TREATMENT HISTORY:  A. Tamoxifen  B. Letrozole and palbociclib started 9-18-18.     INTERVAL HISTORY: Jade is here today for assessment of a rash. She was seen for this on 7/20 at [facility]. Was diagnosed as urticarial rash and was started on prednisone. She used calamine spray because was itchy. Rash has since recurred on bilateral breasts, lower abdomen, and L anterior lower leg. No new soaps, lotions, detergents besides getting a new mattress. No fevers/chills. No other new foods or supplements.       PHYSICAL EXAM:  /86   Pulse 104   Temp 98.3  F (36.8  C) (Oral)   Resp 20   Wt 148.7 kg (327 lb 14.4 oz)   SpO2 96%   BMI 49.87 kg/m    General: Alert, oriented, pleasant, NAD  HEENT: Normocephalic, atraumatic, no icterus.   Neck: No cervical or supraclavicular LAD.  Axillary: No LAD  Breast: Violaceous erythema with some scaling along central breasts. No palpable mass or nodularity or skin thickening   Abdomen: Some excoriations lower central abdomen with scaling flat macular lesions.   Neuro: CNII-XII grossly intact  Extremities: Some papular, scaling lesions L anterior tibia      Labs: Nothing new. Reviewed from 8/11        ASSESSMENT AND PLAN:  1. Recurrent, metastatic ER-positive, HER2 negative breast cancer: History of left breast cancer s/p lumpectomy and SNLB in 2014. S/p 4 cycles ddAC and 12 weeks of Taxol. On AI from 11/2014-2/2017. Recurrence on PET/CT scan performed 08/03/2018 and then biopsy to left posterior cervical lymph nodes, aorticopulmonary, and prevascular lymph nodes were positive. No evidence of metastatic disease in the abdomen and pelvis or brain. Started Ibrance and letrozole on 9/21/18. Last restaging in August showed stable disease.   - Allergy to contrast and she needs premedication and needs CT CAP and CT neck.   2. Rash on abdomen, b/l breast, lower L leg: ?Tinea. Will start clotrimazole-bethamethasone and refer to  derm. Not consistent with drug rash.   3.  Depression and anxiety. She saw Dr. Pate her new PCP and her regimen was changed to add buspirone 7.5 mg twice daily to sertraline 200 mg once per day added. Lorazepam prn anxiety was added.  She has improvement of her symptoms of depression and anxiety.   4.  Hypothyroid. Levothyroxine 175 mg per day.     5. Asthma: Seen by Dr. Hay. Per chart on fluticasone-salmeterol inhaler BID. Also has albuterol prn.   6. COVID vaccination: She completed both doses of Pfizer vaccine in April. Recommended having booster shot soon of third dose of Pfizer.     NATHANAEL Hugo PA-C

## 2021-08-24 NOTE — NURSING NOTE
"Oncology Rooming Note    August 24, 2021 4:40 PM   Jade Collins is a 51 year old female who presents for:    Chief Complaint   Patient presents with     Oncology Clinic Visit     Malignant neoplasm of breast      Initial Vitals: /86   Pulse 104   Temp 98.3  F (36.8  C) (Oral)   Resp 20   Wt 148.7 kg (327 lb 14.4 oz)   SpO2 96%   BMI 49.87 kg/m   Estimated body mass index is 49.87 kg/m  as calculated from the following:    Height as of 3/12/20: 1.727 m (5' 7.99\").    Weight as of this encounter: 148.7 kg (327 lb 14.4 oz). Body surface area is 2.67 meters squared.  Severe Pain (7) Comment: Data Unavailable   No LMP recorded. Patient is postmenopausal.  Allergies reviewed: Yes  Medications reviewed: Yes    Medications: Medication refills not needed today.  Pharmacy name entered into ITema:    [facility] - [place], [place] - [address]  [id number] [place], [place] - [address] [facility]    Clinical concerns: None       Jamila Mac LPN            "

## 2021-09-02 ENCOUNTER — IMMUNIZATION (OUTPATIENT)
Dept: NURSING | Facility: CLINIC | Age: 51
End: 2021-09-02
Payer: COMMERCIAL

## 2021-09-02 PROCEDURE — 0003A PR COVID VAC PFIZER DIL RECON 30 MCG/0.3 ML IM: CPT

## 2021-09-02 PROCEDURE — 91300 PR COVID VAC PFIZER DIL RECON 30 MCG/0.3 ML IM: CPT

## 2021-09-08 ENCOUNTER — LAB (OUTPATIENT)
Dept: LAB | Facility: CLINIC | Age: 51
End: 2021-09-08
Payer: COMMERCIAL

## 2021-09-08 ENCOUNTER — VIRTUAL VISIT (OUTPATIENT)
Dept: ONCOLOGY | Facility: CLINIC | Age: 51
End: 2021-09-08
Attending: PHYSICIAN ASSISTANT
Payer: COMMERCIAL

## 2021-09-08 DIAGNOSIS — Z17.0 MALIGNANT NEOPLASM OF UPPER-OUTER QUADRANT OF LEFT BREAST IN FEMALE, ESTROGEN RECEPTOR POSITIVE (H): ICD-10-CM

## 2021-09-08 DIAGNOSIS — Z17.0 MALIGNANT NEOPLASM OF UPPER-OUTER QUADRANT OF LEFT BREAST IN FEMALE, ESTROGEN RECEPTOR POSITIVE (H): Primary | ICD-10-CM

## 2021-09-08 DIAGNOSIS — C50.412 MALIGNANT NEOPLASM OF UPPER-OUTER QUADRANT OF LEFT BREAST IN FEMALE, ESTROGEN RECEPTOR POSITIVE (H): Primary | ICD-10-CM

## 2021-09-08 DIAGNOSIS — M25.561 ACUTE PAIN OF RIGHT KNEE: ICD-10-CM

## 2021-09-08 DIAGNOSIS — C50.412 MALIGNANT NEOPLASM OF UPPER-OUTER QUADRANT OF LEFT BREAST IN FEMALE, ESTROGEN RECEPTOR POSITIVE (H): ICD-10-CM

## 2021-09-08 DIAGNOSIS — R21 RASH: ICD-10-CM

## 2021-09-08 DIAGNOSIS — E03.9 HYPOTHYROIDISM, UNSPECIFIED TYPE: ICD-10-CM

## 2021-09-08 DIAGNOSIS — E03.9 PRIMARY HYPOTHYROIDISM: ICD-10-CM

## 2021-09-08 DIAGNOSIS — R73.9 HYPERGLYCEMIA: ICD-10-CM

## 2021-09-08 LAB
ALBUMIN SERPL-MCNC: 3.9 G/DL (ref 3.4–5)
ALP SERPL-CCNC: 72 U/L (ref 40–150)
ALT SERPL W P-5'-P-CCNC: 37 U/L (ref 0–50)
ANION GAP SERPL CALCULATED.3IONS-SCNC: 3 MMOL/L (ref 3–14)
AST SERPL W P-5'-P-CCNC: 29 U/L (ref 0–45)
BASOPHILS # BLD MANUAL: 0 10E3/UL (ref 0–0.2)
BASOPHILS NFR BLD MANUAL: 1 %
BILIRUB SERPL-MCNC: 0.5 MG/DL (ref 0.2–1.3)
BUN SERPL-MCNC: 9 MG/DL (ref 7–30)
CALCIUM SERPL-MCNC: 9.9 MG/DL (ref 8.5–10.1)
CANCER AG27-29 SERPL-ACNC: 13 U/ML (ref 0–39)
CEA SERPL-MCNC: 0.7 UG/L (ref 0–2.5)
CHLORIDE BLD-SCNC: 107 MMOL/L (ref 94–109)
CO2 SERPL-SCNC: 28 MMOL/L (ref 20–32)
CREAT SERPL-MCNC: 1 MG/DL (ref 0.52–1.04)
EOSINOPHIL # BLD MANUAL: 0.2 10E3/UL (ref 0–0.7)
EOSINOPHIL NFR BLD MANUAL: 7 %
ERYTHROCYTE [DISTWIDTH] IN BLOOD BY AUTOMATED COUNT: 14.1 % (ref 10–15)
GFR SERPL CREATININE-BSD FRML MDRD: 65 ML/MIN/1.73M2
GLUCOSE BLD-MCNC: 111 MG/DL (ref 70–99)
HBA1C MFR BLD: 6.1 % (ref 0–5.6)
HCT VFR BLD AUTO: 35.7 % (ref 35–47)
HGB BLD-MCNC: 12.4 G/DL (ref 11.7–15.7)
LYMPHOCYTES # BLD MANUAL: 1 10E3/UL (ref 0.8–5.3)
LYMPHOCYTES NFR BLD MANUAL: 40 %
MCH RBC QN AUTO: 33 PG (ref 26.5–33)
MCHC RBC AUTO-ENTMCNC: 34.7 G/DL (ref 31.5–36.5)
MCV RBC AUTO: 95 FL (ref 78–100)
MONOCYTES # BLD MANUAL: 0.1 10E3/UL (ref 0–1.3)
MONOCYTES NFR BLD MANUAL: 4 %
NEUTROPHILS # BLD MANUAL: 1.2 10E3/UL (ref 1.6–8.3)
NEUTROPHILS NFR BLD MANUAL: 48 %
PLAT MORPH BLD: ABNORMAL
PLATELET # BLD AUTO: 158 10E3/UL (ref 150–450)
POTASSIUM BLD-SCNC: 4.5 MMOL/L (ref 3.4–5.3)
PROT SERPL-MCNC: 7.6 G/DL (ref 6.8–8.8)
RBC # BLD AUTO: 3.76 10E6/UL (ref 3.8–5.2)
RBC MORPH BLD: ABNORMAL
SODIUM SERPL-SCNC: 138 MMOL/L (ref 133–144)
T4 FREE SERPL-MCNC: 0.83 NG/DL (ref 0.76–1.46)
TSH SERPL DL<=0.005 MIU/L-ACNC: 7.9 MU/L (ref 0.4–4)
VARIANT LYMPHS BLD QL SMEAR: PRESENT
WBC # BLD AUTO: 2.6 10E3/UL (ref 4–11)

## 2021-09-08 PROCEDURE — 99443 PR PHYSICIAN TELEPHONE EVALUATION 21-30 MIN: CPT | Performed by: PHYSICIAN ASSISTANT

## 2021-09-08 PROCEDURE — 84439 ASSAY OF FREE THYROXINE: CPT

## 2021-09-08 PROCEDURE — 82378 CARCINOEMBRYONIC ANTIGEN: CPT

## 2021-09-08 PROCEDURE — 84443 ASSAY THYROID STIM HORMONE: CPT

## 2021-09-08 PROCEDURE — 85027 COMPLETE CBC AUTOMATED: CPT

## 2021-09-08 PROCEDURE — 36415 COLL VENOUS BLD VENIPUNCTURE: CPT

## 2021-09-08 PROCEDURE — 80053 COMPREHEN METABOLIC PANEL: CPT

## 2021-09-08 PROCEDURE — 86300 IMMUNOASSAY TUMOR CA 15-3: CPT

## 2021-09-08 PROCEDURE — 83036 HEMOGLOBIN GLYCOSYLATED A1C: CPT

## 2021-09-08 RX ORDER — KETOCONAZOLE 20 MG/G
CREAM TOPICAL 2 TIMES DAILY
Qty: 45 G | Refills: 0 | Status: SHIPPED | OUTPATIENT
Start: 2021-09-08 | End: 2022-06-07

## 2021-09-08 NOTE — LETTER
9/8/2021         RE: Jade Collins  [address]  [facility] [address]      Medical Oncology Follow-Up  Sep 8, 2021    HISTORY OF PRESENT ILLNESS:  Jade is a 51-year-old woman with metastatic breast cancer, ER positive    Jade was diagnosed with breast cancer with a lump found in the upper-inner quadrant of the left breast.  She was diagnosed in 12/2013 in the [place] area and Dr. David Redd was her medical oncologist.  Biopsy of the tumor showed it to be ER positive, W[place] positive, and HER-2 negative.  We do not have any of the original pathology and we need to obtain those reports.  She underwent a lumpectomy performed by Dr. Tasha Segura who is a surgeon in the [place] area.  She also had a sentinel lymph node which was noted to be involved with tumor but there was no lymph node dissection done at that time.  [id number].  She subsequently underwent adjuvant chemotherapy with dose-dense AC for 4 cycles and Taxol for 12 weeks, completed 09/11/2014.        She then had radiation therapy post lumpectomy to the left breast, which was completed 11/23/2014 by Dr. Manley.        She was then begun on an aromatase inhibitor, the name of which she does not remember.  The aromatase inhibitor therapy was begun in 11/2014.  She was then switched within about a month or so to anastrozole and remained on anastrozole from 11/2014-02/2017.  She has never received tamoxifen.        She then moved to [place], [place] in 03/2017.  She saw a gynecologist there and underwent a IRIS/BSO by Dr. Zendejas.  She then also saw Dr. Linares in [place], [place], who is an oncologist.  His interpretation of the pathology report was that she had triple-negative breast cancer.  It is possible that she may have had low estrogen receptor positivity, but the anastrozole was then discontinued in 02/2017.  She then underwent the IRIS/BSO in 03/2017.  This was done laparoscopically.        She then remained off of all hormonal therapy and in  09/2017 moved to [place].  She remained off hormonal therapy and did not have Medical Oncology followup initially.        She then went to see Dr. Norberto Brand after noticing some nodes in her neckwho performed a PET/CT scan and the patient also underwent a brain MRI for staging.  The PET/CT scan performed 08/03/2018 showed in the neck there were several mildly metabolic active and large prominent left posterior cervical lymph nodes.  The largest is located posterior to the left sternocleidomastoid muscle and measures 1.5 x 1 cm in size.  This demonstrate modest FDG uptake.  The other lymph nodes are smaller and demonstrate mild FDG uptake.  The prominent prevascular and aortopulmonary lymph nodes were also seen on the contrast-enhanced scan were less well seen on the PET.  The largest lymph node visualized on the PET scan was 1.1 x 0.8 cm and demonstrated mild FDG uptake.  This was in the periaortic area just anterior to the aortic arch.  No lung nodules.  No lung infiltrates.  No pleural effusion.  There was no uptake of FDG in the bones.  In summary, there were mildly enlarged left posterior cervical lymph nodes, largest measuring 1.5 x 1.0 cm and mildly enlarged aorticopulmonary and prevascular lymph nodes that were noted on contrast-enhanced CT scan seen less well on the PET scan.  No evidence of metastatic disease in the abdomen and pelvis.  She also underwent a brain MRI that showed no evidence of intracranial metastatic disease.  She underwent a biopsy of one of the lymph nodes in the left neck which showed metastatic adenocarcinoma consistent with breast origin, which was estrogen-receptor positive.  The tumor cells were positive for CK7, ER and MI consistent with metastatic breast carcinoma.  Estrogen receptor showed strong average nuclear intensity in 95% of carcinoma cells, progesterone receptor moderate average nuclear intensity in 70% of the carcinoma.  A GATA3 stain was apparently not performed.  "      TREATMENT HISTORY:  A. Tamoxifen  B. Letrozole and palbociclib started 9-18-18.     INTERVAL HISTORY:   Today, Jade was called by phone.  She states that she has been doing generally well over the last couple of weeks since she was last seen.  She continues to have her rash, although she feels that it is less itchy.  She was unable to  the cream that was prescribed, due to it being too expensive through her insurance.  She is been applying Jane over-the-counter cream, and does feel that this is helping.  She denies any spreading of the rash.  She would be willing to try a different cream, as long as it is less expensive.  She has not followed up with a dermatologist.  She continues to tolerate Ibrance and letrozole well.  No significant side effects.  She has not had any recent infectious symptoms, including fevers body aches or chills.  Breathing has been stable, no chest pain or shortness of breath.  She denies any headaches or double or blurry vision.  No abdominal pain.  Eating regular.  No nausea or vomiting.  Bowels have been moving regularly.  She does note that she has a \"charley horse\" sensation on her sides occasionally over the last 3 months, mostly with bending and twisting.  Again, no associated chest pain shortness of breath or cough.  She also notes right knee pain for the last month.  She was walking a large dog, went one way in her knee and hand the other.  Since then, she has had some swelling, and some pain.  She also feels like when she gets out of the car her knee does not go in the right direction.  She has not had any prior surgeries or injuries to this knee.      PHYSICAL EXAM:  There were no vitals taken for this visit.  Resp: No respiratory distress, no audible coughing or wheezing  Neuro: No AAOx3    Labs:  Most Recent 3 CBC's:Recent Labs   Lab Test 09/08/21  1152 08/11/21  1422 07/14/21  1433   WBC 2.6* 3.8* 3.3*   HGB 12.4 12.8 12.8   MCV 95 97 99    157 159    " Most Recent 3 BMP's:  Recent Labs   Lab Test 09/08/21  1152 08/11/21  1422 07/14/21  1433    134 139   POTASSIUM 4.5 4.5 4.2   CHLORIDE 107 103 106   CO2 28 24 27   BUN 9 17 12   CR 1.00 0.96 0.99   ANIONGAP 3 7 6   GIOVANNY 9.9 9.2 9.4   * 130* 118*    Most Recent 2 LFT's:  Recent Labs   Lab Test 09/08/21  1152 08/11/21  1422   AST 29 24   ALT 37 38   ALKPHOS 72 66   BILITOTAL 0.5 0.5      I reviewed the above labs today.         ASSESSMENT AND PLAN:  1. Recurrent, metastatic ER-positive, HER2 negative breast cancer: History of left breast cancer s/p lumpectomy and SNLB in 2014. S/p 4 cycles ddAC and 12 weeks of Taxol. On AI from 11/2014-2/2017. Recurrence on PET/CT scan performed 08/03/2018 and then biopsy to left posterior cervical lymph nodes, aorticopulmonary, and prevascular lymph nodes were positive. No evidence of metastatic disease in the abdomen and pelvis or brain. Started Ibrance and letrozole on 9/21/18. Last restaging in August showed stable disease   - Allergy to contrast and she needs premedication and needs CT CAP and CT neck.   - Repeat imaging due in November, scheduled   2. Rash on abdomen, b/l breast, lower L leg: ?Tinea. Improved, despite not being able to apply the clotrimasole-betamethasone which was too expensive. Will prescribe ketoconazole as a cheaper option. Will also use over the counter hydrocortisone. Apply for a few weeks  -Follow up with dermatology recommended if no improvement  3.  Depression and anxiety. She saw Dr. Pate her new PCP and her regimen was changed to add buspirone 7.5 mg twice daily to sertraline 200 mg once per day added. Lorazepam prn anxiety was added.  She has improvement of her symptoms of depression and anxiety.   4.  Hypothyroid. Levothyroxine 175 mg per day. TSH slightly elevated, Free T4 normal. Continue to monitor.   5. Asthma: Seen by Dr. Hay. Per chart on fluticasone-salmeterol inhaler BID. Also has albuterol prn.   6. COVID vaccination:  Received her COVID booster last Thursday.   7. Right knee pain: Offered an orthopedics referral, she would like to start with primary care to further work up    Time spent on phone: 24 minutes     35 minutes spent on the date of the encounter doing chart review, review of test results, interpretation of tests and documentation, in addition to 24 minutes spent on the phone with the patient. Total time: 39 minutes     NATHANAEL Barahona PA-C

## 2021-09-08 NOTE — PROGRESS NOTES
Patient location [place]  What phone number would you like to be contacted at? [phone]  How would you like to obtain your AVS? MyChart

## 2021-09-08 NOTE — PROGRESS NOTES
Medical Oncology Follow-Up  Sep 8, 2021    HISTORY OF PRESENT ILLNESS:  Jade is a 51-year-old woman with metastatic breast cancer, ER positive    Jade was diagnosed with breast cancer with a lump found in the upper-inner quadrant of the left breast.  She was diagnosed in 12/2013 in the [place] area and Dr. David Redd was her medical oncologist.  Biopsy of the tumor showed it to be ER positive, IA positive, and HER-2 negative.  We do not have any of the original pathology and we need to obtain those reports.  She underwent a lumpectomy performed by Dr. Tasha Segura who is a surgeon in the [place] area.  She also had a sentinel lymph node which was noted to be involved with tumor but there was no lymph node dissection done at that time.  [id number].  She subsequently underwent adjuvant chemotherapy with dose-dense AC for 4 cycles and Taxol for 12 weeks, completed 09/11/2014.        She then had radiation therapy post lumpectomy to the left breast, which was completed 11/23/2014 by Dr. Manley.        She was then begun on an aromatase inhibitor, the name of which she does not remember.  The aromatase inhibitor therapy was begun in 11/2014.  She was then switched within about a month or so to anastrozole and remained on anastrozole from 11/2014-02/2017.  She has never received tamoxifen.        She then moved to [place], [place] in 03/2017.  She saw a gynecologist there and underwent a IRIS/BSO by Dr. Zendejas.  She then also saw Dr. Linares in [place], [place], who is an oncologist.  His interpretation of the pathology report was that she had triple-negative breast cancer.  It is possible that she may have had low estrogen receptor positivity, but the anastrozole was then discontinued in 02/2017.  She then underwent the IRIS/BSO in 03/2017.  This was done laparoscopically.        She then remained off of all hormonal therapy and in 09/2017 moved to [place].  She remained off hormonal therapy and did not have Medical Oncology  followup initially.        She then went to see Dr. Norberto Brand after noticing some nodes in her neckwho performed a PET/CT scan and the patient also underwent a brain MRI for staging.  The PET/CT scan performed 08/03/2018 showed in the neck there were several mildly metabolic active and large prominent left posterior cervical lymph nodes.  The largest is located posterior to the left sternocleidomastoid muscle and measures 1.5 x 1 cm in size.  This demonstrate modest FDG uptake.  The other lymph nodes are smaller and demonstrate mild FDG uptake.  The prominent prevascular and aortopulmonary lymph nodes were also seen on the contrast-enhanced scan were less well seen on the PET.  The largest lymph node visualized on the PET scan was 1.1 x 0.8 cm and demonstrated mild FDG uptake.  This was in the periaortic area just anterior to the aortic arch.  No lung nodules.  No lung infiltrates.  No pleural effusion.  There was no uptake of FDG in the bones.  In summary, there were mildly enlarged left posterior cervical lymph nodes, largest measuring 1.5 x 1.0 cm and mildly enlarged aorticopulmonary and prevascular lymph nodes that were noted on contrast-enhanced CT scan seen less well on the PET scan.  No evidence of metastatic disease in the abdomen and pelvis.  She also underwent a brain MRI that showed no evidence of intracranial metastatic disease.  She underwent a biopsy of one of the lymph nodes in the left neck which showed metastatic adenocarcinoma consistent with breast origin, which was estrogen-receptor positive.  The tumor cells were positive for CK7, ER and IA consistent with metastatic breast carcinoma.  Estrogen receptor showed strong average nuclear intensity in 95% of carcinoma cells, progesterone receptor moderate average nuclear intensity in 70% of the carcinoma.  A GATA3 stain was apparently not performed.       TREATMENT HISTORY:  A. Tamoxifen  B. Letrozole and palbociclib started 9-18-18.     INTERVAL  "HISTORY:   Today, Jade was called by phone.  She states that she has been doing generally well over the last couple of weeks since she was last seen.  She continues to have her rash, although she feels that it is less itchy.  She was unable to  the cream that was prescribed, due to it being too expensive through her insurance.  She is been applying Jane over-the-counter cream, and does feel that this is helping.  She denies any spreading of the rash.  She would be willing to try a different cream, as long as it is less expensive.  She has not followed up with a dermatologist.  She continues to tolerate Ibrance and letrozole well.  No significant side effects.  She has not had any recent infectious symptoms, including fevers body aches or chills.  Breathing has been stable, no chest pain or shortness of breath.  She denies any headaches or double or blurry vision.  No abdominal pain.  Eating regular.  No nausea or vomiting.  Bowels have been moving regularly.  She does note that she has a \"charley horse\" sensation on her sides occasionally over the last 3 months, mostly with bending and twisting.  Again, no associated chest pain shortness of breath or cough.  She also notes right knee pain for the last month.  She was walking a large dog, went one way in her knee and hand the other.  Since then, she has had some swelling, and some pain.  She also feels like when she gets out of the car her knee does not go in the right direction.  She has not had any prior surgeries or injuries to this knee.      PHYSICAL EXAM:  There were no vitals taken for this visit.  Resp: No respiratory distress, no audible coughing or wheezing  Neuro: No AAOx3    Labs:  Most Recent 3 CBC's:Recent Labs   Lab Test 09/08/21  1152 08/11/21  1422 07/14/21  1433   WBC 2.6* 3.8* 3.3*   HGB 12.4 12.8 12.8   MCV 95 97 99    157 159    Most Recent 3 BMP's:  Recent Labs   Lab Test 09/08/21  1152 08/11/21  1422 07/14/21  1433    134 " 139   POTASSIUM 4.5 4.5 4.2   CHLORIDE 107 103 106   CO2 28 24 27   BUN 9 17 12   CR 1.00 0.96 0.99   ANIONGAP 3 7 6   GIOVANNY 9.9 9.2 9.4   * 130* 118*    Most Recent 2 LFT's:  Recent Labs   Lab Test 09/08/21  1152 08/11/21  1422   AST 29 24   ALT 37 38   ALKPHOS 72 66   BILITOTAL 0.5 0.5      I reviewed the above labs today.         ASSESSMENT AND PLAN:  1. Recurrent, metastatic ER-positive, HER2 negative breast cancer: History of left breast cancer s/p lumpectomy and SNLB in 2014. S/p 4 cycles ddAC and 12 weeks of Taxol. On AI from 11/2014-2/2017. Recurrence on PET/CT scan performed 08/03/2018 and then biopsy to left posterior cervical lymph nodes, aorticopulmonary, and prevascular lymph nodes were positive. No evidence of metastatic disease in the abdomen and pelvis or brain. Started Ibrance and letrozole on 9/21/18. Last restaging in August showed stable disease   - Allergy to contrast and she needs premedication and needs CT CAP and CT neck.   - Repeat imaging due in November, scheduled   2. Rash on abdomen, b/l breast, lower L leg: ?Tinea. Improved, despite not being able to apply the clotrimasole-betamethasone which was too expensive. Will prescribe ketoconazole as a cheaper option. Will also use over the counter hydrocortisone. Apply for a few weeks  -Follow up with dermatology recommended if no improvement  3.  Depression and anxiety. She saw Dr. Pate her new PCP and her regimen was changed to add buspirone 7.5 mg twice daily to sertraline 200 mg once per day added. Lorazepam prn anxiety was added.  She has improvement of her symptoms of depression and anxiety.   4.  Hypothyroid. Levothyroxine 175 mg per day. TSH slightly elevated, Free T4 normal. Continue to monitor.   5. Asthma: Seen by Dr. Hay. Per chart on fluticasone-salmeterol inhaler BID. Also has albuterol prn.   6. COVID vaccination: Received her COVID booster last Thursday.   7. Right knee pain: Offered an orthopedics referral, she would  like to start with primary care to further work up    Time spent on phone: 24 minutes     35 minutes spent on the date of the encounter doing chart review, review of test results, interpretation of tests and documentation, in addition to 24 minutes spent on the phone with the patient. Total time: 39 minutes     Mandie Encinas PA-C

## 2021-09-09 DIAGNOSIS — Z17.0 MALIGNANT NEOPLASM OF UPPER-OUTER QUADRANT OF LEFT BREAST IN FEMALE, ESTROGEN RECEPTOR POSITIVE (H): Primary | ICD-10-CM

## 2021-09-09 DIAGNOSIS — C50.412 MALIGNANT NEOPLASM OF UPPER-OUTER QUADRANT OF LEFT BREAST IN FEMALE, ESTROGEN RECEPTOR POSITIVE (H): Primary | ICD-10-CM

## 2021-09-09 RX ORDER — PALBOCICLIB 100 MG/1
TABLET, FILM COATED ORAL
Qty: 21 TABLET | Refills: 0 | OUTPATIENT
Start: 2021-09-09

## 2021-09-09 RX ORDER — LETROZOLE 2.5 MG/1
2.5 TABLET, FILM COATED ORAL DAILY
Qty: 28 TABLET | Refills: 0 | Status: SHIPPED | OUTPATIENT
Start: 2021-09-09 | End: 2021-11-24

## 2021-09-09 RX ORDER — LETROZOLE 2.5 MG/1
TABLET, FILM COATED ORAL
Qty: 28 TABLET | Refills: 0 | OUTPATIENT
Start: 2021-09-09

## 2021-09-14 ENCOUNTER — VIRTUAL VISIT (OUTPATIENT)
Dept: ONCOLOGY | Facility: CLINIC | Age: 51
End: 2021-09-14
Attending: DIETITIAN, REGISTERED
Payer: COMMERCIAL

## 2021-09-14 DIAGNOSIS — C50.412 MALIGNANT NEOPLASM OF UPPER-OUTER QUADRANT OF LEFT BREAST IN FEMALE, ESTROGEN RECEPTOR POSITIVE (H): ICD-10-CM

## 2021-09-14 DIAGNOSIS — Z17.0 MALIGNANT NEOPLASM OF UPPER-OUTER QUADRANT OF LEFT BREAST IN FEMALE, ESTROGEN RECEPTOR POSITIVE (H): ICD-10-CM

## 2021-09-14 PROCEDURE — 97803 MED NUTRITION INDIV SUBSEQ: CPT | Mod: TEL | Performed by: DIETITIAN, REGISTERED

## 2021-09-14 NOTE — LETTER
9/14/2021         RE: Jade Collins  [address]  [place] [facility] [address]        Dear Colleague,    Thank you for referring your patient, Jade Collins, to the [facility]. Please see a copy of my visit note below.    Originating Location (pt. Location): Home    Distant Location (provider location):  [facility] -remote     Mode of Communication: Telephone      CLINICAL NUTRITION SERVICES - ASSESSMENT NOTE     Jade Collins 51 year old referred for MNT related to   Hyperglycemia [R73.9]  - Primary       Malignant neoplasm of upper-outer quadrant of left breast in female, estrogen receptor positive (H) [C50.412, Z17.0]          Time Spent: 32 minutes  Visit Type: Follow up   Pt accompanied by: self  Referring Physician: Dr. Bhatt     NUTRITION HISTORY  Factors affecting nutrition intake include:none  Current diet/appetite: regular/good  Chemotherapy: Letrozole; Palbociclib  Radiation: completed 2014  Surgery: lumpectomy 2013     Food Security: any concerns about having enough money to buy food or access to grocery stores? Yes, EBT card      Shops at [facility] or [facility] -1 time every 1-2 weeks (doesn't want to be around crowds) so may order online and  groceries      Patient feels she is addicted to sugar; HgbA1C elevated in July   Patient states the more I eat, the more I want sugars.  Patient states when she was a child she would spend the weekebd and summers at her grandma's and she allowed sugar and when she was with her mother it was salad and vegetables so felt it was punishment.  Ice cream (3 scoops in bowl or coffee mug-treat soothes anxiety or stress) and soda -Dr. Pepper or Peptrina ([employer] large) 1 per day   Does not cook   Likes banana, cantaloupe, apples  Salad mix -adds to sandwiches      Takes meds at nights-1-2 AM; sleeps 9-10 AM    9/14/2021 Patient realized her portions are too large.  Patient feels she is getting more movement  "into lifestyle.  Patient walking cat in [facility].  Patient feels like it's helping her socialization.  Orders food from [employer] or [employer].  Patient gets acid reflux after taking medication.  Patient eating at 10 AM 5 PM 10 PM.  Patient states gets overwhelmed with cooking as did not grow up preparing her own meals.      Diet Recall  Breakfast 11:00-12:00 leftover takeout; yogurt with granola and banana    Lunch Skips    Dinner Orange chicken with white rice or noodles; taquito with sour cream + 5 nachos chips (8:00-9:00) hamburger, turkey or tuna sandwich; frozen vegetables -eats entire bag; cereal with 2% milk (large bowl)    Snacks Ice cream    Beverages Soda       ANTHROPOMETRICS  Height: 5'9\"  Weight: 310 lbs   BMI: 45 kg/m2  Weight Status:  Obesity Grade III BMI >40  IBW: 145 lbs +/-10% (214%)  Weight History:   Wt Readings from Last 10 Encounters:   08/24/21 148.7 kg (327 lb 14.4 oz)   01/28/21 145.2 kg (320 lb)   06/09/20 138.6 kg (305 lb 9.6 oz)   03/12/20 139.5 kg (307 lb 8 oz)   02/13/20 138.7 kg (305 lb 11.2 oz)   01/16/20 137.6 kg (303 lb 4.8 oz)   12/18/19 140.3 kg (309 lb 4.8 oz)   11/15/19 141.7 kg (312 lb 4.8 oz)   10/17/19 139.5 kg (307 lb 8 oz)   09/19/19 137.4 kg (303 lb)      Dosing Weight: 145.2 kg     Medications:   Reviewed     Labs:   Labs reviewed     NUTRITION FOCUSED PHYSICAL ASSESSMENT FOR DIAGNOSING MALNUTRITION:  None observed due to telephone visit due to Covid 19 pandemic     ASSESSED NUTRITION NEEDS:  Estimated Energy Needs: 9672-1828 kcals (11-14 Kcal/Kg)  Justification: obese  Estimated Protein Needs: 79-99 grams protein (1.2-1.5 g pro/Kg)  Justification: preservation of lean body mass  Estimated Fluid Needs: 7407-5933  mL   Justification: maintenance     MALNUTRITION:  % Weight Loss:  None noted  % Intake:  No decreased intake noted  Subcutaneous Fat Loss:  None observed due to telephone visit due to COVID-19 pandemic   Muscle Loss:  None observed due to telephone visit due to " COVID-19 pandemic   Fluid Retention:  None noted due to telephone visit due to COVID-19 pandemic      Malnutrition Diagnosis: Patient does not meet two of the above criteria necessary for diagnosing malnutrition    EVALUATION/PROGRESS TOWARDS GOALS  Previous nutrition goals:  Aim for 45-60 grams carbohydrate per meal-not met   Eat 3 consistent meals per day with balanced marconutrients-not met     Previous nutrition diagnosis:Food and nutrition related knowledge deficit related to lack of prior exposure as evidenced by no previous need for food and nutrition related recommendations     Current nutrition diagnosis: Food and nutrition related knowledge deficit related to lack of prior exposure as evidenced by no previous need for food and nutrition related recommendations      INTERVENTIONS  Provided written & verbal education:     - Discussed portion sizes, label reading, carbohydrate counting, exercise and behavior modification.  Recommend 45-60 grams carbohydrates per meal for blood glucose reduction.  Encouraged patient to limit added sugars <24 grams per day (6 tsp).  Patient consumes 30 tsp added sugar per day (120 grams added sugar) from soda.    - Suggest patient eat first meal within 1 hour of waking and then every 4-5 hours for meals.  Patient to add fat and protein at meals to reduce blood glucose rise.    - Discussed meal planning and grocery shopping ideas. Encouraged increase in fruit and vegetable consumption.   - Discussed exercise and cancer fatigue.  Encouraged patient to include daily movement for improved energy levels.  Congratulated patient on her effort to increases exercise now that she is taking her kitten out in the stroller for walks.       Provided pt with corresponding education materials/handouts on:  [employer] Count Your Carbs booklet      Pt verbalize understanding of materials provided during consult.   Patient Understanding: good  Expected patient engagement:  fair-good     Implementation  Composition of Meals and Snacks and General/healthful diet    Goals (Patient determined)  Buy less processed foods when grocery shopping  Purchase 2 fruit and 2 vegetable options for the week     Follow-Up Plans:   Patient to follow up in 8 weeks.  Patient to check on insurance coverage.   RD name and number provided        MONITORING AND EVALUATION:  -Food/beverage intake  -Weight trends     Mary Smith, RD, LD  Clinical Dietitian  [facility]  [phone] (direct        Again, thank you for allowing me to participate in the care of your patient.        Sincerely,        Zachariah Guerrero, CARLOS, LD

## 2021-09-14 NOTE — LETTER
9/14/2021         RE: Jade Collins  [address]  [place] [address]        Dear Colleague,    Thank you for referring your patient, Jade Collins, to the [facility]. Please see a copy of my visit note below.    Originating Location (pt. Location): Home    Distant Location (provider location):  [facility] -remote     Mode of Communication: Telephone      CLINICAL NUTRITION SERVICES - ASSESSMENT NOTE     Jade Collins 51 year old referred for MNT related to   Hyperglycemia [R73.9]  - Primary       Malignant neoplasm of upper-outer quadrant of left breast in female, estrogen receptor positive (H) [C50.412, Z17.0]          Time Spent: 32 minutes  Visit Type: Follow up   Pt accompanied by: self  Referring Physician: Dr. Bhatt     NUTRITION HISTORY  Factors affecting nutrition intake include:none  Current diet/appetite: regular/good  Chemotherapy: Letrozole; Palbociclib  Radiation: completed 2014  Surgery: lumpectomy 2013     Food Security: any concerns about having enough money to buy food or access to grocery stores? Yes, EBT card      Shops at [facility] or [facility] -1 time every 1-2 weeks (doesn't want to be around crowds) so may order online and  groceries      Patient feels she is addicted to sugar; HgbA1C elevated in July   Patient states the more I eat, the more I want sugars.  Patient states when she was a child she would spend the weekebd and summers at her grandma's and she allowed sugar and when she was with her mother it was salad and vegetables so felt it was punishment.  Ice cream (3 scoops in bowl or coffee mug-treat soothes anxiety or stress) and soda -Dr. Pepper or Peptrina ([employer] large) 1 per day   Does not cook   Likes banana, cantaloupe, apples  Salad mix -adds to sandwiches      Takes meds at nights-1-2 AM; sleeps 9-10 AM    9/14/2021 Patient realized her portions are too large.  Patient feels she is getting more movement  "into lifestyle.  Patient walking cat in [facility].  Patient feels like it's helping her socialization.  Orders food from [employer] or [employer].  Patient gets acid reflux after taking medication.  Patient eating at 10 AM 5 PM 10 PM.  Patient states gets overwhelmed with cooking as did not grow up preparing her own meals.      Diet Recall  Breakfast 11:00-12:00 leftover takeout; yogurt with granola and banana    Lunch Skips    Dinner Orange chicken with white rice or noodles; taquito with sour cream + 5 nachos chips (8:00-9:00) hamburger, turkey or tuna sandwich; frozen vegetables -eats entire bag; cereal with 2% milk (large bowl)    Snacks Ice cream    Beverages Soda       ANTHROPOMETRICS  Height: 5'9\"  Weight: 310 lbs   BMI: 45 kg/m2  Weight Status:  Obesity Grade III BMI >40  IBW: 145 lbs +/-10% (214%)  Weight History:   Wt Readings from Last 10 Encounters:   08/24/21 148.7 kg (327 lb 14.4 oz)   01/28/21 145.2 kg (320 lb)   06/09/20 138.6 kg (305 lb 9.6 oz)   03/12/20 139.5 kg (307 lb 8 oz)   02/13/20 138.7 kg (305 lb 11.2 oz)   01/16/20 137.6 kg (303 lb 4.8 oz)   12/18/19 140.3 kg (309 lb 4.8 oz)   11/15/19 141.7 kg (312 lb 4.8 oz)   10/17/19 139.5 kg (307 lb 8 oz)   09/19/19 137.4 kg (303 lb)      Dosing Weight: 145.2 kg     Medications:   Reviewed     Labs:   Labs reviewed     NUTRITION FOCUSED PHYSICAL ASSESSMENT FOR DIAGNOSING MALNUTRITION:  None observed due to telephone visit due to Covid 19 pandemic     ASSESSED NUTRITION NEEDS:  Estimated Energy Needs: 4415-7762 kcals (11-14 Kcal/Kg)  Justification: obese  Estimated Protein Needs: 79-99 grams protein (1.2-1.5 g pro/Kg)  Justification: preservation of lean body mass  Estimated Fluid Needs: 7353-8141  mL   Justification: maintenance     MALNUTRITION:  % Weight Loss:  None noted  % Intake:  No decreased intake noted  Subcutaneous Fat Loss:  None observed due to telephone visit due to COVID-19 pandemic   Muscle Loss:  None observed due to telephone visit due to " COVID-19 pandemic   Fluid Retention:  None noted due to telephone visit due to COVID-19 pandemic      Malnutrition Diagnosis: Patient does not meet two of the above criteria necessary for diagnosing malnutrition    EVALUATION/PROGRESS TOWARDS GOALS  Previous nutrition goals:  Aim for 45-60 grams carbohydrate per meal-not met   Eat 3 consistent meals per day with balanced marconutrients-not met     Previous nutrition diagnosis:Food and nutrition related knowledge deficit related to lack of prior exposure as evidenced by no previous need for food and nutrition related recommendations     Current nutrition diagnosis: Food and nutrition related knowledge deficit related to lack of prior exposure as evidenced by no previous need for food and nutrition related recommendations      INTERVENTIONS  Provided written & verbal education:     - Discussed portion sizes, label reading, carbohydrate counting, exercise and behavior modification.  Recommend 45-60 grams carbohydrates per meal for blood glucose reduction.  Encouraged patient to limit added sugars <24 grams per day (6 tsp).  Patient consumes 30 tsp added sugar per day (120 grams added sugar) from soda.    - Suggest patient eat first meal within 1 hour of waking and then every 4-5 hours for meals.  Patient to add fat and protein at meals to reduce blood glucose rise.    - Discussed meal planning and grocery shopping ideas. Encouraged increase in fruit and vegetable consumption.   - Discussed exercise and cancer fatigue.  Encouraged patient to include daily movement for improved energy levels.  Congratulated patient on her effort to increases exercise now that she is taking her kitten out in the stroller for walks.       Provided pt with corresponding education materials/handouts on:  [employer] Count Your Carbs booklet      Pt verbalize understanding of materials provided during consult.   Patient Understanding: good  Expected patient engagement:  fair-good     Implementation  Composition of Meals and Snacks and General/healthful diet    Goals (Patient determined)  Buy less processed foods when grocery shopping  Purchase 2 fruit and 2 vegetable options for the week     Follow-Up Plans:   Patient to follow up in 8 weeks.  Patient to check on insurance coverage.   RD name and number provided        MONITORING AND EVALUATION:  -Food/beverage intake  -Weight trends     Mary Smith, RD, LD  Clinical Dietitian  [facility]  [phone] (direct        Again, thank you for allowing me to participate in the care of your patient.        Sincerely,        Zachariah Guerrero, CARLOS, LD

## 2021-09-14 NOTE — PROGRESS NOTES
Originating Location (pt. Location): Home    Distant Location (provider location):  [facility] -remote     Mode of Communication: Telephone      CLINICAL NUTRITION SERVICES - ASSESSMENT NOTE     Jade Collins 51 year old referred for MNT related to   Hyperglycemia [R73.9]  - Primary       Malignant neoplasm of upper-outer quadrant of left breast in female, estrogen receptor positive (H) [C50.412, Z17.0]          Time Spent: 32 minutes  Visit Type: Follow up   Pt accompanied by: self  Referring Physician: Dr. Bhatt     NUTRITION HISTORY  Factors affecting nutrition intake include:none  Current diet/appetite: regular/good  Chemotherapy: Letrozole; Palbociclib  Radiation: completed 2014  Surgery: lumpectomy 2013     Food Security: any concerns about having enough money to buy food or access to grocery stores? Yes, EBT card      [facility] time every 1-2 weeks (doesn't want to be around crowds) so may order online and  groceries      Patient feels she is addicted to sugar; HgbA1C elevated in July   Patient states the more I eat, the more I want sugars.  Patient states when she was a child she would spend the weekebd and summers at her grandma's and she allowed sugar and when she was with her mother it was salad and vegetables so felt it was punishment.  Ice cream (3 scoops in bowl or coffee mug-treat soothes anxiety or stress) and soda -Dr. Pepper or Pepsi (Shaw's large) 1 per day   Does not cook   Likes banana, cantaloupe, apples  Salad mix -adds to sandwiches      Takes meds at nights-1-2 AM; sleeps 9-10 AM    9/14/2021 Patient realized her portions are too large.  Patient feels she is getting more movement into lifestyle.  Patient walking cat in stroller.  Patient feels like it's helping her socialization.  Orders food from [employer] or [employer].  Patient gets acid reflux after taking medication.  Patient eating at 10 AM 5 PM 10 PM.  Patient states gets  "overwhelmed with cooking as did not grow up preparing her own meals.      Diet Recall  Breakfast 11:00-12:00 leftover takeout; yogurt with granola and banana    Lunch Skips    Dinner Orange chicken with white rice or noodles; taquito with sour cream + 5 nachos chips (8:00-9:00) hamburger, turkey or tuna sandwich; frozen vegetables -eats entire bag; cereal with 2% milk (large bowl)    Snacks Ice cream    Beverages Soda       ANTHROPOMETRICS  Height: 5'9\"  Weight: 310 lbs   BMI: 45 kg/m2  Weight Status:  Obesity Grade III BMI >40  IBW: 145 lbs +/-10% (214%)  Weight History:   Wt Readings from Last 10 Encounters:   08/24/21 148.7 kg (327 lb 14.4 oz)   01/28/21 145.2 kg (320 lb)   06/09/20 138.6 kg (305 lb 9.6 oz)   03/12/20 139.5 kg (307 lb 8 oz)   02/13/20 138.7 kg (305 lb 11.2 oz)   01/16/20 137.6 kg (303 lb 4.8 oz)   12/18/19 140.3 kg (309 lb 4.8 oz)   11/15/19 141.7 kg (312 lb 4.8 oz)   10/17/19 139.5 kg (307 lb 8 oz)   09/19/19 137.4 kg (303 lb)      Dosing Weight: 145.2 kg     Medications:   Reviewed     Labs:   Labs reviewed     NUTRITION FOCUSED PHYSICAL ASSESSMENT FOR DIAGNOSING MALNUTRITION:  None observed due to telephone visit due to Covid 19 pandemic     ASSESSED NUTRITION NEEDS:  Estimated Energy Needs: 8446-4693 kcals (11-14 Kcal/Kg)  Justification: obese  Estimated Protein Needs: 79-99 grams protein (1.2-1.5 g pro/Kg)  Justification: preservation of lean body mass  Estimated Fluid Needs: 4239-7338  mL   Justification: maintenance     MALNUTRITION:  % Weight Loss:  None noted  % Intake:  No decreased intake noted  Subcutaneous Fat Loss:  None observed due to telephone visit due to COVID-19 pandemic   Muscle Loss:  None observed due to telephone visit due to COVID-19 pandemic   Fluid Retention:  None noted due to telephone visit due to COVID-19 pandemic      Malnutrition Diagnosis: Patient does not meet two of the above criteria necessary for diagnosing malnutrition    EVALUATION/PROGRESS TOWARDS " GOALS  Previous nutrition goals:  Aim for 45-60 grams carbohydrate per meal-not met   Eat 3 consistent meals per day with balanced marconutrients-not met     Previous nutrition diagnosis:Food and nutrition related knowledge deficit related to lack of prior exposure as evidenced by no previous need for food and nutrition related recommendations     Current nutrition diagnosis: Food and nutrition related knowledge deficit related to lack of prior exposure as evidenced by no previous need for food and nutrition related recommendations      INTERVENTIONS  Provided written & verbal education:     - Discussed portion sizes, label reading, carbohydrate counting, exercise and behavior modification.  Recommend 45-60 grams carbohydrates per meal for blood glucose reduction.  Encouraged patient to limit added sugars <24 grams per day (6 tsp).  Patient consumes 30 tsp added sugar per day (120 grams added sugar) from soda.    - Suggest patient eat first meal within 1 hour of waking and then every 4-5 hours for meals.  Patient to add fat and protein at meals to reduce blood glucose rise.    - Discussed meal planning and grocery shopping ideas. Encouraged increase in fruit and vegetable consumption.   - Discussed exercise and cancer fatigue.  Encouraged patient to include daily movement for improved energy levels.  Congratulated patient on her effort to increases exercise now that she is taking her kitten out in the stroller for walks.       Provided pt with corresponding education materials/handouts on:  [employer] Count Your Carbs booklet      Pt verbalize understanding of materials provided during consult.   Patient Understanding: good  Expected patient engagement: fair-good     Implementation  Composition of Meals and Snacks and General/healthful diet    Goals (Patient determined)  Buy less processed foods when grocery shopping  Purchase 2 fruit and 2 vegetable options for the week     Follow-Up Plans:    Patient to follow up in 8 weeks.  Patient to check on insurance coverage.   RD name and number provided        MONITORING AND EVALUATION:  -Food/beverage intake  -Weight trends     Mary Smith, RD, LD  Clinical Dietitian  [facility]  [phone] (direct

## 2021-10-01 ENCOUNTER — TELEPHONE (OUTPATIENT)
Dept: ONCOLOGY | Facility: CLINIC | Age: 51
End: 2021-10-01

## 2021-10-01 DIAGNOSIS — Z17.0 MALIGNANT NEOPLASM OF UPPER-OUTER QUADRANT OF LEFT BREAST IN FEMALE, ESTROGEN RECEPTOR POSITIVE (H): Primary | ICD-10-CM

## 2021-10-01 DIAGNOSIS — C50.412 MALIGNANT NEOPLASM OF UPPER-OUTER QUADRANT OF LEFT BREAST IN FEMALE, ESTROGEN RECEPTOR POSITIVE (H): Primary | ICD-10-CM

## 2021-10-01 RX ORDER — LETROZOLE 2.5 MG/1
2.5 TABLET, FILM COATED ORAL DAILY
Qty: 28 TABLET | Refills: 0 | Status: SHIPPED | OUTPATIENT
Start: 2021-10-01 | End: 2021-11-24

## 2021-10-01 NOTE — TELEPHONE ENCOUNTER
Oral Chemotherapy Monitoring Program    Subjective/Objective:  Jade Collins is a 51 year old female contacted by phone for a follow-up visit for oral chemotherapy.  Jade confirms taking the appropriate dose of Ibrance 100 mg daily x 21 on, 7 off  - next cycle is 10/08/21. Denies new or worsening side effects, missed doses, and recent hospital or ED visits. Patient has not had any recent medication changes.     ORAL CHEMOTHERAPY 7/7/2021 7/14/2021 7/15/2021 8/5/2021 9/9/2021 10/1/2021 10/1/2021   Assessment Type Refill Quarterly Follow up Chart Review Refill Refill Refill Quarterly Follow up   Diagnosis Code Breast Cancer Breast Cancer Breast Cancer Breast Cancer Breast Cancer Breast Cancer Breast Cancer   Providers Dr. Nova Bhatt   [facility]   Drug Name Ibrance (palbociclib) Ibrance (palbociclib) Ibrance (palbociclib) Ibrance (palbociclib) Ibrance (palbociclib) Ibrance (palbociclib) Ibrance (palbociclib)   Dose 100 mg 100 mg 100 mg 100 mg 100 mg 100 mg 100 mg   Current Schedule Daily Daily Daily Daily Daily Daily Daily   Cycle Details 3 weeks on, 1 week off 3 weeks on, 1 week off 3 weeks on, 1 week off 3 weeks on, 1 week off 3 weeks on, 1 week off 3 weeks on, 1 week off 3 weeks on, 1 week off   Start Date of Last Cycle - - - - - - -   Planned next cycle start date - 7/16/2021 - - - 10/8/2021 10/8/2021   Doses missed in last 2 weeks - 0 - - - - 0   Adherence Assessment - Adherent - - - - Adherent   Adverse Effects - No AE identified during assessment - - - - No AE identified during assessment   Nausea - - - - - - -   Pharmacist Intervention(nausea) - - - - - - -   Diarrhea - - - - - - -   Pharmacist Intervention(diarrhea) - - - - - - -   Oral Mucositis - - - - - - -   Pharmacist Intervention(oral mucositis) - - - - - - -   Fatigue - - - - - - -   Pharmacist Intervention(fatigue) - - - - - - -  "  Neutropenia - - - - - - -   Pharmacist Intervention(neutropenia) - - - - - - -   Intervention(s) - - - - - - -   Home BPs - - - - - - -   Any new drug interactions? - No - - - - No   Pharmacist Intervention? - - - - - - -   Intervention(s) - - - - - - -   Is the dose as ordered appropriate for the patient? - Yes - - - - Yes   Is the patient currently in pain? - - - - - - -   Does the patient feel the pain is currently being managed by a provider? - - - - - - -   Has the patient been assessed within the past 6 months for depression? - - - - - - -   Has the patient missed any days of school, work, or other routine activity? - - - - - - -   Since the last time we talked, have you been hospitalized or used the emergency room? - No - - - - -       Last PHQ-2 Score on record:   PHQ-2 ( 1999 Pfizer) 9/18/2018   Q1: Little interest or pleasure in doing things 1   Q2: Feeling down, depressed or hopeless 1   PHQ-2 Score 2       Vitals:  BP:   BP Readings from Last 1 Encounters:   08/24/21 126/86     Wt Readings from Last 1 Encounters:   08/24/21 148.7 kg (327 lb 14.4 oz)     Estimated body surface area is 2.67 meters squared as calculated from the following:    Height as of 3/12/20: 1.727 m (5' 7.99\").    Weight as of 8/24/21: 148.7 kg (327 lb 14.4 oz).    Labs:  _  Result Component Current Result Ref Range   Sodium 138 (9/8/2021) 133 - 144 mmol/L     _  Result Component Current Result Ref Range   Potassium 4.5 (9/8/2021) 3.4 - 5.3 mmol/L     _  Result Component Current Result Ref Range   Calcium 9.9 (9/8/2021) 8.5 - 10.1 mg/dL     No results found for Mag within last 30 days.     No results found for Phos within last 30 days.     _  Result Component Current Result Ref Range   Albumin 3.9 (9/8/2021) 3.4 - 5.0 g/dL     _  Result Component Current Result Ref Range   Urea Nitrogen 9 (9/8/2021) 7 - 30 mg/dL     _  Result Component Current Result Ref Range   Creatinine 1.00 (9/8/2021) 0.52 - 1.04 mg/dL     _  Result Component " Current Result Ref Range   AST 29 (9/8/2021) 0 - 45 U/L     _  Result Component Current Result Ref Range   ALT 37 (9/8/2021) 0 - 50 U/L     _  Result Component Current Result Ref Range   Bilirubin Total 0.5 (9/8/2021) 0.2 - 1.3 mg/dL     _  Result Component Current Result Ref Range   WBC Count 2.6 (L) (9/8/2021) 4.0 - 11.0 10e3/uL     _  Result Component Current Result Ref Range   Hemoglobin 12.4 (9/8/2021) 11.7 - 15.7 g/dL     _  Result Component Current Result Ref Range   Platelet Count 158 (9/8/2021) 150 - 450 10e3/uL     _  Result Component Current Result Ref Range   Absolute Neutrophils 1.2 (L) (9/8/2021) 1.6 - 8.3 10e3/uL       Assessment/Plan:  Jade is tolerating Ibrance well. Next cycle starts 10/08/21. PDC=0.96, no adherence concerns.     Follow-Up:  10/07: review SONIA appt and labs prior  12/22: quarterly follow-up    Refill Due:  [facility] will deliver Ibrance and letrozole 10/07/21.     Carmen Clark, PharmD  Hematology/Oncology Clinical Pharmacist  [facility]  [facility]  [phone]

## 2021-10-06 ENCOUNTER — LAB (OUTPATIENT)
Dept: LAB | Facility: CLINIC | Age: 51
End: 2021-10-06
Payer: COMMERCIAL

## 2021-10-06 DIAGNOSIS — C50.412 MALIGNANT NEOPLASM OF UPPER-OUTER QUADRANT OF LEFT BREAST IN FEMALE, ESTROGEN RECEPTOR POSITIVE (H): ICD-10-CM

## 2021-10-06 DIAGNOSIS — Z17.0 MALIGNANT NEOPLASM OF UPPER-OUTER QUADRANT OF LEFT BREAST IN FEMALE, ESTROGEN RECEPTOR POSITIVE (H): ICD-10-CM

## 2021-10-06 LAB
ALBUMIN SERPL-MCNC: 4.2 G/DL (ref 3.4–5)
ALP SERPL-CCNC: 68 U/L (ref 40–150)
ALT SERPL W P-5'-P-CCNC: 43 U/L (ref 0–50)
ANION GAP SERPL CALCULATED.3IONS-SCNC: 6 MMOL/L (ref 3–14)
AST SERPL W P-5'-P-CCNC: 37 U/L (ref 0–45)
BASOPHILS # BLD AUTO: 0.1 10E3/UL (ref 0–0.2)
BASOPHILS NFR BLD AUTO: 2 %
BILIRUB SERPL-MCNC: 0.6 MG/DL (ref 0.2–1.3)
BUN SERPL-MCNC: 10 MG/DL (ref 7–30)
CALCIUM SERPL-MCNC: 9.7 MG/DL (ref 8.5–10.1)
CANCER AG27-29 SERPL-ACNC: 19 U/ML (ref 0–39)
CEA SERPL-MCNC: 1.1 UG/L (ref 0–2.5)
CHLORIDE BLD-SCNC: 106 MMOL/L (ref 94–109)
CO2 SERPL-SCNC: 27 MMOL/L (ref 20–32)
CREAT SERPL-MCNC: 1.09 MG/DL (ref 0.52–1.04)
EOSINOPHIL # BLD AUTO: 0.1 10E3/UL (ref 0–0.7)
EOSINOPHIL NFR BLD AUTO: 4 %
ERYTHROCYTE [DISTWIDTH] IN BLOOD BY AUTOMATED COUNT: 13.8 % (ref 10–15)
GFR SERPL CREATININE-BSD FRML MDRD: 59 ML/MIN/1.73M2
GLUCOSE BLD-MCNC: 133 MG/DL (ref 70–99)
HCT VFR BLD AUTO: 38 % (ref 35–47)
HGB BLD-MCNC: 13.5 G/DL (ref 11.7–15.7)
IMM GRANULOCYTES # BLD: 0 10E3/UL
IMM GRANULOCYTES NFR BLD: 0 %
LYMPHOCYTES # BLD AUTO: 0.8 10E3/UL (ref 0.8–5.3)
LYMPHOCYTES NFR BLD AUTO: 35 %
MCH RBC QN AUTO: 33.6 PG (ref 26.5–33)
MCHC RBC AUTO-ENTMCNC: 35.5 G/DL (ref 31.5–36.5)
MCV RBC AUTO: 95 FL (ref 78–100)
MONOCYTES # BLD AUTO: 0.3 10E3/UL (ref 0–1.3)
MONOCYTES NFR BLD AUTO: 11 %
NEUTROPHILS # BLD AUTO: 1.1 10E3/UL (ref 1.6–8.3)
NEUTROPHILS NFR BLD AUTO: 48 %
NRBC # BLD AUTO: 0 10E3/UL
NRBC BLD AUTO-RTO: 0 /100
PLATELET # BLD AUTO: 155 10E3/UL (ref 150–450)
POTASSIUM BLD-SCNC: 4.2 MMOL/L (ref 3.4–5.3)
PROT SERPL-MCNC: 8.1 G/DL (ref 6.8–8.8)
RBC # BLD AUTO: 4.02 10E6/UL (ref 3.8–5.2)
SODIUM SERPL-SCNC: 139 MMOL/L (ref 133–144)
WBC # BLD AUTO: 2.3 10E3/UL (ref 4–11)

## 2021-10-06 PROCEDURE — 86300 IMMUNOASSAY TUMOR CA 15-3: CPT

## 2021-10-06 PROCEDURE — 85025 COMPLETE CBC W/AUTO DIFF WBC: CPT

## 2021-10-06 PROCEDURE — 82378 CARCINOEMBRYONIC ANTIGEN: CPT

## 2021-10-06 PROCEDURE — 80053 COMPREHEN METABOLIC PANEL: CPT

## 2021-10-06 PROCEDURE — 36415 COLL VENOUS BLD VENIPUNCTURE: CPT

## 2021-10-06 NOTE — PROGRESS NOTES
Jade is a 51 year old who is being evaluated via a billable telephone visit.      Jade Collins states she is currently in the state of [place] for her visit today.    What phone number would you like to be contacted at? [phone]  How would you like to obtain your AVS? Soledad Mena, Virtual Visit Facilitator           Medical Oncology Follow-Up  Oct 7, 2021    HISTORY OF PRESENT ILLNESS:  Jade is a 51-year-old woman with metastatic breast cancer, ER positive    Jade was diagnosed with breast cancer with a lump found in the upper-inner quadrant of the left breast.  She was diagnosed in 12/2013 in the [place] area and Dr. David Redd was her medical oncologist.  Biopsy of the tumor showed it to be ER positive, ID positive, and HER-2 negative.  We do not have any of the original pathology and we need to obtain those reports.  She underwent a lumpectomy performed by Dr. Tasha Segura who is a surgeon in the [place] area.  She also had a sentinel lymph node which was noted to be involved with tumor but there was no lymph node dissection done at that time.  [id number].  She subsequently underwent adjuvant chemotherapy with dose-dense AC for 4 cycles and Taxol for 12 weeks, completed 09/11/2014.        She then had radiation therapy post lumpectomy to the left breast, which was completed 11/23/2014 by Dr. Manley.        She was then begun on an aromatase inhibitor, the name of which she does not remember.  The aromatase inhibitor therapy was begun in 11/2014.  She was then switched within about a month or so to anastrozole and remained on anastrozole from 11/2014-02/2017.  She has never received tamoxifen.        She then moved to [place], [place] in 03/2017.  She saw a gynecologist there and underwent a IRIS/BSO by Dr. Zendejas.  She then also saw Dr. Linares in [place], [place], who is an oncologist.  His interpretation of the pathology report was that she had triple-negative breast cancer.  It is possible that she may  have had low estrogen receptor positivity, but the anastrozole was then discontinued in 02/2017.  She then underwent the IRIS/BSO in 03/2017.  This was done laparoscopically.        She then remained off of all hormonal therapy and in 09/2017 moved to [place].  She remained off hormonal therapy and did not have Medical Oncology followup initially.        She then went to see Dr. Norberto Brand after noticing some nodes in her neckwho performed a PET/CT scan and the patient also underwent a brain MRI for staging.  The PET/CT scan performed 08/03/2018 showed in the neck there were several mildly metabolic active and large prominent left posterior cervical lymph nodes.  The largest is located posterior to the left sternocleidomastoid muscle and measures 1.5 x 1 cm in size.  This demonstrate modest FDG uptake.  The other lymph nodes are smaller and demonstrate mild FDG uptake.  The prominent prevascular and aortopulmonary lymph nodes were also seen on the contrast-enhanced scan were less well seen on the PET.  The largest lymph node visualized on the PET scan was 1.1 x 0.8 cm and demonstrated mild FDG uptake.  This was in the periaortic area just anterior to the aortic arch.  No lung nodules.  No lung infiltrates.  No pleural effusion.  There was no uptake of FDG in the bones.  In summary, there were mildly enlarged left posterior cervical lymph nodes, largest measuring 1.5 x 1.0 cm and mildly enlarged aorticopulmonary and prevascular lymph nodes that were noted on contrast-enhanced CT scan seen less well on the PET scan.  No evidence of metastatic disease in the abdomen and pelvis.  She also underwent a brain MRI that showed no evidence of intracranial metastatic disease.  She underwent a biopsy of one of the lymph nodes in the left neck which showed metastatic adenocarcinoma consistent with breast origin, which was estrogen-receptor positive.  The tumor cells were positive for CK7, ER and LA consistent with metastatic  breast carcinoma.  Estrogen receptor showed strong average nuclear intensity in 95% of carcinoma cells, progesterone receptor moderate average nuclear intensity in 70% of the carcinoma.  A GATA3 stain was apparently not performed.       TREATMENT HISTORY:  A. Tamoxifen  B. Letrozole and palbociclib started 9-18-18.     INTERVAL HISTORY:   Today, Jade was called by phone. She is feeling well. She continues on ibrance and letrozole and continues to tolerate this well. She has been making an effort to walk more as her knee is feeling better. She recently got a kitten and is walking it in stroller. This is helping her interact with more people as well. She is also walking around the apartment complex more. The rash is gone on her abdomen and leg with the keto-triamcinolone combo. Her breast rash is clearing as well.     No fevers/chills or infectious concerns. No nausea or bowel issues. BUSH is overall stable. Asthma is under good control currently.       PHYSICAL EXAM:  There were no vitals taken for this visit.  Resp: No respiratory distress, no audible coughing or wheezing  Neuro: No AAOx3    Labs:  Most Recent 3 CBC's:  Recent Labs   Lab Test 10/06/21  0945 09/08/21  1152 08/11/21  1422   WBC 2.3* 2.6* 3.8*   HGB 13.5 12.4 12.8   MCV 95 95 97    158 157    Most Recent 3 BMP's:  Recent Labs   Lab Test 10/06/21  0945 09/08/21  1152 08/11/21  1422    138 134   POTASSIUM 4.2 4.5 4.5   CHLORIDE 106 107 103   CO2 27 28 24   BUN 10 9 17   CR 1.09* 1.00 0.96   ANIONGAP 6 3 7   GIOVANNY 9.7 9.9 9.2   * 111* 130*    Most Recent 2 LFT's:  Recent Labs   Lab Test 10/06/21  0945 09/08/21  1152   AST 37 29   ALT 43 37   ALKPHOS 68 72   BILITOTAL 0.6 0.5      I reviewed the above labs today.       ASSESSMENT AND PLAN:  1. Recurrent, metastatic ER-positive, HER2 negative breast cancer: History of left breast cancer s/p lumpectomy and SNLB in 2014. S/p 4 cycles ddAC and 12 weeks of Taxol. On AI from 11/2014-2/2017.  Recurrence on PET/CT scan performed 08/03/2018 and then biopsy to left posterior cervical lymph nodes, aorticopulmonary, and prevascular lymph nodes were positive. No evidence of metastatic disease in the abdomen and pelvis or brain. Started Ibrance and letrozole on 9/21/18. Last restaging in August showed stable disease   - Allergy to contrast and she needs premedication and needs CT CAP and CT neck.   - Repeat imaging due in November, scheduled   2. Rash on abdomen, b/l breast, lower L leg: ?Tinea. Cleared with ketoconazole + hydrocortisone.  3.  Depression and anxiety. She saw Dr. Pate her new PCP and her regimen was changed to add buspirone 7.5 mg twice daily to sertraline 200 mg once per day added and mirtazapine. Lorazepam prn anxiety was added.  She has improvement of her symptoms of depression and anxiety.   4.  Hypothyroid. Levothyroxine 175 mg per day. TSH slightly elevated, Free T4 normal. Continue to monitor.   5. Asthma: Seen by Dr. Hay. Per chart on fluticasone-salmeterol inhaler BID. Also has albuterol prn.   6. COVID vaccination: Received her COVID booster last month.       Time spent on phone: 13 minutes     20 minutes spent on the date of the encounter doing chart review, review of test results, interpretation of tests, patient visit and documentation, in addition to 13 minutes spent on the phone with the patient.     Cecilia Goins PA-C

## 2021-10-07 ENCOUNTER — VIRTUAL VISIT (OUTPATIENT)
Dept: ONCOLOGY | Facility: CLINIC | Age: 51
End: 2021-10-07
Attending: PHYSICIAN ASSISTANT
Payer: COMMERCIAL

## 2021-10-07 DIAGNOSIS — C50.919 METASTATIC BREAST CANCER: Primary | ICD-10-CM

## 2021-10-07 DIAGNOSIS — C50.412 MALIGNANT NEOPLASM OF UPPER-OUTER QUADRANT OF LEFT BREAST IN FEMALE, ESTROGEN RECEPTOR POSITIVE (H): ICD-10-CM

## 2021-10-07 DIAGNOSIS — Z17.0 MALIGNANT NEOPLASM OF UPPER-OUTER QUADRANT OF LEFT BREAST IN FEMALE, ESTROGEN RECEPTOR POSITIVE (H): ICD-10-CM

## 2021-10-07 PROCEDURE — 999N001193 HC VIDEO/TELEPHONE VISIT; NO CHARGE

## 2021-10-07 PROCEDURE — 99214 OFFICE O/P EST MOD 30 MIN: CPT | Mod: TEL | Performed by: PHYSICIAN ASSISTANT

## 2021-10-07 RX ORDER — METHYLPREDNISOLONE 32 MG/1
TABLET ORAL
Qty: 2 TABLET | Refills: 4 | Status: SHIPPED | OUTPATIENT
Start: 2021-10-07 | End: 2022-02-03

## 2021-10-07 NOTE — LETTER
10/7/2021         RE: Jade Collins  [address]  [facility] [address]        Dear Colleague,    Thank you for referring your patient, Jade Collins, to the [facility]. Please see a copy of my visit note below.    Jade is a 51 year old who is being evaluated via a billable telephone visit.      Jade Collins states she is currently in the state of [place] for her visit today.    What phone number would you like to be contacted at? [phone]  How would you like to obtain your AVS? Soledad Mena, Virtual Visit Facilitator           Medical Oncology Follow-Up  Oct 7, 2021    HISTORY OF PRESENT ILLNESS:  Jade is a 51-year-old woman with metastatic breast cancer, ER positive    Jade was diagnosed with breast cancer with a lump found in the upper-inner quadrant of the left breast.  She was diagnosed in 12/2013 in the [place] area and Dr. David Redd was her medical oncologist.  Biopsy of the tumor showed it to be ER positive, ID positive, and HER-2 negative.  We do not have any of the original pathology and we need to obtain those reports.  She underwent a lumpectomy performed by Dr. Tasha Segura who is a surgeon in the [place] area.  She also had a sentinel lymph node which was noted to be involved with tumor but there was no lymph node dissection done at that time.  [id number].  She subsequently underwent adjuvant chemotherapy with dose-dense AC for 4 cycles and Taxol for 12 weeks, completed 09/11/2014.        She then had radiation therapy post lumpectomy to the left breast, which was completed 11/23/2014 by Dr. Manley.        She was then begun on an aromatase inhibitor, the name of which she does not remember.  The aromatase inhibitor therapy was begun in 11/2014.  She was then switched within about a month or so to anastrozole and remained on anastrozole from 11/2014-02/2017.  She has never received tamoxifen.        She then moved to [place], [place] in 03/2017.  She  saw a gynecologist there and underwent a IRIS/BSO by Dr. Zendejas.  She then also saw Dr. Linares in [place], [place], who is an oncologist.  His interpretation of the pathology report was that she had triple-negative breast cancer.  It is possible that she may have had low estrogen receptor positivity, but the anastrozole was then discontinued in 02/2017.  She then underwent the IRIS/BSO in 03/2017.  This was done laparoscopically.        She then remained off of all hormonal therapy and in 09/2017 moved to [place].  She remained off hormonal therapy and did not have Medical Oncology followup initially.        She then went to see Dr. Norberto Brand after noticing some nodes in her neckwho performed a PET/CT scan and the patient also underwent a brain MRI for staging.  The PET/CT scan performed 08/03/2018 showed in the neck there were several mildly metabolic active and large prominent left posterior cervical lymph nodes.  The largest is located posterior to the left sternocleidomastoid muscle and measures 1.5 x 1 cm in size.  This demonstrate modest FDG uptake.  The other lymph nodes are smaller and demonstrate mild FDG uptake.  The prominent prevascular and aortopulmonary lymph nodes were also seen on the contrast-enhanced scan were less well seen on the PET.  The largest lymph node visualized on the PET scan was 1.1 x 0.8 cm and demonstrated mild FDG uptake.  This was in the periaortic area just anterior to the aortic arch.  No lung nodules.  No lung infiltrates.  No pleural effusion.  There was no uptake of FDG in the bones.  In summary, there were mildly enlarged left posterior cervical lymph nodes, largest measuring 1.5 x 1.0 cm and mildly enlarged aorticopulmonary and prevascular lymph nodes that were noted on contrast-enhanced CT scan seen less well on the PET scan.  No evidence of metastatic disease in the abdomen and pelvis.  She also underwent a brain MRI that showed no evidence of intracranial metastatic disease.   She underwent a biopsy of one of the lymph nodes in the left neck which showed metastatic adenocarcinoma consistent with breast origin, which was estrogen-receptor positive.  The tumor cells were positive for CK7, ER and TX consistent with metastatic breast carcinoma.  Estrogen receptor showed strong average nuclear intensity in 95% of carcinoma cells, progesterone receptor moderate average nuclear intensity in 70% of the carcinoma.  A GATA3 stain was apparently not performed.       TREATMENT HISTORY:  A. Tamoxifen  B. Letrozole and palbociclib started 9-18-18.     INTERVAL HISTORY:   Today, Jade was called by phone. She is feeling well. She continues on ibrance and letrozole and continues to tolerate this well. She has been making an effort to walk more as her knee is feeling better. She recently got a kitten and is walking it in stroller. This is helping her interact with more people as well. She is also walking around the apartment complex more. The rash is gone on her abdomen and leg with the keto-triamcinolone combo. Her breast rash is clearing as well.     No fevers/chills or infectious concerns. No nausea or bowel issues. BUSH is overall stable. Asthma is under good control currently.       PHYSICAL EXAM:  There were no vitals taken for this visit.  Resp: No respiratory distress, no audible coughing or wheezing  Neuro: No AAOx3    Labs:  Most Recent 3 CBC's:  Recent Labs   Lab Test 10/06/21  0945 09/08/21  1152 08/11/21  1422   WBC 2.3* 2.6* 3.8*   HGB 13.5 12.4 12.8   MCV 95 95 97    158 157    Most Recent 3 BMP's:  Recent Labs   Lab Test 10/06/21  0945 09/08/21  1152 08/11/21  1422    138 134   POTASSIUM 4.2 4.5 4.5   CHLORIDE 106 107 103   CO2 27 28 24   BUN 10 9 17   CR 1.09* 1.00 0.96   ANIONGAP 6 3 7   GIOVANNY 9.7 9.9 9.2   * 111* 130*    Most Recent 2 LFT's:  Recent Labs   Lab Test 10/06/21  0945 09/08/21  1152   AST 37 29   ALT 43 37   ALKPHOS 68 72   BILITOTAL 0.6 0.5      I reviewed  the above labs today.       ASSESSMENT AND PLAN:  1. Recurrent, metastatic ER-positive, HER2 negative breast cancer: History of left breast cancer s/p lumpectomy and SNLB in 2014. S/p 4 cycles ddAC and 12 weeks of Taxol. On AI from 11/2014-2/2017. Recurrence on PET/CT scan performed 08/03/2018 and then biopsy to left posterior cervical lymph nodes, aorticopulmonary, and prevascular lymph nodes were positive. No evidence of metastatic disease in the abdomen and pelvis or brain. Started Ibrance and letrozole on 9/21/18. Last restaging in August showed stable disease   - Allergy to contrast and she needs premedication and needs CT CAP and CT neck.   - Repeat imaging due in November, scheduled   2. Rash on abdomen, b/l breast, lower L leg: ?Tinea. Cleared with ketoconazole + hydrocortisone.  3.  Depression and anxiety. She saw Dr. Pate her new PCP and her regimen was changed to add buspirone 7.5 mg twice daily to sertraline 200 mg once per day added and mirtazapine. Lorazepam prn anxiety was added.  She has improvement of her symptoms of depression and anxiety.   4.  Hypothyroid. Levothyroxine 175 mg per day. TSH slightly elevated, Free T4 normal. Continue to monitor.   5. Asthma: Seen by Dr. Hay. Per chart on fluticasone-salmeterol inhaler BID. Also has albuterol prn.   6. COVID vaccination: Received her COVID booster last month.       Time spent on phone: 13 minutes     20 minutes spent on the date of the encounter doing chart review, review of test results, interpretation of tests, patient visit and documentation, in addition to 13 minutes spent on the phone with the patient.     Cecilia Goins PA-C       Again, thank you for allowing me to participate in the care of your patient.        Sincerely,        Cecilia Goins PA-C

## 2021-10-07 NOTE — NURSING NOTE
Jade Collins verified meds and allergies are correct via patients echeck in.  No changes at this time.    Juany Mena, Virtual Facilitator

## 2021-10-10 ENCOUNTER — HEALTH MAINTENANCE LETTER (OUTPATIENT)
Age: 51
End: 2021-10-10

## 2021-10-17 DIAGNOSIS — C50.412 MALIGNANT NEOPLASM OF UPPER-OUTER QUADRANT OF LEFT BREAST IN FEMALE, ESTROGEN RECEPTOR POSITIVE (H): Primary | ICD-10-CM

## 2021-10-17 DIAGNOSIS — Z17.0 MALIGNANT NEOPLASM OF UPPER-OUTER QUADRANT OF LEFT BREAST IN FEMALE, ESTROGEN RECEPTOR POSITIVE (H): Primary | ICD-10-CM

## 2021-11-03 ENCOUNTER — LAB (OUTPATIENT)
Dept: LAB | Facility: CLINIC | Age: 51
End: 2021-11-03
Payer: COMMERCIAL

## 2021-11-03 DIAGNOSIS — Z17.0 MALIGNANT NEOPLASM OF UPPER-OUTER QUADRANT OF LEFT BREAST IN FEMALE, ESTROGEN RECEPTOR POSITIVE (H): Primary | ICD-10-CM

## 2021-11-03 DIAGNOSIS — Z17.0 MALIGNANT NEOPLASM OF UPPER-OUTER QUADRANT OF LEFT BREAST IN FEMALE, ESTROGEN RECEPTOR POSITIVE (H): ICD-10-CM

## 2021-11-03 DIAGNOSIS — C50.412 MALIGNANT NEOPLASM OF UPPER-OUTER QUADRANT OF LEFT BREAST IN FEMALE, ESTROGEN RECEPTOR POSITIVE (H): ICD-10-CM

## 2021-11-03 DIAGNOSIS — E03.9 PRIMARY HYPOTHYROIDISM: ICD-10-CM

## 2021-11-03 DIAGNOSIS — C50.412 MALIGNANT NEOPLASM OF UPPER-OUTER QUADRANT OF LEFT BREAST IN FEMALE, ESTROGEN RECEPTOR POSITIVE (H): Primary | ICD-10-CM

## 2021-11-03 LAB
ALBUMIN SERPL-MCNC: 4.1 G/DL (ref 3.4–5)
ALP SERPL-CCNC: 67 U/L (ref 40–150)
ALT SERPL W P-5'-P-CCNC: 41 U/L (ref 0–50)
ANION GAP SERPL CALCULATED.3IONS-SCNC: 3 MMOL/L (ref 3–14)
AST SERPL W P-5'-P-CCNC: 38 U/L (ref 0–45)
BASOPHILS # BLD MANUAL: 0 10E3/UL (ref 0–0.2)
BASOPHILS NFR BLD MANUAL: 0 %
BILIRUB SERPL-MCNC: 0.6 MG/DL (ref 0.2–1.3)
BUN SERPL-MCNC: 13 MG/DL (ref 7–30)
CALCIUM SERPL-MCNC: 10 MG/DL (ref 8.5–10.1)
CANCER AG27-29 SERPL-ACNC: 15 U/ML (ref 0–39)
CEA SERPL-MCNC: 0.6 UG/L (ref 0–2.5)
CHLORIDE BLD-SCNC: 103 MMOL/L (ref 94–109)
CO2 SERPL-SCNC: 31 MMOL/L (ref 20–32)
CREAT SERPL-MCNC: 1.01 MG/DL (ref 0.52–1.04)
EOSINOPHIL # BLD MANUAL: 0.1 10E3/UL (ref 0–0.7)
EOSINOPHIL NFR BLD MANUAL: 2 %
ERYTHROCYTE [DISTWIDTH] IN BLOOD BY AUTOMATED COUNT: 13.6 % (ref 10–15)
GFR SERPL CREATININE-BSD FRML MDRD: 65 ML/MIN/1.73M2
GLUCOSE BLD-MCNC: 137 MG/DL (ref 70–99)
HCT VFR BLD AUTO: 36.9 % (ref 35–47)
HGB BLD-MCNC: 12.8 G/DL (ref 11.7–15.7)
LYMPHOCYTES # BLD MANUAL: 1 10E3/UL (ref 0.8–5.3)
LYMPHOCYTES NFR BLD MANUAL: 29 %
MCH RBC QN AUTO: 33.7 PG (ref 26.5–33)
MCHC RBC AUTO-ENTMCNC: 34.7 G/DL (ref 31.5–36.5)
MCV RBC AUTO: 97 FL (ref 78–100)
MONOCYTES # BLD MANUAL: 0.4 10E3/UL (ref 0–1.3)
MONOCYTES NFR BLD MANUAL: 11 %
NEUTROPHILS # BLD MANUAL: 2 10E3/UL (ref 1.6–8.3)
NEUTROPHILS NFR BLD MANUAL: 58 %
PLAT MORPH BLD: NORMAL
PLATELET # BLD AUTO: 139 10E3/UL (ref 150–450)
POTASSIUM BLD-SCNC: 4.3 MMOL/L (ref 3.4–5.3)
PROT SERPL-MCNC: 7.9 G/DL (ref 6.8–8.8)
RBC # BLD AUTO: 3.8 10E6/UL (ref 3.8–5.2)
RBC MORPH BLD: NORMAL
SODIUM SERPL-SCNC: 137 MMOL/L (ref 133–144)
T4 FREE SERPL-MCNC: 0.99 NG/DL (ref 0.76–1.46)
TSH SERPL DL<=0.005 MIU/L-ACNC: 5.82 MU/L (ref 0.4–4)
WBC # BLD AUTO: 3.4 10E3/UL (ref 4–11)

## 2021-11-03 PROCEDURE — 86300 IMMUNOASSAY TUMOR CA 15-3: CPT

## 2021-11-03 PROCEDURE — 82378 CARCINOEMBRYONIC ANTIGEN: CPT

## 2021-11-03 PROCEDURE — 36415 COLL VENOUS BLD VENIPUNCTURE: CPT

## 2021-11-03 PROCEDURE — 84443 ASSAY THYROID STIM HORMONE: CPT

## 2021-11-03 PROCEDURE — 80053 COMPREHEN METABOLIC PANEL: CPT

## 2021-11-03 PROCEDURE — 84439 ASSAY OF FREE THYROXINE: CPT

## 2021-11-03 PROCEDURE — 85027 COMPLETE CBC AUTOMATED: CPT

## 2021-11-03 RX ORDER — LETROZOLE 2.5 MG/1
2.5 TABLET, FILM COATED ORAL DAILY
Qty: 28 TABLET | Refills: 0 | Status: SHIPPED | OUTPATIENT
Start: 2021-11-03 | End: 2021-11-24

## 2021-11-03 NOTE — ORAL ONC MGMT
Oral Chemotherapy Monitoring Program  Lab Follow Up    Reviewed lab results from 11/3/21.    Labs:  _  Result Component Current Result Ref Range   Sodium 137 (11/3/2021) 133 - 144 mmol/L     _  Result Component Current Result Ref Range   Potassium 4.3 (11/3/2021) 3.4 - 5.3 mmol/L     _  Result Component Current Result Ref Range   Calcium 10.0 (11/3/2021) 8.5 - 10.1 mg/dL     _  Result Component Current Result Ref Range   Albumin 4.1 (11/3/2021) 3.4 - 5.0 g/dL     _  Result Component Current Result Ref Range   Urea Nitrogen 13 (11/3/2021) 7 - 30 mg/dL     _  Result Component Current Result Ref Range   Creatinine 1.01 (11/3/2021) 0.52 - 1.04 mg/dL       _  Result Component Current Result Ref Range   AST 38 (11/3/2021) 0 - 45 U/L     _  Result Component Current Result Ref Range   ALT 41 (11/3/2021) 0 - 50 U/L     _  Result Component Current Result Ref Range   Bilirubin Total 0.6 (11/3/2021) 0.2 - 1.3 mg/dL       _  Result Component Current Result Ref Range   WBC Count 3.4 (L) (11/3/2021) 4.0 - 11.0 10e3/uL     _  Result Component Current Result Ref Range   Hemoglobin 12.8 (11/3/2021) 11.7 - 15.7 g/dL     _  Result Component Current Result Ref Range   Platelet Count 139 (L) (11/3/2021) 150 - 450 10e3/uL     _  Result Component Current Result Ref Range   Absolute Neutrophils 2.0 (11/3/2021) 1.6 - 8.3 10e3/uL       Assessment & Plan:   No concerning abnormalities. Continue plan of care Ibrance 100mg daily for 21 days of 28 day cycle.    Last cycle started 10/8.  Next cycle starts 11/5.    Follow-Up:  11/16: Dr. Bhatt appointment with amie Swain, PharmD  Hematology/Oncology Clinical Pharmacist  [facility]  [facility]

## 2021-11-10 ENCOUNTER — ANCILLARY PROCEDURE (OUTPATIENT)
Dept: CT IMAGING | Facility: CLINIC | Age: 51
End: 2021-11-10
Attending: INTERNAL MEDICINE
Payer: COMMERCIAL

## 2021-11-10 DIAGNOSIS — C50.919 METASTATIC BREAST CANCER: ICD-10-CM

## 2021-11-10 DIAGNOSIS — C50.412 MALIGNANT NEOPLASM OF UPPER-OUTER QUADRANT OF LEFT BREAST IN FEMALE, ESTROGEN RECEPTOR POSITIVE (H): ICD-10-CM

## 2021-11-10 DIAGNOSIS — Z17.0 MALIGNANT NEOPLASM OF UPPER-OUTER QUADRANT OF LEFT BREAST IN FEMALE, ESTROGEN RECEPTOR POSITIVE (H): ICD-10-CM

## 2021-11-10 PROCEDURE — 74177 CT ABD & PELVIS W/CONTRAST: CPT | Mod: GC | Performed by: STUDENT IN AN ORGANIZED HEALTH CARE EDUCATION/TRAINING PROGRAM

## 2021-11-10 PROCEDURE — 71260 CT THORAX DX C+: CPT | Mod: GC | Performed by: STUDENT IN AN ORGANIZED HEALTH CARE EDUCATION/TRAINING PROGRAM

## 2021-11-10 PROCEDURE — 70491 CT SOFT TISSUE NECK W/DYE: CPT | Mod: GC | Performed by: RADIOLOGY

## 2021-11-10 RX ORDER — IOPAMIDOL 755 MG/ML
135 INJECTION, SOLUTION INTRAVASCULAR ONCE
Status: COMPLETED | OUTPATIENT
Start: 2021-11-10 | End: 2021-11-10

## 2021-11-10 RX ADMIN — IOPAMIDOL 135 ML: 755 INJECTION, SOLUTION INTRAVASCULAR at 12:54

## 2021-11-16 ENCOUNTER — VIRTUAL VISIT (OUTPATIENT)
Dept: ONCOLOGY | Facility: CLINIC | Age: 51
End: 2021-11-16
Attending: INTERNAL MEDICINE
Payer: COMMERCIAL

## 2021-11-16 DIAGNOSIS — C50.412 MALIGNANT NEOPLASM OF UPPER-OUTER QUADRANT OF LEFT BREAST IN FEMALE, ESTROGEN RECEPTOR POSITIVE (H): Primary | ICD-10-CM

## 2021-11-16 DIAGNOSIS — R73.9 HYPERGLYCEMIA: ICD-10-CM

## 2021-11-16 DIAGNOSIS — E03.9 ACQUIRED HYPOTHYROIDISM: ICD-10-CM

## 2021-11-16 DIAGNOSIS — Z17.0 MALIGNANT NEOPLASM OF UPPER-OUTER QUADRANT OF LEFT BREAST IN FEMALE, ESTROGEN RECEPTOR POSITIVE (H): Primary | ICD-10-CM

## 2021-11-16 PROCEDURE — 99214 OFFICE O/P EST MOD 30 MIN: CPT | Mod: TEL | Performed by: INTERNAL MEDICINE

## 2021-11-16 NOTE — NURSING NOTE
Patient verified meds and allergies are correct via patients echeck in.    Steve Bautista, Virtual Facilitator

## 2021-11-16 NOTE — PROGRESS NOTES
Jade is a 51 year old who is being evaluated via a billable telephone visit.      What phone number would you like to be contacted at? [phone]   How would you like to obtain your AVS? Soledad  Phone call duration: 3 minutes

## 2021-11-16 NOTE — PROGRESS NOTES
HISTORY OF PRESENT ILLNESS:  Jade is a 51-year-old woman with metastatic breast cancer who comes to our clinic for recommendations for further treatment.  She is referred by Dr. Norberto Brand at [facility].  Jade would like to continue her care at the [facility].       Jade was diagnosed with breast cancer with a lump found in the upper-inner quadrant of the left breast.  She was diagnosed in 12/2013 in the [place] area and Dr. David Redd was her medical oncologist.  Biopsy of the tumor showed it to be ER positive, CO positive, and HER-2 negative.  We do not have any of the original pathology and we need to obtain those reports.  She underwent a lumpectomy performed by Dr. Tasha Segura who is a surgeon in the [place] area.  She also had a sentinel lymph node which was noted to be involved with tumor but there was no lymph node dissection done at that time.  [id number].  She subsequently underwent adjuvant chemotherapy with dose-dense AC for 4 cycles and Taxol for 12 weeks, completed 09/11/2014.        She then had radiation therapy post lumpectomy to the left breast, which was completed 11/23/2014 by Dr. Manley.        She was then begun on an aromatase inhibitor, the name of which she does not remember.  The aromatase inhibitor therapy was begun in 11/2014.  She was then switched within about a month or so to anastrozole and remained on anastrozole from 11/2014-02/2017.  She has never received tamoxifen.        She then moved to [place], [place] in 03/2017.  She saw a gynecologist there and underwent a IRIS/BSO by Dr. Zendejas.  She then also saw Dr. Linares in [place], [place], who is an oncologist.  His interpretation of the pathology report was that she had triple-negative breast cancer.  It is possible that she may have had low estrogen receptor positivity, but the anastrozole was then discontinued in 02/2017.  She then underwent the IRIS/BSO in 03/2017.  This was done laparoscopically.        She then  remained off of all hormonal therapy and in 09/2017 moved to [place].  She remained off hormonal therapy and did not have Medical Oncology followup initially.        She was driving for [place] and noticed lymph nodes in the left neck about 8 weeks ago.  She then went to see Dr. Norberto Brand who performed a PET/CT scan and the patient also underwent a brain MRI for staging.  The PET/CT scan performed 08/03/2018 showed in the neck there were several mildly metabolic active and large prominent left posterior cervical lymph nodes.  The largest is located posterior to the left sternocleidomastoid muscle and measures 1.5 x 1 cm in size.  This demonstrate modest FDG uptake.  The other lymph nodes are smaller and demonstrate mild FDG uptake.  The prominent prevascular and aortopulmonary lymph nodes were also seen on the contrast-enhanced scan were less well seen on the PET.  The largest lymph node visualized on the PET scan was 1.1 x 0.8 cm and demonstrated mild FDG uptake.  This was in the periaortic area just anterior to the aortic arch.  No lung nodules.  No lung infiltrates.  No pleural effusion.  There was no uptake of FDG in the bones.  In summary, there were mildly enlarged left posterior cervical lymph nodes, largest measuring 1.5 x 1.0 cm and mildly enlarged aorticopulmonary and prevascular lymph nodes that were noted on contrast-enhanced CT scan seen less well on the PET scan.  No evidence of metastatic disease in the abdomen and pelvis.  She also underwent a brain MRI that showed no evidence of intracranial metastatic disease.  She underwent a biopsy of one of the lymph nodes in the left neck which showed metastatic adenocarcinoma consistent with breast origin, which was estrogen-receptor positive.  The tumor cells were positive for CK7, ER and CT consistent with metastatic breast carcinoma.  Estrogen receptor showed strong average nuclear intensity in 95% of carcinoma cells, progesterone receptor moderate average  nuclear intensity in 70% of the carcinoma.  A GATA3 stain was apparently not performed.       TREATMENT HISTORY:  A. Tamoxifen  B. Letrozole and palbociclib started 9-18-18.  C. Pfizer COVID vaccination end of March, 2021 and 4-17.     INTERVAL HISTORY:  Jade has been doing quite well.  No pain.  No fatigue.  Depression and anxiety have been well controlled by buspirone, sertraline, and mirtazapine. Mirtazapine helps her sleep at night. No fullness in the neck.  The CT scan of the chest abdomen and pelvis and neck shows no progression.  She is very pleased with this result as are we.      The Solu-Medrol premedication before contrast is working well. She has some insomnia and was quite fatigued today.     She gets occasional hot flashes during the day and at night that are not too bothersome. She endorses mild joint aches about an hour after taking letrozole.      REVIEW OF SYSTEMS:  A 10-point review of systems was performed and was negative with the exception of pertinent positives noted above.     OBJECTIVE DATA:  This was a phone visit and a physical exam was not performed today.  Mood was normal.     Imaging studies:    CT CAP 11-10-21   IMPRESSION: In this patient with history of left invasive breast  cancer status post lumpectomy and chemoradiation:  1. No evidence of recurrent or metastatic disease in the chest,  abdomen or pelvis.  2. Resolution of the previously seen groundglass opacities in the  lungs seen seen on CT 8/11/2021, likely was infection or inflammatory  etiology.  3. Additional scattered solid pulmonary nodules are unchanged.  4. Hepatomegaly with diffuse hepatic steatosis.     I have personally reviewed the examination and initial interpretation  and I agree with the findings.     JAMISON MORGAN MD                                                                CT neck 11-  Impression:     Redemonstration of several normal-sized left level 2 and 3 nodes.  Since 8/11/2021 no  significant difference, since 5/11/2021, the level  IIb nodes are smaller. Persistent 13 mm left level 3 node. Continued  attention on follow-up is recommended.      I have personally reviewed the examination and initial interpretation  and I agree with the findings.     RODERICK ROWE MD      Labs:   CBC normal. ANC 2.0.  CMP was within normal limits except for glucose 130. HbA1c 6.1 9-8-21 7-14-21. CEA 0.6, CA27.29 15.  TSH 5.82     ASSESSMENT AND PLAN:  1. Recurrent, metastatic ER-positive, HER2 negative breast cancer: History of left breast cancer s/p lumpectomy and SNLB in 2014. S/p 4 cycles ddAC and 12 weeks of Taxol. On AI from 11/2014-2/2017. Recurrence on PET/CT scan performed 08/03/2018 and then biopsy to left posterior cervical lymph nodes, aorticopulmonary, and prevascular lymph nodes were positive. No evidence of metastatic disease in the abdomen and pelvis or brain. Started Ibrance and letrozole on 9/21/18. Tolerating well aside from some hot flashes and mild joint stiffness.  She continues on Ibrance and letrozole and is now at 3 years.   2. Restaging shows essentially stable disease.  2. Overall doing well.  Fatty liver.  HbA1c discussed.   3.  Imaging and interval.  She would like to perform imaging on a 3 month basis.  I think this is reasonable. Allergy to contrast and she needs premedication and needs CT CAP and CT neck.   4.  Diet and exercise. We also discussed eating less sugar and getting 150 minutes of exercise per week.  She will try to incorporate these recommendations into her lifestyle.  PCP Dr. Dr. Wesly Pate at [facility].  5.  Depression and anxiety. She saw Dr. Pate her new PCP and her regimen was changed to add buspirone 7.5 mg twice daily to sertraline 200 mg once per added. Lorazepam prn anxiety was added.  She has improvement of her symptoms of depression and anxiety.   6.  Hypothyroid. Levothyroxine 0.175 mg per day.   She needs to go back to her PCP to see if this needs to  be increased.   7. Asthma: Seen by Dr. Hay. Takes advair, montelukast, and albuterol as needed, noticing some slightly worsening symptoms lately. Meeting with WALT Hay to discuss  8. She has had a flu shot.  Had Pfizer COVID vaccination in March and April 9.  She decided against bariatric surgery and will concentrate on diet and exercise.   10. Follow up. Follow-up with SONIA December 14, January 11, February 9 with me and with me in person with SONIA March 8 with CBC, CMP, CA27.29 and CEA.   CT CAP and CT neck February 8 with methylprednisolone premedication.  HbA1c, CBC, CMP, CEA, CA 27-29 all visits.  TSH March 8. Endocrinology referral.     Thank you for allowing us to participate in this patient's care.      Sincerely,      Wilfredo Bhatt MD  Professor  [facility]  [phone]        I spent   minutes with the patient more than 50% of which was in counseling and coordination of care.

## 2021-11-16 NOTE — LETTER
11/16/2021         RE: Jade Collins  [address]  [facility] [address]        Dear Colleague,    Thank you for referring your patient, Jade Collins, to the [facility]. Please see a copy of my visit note below.    HISTORY OF PRESENT ILLNESS:  Jade is a 51-year-old woman with metastatic breast cancer who comes to our clinic for recommendations for further treatment.  She is referred by Dr. Norberto Brand at [facility].  Jade would like to continue her care at the [facility].       Jade was diagnosed with breast cancer with a lump found in the upper-inner quadrant of the left breast.  She was diagnosed in 12/2013 in the [place] area and Dr. David Redd was her medical oncologist.  Biopsy of the tumor showed it to be ER positive, TN positive, and HER-2 negative.  We do not have any of the original pathology and we need to obtain those reports.  She underwent a lumpectomy performed by Dr. Tasha Segura who is a surgeon in the [place] area.  She also had a sentinel lymph node which was noted to be involved with tumor but there was no lymph node dissection done at that time.  [id number].  She subsequently underwent adjuvant chemotherapy with dose-dense AC for 4 cycles and Taxol for 12 weeks, completed 09/11/2014.        She then had radiation therapy post lumpectomy to the left breast, which was completed 11/23/2014 by Dr. Manley.        She was then begun on an aromatase inhibitor, the name of which she does not remember.  The aromatase inhibitor therapy was begun in 11/2014.  She was then switched within about a month or so to anastrozole and remained on anastrozole from 11/2014-02/2017.  She has never received tamoxifen.        She then moved to [place], [place] in 03/2017.  She saw a gynecologist there and underwent a IRIS/BSO by Dr. Zendejas.  She then also saw Dr. Linares in [place], [place], who is an oncologist.  His interpretation of the pathology report was that she  had triple-negative breast cancer.  It is possible that she may have had low estrogen receptor positivity, but the anastrozole was then discontinued in 02/2017.  She then underwent the IRIS/BSO in 03/2017.  This was done laparoscopically.        She then remained off of all hormonal therapy and in 09/2017 moved to [place].  She remained off hormonal therapy and did not have Medical Oncology followup initially.        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  She then went to see Dr. Norberto Brand who performed a PET/CT scan and the patient also underwent a brain MRI for staging.  The PET/CT scan performed 08/03/2018 showed in the neck there were several mildly metabolic active and large prominent left posterior cervical lymph nodes.  The largest is located posterior to the left sternocleidomastoid muscle and measures 1.5 x 1 cm in size.  This demonstrate modest FDG uptake.  The other lymph nodes are smaller and demonstrate mild FDG uptake.  The prominent prevascular and aortopulmonary lymph nodes were also seen on the contrast-enhanced scan were less well seen on the PET.  The largest lymph node visualized on the PET scan was 1.1 x 0.8 cm and demonstrated mild FDG uptake.  This was in the periaortic area just anterior to the aortic arch.  No lung nodules.  No lung infiltrates.  No pleural effusion.  There was no uptake of FDG in the bones.  In summary, there were mildly enlarged left posterior cervical lymph nodes, largest measuring 1.5 x 1.0 cm and mildly enlarged aorticopulmonary and prevascular lymph nodes that were noted on contrast-enhanced CT scan seen less well on the PET scan.  No evidence of metastatic disease in the abdomen and pelvis.  She also underwent a brain MRI that showed no evidence of intracranial metastatic disease.  She underwent a biopsy of one of the lymph nodes in the left neck which showed metastatic adenocarcinoma consistent with breast origin,  which was estrogen-receptor positive.  The tumor cells were positive for CK7, ER and MD consistent with metastatic breast carcinoma.  Estrogen receptor showed strong average nuclear intensity in 95% of carcinoma cells, progesterone receptor moderate average nuclear intensity in 70% of the carcinoma.  A GATA3 stain was apparently not performed.       TREATMENT HISTORY:  A. Tamoxifen  B. Letrozole and palbociclib started 9-18-18.  C. Pfizer COVID vaccination end of March, 2021 and 4-17.     INTERVAL HISTORY:  Jade has been doing quite well.  No pain.  No fatigue.  Depression and anxiety have been well controlled by buspirone, sertraline, and mirtazapine. Mirtazapine helps her sleep at night. No fullness in the neck.  The CT scan of the chest abdomen and pelvis and neck shows no progression.  She is very pleased with this result as are we.      The Solu-Medrol premedication before contrast is working well. She has some insomnia and was quite fatigued today.     She gets occasional hot flashes during the day and at night that are not too bothersome. She endorses mild joint aches about an hour after taking letrozole.      REVIEW OF SYSTEMS:  A 10-point review of systems was performed and was negative with the exception of pertinent positives noted above.     OBJECTIVE DATA:  This was a phone visit and a physical exam was not performed today.  Mood was normal.     Imaging studies:    CT CAP 11-10-21   IMPRESSION: In this patient with history of left invasive breast  cancer status post lumpectomy and chemoradiation:  1. No evidence of recurrent or metastatic disease in the chest,  abdomen or pelvis.  2. Resolution of the previously seen groundglass opacities in the  lungs seen seen on CT 8/11/2021, likely was infection or inflammatory  etiology.  3. Additional scattered solid pulmonary nodules are unchanged.  4. Hepatomegaly with diffuse hepatic steatosis.     I have personally reviewed the examination and initial  interpretation  and I agree with the findings.     JAMISON MORGAN MD                                                                CT neck 11-  Impression:     Redemonstration of several normal-sized left level 2 and 3 nodes.  Since 8/11/2021 no significant difference, since 5/11/2021, the level  IIb nodes are smaller. Persistent 13 mm left level 3 node. Continued  attention on follow-up is recommended.      I have personally reviewed the examination and initial interpretation  and I agree with the findings.     RODERICK ROWE MD      Labs:   CBC normal. ANC 2.0.  CMP was within normal limits except for glucose 130. HbA1c 6.1 9-8-21 7-14-21. CEA 0.6, CA27.29 15.  TSH 5.82     ASSESSMENT AND PLAN:  1. Recurrent, metastatic ER-positive, HER2 negative breast cancer: History of left breast cancer s/p lumpectomy and SNLB in 2014. S/p 4 cycles ddAC and 12 weeks of Taxol. On AI from 11/2014-2/2017. Recurrence on PET/CT scan performed 08/03/2018 and then biopsy to left posterior cervical lymph nodes, aorticopulmonary, and prevascular lymph nodes were positive. No evidence of metastatic disease in the abdomen and pelvis or brain. Started Ibrance and letrozole on 9/21/18. Tolerating well aside from some hot flashes and mild joint stiffness.  She continues on Ibrance and letrozole and is now at 3 years.   2. Restaging shows essentially stable disease.  2. Overall doing well.  Fatty liver.  HbA1c discussed.   3.  Imaging and interval.  She would like to perform imaging on a 3 month basis.  I think this is reasonable. Allergy to contrast and she needs premedication and needs CT CAP and CT neck.   4.  Diet and exercise. We also discussed eating less sugar and getting 150 minutes of exercise per week.  She will try to incorporate these recommendations into her lifestyle.  PCP Dr. Dr. Wesly Pate at [facility].  5.  Depression and anxiety. She saw Dr. Pate her new PCP and her regimen was changed to add buspirone 7.5 mg  twice daily to sertraline 200 mg once per added. Lorazepam prn anxiety was added.  She has improvement of her symptoms of depression and anxiety.   6.  Hypothyroid. Levothyroxine 0.175 mg per day.   She needs to go back to her PCP to see if this needs to be increased.   7. Asthma: Seen by Dr. Hay. Takes advair, montelukast, and albuterol as needed, noticing some slightly worsening symptoms lately. Meeting with WALT Hay to discuss  8. She has had a flu shot.  Had Pfizer COVID vaccination in March and April 9.  She decided against bariatric surgery and will concentrate on diet and exercise.   10. Follow up. Follow-up with SONIA December 14, January 11, February 9 with me and with me in person with SONIA March 8 with CBC, CMP, CA27.29 and CEA.   CT CAP and CT neck February 8 with methylprednisolone premedication.  HbA1c, CBC, CMP, CEA, CA 27-29 all visits.  TSH March 8. Endocrinology referral.     Thank you for allowing us to participate in this patient's care.      Sincerely,      Wilfredo Bhatt MD  Professor  [facility]  [phone]        I spent   minutes with the patient more than 50% of which was in counseling and coordination of care.

## 2021-11-17 NOTE — TELEPHONE ENCOUNTER
RECORDS RECEIVED FROM: internal /ce    DATE RECEIVED: 11/18/21   NOTES (FOR ALL VISITS) STATUS DETAILS   OFFICE NOTES from referring provider internal  Wilfredo Bhatt MD   OFFICE NOTES from other specialist internal  8/17/21 [facility]      ED NOTES na    OPERATIVE REPORT  (thyroid, pituitary, adrenal, parathyroid) na    MEDICATION LIST internal     IMAGING      DEXASCAN na    MRI (BRAIN) internal  2.13.20,    XR (Chest) internal  5.10.20,    CT (HEAD/NECK/CHEST/ABDOMEN) internal  11.10.21, 8/11/21, 5/11/21, 2/16/21, 10.20.20, 1.15.20    NUCLEAR  na    ULTRASOUND (HEAD/NECK) na    LABS     DIABETES: HBGA1C, CREATININE, FASTING LIPIDS, MICROALBUMIN URINE, POTASSIUM, TSH, T4    THYROID: TSH, T4, CBC, THYRODLONULIN, TOTAL T3, FREE T4, CALCITONIN, CEA internal /ce  TSH/T4- 11.3.21   Cbc- 10.6.21  HBGA1C- 9.8.21  Lipid- 11.18.20  T3- 4.14.20

## 2021-11-18 ENCOUNTER — LAB (OUTPATIENT)
Dept: LAB | Facility: CLINIC | Age: 51
End: 2021-11-18

## 2021-11-18 ENCOUNTER — OFFICE VISIT (OUTPATIENT)
Dept: ENDOCRINOLOGY | Facility: CLINIC | Age: 51
End: 2021-11-18
Payer: COMMERCIAL

## 2021-11-18 ENCOUNTER — PRE VISIT (OUTPATIENT)
Dept: ENDOCRINOLOGY | Facility: CLINIC | Age: 51
End: 2021-11-18

## 2021-11-18 VITALS
DIASTOLIC BLOOD PRESSURE: 88 MMHG | SYSTOLIC BLOOD PRESSURE: 138 MMHG | TEMPERATURE: 97.4 F | BODY MASS INDEX: 43.4 KG/M2 | WEIGHT: 293 LBS | HEIGHT: 69 IN

## 2021-11-18 DIAGNOSIS — R73.03 PREDIABETES: Primary | ICD-10-CM

## 2021-11-18 DIAGNOSIS — E66.01 CLASS 3 SEVERE OBESITY DUE TO EXCESS CALORIES WITH SERIOUS COMORBIDITY AND BODY MASS INDEX (BMI) OF 45.0 TO 49.9 IN ADULT (H): ICD-10-CM

## 2021-11-18 DIAGNOSIS — E03.9 HYPOTHYROIDISM, UNSPECIFIED TYPE: ICD-10-CM

## 2021-11-18 DIAGNOSIS — E66.813 CLASS 3 SEVERE OBESITY DUE TO EXCESS CALORIES WITH SERIOUS COMORBIDITY AND BODY MASS INDEX (BMI) OF 45.0 TO 49.9 IN ADULT (H): ICD-10-CM

## 2021-11-18 DIAGNOSIS — R73.03 PREDIABETES: ICD-10-CM

## 2021-11-18 DIAGNOSIS — R73.9 HYPERGLYCEMIA: ICD-10-CM

## 2021-11-18 PROCEDURE — 99204 OFFICE O/P NEW MOD 45 MIN: CPT | Mod: GC | Performed by: STUDENT IN AN ORGANIZED HEALTH CARE EDUCATION/TRAINING PROGRAM

## 2021-11-18 RX ORDER — DEXAMETHASONE 1 MG
1 TABLET ORAL ONCE
Qty: 1 TABLET | Refills: 0 | Status: SHIPPED | OUTPATIENT
Start: 2021-11-18 | End: 2022-06-07

## 2021-11-18 RX ORDER — LEVOTHYROXINE SODIUM 200 UG/1
200 TABLET ORAL DAILY
Qty: 90 TABLET | Refills: 3 | Status: SHIPPED | OUTPATIENT
Start: 2021-11-18 | End: 2022-05-19

## 2021-11-18 ASSESSMENT — MIFFLIN-ST. JEOR: SCORE: 2158.11

## 2021-11-18 ASSESSMENT — PAIN SCALES - GENERAL: PAINLEVEL: NO PAIN (0)

## 2021-11-18 NOTE — PROGRESS NOTES
Endocrinology Clinic New Consult      Jade Collins MRN:[id number] YOB: 1970  Primary care provider: Roby Johnson     Reason for Endocrine consult: hyperglycemia    HPI:  Jade Collins is a 51 year old female with PMHx of  Asthma, hypothyroidism, Recurrent metastatic ER-positive, HER2 negative breast cancer: History of left breast cancer s/p lumpectomy and SNLB in 2014. S/p 4 cycles doxorubicin and adriamycin and 12 weeks of Taxol. On Aromatase Inhibitor from 11/2014-2/2017. Recurrence on PET/CT scan performed 08/03/2018 and then biopsy to left posterior cervical lymph nodes, aorticopulmonary, and prevascular lymph nodes were positive. No evidence of metastatic disease in the abdomen and pelvis or brain. Was started Ibrance( palbociclib - CDK-4 and CDK- 6 inhibitor) and letrozole on 9/21/18.    She is referred to our clinic for hyperglycemia on random BG    Reports dry mouth and being thirsty all the time since 3 years,she mentions that she has intermittent blurring of vision especially when she gets up in the morning, uses prescription glasses otherwise. Has numbness and tingling of hands and feet usually the tips since chemotherapy in 2014. No increased frequency of urination.  Reports weight gain of around 20 lbs - she says its the heaviest she has been, she also mentions she has sugary food, soda and is sedentary at home, says she is stage 4 Ca and kind of just gave up on being actively healthy. She denies any headaches or visual defects or double vision.    She also reports having asthma, she has flares and feels winded on exertion- last was 3 days ago where she used rescue inhaler. She uses advair daily but she prefers not to use prednisone as her father had SE from it long term. She uses methylprednisone 12 hr and 2 hr before CT scan for Contrast allergy. She gets CT scans every 3 months since 2018    Denies any new stretch marks, she mentions she has easy bruising and delayed healing, feels like she  has difficulty or weakness when climbing up stairs or getting up from sitting position, says she is deconditioned. No tenderness. Has no history of HTN. She mentions she gets hot flashes at random times of the day and resolves by itself after taking ibrance and letrozole . She uses oxybutynin for it and that does help.     She started using mirtazipine for last 3 years to help her with sleep    She has history of hypothyroidism  since 2000 at the time she had mood swings, weight loss, hunger also time, and then she was diagnosed hypothyroidism later. She has been taking levothyroxine and current dose is 175 mcg daily    Has anxiety with healthcare related mostly forms and administrations and with COVID, jitteriness, has diarrhea 3-4 times/ week. Feels hot easily. Losing hair and has brittle nails for last 6 months    Energy levels are fluctuant, she needs sugar and coffee to keep her up, she had a granola bar and water    Denies any  neck pain, difficulty swallowing . She mentions sometime her voice is hoarse, does change when she uses inhalers  Takes , vit D 3 , calcium and MVI ,no biotin supplements    FH: Father- graves, sister- hypothyroid , paternal grandfather- hypothyroid    SH: On disability, single, no kids, never smoker, has second hand exposure,no illicit drug use or alcohol consumption      ROS:  All 12 systems were reviewed and negative except as mentioned in HPI    PMH  Patient Active Problem List   Diagnosis     Malignant neoplasm of upper-outer quadrant of left breast in female, estrogen receptor positive (H)     Primary hypothyroidism     Obesity     Lymph nodes enlarged     Depression, unspecified depression type       Allergies:  Allergies   Allergen Reactions     Contrast Dye Shortness Of Breath     Swollen eyes, coughing, headache     Penicillins      Sulfa Drugs Shortness Of Breath     Seasonal Allergies        PTA Meds:  Prior to Admission medications    Medication Sig Last Dose Taking? Auth  Provider   albuterol (PROAIR HFA/PROVENTIL HFA/VENTOLIN HFA) 108 (90 Base) MCG/ACT inhaler Inhale 2 puffs into the lungs every 6 hours   Facundo Hay MD   albuterol (PROVENTIL) (2.5 MG/3ML) 0.083% neb solution Take 1 vial (2.5 mg) by nebulization every 6 hours as needed for shortness of breath / dyspnea or wheezing   Facundo Hay MD   busPIRone (BUSPAR) 5 MG tablet    Reported, Patient   Calcium Citrate-Vitamin D (CALCIUM + D PO) Take 250 mg by mouth 2 times daily    Reported, Patient   clotrimazole (MYCELEX) 10 MG lozenge Place 1 lozenge (10 mg) inside cheek 5 times daily   Helga Fernando PA-C   fluticasone-salmeterol (ADVAIR-HFA) 115-21 MCG/ACT inhaler Inhale 2 puffs into the lungs 2 times daily   Facundo Hay MD   ketoconazole (NIZORAL) 2 % external cream Apply topically 2 times daily   Cecilia Goins PA-C   letrozole (FEMARA) 2.5 MG tablet Take 1 tablet (2.5 mg) by mouth daily for 28 days   Wilfredo Bhatt MD   letrozole (FEMARA) 2.5 MG tablet Take 1 tablet (2.5 mg) by mouth daily for 28 days   Wilfredo Bhatt MD   letrozole (FEMARA) 2.5 MG tablet Take 1 tablet (2.5 mg) by mouth daily for 28 days   Cecilia Goins PA-C   levothyroxine (SYNTHROID/LEVOTHROID) 175 MCG tablet Take 1 tablet (175 mcg) by mouth daily   Mary Carcamo MD   loratadine (CLARITIN) 10 MG tablet Take 1 tablet (10 mg) by mouth daily   Evelina Garcia PA-C   LORazepam (ATIVAN) 1 MG tablet Take 1 mg by mouth   Reported, Patient   methylPREDNISolone (MEDROL) 32 MG tablet Take 1 tablet (32 mg) 12 hours and 2 hours before IV contrast.   Cecilia Goins PA-C   mirtazapine (REMERON) 15 MG tablet TK 1 T PO HS   Evelina Garcia PA-C   montelukast (SINGULAIR) 10 MG tablet    Reported, Patient   Multiple Vitamins-Minerals (MULTIVITAMIN ADULT PO)    Reported, Patient   oxybutynin (DITROPAN) 5 MG tablet Take 1 tablet (5 mg) by mouth 2 times daily   Carmen Jackson PA-C   palbociclib  (IBRANCE) 100 MG tablet Take 1 tablet (100 mg) by mouth daily with food Take for 21 days, then 7 days off. Avoid grapefruit. Do not open/crush/chew capsule.   Wilfredo Bhatt MD   sertraline (ZOLOFT) 100 MG tablet Take 1 tablet (100 mg) by mouth 2 times daily   Wilfredo Bhatt MD   triamcinolone (NASACORT) 55 MCG/ACT inhaler Spray 1 spray into both nostrils daily    Reported, Patient   VITAMIN D, CHOLECALCIFEROL, PO Take 2,000 Units by mouth daily    Reported, Patient        Current heard:   Current Outpatient Medications   Medication     albuterol (PROAIR HFA/PROVENTIL HFA/VENTOLIN HFA) 108 (90 Base) MCG/ACT inhaler     albuterol (PROVENTIL) (2.5 MG/3ML) 0.083% neb solution     busPIRone (BUSPAR) 5 MG tablet     Calcium Citrate-Vitamin D (CALCIUM + D PO)     clotrimazole (MYCELEX) 10 MG lozenge     fluticasone-salmeterol (ADVAIR-HFA) 115-21 MCG/ACT inhaler     ketoconazole (NIZORAL) 2 % external cream     letrozole (FEMARA) 2.5 MG tablet     letrozole (FEMARA) 2.5 MG tablet     letrozole (FEMARA) 2.5 MG tablet     levothyroxine (SYNTHROID/LEVOTHROID) 175 MCG tablet     loratadine (CLARITIN) 10 MG tablet     LORazepam (ATIVAN) 1 MG tablet     methylPREDNISolone (MEDROL) 32 MG tablet     mirtazapine (REMERON) 15 MG tablet     montelukast (SINGULAIR) 10 MG tablet     Multiple Vitamins-Minerals (MULTIVITAMIN ADULT PO)     oxybutynin (DITROPAN) 5 MG tablet     palbociclib (IBRANCE) 100 MG tablet     sertraline (ZOLOFT) 100 MG tablet     triamcinolone (NASACORT) 55 MCG/ACT inhaler     VITAMIN D, CHOLECALCIFEROL, PO     No current facility-administered medications for this visit.       Social History:  Social History     Tobacco Use     Smoking status: Never Smoker     Smokeless tobacco: Never Used   Substance Use Topics     Alcohol use: Not on file         Physical examination:  General appearance: seated comfortably in the chair during assessment. Not in any acute distress. obese  HEENT: PEERLA. Oral  cavity is moist and clear. thick neck, no obviosu thyromegaly or nodules felt, left neck, Left cervical LN palpable  Lungs: bilateral air entry equal. Clear to auscultation. No rhonchi or crepitations heard  Heart: S1 S2 normal. Pulse: regular rate and rhythm, good volume  Abdomen: soft, nontender, non distended, obese, has thin purple striae  Neurological: conscious and oriented. Speech: normal. Moving all four extremities equally, reflexes 2+ in all 4 extremities  Extremities: no edema noted. Dorsalis pedis 2+ bilaterally. No ulcers noted. No tremor is noted over her outstretched hands  Psychiatric: normal mood and affect. Normal judgment    Endocrine Labs:          EXAMINATION: CT CHEST/ABDOMEN/PELVIS W CONTRAST, 11/10/2021 1:12 PM     INDICATION: Invasive breast cancer, stage IV, assess treatment  response; she needs premedication with methylprednisolone; Malignant  neoplasm of upper-outer quadrant of left breast in female, estrogen  receptor positive (H); Malignant neoplasm of upper-outer quadrant of  left breast in female, estrogen receptor positive (H)     COMPARISON STUDY: CT 8/11/2021     TECHNIQUE: CT scan of the chest, abdomen and pelvis was performed on  multidetector CT scanner using volumetric acquisition technique and  images were reconstructed in multiple planes with variable thickness  and reviewed on dedicated workstations.      CONTRAST: Isovue 370   135cc.   Without oral contrast.     CT scan radiation dose is optimized to minimum requisite dose using  automated dose modulation techniques.     FINDINGS:     Chest:   Mediastinum: Heart size normal. No pericardial effusion. Normal  caliber thoracic aorta and main pulmonary artery. No hilar,  mediastinal or axillary lymphadenopathy.     Pleura: No pleural effusion or thickening.     Lungs: Central tracheobronchial tree is patent. Stable scattered sub-6  mm pulmonary nodules. For example, 2 mm subpleural nodule in the left  upper lobe (series 11  image 165). No new or enlarging nodules. No  consolidation. Resolution of scattered groundglass opacities since  8/11/2021 CT.     Abdomen and Pelvis:  Liver: Hepatomegaly. Diffuse hepatic steatosis. No mass. No  intrahepatic biliary ductal dilation.     Biliary System: Normal gallbladder. No extrahepatic biliary ductal  dilation.     Pancreas: No mass or pancreatic ductal dilation.     Adrenal glands: No mass or nodules     Spleen: Normal.     Kidneys: No suspicious mass, obstructing calculus or hydronephrosis.     Gastrointestinal tract: Normal appendix. Normal caliber small bowel.     Mesentery/peritoneum/retroperitoneum: No mass. No free fluid or air.     Lymph nodes: No significant lymphadenopathy.     Vasculature: Patent major abdominal vasculature.     Pelvis: Urinary bladder is normal.  Postoperative changes of  hysterectomy and bilateral salpingo-oophorectomy.     Osseous structures: No aggressive or acute osseous lesion. Multilevel  degenerative changes in the spine. Grade 2 anterolisthesis of L5 on S1  with complete loss of the disc height.      Soft tissues: Post conservation therapy changes in left breast.  Small  fat containing ventral/umbilical hernia.                                                                      IMPRESSION: In this patient with history of left invasive breast  cancer status post lumpectomy and chemoradiation:  1. No evidence of recurrent or metastatic disease in the chest,  abdomen or pelvis.  2. Resolution of the previously seen groundglass opacities in the  lungs seen seen on CT 8/11/2021, likely was infection or inflammatory  etiology.  3. Additional scattered solid pulmonary nodules are unchanged.  4. Hepatomegaly with diffuse hepatic steatosis.     I have personally reviewed the examination and initial interpretation  and I agree with the findings.     JAMISON MORGAN MD         SYSTEM ID:  [id number]     Assessment and Plan:   Jade Collins is a 51 year old female with PMHx  of  Asthma, hypothyroidism, Recurrent metastatic ER-positive, HER2 negative breast cancer: s/p surgery, chemotherapy , on ibrance and letrozole since 2018, She was referred to our clinic for evaluation of hyperglycemia.     # Prediabetes with obesity  - Risk factors include sedentary lifestyle, high calorie diet, obesity and chronic exposure to high dose steroid as pre treatment for her Q 3month CT scans  - Would benefit from weight management referral , patient interested in lifestyle modification and medical management  - Ordered diabetes education referral for prediabetes and obesity  - Advised her to check BG 1-2 times daily at different times using a glucometer  - Encouraged her to eat more protein and vegetables and cut down sodas and starches in her diet  - Encouraged her to walk if able to ( limited by BUSH) or stand instead of sitting  - Ordered [place] salivary cortisol X 2 and dexamethasone suppression test to rule out excessive endogenous steroid production, went over the instructions with her  - Since she just got her CT scan last week, advised her to get all the tests done 1-2 weeks prior to her next CT scan when she will need to take steroids again    # Hypothyroidism  - Revised correct pill technique with her  - Increased levothyroxine to 200 mcg daily from 175 mcg daily  - Will repeat TFT in 6 weeks with other labs    Return to clinic in 6 months    The patient was seen, examined and discussed with Dr. Javier Smart MD.  Endocrinology fellow  Pager [phone]     I have seen and examined the patient, reviewed and edited the fellow's note, and agree with the plan of care.  SAGRARIO Ortiz

## 2021-11-18 NOTE — PATIENT INSTRUCTIONS
"Once you meet diabetes educator , learn how to check blood glucose and check 1-2 times a day usually when you get up or before meals or before bedtime     Obtain glucometer from your pharmacy    We also referred you to weight management    Try eating more vegetables and protein and less sugary stuff like soda, pasta, pizza, rice     Do the blood test except the \" AM Cortisol\" 2 weeks before CT scan    Do the saliva test 1 week before your next CT scan so we can check the tests appropriately when you are not on the steroid    After you get the saliva samples on 2 different days and store them in the refridgerator - take decadron 1 mg tablet at night at 11pm and get an AM cortisol the next day morning at 8 AM        "

## 2021-11-18 NOTE — NURSING NOTE
"Chief Complaint   Patient presents with     Thyroid Problem     Vital signs:  Temp: 97.4  F (36.3  C) Temp src: Oral BP: 138/88             Height: 175.3 cm (5' 9\") Weight: 147.9 kg (326 lb)  Estimated body mass index is 48.14 kg/m  as calculated from the following:    Height as of this encounter: 1.753 m (5' 9\").    Weight as of this encounter: 147.9 kg (326 lb).    Malinda Espinosa, EMT    "

## 2021-11-18 NOTE — LETTER
11/18/2021       RE: Jade Collins  [address]  [place] [place] [address]     Dear Colleague,    Thank you for referring your patient, Jade Collins, to the [facility] ENDOCRINOLOGY [facility]IC [place] at [facility]. Please see a copy of my visit note below.      Endocrinology Clinic New Consult      Jade Collins MRN:[id number] YOB: 1970  Primary care provider: Roby Johnson     Reason for Endocrine consult: hyperglycemia    HPI:  Jade Collins is a 51 year old female with PMHx of  Asthma, hypothyroidism, Recurrent metastatic ER-positive, HER2 negative breast cancer: History of left breast cancer s/p lumpectomy and SNLB in 2014. S/p 4 cycles doxorubicin and adriamycin and 12 weeks of Taxol. On Aromatase Inhibitor from 11/2014-2/2017. Recurrence on PET/CT scan performed 08/03/2018 and then biopsy to left posterior cervical lymph nodes, aorticopulmonary, and prevascular lymph nodes were positive. No evidence of metastatic disease in the abdomen and pelvis or brain. Was started Ibrance( palbociclib - CDK-4 and CDK- 6 inhibitor) and letrozole on 9/21/18.    She is referred to our clinic for hyperglycemia on random BG    Reports dry mouth and being thirsty all the time since 3 years,she mentions that she has intermittent blurring of vision especially when she gets up in the morning, uses prescription glasses otherwise. Has numbness and tingling of hands and feet usually the tips since chemotherapy in 2014. No increased frequency of urination.  Reports weight gain of around 20 lbs - she says its the heaviest she has been, she also mentions she has sugary food, soda and is sedentary at home, says she is stage 4 Ca and kind of just gave up on being actively healthy. She denies any headaches or visual defects or double vision.    She also reports having asthma, she has flares and feels winded on exertion- last was 3 days ago where she used rescue inhaler. She uses  advair daily but she prefers not to use prednisone as her father had SE from it long term. She uses methylprednisone 12 hr and 2 hr before CT scan for Contrast allergy. She gets CT scans every 3 months since 2018    Denies any new stretch marks, she mentions she has easy bruising and delayed healing, feels like she has difficulty or weakness when climbing up stairs or getting up from sitting position, says she is deconditioned. No tenderness. Has no history of HTN. She mentions she gets hot flashes at random times of the day and resolves by itself after taking ibrance and letrozole . She uses oxybutynin for it and that does help.     She started using mirtazipine for last 3 years to help her with sleep    She has history of hypothyroidism  since 2000 at the time she had mood swings, weight loss, hunger also time, and then she was diagnosed hypothyroidism later. She has been taking levothyroxine and current dose is 175 mcg daily    Has anxiety with healthcare related mostly forms and administrations and with COVID, jitteriness, has diarrhea 3-4 times/ week. Feels hot easily. Losing hair and has brittle nails for last 6 months    Energy levels are fluctuant, she needs sugar and coffee to keep her up, she had a granola bar and water    Denies any  neck pain, difficulty swallowing . She mentions sometime her voice is hoarse, does change when she uses inhalers  Takes , vit D 3 , calcium and MVI ,no biotin supplements    FH: Father- graves, sister- hypothyroid , paternal grandfather- hypothyroid    SH: On disability, single, no kids, never smoker, has second hand exposure,no illicit drug use or alcohol consumption      ROS:  All 12 systems were reviewed and negative except as mentioned in HPI    PMH  Patient Active Problem List   Diagnosis     Malignant neoplasm of upper-outer quadrant of left breast in female, estrogen receptor positive (H)     Primary hypothyroidism     Obesity     Lymph nodes enlarged     Depression,  unspecified depression type       Allergies:  Allergies   Allergen Reactions     Contrast Dye Shortness Of Breath     Swollen eyes, coughing, headache     Penicillins      Sulfa Drugs Shortness Of Breath     Seasonal Allergies        PTA Meds:  Prior to Admission medications    Medication Sig Last Dose Taking? Auth Provider   albuterol (PROAIR HFA/PROVENTIL HFA/VENTOLIN HFA) 108 (90 Base) MCG/ACT inhaler Inhale 2 puffs into the lungs every 6 hours   Facundo Hay MD   albuterol (PROVENTIL) (2.5 MG/3ML) 0.083% neb solution Take 1 vial (2.5 mg) by nebulization every 6 hours as needed for shortness of breath / dyspnea or wheezing   Facundo Hay MD   busPIRone (BUSPAR) 5 MG tablet    Reported, Patient   Calcium Citrate-Vitamin D (CALCIUM + D PO) Take 250 mg by mouth 2 times daily    Reported, Patient   clotrimazole (MYCELEX) 10 MG lozenge Place 1 lozenge (10 mg) inside cheek 5 times daily   Helga Fernando PA-C   fluticasone-salmeterol (ADVAIR-HFA) 115-21 MCG/ACT inhaler Inhale 2 puffs into the lungs 2 times daily   Facundo Hay MD   ketoconazole (NIZORAL) 2 % external cream Apply topically 2 times daily   Cecilia Goins PA-C   letrozole (FEMARA) 2.5 MG tablet Take 1 tablet (2.5 mg) by mouth daily for 28 days   Wilfredo Bhatt MD   letrozole (FEMARA) 2.5 MG tablet Take 1 tablet (2.5 mg) by mouth daily for 28 days   Wilfredo Bhatt MD   letrozole (FEMARA) 2.5 MG tablet Take 1 tablet (2.5 mg) by mouth daily for 28 days   Cecilia Goins PA-C   levothyroxine (SYNTHROID/LEVOTHROID) 175 MCG tablet Take 1 tablet (175 mcg) by mouth daily   Mary Carcamo MD   loratadine (CLARITIN) 10 MG tablet Take 1 tablet (10 mg) by mouth daily   Evelina Garcia PA-C   LORazepam (ATIVAN) 1 MG tablet Take 1 mg by mouth   Reported, Patient   methylPREDNISolone (MEDROL) 32 MG tablet Take 1 tablet (32 mg) 12 hours and 2 hours before IV contrast.   Cecilia Goins PA-C mirtazapine  (REMERON) 15 MG tablet TK 1 T PO HS   Evelina Garcia PA-C   montelukast (SINGULAIR) 10 MG tablet    Reported, Patient   Multiple Vitamins-Minerals (MULTIVITAMIN ADULT PO)    Reported, Patient   oxybutynin (DITROPAN) 5 MG tablet Take 1 tablet (5 mg) by mouth 2 times daily   Carmen Jackson PA-C   palbociclib (IBRANCE) 100 MG tablet Take 1 tablet (100 mg) by mouth daily with food Take for 21 days, then 7 days off. Avoid grapefruit. Do not open/crush/chew capsule.   Wilfredo Bhatt MD   sertraline (ZOLOFT) 100 MG tablet Take 1 tablet (100 mg) by mouth 2 times daily   Wilfredo Bhatt MD   triamcinolone (NASACORT) 55 MCG/ACT inhaler Spray 1 spray into both nostrils daily    Reported, Patient   VITAMIN D, CHOLECALCIFEROL, PO Take 2,000 Units by mouth daily    Reported, Patient        Current heard:   Current Outpatient Medications   Medication     albuterol (PROAIR HFA/PROVENTIL HFA/VENTOLIN HFA) 108 (90 Base) MCG/ACT inhaler     albuterol (PROVENTIL) (2.5 MG/3ML) 0.083% neb solution     busPIRone (BUSPAR) 5 MG tablet     Calcium Citrate-Vitamin D (CALCIUM + D PO)     clotrimazole (MYCELEX) 10 MG lozenge     fluticasone-salmeterol (ADVAIR-HFA) 115-21 MCG/ACT inhaler     ketoconazole (NIZORAL) 2 % external cream     letrozole (FEMARA) 2.5 MG tablet     letrozole (FEMARA) 2.5 MG tablet     letrozole (FEMARA) 2.5 MG tablet     levothyroxine (SYNTHROID/LEVOTHROID) 175 MCG tablet     loratadine (CLARITIN) 10 MG tablet     LORazepam (ATIVAN) 1 MG tablet     methylPREDNISolone (MEDROL) 32 MG tablet     mirtazapine (REMERON) 15 MG tablet     montelukast (SINGULAIR) 10 MG tablet     Multiple Vitamins-Minerals (MULTIVITAMIN ADULT PO)     oxybutynin (DITROPAN) 5 MG tablet     palbociclib (IBRANCE) 100 MG tablet     sertraline (ZOLOFT) 100 MG tablet     triamcinolone (NASACORT) 55 MCG/ACT inhaler     VITAMIN D, CHOLECALCIFEROL, PO     No current facility-administered medications for this visit.       Social  History:  Social History     Tobacco Use     Smoking status: Never Smoker     Smokeless tobacco: Never Used   Substance Use Topics     Alcohol use: Not on file         Physical examination:  General appearance: seated comfortably in the chair during assessment. Not in any acute distress. obese  HEENT: PEERLA. Oral cavity is moist and clear. thick neck, no obviosu thyromegaly or nodules felt, left neck, Left cervical LN palpable  Lungs: bilateral air entry equal. Clear to auscultation. No rhonchi or crepitations heard  Heart: S1 S2 normal. Pulse: regular rate and rhythm, good volume  Abdomen: soft, nontender, non distended, obese, has thin purple striae  Neurological: conscious and oriented. Speech: normal. Moving all four extremities equally, reflexes 2+ in all 4 extremities  Extremities: no edema noted. Dorsalis pedis 2+ bilaterally. No ulcers noted. No tremor is noted over her outstretched hands  Psychiatric: normal mood and affect. Normal judgment    Endocrine Labs:          EXAMINATION: CT CHEST/ABDOMEN/PELVIS W CONTRAST, 11/10/2021 1:12 PM     INDICATION: Invasive breast cancer, stage IV, assess treatment  response; she needs premedication with methylprednisolone; Malignant  neoplasm of upper-outer quadrant of left breast in female, estrogen  receptor positive (H); Malignant neoplasm of upper-outer quadrant of  left breast in female, estrogen receptor positive (H)     COMPARISON STUDY: CT 8/11/2021     TECHNIQUE: CT scan of the chest, abdomen and pelvis was performed on  multidetector CT scanner using volumetric acquisition technique and  images were reconstructed in multiple planes with variable thickness  and reviewed on dedicated workstations.      CONTRAST: Isovue 370   135cc.   Without oral contrast.     CT scan radiation dose is optimized to minimum requisite dose using  automated dose modulation techniques.     FINDINGS:     Chest:   Mediastinum: Heart size normal. No pericardial effusion.  Normal  caliber thoracic aorta and main pulmonary artery. No hilar,  mediastinal or axillary lymphadenopathy.     Pleura: No pleural effusion or thickening.     Lungs: Central tracheobronchial tree is patent. Stable scattered sub-6  mm pulmonary nodules. For example, 2 mm subpleural nodule in the left  upper lobe (series 11 image 165). No new or enlarging nodules. No  consolidation. Resolution of scattered groundglass opacities since  8/11/2021 CT.     Abdomen and Pelvis:  Liver: Hepatomegaly. Diffuse hepatic steatosis. No mass. No  intrahepatic biliary ductal dilation.     Biliary System: Normal gallbladder. No extrahepatic biliary ductal  dilation.     Pancreas: No mass or pancreatic ductal dilation.     Adrenal glands: No mass or nodules     Spleen: Normal.     Kidneys: No suspicious mass, obstructing calculus or hydronephrosis.     Gastrointestinal tract: Normal appendix. Normal caliber small bowel.     Mesentery/peritoneum/retroperitoneum: No mass. No free fluid or air.     Lymph nodes: No significant lymphadenopathy.     Vasculature: Patent major abdominal vasculature.     Pelvis: Urinary bladder is normal.  Postoperative changes of  hysterectomy and bilateral salpingo-oophorectomy.     Osseous structures: No aggressive or acute osseous lesion. Multilevel  degenerative changes in the spine. Grade 2 anterolisthesis of L5 on S1  with complete loss of the disc height.      Soft tissues: Post conservation therapy changes in left breast.  Small  fat containing ventral/umbilical hernia.                                                                      IMPRESSION: In this patient with history of left invasive breast  cancer status post lumpectomy and chemoradiation:  1. No evidence of recurrent or metastatic disease in the chest,  abdomen or pelvis.  2. Resolution of the previously seen groundglass opacities in the  lungs seen seen on CT 8/11/2021, likely was infection or inflammatory  etiology.  3. Additional  scattered solid pulmonary nodules are unchanged.  4. Hepatomegaly with diffuse hepatic steatosis.     I have personally reviewed the examination and initial interpretation  and I agree with the findings.     JAMISON MORGAN MD         SYSTEM ID:  [id number]     Assessment and Plan:   Jade Collins is a 51 year old female with PMHx of  Asthma, hypothyroidism, Recurrent metastatic ER-positive, HER2 negative breast cancer: s/p surgery, chemotherapy , on ibrance and letrozole since 2018, She was referred to our clinic for evaluation of hyperglycemia.     # Prediabetes with obesity  - Risk factors include sedentary lifestyle, high calorie diet, obesity and chronic exposure to high dose steroid as pre treatment for her Q 3month CT scans  - Would benefit from weight management referral , patient interested in lifestyle modification and medical management  - Ordered diabetes education referral for prediabetes and obesity  - Advised her to check BG 1-2 times daily at different times using a glucometer  - Encouraged her to eat more protein and vegetables and cut down sodas and starches in her diet  - Encouraged her to walk if able to ( limited by BUSH) or stand instead of sitting  - Ordered MN salivary cortisol X 2 and dexamethasone suppression test to rule out excessive endogenous steroid production, went over the instructions with her  - Since she just got her CT scan last week, advised her to get all the tests done 1-2 weeks prior to her next CT scan when she will need to take steroids again    # Hypothyroidism  - Revised correct pill technique with her  - Increased levothyroxine to 200 mcg daily from 175 mcg daily  - Will repeat TFT in 6 weeks with other labs    Return to clinic in 6 months    The patient was seen, examined and discussed with Dr. Javier Smart MD.  Endocrinology fellow  Pager [phone]     I have seen and examined the patient, reviewed and edited the fellow's note, and agree with the  plan of care.  SAGRARIO Ortiz

## 2021-11-21 DIAGNOSIS — Z17.0 MALIGNANT NEOPLASM OF UPPER-OUTER QUADRANT OF LEFT BREAST IN FEMALE, ESTROGEN RECEPTOR POSITIVE (H): Primary | ICD-10-CM

## 2021-11-21 DIAGNOSIS — C50.412 MALIGNANT NEOPLASM OF UPPER-OUTER QUADRANT OF LEFT BREAST IN FEMALE, ESTROGEN RECEPTOR POSITIVE (H): Primary | ICD-10-CM

## 2021-11-24 DIAGNOSIS — Z17.0 MALIGNANT NEOPLASM OF UPPER-OUTER QUADRANT OF LEFT BREAST IN FEMALE, ESTROGEN RECEPTOR POSITIVE (H): Primary | ICD-10-CM

## 2021-11-24 DIAGNOSIS — C50.412 MALIGNANT NEOPLASM OF UPPER-OUTER QUADRANT OF LEFT BREAST IN FEMALE, ESTROGEN RECEPTOR POSITIVE (H): Primary | ICD-10-CM

## 2021-11-24 RX ORDER — LETROZOLE 2.5 MG/1
2.5 TABLET, FILM COATED ORAL DAILY
Qty: 28 TABLET | Refills: 2 | Status: SHIPPED | OUTPATIENT
Start: 2021-11-24 | End: 2022-06-07

## 2021-11-30 ENCOUNTER — LAB (OUTPATIENT)
Dept: LAB | Facility: CLINIC | Age: 51
End: 2021-11-30
Payer: COMMERCIAL

## 2021-11-30 DIAGNOSIS — Z17.0 MALIGNANT NEOPLASM OF UPPER-OUTER QUADRANT OF LEFT BREAST IN FEMALE, ESTROGEN RECEPTOR POSITIVE (H): ICD-10-CM

## 2021-11-30 DIAGNOSIS — R73.9 HYPERGLYCEMIA: ICD-10-CM

## 2021-11-30 DIAGNOSIS — C50.412 MALIGNANT NEOPLASM OF UPPER-OUTER QUADRANT OF LEFT BREAST IN FEMALE, ESTROGEN RECEPTOR POSITIVE (H): ICD-10-CM

## 2021-11-30 LAB
ALBUMIN SERPL-MCNC: 4 G/DL (ref 3.4–5)
ALP SERPL-CCNC: 69 U/L (ref 40–150)
ALT SERPL W P-5'-P-CCNC: 39 U/L (ref 0–50)
ANION GAP SERPL CALCULATED.3IONS-SCNC: 7 MMOL/L (ref 3–14)
AST SERPL W P-5'-P-CCNC: 33 U/L (ref 0–45)
BASOPHILS # BLD MANUAL: 0 10E3/UL (ref 0–0.2)
BASOPHILS NFR BLD MANUAL: 0 %
BILIRUB SERPL-MCNC: 0.6 MG/DL (ref 0.2–1.3)
BUN SERPL-MCNC: 14 MG/DL (ref 7–30)
CALCIUM SERPL-MCNC: 10.1 MG/DL (ref 8.5–10.1)
CANCER AG27-29 SERPL-ACNC: 20 U/ML (ref 0–39)
CEA SERPL-MCNC: 0.6 UG/L (ref 0–2.5)
CHLORIDE BLD-SCNC: 106 MMOL/L (ref 94–109)
CO2 SERPL-SCNC: 25 MMOL/L (ref 20–32)
CREAT SERPL-MCNC: 1.1 MG/DL (ref 0.52–1.04)
EOSINOPHIL # BLD MANUAL: 0.2 10E3/UL (ref 0–0.7)
EOSINOPHIL NFR BLD MANUAL: 8 %
ERYTHROCYTE [DISTWIDTH] IN BLOOD BY AUTOMATED COUNT: 13.4 % (ref 10–15)
GFR SERPL CREATININE-BSD FRML MDRD: 58 ML/MIN/1.73M2
GLUCOSE BLD-MCNC: 146 MG/DL (ref 70–99)
HBA1C MFR BLD: 6.2 % (ref 0–5.6)
HCT VFR BLD AUTO: 38.6 % (ref 35–47)
HGB BLD-MCNC: 13.3 G/DL (ref 11.7–15.7)
LYMPHOCYTES # BLD MANUAL: 1.1 10E3/UL (ref 0.8–5.3)
LYMPHOCYTES NFR BLD MANUAL: 51 %
MCH RBC QN AUTO: 33.3 PG (ref 26.5–33)
MCHC RBC AUTO-ENTMCNC: 34.5 G/DL (ref 31.5–36.5)
MCV RBC AUTO: 97 FL (ref 78–100)
MONOCYTES # BLD MANUAL: 0 10E3/UL (ref 0–1.3)
MONOCYTES NFR BLD MANUAL: 2 %
NEUTROPHILS # BLD MANUAL: 0.9 10E3/UL (ref 1.6–8.3)
NEUTROPHILS NFR BLD MANUAL: 39 %
PLAT MORPH BLD: ABNORMAL
PLATELET # BLD AUTO: 135 10E3/UL (ref 150–450)
POTASSIUM BLD-SCNC: 4.1 MMOL/L (ref 3.4–5.3)
PROT SERPL-MCNC: 7.8 G/DL (ref 6.8–8.8)
RBC # BLD AUTO: 4 10E6/UL (ref 3.8–5.2)
RBC MORPH BLD: ABNORMAL
SODIUM SERPL-SCNC: 138 MMOL/L (ref 133–144)
WBC # BLD AUTO: 2.2 10E3/UL (ref 4–11)

## 2021-11-30 PROCEDURE — 82378 CARCINOEMBRYONIC ANTIGEN: CPT

## 2021-11-30 PROCEDURE — 36415 COLL VENOUS BLD VENIPUNCTURE: CPT

## 2021-11-30 PROCEDURE — 86300 IMMUNOASSAY TUMOR CA 15-3: CPT

## 2021-11-30 PROCEDURE — 80053 COMPREHEN METABOLIC PANEL: CPT

## 2021-11-30 PROCEDURE — 83036 HEMOGLOBIN GLYCOSYLATED A1C: CPT

## 2021-11-30 PROCEDURE — 85027 COMPLETE CBC AUTOMATED: CPT

## 2021-12-07 ENCOUNTER — TELEPHONE (OUTPATIENT)
Dept: ONCOLOGY | Facility: CLINIC | Age: 51
End: 2021-12-07
Payer: COMMERCIAL

## 2021-12-07 NOTE — ORAL ONC MGMT
Oral Chemotherapy Monitoring Program    Jade is holding next Ibrance cycle due to ANC 0.9 on 11/30 and will repeat labs on Thursday 12/9.     Follow up:   12/9: review labs    Jose Swain, PharmD  Hematology/Oncology Clinical Pharmacist  [facility]  [facility]

## 2021-12-09 ENCOUNTER — LAB (OUTPATIENT)
Dept: LAB | Facility: CLINIC | Age: 51
End: 2021-12-09
Payer: COMMERCIAL

## 2021-12-09 DIAGNOSIS — Z17.0 MALIGNANT NEOPLASM OF UPPER-OUTER QUADRANT OF LEFT BREAST IN FEMALE, ESTROGEN RECEPTOR POSITIVE (H): ICD-10-CM

## 2021-12-09 DIAGNOSIS — C50.412 MALIGNANT NEOPLASM OF UPPER-OUTER QUADRANT OF LEFT BREAST IN FEMALE, ESTROGEN RECEPTOR POSITIVE (H): ICD-10-CM

## 2021-12-09 LAB
ALBUMIN SERPL-MCNC: 4.2 G/DL (ref 3.4–5)
ALP SERPL-CCNC: 79 U/L (ref 40–150)
ALT SERPL W P-5'-P-CCNC: 51 U/L (ref 0–50)
ANION GAP SERPL CALCULATED.3IONS-SCNC: 6 MMOL/L (ref 3–14)
AST SERPL W P-5'-P-CCNC: 47 U/L (ref 0–45)
BASOPHILS # BLD AUTO: 0.1 10E3/UL (ref 0–0.2)
BASOPHILS NFR BLD AUTO: 2 %
BILIRUB SERPL-MCNC: 0.4 MG/DL (ref 0.2–1.3)
BUN SERPL-MCNC: 13 MG/DL (ref 7–30)
CALCIUM SERPL-MCNC: 10 MG/DL (ref 8.5–10.1)
CANCER AG27-29 SERPL-ACNC: 20 U/ML (ref 0–39)
CEA SERPL-MCNC: 0.8 UG/L (ref 0–2.5)
CHLORIDE BLD-SCNC: 104 MMOL/L (ref 94–109)
CO2 SERPL-SCNC: 28 MMOL/L (ref 20–32)
CREAT SERPL-MCNC: 1.06 MG/DL (ref 0.52–1.04)
EOSINOPHIL # BLD AUTO: 0.1 10E3/UL (ref 0–0.7)
EOSINOPHIL NFR BLD AUTO: 4 %
ERYTHROCYTE [DISTWIDTH] IN BLOOD BY AUTOMATED COUNT: 13.3 % (ref 10–15)
GFR SERPL CREATININE-BSD FRML MDRD: 61 ML/MIN/1.73M2
GLUCOSE BLD-MCNC: 135 MG/DL (ref 70–99)
HCT VFR BLD AUTO: 40.5 % (ref 35–47)
HGB BLD-MCNC: 14 G/DL (ref 11.7–15.7)
IMM GRANULOCYTES # BLD: 0 10E3/UL
IMM GRANULOCYTES NFR BLD: 0 %
LYMPHOCYTES # BLD AUTO: 1.4 10E3/UL (ref 0.8–5.3)
LYMPHOCYTES NFR BLD AUTO: 40 %
MCH RBC QN AUTO: 33.3 PG (ref 26.5–33)
MCHC RBC AUTO-ENTMCNC: 34.6 G/DL (ref 31.5–36.5)
MCV RBC AUTO: 96 FL (ref 78–100)
MONOCYTES # BLD AUTO: 0.4 10E3/UL (ref 0–1.3)
MONOCYTES NFR BLD AUTO: 10 %
NEUTROPHILS # BLD AUTO: 1.5 10E3/UL (ref 1.6–8.3)
NEUTROPHILS NFR BLD AUTO: 44 %
NRBC # BLD AUTO: 0 10E3/UL
NRBC BLD AUTO-RTO: 0 /100
PLATELET # BLD AUTO: 189 10E3/UL (ref 150–450)
POTASSIUM BLD-SCNC: 4.2 MMOL/L (ref 3.4–5.3)
PROT SERPL-MCNC: 8.2 G/DL (ref 6.8–8.8)
RBC # BLD AUTO: 4.2 10E6/UL (ref 3.8–5.2)
SODIUM SERPL-SCNC: 138 MMOL/L (ref 133–144)
WBC # BLD AUTO: 3.4 10E3/UL (ref 4–11)

## 2021-12-09 PROCEDURE — 80053 COMPREHEN METABOLIC PANEL: CPT

## 2021-12-09 PROCEDURE — 86300 IMMUNOASSAY TUMOR CA 15-3: CPT

## 2021-12-09 PROCEDURE — 82378 CARCINOEMBRYONIC ANTIGEN: CPT

## 2021-12-09 PROCEDURE — 85025 COMPLETE CBC W/AUTO DIFF WBC: CPT

## 2021-12-09 PROCEDURE — 36415 COLL VENOUS BLD VENIPUNCTURE: CPT

## 2021-12-10 ENCOUNTER — TELEPHONE (OUTPATIENT)
Dept: ONCOLOGY | Facility: CLINIC | Age: 51
End: 2021-12-10
Payer: COMMERCIAL

## 2021-12-10 NOTE — ORAL ONC MGMT
Oral Chemotherapy Monitoring Program  Lab Follow Up  Reviewed lab results from 12/9/21.    Labs:  Result Component Current Result Ref Range   Sodium 138 (12/9/2021) 133 - 144 mmol/L   _  Result Component Current Result Ref Range   Potassium 4.2 (12/9/2021) 3.4 - 5.3 mmol/L   _  Result Component Current Result Ref Range   Calcium 10.0 (12/9/2021) 8.5 - 10.1 mg/dL   _  Result Component Current Result Ref Range   Albumin 4.2 (12/9/2021) 3.4 - 5.0 g/dL   _  Result Component Current Result Ref Range   Urea Nitrogen 13 (12/9/2021) 7 - 30 mg/dL   _  Result Component Current Result Ref Range   Creatinine 1.06 (H) (12/9/2021) 0.52 - 1.04 mg/dL   _  Result Component Current Result Ref Range   AST 47 (H) (12/9/2021) 0 - 45 U/L   _  Result Component Current Result Ref Range   ALT 51 (H) (12/9/2021) 0 - 50 U/L   _  Result Component Current Result Ref Range   Bilirubin Total 0.4 (12/9/2021) 0.2 - 1.3 mg/dL   _  Result Component Current Result Ref Range   WBC Count 3.4 (L) (12/9/2021) 4.0 - 11.0 10e3/uL   _  Result Component Current Result Ref Range   Hemoglobin 14.0 (12/9/2021) 11.7 - 15.7 g/dL   _  Result Component Current Result Ref Range   Platelet Count 189 (12/9/2021) 150 - 450 10e3/uL   _  Result Component Current Result Ref Range   Absolute Neutrophils 1.5  (12/09/2021) 1.6 - 8.3 10e3/uL     Assessment & Plan:  Jade's last Ibrance cycle started 11/5. She delayed next Ibrance 100mg cycle for 1 week due to ANC below 1.     ANC 11/30=0.9   ANC 12/9=1.5     Per harrison Goins 12/10: Okay to restart since ANC is over 1.    Placed call to patient and spoke to Jade. We reviewed labs. ANC was 1.5 today. She will start her Ibrance cycle today 12/10. No questions or concerns today.     Follow-Up:  12/30: appointment and labs     Jose Swain, PharmD  Hematology/Oncology Clinical Pharmacist  [facility]  [facility]  [phone]

## 2021-12-21 ENCOUNTER — PATIENT OUTREACH (OUTPATIENT)
Dept: ONCOLOGY | Facility: CLINIC | Age: 51
End: 2021-12-21
Payer: COMMERCIAL

## 2021-12-21 NOTE — PROGRESS NOTES
Called patient to assist with a call back for scheduling as Dr Bhatt has recommended and left a VM to return call to schedule future appointments. Farheen Price RN, BSN Breast Center Nurse Coordinator

## 2021-12-28 ENCOUNTER — VIRTUAL VISIT (OUTPATIENT)
Dept: EDUCATION SERVICES | Facility: CLINIC | Age: 51
End: 2021-12-28
Payer: COMMERCIAL

## 2021-12-28 DIAGNOSIS — E66.813 CLASS 3 SEVERE OBESITY DUE TO EXCESS CALORIES WITH SERIOUS COMORBIDITY AND BODY MASS INDEX (BMI) OF 45.0 TO 49.9 IN ADULT (H): ICD-10-CM

## 2021-12-28 DIAGNOSIS — E66.01 CLASS 3 SEVERE OBESITY DUE TO EXCESS CALORIES WITH SERIOUS COMORBIDITY AND BODY MASS INDEX (BMI) OF 45.0 TO 49.9 IN ADULT (H): ICD-10-CM

## 2021-12-28 DIAGNOSIS — R73.03 PREDIABETES: ICD-10-CM

## 2021-12-28 PROCEDURE — 99207 PR NO BILLABLE SERVICE THIS VISIT: CPT | Mod: GT | Performed by: NUTRITIONIST

## 2021-12-28 NOTE — PROGRESS NOTES
"Jade is a 51 year old who is being evaluated via a billable video visit.      How would you like to obtain your AVS? MyChart  If the video visit is dropped, the invitation should be resent by: Send to e-mail at: [email or web address]  Will anyone else be joining your video visit? No      Video Start Time: 3:20pm    Video-Visit Details    Type of service:  Video Visit    Video End Time:4:20pm    Originating Location (pt. Location): Home    D[facility] ([facility] [place]     Platform used for Video Visit: Jewel Toned    Diabetes Self-Management Education & Support    Presents for:  Prediabetes  Referred by Dr. Smart    SUBJECTIVE/OBJECTIVE:     Cultural Influences/Ethnic Background:  Not  or   Concern: addicted to sugar    Diabetes Symptoms & Complications:          Patient Problem List and Family Medical History reviewed for relevant medical history, current medical status, and diabetes risk factors.    Vitals:  There were no vitals taken for this visit.  Estimated body mass index is 48.14 kg/m  as calculated from the following:    Height as of 11/18/21: 1.753 m (5' 9\").    Weight as of 11/18/21: 147.9 kg (326 lb).   Last 3 BP:   BP Readings from Last 3 Encounters:   11/18/21 138/88   08/24/21 126/86   01/28/21 139/68       History   Smoking Status     Never Smoker   Smokeless Tobacco     Never Used       Labs:  Lab Results   Component Value Date    A1C 6.2 11/30/2021    A1C 5.8 06/09/2021     Lab Results   Component Value Date     12/09/2021    GLC 96 06/09/2021     No results found for: LDL  No results found for: HDL]  GFR Estimate   Date Value Ref Range Status   12/09/2021 61 >60 mL/min/1.73m2 Final     Comment:     As of July 11, 2021, eGFR is calculated by the CKD-EPI creatinine equation, without race adjustment. eGFR can be influenced by muscle mass, exercise, and diet. The reported eGFR is an estimation only and is only " applicable if the renal function is stable.   2021 65 >60 mL/min/[1.73_m2] Final     Comment:     Non  GFR Calc  Starting 2018, serum creatinine based estimated GFR (eGFR) will be   calculated using the Chronic Kidney Disease Epidemiology Collaboration   (CKD-EPI) equation.       GFR Estimate If Black   Date Value Ref Range Status   2021 76 >60 mL/min/[1.73_m2] Final     Comment:      GFR Calc  Starting 2018, serum creatinine based estimated GFR (eGFR) will be   calculated using the Chronic Kidney Disease Epidemiology Collaboration   (CKD-EPI) equation.       Lab Results   Component Value Date    CR 1.06 2021    CR 1.00 2021     No results found for: MICROALBUMIN    Healthy Eatin lb weight gain over last seven or so years  Current weight 325 lbs  Does not like to cook, so does some frozen meals like lean cuisine, smart one, banquette, birds eye veg  Banana, apple  Rotisserie chicken  Bagged salads  Breakfast is sausage eggg cheese breakfast sandwich XIMENA  Cereal like honey bunches oats  Yogurt and banana  Lunch is 1-2 lean cuisines  Snacks nature valley bar, little velasquez nutty bar, fudge cookies, yogurt pretzels, ice cream, candy bar  Drinks soda, crystal light, water, sparkling ice  Estimated needs: 1900 for 2 lb weight loss per week    Being Active:  Some walking  Limitation: asthma    Monitoring:  Is not currently checking     Healthy Coping:     Patient Activation Measure Survey Score:  No flowsheet data found.    Diabetes knowledge and skills assessment:     Based on learning assessment above, most appropriate setting for further diabetes education would be: Individual setting.      INTERVENTIONS:    Education provided today on:  AADE Self-Care Behaviors:  Diabetes Pathophysiology  Healthy Eating: weight reduction, portion control, plate planning method, label reading and Macronutrients in food and function. Emphasizing nutrient dense  sources of carbohydrate and minimizing carb containing foods that are refined and high in added sugar. Consistency in timing of meals and portions sizes. Label Reading. Lean versus high fat sources of protein. Different types of fats, emphasizing monounsaturated fats and omega three fatty acids and limiting saturated fat. Avoidance of trans fat. Plate method for a simple method to balancing meals.   Being Active: relationship to blood glucose and describe appropriate activity program  Healthy Coping: recognize feelings about diagnosis and benefits of making appropriate lifestyle changes  Diabetes Prevention    Opportunities for ongoing education and support in diabetes-self management were discussed.    Pt verbalized understanding of concepts discussed and recommendations provided today.       Education Materials Provided:  Carbohydrate Counting   Snack Ideas  Meal Ideas  Mindful plate  Diabetes Prevention      ASSESSMENT:  Patient's most recent   Lab Results   Component Value Date    A1C 6.2 11/30/2021    A1C 5.8 06/09/2021       PLAN  See Patient Instructions for co-developed, patient-stated behavior change goals.    Time Spent: 60 minutes  Encounter Type: Individual    Any diabetes medication dose changes were made via the CDE Protocol and Collaborative Practice Agreement with the patient's referring provider. A copy of this encounter was shared with the provider.

## 2021-12-29 NOTE — PATIENT INSTRUCTIONS
Eri Muse meeting you yesterday!    We talked about:  Estimated calories for weight loss 1900/day.  Carbohydrate <60 grams per meal and <30 grams per snack.    I am sending some education materials to you via mail and email.    Some healthier cereals:   Cheerios, Wheaties, Kix, Brenda's Puffins or shredded wheat, [employer] Purley O's    Yogurt:  Chobani less sugar, oikos triple zero, Siggi's, Too Good, any plain yogurt    Your goals:   1) start walking the halls in your building  2) choose healthier snacks. Eliminate some of the sugar. And eliminate some of the soda.  3) start reading labels more often    Thank you! Have a great couple weeks!  [place]ory

## 2022-01-03 ENCOUNTER — LAB (OUTPATIENT)
Dept: LAB | Facility: CLINIC | Age: 52
End: 2022-01-03
Payer: COMMERCIAL

## 2022-01-03 DIAGNOSIS — Z17.0 MALIGNANT NEOPLASM OF UPPER-OUTER QUADRANT OF LEFT BREAST IN FEMALE, ESTROGEN RECEPTOR POSITIVE (H): ICD-10-CM

## 2022-01-03 DIAGNOSIS — C50.412 MALIGNANT NEOPLASM OF UPPER-OUTER QUADRANT OF LEFT BREAST IN FEMALE, ESTROGEN RECEPTOR POSITIVE (H): ICD-10-CM

## 2022-01-03 LAB
ALBUMIN SERPL-MCNC: 3.8 G/DL (ref 3.4–5)
ALP SERPL-CCNC: 73 U/L (ref 40–150)
ALT SERPL W P-5'-P-CCNC: 44 U/L (ref 0–50)
ANION GAP SERPL CALCULATED.3IONS-SCNC: 5 MMOL/L (ref 3–14)
AST SERPL W P-5'-P-CCNC: 36 U/L (ref 0–45)
BASOPHILS # BLD MANUAL: 0 10E3/UL (ref 0–0.2)
BASOPHILS NFR BLD MANUAL: 0 %
BILIRUB SERPL-MCNC: 0.6 MG/DL (ref 0.2–1.3)
BUN SERPL-MCNC: 10 MG/DL (ref 7–30)
CALCIUM SERPL-MCNC: 9.8 MG/DL (ref 8.5–10.1)
CHLORIDE BLD-SCNC: 104 MMOL/L (ref 94–109)
CO2 SERPL-SCNC: 29 MMOL/L (ref 20–32)
CREAT SERPL-MCNC: 1.01 MG/DL (ref 0.52–1.04)
EOSINOPHIL # BLD MANUAL: 0.1 10E3/UL (ref 0–0.7)
EOSINOPHIL NFR BLD MANUAL: 4 %
ERYTHROCYTE [DISTWIDTH] IN BLOOD BY AUTOMATED COUNT: 13.4 % (ref 10–15)
GFR SERPL CREATININE-BSD FRML MDRD: 67 ML/MIN/1.73M2
GLUCOSE BLD-MCNC: 117 MG/DL (ref 70–99)
HCT VFR BLD AUTO: 38.4 % (ref 35–47)
HGB BLD-MCNC: 13.2 G/DL (ref 11.7–15.7)
HOLD SPECIMEN: NORMAL
LYMPHOCYTES # BLD MANUAL: 1.8 10E3/UL (ref 0.8–5.3)
LYMPHOCYTES NFR BLD MANUAL: 56 %
MCH RBC QN AUTO: 33.5 PG (ref 26.5–33)
MCHC RBC AUTO-ENTMCNC: 34.4 G/DL (ref 31.5–36.5)
MCV RBC AUTO: 98 FL (ref 78–100)
MONOCYTES # BLD MANUAL: 0.1 10E3/UL (ref 0–1.3)
MONOCYTES NFR BLD MANUAL: 4 %
NEUTROPHILS # BLD MANUAL: 1.2 10E3/UL (ref 1.6–8.3)
NEUTROPHILS NFR BLD MANUAL: 36 %
PLAT MORPH BLD: ABNORMAL
PLATELET # BLD AUTO: 148 10E3/UL (ref 150–450)
POTASSIUM BLD-SCNC: 4.3 MMOL/L (ref 3.4–5.3)
PROT SERPL-MCNC: 7.9 G/DL (ref 6.8–8.8)
RBC # BLD AUTO: 3.94 10E6/UL (ref 3.8–5.2)
RBC MORPH BLD: ABNORMAL
SODIUM SERPL-SCNC: 138 MMOL/L (ref 133–144)
WBC # BLD AUTO: 3.2 10E3/UL (ref 4–11)

## 2022-01-03 PROCEDURE — 80053 COMPREHEN METABOLIC PANEL: CPT

## 2022-01-03 PROCEDURE — 36415 COLL VENOUS BLD VENIPUNCTURE: CPT

## 2022-01-03 PROCEDURE — 85027 COMPLETE CBC AUTOMATED: CPT

## 2022-01-05 ENCOUNTER — VIRTUAL VISIT (OUTPATIENT)
Dept: ONCOLOGY | Facility: CLINIC | Age: 52
End: 2022-01-05
Attending: PHYSICIAN ASSISTANT
Payer: COMMERCIAL

## 2022-01-05 ENCOUNTER — TELEPHONE (OUTPATIENT)
Dept: ONCOLOGY | Facility: CLINIC | Age: 52
End: 2022-01-05

## 2022-01-05 ENCOUNTER — TELEPHONE (OUTPATIENT)
Dept: ONCOLOGY | Facility: CLINIC | Age: 52
End: 2022-01-05
Payer: COMMERCIAL

## 2022-01-05 DIAGNOSIS — C50.919 METASTATIC BREAST CANCER: Primary | ICD-10-CM

## 2022-01-05 DIAGNOSIS — M25.551 HIP PAIN, RIGHT: ICD-10-CM

## 2022-01-05 DIAGNOSIS — Z17.0 MALIGNANT NEOPLASM OF UPPER-OUTER QUADRANT OF LEFT BREAST IN FEMALE, ESTROGEN RECEPTOR POSITIVE (H): ICD-10-CM

## 2022-01-05 DIAGNOSIS — C50.412 MALIGNANT NEOPLASM OF UPPER-OUTER QUADRANT OF LEFT BREAST IN FEMALE, ESTROGEN RECEPTOR POSITIVE (H): ICD-10-CM

## 2022-01-05 DIAGNOSIS — R73.03 PREDIABETES: ICD-10-CM

## 2022-01-05 PROCEDURE — 99215 OFFICE O/P EST HI 40 MIN: CPT | Mod: GT | Performed by: PHYSICIAN ASSISTANT

## 2022-01-05 PROCEDURE — 999N001193 HC VIDEO/TELEPHONE VISIT; NO CHARGE

## 2022-01-05 NOTE — TELEPHONE ENCOUNTER
Fahad/ Assistance Approved    Medication - Ibrance  Amount/ $ 4000  [facility]  Patient notified for consent          Tarsha Fernandez CPhT  [facility]  Oncology Pharmacy Liaison  [email or web address]  Phone: [phone]  Fax: [phone]

## 2022-01-05 NOTE — TELEPHONE ENCOUNTER
Oral Chemotherapy Monitoring Program    Subjective/Objective:  Jade Collins is a 51 year old female contacted by phone for a follow-up visit for oral chemotherapy.  Pt confirms taking the appropriate dose of Ibrance, 100mg daily for 21 days on, 7 days off and letrozole 2.5mg daily. Denies new or worsening side effects, missed doses, and recent hospital or ED visits. Patient has not had any recent medication changes.  Pt states she feels really good and has no complaints. Next cycle starts 1/7/21    ORAL CHEMOTHERAPY 11/3/2021 11/18/2021 11/24/2021 11/30/2021 12/7/2021 12/10/2021 1/5/2022   Assessment Type Chart Review;Lab Monitoring Chart Review Refill Lab Monitoring Lab Monitoring Lab Monitoring Quarterly Follow up   Diagnosis Code Breast Cancer Breast Cancer Breast Cancer Breast Cancer Breast Cancer Breast Cancer Breast Cancer   Providers Dr. Nova Bhatt   [facility]   Drug Name Ibrance (palbociclib) Ibrance (palbociclib) Ibrance (palbociclib) Ibrance (palbociclib) Ibrance (palbociclib) Ibrance (palbociclib) Ibrance (palbociclib)   Dose 100 mg 100 mg 100 mg 100 mg 100 mg 100 mg 100 mg   Current Schedule Daily Daily Daily Daily Daily Daily Daily   Cycle Details 3 weeks on, 1 week off 3 weeks on, 1 week off 3 weeks on, 1 week off 3 weeks on, 1 week off 3 weeks on, 1 week off 3 weeks on, 1 week off 3 weeks on, 1 week off   Start Date of Last Cycle 10/8/2021 - - - - 11/5/2021 -   Planned next cycle start date 11/5/2021 - - - - 12/10/2021 1/7/2021   Doses missed in last 2 weeks - - - - - 0 0   Adherence Assessment - - - - - Adherent Adherent   Adverse Effects - - - Neutropenia - Neutropenia No AE identified during assessment   Nausea - - - - - - -   Pharmacist Intervention(nausea) - - - - - - -   Diarrhea - - - - - - -   Pharmacist Intervention(diarrhea) - - - - - - -   Oral Mucositis - - - - - -  "-   Pharmacist Intervention(oral mucositis) - - - - - - -   Fatigue - - - - - - -   Pharmacist Intervention(fatigue) - - - - - - -   Neutropenia - - - Grade 3 - Grade 1 -   Pharmacist Intervention(neutropenia) - - - Yes - No -   Intervention(s) - - - Recommend drug hold - - -   Home BPs - - - - - - -   Any new drug interactions? - - - - - No No   Pharmacist Intervention? - - - - - - -   Intervention(s) - - - - - - -   Is the dose as ordered appropriate for the patient? - - - - - Yes Yes   Is the patient currently in pain? - - - - - - -   Does the patient feel the pain is currently being managed by a provider? - - - - - - -   Has the patient been assessed within the past 6 months for depression? - - - - - - -   Has the patient missed any days of school, work, or other routine activity? - - - - - - -   Since the last time we talked, have you been hospitalized or used the emergency room? - - - - - No No       Last PHQ-2 Score on record:   PHQ-2 ( 1999 [employer]) 9/18/2018   Q1: Little interest or pleasure in doing things 1   Q2: Feeling down, depressed or hopeless 1   PHQ-2 Score 2       Vitals:  BP:   BP Readings from Last 1 Encounters:   11/18/21 138/88     Wt Readings from Last 1 Encounters:   11/18/21 147.9 kg (326 lb)     Estimated body surface area is 2.68 meters squared as calculated from the following:    Height as of 11/18/21: 1.753 m (5' 9\").    Weight as of 11/18/21: 147.9 kg (326 lb).    Labs:  _  Result Component Current Result Ref Range   Sodium 138 (1/3/2022) 133 - 144 mmol/L     _  Result Component Current Result Ref Range   Potassium 4.3 (1/3/2022) 3.4 - 5.3 mmol/L     _  Result Component Current Result Ref Range   Calcium 9.8 (1/3/2022) 8.5 - 10.1 mg/dL     No results found for Mag within last 30 days.     No results found for Phos within last 30 days.     _  Result Component Current Result Ref Range   Albumin 3.8 (1/3/2022) 3.4 - 5.0 g/dL     _  Result Component Current Result Ref Range   Urea Nitrogen " 10 (1/3/2022) 7 - 30 mg/dL     _  Result Component Current Result Ref Range   Creatinine 1.01 (1/3/2022) 0.52 - 1.04 mg/dL     _  Result Component Current Result Ref Range   AST 36 (1/3/2022) 0 - 45 U/L     _  Result Component Current Result Ref Range   ALT 44 (1/3/2022) 0 - 50 U/L     _  Result Component Current Result Ref Range   Bilirubin Total 0.6 (1/3/2022) 0.2 - 1.3 mg/dL     _  Result Component Current Result Ref Range   WBC Count 3.2 (L) (1/3/2022) 4.0 - 11.0 10e3/uL     _  Result Component Current Result Ref Range   Hemoglobin 13.2 (1/3/2022) 11.7 - 15.7 g/dL     _  Result Component Current Result Ref Range   Platelet Count 148 (L) (1/3/2022) 150 - 450 10e3/uL     _  Result Component Current Result Ref Range   Absolute Neutrophils 1.2 (L) (1/3/2022) 1.6 - 8.3 10e3/uL     Assessment/Plan:  Pt continues to tolerate Ibrance and letrozole well. Continue current therapy as planned. Next cycle starts 1/7.    Follow-Up:  01/25: monthly labs    Refill Due:  [facility] to deliver Ibrance and letrozole 1/6 for 1/7 cycle start    Grace Myhre, PharmD  Hematology/Oncology Pharmacist  [facility]  [facility]  [phone]

## 2022-01-05 NOTE — LETTER
1/5/2022         RE: Jade Collins  [address]  [facility] [address]        Dear Colleague,    Thank you for referring your patient, Jade Collins, to the [facility]. Please see a copy of my visit note below.    Jade is a 51 year old who is being evaluated via a billable video visit.      How would you like to obtain your AVS? MyChart  If the video visit is dropped, the invitation should be resent by: Text to cell phone: [phone]   Will anyone else be joining your video visit? No      Video Start Time: 2:33 PM  Video-Visit Details    Type of service:  Video Visit    Video End Time:3:08 PM    Originating Location (pt. Location): Home    Distant Location (provider location):  [facility]     Platform used for Video Visit: Sandeep Bautista      HISTORY OF PRESENT ILLNESS:  Jade is a 51-year-old woman with metastatic breast cancer        Jade was diagnosed with breast cancer with a lump found in the upper-inner quadrant of the left breast.  She was diagnosed in 12/2013 in the [place] area and Dr. David Redd was her medical oncologist.  Biopsy of the tumor showed it to be ER positive, SD positive, and HER-2 negative.  We do not have any of the original pathology and we need to obtain those reports.  She underwent a lumpectomy performed by Dr. Tasha Segura who is a surgeon in the [place] area.  She also had a sentinel lymph node which was noted to be involved with tumor but there was no lymph node dissection done at that time.  [id number].  She subsequently underwent adjuvant chemotherapy with dose-dense AC for 4 cycles and Taxol for 12 weeks, completed 09/11/2014.        She then had radiation therapy post lumpectomy to the left breast, which was completed 11/23/2014 by Dr. Manley.        She was then begun on an aromatase inhibitor, the name of which she does not remember.  The aromatase inhibitor therapy was begun in 11/2014.  She was  then switched within about a month or so to anastrozole and remained on anastrozole from 11/2014-02/2017.  She has never received tamoxifen.        She then moved to [place], [place] in 03/2017.  She saw a gynecologist there and underwent a IRIS/BSO by Dr. Zendejas.  She then also saw Dr. Linares in [place], [place], who is an oncologist.  His interpretation of the pathology report was that she had triple-negative breast cancer.  It is possible that she may have had low estrogen receptor positivity, but the anastrozole was then discontinued in 02/2017.  She then underwent the IRIS/BSO in 03/2017.  This was done laparoscopically.        She then remained off of all hormonal therapy and in 09/2017 moved to [place].  She remained off hormonal therapy and did not have Medical Oncology followup initially.        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  She then went to see Dr. Norberto Brand who performed a PET/CT scan and the patient also underwent a brain MRI for staging.  The PET/CT scan performed 08/03/2018 showed in the neck there were several mildly metabolic active and large prominent left posterior cervical lymph nodes.  The largest is located posterior to the left sternocleidomastoid muscle and measures 1.5 x 1 cm in size.  This demonstrate modest FDG uptake.  The other lymph nodes are smaller and demonstrate mild FDG uptake.  The prominent prevascular and aortopulmonary lymph nodes were also seen on the contrast-enhanced scan were less well seen on the PET.  The largest lymph node visualized on the PET scan was 1.1 x 0.8 cm and demonstrated mild FDG uptake.  This was in the periaortic area just anterior to the aortic arch.  No lung nodules.  No lung infiltrates.  No pleural effusion.  There was no uptake of FDG in the bones.  In summary, there were mildly enlarged left posterior cervical lymph nodes, largest measuring 1.5 x 1.0 cm and mildly enlarged aorticopulmonary and prevascular lymph nodes  that were noted on contrast-enhanced CT scan seen less well on the PET scan.  No evidence of metastatic disease in the abdomen and pelvis.  She also underwent a brain MRI that showed no evidence of intracranial metastatic disease.  She underwent a biopsy of one of the lymph nodes in the left neck which showed metastatic adenocarcinoma consistent with breast origin, which was estrogen-receptor positive.  The tumor cells were positive for CK7, ER and FL consistent with metastatic breast carcinoma.  Estrogen receptor showed strong average nuclear intensity in 95% of carcinoma cells, progesterone receptor moderate average nuclear intensity in 70% of the carcinoma.  A GATA3 stain was apparently not performed.       TREATMENT HISTORY:  A. Tamoxifen  B. Letrozole and palbociclib started 9-18-18.  C. Pfizer COVID vaccination end of March, 2021 and 4-17.     INTERVAL HISTORY: Jade is feeling okay. She has seen endocrinology and diabetes educator in the past few months and is more motivated to work on exercise and eat healthy. She has still been walking around her apartment building with her cat.     Has not started checking BS yet but has follow-up with educator to have teaching on this.     She has had waxing and waning R posterior hip/SI joint pain for a while. It will feel sharp with certain positions and then will go away. She is not sure if she pulled a muscle. No flank pain, urinary concerns, or abdominal pain. No other new or different pain.     No fevers/chills. Mild fatigue which is stable. BUSH is stable. She uses inhaler 3-5x per week. No new URI symptoms.     Rashes cleared up.      OBJECTIVE DATA:  Video physical exam  General: Patient appears well in no acute distress.   Skin: No visualized rash or lesions on visualized skin  Eyes: EOMI, no erythema, sclera icterus or discharge noted  Resp: Appears to be breathing comfortably without accessory muscle usage, speaking in full sentences, no cough  MSK: Appears to  have normal range of motion based on visualized movements  Neurologic: No apparent tremors, facial movements symmetric  Psych: affect bright, alert and oriented    The rest of a comprehensive physical examination is deferred due to PHE (public health emergency) video restrictions     Labs:    1/3/2022 14:04   Sodium 138   Potassium 4.3   Chloride 104   Carbon Dioxide 29   Urea Nitrogen 10   Creatinine 1.01   GFR Estimate 67   Calcium 9.8   Anion Gap 5   Albumin 3.8   Protein Total 7.9   Bilirubin Total 0.6   Alkaline Phosphatase 73   ALT 44   AST 36   Glucose 117 (H)   WBC 3.2 (L)   Hemoglobin 13.2   Hematocrit 38.4   Platelet Count 148 (L)   RBC Count 3.94   MCV 98   MCH 33.5 (H)   MCHC 34.4   RDW 13.4   % Neutrophils 36   % Lymphocytes 56   % Monocytes 4   % Eosinophils 4   % Basophils 0   Absolute Basophils 0.0   Absolute Neutrophil 1.2 (L)   Absolute Lymphocytes 1.8   Absolute Monocytes 0.1   Absolute Eosinophils 0.1   RBC Morphology Confirmed RBC Indices   Platelet Morphology Automated Count Confirmed. Platelet morphology is normal.          ASSESSMENT AND PLAN:  1. Recurrent, metastatic ER-positive, HER2 negative breast cancer: History of left breast cancer s/p lumpectomy and SNLB in 2014. S/p 4 cycles ddAC and 12 weeks of Taxol. On AI from 11/2014-2/2017. Recurrence on PET/CT scan performed 08/03/2018 and then biopsy to left posterior cervical lymph nodes, aorticopulmonary, and prevascular lymph nodes were positive. No evidence of metastatic disease in the abdomen and pelvis or brain. Started Ibrance and letrozole on 9/21/18. Tolerating well aside from some hot flashes and mild joint stiffness.  She continues on Ibrance and letrozole and is now at 3 years.   2. Restaging shows essentially stable disease. Imaging every 3 months with CT CAP and neck. Needs steroid premeds due to contrast allergies.   3. R posterior hip pain: Has L5-S1 anterolisthesis and DJD in back. May be inflammation of SI joint. Discussed  using NSAID with food for a few days then starting PT. If no improvement, see ortho.    4.  Prediabetes: Working with diabetic educator. Will start checking home BS. Is starting to feel more motivated about lifestyle changes.   5.  Depression and anxiety. She saw Dr. Pate her new PCP and her regimen was changed to add buspirone 7.5 mg twice daily to sertraline 200 mg once per added. Lorazepam prn anxiety was added.  She has improvement of her symptoms of depression and anxiety.   6.  Hypothyroid. Levothyroxine 200 mcg daily. This was adjusted at endocrinology appt.   7. Asthma: Seen by Dr. Hay. Takes advair, montelukast, and albuterol as needed,    40 minutes spent on the date of the encounter doing chart review, review of test results, interpretation of tests, patient visit and documentation      Cecilia Goins PA-C              Again, thank you for allowing me to participate in the care of your patient.        Sincerely,        Cecilia Goins PA-C

## 2022-01-05 NOTE — PROGRESS NOTES
Jade is a 51 year old who is being evaluated via a billable video visit.      How would you like to obtain your AVS? [employer]  If the video visit is dropped, the invitation should be resent by: Text to cell phone: [phone]   Will anyone else be joining your video visit? No      Video Start Time: 2:33 PM  Video-Visit Details    Type of service:  Video Visit    Video End Time:3:08 PM    Originating Location (pt. Location): Home    Distant Location (provider location):  [facility]     Platform used for Video Visit: Sandeep Bautista      HISTORY OF PRESENT ILLNESS:  Jade is a 51-year-old woman with metastatic breast cancer        Jade was diagnosed with breast cancer with a lump found in the upper-inner quadrant of the left breast.  She was diagnosed in 12/2013 in the [place] area and Dr. David Redd was her medical oncologist.  Biopsy of the tumor showed it to be ER positive, IL positive, and HER-2 negative.  We do not have any of the original pathology and we need to obtain those reports.  She underwent a lumpectomy performed by Dr. Tasha Segura who is a surgeon in the [place] area.  She also had a sentinel lymph node which was noted to be involved with tumor but there was no lymph node dissection done at that time.  [id number].  She subsequently underwent adjuvant chemotherapy with dose-dense AC for 4 cycles and Taxol for 12 weeks, completed 09/11/2014.        She then had radiation therapy post lumpectomy to the left breast, which was completed 11/23/2014 by Dr. Manley.        She was then begun on an aromatase inhibitor, the name of which she does not remember.  The aromatase inhibitor therapy was begun in 11/2014.  She was then switched within about a month or so to anastrozole and remained on anastrozole from 11/2014-02/2017.  She has never received tamoxifen.        She then moved to [place], [place] in 03/2017.  She saw a gynecologist there and underwent a IRIS/BSO by   Aashish.  She then also saw Dr. Linares in [place], [place], who is an oncologist.  His interpretation of the pathology report was that she had triple-negative breast cancer.  It is possible that she may have had low estrogen receptor positivity, but the anastrozole was then discontinued in 02/2017.  She then underwent the IRIS/BSO in 03/2017.  This was done laparoscopically.        She then remained off of all hormonal therapy and in 09/2017 moved to [place].  She remained off hormonal therapy and did not have Medical Oncology followup initially.        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  She then went to see Dr. Norberto Brand who performed a PET/CT scan and the patient also underwent a brain MRI for staging.  The PET/CT scan performed 08/03/2018 showed in the neck there were several mildly metabolic active and large prominent left posterior cervical lymph nodes.  The largest is located posterior to the left sternocleidomastoid muscle and measures 1.5 x 1 cm in size.  This demonstrate modest FDG uptake.  The other lymph nodes are smaller and demonstrate mild FDG uptake.  The prominent prevascular and aortopulmonary lymph nodes were also seen on the contrast-enhanced scan were less well seen on the PET.  The largest lymph node visualized on the PET scan was 1.1 x 0.8 cm and demonstrated mild FDG uptake.  This was in the periaortic area just anterior to the aortic arch.  No lung nodules.  No lung infiltrates.  No pleural effusion.  There was no uptake of FDG in the bones.  In summary, there were mildly enlarged left posterior cervical lymph nodes, largest measuring 1.5 x 1.0 cm and mildly enlarged aorticopulmonary and prevascular lymph nodes that were noted on contrast-enhanced CT scan seen less well on the PET scan.  No evidence of metastatic disease in the abdomen and pelvis.  She also underwent a brain MRI that showed no evidence of intracranial metastatic disease.  She underwent a biopsy  of one of the lymph nodes in the left neck which showed metastatic adenocarcinoma consistent with breast origin, which was estrogen-receptor positive.  The tumor cells were positive for CK7, ER and ME consistent with metastatic breast carcinoma.  Estrogen receptor showed strong average nuclear intensity in 95% of carcinoma cells, progesterone receptor moderate average nuclear intensity in 70% of the carcinoma.  A GATA3 stain was apparently not performed.       TREATMENT HISTORY:  A. Tamoxifen  B. Letrozole and palbociclib started 9-18-18.  C. Pfizer COVID vaccination end of March, 2021 and 4-17.     INTERVAL HISTORY: Jade is feeling okay. She has seen endocrinology and diabetes educator in the past few months and is more motivated to work on exercise and eat healthy. She has still been walking around her apartment building with her cat.     Has not started checking BS yet but has follow-up with educator to have teaching on this.     She has had waxing and waning R posterior hip/SI joint pain for a while. It will feel sharp with certain positions and then will go away. She is not sure if she pulled a muscle. No flank pain, urinary concerns, or abdominal pain. No other new or different pain.     No fevers/chills. Mild fatigue which is stable. BUSH is stable. She uses inhaler 3-5x per week. No new URI symptoms.     Rashes cleared up.      OBJECTIVE DATA:  Video physical exam  General: Patient appears well in no acute distress.   Skin: No visualized rash or lesions on visualized skin  Eyes: EOMI, no erythema, sclera icterus or discharge noted  Resp: Appears to be breathing comfortably without accessory muscle usage, speaking in full sentences, no cough  MSK: Appears to have normal range of motion based on visualized movements  Neurologic: No apparent tremors, facial movements symmetric  Psych: affect bright, alert and oriented    The rest of a comprehensive physical examination is deferred due to PHE (public health  emergency) video restrictions     Labs:    1/3/2022 14:04   Sodium 138   Potassium 4.3   Chloride 104   Carbon Dioxide 29   Urea Nitrogen 10   Creatinine 1.01   GFR Estimate 67   Calcium 9.8   Anion Gap 5   Albumin 3.8   Protein Total 7.9   Bilirubin Total 0.6   Alkaline Phosphatase 73   ALT 44   AST 36   Glucose 117 (H)   WBC 3.2 (L)   Hemoglobin 13.2   Hematocrit 38.4   Platelet Count 148 (L)   RBC Count 3.94   MCV 98   MCH 33.5 (H)   MCHC 34.4   RDW 13.4   % Neutrophils 36   % Lymphocytes 56   % Monocytes 4   % Eosinophils 4   % Basophils 0   Absolute Basophils 0.0   Absolute Neutrophil 1.2 (L)   Absolute Lymphocytes 1.8   Absolute Monocytes 0.1   Absolute Eosinophils 0.1   RBC Morphology Confirmed RBC Indices   Platelet Morphology Automated Count Confirmed. Platelet morphology is normal.          ASSESSMENT AND PLAN:  1. Recurrent, metastatic ER-positive, HER2 negative breast cancer: History of left breast cancer s/p lumpectomy and SNLB in 2014. S/p 4 cycles ddAC and 12 weeks of Taxol. On AI from 11/2014-2/2017. Recurrence on PET/CT scan performed 08/03/2018 and then biopsy to left posterior cervical lymph nodes, aorticopulmonary, and prevascular lymph nodes were positive. No evidence of metastatic disease in the abdomen and pelvis or brain. Started Ibrance and letrozole on 9/21/18. Tolerating well aside from some hot flashes and mild joint stiffness.  She continues on Ibrance and letrozole and is now at 3 years.   2. Restaging shows essentially stable disease. Imaging every 3 months with CT CAP and neck. Needs steroid premeds due to contrast allergies.   3. R posterior hip pain: Has L5-S1 anterolisthesis and DJD in back. May be inflammation of SI joint. Discussed using NSAID with food for a few days then starting PT. If no improvement, see ortho.    4.  Prediabetes: Working with diabetic educator. Will start checking home BS. Is starting to feel more motivated about lifestyle changes.   5.  Depression and  anxiety. She saw Dr. Pate her new PCP and her regimen was changed to add buspirone 7.5 mg twice daily to sertraline 200 mg once per added. Lorazepam prn anxiety was added.  She has improvement of her symptoms of depression and anxiety.   6.  Hypothyroid. Levothyroxine 200 mcg daily. This was adjusted at endocrinology appt.   7. Asthma: Seen by Dr. Hay. Takes advair, montelukast, and albuterol as needed,    40 minutes spent on the date of the encounter doing chart review, review of test results, interpretation of tests, patient visit and documentation      Cecilia Goins PA-C

## 2022-01-10 ENCOUNTER — VIRTUAL VISIT (OUTPATIENT)
Dept: EDUCATION SERVICES | Facility: CLINIC | Age: 52
End: 2022-01-10
Payer: COMMERCIAL

## 2022-01-10 DIAGNOSIS — R73.03 PREDIABETES: Primary | ICD-10-CM

## 2022-01-10 PROCEDURE — 99207 PR NO BILLABLE SERVICE THIS VISIT: CPT | Mod: TEL | Performed by: NUTRITIONIST

## 2022-01-11 ENCOUNTER — IMMUNIZATION (OUTPATIENT)
Dept: FAMILY MEDICINE | Facility: CLINIC | Age: 52
End: 2022-01-11
Payer: COMMERCIAL

## 2022-01-11 DIAGNOSIS — Z23 NEED FOR PROPHYLACTIC VACCINATION AND INOCULATION AGAINST INFLUENZA: Primary | ICD-10-CM

## 2022-01-11 PROCEDURE — 90682 RIV4 VACC RECOMBINANT DNA IM: CPT

## 2022-01-11 PROCEDURE — 99207 PR NO CHARGE NURSE ONLY: CPT

## 2022-01-11 PROCEDURE — 90471 IMMUNIZATION ADMIN: CPT

## 2022-01-16 NOTE — PROGRESS NOTES
"Jade is a 51 year old who is being evaluated via a billable telephone visit.      What phone number would you like to be contacted at? On file  How would you like to obtain your AVS? Soledad  Phone call duration: 60 minutes    Diabetes Self-Management Education & Support    Presents for:  Prediabetes  Follow up    SUBJECTIVE/OBJECTIVE:     Cultural Influences/Ethnic Background:  Not  or   Patient would like meter instruction today  She has an accu chek guide me meter    Diabetes Symptoms & Complications:          Patient Problem List and Family Medical History reviewed for relevant medical history, current medical status, and diabetes risk factors.    Vitals:  There were no vitals taken for this visit.  Estimated body mass index is 48.14 kg/m  as calculated from the following:    Height as of 11/18/21: 1.753 m (5' 9\").    Weight as of 11/18/21: 147.9 kg (326 lb).   Last 3 BP:   BP Readings from Last 3 Encounters:   11/18/21 138/88   08/24/21 126/86   01/28/21 139/68       History   Smoking Status     Never Smoker   Smokeless Tobacco     Never Used       Labs:  Lab Results   Component Value Date    A1C 6.2 11/30/2021    A1C 5.8 06/09/2021     Lab Results   Component Value Date     01/03/2022    GLC 96 06/09/2021     No results found for: LDL  No results found for: HDL]  GFR Estimate   Date Value Ref Range Status   01/03/2022 67 >60 mL/min/1.73m2 Final     Comment:     Effective December 21, 2021 eGFRcr in adults is calculated using the 2021 CKD-EPI creatinine equation which includes age and gender (Diana ray al., NEJ, DOI: 10.1056/ZWQVue9423237)   06/09/2021 65 >60 mL/min/[1.73_m2] Final     Comment:     Non  GFR Calc  Starting 12/18/2018, serum creatinine based estimated GFR (eGFR) will be   calculated using the Chronic Kidney Disease Epidemiology Collaboration   (CKD-EPI) equation.       GFR Estimate If Black   Date Value Ref Range Status   06/09/2021 76 >60 mL/min/[1.73_m2] " Final     Comment:      GFR Calc  Starting 12/18/2018, serum creatinine based estimated GFR (eGFR) will be   calculated using the Chronic Kidney Disease Epidemiology Collaboration   (CKD-EPI) equation.       Lab Results   Component Value Date    CR 1.01 01/03/2022    CR 1.00 06/09/2021     No results found for: MICROALBUMIN    Healthy Eating:   starting to use labels, make healthier snack choices, less sweets  Going to read through the materials I sent her    Being Active:   has started doing some walking in her building    Monitoring:   needs instruction today      Healthy Coping:     Patient Activation Measure Survey Score:  No flowsheet data found.    Diabetes knowledge and skills assessment:     Patient needs further education on the following diabetes management concepts: Monitoring    Based on learning assessment above, most appropriate setting for further diabetes education would be: Individual setting.      INTERVENTIONS:    Education provided today on:  AADE Self-Care Behaviors:  Monitoring: purpose, proper technique, log and interpret results, individual blood glucose targets, frequency of monitoring and proper sharps disposal    Opportunities for ongoing education and support in diabetes-self management were discussed.    Pt verbalized understanding of concepts discussed and recommendations provided today.       Education Materials Provided:  Safe Disposal Options for Needles & Syringes      ASSESSMENT:    Patient's most recent   Lab Results   Component Value Date    A1C 6.2 11/30/2021    A1C 5.8 06/09/2021   Prediabetes    PLAN  Follow up two months  Patient plans to check glucose 1-2 times daily for now  Time Spent: 60 minutes  Encounter Type: Individual    Any diabetes medication dose changes were made via the CDE Protocol and Collaborative Practice Agreement with the patient's referring provider. A copy of this encounter was shared with the provider.

## 2022-01-20 ENCOUNTER — PATIENT OUTREACH (OUTPATIENT)
Dept: CARE COORDINATION | Facility: CLINIC | Age: 52
End: 2022-01-20
Payer: COMMERCIAL

## 2022-01-20 NOTE — PROGRESS NOTES
Oncology Distress Screening Follow-up  Clinical Social Work  [facility]    Identified Concern and Score From Distress Screenin. How concerned are you about feeling anxious or very scared?  9 Abnormal      Date of Distress Screenin22    Data: At time of last visit, Patient scored positive on distress screen.  called Patient today with intention of introducing them to psychosocial services and support, and following up on elevated distress.        Intervention/Education Provided: Phone call to patient about distress screening. No answer - message left. Provided contact information in order to reach writer.       Follow-up Required: Will remain available for support and await patient's return call.          ROMULO Gaines,LGSW  Hematology/Oncology Social Worker  Phone:[phone] Pager: [phone]

## 2022-01-29 ENCOUNTER — HEALTH MAINTENANCE LETTER (OUTPATIENT)
Age: 52
End: 2022-01-29

## 2022-02-01 ENCOUNTER — LAB (OUTPATIENT)
Dept: LAB | Facility: CLINIC | Age: 52
End: 2022-02-01
Payer: COMMERCIAL

## 2022-02-01 DIAGNOSIS — R73.9 HYPERGLYCEMIA: ICD-10-CM

## 2022-02-01 DIAGNOSIS — Z17.0 MALIGNANT NEOPLASM OF UPPER-OUTER QUADRANT OF LEFT BREAST IN FEMALE, ESTROGEN RECEPTOR POSITIVE (H): ICD-10-CM

## 2022-02-01 DIAGNOSIS — E03.9 ACQUIRED HYPOTHYROIDISM: ICD-10-CM

## 2022-02-01 DIAGNOSIS — C50.412 MALIGNANT NEOPLASM OF UPPER-OUTER QUADRANT OF LEFT BREAST IN FEMALE, ESTROGEN RECEPTOR POSITIVE (H): ICD-10-CM

## 2022-02-01 LAB
ALBUMIN SERPL-MCNC: 4 G/DL (ref 3.4–5)
ALP SERPL-CCNC: 69 U/L (ref 40–150)
ALT SERPL W P-5'-P-CCNC: 39 U/L (ref 0–50)
ANION GAP SERPL CALCULATED.3IONS-SCNC: 6 MMOL/L (ref 3–14)
AST SERPL W P-5'-P-CCNC: 34 U/L (ref 0–45)
BASOPHILS # BLD AUTO: 0.1 10E3/UL (ref 0–0.2)
BASOPHILS NFR BLD AUTO: 2 %
BILIRUB SERPL-MCNC: 0.5 MG/DL (ref 0.2–1.3)
BUN SERPL-MCNC: 12 MG/DL (ref 7–30)
CALCIUM SERPL-MCNC: 9.3 MG/DL (ref 8.5–10.1)
CANCER AG27-29 SERPL-ACNC: 15 U/ML (ref 0–39)
CEA SERPL-MCNC: 0.6 UG/L (ref 0–2.5)
CHLORIDE BLD-SCNC: 107 MMOL/L (ref 94–109)
CO2 SERPL-SCNC: 25 MMOL/L (ref 20–32)
CREAT SERPL-MCNC: 1.05 MG/DL (ref 0.52–1.04)
EOSINOPHIL # BLD AUTO: 0.1 10E3/UL (ref 0–0.7)
EOSINOPHIL NFR BLD AUTO: 4 %
ERYTHROCYTE [DISTWIDTH] IN BLOOD BY AUTOMATED COUNT: 13.3 % (ref 10–15)
GFR SERPL CREATININE-BSD FRML MDRD: 64 ML/MIN/1.73M2
GLUCOSE BLD-MCNC: 133 MG/DL (ref 70–99)
HBA1C MFR BLD: 6.1 % (ref 0–5.6)
HCT VFR BLD AUTO: 38 % (ref 35–47)
HGB BLD-MCNC: 13.2 G/DL (ref 11.7–15.7)
IMM GRANULOCYTES # BLD: 0 10E3/UL
IMM GRANULOCYTES NFR BLD: 0 %
LYMPHOCYTES # BLD AUTO: 1 10E3/UL (ref 0.8–5.3)
LYMPHOCYTES NFR BLD AUTO: 41 %
MCH RBC QN AUTO: 33.2 PG (ref 26.5–33)
MCHC RBC AUTO-ENTMCNC: 34.7 G/DL (ref 31.5–36.5)
MCV RBC AUTO: 96 FL (ref 78–100)
MONOCYTES # BLD AUTO: 0.2 10E3/UL (ref 0–1.3)
MONOCYTES NFR BLD AUTO: 10 %
NEUTROPHILS # BLD AUTO: 1.1 10E3/UL (ref 1.6–8.3)
NEUTROPHILS NFR BLD AUTO: 43 %
NRBC # BLD AUTO: 0 10E3/UL
NRBC BLD AUTO-RTO: 0 /100
PLATELET # BLD AUTO: 139 10E3/UL (ref 150–450)
POTASSIUM BLD-SCNC: 4 MMOL/L (ref 3.4–5.3)
PROT SERPL-MCNC: 8 G/DL (ref 6.8–8.8)
RBC # BLD AUTO: 3.98 10E6/UL (ref 3.8–5.2)
SODIUM SERPL-SCNC: 138 MMOL/L (ref 133–144)
TSH SERPL DL<=0.005 MIU/L-ACNC: 3.51 MU/L (ref 0.4–4)
WBC # BLD AUTO: 2.4 10E3/UL (ref 4–11)

## 2022-02-01 PROCEDURE — 80053 COMPREHEN METABOLIC PANEL: CPT

## 2022-02-01 PROCEDURE — 36415 COLL VENOUS BLD VENIPUNCTURE: CPT

## 2022-02-01 PROCEDURE — 82378 CARCINOEMBRYONIC ANTIGEN: CPT

## 2022-02-01 PROCEDURE — 84443 ASSAY THYROID STIM HORMONE: CPT

## 2022-02-01 PROCEDURE — 83036 HEMOGLOBIN GLYCOSYLATED A1C: CPT

## 2022-02-01 PROCEDURE — 85025 COMPLETE CBC W/AUTO DIFF WBC: CPT

## 2022-02-01 PROCEDURE — 86300 IMMUNOASSAY TUMOR CA 15-3: CPT

## 2022-02-03 ENCOUNTER — VIRTUAL VISIT (OUTPATIENT)
Dept: ONCOLOGY | Facility: CLINIC | Age: 52
End: 2022-02-03
Attending: PHYSICIAN ASSISTANT
Payer: COMMERCIAL

## 2022-02-03 DIAGNOSIS — Z17.0 MALIGNANT NEOPLASM OF UPPER-OUTER QUADRANT OF LEFT BREAST IN FEMALE, ESTROGEN RECEPTOR POSITIVE (H): Primary | ICD-10-CM

## 2022-02-03 DIAGNOSIS — C50.412 MALIGNANT NEOPLASM OF UPPER-OUTER QUADRANT OF LEFT BREAST IN FEMALE, ESTROGEN RECEPTOR POSITIVE (H): Primary | ICD-10-CM

## 2022-02-03 PROCEDURE — 99214 OFFICE O/P EST MOD 30 MIN: CPT | Mod: GT | Performed by: PHYSICIAN ASSISTANT

## 2022-02-03 RX ORDER — MIRTAZAPINE 15 MG/1
TABLET, FILM COATED ORAL
Qty: 30 TABLET | Refills: 3 | Status: SHIPPED | OUTPATIENT
Start: 2022-02-03 | End: 2022-05-27

## 2022-02-03 RX ORDER — METHYLPREDNISOLONE 32 MG/1
TABLET ORAL
Qty: 2 TABLET | Refills: 0 | Status: SHIPPED | OUTPATIENT
Start: 2022-02-03 | End: 2022-04-27

## 2022-02-03 NOTE — PROGRESS NOTES
Jade is a 51 year old who is being evaluated via a billable video visit.      How would you like to obtain your AVS? MyChart  If the video visit is dropped, the invitation should be resent by: via text to [phone]  Will anyone else be joining your video visit? Anya Chirinos    Video Start Time: 3:15 PM  Video-Visit Details    Type of service:  Video Visit    Video End Time:3:31 PM    Originating Location (pt. Location): Home    Distant Location (provider location):  [facility]     Platform used for Video Visit: [employer]           HISTORY OF PRESENT ILLNESS:  Jade is a 51-year-old woman with metastatic breast cancer        Jade was diagnosed with breast cancer with a lump found in the upper-inner quadrant of the left breast.  She was diagnosed in 12/2013 in the [place] area and Dr. David Redd was her medical oncologist.  Biopsy of the tumor showed it to be ER positive, VT positive, and HER-2 negative.  We do not have any of the original pathology and we need to obtain those reports.  She underwent a lumpectomy performed by Dr. Tasha Segura who is a surgeon in the [place] area.  She also had a sentinel lymph node which was noted to be involved with tumor but there was no lymph node dissection done at that time.  [id number].  She subsequently underwent adjuvant chemotherapy with dose-dense AC for 4 cycles and Taxol for 12 weeks, completed 09/11/2014.        She then had radiation therapy post lumpectomy to the left breast, which was completed 11/23/2014 by Dr. Manley.        She was then begun on an aromatase inhibitor, the name of which she does not remember.  The aromatase inhibitor therapy was begun in 11/2014.  She was then switched within about a month or so to anastrozole and remained on anastrozole from 11/2014-02/2017.  She has never received tamoxifen.        She then moved to [place], [place] in 03/2017.  She saw a gynecologist there and underwent a IRIS/BSO by   Aashish.  She then also saw Dr. Linares in [place], [place], who is an oncologist.  His interpretation of the pathology report was that she had triple-negative breast cancer.  It is possible that she may have had low estrogen receptor positivity, but the anastrozole was then discontinued in 02/2017.  She then underwent the IRIS/BSO in 03/2017.  This was done laparoscopically.        She then remained off of all hormonal therapy and in 09/2017 moved to [place].  She remained off hormonal therapy and did not have Medical Oncology followup initially.        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  She then went to see Dr. Norberto Brand who performed a PET/CT scan and the patient also underwent a brain MRI for staging.  The PET/CT scan performed 08/03/2018 showed in the neck there were several mildly metabolic active and large prominent left posterior cervical lymph nodes.  The largest is located posterior to the left sternocleidomastoid muscle and measures 1.5 x 1 cm in size.  This demonstrate modest FDG uptake.  The other lymph nodes are smaller and demonstrate mild FDG uptake.  The prominent prevascular and aortopulmonary lymph nodes were also seen on the contrast-enhanced scan were less well seen on the PET.  The largest lymph node visualized on the PET scan was 1.1 x 0.8 cm and demonstrated mild FDG uptake.  This was in the periaortic area just anterior to the aortic arch.  No lung nodules.  No lung infiltrates.  No pleural effusion.  There was no uptake of FDG in the bones.  In summary, there were mildly enlarged left posterior cervical lymph nodes, largest measuring 1.5 x 1.0 cm and mildly enlarged aorticopulmonary and prevascular lymph nodes that were noted on contrast-enhanced CT scan seen less well on the PET scan.  No evidence of metastatic disease in the abdomen and pelvis.  She also underwent a brain MRI that showed no evidence of intracranial metastatic disease.  She underwent a biopsy  of one of the lymph nodes in the left neck which showed metastatic adenocarcinoma consistent with breast origin, which was estrogen-receptor positive.  The tumor cells were positive for CK7, ER and DE consistent with metastatic breast carcinoma.  Estrogen receptor showed strong average nuclear intensity in 95% of carcinoma cells, progesterone receptor moderate average nuclear intensity in 70% of the carcinoma.  A GATA3 stain was apparently not performed.       TREATMENT HISTORY:  A. Tamoxifen  B. Letrozole and palbociclib started 9-18-18.  C. Pfizer COVID vaccination end of March, 2021 and 4-17.     INTERVAL HISTORY: Jade is feeling okay. She does not have any concerns today. No new issues in the past month. She has started to check blood sugars and this has been helpful for making dietary choices. Her mom broke her hip. She lives in [place]. It has been stressful for Jade to help coordinate care. She is considering going to visit her in early March. She denies any fevers/chills, URI symptoms, or infectious concerns. The R posterior hip/SI joint pain has not recurred so she did not pursue PT.     Plans to start ibrance on Friday.      OBJECTIVE DATA:  Video physical exam  General: Patient appears well in no acute distress.   Skin: No visualized rash or lesions on visualized skin  Eyes: EOMI, no erythema, sclera icterus or discharge noted  Resp: Appears to be breathing comfortably without accessory muscle usage, speaking in full sentences, no cough  MSK: Appears to have normal range of motion based on visualized movements  Neurologic: No apparent tremors, facial movements symmetric  Psych: affect bright, alert and oriented    The rest of a comprehensive physical examination is deferred due to PHE (public health emergency) video restrictions     Labs:    2/1/2022 12:32   Albumin 4.0   Alkaline Phosphatase 69   ALT 39   Anion Gap 6   AST 34   Bilirubin Total 0.5   CA 27-29 15   Calcium 9.3   Carbon Dioxide 25   Chloride  107   Creatinine 1.05 (H)   GFR Estimate 64   Hemoglobin A1C 6.1 (H)   Potassium 4.0   Protein Total 8.0   Sodium 138   TSH 3.51   Urea Nitrogen 12   Glucose 133 (H)   WBC 2.4 (L)   Hemoglobin 13.2   Hematocrit 38.0   Platelet Count 139 (L)   RBC Count 3.98   MCV 96   MCH 33.2 (H)   MCHC 34.7   RDW 13.3   % Neutrophils 43   % Lymphocytes 41   % Monocytes 10   % Eosinophils 4   % Basophils 2   Absolute Basophils 0.1   Absolute Eosinophils 0.1   Absolute Immature Granulocytes 0.0   Absolute Lymphocytes 1.0   Absolute Monocytes 0.2   % Immature Granulocytes 0   Absolute Neutrophils 1.1 (L)   Absolute NRBCs 0.0   NRBCs per 100 WBC 0   CEA 0.6            ASSESSMENT AND PLAN:  1. Recurrent, metastatic ER-positive, HER2 negative breast cancer: History of left breast cancer s/p lumpectomy and SNLB in 2014. S/p 4 cycles ddAC and 12 weeks of Taxol. On AI from 11/2014-2/2017. Recurrence on PET/CT scan performed 08/03/2018 and then biopsy to left posterior cervical lymph nodes, aorticopulmonary, and prevascular lymph nodes were positive. No evidence of metastatic disease in the abdomen and pelvis or brain. Started Ibrance and letrozole on 9/21/18. Tolerating well aside from some hot flashes and mild joint stiffness.  She continues on Ibrance and letrozole and is now at 3 years.   2. Restaging shows essentially stable disease. Imaging every 3 months with CT CAP and neck. Needs steroid premeds due to contrast allergies.   3. R posterior hip pain: Has L5-S1 anterolisthesis and DJD in back. This has resolved. She did not pursue PT.   4.  Prediabetes: Working with diabetic educator. Has started to check home BS. Is starting to feel more motivated about lifestyle changes.   5.  Depression and anxiety. She saw Dr. Pate her new PCP and her regimen was changed to add buspirone 7.5 mg twice daily to sertraline 200 mg once per added. Lorazepam prn anxiety was added.  She has improvement of her symptoms of depression and anxiety.   6.   Hypothyroid. Levothyroxine 200 mcg daily. This was adjusted at endocrinology appt.   7. Asthma: Seen by Dr. Hay. Takes advair, montelukast, and albuterol as needed,    25 minutes spent on the date of the encounter doing chart review, review of test results, interpretation of tests, patient visit and documentation      Cecilia Goins PA-C

## 2022-02-03 NOTE — LETTER
2/3/2022         RE: Jade Collins  [address]  [facility] [address]        Dear Colleague,    Thank you for referring your patient, Jade Collins, to the [facility]. Please see a copy of my visit note below.    Jade is a 51 year old who is being evaluated via a billable video visit.      How would you like to obtain your AVS? MyChart  If the video visit is dropped, the invitation should be resent by: via text to [phone]  Will anyone else be joining your video visit? Anya      Diana Chirinos    Video Start Time: 3:15 PM  Video-Visit Details    Type of service:  Video Visit    Video End Time:3:31 PM    Originating Location (pt. Location): Home    Distant Location (provider location):  [facility]     Platform used for Video Visit: [employer]           HISTORY OF PRESENT ILLNESS:  Jade is a 51-year-old woman with metastatic breast cancer        Jade was diagnosed with breast cancer with a lump found in the upper-inner quadrant of the left breast.  She was diagnosed in 12/2013 in the [place] area and Dr. David Redd was her medical oncologist.  Biopsy of the tumor showed it to be ER positive, MN positive, and HER-2 negative.  We do not have any of the original pathology and we need to obtain those reports.  She underwent a lumpectomy performed by Dr. Tasha Segura who is a surgeon in the [place] area.  She also had a sentinel lymph node which was noted to be involved with tumor but there was no lymph node dissection done at that time.  [id number].  She subsequently underwent adjuvant chemotherapy with dose-dense AC for 4 cycles and Taxol for 12 weeks, completed 09/11/2014.        She then had radiation therapy post lumpectomy to the left breast, which was completed 11/23/2014 by Dr. Manley.        She was then begun on an aromatase inhibitor, the name of which she does not remember.  The aromatase inhibitor therapy was begun in 11/2014.  She was then  switched within about a month or so to anastrozole and remained on anastrozole from 11/2014-02/2017.  She has never received tamoxifen.        She then moved to [place], [place] in 03/2017.  She saw a gynecologist there and underwent a IRIS/BSO by Dr. Zendejas.  She then also saw Dr. Linares in [place], [place], who is an oncologist.  His interpretation of the pathology report was that she had triple-negative breast cancer.  It is possible that she may have had low estrogen receptor positivity, but the anastrozole was then discontinued in 02/2017.  She then underwent the IRIS/BSO in 03/2017.  This was done laparoscopically.        She then remained off of all hormonal therapy and in 09/2017 moved to [place].  She remained off hormonal therapy and did not have Medical Oncology followup initially.        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  She then went to see Dr. Norberto Brand who performed a PET/CT scan and the patient also underwent a brain MRI for staging.  The PET/CT scan performed 08/03/2018 showed in the neck there were several mildly metabolic active and large prominent left posterior cervical lymph nodes.  The largest is located posterior to the left sternocleidomastoid muscle and measures 1.5 x 1 cm in size.  This demonstrate modest FDG uptake.  The other lymph nodes are smaller and demonstrate mild FDG uptake.  The prominent prevascular and aortopulmonary lymph nodes were also seen on the contrast-enhanced scan were less well seen on the PET.  The largest lymph node visualized on the PET scan was 1.1 x 0.8 cm and demonstrated mild FDG uptake.  This was in the periaortic area just anterior to the aortic arch.  No lung nodules.  No lung infiltrates.  No pleural effusion.  There was no uptake of FDG in the bones.  In summary, there were mildly enlarged left posterior cervical lymph nodes, largest measuring 1.5 x 1.0 cm and mildly enlarged aorticopulmonary and prevascular lymph nodes that  were noted on contrast-enhanced CT scan seen less well on the PET scan.  No evidence of metastatic disease in the abdomen and pelvis.  She also underwent a brain MRI that showed no evidence of intracranial metastatic disease.  She underwent a biopsy of one of the lymph nodes in the left neck which showed metastatic adenocarcinoma consistent with breast origin, which was estrogen-receptor positive.  The tumor cells were positive for CK7, ER and [place] consistent with metastatic breast carcinoma.  Estrogen receptor showed strong average nuclear intensity in 95% of carcinoma cells, progesterone receptor moderate average nuclear intensity in 70% of the carcinoma.  A GATA3 stain was apparently not performed.       TREATMENT HISTORY:  A. Tamoxifen  B. Letrozole and palbociclib started 9-18-18.  C. Pfizer COVID vaccination end of March, 2021 and 4-17.     INTERVAL HISTORY: Jade is feeling okay. She does not have any concerns today. No new issues in the past month. She has started to check blood sugars and this has been helpful for making dietary choices. Her mom broke her hip. She lives in [place]. It has been stressful for Jade to help coordinate care. She is considering going to visit her in early March. She denies any fevers/chills, URI symptoms, or infectious concerns. The R posterior hip/SI joint pain has not recurred so she did not pursue PT.     Plans to start ibrance on Friday.      OBJECTIVE DATA:  Video physical exam  General: Patient appears well in no acute distress.   Skin: No visualized rash or lesions on visualized skin  Eyes: EOMI, no erythema, sclera icterus or discharge noted  Resp: Appears to be breathing comfortably without accessory muscle usage, speaking in full sentences, no cough  MSK: Appears to have normal range of motion based on visualized movements  Neurologic: No apparent tremors, facial movements symmetric  Psych: affect bright, alert and oriented    The rest of a comprehensive physical  examination is deferred due to PHE (public health emergency) video restrictions     Labs:    2/1/2022 12:32   Albumin 4.0   Alkaline Phosphatase 69   ALT 39   Anion Gap 6   AST 34   Bilirubin Total 0.5   CA 27-29 15   Calcium 9.3   Carbon Dioxide 25   Chloride 107   Creatinine 1.05 (H)   GFR Estimate 64   Hemoglobin A1C 6.1 (H)   Potassium 4.0   Protein Total 8.0   Sodium 138   TSH 3.51   Urea Nitrogen 12   Glucose 133 (H)   WBC 2.4 (L)   Hemoglobin 13.2   Hematocrit 38.0   Platelet Count 139 (L)   RBC Count 3.98   MCV 96   MCH 33.2 (H)   MCHC 34.7   RDW 13.3   % Neutrophils 43   % Lymphocytes 41   % Monocytes 10   % Eosinophils 4   % Basophils 2   Absolute Basophils 0.1   Absolute Eosinophils 0.1   Absolute Immature Granulocytes 0.0   Absolute Lymphocytes 1.0   Absolute Monocytes 0.2   % Immature Granulocytes 0   Absolute Neutrophils 1.1 (L)   Absolute NRBCs 0.0   NRBCs per 100 WBC 0   CEA 0.6            ASSESSMENT AND PLAN:  1. Recurrent, metastatic ER-positive, HER2 negative breast cancer: History of left breast cancer s/p lumpectomy and SNLB in 2014. S/p 4 cycles ddAC and 12 weeks of Taxol. On AI from 11/2014-2/2017. Recurrence on PET/CT scan performed 08/03/2018 and then biopsy to left posterior cervical lymph nodes, aorticopulmonary, and prevascular lymph nodes were positive. No evidence of metastatic disease in the abdomen and pelvis or brain. Started Ibrance and letrozole on 9/21/18. Tolerating well aside from some hot flashes and mild joint stiffness.  She continues on Ibrance and letrozole and is now at 3 years.   2. Restaging shows essentially stable disease. Imaging every 3 months with CT CAP and neck. Needs steroid premeds due to contrast allergies.   3. R posterior hip pain: Has L5-S1 anterolisthesis and DJD in back. This has resolved. She did not pursue PT.   4.  Prediabetes: Working with diabetic educator. Has started to check home BS. Is starting to feel more motivated about lifestyle changes.   5.   Depression and anxiety. She saw Dr. Pate her new PCP and her regimen was changed to add buspirone 7.5 mg twice daily to sertraline 200 mg once per added. Lorazepam prn anxiety was added.  She has improvement of her symptoms of depression and anxiety.   6.  Hypothyroid. Levothyroxine 200 mcg daily. This was adjusted at endocrinology appt.   7. Asthma: Seen by Dr. Hay. Takes advair, montelukast, and albuterol as needed,    25 minutes spent on the date of the encounter doing chart review, review of test results, interpretation of tests, patient visit and documentation      Cecilia Goins PA-C              Again, thank you for allowing me to participate in the care of your patient.        Sincerely,        Cecilia Goins PA-C

## 2022-02-09 ENCOUNTER — IMMUNIZATION (OUTPATIENT)
Dept: NURSING | Facility: CLINIC | Age: 52
End: 2022-02-09
Payer: COMMERCIAL

## 2022-02-09 PROCEDURE — 0054A COVID-19,PF,PFIZER (12+ YRS): CPT

## 2022-02-09 PROCEDURE — 91305 COVID-19,PF,PFIZER (12+ YRS): CPT

## 2022-02-16 DIAGNOSIS — Z17.0 MALIGNANT NEOPLASM OF UPPER-OUTER QUADRANT OF LEFT BREAST IN FEMALE, ESTROGEN RECEPTOR POSITIVE (H): Primary | ICD-10-CM

## 2022-02-16 DIAGNOSIS — C50.412 MALIGNANT NEOPLASM OF UPPER-OUTER QUADRANT OF LEFT BREAST IN FEMALE, ESTROGEN RECEPTOR POSITIVE (H): Primary | ICD-10-CM

## 2022-02-16 RX ORDER — LETROZOLE 2.5 MG/1
2.5 TABLET, FILM COATED ORAL DAILY
Qty: 28 TABLET | Refills: 2 | Status: SHIPPED | OUTPATIENT
Start: 2022-02-16

## 2022-02-17 NOTE — TELEPHONE ENCOUNTER
[employer]ahad out of funds. Faxed physician diagnosis verification form as well as rejection of the current claim to get added funds.     [facility]/ Assistance Approved    Medication Ibrance  Amount/ $ 6,000  [facility]  Effective Dates 01/05/2022-01/05/2023            Jaleesa Winslow  Oncology Pharmacy Liaison  [email or web address]  Phone: [phone]  Fax: [phone]

## 2022-02-24 ENCOUNTER — HOSPITAL ENCOUNTER (OUTPATIENT)
Dept: CT IMAGING | Facility: CLINIC | Age: 52
End: 2022-02-24
Attending: INTERNAL MEDICINE
Payer: COMMERCIAL

## 2022-02-24 DIAGNOSIS — Z17.0 MALIGNANT NEOPLASM OF UPPER-OUTER QUADRANT OF LEFT BREAST IN FEMALE, ESTROGEN RECEPTOR POSITIVE (H): ICD-10-CM

## 2022-02-24 DIAGNOSIS — C50.412 MALIGNANT NEOPLASM OF UPPER-OUTER QUADRANT OF LEFT BREAST IN FEMALE, ESTROGEN RECEPTOR POSITIVE (H): ICD-10-CM

## 2022-02-24 PROCEDURE — 74177 CT ABD & PELVIS W/CONTRAST: CPT | Mod: 26 | Performed by: RADIOLOGY

## 2022-02-24 PROCEDURE — 74177 CT ABD & PELVIS W/CONTRAST: CPT

## 2022-02-24 PROCEDURE — 250N000011 HC RX IP 250 OP 636: Performed by: INTERNAL MEDICINE

## 2022-02-24 PROCEDURE — 71260 CT THORAX DX C+: CPT | Mod: 26 | Performed by: RADIOLOGY

## 2022-02-24 PROCEDURE — 70491 CT SOFT TISSUE NECK W/DYE: CPT

## 2022-02-24 PROCEDURE — 250N000009 HC RX 250: Performed by: INTERNAL MEDICINE

## 2022-02-24 PROCEDURE — 70491 CT SOFT TISSUE NECK W/DYE: CPT | Mod: 26 | Performed by: RADIOLOGY

## 2022-02-24 RX ORDER — IOPAMIDOL 755 MG/ML
100 INJECTION, SOLUTION INTRAVASCULAR ONCE
Status: COMPLETED | OUTPATIENT
Start: 2022-02-24 | End: 2022-02-24

## 2022-02-24 RX ADMIN — SODIUM CHLORIDE 85 ML: 9 INJECTION, SOLUTION INTRAVENOUS at 11:11

## 2022-02-24 RX ADMIN — IOPAMIDOL 135 ML: 755 INJECTION, SOLUTION INTRAVENOUS at 11:11

## 2022-03-02 ENCOUNTER — LAB (OUTPATIENT)
Dept: LAB | Facility: CLINIC | Age: 52
End: 2022-03-02
Attending: INTERNAL MEDICINE
Payer: COMMERCIAL

## 2022-03-02 ENCOUNTER — ONCOLOGY VISIT (OUTPATIENT)
Dept: ONCOLOGY | Facility: CLINIC | Age: 52
End: 2022-03-02
Attending: PHYSICIAN ASSISTANT
Payer: COMMERCIAL

## 2022-03-02 VITALS
HEART RATE: 91 BPM | WEIGHT: 293 LBS | BODY MASS INDEX: 47.67 KG/M2 | TEMPERATURE: 98.2 F | RESPIRATION RATE: 18 BRPM | DIASTOLIC BLOOD PRESSURE: 75 MMHG | OXYGEN SATURATION: 96 % | SYSTOLIC BLOOD PRESSURE: 106 MMHG

## 2022-03-02 DIAGNOSIS — R73.03 PREDIABETES: ICD-10-CM

## 2022-03-02 DIAGNOSIS — E66.813 CLASS 3 SEVERE OBESITY DUE TO EXCESS CALORIES WITH SERIOUS COMORBIDITY AND BODY MASS INDEX (BMI) OF 45.0 TO 49.9 IN ADULT (H): ICD-10-CM

## 2022-03-02 DIAGNOSIS — E66.01 CLASS 3 SEVERE OBESITY DUE TO EXCESS CALORIES WITH SERIOUS COMORBIDITY AND BODY MASS INDEX (BMI) OF 45.0 TO 49.9 IN ADULT (H): ICD-10-CM

## 2022-03-02 DIAGNOSIS — R73.9 HYPERGLYCEMIA: ICD-10-CM

## 2022-03-02 DIAGNOSIS — C50.412 MALIGNANT NEOPLASM OF UPPER-OUTER QUADRANT OF LEFT BREAST IN FEMALE, ESTROGEN RECEPTOR POSITIVE (H): ICD-10-CM

## 2022-03-02 DIAGNOSIS — C50.919 METASTATIC BREAST CANCER: Primary | ICD-10-CM

## 2022-03-02 DIAGNOSIS — E03.9 HYPOTHYROIDISM, UNSPECIFIED TYPE: ICD-10-CM

## 2022-03-02 DIAGNOSIS — Z17.0 MALIGNANT NEOPLASM OF UPPER-OUTER QUADRANT OF LEFT BREAST IN FEMALE, ESTROGEN RECEPTOR POSITIVE (H): ICD-10-CM

## 2022-03-02 LAB
ALBUMIN SERPL-MCNC: 3.9 G/DL (ref 3.4–5)
ALP SERPL-CCNC: 78 U/L (ref 40–150)
ALT SERPL W P-5'-P-CCNC: 43 U/L (ref 0–50)
ANION GAP SERPL CALCULATED.3IONS-SCNC: 7 MMOL/L (ref 3–14)
AST SERPL W P-5'-P-CCNC: 38 U/L (ref 0–45)
BASOPHILS # BLD AUTO: 0 10E3/UL (ref 0–0.2)
BASOPHILS NFR BLD AUTO: 1 %
BILIRUB SERPL-MCNC: 0.5 MG/DL (ref 0.2–1.3)
BUN SERPL-MCNC: 11 MG/DL (ref 7–30)
CALCIUM SERPL-MCNC: 9.3 MG/DL (ref 8.5–10.1)
CEA SERPL-MCNC: <0.5 UG/L (ref 0–2.5)
CHLORIDE BLD-SCNC: 106 MMOL/L (ref 94–109)
CO2 SERPL-SCNC: 25 MMOL/L (ref 20–32)
CORTIS SERPL-MCNC: 8 UG/DL (ref 4–22)
CREAT SERPL-MCNC: 0.98 MG/DL (ref 0.52–1.04)
EOSINOPHIL # BLD AUTO: 0.1 10E3/UL (ref 0–0.7)
EOSINOPHIL NFR BLD AUTO: 3 %
ERYTHROCYTE [DISTWIDTH] IN BLOOD BY AUTOMATED COUNT: 13.6 % (ref 10–15)
GFR SERPL CREATININE-BSD FRML MDRD: 70 ML/MIN/1.73M2
GLUCOSE BLD-MCNC: 140 MG/DL (ref 70–99)
HBA1C MFR BLD: 6.5 % (ref 0–5.6)
HCT VFR BLD AUTO: 39.4 % (ref 35–47)
HGB BLD-MCNC: 13.5 G/DL (ref 11.7–15.7)
IMM GRANULOCYTES # BLD: 0 10E3/UL
IMM GRANULOCYTES NFR BLD: 0 %
LYMPHOCYTES # BLD AUTO: 1.3 10E3/UL (ref 0.8–5.3)
LYMPHOCYTES NFR BLD AUTO: 43 %
MCH RBC QN AUTO: 32.8 PG (ref 26.5–33)
MCHC RBC AUTO-ENTMCNC: 34.3 G/DL (ref 31.5–36.5)
MCV RBC AUTO: 96 FL (ref 78–100)
MONOCYTES # BLD AUTO: 0.3 10E3/UL (ref 0–1.3)
MONOCYTES NFR BLD AUTO: 11 %
NEUTROPHILS # BLD AUTO: 1.3 10E3/UL (ref 1.6–8.3)
NEUTROPHILS NFR BLD AUTO: 42 %
NRBC # BLD AUTO: 0 10E3/UL
NRBC BLD AUTO-RTO: 0 /100
PLATELET # BLD AUTO: 153 10E3/UL (ref 150–450)
POTASSIUM BLD-SCNC: 4 MMOL/L (ref 3.4–5.3)
PROT SERPL-MCNC: 8 G/DL (ref 6.8–8.8)
RBC # BLD AUTO: 4.12 10E6/UL (ref 3.8–5.2)
SODIUM SERPL-SCNC: 138 MMOL/L (ref 133–144)
T4 FREE SERPL-MCNC: 0.91 NG/DL (ref 0.76–1.46)
TSH SERPL DL<=0.005 MIU/L-ACNC: 5.01 MU/L (ref 0.4–4)
WBC # BLD AUTO: 3 10E3/UL (ref 4–11)

## 2022-03-02 PROCEDURE — 83036 HEMOGLOBIN GLYCOSYLATED A1C: CPT | Performed by: PATHOLOGY

## 2022-03-02 PROCEDURE — 36415 COLL VENOUS BLD VENIPUNCTURE: CPT | Performed by: PATHOLOGY

## 2022-03-02 PROCEDURE — 85025 COMPLETE CBC W/AUTO DIFF WBC: CPT | Performed by: PATHOLOGY

## 2022-03-02 PROCEDURE — G0463 HOSPITAL OUTPT CLINIC VISIT: HCPCS

## 2022-03-02 PROCEDURE — 84439 ASSAY OF FREE THYROXINE: CPT | Performed by: PATHOLOGY

## 2022-03-02 PROCEDURE — 80053 COMPREHEN METABOLIC PANEL: CPT | Performed by: PATHOLOGY

## 2022-03-02 PROCEDURE — 99000 SPECIMEN HANDLING OFFICE-LAB: CPT | Performed by: PATHOLOGY

## 2022-03-02 PROCEDURE — 99214 OFFICE O/P EST MOD 30 MIN: CPT | Performed by: PHYSICIAN ASSISTANT

## 2022-03-02 PROCEDURE — 82378 CARCINOEMBRYONIC ANTIGEN: CPT | Mod: 90 | Performed by: PATHOLOGY

## 2022-03-02 PROCEDURE — 82533 TOTAL CORTISOL: CPT | Mod: 90 | Performed by: PATHOLOGY

## 2022-03-02 PROCEDURE — 84443 ASSAY THYROID STIM HORMONE: CPT | Performed by: PATHOLOGY

## 2022-03-02 ASSESSMENT — PAIN SCALES - GENERAL: PAINLEVEL: NO PAIN (0)

## 2022-03-02 NOTE — PROGRESS NOTES
HISTORY OF PRESENT ILLNESS:  Jade is a 51-year-old woman with metastatic breast cancer        Jade was diagnosed with breast cancer with a lump found in the upper-inner quadrant of the left breast.  She was diagnosed in 12/2013 in the [place] area and Dr. David Redd was her medical oncologist.  Biopsy of the tumor showed it to be ER positive, SC positive, and HER-2 negative.  We do not have any of the original pathology and we need to obtain those reports.  She underwent a lumpectomy performed by Dr. Tasha Segura who is a surgeon in the [place] area.  She also had a sentinel lymph node which was noted to be involved with tumor but there was no lymph node dissection done at that time.  [id number].  She subsequently underwent adjuvant chemotherapy with dose-dense AC for 4 cycles and Taxol for 12 weeks, completed 09/11/2014.        She then had radiation therapy post lumpectomy to the left breast, which was completed 11/23/2014 by Dr. Manley.        She was then begun on an aromatase inhibitor, the name of which she does not remember.  The aromatase inhibitor therapy was begun in 11/2014.  She was then switched within about a month or so to anastrozole and remained on anastrozole from 11/2014-02/2017.  She has never received tamoxifen.        She then moved to [place], [place] in 03/2017.  She saw a gynecologist there and underwent a IRIS/BSO by Dr. Zendejas.  She then also saw Dr. Linares in [place], [place], who is an oncologist.  His interpretation of the pathology report was that she had triple-negative breast cancer.  It is possible that she may have had low estrogen receptor positivity, but the anastrozole was then discontinued in 02/2017.  She then underwent the IRIS/BSO in 03/2017.  This was done laparoscopically.        She then remained off of all hormonal therapy and in 09/2017 moved to [place].  She remained off hormonal therapy and did not have Medical Oncology followup initially.        She was driving for  [place] and noticed lymph nodes in the left neck.  She then went to see Dr. Norberto Brand who performed a PET/CT scan and the patient also underwent a brain MRI for staging.  The PET/CT scan performed 08/03/2018 showed in the neck there were several mildly metabolic active and large prominent left posterior cervical lymph nodes.  The largest is located posterior to the left sternocleidomastoid muscle and measures 1.5 x 1 cm in size.  This demonstrate modest FDG uptake.  The other lymph nodes are smaller and demonstrate mild FDG uptake.  The prominent prevascular and aortopulmonary lymph nodes were also seen on the contrast-enhanced scan were less well seen on the PET.  The largest lymph node visualized on the PET scan was 1.1 x 0.8 cm and demonstrated mild FDG uptake.  This was in the periaortic area just anterior to the aortic arch.  No lung nodules.  No lung infiltrates.  No pleural effusion.  There was no uptake of FDG in the bones.  In summary, there were mildly enlarged left posterior cervical lymph nodes, largest measuring 1.5 x 1.0 cm and mildly enlarged aorticopulmonary and prevascular lymph nodes that were noted on contrast-enhanced CT scan seen less well on the PET scan.  No evidence of metastatic disease in the abdomen and pelvis.  She also underwent a brain MRI that showed no evidence of intracranial metastatic disease.  She underwent a biopsy of one of the lymph nodes in the left neck which showed metastatic adenocarcinoma consistent with breast origin, which was estrogen-receptor positive.  The tumor cells were positive for CK7, ER and HI consistent with metastatic breast carcinoma.  Estrogen receptor showed strong average nuclear intensity in 95% of carcinoma cells, progesterone receptor moderate average nuclear intensity in 70% of the carcinoma.  A GATA3 stain was apparently not performed.       TREATMENT HISTORY:  A. Tamoxifen  B. Letrozole and palbociclib started 9-18-18.  C. Pfizer COVID vaccination end  of March, 2021 and 4-17.     INTERVAL HISTORY:   Jade has been feeling generally well over the last month.  She has not had any significant changes in her health.  She continues to tolerate the Ibrance well.  She has not had any fevers, body aches, chills.  No coughing or shortness of breath.  Her bowels have been moving regularly.  She continues to struggle with eating healthy, and knows that eating unhealthy foods is a trigger for her when she is stressed.  She has not been taking her blood sugars regularly, but does have a follow-up with diabetic educator coming up this week in hopes to improve.   She is not having any pain.     Friday will be day one of Ibrance.  OBJECTIVE DATA:  Physical Exam:  /75   Pulse 91   Temp 98.2  F (36.8  C) (Oral)   Resp 18   Wt 146.4 kg (322 lb 12.8 oz)   SpO2 96%   BMI 47.67 kg/m      Wt Readings from Last 4 Encounters:   03/02/22 146.4 kg (322 lb 12.8 oz)   11/18/21 147.9 kg (326 lb)   08/24/21 148.7 kg (327 lb 14.4 oz)   01/28/21 145.2 kg (320 lb)         VS: Above vitals reviewed  General: Resting comfortably in no acute distress  Head: Normocephalic, atraumatic   Heart: Regular rate and rhythm, no murmurs  Lung: Normal respiratory effort. Clear to auscultation bilaterally. No wheezes, rhonchi, or crackles   Neuro: AAO x3. Gait is steady. Moving all extremities   Extremities: No lower extremity edema  Skin: Warm, dry, intact. No rashes, no suspicious lesions. No petechia or bruising noted       Labs:   Most Recent 3 CBC's:  Recent Labs   Lab Test 03/02/22  1509 02/01/22  1232 01/03/22  1404   WBC 3.0* 2.4* 3.2*   HGB 13.5 13.2 13.2   MCV 96 96 98    139* 148*    Most Recent 3 BMP's:  Recent Labs   Lab Test 03/02/22  1509 02/01/22  1232 01/03/22  1404    138 138   POTASSIUM 4.0 4.0 4.3   CHLORIDE 106 107 104   CO2 25 25 29   BUN 11 12 10   CR 0.98 1.05* 1.01   ANIONGAP 7 6 5   GIOVANNY 9.3 9.3 9.8   * 133* 117*    Most Recent 2 LFT's:  Recent Labs   Lab  Test 03/02/22  1509 02/01/22  1232   AST 38 34   ALT 43 39   ALKPHOS 78 69   BILITOTAL 0.5 0.5      I reviewed the above labs today.      Imaging:  CT CAP 2/24/2022  IMPRESSION: This patient with a HISTORY of inflammatory breast cancer:  1. No CT evidence of metastatic disease in the chest, abdomen, or  pelvis.  2. No new or enlarging pulmonary nodule.  3. Hepatomegaly with marked hepatic steatosis. No focal mass.     I have personally reviewed the examination and initial interpretation  and I agree with the findings.     CLINT BOYD MD      CT Neck 2/24/2022  Impression: Motion degraded exam.  Compared to 11/10/2021 dated CT, borderline enlarged left cervical  lymph nodes slightly increased. No new lymphadenopathy.     ANATOLY KHAN MD            ASSESSMENT AND PLAN:  1. Recurrent, metastatic ER-positive, HER2 negative breast cancer: History of left breast cancer s/p lumpectomy and SNLB in 2014. S/p 4 cycles ddAC and 12 weeks of Taxol. On AI from 11/2014-2/2017. Recurrence on PET/CT scan performed 08/03/2018 and then biopsy to left posterior cervical lymph nodes, aorticopulmonary, and prevascular lymph nodes were positive. No evidence of metastatic disease in the abdomen and pelvis or brain.   -Started Ibrance and letrozole on 9/21/18.   - Continues tolerating well aside from some hot flashes and mild joint stiffness.    -Labs are appropriate to continue with Ibrance on Friday.  - Repeat CT CAP in 3 months     2. Restaging Reviewed by Dr. Bhatt, continue current treatment. Imaging every 3 months with CT CAP and neck. Needs steroid premeds due to contrast allergies.     3. R posterior hip pain: Has L5-S1 anterolisthesis and DJD in back. This has resolved. She did not pursue PT.     4.  Diabetes:   - A1C is 6.5, now considered diabetic   -Working with diabetic educator. Encouraged her to start checking home BS again. Has follow up with them in a few weeks  - Has an endocrinology follow up to further address      5.  Depression and anxiety. Managed by PCP    6.  Hypothyroid. Levothyroxine 200 mcg daily. Managed by endocrinology     7. Asthma: Seen by Dr. Hay. Takes advair, montelukast, and albuterol as needed      Follow up: In four weeks with labs.     Patient will call in the interim with any questions or concerns. They voice understanding and agree with the above plan.     25 minutes spent on the date of the encounter doing chart review, review of test results, interpretation of tests, patient visit and documentation     Mandie Encinas PA-C

## 2022-03-02 NOTE — NURSING NOTE
"Oncology Rooming Note    March 2, 2022 3:35 PM   Jade Collins is a 51 year old female who presents for:    Chief Complaint   Patient presents with     Oncology Clinic Visit     Breast Cancer     Initial Vitals: /75   Pulse 91   Temp 98.2  F (36.8  C) (Oral)   Resp 18   Wt 146.4 kg (322 lb 12.8 oz)   SpO2 96%   BMI 47.67 kg/m   Estimated body mass index is 47.67 kg/m  as calculated from the following:    Height as of 11/18/21: 1.753 m (5' 9\").    Weight as of this encounter: 146.4 kg (322 lb 12.8 oz). Body surface area is 2.67 meters squared.  No Pain (0) Comment: Data Unavailable   No LMP recorded. Patient is postmenopausal.  Allergies reviewed: Yes  Medications reviewed: Yes    Medications: Medication refills not needed today.  Pharmacy name entered into "[employer]:    [facility] - [place], [place] - [address]  [id number] [place], [place] - [address] [facility]    Clinical concerns: Would like to go over CT scans       Jamila Mac LPN            "

## 2022-03-08 NOTE — TELEPHONE ENCOUNTER
RECORDS RECEIVED FROM: internal    DATE RECEIVED: 5.19.22    NOTES (FOR ALL VISITS) STATUS DETAILS   OFFICE NOTES from referring provider internal  Wilfredo Bhatt MD   OFFICE NOTES from other specialist internal  1.10.22 Donny      ED NOTES     OPERATIVE REPORT  (thyroid, pituitary, adrenal, parathyroid)     MEDICATION LIST internal     IMAGING      DEXASCAN     MRI (BRAIN)     XR (Chest) internal  5.10.20   CT (HEAD/NECK/CHEST/ABDOMEN) internal  2.24.22, 11.10.21, 8/11/21, 5.11.21, 2.16.21 more in epic    NUCLEAR      ULTRASOUND (HEAD/NECK)     LABS     DIABETES: HBGA1C, CREATININE, FASTING LIPIDS, MICROALBUMIN URINE, POTASSIUM, TSH, T4    THYROID: TSH, T4, CBC, THYRODLONULIN, TOTAL T3, FREE T4, CALCITONIN, CEA internal

## 2022-03-14 ENCOUNTER — VIRTUAL VISIT (OUTPATIENT)
Dept: EDUCATION SERVICES | Facility: CLINIC | Age: 52
End: 2022-03-14
Payer: COMMERCIAL

## 2022-03-14 ENCOUNTER — MYC MEDICAL ADVICE (OUTPATIENT)
Dept: ENDOCRINOLOGY | Facility: CLINIC | Age: 52
End: 2022-03-14

## 2022-03-14 DIAGNOSIS — E11.9 TYPE 2 DIABETES MELLITUS (H): ICD-10-CM

## 2022-03-14 DIAGNOSIS — E03.9 PRIMARY HYPOTHYROIDISM: Primary | ICD-10-CM

## 2022-03-14 PROCEDURE — 97803 MED NUTRITION INDIV SUBSEQ: CPT | Mod: TEL | Performed by: NUTRITIONIST

## 2022-03-18 NOTE — PROGRESS NOTES
"Jade is a 51 year old who is being evaluated via a billable telephone visit.      What phone number would you like to be contacted at? Number in EMR  How would you like to obtain your AVS? Soledad    Phone call duration: 30 minutes    Diabetes Self-Management Education & Support    Presents for:  Follow up  Patient last seen for prediabetes, now considered type 2 after A1C of 6.5%    SUBJECTIVE/OBJECTIVE:     Cultural Influences/Ethnic Background:  Not  or     Diabetes Symptoms & Complications:          Patient Problem List and Family Medical History reviewed for relevant medical history, current medical status, and diabetes risk factors.    Vitals:  There were no vitals taken for this visit.  Estimated body mass index is 47.67 kg/m  as calculated from the following:    Height as of 11/18/21: 1.753 m (5' 9\").    Weight as of 3/2/22: 146.4 kg (322 lb 12.8 oz).   Last 3 BP:   BP Readings from Last 3 Encounters:   03/02/22 106/75   11/18/21 138/88   08/24/21 126/86       History   Smoking Status     Never Smoker   Smokeless Tobacco     Never Used       Labs:  Lab Results   Component Value Date    A1C 6.5 03/02/2022    A1C 5.8 06/09/2021     Lab Results   Component Value Date     03/02/2022    GLC 96 06/09/2021     No results found for: LDL  No results found for: HDL]  GFR Estimate   Date Value Ref Range Status   03/02/2022 70 >60 mL/min/1.73m2 Final     Comment:     Effective December 21, 2021 eGFRcr in adults is calculated using the 2021 CKD-EPI creatinine equation which includes age and gender (Diana ray al., NEJM, DOI: 10.1056/SLQUoa8884391)   06/09/2021 65 >60 mL/min/[1.73_m2] Final     Comment:     Non  GFR Calc  Starting 12/18/2018, serum creatinine based estimated GFR (eGFR) will be   calculated using the Chronic Kidney Disease Epidemiology Collaboration   (CKD-EPI) equation.       GFR Estimate If Black   Date Value Ref Range Status   06/09/2021 76 >60 mL/min/[1.73_m2] Final "     Comment:      GFR Calc  Starting 12/18/2018, serum creatinine based estimated GFR (eGFR) will be   calculated using the Chronic Kidney Disease Epidemiology Collaboration   (CKD-EPI) equation.       Lab Results   Component Value Date    CR 0.98 03/02/2022    CR 1.00 06/09/2021     No results found for: MICROALBUMIN    Healthy Eating:  Doing some binging with foods in the house. States I need to shop better - meat eggs vegetables. .  She feels she needs more of a routine for her meal timing. If she skips a meal she tends to overeat later.     Being Active:  Doing some walking in the halls of her apartment complex    Monitoring:  Was monitoring back in January and then stopped. Wasn't sure what to do with the number or what they meant.   Fasting 136, 122, 146, 128, 116  Post Prandial 178, 121, 149, 117    Healthy Coping:     Patient Activation Measure Survey Score:  No flowsheet data found.    Diabetes knowledge and skills assessment:     Patient needs further education on the following diabetes management concepts: Patient now needs full diabetes education    Based on learning assessment above, most appropriate setting for further diabetes education would be: Individual setting.      INTERVENTIONS:    Education provided today on:  AADE Self-Care Behaviors:  Healthy Eating: consistency in amount, composition, and timing of food intake and portion control  Monitoring: purpose, log and interpret results, individual blood glucose targets, frequency of monitoring  Healthy Coping: recognize feelings about diagnosis and benefits of making appropriate lifestyle changes  What is A1C, goal, how often to check    Opportunities for ongoing education and support in diabetes-self management were discussed.    Pt verbalized understanding of concepts discussed and recommendations provided today.       Education Materials Provided:  No new materials provided today      ASSESSMENT:    Patient's most recent   Lab  Results   Component Value Date    A1C 6.5 03/02/2022    A1C 5.8 06/09/2021    is meeting goal of <7.0    PLAN  See Patient Instructions for co-developed, patient-stated behavior change goals.  Start checking glucose 1-2 times per day. Fasting and 2 hours after a meal.  Keep foods that are hard to control out of the house.  Increase vegetables.  Be aware of carbohydrate intake  Continue walking halls daily  Approximate meal timing 9am BF, 2pm L, 7pm D, snacks in between as needed  Follow up in 4-6 weeks  Time Spent: 30 minutes  Encounter Type: Individual    Any diabetes medication dose changes were made via the CDE Protocol and Collaborative Practice Agreement with the patient's referring provider. A copy of this encounter was shared with the provider.

## 2022-03-29 ENCOUNTER — LAB (OUTPATIENT)
Dept: LAB | Facility: CLINIC | Age: 52
End: 2022-03-29
Payer: COMMERCIAL

## 2022-03-29 ENCOUNTER — TELEPHONE (OUTPATIENT)
Dept: ONCOLOGY | Facility: CLINIC | Age: 52
End: 2022-03-29

## 2022-03-29 DIAGNOSIS — Z17.0 MALIGNANT NEOPLASM OF UPPER-OUTER QUADRANT OF LEFT BREAST IN FEMALE, ESTROGEN RECEPTOR POSITIVE (H): ICD-10-CM

## 2022-03-29 DIAGNOSIS — C50.412 MALIGNANT NEOPLASM OF UPPER-OUTER QUADRANT OF LEFT BREAST IN FEMALE, ESTROGEN RECEPTOR POSITIVE (H): ICD-10-CM

## 2022-03-29 DIAGNOSIS — R73.9 HYPERGLYCEMIA: ICD-10-CM

## 2022-03-29 LAB
ALBUMIN SERPL-MCNC: 3.8 G/DL (ref 3.4–5)
ALP SERPL-CCNC: 70 U/L (ref 40–150)
ALT SERPL W P-5'-P-CCNC: 38 U/L (ref 0–50)
ANION GAP SERPL CALCULATED.3IONS-SCNC: 7 MMOL/L (ref 3–14)
AST SERPL W P-5'-P-CCNC: 32 U/L (ref 0–45)
BASOPHILS # BLD MANUAL: 0 10E3/UL (ref 0–0.2)
BASOPHILS NFR BLD MANUAL: 0 %
BILIRUB SERPL-MCNC: 0.4 MG/DL (ref 0.2–1.3)
BUN SERPL-MCNC: 9 MG/DL (ref 7–30)
CALCIUM SERPL-MCNC: 9.6 MG/DL (ref 8.5–10.1)
CANCER AG27-29 SERPL-ACNC: 17 U/ML (ref 0–39)
CEA SERPL-MCNC: 0.7 UG/L (ref 0–2.5)
CHLORIDE BLD-SCNC: 107 MMOL/L (ref 94–109)
CO2 SERPL-SCNC: 26 MMOL/L (ref 20–32)
CREAT SERPL-MCNC: 0.96 MG/DL (ref 0.52–1.04)
EOSINOPHIL # BLD MANUAL: 0 10E3/UL (ref 0–0.7)
EOSINOPHIL NFR BLD MANUAL: 0 %
ERYTHROCYTE [DISTWIDTH] IN BLOOD BY AUTOMATED COUNT: 13.4 % (ref 10–15)
GFR SERPL CREATININE-BSD FRML MDRD: 71 ML/MIN/1.73M2
GLUCOSE BLD-MCNC: 138 MG/DL (ref 70–99)
HBA1C MFR BLD: 6.5 % (ref 0–5.6)
HCT VFR BLD AUTO: 37 % (ref 35–47)
HGB BLD-MCNC: 12.8 G/DL (ref 11.7–15.7)
LYMPHOCYTES # BLD MANUAL: 1.1 10E3/UL (ref 0.8–5.3)
LYMPHOCYTES NFR BLD MANUAL: 39 %
MCH RBC QN AUTO: 33.3 PG (ref 26.5–33)
MCHC RBC AUTO-ENTMCNC: 34.6 G/DL (ref 31.5–36.5)
MCV RBC AUTO: 96 FL (ref 78–100)
MONOCYTES # BLD MANUAL: 0.2 10E3/UL (ref 0–1.3)
MONOCYTES NFR BLD MANUAL: 7 %
NEUTROPHILS # BLD MANUAL: 1.5 10E3/UL (ref 1.6–8.3)
NEUTROPHILS NFR BLD MANUAL: 54 %
PLAT MORPH BLD: ABNORMAL
PLATELET # BLD AUTO: 139 10E3/UL (ref 150–450)
POTASSIUM BLD-SCNC: 4.2 MMOL/L (ref 3.4–5.3)
PROT SERPL-MCNC: 7.5 G/DL (ref 6.8–8.8)
RBC # BLD AUTO: 3.84 10E6/UL (ref 3.8–5.2)
RBC MORPH BLD: ABNORMAL
SODIUM SERPL-SCNC: 140 MMOL/L (ref 133–144)
WBC # BLD AUTO: 2.8 10E3/UL (ref 4–11)

## 2022-03-29 PROCEDURE — 36415 COLL VENOUS BLD VENIPUNCTURE: CPT

## 2022-03-29 PROCEDURE — 86300 IMMUNOASSAY TUMOR CA 15-3: CPT

## 2022-03-29 PROCEDURE — 82378 CARCINOEMBRYONIC ANTIGEN: CPT

## 2022-03-29 PROCEDURE — 80053 COMPREHEN METABOLIC PANEL: CPT

## 2022-03-29 PROCEDURE — 85027 COMPLETE CBC AUTOMATED: CPT

## 2022-03-29 PROCEDURE — 83036 HEMOGLOBIN GLYCOSYLATED A1C: CPT

## 2022-03-29 NOTE — TELEPHONE ENCOUNTER
Oral Chemotherapy Monitoring Program     Placed call to patient in follow up of oral chemotherapy. Left message requesting call back. No drug names were mentioned. Will update when response received.     Fang Barba  4th Year Pharmacy Student  [employer]

## 2022-03-29 NOTE — ORAL ONC MGMT
Oral Chemotherapy Monitoring Program    Subjective/Objective:  Jade Collins is a 52 year old female contacted by phone for a follow-up visit for oral chemotherapy.  Jade confirms taking the appropriate dose of Ibrance 100mg daily for 21 days on and 7 days off of 28 day cycle. Denies new or worsening side effects, missed doses, and recent hospital or ED visits. Patient has not had any recent medication changes.     ORAL CHEMOTHERAPY 12/10/2021 1/5/2022 1/6/2022 2/4/2022 2/16/2022 3/29/2022 3/29/2022   Assessment Type Lab Monitoring Quarterly Follow up Chart Review Chart Review Refill Left Voicemail Incoming phone call;Quarterly Follow up   Diagnosis Code Breast Cancer Breast Cancer Breast Cancer Breast Cancer Breast Cancer Breast Cancer Breast Cancer   Providers Dr. Nova Bhatt   [facility]   Drug Name Ibrance (palbociclib) Ibrance (palbociclib) Ibrance (palbociclib) Ibrance (palbociclib) Ibrance (palbociclib) Ibrance (palbociclib) Ibrance (palbociclib)   Dose 100 mg 100 mg 100 mg 100 mg 100 mg 100 mg 100 mg   Current Schedule Daily Daily Daily Daily Daily Daily Daily   Cycle Details 3 weeks on, 1 week off 3 weeks on, 1 week off 3 weeks on, 1 week off 3 weeks on, 1 week off 3 weeks on, 1 week off 3 weeks on, 1 week off 3 weeks on, 1 week off   Start Date of Last Cycle 11/5/2021 - - - - - 3/4/2022   Planned next cycle start date 12/10/2021 1/7/2021 - - - - 4/1/2022   Doses missed in last 2 weeks 0 0 - - - - 0   Adherence Assessment Adherent Adherent - - - - Adherent   Adverse Effects Neutropenia No AE identified during assessment - - - - No AE identified during assessment   Nausea - - - - - - -   Pharmacist Intervention(nausea) - - - - - - -   Diarrhea - - - - - - -   Pharmacist Intervention(diarrhea) - - - - - - -   Oral Mucositis - - - - - - -   Pharmacist Intervention(oral mucositis) - - - - -  "- -   Fatigue - - - - - - -   Pharmacist Intervention(fatigue) - - - - - - -   Neutropenia Grade 1 - - - - - -   Pharmacist Intervention(neutropenia) No - - - - - -   Intervention(s) - - - - - - -   Home BPs - - - - - - -   Any new drug interactions? No No - - - - No   Pharmacist Intervention? - - - - - - -   Intervention(s) - - - - - - -   Is the dose as ordered appropriate for the patient? Yes Yes - - - - Yes   Is the patient currently in pain? - - - - - - -   Does the patient feel the pain is currently being managed by a provider? - - - - - - -   Has the patient been assessed within the past 6 months for depression? - - - - - - -   Has the patient missed any days of school, work, or other routine activity? - - - - - - -   Since the last time we talked, have you been hospitalized or used the emergency room? No No - - - - No     Last PHQ-2 Score on record:   PHQ-2 ( 1999 Pfizer) 9/18/2018   Q1: Little interest or pleasure in doing things 1   Q2: Feeling down, depressed or hopeless 1   PHQ-2 Score 2     Vitals:  BP:   BP Readings from Last 1 Encounters:   03/02/22 106/75     Wt Readings from Last 1 Encounters:   03/02/22 146.4 kg (322 lb 12.8 oz)     Estimated body surface area is 2.67 meters squared as calculated from the following:    Height as of 11/18/21: 1.753 m (5' 9\").    Weight as of 3/2/22: 146.4 kg (322 lb 12.8 oz).    Labs:  _  Result Component Current Result Ref Range   Sodium 140 (3/29/2022) 133 - 144 mmol/L   _  Result Component Current Result Ref Range   Potassium 4.2 (3/29/2022) 3.4 - 5.3 mmol/L   _  Result Component Current Result Ref Range   Calcium 9.6 (3/29/2022) 8.5 - 10.1 mg/dL     Result Component Current Result Ref Range   Albumin 3.8 (3/29/2022) 3.4 - 5.0 g/dL   _  Result Component Current Result Ref Range   Urea Nitrogen 9 (3/29/2022) 7 - 30 mg/dL   _  Result Component Current Result Ref Range   Creatinine 0.96 (3/29/2022) 0.52 - 1.04 mg/dL   _  Result Component Current Result Ref Range "   AST 32 (3/29/2022) 0 - 45 U/L   _  Result Component Current Result Ref Range   ALT 38 (3/29/2022) 0 - 50 U/L   _  Result Component Current Result Ref Range   Bilirubin Total 0.4 (3/29/2022) 0.2 - 1.3 mg/dL   _  Result Component Current Result Ref Range   WBC Count 2.8 (L) (3/29/2022) 4.0 - 11.0 10e3/uL   _  Result Component Current Result Ref Range   Hemoglobin 12.8 (3/29/2022) 11.7 - 15.7 g/dL   _  Result Component Current Result Ref Range   Platelet Count 139 (L) (3/29/2022) 150 - 450 10e3/uL   _  Result Component Current Result Ref Range   Absolute Neutrophils 1.5 (L) (3/29/2022) 1.6 - 8.3 10e3/uL     Assessment/Plan:  ANC was 1.5 on 3/29, reviewed labs with Jade. Denies new or changed side effects. Continue plan of care. Next cycle starts Friday 4/1.    Adherence: PDC= 0.97, refills on time, no concerns about adherence. She has zero tablets on hand.    Follow-Up:  3/30: review appointment     Refill Due:  [facility] will deliver refill Thursday 3/31    Jose Swain, PharmD  Hematology/Oncology Clinical Pharmacist  [facility]  [facility]  [phone]

## 2022-03-30 ENCOUNTER — VIRTUAL VISIT (OUTPATIENT)
Dept: ONCOLOGY | Facility: CLINIC | Age: 52
End: 2022-03-30
Attending: PHYSICIAN ASSISTANT
Payer: COMMERCIAL

## 2022-03-30 DIAGNOSIS — J45.20 MILD INTERMITTENT ASTHMA WITHOUT COMPLICATION: ICD-10-CM

## 2022-03-30 DIAGNOSIS — Z17.0 MALIGNANT NEOPLASM OF UPPER-OUTER QUADRANT OF LEFT BREAST IN FEMALE, ESTROGEN RECEPTOR POSITIVE (H): Primary | ICD-10-CM

## 2022-03-30 DIAGNOSIS — C50.412 MALIGNANT NEOPLASM OF UPPER-OUTER QUADRANT OF LEFT BREAST IN FEMALE, ESTROGEN RECEPTOR POSITIVE (H): Primary | ICD-10-CM

## 2022-03-30 PROBLEM — E11.9 DIABETES MELLITUS, TYPE 2 (H): Status: ACTIVE | Noted: 2022-03-30

## 2022-03-30 PROCEDURE — 99213 OFFICE O/P EST LOW 20 MIN: CPT | Mod: GT | Performed by: PHYSICIAN ASSISTANT

## 2022-03-30 RX ORDER — ALBUTEROL SULFATE 90 UG/1
2 AEROSOL, METERED RESPIRATORY (INHALATION) EVERY 6 HOURS
Qty: 6.7 G | Refills: 1 | Status: SHIPPED | OUTPATIENT
Start: 2022-03-30 | End: 2022-05-25

## 2022-03-30 NOTE — LETTER
3/30/2022         RE: Jade Collins  [address]  [facility] [address]      Jade is a 52 year old who is being evaluated via a billable video visit.       How would you like to obtain your AVS? Soledad  If the video visit is dropped, the invitation should be resent by: Text to cell phone: [phone]  Will anyone else be joining your video visit? Anya Alexandre    Start time:1:56pm  End time:2:07 PM  Patient location: Home  Provider location: [facility]  Platform: [employer]         HISTORY OF PRESENT ILLNESS:  Jade is a 21-year-old woman with metastatic breast cancer        Jade was diagnosed with breast cancer with a lump found in the upper-inner quadrant of the left breast.  She was diagnosed in 12/2013 in the [place] area and Dr. David Redd was her medical oncologist.  Biopsy of the tumor showed it to be ER positive, KS positive, and HER-2 negative.  We do not have any of the original pathology and we need to obtain those reports.  She underwent a lumpectomy performed by Dr. Tasha Segura who is a surgeon in the [place] area.  She also had a sentinel lymph node which was noted to be involved with tumor but there was no lymph node dissection done at that time.  [id number].  She subsequently underwent adjuvant chemotherapy with dose-dense AC for 4 cycles and Taxol for 12 weeks, completed 09/11/2014.        She then had radiation therapy post lumpectomy to the left breast, which was completed 11/23/2014 by Dr. Manley.        She was then begun on an aromatase inhibitor, the name of which she does not remember.  The aromatase inhibitor therapy was begun in 11/2014.  She was then switched within about a month or so to anastrozole and remained on anastrozole from 11/2014-02/2017.  She has never received tamoxifen.        She then moved to [place], [place] in 03/2017.  She saw a gynecologist there and underwent a IRIS/BSO by Dr. Zendejas.  She then also saw Dr. Linares in [place], [place],  who is an oncologist.  His interpretation of the pathology report was that she had triple-negative breast cancer.  It is possible that she may have had low estrogen receptor positivity, but the anastrozole was then discontinued in 02/2017.  She then underwent the IRIS/BSO in 03/2017.  This was done laparoscopically.        She then remained off of all hormonal therapy and in 09/2017 moved to [place].  She remained off hormonal therapy and did not have Medical Oncology followup initially.        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  She then went to see Dr. Norberto Brand who performed a PET/CT scan and the patient also underwent a brain MRI for staging.  The PET/CT scan performed 08/03/2018 showed in the neck there were several mildly metabolic active and large prominent left posterior cervical lymph nodes.  The largest is located posterior to the left sternocleidomastoid muscle and measures 1.5 x 1 cm in size.  This demonstrate modest FDG uptake.  The other lymph nodes are smaller and demonstrate mild FDG uptake.  The prominent prevascular and aortopulmonary lymph nodes were also seen on the contrast-enhanced scan were less well seen on the PET.  The largest lymph node visualized on the PET scan was 1.1 x 0.8 cm and demonstrated mild FDG uptake.  This was in the periaortic area just anterior to the aortic arch.  No lung nodules.  No lung infiltrates.  No pleural effusion.  There was no uptake of FDG in the bones.  In summary, there were mildly enlarged left posterior cervical lymph nodes, largest measuring 1.5 x 1.0 cm and mildly enlarged aorticopulmonary and prevascular lymph nodes that were noted on contrast-enhanced CT scan seen less well on the PET scan.  No evidence of metastatic disease in the abdomen and pelvis.  She also underwent a brain MRI that showed no evidence of intracranial metastatic disease.  She underwent a biopsy of one of the lymph nodes in the left neck which showed  metastatic adenocarcinoma consistent with breast origin, which was estrogen-receptor positive.  The tumor cells were positive for CK7, ER and NE consistent with metastatic breast carcinoma.  Estrogen receptor showed strong average nuclear intensity in 95% of carcinoma cells, progesterone receptor moderate average nuclear intensity in 70% of the carcinoma.  A GATA3 stain was apparently not performed.       TREATMENT HISTORY:  A. Tamoxifen  B. Letrozole and palbociclib started 9-18-18.  C. Pfizer COVID vaccination end of March, 2021 and 4-17.     INTERVAL HISTORY:   Today, Jade states that she has been feeling well.  She has not had any changes in her health since she was last seen.  She continues to tolerate the Ibrance without any significant side effects.  She has been noticing that when she babysat her friend's cat, but her asthma has been acting up on.  Symptoms resolve with her albuterol inhaler.  She denies any fevers, body aches, chills.  Her bowels have been moving regularly.  No headaches or double or blurry vision.  No new sites of pain.  She feels that her energy level has been stable.  She has been working on her diet to try and improve her sugars.  She has an upcoming appointment with endocrinologist and diabetic educator.      Friday will be day one of Ibrance.  OBJECTIVE DATA:  Physical Exam:  General: Resting comfortably in no acute distress  Head: Normocephalic, atraumatic   EENT: No scleral icteris, EOMS intact.   Lung: Normal respiratory effort. Speaking fluently without shortness of breath. No audible wheezes or coughing.   Neuro: AAO x3. Moving upper extremities equally and symmetrically   Skin: Warm, dry, intact. No rashes, no suspicious lesions. No petechia or bruising noted on visible skin     The rest of a comprehensive physical examination is deferred due to PHE (public health emergency) video restrictions          Labs:   Most Recent 3 CBC's:        Recent Labs   Lab Test 03/02/22  1509  02/01/22  1232 01/03/22  1404   WBC 3.0* 2.4* 3.2*   HGB 13.5 13.2 13.2   MCV 96 96 98    139* 148*    Most Recent 3 BMP's:        Recent Labs   Lab Test 03/02/22  1509 02/01/22  1232 01/03/22  1404    138 138   POTASSIUM 4.0 4.0 4.3   CHLORIDE 106 107 104   CO2 25 25 29   BUN 11 12 10   CR 0.98 1.05* 1.01   ANIONGAP 7 6 5   GIOVANNY 9.3 9.3 9.8   * 133* 117*    Most Recent 2 LFT's:       Recent Labs   Lab Test 03/02/22  1509 02/01/22  1232   AST 38 34   ALT 43 39   ALKPHOS 78 69   BILITOTAL 0.5 0.5      I reviewed the above labs today.        Imaging:No new imaging         ASSESSMENT AND PLAN:  1. Recurrent, metastatic ER-positive, HER2 negative breast cancer: History of left breast cancer s/p lumpectomy and SNLB in 2014. S/p 4 cycles ddAC and 12 weeks of Taxol. On AI from 11/2014-2/2017. Recurrence on PET/CT scan performed 08/03/2018 and then biopsy to left posterior cervical lymph nodes, aorticopulmonary, and prevascular lymph nodes were positive. No evidence of metastatic disease in the abdomen and pelvis or brain.   -Started Ibrance and letrozole on 9/21/18.   - Continues tolerating well aside from some hot flashes and mild joint stiffness.    - Repeat CT CAP in 2 months   - Labs are appropriate to proceed with next Ibrance  cycle on Friday     2. Restaging  Imaging every 3 months with CT CAP and neck. Needs steroid premeds due to contrast allergies. Will prescribe at next visit      3. R posterior hip pain: Has L5-S1 anterolisthesis and DJD in back. This has resolved. She did not pursue PT.      4.  Diabetes:   - A1C is 6.5, now considered diabetic   -Working with diabetic educator.  - Has started checking her sugars more frequently and is making lifestyle changes   - Has an endocrinology follow up to further address      5.  Depression and anxiety. Managed by PCP     6.  Hypothyroid. Levothyroxine 200 mcg daily. Managed by endocrinology      7. Asthma: Seen by Dr. Hay. Takes toya  montelukast, and albuterol as needed. Is running out of albuterol, refilled today        Patient will call in the interim with any questions or concerns. They voice understanding and agree with the above plan.         Mandie Encinas PA-C

## 2022-03-30 NOTE — PROGRESS NOTES
Jade is a 52 year old who is being evaluated via a billable video visit.      How would you like to obtain your AVS? MyChart  If the video visit is dropped, the invitation should be resent by: Text to cell phone: [phone]  Will anyone else be joining your video visit? No  {If patient encounters technical issues they should call [phone] :[phone]}    Niurka Alexandre

## 2022-03-30 NOTE — PROGRESS NOTES
Jade is a 52 year old who is being evaluated via a billable video visit.       How would you like to obtain your AVS? Smitahart  If the video visit is dropped, the invitation should be resent by: Text to cell phone: [phone]  Will anyone else be joining your video visit? Anya Alexandre    Start time:1:56pm  End time:2:07 PM  Patient location: Home  Provider location: [facility]  Platform: [employer]         HISTORY OF PRESENT ILLNESS:  Jade is a 21-year-old woman with metastatic breast cancer        Jade was diagnosed with breast cancer with a lump found in the upper-inner quadrant of the left breast.  She was diagnosed in 12/2013 in the [place] area and Dr. David Redd was her medical oncologist.  Biopsy of the tumor showed it to be ER positive, NV positive, and HER-2 negative.  We do not have any of the original pathology and we need to obtain those reports.  She underwent a lumpectomy performed by Dr. Tasha Segura who is a surgeon in the [place] area.  She also had a sentinel lymph node which was noted to be involved with tumor but there was no lymph node dissection done at that time.  [id number].  She subsequently underwent adjuvant chemotherapy with dose-dense AC for 4 cycles and Taxol for 12 weeks, completed 09/11/2014.        She then had radiation therapy post lumpectomy to the left breast, which was completed 11/23/2014 by Dr. Manley.        She was then begun on an aromatase inhibitor, the name of which she does not remember.  The aromatase inhibitor therapy was begun in 11/2014.  She was then switched within about a month or so to anastrozole and remained on anastrozole from 11/2014-02/2017.  She has never received tamoxifen.        She then moved to [place], [place] in 03/2017.  She saw a gynecologist there and underwent a IRIS/BSO by Dr. Zendejas.  She then also saw Dr. Linares in [place], [place], who is an oncologist.  His interpretation of the pathology report was that she had  triple-negative breast cancer.  It is possible that she may have had low estrogen receptor positivity, but the anastrozole was then discontinued in 02/2017.  She then underwent the IRIS/BSO in 03/2017.  This was done laparoscopically.        She then remained off of all hormonal therapy and in 09/2017 moved to [place].  She remained off hormonal therapy and did not have Medical Oncology followup initially.        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  She then went to see Dr. Norberto Brand who performed a PET/CT scan and the patient also underwent a brain MRI for staging.  The PET/CT scan performed 08/03/2018 showed in the neck there were several mildly metabolic active and large prominent left posterior cervical lymph nodes.  The largest is located posterior to the left sternocleidomastoid muscle and measures 1.5 x 1 cm in size.  This demonstrate modest FDG uptake.  The other lymph nodes are smaller and demonstrate mild FDG uptake.  The prominent prevascular and aortopulmonary lymph nodes were also seen on the contrast-enhanced scan were less well seen on the PET.  The largest lymph node visualized on the PET scan was 1.1 x 0.8 cm and demonstrated mild FDG uptake.  This was in the periaortic area just anterior to the aortic arch.  No lung nodules.  No lung infiltrates.  No pleural effusion.  There was no uptake of FDG in the bones.  In summary, there were mildly enlarged left posterior cervical lymph nodes, largest measuring 1.5 x 1.0 cm and mildly enlarged aorticopulmonary and prevascular lymph nodes that were noted on contrast-enhanced CT scan seen less well on the PET scan.  No evidence of metastatic disease in the abdomen and pelvis.  She also underwent a brain MRI that showed no evidence of intracranial metastatic disease.  She underwent a biopsy of one of the lymph nodes in the left neck which showed metastatic adenocarcinoma consistent with breast origin, which was  estrogen-receptor positive.  The tumor cells were positive for CK7, ER and TN consistent with metastatic breast carcinoma.  Estrogen receptor showed strong average nuclear intensity in 95% of carcinoma cells, progesterone receptor moderate average nuclear intensity in 70% of the carcinoma.  A GATA3 stain was apparently not performed.       TREATMENT HISTORY:  A. Tamoxifen  B. Letrozole and palbociclib started 9-18-18.  C. Pfizer COVID vaccination end of March, 2021 and 4-17.     INTERVAL HISTORY:   Today, Jade states that she has been feeling well.  She has not had any changes in her health since she was last seen.  She continues to tolerate the Ibrance without any significant side effects.  She has been noticing that when she babysat her friend's cat, but her asthma has been acting up on.  Symptoms resolve with her albuterol inhaler.  She denies any fevers, body aches, chills.  Her bowels have been moving regularly.  No headaches or double or blurry vision.  No new sites of pain.  She feels that her energy level has been stable.  She has been working on her diet to try and improve her sugars.  She has an upcoming appointment with endocrinologist and diabetic educator.      Friday will be day one of Ibrance.  OBJECTIVE DATA:  Physical Exam:  General: Resting comfortably in no acute distress  Head: Normocephalic, atraumatic   EENT: No scleral icteris, EOMS intact.   Lung: Normal respiratory effort. Speaking fluently without shortness of breath. No audible wheezes or coughing.   Neuro: AAO x3. Moving upper extremities equally and symmetrically   Skin: Warm, dry, intact. No rashes, no suspicious lesions. No petechia or bruising noted on visible skin     The rest of a comprehensive physical examination is deferred due to PHE (public health emergency) video restrictions          Labs:   Most Recent 3 CBC's:        Recent Labs   Lab Test 03/02/22  1509 02/01/22  1232 01/03/22  1404   WBC 3.0* 2.4* 3.2*   HGB 13.5 13.2  13.2   MCV 96 96 98    139* 148*    Most Recent 3 BMP's:        Recent Labs   Lab Test 03/02/22  1509 02/01/22  1232 01/03/22  1404    138 138   POTASSIUM 4.0 4.0 4.3   CHLORIDE 106 107 104   CO2 25 25 29   BUN 11 12 10   CR 0.98 1.05* 1.01   ANIONGAP 7 6 5   GIOVANNY 9.3 9.3 9.8   * 133* 117*    Most Recent 2 LFT's:       Recent Labs   Lab Test 03/02/22  1509 02/01/22  1232   AST 38 34   ALT 43 39   ALKPHOS 78 69   BILITOTAL 0.5 0.5      I reviewed the above labs today.        Imaging:No new imaging         ASSESSMENT AND PLAN:  1. Recurrent, metastatic ER-positive, HER2 negative breast cancer: History of left breast cancer s/p lumpectomy and SNLB in 2014. S/p 4 cycles ddAC and 12 weeks of Taxol. On AI from 11/2014-2/2017. Recurrence on PET/CT scan performed 08/03/2018 and then biopsy to left posterior cervical lymph nodes, aorticopulmonary, and prevascular lymph nodes were positive. No evidence of metastatic disease in the abdomen and pelvis or brain.   -Started Ibrance and letrozole on 9/21/18.   - Continues tolerating well aside from some hot flashes and mild joint stiffness.    - Repeat CT CAP in 2 months   - Labs are appropriate to proceed with next Ibrance  cycle on Friday     2. Restaging  Imaging every 3 months with CT CAP and neck. Needs steroid premeds due to contrast allergies. Will prescribe at next visit      3. R posterior hip pain: Has L5-S1 anterolisthesis and DJD in back. This has resolved. She did not pursue PT.      4.  Diabetes:   - A1C is 6.5, now considered diabetic   -Working with diabetic educator.  - Has started checking her sugars more frequently and is making lifestyle changes   - Has an endocrinology follow up to further address      5.  Depression and anxiety. Managed by PCP     6.  Hypothyroid. Levothyroxine 200 mcg daily. Managed by endocrinology      7. Asthma: Seen by Dr. Hay. Takes advair, montelukast, and albuterol as needed. Is running out of albuterol, refilled  today        Patient will call in the interim with any questions or concerns. They voice understanding and agree with the above plan.     Mandie Encinas PA-C

## 2022-03-30 NOTE — NURSING NOTE
Patient confirms medications and allergies are accurate via patients echeck in completion, and or denies any changes since last reviewed/verified.     Niurka Alexandre, Virtual Facilitator

## 2022-04-02 DIAGNOSIS — E66.813 CLASS 3 SEVERE OBESITY DUE TO EXCESS CALORIES WITH SERIOUS COMORBIDITY AND BODY MASS INDEX (BMI) OF 45.0 TO 49.9 IN ADULT (H): ICD-10-CM

## 2022-04-02 DIAGNOSIS — E66.01 CLASS 3 SEVERE OBESITY DUE TO EXCESS CALORIES WITH SERIOUS COMORBIDITY AND BODY MASS INDEX (BMI) OF 45.0 TO 49.9 IN ADULT (H): ICD-10-CM

## 2022-04-02 DIAGNOSIS — R73.03 PREDIABETES: ICD-10-CM

## 2022-04-03 RX ORDER — BLOOD SUGAR DIAGNOSTIC
STRIP MISCELLANEOUS
Qty: 100 STRIP | Refills: 5 | Status: SHIPPED | OUTPATIENT
Start: 2022-04-03

## 2022-04-03 RX ORDER — LANCETS
EACH MISCELLANEOUS
Qty: 100 EACH | Refills: 5 | Status: SHIPPED | OUTPATIENT
Start: 2022-04-03

## 2022-04-03 NOTE — TELEPHONE ENCOUNTER
Lancets, test strips    11/18/2021  [facility] [place]     Adrienne Smart MD    Student in organized health care education/training program         Nv: 5/19/22

## 2022-04-08 ENCOUNTER — TELEPHONE (OUTPATIENT)
Dept: ENDOCRINOLOGY | Facility: CLINIC | Age: 52
End: 2022-04-08
Payer: COMMERCIAL

## 2022-04-08 DIAGNOSIS — E66.01 CLASS 3 SEVERE OBESITY DUE TO EXCESS CALORIES WITH SERIOUS COMORBIDITY AND BODY MASS INDEX (BMI) OF 45.0 TO 49.9 IN ADULT (H): ICD-10-CM

## 2022-04-08 DIAGNOSIS — R73.03 PREDIABETES: ICD-10-CM

## 2022-04-08 DIAGNOSIS — E06.3 HYPOTHYROIDISM DUE TO HASHIMOTO'S THYROIDITIS: Primary | ICD-10-CM

## 2022-04-08 DIAGNOSIS — E66.813 CLASS 3 SEVERE OBESITY DUE TO EXCESS CALORIES WITH SERIOUS COMORBIDITY AND BODY MASS INDEX (BMI) OF 45.0 TO 49.9 IN ADULT (H): ICD-10-CM

## 2022-04-08 RX ORDER — DEXAMETHASONE 1 MG
TABLET ORAL
Qty: 1 TABLET | Refills: 0 | Status: SHIPPED | OUTPATIENT
Start: 2022-04-08 | End: 2022-06-07

## 2022-04-08 NOTE — TELEPHONE ENCOUNTER
Spoke with patient regarding her thyroid test and her AM cortisol     Clarified that cortisol levels was regular serum cortisol and not dex suppression test value, she lost her decadron pill.     Will also repeat TFT and prescribe a new decadron pill 1mg , went over how to get the [place] salivary cortisol swabs before taking the decadron and going for 8 AM blood test next day.

## 2022-04-25 ENCOUNTER — VIRTUAL VISIT (OUTPATIENT)
Dept: EDUCATION SERVICES | Facility: CLINIC | Age: 52
End: 2022-04-25
Payer: COMMERCIAL

## 2022-04-25 DIAGNOSIS — E11.9 DIABETES MELLITUS, TYPE 2 (H): Primary | ICD-10-CM

## 2022-04-25 PROCEDURE — G0108 DIAB MANAGE TRN  PER INDIV: HCPCS | Mod: TEL | Performed by: NUTRITIONIST

## 2022-04-25 NOTE — PROGRESS NOTES
"Jade is a 52 year old who is being evaluated via a billable telephone visit.      What phone number would you like to be contacted at? On file  How would you like to obtain your AVS? Soledad    Phone call duration: 45 minutes    Diabetes Self-Management Education & Support    Presents for:  Follow up Type 2 diabetes    SUBJECTIVE/OBJECTIVE:     Cultural Influences/Ethnic Background:      Diabetes Symptoms & Complications:     Patient Problem List and Family Medical History reviewed for relevant medical history, current medical status, and diabetes risk factors.    Vitals:  There were no vitals taken for this visit.  Estimated body mass index is 47.67 kg/m  as calculated from the following:    Height as of 11/18/21: 1.753 m (5' 9\").    Weight as of 3/2/22: 146.4 kg (322 lb 12.8 oz).   Last 3 BP:   BP Readings from Last 3 Encounters:   03/02/22 106/75   11/18/21 138/88   08/24/21 126/86       History   Smoking Status     Never Smoker   Smokeless Tobacco     Never Used       Labs:  Lab Results   Component Value Date    A1C 6.5 03/29/2022    A1C 5.8 06/09/2021     Lab Results   Component Value Date     03/29/2022    GLC 96 06/09/2021     No results found for: LDL  No results found for: HDL]  GFR Estimate   Date Value Ref Range Status   03/29/2022 71 >60 mL/min/1.73m2 Final     Comment:     Effective December 21, 2021 eGFRcr in adults is calculated using the 2021 CKD-EPI creatinine equation which includes age and gender (Diana ray al., NEJ, DOI: 10.1056/JORNga5396240)   06/09/2021 65 >60 mL/min/[1.73_m2] Final     Comment:     Non  GFR Calc  Starting 12/18/2018, serum creatinine based estimated GFR (eGFR) will be   calculated using the Chronic Kidney Disease Epidemiology Collaboration   (CKD-EPI) equation.       GFR Estimate If Black   Date Value Ref Range Status   06/09/2021 76 >60 mL/min/[1.73_m2] Final     Comment:      GFR Calc  Starting 12/18/2018, serum " creatinine based estimated GFR (eGFR) will be   calculated using the Chronic Kidney Disease Epidemiology Collaboration   (CKD-EPI) equation.       Lab Results   Component Value Date    CR 0.96 2022    CR 1.00 2021     No results found for: MICROALBUMIN    Healthy Eating:  Being more diligent about what she is eating.   Atkins frozen meals seem to give her better numbers.  Trying to eat boiled eggs. Drinking more water.   Trying to quite soda - doing one per day now. Had been having three times that amount before.  Sugar free cookies and chocolates.   Watching portion control.   Went into [employer] and took out all the sweets she had been getting from the options. Helped her realize how much she was getting before.   Not quite on a meal routine yet, two meals per day currently.  Wakes up, lays in bed. Then breakfast/Lunch 2 hard boiled eggs and 2 pieces toast and water. Or lean cuisine chicken fettuccini. Atkins chicken broccoli meal.   Snacking on foods in the evening.   Cooked beef cutlets with rice.   Popcorn for a snack. A couple sugar free cookies.  Stopped ice cream.   Having an ensure carb control some days   She is going to continue to work on consistent meal times    Being Active:  Trying to get walks in the building still.     Monitorin/25 Fasting 126   129 after lunch   fasting 120   fasting 144   after a meal 149   bedtime 171  /18 after a meal 168  /17 after a meal 125  /15 fasting 127  143 after a meal   129 fasting  12 144 after lunch  10 99 after lunch    Taking Medications:   n/a    Healthy Coping:     Patient Activation Measure Survey Score:  No flowsheet data found.    INTERVENTIONS:    Education provided today on:  AADE Self-Care Behaviors:  Monitoring: We had a discussion around how to assess glucose and control    Opportunities for ongoing education and support in diabetes-self management were discussed.    Pt verbalized understanding of concepts  discussed and recommendations provided today.       Education Materials Provided:  No new materials provided today      ASSESSMENT:    Patient's most recent   Lab Results   Component Value Date    A1C 6.5 03/29/2022    A1C 5.8 06/09/2021    is meeting goal of <7.0    PLAN  See Patient Instructions for co-developed, patient-stated behavior change goals.    Time Spent: 45 minutes  Encounter Type: Individual    Any diabetes medication dose changes were made via the CDE Protocol and Collaborative Practice Agreement with the patient's referring provider. A copy of this encounter was shared with the provider.

## 2022-04-26 ENCOUNTER — LAB (OUTPATIENT)
Dept: LAB | Facility: CLINIC | Age: 52
End: 2022-04-26
Payer: COMMERCIAL

## 2022-04-26 DIAGNOSIS — E03.9 PRIMARY HYPOTHYROIDISM: ICD-10-CM

## 2022-04-26 DIAGNOSIS — Z17.0 MALIGNANT NEOPLASM OF UPPER-OUTER QUADRANT OF LEFT BREAST IN FEMALE, ESTROGEN RECEPTOR POSITIVE (H): ICD-10-CM

## 2022-04-26 DIAGNOSIS — C50.412 MALIGNANT NEOPLASM OF UPPER-OUTER QUADRANT OF LEFT BREAST IN FEMALE, ESTROGEN RECEPTOR POSITIVE (H): ICD-10-CM

## 2022-04-26 LAB
ALBUMIN SERPL-MCNC: 4.3 G/DL (ref 3.4–5)
ALP SERPL-CCNC: 78 U/L (ref 40–150)
ALT SERPL W P-5'-P-CCNC: 50 U/L (ref 0–50)
ANION GAP SERPL CALCULATED.3IONS-SCNC: 8 MMOL/L (ref 3–14)
AST SERPL W P-5'-P-CCNC: 36 U/L (ref 0–45)
BASOPHILS # BLD MANUAL: 0.1 10E3/UL (ref 0–0.2)
BASOPHILS NFR BLD MANUAL: 3 %
BILIRUB SERPL-MCNC: 0.6 MG/DL (ref 0.2–1.3)
BUN SERPL-MCNC: 13 MG/DL (ref 7–30)
CALCIUM SERPL-MCNC: 9.9 MG/DL (ref 8.5–10.1)
CANCER AG27-29 SERPL-ACNC: 19 U/ML (ref 0–39)
CEA SERPL-MCNC: <0.5 UG/L (ref 0–2.5)
CHLORIDE BLD-SCNC: 107 MMOL/L (ref 94–109)
CO2 SERPL-SCNC: 24 MMOL/L (ref 20–32)
CREAT SERPL-MCNC: 0.98 MG/DL (ref 0.52–1.04)
DACRYOCYTES BLD QL SMEAR: SLIGHT
EOSINOPHIL # BLD MANUAL: 0 10E3/UL (ref 0–0.7)
EOSINOPHIL NFR BLD MANUAL: 2 %
ERYTHROCYTE [DISTWIDTH] IN BLOOD BY AUTOMATED COUNT: 13.2 % (ref 10–15)
GFR SERPL CREATININE-BSD FRML MDRD: 69 ML/MIN/1.73M2
GLUCOSE BLD-MCNC: 125 MG/DL (ref 70–99)
HCT VFR BLD AUTO: 37.6 % (ref 35–47)
HGB BLD-MCNC: 13.2 G/DL (ref 11.7–15.7)
LYMPHOCYTES # BLD MANUAL: 0.7 10E3/UL (ref 0.8–5.3)
LYMPHOCYTES NFR BLD MANUAL: 33 %
MCH RBC QN AUTO: 33.8 PG (ref 26.5–33)
MCHC RBC AUTO-ENTMCNC: 35.1 G/DL (ref 31.5–36.5)
MCV RBC AUTO: 96 FL (ref 78–100)
MONOCYTES # BLD MANUAL: 0.1 10E3/UL (ref 0–1.3)
MONOCYTES NFR BLD MANUAL: 7 %
NEUTROPHILS # BLD MANUAL: 1.1 10E3/UL (ref 1.6–8.3)
NEUTROPHILS NFR BLD MANUAL: 55 %
PLAT MORPH BLD: ABNORMAL
PLATELET # BLD AUTO: 159 10E3/UL (ref 150–450)
POTASSIUM BLD-SCNC: 4.3 MMOL/L (ref 3.4–5.3)
PROT SERPL-MCNC: 8.4 G/DL (ref 6.8–8.8)
RBC # BLD AUTO: 3.9 10E6/UL (ref 3.8–5.2)
RBC MORPH BLD: ABNORMAL
SODIUM SERPL-SCNC: 139 MMOL/L (ref 133–144)
TSH SERPL DL<=0.005 MIU/L-ACNC: 0.63 MU/L (ref 0.4–4)
VARIANT LYMPHS BLD QL SMEAR: PRESENT
WBC # BLD AUTO: 2 10E3/UL (ref 4–11)

## 2022-04-26 PROCEDURE — 36415 COLL VENOUS BLD VENIPUNCTURE: CPT

## 2022-04-26 PROCEDURE — 84443 ASSAY THYROID STIM HORMONE: CPT

## 2022-04-26 PROCEDURE — 82378 CARCINOEMBRYONIC ANTIGEN: CPT

## 2022-04-26 PROCEDURE — 85027 COMPLETE CBC AUTOMATED: CPT

## 2022-04-26 PROCEDURE — 86300 IMMUNOASSAY TUMOR CA 15-3: CPT

## 2022-04-26 PROCEDURE — 80053 COMPREHEN METABOLIC PANEL: CPT

## 2022-04-27 ENCOUNTER — VIRTUAL VISIT (OUTPATIENT)
Dept: ONCOLOGY | Facility: CLINIC | Age: 52
End: 2022-04-27
Attending: PHYSICIAN ASSISTANT
Payer: COMMERCIAL

## 2022-04-27 ENCOUNTER — TELEPHONE (OUTPATIENT)
Dept: ENDOCRINOLOGY | Facility: CLINIC | Age: 52
End: 2022-04-27
Payer: COMMERCIAL

## 2022-04-27 ENCOUNTER — LAB (OUTPATIENT)
Dept: URGENT CARE | Facility: URGENT CARE | Age: 52
End: 2022-04-27
Attending: PHYSICIAN ASSISTANT

## 2022-04-27 DIAGNOSIS — E66.813 CLASS 3 SEVERE OBESITY DUE TO EXCESS CALORIES WITH SERIOUS COMORBIDITY AND BODY MASS INDEX (BMI) OF 45.0 TO 49.9 IN ADULT (H): Primary | ICD-10-CM

## 2022-04-27 DIAGNOSIS — Z17.0 MALIGNANT NEOPLASM OF UPPER-OUTER QUADRANT OF LEFT BREAST IN FEMALE, ESTROGEN RECEPTOR POSITIVE (H): Primary | ICD-10-CM

## 2022-04-27 DIAGNOSIS — R05.9 COUGH: ICD-10-CM

## 2022-04-27 DIAGNOSIS — E66.01 CLASS 3 SEVERE OBESITY DUE TO EXCESS CALORIES WITH SERIOUS COMORBIDITY AND BODY MASS INDEX (BMI) OF 45.0 TO 49.9 IN ADULT (H): Primary | ICD-10-CM

## 2022-04-27 DIAGNOSIS — C50.412 MALIGNANT NEOPLASM OF UPPER-OUTER QUADRANT OF LEFT BREAST IN FEMALE, ESTROGEN RECEPTOR POSITIVE (H): Primary | ICD-10-CM

## 2022-04-27 PROCEDURE — 99214 OFFICE O/P EST MOD 30 MIN: CPT | Mod: GT | Performed by: PHYSICIAN ASSISTANT

## 2022-04-27 PROCEDURE — U0003 INFECTIOUS AGENT DETECTION BY NUCLEIC ACID (DNA OR RNA); SEVERE ACUTE RESPIRATORY SYNDROME CORONAVIRUS 2 (SARS-COV-2) (CORONAVIRUS DISEASE [COVID-19]), AMPLIFIED PROBE TECHNIQUE, MAKING USE OF HIGH THROUGHPUT TECHNOLOGIES AS DESCRIBED BY CMS-2020-01-R: HCPCS

## 2022-04-27 PROCEDURE — U0005 INFEC AGEN DETEC AMPLI PROBE: HCPCS

## 2022-04-27 RX ORDER — METHYLPREDNISOLONE 32 MG/1
TABLET ORAL
Qty: 2 TABLET | Refills: 0 | Status: SHIPPED | OUTPATIENT
Start: 2022-04-27 | End: 2022-08-27

## 2022-04-27 NOTE — PROGRESS NOTES
Jade is a 52 year old who is being evaluated via a billable video visit.      Jade Collins stated she is in the state of [place] for the visit today.    How would you like to obtain your AVS? Smtiahart  If the video visit is dropped, the invitation should be resent by: Send to e-mail at: [email or web address]  Will anyone else be joining your video visit? No      Video Start Time: 10:29am  Video-Visit Details    Type of service:  Video Visit    Video End Time:10:51 AM    Originating Location (pt. Location): Home    Distant Location (provider location):  [facility]     Platform used for Video Visit: Sandeep Mena, Virtual Visit Facilitator          HISTORY OF PRESENT ILLNESS:  Jade is a 52-year-old woman with metastatic breast cancer        Jade was diagnosed with breast cancer with a lump found in the upper-inner quadrant of the left breast.  She was diagnosed in 12/2013 in the [place] area and Dr. David Redd was her medical oncologist.  Biopsy of the tumor showed it to be ER positive, DE positive, and HER-2 negative.  We do not have any of the original pathology and we need to obtain those reports.  She underwent a lumpectomy performed by Dr. Tasha Segura who is a surgeon in the [place] area.  She also had a sentinel lymph node which was noted to be involved with tumor but there was no lymph node dissection done at that time.  [id number].  She subsequently underwent adjuvant chemotherapy with dose-dense AC for 4 cycles and Taxol for 12 weeks, completed 09/11/2014.        She then had radiation therapy post lumpectomy to the left breast, which was completed 11/23/2014 by Dr. Manley.        She was then begun on an aromatase inhibitor, the name of which she does not remember.  The aromatase inhibitor therapy was begun in 11/2014.  She was then switched within about a month or so to anastrozole and remained on anastrozole from 11/2014-02/2017.  She has never received tamoxifen.         She then moved to [place], [place] in 03/2017.  She saw a gynecologist there and underwent a IRIS/BSO by Dr. Zendejas.  She then also saw Dr. Linares in [place], [place], who is an oncologist.  His interpretation of the pathology report was that she had triple-negative breast cancer.  It is possible that she may have had low estrogen receptor positivity, but the anastrozole was then discontinued in 02/2017.  She then underwent the IRIS/BSO in 03/2017.  This was done laparoscopically.        She then remained off of all hormonal therapy and in 09/2017 moved to [place].  She remained off hormonal therapy and did not have Medical Oncology followup initially.        She was driving for [place] and noticed lymph nodes in the left neck.  She then went to see Dr. Norberto Brand who performed a PET/CT scan and the patient also underwent a brain MRI for staging.  The PET/CT scan performed 08/03/2018 showed in the neck there were several mildly metabolic active and large prominent left posterior cervical lymph nodes.  The largest is located posterior to the left sternocleidomastoid muscle and measures 1.5 x 1 cm in size.  This demonstrate modest FDG uptake.  The other lymph nodes are smaller and demonstrate mild FDG uptake.  The prominent prevascular and aortopulmonary lymph nodes were also seen on the contrast-enhanced scan were less well seen on the PET.  The largest lymph node visualized on the PET scan was 1.1 x 0.8 cm and demonstrated mild FDG uptake.  This was in the periaortic area just anterior to the aortic arch.  No lung nodules.  No lung infiltrates.  No pleural effusion.  There was no uptake of FDG in the bones.  In summary, there were mildly enlarged left posterior cervical lymph nodes, largest measuring 1.5 x 1.0 cm and mildly enlarged aorticopulmonary and prevascular lymph nodes that were noted on contrast-enhanced CT scan seen less well on the PET scan.  No evidence of metastatic disease in the abdomen and pelvis.  She  also underwent a brain MRI that showed no evidence of intracranial metastatic disease.  She underwent a biopsy of one of the lymph nodes in the left neck which showed metastatic adenocarcinoma consistent with breast origin, which was estrogen-receptor positive.  The tumor cells were positive for CK7, ER and VA consistent with metastatic breast carcinoma.  Estrogen receptor showed strong average nuclear intensity in 95% of carcinoma cells, progesterone receptor moderate average nuclear intensity in 70% of the carcinoma.  A GATA3 stain was apparently not performed.       TREATMENT HISTORY:  A. Tamoxifen  B. Letrozole and palbociclib started 9-18-18.  C. Pfizer COVID vaccination end of March, 2021 and 4-17.     INTERVAL HISTORY:   Since she was last seen she has been feeling generally well, except she did break her toe.     Has had a runny nose, post nasal drip and a mild cough. Cough is dry. No shortness of breath or wheezing. No fevers, body aches, or chills. Symptoms started one week ago on 4/20. She did take a few COVID tests at home last night, one of which was positive. Has been vaccinated x4    She denies any bony pains.  Bowels are moving regularly.  No headaches or dizziness.  No changes in vision.    Her blood sugars have been improving at home. Has been limiting her intake of soda. Continues to follow with diabetic educator        This is her off week of the [facility], friday will be day one of [facility].    ROS  Patient denies the following: fevers, body aches, chills, headaches, vision changes, dizziness, chest pain, shortness of breath, nausea, vomiting, diarrhea, constipation, abdominal pain, rashes, bruising/bleeding, mouth sores, swelling or pain in the legs.       OBJECTIVE DATA:  Physical Exam:  General: Resting comfortably in no acute distress, appears well   Head: Normocephalic, atraumatic   EENT: No scleral icteris, EOMS intact.   Lung: Normal respiratory effort. Speaking fluently without shortness of  breath. No audible wheezes or coughing.   Neuro: AAO x3. Moving upper extremities equally and symmetrically   Skin: Warm, dry, intact. No rashes, no suspicious lesions. No petechia or bruising noted on visible skin. No cyanosis      The rest of a comprehensive physical examination is deferred due to PHE (public health emergency) video restrictions           Labs:   Most Recent 3 CBC's:Recent Labs   Lab Test 04/26/22  1236 03/29/22  1209 03/02/22  1509   WBC 2.0* 2.8* 3.0*   HGB 13.2 12.8 13.5   MCV 96 96 96    139* 153    Most Recent 3 BMP's:  Recent Labs   Lab Test 04/26/22  1236 03/29/22  1209 03/02/22  1509    140 138   POTASSIUM 4.3 4.2 4.0   CHLORIDE 107 107 106   CO2 24 26 25   BUN 13 9 11   CR 0.98 0.96 0.98   ANIONGAP 8 7 7   GIOVANNY 9.9 9.6 9.3   * 138* 140*    Most Recent 2 LFT's:  Recent Labs   Lab Test 04/26/22  1236 03/29/22  1209   AST 36 32   ALT 50 38   ALKPHOS 78 70   BILITOTAL 0.6 0.4        ANC 1.1    Component Ref Range & Units 1 d ago   (4/26/22) 4 wk ago   (3/29/22) 1 mo ago   (3/2/22) 2 mo ago   (2/1/22) 4 mo ago   (12/9/21) 4 mo ago   (11/30/21) 5 mo ago   (11/3/21)     CEA 0.0 - 2.5 ug/L <0.5  0.7 CM  <0.5 CM  0.6 CM  0.8 CM  0.6 CM  0.6 CM       Component Ref Range & Units 1 d ago   (4/26/22) 4 wk ago   (3/29/22) 2 mo ago   (2/1/22) 4 mo ago   (12/9/21) 4 mo ago   (11/30/21) 5 mo ago   (11/3/21) 6 mo ago   (10/6/21)    CA 27-29 0 - 39 U/mL 19  17  15  20  20  15  19            I reviewed the above labs today.          Imaging:No new imaging         ASSESSMENT AND PLAN:  1. Recurrent, metastatic ER-positive, HER2 negative breast cancer: History of left breast cancer s/p lumpectomy and SNLB in 2014. S/p 4 cycles ddAC and 12 weeks of Taxol. On AI from 11/2014-2/2017. Recurrence on PET/CT scan performed 08/03/2018 and then biopsy to left posterior cervical lymph nodes, aorticopulmonary, and prevascular lymph nodes were positive. No evidence of metastatic disease in the abdomen  and pelvis or brain.   -Started Ibrance and letrozole on 9/21/18.   - Continues tolerating well aside from some hot flashes and mild joint stiffness.    - Repeat CT CAP in 1 month   - Labs are appropriate to proceed with next Ibrance  cycle on Friday, however, I have recommended that she not start until her symptoms have completely resolved      2. Restaging  Imaging every 3 months with CT CAP and neck. Needs steroid premeds due to contrast allergies. Prescribed today      3. R posterior hip pain: Has L5-S1 anterolisthesis and DJD in back. This has resolved.      4.  Diabetes:   - A1C is 6.5, now considered diabetic   -Working with diabetic educator. Saw yesterday  - Has started checking her sugars more frequently, twice a day at home and they have been coming down. Continues with lifestyle changes   - Has an endocrinology follow up to further address      5.  Depression and anxiety. Managed by PCP     6.  Hypothyroid. Levothyroxine 200 mcg daily. Managed by endocrinology      7. Asthma: Seen by Dr. Hay. Takes advair, montelukast, and albuterol as needed. Is running out of albuterol, refilled today     8. COVID  - Has had vaccines x4   - Now with symptoms (runny nose and mild cough) for seven days. Symptom onset 4/20. Took a few OTC COVID tests at home last night, one of them was positive  - Ordered PCR today  - Unfortunately her symptom onset is outside the timeline for paxlovid (5 days) and monoclonal antibodies (7 days, which is today)   - Recommend symptomatic care- push fluids, rest, monitor symptoms closely. Reviewed when to go to the ED  - Self isolate per CDC guidelines  - Hold Ibrance until symptoms resolve                 Patient will call in the interim with any questions or concerns. They voice understanding and agree with the above plan.      Mandie Encinas PA-C

## 2022-04-27 NOTE — TELEPHONE ENCOUNTER
----- Message from Dina Edge sent at 4/26/2022 12:30 PM CDT -----  Regarding: Orders needed  Your patient brought in 2 saliva cortisol tests but there are no orders in the patient's chart. Please place the orders as future and we will get the testing sent out for you.    Thanks  Dina Edge   [facility]  [phone]    Ordered the 2 [place] salivary cortisol tests, thank you

## 2022-04-28 LAB — SARS-COV-2 RNA RESP QL NAA+PROBE: NEGATIVE

## 2022-05-05 ENCOUNTER — TELEPHONE (OUTPATIENT)
Dept: ONCOLOGY | Facility: CLINIC | Age: 52
End: 2022-05-05
Payer: COMMERCIAL

## 2022-05-05 NOTE — TELEPHONE ENCOUNTER
Oral Chemotherapy Monitoring Program     Placed call to Jade Guillen Collins in follow up of Ibrance oral chemotherapy to confirm if her symptoms have resolved since her most recent visit with Mandie Encinas on 4/27/22.     She reports that she was feeling better the day following the appointment so she had a repeat COVID test which showed negative results. She reports that due to continued improvement in her symptoms and negative test results therefore she started therapy on time on 4/29/22 as planned.       Saray Mueller, PharmD, BCACP  Oral Chemotherapy Monitoring Program  [facility]  [phone]  May 5, 2022

## 2022-05-13 DIAGNOSIS — C50.412 MALIGNANT NEOPLASM OF UPPER-OUTER QUADRANT OF LEFT BREAST IN FEMALE, ESTROGEN RECEPTOR POSITIVE (H): Primary | ICD-10-CM

## 2022-05-13 DIAGNOSIS — Z17.0 MALIGNANT NEOPLASM OF UPPER-OUTER QUADRANT OF LEFT BREAST IN FEMALE, ESTROGEN RECEPTOR POSITIVE (H): Primary | ICD-10-CM

## 2022-05-16 DIAGNOSIS — Z17.0 MALIGNANT NEOPLASM OF UPPER-OUTER QUADRANT OF LEFT BREAST IN FEMALE, ESTROGEN RECEPTOR POSITIVE (H): Primary | ICD-10-CM

## 2022-05-16 DIAGNOSIS — C50.412 MALIGNANT NEOPLASM OF UPPER-OUTER QUADRANT OF LEFT BREAST IN FEMALE, ESTROGEN RECEPTOR POSITIVE (H): Primary | ICD-10-CM

## 2022-05-16 RX ORDER — LETROZOLE 2.5 MG/1
2.5 TABLET, FILM COATED ORAL DAILY
Qty: 28 TABLET | Refills: 2 | Status: SHIPPED | OUTPATIENT
Start: 2022-05-16 | End: 2022-06-07

## 2022-05-18 ASSESSMENT — ENCOUNTER SYMPTOMS
HOARSE VOICE: 1
DIARRHEA: 1
BLOOD IN STOOL: 0
HEMATURIA: 0
SHORTNESS OF BREATH: 1
DOUBLE VISION: 0
JOINT SWELLING: 1
EYE IRRITATION: 1
HALLUCINATIONS: 0
SINUS CONGESTION: 1
POLYDIPSIA: 1
POSTURAL DYSPNEA: 1
SMELL DISTURBANCE: 0
COUGH DISTURBING SLEEP: 1
EYE REDNESS: 0
BLOATING: 1
TROUBLE SWALLOWING: 0
HEMOPTYSIS: 0
DYSPNEA ON EXERTION: 1
BACK PAIN: 1
WEIGHT LOSS: 0
NECK MASS: 0
NECK PAIN: 1
TASTE DISTURBANCE: 1
EYE PAIN: 0
SORE THROAT: 0
SPUTUM PRODUCTION: 1
FEVER: 0
CHILLS: 0
RECTAL PAIN: 1
INCREASED ENERGY: 1
BOWEL INCONTINENCE: 0
ARTHRALGIAS: 1
ALTERED TEMPERATURE REGULATION: 1
NIGHT SWEATS: 0
FLANK PAIN: 0
CONSTIPATION: 0
JAUNDICE: 0
MUSCLE CRAMPS: 1
MUSCLE WEAKNESS: 1
SNORES LOUDLY: 1
FATIGUE: 1
MYALGIAS: 1
ABDOMINAL PAIN: 0
POLYPHAGIA: 0
SINUS PAIN: 1
DECREASED APPETITE: 1
WHEEZING: 1
HEARTBURN: 1
NAUSEA: 1
VOMITING: 1
WEIGHT GAIN: 1
DYSURIA: 0
EYE WATERING: 0
STIFFNESS: 1
COUGH: 1
DIFFICULTY URINATING: 0

## 2022-05-19 ENCOUNTER — OFFICE VISIT (OUTPATIENT)
Dept: ENDOCRINOLOGY | Facility: CLINIC | Age: 52
End: 2022-05-19
Payer: COMMERCIAL

## 2022-05-19 ENCOUNTER — LAB (OUTPATIENT)
Dept: LAB | Facility: CLINIC | Age: 52
End: 2022-05-19

## 2022-05-19 ENCOUNTER — PRE VISIT (OUTPATIENT)
Dept: ENDOCRINOLOGY | Facility: CLINIC | Age: 52
End: 2022-05-19
Payer: COMMERCIAL

## 2022-05-19 VITALS
HEIGHT: 69 IN | DIASTOLIC BLOOD PRESSURE: 82 MMHG | HEART RATE: 108 BPM | SYSTOLIC BLOOD PRESSURE: 132 MMHG | BODY MASS INDEX: 43.4 KG/M2 | WEIGHT: 293 LBS

## 2022-05-19 DIAGNOSIS — R73.9 HYPERGLYCEMIA: Primary | ICD-10-CM

## 2022-05-19 DIAGNOSIS — E66.813 CLASS 3 SEVERE OBESITY DUE TO EXCESS CALORIES WITH SERIOUS COMORBIDITY AND BODY MASS INDEX (BMI) OF 45.0 TO 49.9 IN ADULT (H): ICD-10-CM

## 2022-05-19 DIAGNOSIS — E11.9 TYPE 2 DIABETES MELLITUS WITHOUT COMPLICATION, WITHOUT LONG-TERM CURRENT USE OF INSULIN (H): ICD-10-CM

## 2022-05-19 DIAGNOSIS — E66.01 CLASS 3 SEVERE OBESITY DUE TO EXCESS CALORIES WITH SERIOUS COMORBIDITY AND BODY MASS INDEX (BMI) OF 45.0 TO 49.9 IN ADULT (H): ICD-10-CM

## 2022-05-19 DIAGNOSIS — E03.9 HYPOTHYROIDISM, UNSPECIFIED TYPE: ICD-10-CM

## 2022-05-19 PROCEDURE — 99215 OFFICE O/P EST HI 40 MIN: CPT | Mod: GC | Performed by: STUDENT IN AN ORGANIZED HEALTH CARE EDUCATION/TRAINING PROGRAM

## 2022-05-19 RX ORDER — LEVOTHYROXINE SODIUM 200 UG/1
200 TABLET ORAL DAILY
Qty: 90 TABLET | Refills: 3 | Status: SHIPPED | OUTPATIENT
Start: 2022-05-19 | End: 2022-06-07

## 2022-05-19 RX ORDER — SEMAGLUTIDE 1.34 MG/ML
0.25 INJECTION, SOLUTION SUBCUTANEOUS
Qty: 1.5 ML | Refills: 3 | Status: SHIPPED | OUTPATIENT
Start: 2022-05-19

## 2022-05-19 RX ORDER — DEXAMETHASONE 1 MG
1 TABLET ORAL 2 TIMES DAILY WITH MEALS
Qty: 1 TABLET | Refills: 0 | Status: SHIPPED | OUTPATIENT
Start: 2022-05-19

## 2022-05-19 RX ORDER — DEXAMETHASONE 1 MG
1 TABLET ORAL ONCE
Qty: 1 TABLET | Refills: 0 | Status: CANCELLED | OUTPATIENT
Start: 2022-05-19 | End: 2022-05-19

## 2022-05-19 ASSESSMENT — PAIN SCALES - GENERAL: PAINLEVEL: NO PAIN (0)

## 2022-05-19 NOTE — PATIENT INSTRUCTIONS
Obtain 2 midnight salivary samples first    After that take decadron 1 mg pill at midnight    Next day at 8 Am you should get a morning cortisol level-make an appointment for the lab    We also ordered blood test for cholesterol, and urine test for protein    We started you on ozempic 0.25 mg subcutaneous weekly once    Follow up with diabetes educator    Continue levothyroxine 200 mcg daily oral

## 2022-05-19 NOTE — LETTER
5/19/2022       RE: Jade Collins  [address]  [facility] [address]     Dear Colleague,    Thank you for referring your patient, Jade Collins, to the [facility]. Please see a copy of my visit note below.    Endocrinology Follow-up Clinic Note    Jade Collins MRN:[id number] YOB: 1970  Primary care provider: Roby Johnson     Reason for visit: T2DM, obesity and hypothyroidism    Brief summary:  Jade Collins is a 52 year old female with PMHx of  Asthma, hypothyroidism, Recurrent metastatic ER-positive, HER2 negative breast cancer: History of left breast cancer s/p lumpectomy and SNLB in 2014. S/p 4 cycles doxorubicin and adriamycin and 12 weeks of Taxol. On Aromatase Inhibitor from 11/2014-2/2017. Recurrence on PET/CT scan performed 08/03/2018 and then biopsy to left posterior cervical lymph nodes, aorticopulmonary, and prevascular lymph nodes were positive. No evidence of metastatic disease in the abdomen and pelvis or brain. Was started Ibrance( palbociclib - CDK-4 and CDK- 6 inhibitor) and letrozole on 9/21/18.     She was seen in our clinic in 11/2021 for hyperglycemia on random BG    Has numbness and tingling of hands and feet usually the tips since chemotherapy in 2014. No increased frequency of urination.  Reports weight gain of around 20 lbs     She  reports having asthma, she has flares and feels winded on exertion. She uses advair daily but she prefers not to use prednisone as her father had SE from it long term. She uses methylprednisone 12 hr and 2 hr before CT scan for Contrast allergy. She gets CT scans every 3 months since 2018.Her next Ct scan is 5/27/2022     No  new stretch marks, reported easy bruising and delayed healing, has difficulty or weakness when climbing up stairs or getting up from sitting position, says she is deconditioned. No tenderness. Has no history of  HTN. Has hot flashes at random times of the day and resolves by itself after taking ibrance and letrozole . She uses oxybutynin for it and that does help.      She started using mirtazipine for last 3 years to help her with sleep     She has history of hypothyroidism  since 2000 at the time she had mood swings, weight loss, and then she was diagnosed hypothyroidism later. She has been taking levothyroxine and current dose is 200 mcg daily    Has anxiety with healthcare related mostly forms and administrations and with COVID, jitteriness, has diarrhea 3-4 times/ week. Feels hot easily. Losing hair and has brittle nails for last 6 months. Energy levels are fluctuant    FH: Father- graves, sister- hypothyroid , paternal grandfather- hypothyroid     SH: On disability, single, no kids, never smoker, has second hand exposure,no illicit drug use or alcohol consumption    Interval history:  She had history  prediabetes HbA1c: 5.9  with obesity BMI: 47- we ordered [place] salivary cortisol and dex suppression test last visit . She lost the decadron pill, we prescribed it to her again but no results available at present. This had to be done timed between her CT scan as she gets methylpred for her CT scan every 3 months. She submitted her [place] salivary cortisol but they were never attached to the orders    We referred her to diabetes education and also discussed about weight management referral. BMI: 47. We previously discussed lifestyle modifications including reducing starches and cutting down sodas and increasing protein and vegetables.  Her physical activity is limited given her health conditions.  She was also exposed to steroids chronically for her every 3-month CT scans. She wanted to try lifestyle modification first.    Her last Hb A1c: 6.5 and she has been seeing our diabetes educators and started checking her BG   4/25 Fasting 126  4/23 129 after lunch  4/23 fasting 120  4/22 fasting 144  4/21 after a meal 149  4/19 bedtime  171  4/18 after a meal 168  4/17 after a meal 125  4/15 fasting 127  143 after a meal  4/13 129 fasting  4/12 144 after lunch  4/10 99 after lunch    She reports having excessive thirst, gets up once overnight to void. Has neuropathy in hands and feet she has had that since she had chemo and currently on ibrance    Last hemoglobin A1c on 3/29/2022 was 6.5.  Current DM meds: none    Diabetes complications include:  Retinopathy: last eye exam this year, but she didn't have dilated eye exam, asked her to have a follow up for diabetic eye exam  Nephropathy: ordered urine micro albumin, creat: 0.98 in 4/2022  Neuropathy: has numbness and tingling in hands and feet secondary to chemotherapy, currently on ibrance  LDL:ordered lipid panel  ASA: none  Smoking:none  BP: 132/82 mm Hg      TSH on 4/26/2022 0.63  Currently on levothyroxine 200 mcg daily, on empty stomach, separates it from calcium and vitamin by 4 hrs. No tremors or palpitations, gets hot flashes with night sweats she is on letrozole. Weight stable since last visit. No obstructive symptoms    Review Of Systems  12 point ROS negative unless noted above    Past Medical History  No past medical history on file.    Past Surgical History  No past surgical history on file.     Medications  Current Outpatient Medications   Medication Sig Dispense Refill     ACCU-CHEK GUIDE test strip USE AS DIRECTED TEST 2-3 TIMES DAILY 100 strip 5     albuterol (PROAIR HFA/PROVENTIL HFA/VENTOLIN HFA) 108 (90 Base) MCG/ACT inhaler Inhale 2 puffs into the lungs every 6 hours 6.7 g 1     albuterol (PROVENTIL) (2.5 MG/3ML) 0.083% neb solution Take 1 vial (2.5 mg) by nebulization every 6 hours as needed for shortness of breath / dyspnea or wheezing 30 vial 0     blood glucose (NO BRAND SPECIFIED) lancets standard Use to test blood sugar 2-3 times daily or as directed. 100 each 3     blood glucose monitoring (NO BRAND SPECIFIED) meter device kit Use to test blood sugar 2-3 times daily or as  directed. 1 kit 0     blood glucose monitoring (SOFTCLIX) lancets Use to test blood sugar 2-3 times daily or as directed 100 each 5     busPIRone (BUSPAR) 5 MG tablet        Calcium Citrate-Vitamin D (CALCIUM + D PO) Take 250 mg by mouth 2 times daily        clotrimazole (MYCELEX) 10 MG lozenge Place 1 lozenge (10 mg) inside cheek 5 times daily 60 each 0     dexamethasone (DECADRON) 1 MG tablet Take 1 tab at 11PM and go for test next day at 8AM 1 tablet 0     dexamethasone (DECADRON) 1 MG tablet Take 1 tablet (1 mg) by mouth once for 1 dose 1 tablet 0     fluticasone-salmeterol (ADVAIR-HFA) 115-21 MCG/ACT inhaler Inhale 2 puffs into the lungs 2 times daily 1 Inhaler 2     ketoconazole (NIZORAL) 2 % external cream Apply topically 2 times daily 45 g 0     letrozole (FEMARA) 2.5 MG tablet Take 1 tablet (2.5 mg) by mouth daily 28 tablet 2     letrozole (FEMARA) 2.5 MG tablet Take 1 tablet (2.5 mg) by mouth daily 28 tablet 2     letrozole (FEMARA) 2.5 MG tablet Take 1 tablet (2.5 mg) by mouth daily for 28 days 28 tablet 2     levothyroxine (SYNTHROID/LEVOTHROID) 200 MCG tablet Take 1 tablet (200 mcg) by mouth daily 90 tablet 3     loratadine (CLARITIN) 10 MG tablet Take 1 tablet (10 mg) by mouth daily 90 tablet 3     LORazepam (ATIVAN) 1 MG tablet Take 1 mg by mouth       methylPREDNISolone (MEDROL) 32 MG tablet Take 1 tablet (32 mg) 12 hours and 2 hours before IV contrast. 2 tablet 0     mirtazapine (REMERON) 15 MG tablet TK 1 T PO HS 30 tablet 3     montelukast (SINGULAIR) 10 MG tablet        Multiple Vitamins-Minerals (MULTIVITAMIN ADULT PO)        oxybutynin (DITROPAN) 5 MG tablet Take 1 tablet (5 mg) by mouth 2 times daily 60 tablet 3     palbociclib (IBRANCE) 100 MG tablet Take 1 tablet (100 mg) by mouth daily with food Take for 21 days, then 7 days off. Avoid grapefruit. Do not open/crush/chew capsule. 21 tablet 2     sertraline (ZOLOFT) 100 MG tablet Take 1 tablet (100 mg) by mouth 2 times daily 60 tablet 3      triamcinolone (NASACORT) 55 MCG/ACT inhaler Spray 1 spray into both nostrils daily        VITAMIN D, CHOLECALCIFEROL, PO Take 2,000 Units by mouth daily          Physical Examination:  General appearance: seated comfortably in the chair during assessment. Not in any acute distress. obese  HEENT: PEERLA. Oral cavity is moist and clear. thick neck, no obvious thyromegaly or nodules felt  Lungs: bilateral air entry equal. Clear to auscultation. No rhonchi or crepitations heard  Heart: S1 S2 normal. Pulse: regular rate and rhythm, good volume  Abdomen: soft, nontender, non distended, obese, pale pink striae  Neurological: conscious and oriented. Speech: normal. Moving all four extremities equally, reflexes 2+ in all 4 extremities  Extremities: no edema noted. Dorsalis pedis 2+ bilaterally. No ulcers noted. No tremor is noted over her outstretched hands  Psychiatric: normal mood and affect. Normal judgment  Feet: Sensation intact to gross touch.  Monofilament test negative.  No ulcers or calluses    Endocrine Labs:    ENDO DIABETES Latest Ref Rng & Units 4/26/2022   GLUCOSE 70 - 99 mg/dL 125 (H)   A1C 0.0 - 5.6 %    ALT 0 - 50 U/L 50   AST 0 - 45 U/L 36   CREATININE 0.52 - 1.04 mg/dL 0.98   HCT 35.0 - 47.0 % 37.6   HGB 11.7 - 15.7 g/dL 13.2   POTASSIUM 3.4 - 5.3 mmol/L 4.3   RBC 3.80 - 5.20 10e6/uL 3.90   RDW 10.0 - 15.0 % 13.2   WBC 4.0 - 11.0 10e3/uL 2.0 (L)   MCH 26.5 - 33.0 pg 33.8 (H)   MCHC 31.5 - 36.5 g/dL 35.1   MCV 78 - 100 fL 96    - 450 10e3/uL 159            Assessment and Plan:   Jade Collins is a 52 year old female with PMHx of  Asthma, hypothyroidism, Recurrent metastatic ER-positive, HER2 negative breast cancer: s/p surgery, chemotherapy , on ibrance and letrozole since 2018, here for follow-up for type II DM , obesity and hypothyroidism     #Newly diagnosed T2DM, HbA1c: 6.5 with BMI of 47.  - Risk factors include sedentary lifestyle, high calorie diet, obesity and chronic exposure to high dose  steroid as pre treatment for her Q 3month CT scans  -Recommend Ozempic 0.25 mg subcutaneous weekly once for first 4 weeks and if tolerating well will increase 2.5 mg subcutaneous weekly once for the next 4 weeks and go up to 1 mg dose after.  Discussed the rationale risks and benefits in detail and patient agreeable to medication  - Advised her to check BG 1-2 times a day, preferably fasting and at bedtime  - Encouraged her to eat more protein and vegetables and cut down sodas and starches in her diet  - Encouraged her to walk if able to ( limited by BUSH) or stand instead of sitting  - Ordered [place] salivary cortisol X 2 and dexamethasone suppression test to rule out excessive endogenous steroid production, went over the instructions with her in detail. We discussed the timing of the tests- to be done before she gets steroids for her CT scan.      #Diabetes complications  Retinopathy: last eye exam this year, but she didn't have dilated eye exam, asked her to have a follow up for diabetic eye exam  Nephropathy: ordered urine micro albumin, creat: 0.98 in 4/2022  Neuropathy: has numbness and tingling in hands and feet secondary to chemotherapy, currently on ibrance  LDL:ordered lipid panel  ASA: none  Smoking:none  BP: 132/82 mm Hg    Order lipid panel, urine microalbumin    # Hypothyroidism  - Clinically and chemically euthyroid  - Revised correct pill technique with her  -Continue levothyroxine to 200 mcg daily         Return to clinic in 3 months    The patient was seen, examined and discussed with MD Adrienne Gandara MD.  Endocrinology fellow                      Attestation signed by Tricia Stewart MD at 5/20/2022 10:46 AM:  I interviewed and examined this patient with Dr. Smart.  I agree with her findings and recommendations.  Ozempic is a good therapy for her because it will assist with weight loss.  If  1 mg dose is ineffective overtime may want to switch to wegovy.    We  spent 30 min with the patient and an additional 15 min on day of visit doing chart review, orders, and documentation.    Tricia Stewart MD

## 2022-05-19 NOTE — NURSING NOTE
"Chief Complaint   Patient presents with     Endocrine Problem     Diabetes     Vital signs:      BP: 132/82 Pulse: 108           Height: 175.3 cm (5' 9\") Weight: 146.7 kg (323 lb 6.4 oz)  Estimated body mass index is 47.76 kg/m  as calculated from the following:    Height as of this encounter: 1.753 m (5' 9\").    Weight as of this encounter: 146.7 kg (323 lb 6.4 oz).          "

## 2022-05-19 NOTE — PROGRESS NOTES
Endocrinology Follow-up Clinic Note    Jade Collins MRN:[id number] YOB: 1970  Primary care provider: Roby Johnson     Reason for visit: T2DM, obesity and hypothyroidism    Brief summary:  Jade Collins is a 52 year old female with PMHx of  Asthma, hypothyroidism, Recurrent metastatic ER-positive, HER2 negative breast cancer: History of left breast cancer s/p lumpectomy and SNLB in 2014. S/p 4 cycles doxorubicin and adriamycin and 12 weeks of Taxol. On Aromatase Inhibitor from 11/2014-2/2017. Recurrence on PET/CT scan performed 08/03/2018 and then biopsy to left posterior cervical lymph nodes, aorticopulmonary, and prevascular lymph nodes were positive. No evidence of metastatic disease in the abdomen and pelvis or brain. Was started Ibrance( palbociclib - CDK-4 and CDK- 6 inhibitor) and letrozole on 9/21/18.     She was seen in our clinic in 11/2021 for hyperglycemia on random BG    Has numbness and tingling of hands and feet usually the tips since chemotherapy in 2014. No increased frequency of urination.  Reports weight gain of around 20 lbs     She  reports having asthma, she has flares and feels winded on exertion. She uses advair daily but she prefers not to use prednisone as her father had SE from it long term. She uses methylprednisone 12 hr and 2 hr before CT scan for Contrast allergy. She gets CT scans every 3 months since 2018.Her next Ct scan is 5/27/2022     No  new stretch marks, reported easy bruising and delayed healing, has difficulty or weakness when climbing up stairs or getting up from sitting position, says she is deconditioned. No tenderness. Has no history of HTN. Has hot flashes at random times of the day and resolves by itself after taking ibrance and letrozole . She uses oxybutynin for it and that does help.      She started using mirtazipine for last 3 years to help her with sleep     She has history of hypothyroidism  since 2000 at the time she had mood swings, weight loss,  and then she was diagnosed hypothyroidism later. She has been taking levothyroxine and current dose is 200 mcg daily    Has anxiety with healthcare related mostly forms and administrations and with COVID, jitteriness, has diarrhea 3-4 times/ week. Feels hot easily. Losing hair and has brittle nails for last 6 months. Energy levels are fluctuant    FH: Father- graves, sister- hypothyroid , paternal grandfather- hypothyroid     SH: On disability, single, no kids, never smoker, has second hand exposure,no illicit drug use or alcohol consumption    Interval history:  She had history  prediabetes HbA1c: 5.9  with obesity BMI: 47- we ordered MN salivary cortisol and dex suppression test last visit . She lost the decadron pill, we prescribed it to her again but no results available at present. This had to be done timed between her CT scan as she gets methylpred for her CT scan every 3 months. She submitted her MN salivary cortisol but they were never attached to the orders    We referred her to diabetes education and also discussed about weight management referral. BMI: 47. We previously discussed lifestyle modifications including reducing starches and cutting down sodas and increasing protein and vegetables.  Her physical activity is limited given her health conditions.  She was also exposed to steroids chronically for her every 3-month CT scans. She wanted to try lifestyle modification first.    Her last Hb A1c: 6.5 and she has been seeing our diabetes educators and started checking her BG   4/25 Fasting 126  4/23 129 after lunch  4/23 fasting 120  4/22 fasting 144  4/21 after a meal 149  4/19 bedtime 171  4/18 after a meal 168  4/17 after a meal 125  4/15 fasting 127  143 after a meal  4/13 129 fasting  4/12 144 after lunch  4/10 99 after lunch    She reports having excessive thirst, gets up once overnight to void. Has neuropathy in hands and feet she has had that since she had chemo and currently on ibrance    Last  hemoglobin A1c on 3/29/2022 was 6.5.  Current DM meds: none    Diabetes complications include:  Retinopathy: last eye exam this year, but she didn't have dilated eye exam, asked her to have a follow up for diabetic eye exam  Nephropathy: ordered urine micro albumin, creat: 0.98 in 4/2022  Neuropathy: has numbness and tingling in hands and feet secondary to chemotherapy, currently on ibrance  LDL:ordered lipid panel  ASA: none  Smoking:none  BP: 132/82 mm Hg      TSH on 4/26/2022 0.63  Currently on levothyroxine 200 mcg daily, on empty stomach, separates it from calcium and vitamin by 4 hrs. No tremors or palpitations, gets hot flashes with night sweats she is on letrozole. Weight stable since last visit. No obstructive symptoms    Review Of Systems  12 point ROS negative unless noted above    Past Medical History  No past medical history on file.    Past Surgical History  No past surgical history on file.     Medications  Current Outpatient Medications   Medication Sig Dispense Refill     ACCU-CHEK GUIDE test strip USE AS DIRECTED TEST 2-3 TIMES DAILY 100 strip 5     albuterol (PROAIR HFA/PROVENTIL HFA/VENTOLIN HFA) 108 (90 Base) MCG/ACT inhaler Inhale 2 puffs into the lungs every 6 hours 6.7 g 1     albuterol (PROVENTIL) (2.5 MG/3ML) 0.083% neb solution Take 1 vial (2.5 mg) by nebulization every 6 hours as needed for shortness of breath / dyspnea or wheezing 30 vial 0     blood glucose (NO BRAND SPECIFIED) lancets standard Use to test blood sugar 2-3 times daily or as directed. 100 each 3     blood glucose monitoring (NO BRAND SPECIFIED) meter device kit Use to test blood sugar 2-3 times daily or as directed. 1 kit 0     blood glucose monitoring (SOFTCLIX) lancets Use to test blood sugar 2-3 times daily or as directed 100 each 5     busPIRone (BUSPAR) 5 MG tablet        Calcium Citrate-Vitamin D (CALCIUM + D PO) Take 250 mg by mouth 2 times daily        clotrimazole (MYCELEX) 10 MG lozenge Place 1 lozenge (10 mg)  inside cheek 5 times daily 60 each 0     dexamethasone (DECADRON) 1 MG tablet Take 1 tab at 11PM and go for test next day at 8AM 1 tablet 0     dexamethasone (DECADRON) 1 MG tablet Take 1 tablet (1 mg) by mouth once for 1 dose 1 tablet 0     fluticasone-salmeterol (ADVAIR-HFA) 115-21 MCG/ACT inhaler Inhale 2 puffs into the lungs 2 times daily 1 Inhaler 2     ketoconazole (NIZORAL) 2 % external cream Apply topically 2 times daily 45 g 0     letrozole (FEMARA) 2.5 MG tablet Take 1 tablet (2.5 mg) by mouth daily 28 tablet 2     letrozole (FEMARA) 2.5 MG tablet Take 1 tablet (2.5 mg) by mouth daily 28 tablet 2     letrozole (FEMARA) 2.5 MG tablet Take 1 tablet (2.5 mg) by mouth daily for 28 days 28 tablet 2     levothyroxine (SYNTHROID/LEVOTHROID) 200 MCG tablet Take 1 tablet (200 mcg) by mouth daily 90 tablet 3     loratadine (CLARITIN) 10 MG tablet Take 1 tablet (10 mg) by mouth daily 90 tablet 3     LORazepam (ATIVAN) 1 MG tablet Take 1 mg by mouth       methylPREDNISolone (MEDROL) 32 MG tablet Take 1 tablet (32 mg) 12 hours and 2 hours before IV contrast. 2 tablet 0     mirtazapine (REMERON) 15 MG tablet TK 1 T PO HS 30 tablet 3     montelukast (SINGULAIR) 10 MG tablet        Multiple Vitamins-Minerals (MULTIVITAMIN ADULT PO)        oxybutynin (DITROPAN) 5 MG tablet Take 1 tablet (5 mg) by mouth 2 times daily 60 tablet 3     palbociclib (IBRANCE) 100 MG tablet Take 1 tablet (100 mg) by mouth daily with food Take for 21 days, then 7 days off. Avoid grapefruit. Do not open/crush/chew capsule. 21 tablet 2     sertraline (ZOLOFT) 100 MG tablet Take 1 tablet (100 mg) by mouth 2 times daily 60 tablet 3     triamcinolone (NASACORT) 55 MCG/ACT inhaler Spray 1 spray into both nostrils daily        VITAMIN D, CHOLECALCIFEROL, PO Take 2,000 Units by mouth daily          Physical Examination:  General appearance: seated comfortably in the chair during assessment. Not in any acute distress. obese  HEENT: PEERLA. Oral cavity is  moist and clear. thick neck, no obvious thyromegaly or nodules felt  Lungs: bilateral air entry equal. Clear to auscultation. No rhonchi or crepitations heard  Heart: S1 S2 normal. Pulse: regular rate and rhythm, good volume  Abdomen: soft, nontender, non distended, obese, pale pink striae  Neurological: conscious and oriented. Speech: normal. Moving all four extremities equally, reflexes 2+ in all 4 extremities  Extremities: no edema noted. Dorsalis pedis 2+ bilaterally. No ulcers noted. No tremor is noted over her outstretched hands  Psychiatric: normal mood and affect. Normal judgment  Feet: Sensation intact to gross touch.  Monofilament test negative.  No ulcers or calluses    Endocrine Labs:    ENDO DIABETES Latest Ref Rng & Units 4/26/2022   GLUCOSE 70 - 99 mg/dL 125 (H)   A1C 0.0 - 5.6 %    ALT 0 - 50 U/L 50   AST 0 - 45 U/L 36   CREATININE 0.52 - 1.04 mg/dL 0.98   HCT 35.0 - 47.0 % 37.6   HGB 11.7 - 15.7 g/dL 13.2   POTASSIUM 3.4 - 5.3 mmol/L 4.3   RBC 3.80 - 5.20 10e6/uL 3.90   RDW 10.0 - 15.0 % 13.2   WBC 4.0 - 11.0 10e3/uL 2.0 (L)   MCH 26.5 - 33.0 pg 33.8 (H)   MCHC 31.5 - 36.5 g/dL 35.1   MCV 78 - 100 fL 96    - 450 10e3/uL 159            Assessment and Plan:   Jade Collins is a 52 year old female with PMHx of  Asthma, hypothyroidism, Recurrent metastatic ER-positive, HER2 negative breast cancer: s/p surgery, chemotherapy , on ibrance and letrozole since 2018, here for follow-up for type II DM , obesity and hypothyroidism     #Newly diagnosed T2DM, HbA1c: 6.5 with BMI of 47.  - Risk factors include sedentary lifestyle, high calorie diet, obesity and chronic exposure to high dose steroid as pre treatment for her Q 3month CT scans  -Recommend Ozempic 0.25 mg subcutaneous weekly once for first 4 weeks and if tolerating well will increase 2.5 mg subcutaneous weekly once for the next 4 weeks and go up to 1 mg dose after.  Discussed the rationale risks and benefits in detail and patient agreeable to  medication  - Advised her to check BG 1-2 times a day, preferably fasting and at bedtime  - Encouraged her to eat more protein and vegetables and cut down sodas and starches in her diet  - Encouraged her to walk if able to ( limited by BUSH) or stand instead of sitting  - Ordered MN salivary cortisol X 2 and dexamethasone suppression test to rule out excessive endogenous steroid production, went over the instructions with her in detail. We discussed the timing of the tests- to be done before she gets steroids for her CT scan.      #Diabetes complications  Retinopathy: last eye exam this year, but she didn't have dilated eye exam, asked her to have a follow up for diabetic eye exam  Nephropathy: ordered urine micro albumin, creat: 0.98 in 4/2022  Neuropathy: has numbness and tingling in hands and feet secondary to chemotherapy, currently on ibrance  LDL:ordered lipid panel  ASA: none  Smoking:none  BP: 132/82 mm Hg    Order lipid panel, urine microalbumin    # Hypothyroidism  - Clinically and chemically euthyroid  - Revised correct pill technique with her  -Continue levothyroxine to 200 mcg daily         Return to clinic in 3 months    The patient was seen, examined and discussed with MD Adrienne Gandara MD.  Endocrinology fellow

## 2022-05-21 ENCOUNTER — HEALTH MAINTENANCE LETTER (OUTPATIENT)
Age: 52
End: 2022-05-21

## 2022-05-23 ENCOUNTER — MYC REFILL (OUTPATIENT)
Dept: ONCOLOGY | Facility: CLINIC | Age: 52
End: 2022-05-23
Payer: COMMERCIAL

## 2022-05-23 DIAGNOSIS — Z17.0 MALIGNANT NEOPLASM OF UPPER-OUTER QUADRANT OF LEFT BREAST IN FEMALE, ESTROGEN RECEPTOR POSITIVE (H): ICD-10-CM

## 2022-05-23 DIAGNOSIS — C50.412 MALIGNANT NEOPLASM OF UPPER-OUTER QUADRANT OF LEFT BREAST IN FEMALE, ESTROGEN RECEPTOR POSITIVE (H): ICD-10-CM

## 2022-05-23 RX ORDER — METHYLPREDNISOLONE 32 MG/1
TABLET ORAL
Qty: 2 TABLET | Refills: 0 | Status: CANCELLED | OUTPATIENT
Start: 2022-05-23

## 2022-05-24 ENCOUNTER — TELEPHONE (OUTPATIENT)
Dept: ENDOCRINOLOGY | Facility: CLINIC | Age: 52
End: 2022-05-24

## 2022-05-24 DIAGNOSIS — E11.9 TYPE 2 DIABETES MELLITUS WITHOUT COMPLICATION, WITHOUT LONG-TERM CURRENT USE OF INSULIN (H): ICD-10-CM

## 2022-05-24 DIAGNOSIS — E66.01 CLASS 3 SEVERE OBESITY DUE TO EXCESS CALORIES WITH SERIOUS COMORBIDITY AND BODY MASS INDEX (BMI) OF 45.0 TO 49.9 IN ADULT (H): Primary | ICD-10-CM

## 2022-05-24 DIAGNOSIS — E66.813 CLASS 3 SEVERE OBESITY DUE TO EXCESS CALORIES WITH SERIOUS COMORBIDITY AND BODY MASS INDEX (BMI) OF 45.0 TO 49.9 IN ADULT (H): Primary | ICD-10-CM

## 2022-05-24 RX ORDER — SEMAGLUTIDE 1.34 MG/ML
0.25 INJECTION, SOLUTION SUBCUTANEOUS
Qty: 1.5 ML | Refills: 3 | Status: SHIPPED | OUTPATIENT
Start: 2022-05-24 | End: 2022-06-07

## 2022-05-24 NOTE — TELEPHONE ENCOUNTER
Patient would like to fill her Ozempic with [facility].  Please send new rx to [facility].

## 2022-05-24 NOTE — TELEPHONE ENCOUNTER
Pt called to let provider know she found methylprednisolone 32 mg     Pt reports not needing the new refill request, she found the prescription at home.

## 2022-05-24 NOTE — TELEPHONE ENCOUNTER
Per Mandie Encinas:   I prescribed this for the patient three weeks ago to be taken prior to her imaging. Did she not fill it, or did she take it early?        Call placed to Jade asking her to please call the triage line at [phone] option 5 option 2 and ask to speak to triage nurse.

## 2022-05-25 ENCOUNTER — TELEPHONE (OUTPATIENT)
Dept: ENDOCRINOLOGY | Facility: CLINIC | Age: 52
End: 2022-05-25
Payer: COMMERCIAL

## 2022-05-25 ENCOUNTER — TELEPHONE (OUTPATIENT)
Dept: ONCOLOGY | Facility: CLINIC | Age: 52
End: 2022-05-25
Payer: COMMERCIAL

## 2022-05-25 ENCOUNTER — TELEPHONE (OUTPATIENT)
Dept: ONCOLOGY | Facility: CLINIC | Age: 52
End: 2022-05-25

## 2022-05-25 DIAGNOSIS — Z17.0 MALIGNANT NEOPLASM OF UPPER-OUTER QUADRANT OF LEFT BREAST IN FEMALE, ESTROGEN RECEPTOR POSITIVE (H): ICD-10-CM

## 2022-05-25 DIAGNOSIS — J45.20 MILD INTERMITTENT ASTHMA WITHOUT COMPLICATION: ICD-10-CM

## 2022-05-25 DIAGNOSIS — C50.412 MALIGNANT NEOPLASM OF UPPER-OUTER QUADRANT OF LEFT BREAST IN FEMALE, ESTROGEN RECEPTOR POSITIVE (H): ICD-10-CM

## 2022-05-25 DIAGNOSIS — E03.9 HYPOTHYROIDISM, UNSPECIFIED TYPE: Primary | ICD-10-CM

## 2022-05-25 RX ORDER — LEVOTHYROXINE SODIUM 200 UG/1
200 TABLET ORAL DAILY
Qty: 90 TABLET | Refills: 3 | Status: SHIPPED | OUTPATIENT
Start: 2022-05-25

## 2022-05-25 RX ORDER — ALBUTEROL SULFATE 90 UG/1
2 AEROSOL, METERED RESPIRATORY (INHALATION) EVERY 6 HOURS
Qty: 6.7 G | Refills: 3 | Status: SHIPPED | OUTPATIENT
Start: 2022-05-25 | End: 2022-06-07

## 2022-05-25 NOTE — TELEPHONE ENCOUNTER
Patient is requesting that her Zoloft (sertraline) be sent to [facility] mail order [facility]. 100 mg tablets, one tablet twice daily  If she could get 180 tablets for 90 day supply, that would be appreciated.   Thank you.

## 2022-05-25 NOTE — TELEPHONE ENCOUNTER
Patient is requesting that her Remeron (mirtazipine) be sent to [facility]. 15mg tablets, once daily at bedtime.   If she could get 90 tablets for 90 day supply, that would be appreciated.   Thank you.

## 2022-05-25 NOTE — TELEPHONE ENCOUNTER
Patient has requested her levothyroxine be filled at [facility]. Looks like current dose is 200mcg once daily. 90 tablets for 90 day supply.   Thank you

## 2022-05-25 NOTE — TELEPHONE ENCOUNTER
Reordered it to her [facility] mail order [facility]- Lt4 200 mcg daily  Adrienne Smart MD.  Endocrinology fellow

## 2022-05-25 NOTE — TELEPHONE ENCOUNTER
Patient is requesting that her Albuterol inhaler be sent to [facility] mail order pharmacy. 1 inhaler, 2 puffs into the lungs every 6 hours  If she could get a 3 month supply,  That would be appreciated  Thank you.

## 2022-05-27 ENCOUNTER — LAB (OUTPATIENT)
Dept: LAB | Facility: CLINIC | Age: 52
End: 2022-05-27
Attending: PHYSICIAN ASSISTANT
Payer: COMMERCIAL

## 2022-05-27 ENCOUNTER — HOSPITAL ENCOUNTER (OUTPATIENT)
Dept: CT IMAGING | Facility: CLINIC | Age: 52
Discharge: HOME | End: 2022-05-27
Attending: PHYSICIAN ASSISTANT
Payer: COMMERCIAL

## 2022-05-27 DIAGNOSIS — C50.919 METASTATIC BREAST CANCER: ICD-10-CM

## 2022-05-27 DIAGNOSIS — C50.412 MALIGNANT NEOPLASM OF UPPER-OUTER QUADRANT OF LEFT BREAST IN FEMALE, ESTROGEN RECEPTOR POSITIVE (H): ICD-10-CM

## 2022-05-27 DIAGNOSIS — Z17.0 MALIGNANT NEOPLASM OF UPPER-OUTER QUADRANT OF LEFT BREAST IN FEMALE, ESTROGEN RECEPTOR POSITIVE (H): ICD-10-CM

## 2022-05-27 LAB
ALBUMIN SERPL-MCNC: 3.8 G/DL (ref 3.4–5)
ALP SERPL-CCNC: 78 U/L (ref 40–150)
ALT SERPL W P-5'-P-CCNC: 55 U/L (ref 0–50)
ANION GAP SERPL CALCULATED.3IONS-SCNC: 12 MMOL/L (ref 3–14)
AST SERPL W P-5'-P-CCNC: 43 U/L (ref 0–45)
BASOPHILS # BLD AUTO: 0 10E3/UL (ref 0–0.2)
BASOPHILS NFR BLD AUTO: 1 %
BILIRUB SERPL-MCNC: 0.6 MG/DL (ref 0.2–1.3)
BUN SERPL-MCNC: 12 MG/DL (ref 7–30)
CALCIUM SERPL-MCNC: 9.3 MG/DL (ref 8.5–10.1)
CHLORIDE BLD-SCNC: 102 MMOL/L (ref 94–109)
CO2 SERPL-SCNC: 22 MMOL/L (ref 20–32)
CREAT SERPL-MCNC: 0.8 MG/DL (ref 0.52–1.04)
EOSINOPHIL # BLD AUTO: 0 10E3/UL (ref 0–0.7)
EOSINOPHIL NFR BLD AUTO: 0 %
ERYTHROCYTE [DISTWIDTH] IN BLOOD BY AUTOMATED COUNT: 13.2 % (ref 10–15)
GFR SERPL CREATININE-BSD FRML MDRD: 88 ML/MIN/1.73M2
GLUCOSE BLD-MCNC: 169 MG/DL (ref 70–99)
HCT VFR BLD AUTO: 40.1 % (ref 35–47)
HGB BLD-MCNC: 13.9 G/DL (ref 11.7–15.7)
IMM GRANULOCYTES # BLD: 0 10E3/UL
IMM GRANULOCYTES NFR BLD: 1 %
LYMPHOCYTES # BLD AUTO: 0.8 10E3/UL (ref 0.8–5.3)
LYMPHOCYTES NFR BLD AUTO: 23 %
MCH RBC QN AUTO: 33.3 PG (ref 26.5–33)
MCHC RBC AUTO-ENTMCNC: 34.7 G/DL (ref 31.5–36.5)
MCV RBC AUTO: 96 FL (ref 78–100)
MONOCYTES # BLD AUTO: 0.1 10E3/UL (ref 0–1.3)
MONOCYTES NFR BLD AUTO: 4 %
NEUTROPHILS # BLD AUTO: 2.3 10E3/UL (ref 1.6–8.3)
NEUTROPHILS NFR BLD AUTO: 71 %
NRBC # BLD AUTO: 0 10E3/UL
NRBC BLD AUTO-RTO: 0 /100
PLATELET # BLD AUTO: 162 10E3/UL (ref 150–450)
POTASSIUM BLD-SCNC: 4.5 MMOL/L (ref 3.4–5.3)
PROT SERPL-MCNC: 8.4 G/DL (ref 6.8–8.8)
RBC # BLD AUTO: 4.17 10E6/UL (ref 3.8–5.2)
SODIUM SERPL-SCNC: 136 MMOL/L (ref 133–144)
WBC # BLD AUTO: 3.2 10E3/UL (ref 4–11)

## 2022-05-27 PROCEDURE — 80053 COMPREHEN METABOLIC PANEL: CPT

## 2022-05-27 PROCEDURE — 70491 CT SOFT TISSUE NECK W/DYE: CPT

## 2022-05-27 PROCEDURE — 250N000011 HC RX IP 250 OP 636: Performed by: PHYSICIAN ASSISTANT

## 2022-05-27 PROCEDURE — 250N000009 HC RX 250: Performed by: PHYSICIAN ASSISTANT

## 2022-05-27 PROCEDURE — 71260 CT THORAX DX C+: CPT | Mod: 26 | Performed by: STUDENT IN AN ORGANIZED HEALTH CARE EDUCATION/TRAINING PROGRAM

## 2022-05-27 PROCEDURE — 36415 COLL VENOUS BLD VENIPUNCTURE: CPT

## 2022-05-27 PROCEDURE — 70491 CT SOFT TISSUE NECK W/DYE: CPT | Mod: 26 | Performed by: RADIOLOGY

## 2022-05-27 PROCEDURE — 74177 CT ABD & PELVIS W/CONTRAST: CPT

## 2022-05-27 PROCEDURE — 74177 CT ABD & PELVIS W/CONTRAST: CPT | Mod: 26 | Performed by: STUDENT IN AN ORGANIZED HEALTH CARE EDUCATION/TRAINING PROGRAM

## 2022-05-27 PROCEDURE — 85004 AUTOMATED DIFF WBC COUNT: CPT

## 2022-05-27 RX ORDER — MIRTAZAPINE 15 MG/1
TABLET, FILM COATED ORAL
Qty: 90 TABLET | Refills: 3 | Status: SHIPPED | OUTPATIENT
Start: 2022-05-27 | End: 2022-08-23

## 2022-05-27 RX ORDER — IOPAMIDOL 755 MG/ML
150 INJECTION, SOLUTION INTRAVASCULAR ONCE
Status: COMPLETED | OUTPATIENT
Start: 2022-05-27 | End: 2022-05-27

## 2022-05-27 RX ADMIN — IOPAMIDOL 135 ML: 755 INJECTION, SOLUTION INTRAVENOUS at 13:11

## 2022-05-27 RX ADMIN — SODIUM CHLORIDE 74 ML: 9 INJECTION, SOLUTION INTRAVENOUS at 13:12

## 2022-05-29 NOTE — PROGRESS NOTES
HISTORY OF PRESENT ILLNESS:  Jade is a 52-year-old woman with metastatic breast cancer who comes to our clinic for recommendations for further treatment.  She is referred by Dr. Norberto Brand at [facility].  Jade would like to continue her care at the [facility].       Jade was diagnosed with breast cancer with a lump found in the upper-inner quadrant of the left breast.  She was diagnosed in 12/2013 in the [place] area and Dr. David Rded was her medical oncologist.  Biopsy of the tumor showed it to be ER positive, IN positive, and HER-2 negative.  We do not have any of the original pathology and we need to obtain those reports.  She underwent a lumpectomy performed by Dr. Tasha Segura who is a surgeon in the [place] area.  She also had a sentinel lymph node which was noted to be involved with tumor but there was no lymph node dissection done at that time.  [id number].  She subsequently underwent adjuvant chemotherapy with dose-dense AC for 4 cycles and Taxol for 12 weeks, completed 09/11/2014.        She then had radiation therapy post lumpectomy to the left breast, which was completed 11/23/2014 by Dr. Manley.        She was then begun on an aromatase inhibitor, the name of which she does not remember.  The aromatase inhibitor therapy was begun in 11/2014.  She was then switched within about a month or so to anastrozole and remained on anastrozole from 11/2014-02/2017.  She has never received tamoxifen.        She then moved to [place], [place] in 03/2017.  She saw a gynecologist there and underwent a IRIS/BSO by Dr. Zendejas.  She then also saw Dr. Linares in [place], [place], who is an oncologist.  His interpretation of the pathology report was that she had triple-negative breast cancer.  It is possible that she may have had low estrogen receptor positivity, but the anastrozole was then discontinued in 02/2017.  She then underwent the IRIS/BSO in 03/2017.  This was done laparoscopically.        She then  remained off of all hormonal therapy and in 09/2017 moved to [place].  She remained off hormonal therapy and did not have Medical Oncology followup initially.        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  She then went to see Dr. Norberto Brand who performed a PET/CT scan and the patient also underwent a brain MRI for staging.  The PET/CT scan performed 08/03/2018 showed in the neck there were several mildly metabolic active and large prominent left posterior cervical lymph nodes.  The largest is located posterior to the left sternocleidomastoid muscle and measures 1.5 x 1 cm in size.  This demonstrate modest FDG uptake.  The other lymph nodes are smaller and demonstrate mild FDG uptake.  The prominent prevascular and aortopulmonary lymph nodes were also seen on the contrast-enhanced scan were less well seen on the PET.  The largest lymph node visualized on the PET scan was 1.1 x 0.8 cm and demonstrated mild FDG uptake.  This was in the periaortic area just anterior to the aortic arch.  No lung nodules.  No lung infiltrates.  No pleural effusion.  There was no uptake of FDG in the bones.  In summary, there were mildly enlarged left posterior cervical lymph nodes, largest measuring 1.5 x 1.0 cm and mildly enlarged aorticopulmonary and prevascular lymph nodes that were noted on contrast-enhanced CT scan seen less well on the PET scan.  No evidence of metastatic disease in the abdomen and pelvis.  She also underwent a brain MRI that showed no evidence of intracranial metastatic disease.  She underwent a biopsy of one of the lymph nodes in the left neck which showed metastatic adenocarcinoma consistent with breast origin, which was estrogen-receptor positive.  The tumor cells were positive for CK7, ER and ID consistent with metastatic breast carcinoma.  Estrogen receptor showed strong average nuclear intensity in 95% of carcinoma cells, progesterone receptor moderate average  nuclear intensity in 70% of the carcinoma.  A GATA3 stain was apparently not performed.       TREATMENT HISTORY:  A. Tamoxifen  B. Letrozole and palbociclib started 9-18-18.  C. Pfizer COVID vaccination end of March, 2021 and 4-17.     INTERVAL HISTORY:  Jade returns to clinic and is feeling entirely well.  She has no pain, fatigue, depression or anxiety.  She has no complaints of lymphadenopathy in the neck.  She is on sertraline, which is helping her depression.  She does have chronic low back pain.  Restaging CT scan of the chest, abdomen and pelvis is stable with no evidence of metastatic disease.  CT scan of the neck shows persistent lymphadenopathy but with no significant change of the short axis of the lymph nodes.    Ms. Collins denies any neck pain or other new pain. She continues to have chronic low back pain. She denies any bothersome side effects from her breast cancer treatment and is tolerating the medication well. She has lost 10 pounds in the setting of starting Ozempic, and follows with endocrinology for T2DM and thyroid health issues. She denies alcohol use. She has had 4 COVID vaccinations. She has depression/anxiety managed well by sertraline. She denies headache, fever/chills, nausea/vomiting, constipation, and diarrhea.    REVIEW OF SYSTEMS:  A 10-point review of systems is entirely negative.    Diet has been Mediterranean in style.  She does have some difficulty with weight and is interested in strategies to reduce consumption of added sugar in her diet.  She does want to exercise 150 minutes per week,but has not been reaching that goal at this time.  She does not consume excess alcohol.  She takes vitamin D3 as well as calcium.  She has had COVID vaccination x4.    She does have thyroid replacement and is following up with Endocrinology for thyroid replacement as well as for type 2 diabetes, managed with diet.       PHYSICAL EXAMINATION:    GENERAL:  Jade appeared generally well.  She has no  alopecia.  /85   Pulse 85   Temp 97.8  F (36.6  C) (Oral)   Resp 16   Wt 142.9 kg (315 lb)   SpO2 95%   BMI 46.52 kg/m    HEENT:  Examination of the oropharynx is without lesions.  LYMPH:  There is no palpable cervical, supraclavicular, subclavicular or axillary lymphadenopathy.  BREASTS:  Right breast reveals no masses.  Examination of the left breast reveals a mass from the 11:30 to 12 o'clock position of the left breast just above the nipple areolar complex, measuring about 3 x 3 cm in size.  No other masses in either breast.  LUNGS:  Clear to percussion and auscultation.  HEART:  Regular rate and rhythm.  S1, S2.  ABDOMEN:  Soft, nontender, consistent with increased BMI.    EXTREMITIES:  Without edema.  PSYCH:  Mood was somewhat anxious and affect was appropriate.    LABORATORY DATA:  Pending.      ASSESSMENT AND PLAN:  1. Recurrent, metastatic ER-positive, HER2 negative breast cancer: History of left breast cancer s/p lumpectomy and SNLB in 2014. S/p 4 cycles ddAC and 12 weeks of Taxol. On AI from 11/2014-2/2017. Recurrence on PET/CT scan performed 08/03/2018 and then biopsy to left posterior cervical lymph nodes, aorticopulmonary, and prevascular lymph nodes were positive. No evidence of metastatic disease in the abdomen and pelvis or brain. Started Ibrance and letrozole on 9/21/18. Tolerating well aside from some hot flashes and mild joint stiffness.  She continues on Ibrance and letrozole and is now at nearly 4 years with stable disease.  2.  She is very pleased that her disease has remained stable.  I did discuss with her that we measured the short axis of lymph nodes in her neck and there has been no substantial difference over the past 6 months, and my recommendation is to continue with the combination of palbociclib 100 mg 21 days on and 7 days off with letrozole.  Jade is very pleased with this.  She has no identified bone metastases and therefore is not on a bone targeted agent.  3.   Followup plan.  We will see Jade monthly and obtain a CT scan of chest, abdomen and pelvis in 3 months.  4.  Weight control.  She decided against bariatric surgery and will concentrate on diet and exercise. She is on Ozempic and her weight is decreased from 326 to 315 pounds.  5.  Imaging and interval.  She would like to perform imaging on a 3 month basis.  I think this is reasonable. Allergy to contrast and she needs premedication and needs CT CAP and CT neck.   6.  Diet and exercise. We also discussed eating less sugar and getting 150 minutes of exercise per week.  She will try to incorporate these recommendations into her lifestyle.  PCP . Dr. Wesly Pate at [facility].  7.  Depression and anxiety. She saw Dr. Pate her new PCP and her regimen was changed to add buspirone 7.5 mg twice daily to sertraline 200 mg once per added. Lorazepam prn anxiety was added.  She has improvement of her symptoms of depression and anxiety.   8.  Hypothyroid. Levothyroxine 0.175 mg per day.   She needs to go back to her PCP to see if this needs to be increased.   9. Asthma: Seen by Dr. Hay. Takes advair, montelukast, and albuterol as needed, noticing some slightly worsening symptoms lately. Meeting with WALT Hay to discuss  10. Follow up. Continue letrozole and palbociclib.  Follow-up with SONIA June 28, July 26 with me August 23 in person  with CBC, CMP, CA27.29 and CEA.   CT CAP and CT neck  with methylprednisolone premedication August 22.  CBC, CMP, CEA, CA 27-29 all visits.      Thank you for allowing us to participate in this patient's care.      Sincerely,      Wilfredo Bhatt MD  Professor  [facility]  [phone]        A/P: Ms. Collins is a 52 year old woman with a PMH T2DM, obesity, and hypothyroidism who presents for routine follow up for her recurrent, metastatic ER-positive, HER2 negative breast cancer originating on her left. After discussion with radiology, using RECIST criteria, the short axes of  her enlarged cervical lymph nodes appear relatively unchanged. Coupled with her lack of new symptoms, we recommend continuation of her Ibrance and letrozole with follow up monthly as before. We defer to her endocrinologist for her other ongoing health issues. Lab results today were mostly normal with slight elevation of her ALT, which may be due to NAFLD. We also provided recommendations on diet and exercise (including the [facility] program), encouraging weight loss.    Denis Ceja, MS4  [employer] [facility]           I spent 35 minutes with the patient more than 50% of which was in counseling and coordination of care.

## 2022-05-31 ENCOUNTER — APPOINTMENT (OUTPATIENT)
Dept: LAB | Facility: CLINIC | Age: 52
End: 2022-05-31
Attending: INTERNAL MEDICINE
Payer: COMMERCIAL

## 2022-05-31 ENCOUNTER — LAB (OUTPATIENT)
Dept: LAB | Facility: CLINIC | Age: 52
End: 2022-05-31

## 2022-05-31 ENCOUNTER — ONCOLOGY VISIT (OUTPATIENT)
Dept: ONCOLOGY | Facility: CLINIC | Age: 52
End: 2022-05-31
Attending: INTERNAL MEDICINE
Payer: COMMERCIAL

## 2022-05-31 VITALS
WEIGHT: 293 LBS | TEMPERATURE: 97.8 F | DIASTOLIC BLOOD PRESSURE: 85 MMHG | BODY MASS INDEX: 46.52 KG/M2 | HEART RATE: 85 BPM | OXYGEN SATURATION: 95 % | SYSTOLIC BLOOD PRESSURE: 130 MMHG | RESPIRATION RATE: 16 BRPM

## 2022-05-31 DIAGNOSIS — E06.3 HYPOTHYROIDISM DUE TO HASHIMOTO'S THYROIDITIS: ICD-10-CM

## 2022-05-31 DIAGNOSIS — E66.01 CLASS 3 SEVERE OBESITY DUE TO EXCESS CALORIES WITH SERIOUS COMORBIDITY AND BODY MASS INDEX (BMI) OF 45.0 TO 49.9 IN ADULT (H): ICD-10-CM

## 2022-05-31 DIAGNOSIS — E66.813 CLASS 3 SEVERE OBESITY DUE TO EXCESS CALORIES WITH SERIOUS COMORBIDITY AND BODY MASS INDEX (BMI) OF 45.0 TO 49.9 IN ADULT (H): ICD-10-CM

## 2022-05-31 DIAGNOSIS — E11.9 TYPE 2 DIABETES MELLITUS WITHOUT COMPLICATION, WITHOUT LONG-TERM CURRENT USE OF INSULIN (H): Primary | ICD-10-CM

## 2022-05-31 DIAGNOSIS — E03.9 PRIMARY HYPOTHYROIDISM: ICD-10-CM

## 2022-05-31 DIAGNOSIS — C50.412 MALIGNANT NEOPLASM OF UPPER-OUTER QUADRANT OF LEFT BREAST IN FEMALE, ESTROGEN RECEPTOR POSITIVE (H): ICD-10-CM

## 2022-05-31 DIAGNOSIS — R73.9 HYPERGLYCEMIA: ICD-10-CM

## 2022-05-31 DIAGNOSIS — C50.919 METASTATIC BREAST CANCER: ICD-10-CM

## 2022-05-31 DIAGNOSIS — Z17.0 MALIGNANT NEOPLASM OF UPPER-OUTER QUADRANT OF LEFT BREAST IN FEMALE, ESTROGEN RECEPTOR POSITIVE (H): ICD-10-CM

## 2022-05-31 LAB
ALBUMIN SERPL-MCNC: 4 G/DL (ref 3.4–5)
ALP SERPL-CCNC: 85 U/L (ref 40–150)
ALT SERPL W P-5'-P-CCNC: 55 U/L (ref 0–50)
ANION GAP SERPL CALCULATED.3IONS-SCNC: 9 MMOL/L (ref 3–14)
AST SERPL W P-5'-P-CCNC: 44 U/L (ref 0–45)
BASOPHILS # BLD AUTO: 0 10E3/UL (ref 0–0.2)
BASOPHILS NFR BLD AUTO: 1 %
BILIRUB SERPL-MCNC: 0.6 MG/DL (ref 0.2–1.3)
BUN SERPL-MCNC: 16 MG/DL (ref 7–30)
CALCIUM SERPL-MCNC: 9.7 MG/DL (ref 8.5–10.1)
CANCER AG27-29 SERPL-ACNC: 20 U/ML (ref 0–39)
CEA SERPL-MCNC: <0.5 UG/L (ref 0–2.5)
CHLORIDE BLD-SCNC: 105 MMOL/L (ref 94–109)
CHOLEST SERPL-MCNC: 204 MG/DL
CO2 SERPL-SCNC: 24 MMOL/L (ref 20–32)
CORTIS SERPL-MCNC: 2.2 UG/DL (ref 4–22)
CREAT SERPL-MCNC: 0.94 MG/DL (ref 0.52–1.04)
CREAT UR-MCNC: 225 MG/DL
EOSINOPHIL # BLD AUTO: 0 10E3/UL (ref 0–0.7)
EOSINOPHIL NFR BLD AUTO: 1 %
ERYTHROCYTE [DISTWIDTH] IN BLOOD BY AUTOMATED COUNT: 13.2 % (ref 10–15)
FASTING STATUS PATIENT QL REPORTED: YES
GFR SERPL CREATININE-BSD FRML MDRD: 73 ML/MIN/1.73M2
GLUCOSE BLD-MCNC: 148 MG/DL (ref 70–99)
HBA1C MFR BLD: 6.3 % (ref 0–5.6)
HCT VFR BLD AUTO: 39.8 % (ref 35–47)
HDLC SERPL-MCNC: 64 MG/DL
HGB BLD-MCNC: 13.9 G/DL (ref 11.7–15.7)
IMM GRANULOCYTES # BLD: 0 10E3/UL
IMM GRANULOCYTES NFR BLD: 0 %
LDLC SERPL CALC-MCNC: 113 MG/DL
LYMPHOCYTES # BLD AUTO: 1 10E3/UL (ref 0.8–5.3)
LYMPHOCYTES NFR BLD AUTO: 35 %
MCH RBC QN AUTO: 33.1 PG (ref 26.5–33)
MCHC RBC AUTO-ENTMCNC: 34.9 G/DL (ref 31.5–36.5)
MCV RBC AUTO: 95 FL (ref 78–100)
MICROALBUMIN UR-MCNC: 12 MG/L
MICROALBUMIN/CREAT UR: 5.33 MG/G CR (ref 0–25)
MONOCYTES # BLD AUTO: 0.3 10E3/UL (ref 0–1.3)
MONOCYTES NFR BLD AUTO: 11 %
NEUTROPHILS # BLD AUTO: 1.4 10E3/UL (ref 1.6–8.3)
NEUTROPHILS NFR BLD AUTO: 52 %
NONHDLC SERPL-MCNC: 140 MG/DL
NRBC # BLD AUTO: 0 10E3/UL
NRBC BLD AUTO-RTO: 0 /100
PLATELET # BLD AUTO: 170 10E3/UL (ref 150–450)
POTASSIUM BLD-SCNC: 4.2 MMOL/L (ref 3.4–5.3)
PROT SERPL-MCNC: 8.1 G/DL (ref 6.8–8.8)
RBC # BLD AUTO: 4.2 10E6/UL (ref 3.8–5.2)
SODIUM SERPL-SCNC: 138 MMOL/L (ref 133–144)
T4 FREE SERPL-MCNC: 1.37 NG/DL (ref 0.76–1.46)
TRIGL SERPL-MCNC: 137 MG/DL
TSH SERPL DL<=0.005 MIU/L-ACNC: 0.5 MU/L (ref 0.4–4)
WBC # BLD AUTO: 2.8 10E3/UL (ref 4–11)

## 2022-05-31 PROCEDURE — 36415 COLL VENOUS BLD VENIPUNCTURE: CPT | Performed by: INTERNAL MEDICINE

## 2022-05-31 PROCEDURE — 82533 TOTAL CORTISOL: CPT | Performed by: INTERNAL MEDICINE

## 2022-05-31 PROCEDURE — 80053 COMPREHEN METABOLIC PANEL: CPT | Performed by: INTERNAL MEDICINE

## 2022-05-31 PROCEDURE — 82378 CARCINOEMBRYONIC ANTIGEN: CPT | Performed by: INTERNAL MEDICINE

## 2022-05-31 PROCEDURE — 85025 COMPLETE CBC W/AUTO DIFF WBC: CPT | Performed by: INTERNAL MEDICINE

## 2022-05-31 PROCEDURE — 84439 ASSAY OF FREE THYROXINE: CPT | Performed by: INTERNAL MEDICINE

## 2022-05-31 PROCEDURE — 82043 UR ALBUMIN QUANTITATIVE: CPT | Performed by: PATHOLOGY

## 2022-05-31 PROCEDURE — 80061 LIPID PANEL: CPT | Performed by: INTERNAL MEDICINE

## 2022-05-31 PROCEDURE — 86300 IMMUNOASSAY TUMOR CA 15-3: CPT | Performed by: INTERNAL MEDICINE

## 2022-05-31 PROCEDURE — 83036 HEMOGLOBIN GLYCOSYLATED A1C: CPT | Performed by: INTERNAL MEDICINE

## 2022-05-31 PROCEDURE — 84443 ASSAY THYROID STIM HORMONE: CPT | Performed by: INTERNAL MEDICINE

## 2022-05-31 PROCEDURE — 99214 OFFICE O/P EST MOD 30 MIN: CPT | Performed by: INTERNAL MEDICINE

## 2022-05-31 PROCEDURE — G0463 HOSPITAL OUTPT CLINIC VISIT: HCPCS

## 2022-05-31 ASSESSMENT — PAIN SCALES - GENERAL: PAINLEVEL: NO PAIN (0)

## 2022-05-31 NOTE — NURSING NOTE
Chief Complaint   Patient presents with     Blood Draw     Labs drawn via  by RN. VS taken.     Labs drawn with  by rn.  Pt tolerated well.  VS taken.     Lico Díaz RN

## 2022-05-31 NOTE — LETTER
5/31/2022         RE: Jade Collins  [address]  [facility] [address]        Dear Colleague,    Thank you for referring your patient, Jade Collins, to the [facility]. Please see a copy of my visit note below.    HISTORY OF PRESENT ILLNESS:  Jade is a 52-year-old woman with metastatic breast cancer who comes to our clinic for recommendations for further treatment.  She is referred by Dr. Norberto Brand at [facility].  Jade would like to continue her care at the [facility].       Jade was diagnosed with breast cancer with a lump found in the upper-inner quadrant of the left breast.  She was diagnosed in 12/2013 in the [place] area and Dr. David Redd was her medical oncologist.  Biopsy of the tumor showed it to be ER positive, IN positive, and HER-2 negative.  We do not have any of the original pathology and we need to obtain those reports.  She underwent a lumpectomy performed by Dr. Tasha Segura who is a surgeon in the [place] area.  She also had a sentinel lymph node which was noted to be involved with tumor but there was no lymph node dissection done at that time.  [id number].  She subsequently underwent adjuvant chemotherapy with dose-dense AC for 4 cycles and Taxol for 12 weeks, completed 09/11/2014.        She then had radiation therapy post lumpectomy to the left breast, which was completed 11/23/2014 by Dr. Manley.        She was then begun on an aromatase inhibitor, the name of which she does not remember.  The aromatase inhibitor therapy was begun in 11/2014.  She was then switched within about a month or so to anastrozole and remained on anastrozole from 11/2014-02/2017.  She has never received tamoxifen.        She then moved to [place], [place] in 03/2017.  She saw a gynecologist there and underwent a IRIS/BSO by Dr. Zendejas.  She then also saw Dr. Linares in [place], [place], who is an oncologist.  His interpretation of the pathology report was  that she had triple-negative breast cancer.  It is possible that she may have had low estrogen receptor positivity, but the anastrozole was then discontinued in 02/2017.  She then underwent the IRIS/BSO in 03/2017.  This was done laparoscopically.        She then remained off of all hormonal therapy and in 09/2017 moved to [place].  She remained off hormonal therapy and did not have Medical Oncology followup initially.        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  She then went to see Dr. Norberto Brand who performed a PET/CT scan and the patient also underwent a brain MRI for staging.  The PET/CT scan performed 08/03/2018 showed in the neck there were several mildly metabolic active and large prominent left posterior cervical lymph nodes.  The largest is located posterior to the left sternocleidomastoid muscle and measures 1.5 x 1 cm in size.  This demonstrate modest FDG uptake.  The other lymph nodes are smaller and demonstrate mild FDG uptake.  The prominent prevascular and aortopulmonary lymph nodes were also seen on the contrast-enhanced scan were less well seen on the PET.  The largest lymph node visualized on the PET scan was 1.1 x 0.8 cm and demonstrated mild FDG uptake.  This was in the periaortic area just anterior to the aortic arch.  No lung nodules.  No lung infiltrates.  No pleural effusion.  There was no uptake of FDG in the bones.  In summary, there were mildly enlarged left posterior cervical lymph nodes, largest measuring 1.5 x 1.0 cm and mildly enlarged aorticopulmonary and prevascular lymph nodes that were noted on contrast-enhanced CT scan seen less well on the PET scan.  No evidence of metastatic disease in the abdomen and pelvis.  She also underwent a brain MRI that showed no evidence of intracranial metastatic disease.  She underwent a biopsy of one of the lymph nodes in the left neck which showed metastatic adenocarcinoma consistent with breast  origin, which was estrogen-receptor positive.  The tumor cells were positive for CK7, ER and ID consistent with metastatic breast carcinoma.  Estrogen receptor showed strong average nuclear intensity in 95% of carcinoma cells, progesterone receptor moderate average nuclear intensity in 70% of the carcinoma.  A GATA3 stain was apparently not performed.       TREATMENT HISTORY:  A. Tamoxifen  B. Letrozole and palbociclib started 9-18-18.  C. Pfizer COVID vaccination end of March, 2021 and 4-17.     INTERVAL HISTORY:  Jade returns to clinic and is feeling entirely well.  She has no pain, fatigue, depression or anxiety.  She has no complaints of lymphadenopathy in the neck.  She is on sertraline, which is helping her depression.  She does have chronic low back pain.  Restaging CT scan of the chest, abdomen and pelvis is stable with no evidence of metastatic disease.  CT scan of the neck shows persistent lymphadenopathy but with no significant change of the short axis of the lymph nodes.    Ms. Collins denies any neck pain or other new pain. She continues to have chronic low back pain. She denies any bothersome side effects from her breast cancer treatment and is tolerating the medication well. She has lost 10 pounds in the setting of starting Ozempic, and follows with endocrinology for T2DM and thyroid health issues. She denies alcohol use. She has had 4 COVID vaccinations. She has depression/anxiety managed well by sertraline. She denies headache, fever/chills, nausea/vomiting, constipation, and diarrhea.    REVIEW OF SYSTEMS:  A 10-point review of systems is entirely negative.    Diet has been Mediterranean in style.  She does have some difficulty with weight and is interested in strategies to reduce consumption of added sugar in her diet.  She does want to exercise 150 minutes per week,but has not been reaching that goal at this time.  She does not consume excess alcohol.  She takes vitamin D3 as well as calcium.  She  has had COVID vaccination x4.    She does have thyroid replacement and is following up with Endocrinology for thyroid replacement as well as for type 2 diabetes, managed with diet.       PHYSICAL EXAMINATION:    GENERAL:  Jade appeared generally well.  She has no alopecia.  /85   Pulse 85   Temp 97.8  F (36.6  C) (Oral)   Resp 16   Wt 142.9 kg (315 lb)   SpO2 95%   BMI 46.52 kg/m    HEENT:  Examination of the oropharynx is without lesions.  LYMPH:  There is no palpable cervical, supraclavicular, subclavicular or axillary lymphadenopathy.  BREASTS:  Right breast reveals no masses.  Examination of the left breast reveals a mass from the 11:30 to 12 o'clock position of the left breast just above the nipple areolar complex, measuring about 3 x 3 cm in size.  No other masses in either breast.  LUNGS:  Clear to percussion and auscultation.  HEART:  Regular rate and rhythm.  S1, S2.  ABDOMEN:  Soft, nontender, consistent with increased BMI.    EXTREMITIES:  Without edema.  PSYCH:  Mood was somewhat anxious and affect was appropriate.    LABORATORY DATA:  Pending.      ASSESSMENT AND PLAN:  1. Recurrent, metastatic ER-positive, HER2 negative breast cancer: History of left breast cancer s/p lumpectomy and SNLB in 2014. S/p 4 cycles ddAC and 12 weeks of Taxol. On AI from 11/2014-2/2017. Recurrence on PET/CT scan performed 08/03/2018 and then biopsy to left posterior cervical lymph nodes, aorticopulmonary, and prevascular lymph nodes were positive. No evidence of metastatic disease in the abdomen and pelvis or brain. Started Ibrance and letrozole on 9/21/18. Tolerating well aside from some hot flashes and mild joint stiffness.  She continues on Ibrance and letrozole and is now at nearly 4 years with stable disease.  2.  She is very pleased that her disease has remained stable.  I did discuss with her that we measured the short axis of lymph nodes in her neck and there has been no substantial difference over the  past 6 months, and my recommendation is to continue with the combination of palbociclib 100 mg 21 days on and 7 days off with letrozole.  Jade is very pleased with this.  She has no identified bone metastases and therefore is not on a bone targeted agent.  3.  Followup plan.  We will see Jade monthly and obtain a CT scan of chest, abdomen and pelvis in 3 months.  4.  Weight control.  She decided against bariatric surgery and will concentrate on diet and exercise. She is on Ozempic and her weight is decreased from 326 to 315 pounds.  5.  Imaging and interval.  She would like to perform imaging on a 3 month basis.  I think this is reasonable. Allergy to contrast and she needs premedication and needs CT CAP and CT neck.   6.  Diet and exercise. We also discussed eating less sugar and getting 150 minutes of exercise per week.  She will try to incorporate these recommendations into her lifestyle.  PCP . Dr. Wesly Pate at [facility].  7.  Depression and anxiety. She saw Dr. Pate her new PCP and her regimen was changed to add buspirone 7.5 mg twice daily to sertraline 200 mg once per added. Lorazepam prn anxiety was added.  She has improvement of her symptoms of depression and anxiety.   8.  Hypothyroid. Levothyroxine 0.175 mg per day.   She needs to go back to her PCP to see if this needs to be increased.   9. Asthma: Seen by Dr. Hay. Takes advair, montelukast, and albuterol as needed, noticing some slightly worsening symptoms lately. Meeting with WALT Hay to discuss  10. Follow up. Continue letrozole and palbociclib.  Follow-up with SONIA June 28, July 26 with me August 23 in person  with CBC, CMP, CA27.29 and CEA.   CT CAP and CT neck  with methylprednisolone premedication August 22.  CBC, CMP, CEA, CA 27-29 all visits.      Thank you for allowing us to participate in this patient's care.      Sincerely,      Wilfredo Bhatt MD  Professor  [facility]  [phone]        A/P: Ms. Collins is a 52 year  old woman with a PMH T2DM, obesity, and hypothyroidism who presents for routine follow up for her recurrent, metastatic ER-positive, HER2 negative breast cancer originating on her left. After discussion with radiology, using RECIST criteria, the short axes of her enlarged cervical lymph nodes appear relatively unchanged. Coupled with her lack of new symptoms, we recommend continuation of her Ibrance and letrozole with follow up monthly as before. We defer to her endocrinologist for her other ongoing health issues. Lab results today were mostly normal with slight elevation of her ALT, which may be due to NAFLD. We also provided recommendations on diet and exercise (including the Leonora [employer] program), encouraging weight loss.    Denis Ceja, MS4  [employer] [place] [employer]       I spent 35 minutes with the patient more than 50% of which was in counseling and coordination of care.      Sincerely,    Wilfredo Bhatt MD

## 2022-05-31 NOTE — NURSING NOTE
"Oncology Rooming Note    May 31, 2022 1:28 PM   Jade Collins is a 52 year old female who presents for:    Chief Complaint   Patient presents with     Blood Draw     Labs drawn via  by RN. VS taken.     Oncology Clinic Visit     Malignant neoplasm of upper-outer quadrant of left breast in female, estrogen receptor positive     Initial Vitals: /85   Pulse 85   Temp 97.8  F (36.6  C) (Oral)   Resp 16   Wt 142.9 kg (315 lb)   SpO2 95%   BMI 46.52 kg/m   Estimated body mass index is 46.52 kg/m  as calculated from the following:    Height as of 5/19/22: 1.753 m (5' 9\").    Weight as of this encounter: 142.9 kg (315 lb). Body surface area is 2.64 meters squared.  No Pain (0) Comment: Data Unavailable   No LMP recorded. Patient is postmenopausal.  Allergies reviewed: Yes  Medications reviewed: Yes    Medications: Medication refills not needed today.  Pharmacy name entered into [facility] System: [facility] - [place], [place] - [address]    Clinical concerns: none       Brendan Scales            "

## 2022-06-03 LAB
CORTIS SAL-MCNC: 0.06 UG/DL
CORTIS SAL-MCNC: 0.06 UG/DL

## 2022-06-03 ASSESSMENT — ENCOUNTER SYMPTOMS
SPUTUM PRODUCTION: 0
COUGH DISTURBING SLEEP: 0
POSTURAL DYSPNEA: 1
WHEEZING: 1
HEMOPTYSIS: 0
DYSPNEA ON EXERTION: 1
COUGH: 0
SHORTNESS OF BREATH: 1
SNORES LOUDLY: 1

## 2022-06-06 ENCOUNTER — OFFICE VISIT (OUTPATIENT)
Dept: OPHTHALMOLOGY | Facility: CLINIC | Age: 52
End: 2022-06-06
Attending: OPHTHALMOLOGY
Payer: COMMERCIAL

## 2022-06-06 DIAGNOSIS — H25.13 SENILE NUCLEAR SCLEROSIS, BILATERAL: ICD-10-CM

## 2022-06-06 DIAGNOSIS — E11.9 TYPE 2 DIABETES MELLITUS WITHOUT COMPLICATION, WITHOUT LONG-TERM CURRENT USE OF INSULIN (H): Primary | ICD-10-CM

## 2022-06-06 PROBLEM — F33.1 MAJOR DEPRESSIVE DISORDER, RECURRENT EPISODE, MODERATE (H): Status: ACTIVE | Noted: 2020-11-27

## 2022-06-06 PROBLEM — F41.0 PANIC DISORDER WITHOUT AGORAPHOBIA WITH MODERATE PANIC ATTACKS: Status: ACTIVE | Noted: 2020-11-27

## 2022-06-06 PROBLEM — F41.8 OTHER SPECIFIED ANXIETY DISORDERS: Status: ACTIVE | Noted: 2020-11-27

## 2022-06-06 PROCEDURE — 92134 CPTRZ OPH DX IMG PST SGM RTA: CPT | Performed by: OPHTHALMOLOGY

## 2022-06-06 PROCEDURE — G0463 HOSPITAL OUTPT CLINIC VISIT: HCPCS | Mod: 25

## 2022-06-06 PROCEDURE — 92004 COMPRE OPH EXAM NEW PT 1/>: CPT | Performed by: OPHTHALMOLOGY

## 2022-06-06 ASSESSMENT — EXTERNAL EXAM - LEFT EYE: OS_EXAM: NORMAL

## 2022-06-06 ASSESSMENT — SLIT LAMP EXAM - LIDS
COMMENTS: NORMAL
COMMENTS: NORMAL

## 2022-06-06 ASSESSMENT — TONOMETRY
OD_IOP_MMHG: 18
IOP_METHOD: TONOPEN
OS_IOP_MMHG: 18

## 2022-06-06 ASSESSMENT — CONF VISUAL FIELD
OS_NORMAL: 1
OD_NORMAL: 1

## 2022-06-06 ASSESSMENT — VISUAL ACUITY
OD_SC: 20/50
METHOD: SNELLEN - LINEAR
OS_PH_SC: 20/20
OD_PH_SC: 20/20
OS_SC: 20/40

## 2022-06-06 ASSESSMENT — EXTERNAL EXAM - RIGHT EYE: OD_EXAM: NORMAL

## 2022-06-06 NOTE — PROGRESS NOTES
I have confirmed the patient's and reviewed Past Medical History, Past Surgical History, Social History, Family History, Problem List, Medication List and agree with Tech note.    CC: Diabetes mellitus diagnosed 6 months ago     HPI: patient here to establish care    Assessment/plan:   1.  Diabetes mellitus no nonproliferative diabetic retinopathy     Blood glucose control   Last HbA1C is 6.3  May 2022   Monitor     2.  Nuclear sclerotic cataract  Both eyes    - Not visually significant   - refract as needed    - no new prescription glasses issued    - monitor yearly       RTC one year for dilated fundus exam and Exam/OCT     Demetri Schmidt MD PhD.  Professor & Chair

## 2022-06-06 NOTE — NURSING NOTE
Chief Complaints and History of Present Illnesses   Patient presents with     Diabetic Eye Exam     Chief Complaint(s) and History of Present Illness(es)     Diabetic Eye Exam               Comments     Pt. States that she is doing well. No change in VA BE. No pain BE. No flashes BE. Does see floaters at times BE.   Lab Results       Component                Value               Date                       A1C                      6.3                 05/31/2022                 A1C                      6.5                 03/29/2022                 A1C                      6.5                 03/02/2022                 A1C                      6.1                 02/01/2022                 A1C                      6.2                 11/30/2021                 A1C                      5.8                 06/09/2021                 A1C                      5.6                 02/16/2021                 A1C                      5.4                 09/19/2019                 A1C                      5.4                 11/13/2018                 A1C                      5.3                 10/16/2018            Kat Ventura COT 2:57 PM June 6, 2022

## 2022-06-07 ENCOUNTER — VIRTUAL VISIT (OUTPATIENT)
Dept: EDUCATION SERVICES | Facility: CLINIC | Age: 52
End: 2022-06-07
Payer: COMMERCIAL

## 2022-06-07 ENCOUNTER — OFFICE VISIT (OUTPATIENT)
Dept: PULMONOLOGY | Facility: CLINIC | Age: 52
End: 2022-06-07
Attending: INTERNAL MEDICINE
Payer: COMMERCIAL

## 2022-06-07 VITALS
SYSTOLIC BLOOD PRESSURE: 127 MMHG | HEART RATE: 92 BPM | HEIGHT: 69 IN | WEIGHT: 293 LBS | BODY MASS INDEX: 43.4 KG/M2 | RESPIRATION RATE: 18 BRPM | OXYGEN SATURATION: 95 % | DIASTOLIC BLOOD PRESSURE: 74 MMHG

## 2022-06-07 DIAGNOSIS — J45.30 MILD PERSISTENT ASTHMA WITHOUT COMPLICATION: ICD-10-CM

## 2022-06-07 DIAGNOSIS — E11.9 DIABETES MELLITUS, TYPE 2 (H): Primary | ICD-10-CM

## 2022-06-07 DIAGNOSIS — E66.01 MORBID OBESITY (H): Primary | ICD-10-CM

## 2022-06-07 DIAGNOSIS — J45.909 UNCOMPLICATED ASTHMA, UNSPECIFIED ASTHMA SEVERITY, UNSPECIFIED WHETHER PERSISTENT: ICD-10-CM

## 2022-06-07 DIAGNOSIS — J45.40 MODERATE PERSISTENT ASTHMA: Primary | ICD-10-CM

## 2022-06-07 PROCEDURE — 99204 OFFICE O/P NEW MOD 45 MIN: CPT | Mod: 25 | Performed by: INTERNAL MEDICINE

## 2022-06-07 PROCEDURE — 94060 EVALUATION OF WHEEZING: CPT | Performed by: INTERNAL MEDICINE

## 2022-06-07 PROCEDURE — 97803 MED NUTRITION INDIV SUBSEQ: CPT | Mod: GT | Performed by: NUTRITIONIST

## 2022-06-07 PROCEDURE — G0463 HOSPITAL OUTPT CLINIC VISIT: HCPCS | Mod: 25

## 2022-06-07 RX ORDER — ALBUTEROL SULFATE 0.83 MG/ML
2.5 SOLUTION RESPIRATORY (INHALATION) EVERY 6 HOURS PRN
Status: CANCELLED | OUTPATIENT
Start: 2022-06-07

## 2022-06-07 RX ORDER — ALBUTEROL SULFATE 90 UG/1
2 AEROSOL, METERED RESPIRATORY (INHALATION) EVERY 6 HOURS
Qty: 6.7 G | Refills: 3 | Status: SHIPPED | OUTPATIENT
Start: 2022-06-07

## 2022-06-07 RX ORDER — FLUTICASONE PROPIONATE AND SALMETEROL XINAFOATE 115; 21 UG/1; UG/1
2 AEROSOL, METERED RESPIRATORY (INHALATION) 2 TIMES DAILY
Qty: 1 G | Refills: 5 | Status: SHIPPED | OUTPATIENT
Start: 2022-06-07

## 2022-06-07 ASSESSMENT — PAIN SCALES - GENERAL: PAINLEVEL: MODERATE PAIN (5)

## 2022-06-07 ASSESSMENT — ASTHMA QUESTIONNAIRES
ACT_TOTALSCORE: 10
QUESTION_3 LAST FOUR WEEKS HOW OFTEN DID YOUR ASTHMA SYMPTOMS (WHEEZING, COUGHING, SHORTNESS OF BREATH, CHEST TIGHTNESS OR PAIN) WAKE YOU UP AT NIGHT OR EARLIER THAN USUAL IN THE MORNING: TWO OR THREE NIGHTS A WEEK
ACT_TOTALSCORE: 10
QUESTION_2 LAST FOUR WEEKS HOW OFTEN HAVE YOU HAD SHORTNESS OF BREATH: MORE THAN ONCE A DAY
QUESTION_5 LAST FOUR WEEKS HOW WOULD YOU RATE YOUR ASTHMA CONTROL: POORLY CONTROLLED
QUESTION_1 LAST FOUR WEEKS HOW MUCH OF THE TIME DID YOUR ASTHMA KEEP YOU FROM GETTING AS MUCH DONE AT WORK, SCHOOL OR AT HOME: MOST OF THE TIME
QUESTION_4 LAST FOUR WEEKS HOW OFTEN HAVE YOU USED YOUR RESCUE INHALER OR NEBULIZER MEDICATION (SUCH AS ALBUTEROL): TWO OR THREE TIMES PER WEEK

## 2022-06-07 NOTE — PROGRESS NOTES
Reason for Visit  Jade Collins is a 52 year old year old female who is being seen for RECHECK (Return Asthma F/U )    Pulmonary HPI  54 YO with history of metastatic breast cancer with metastasis to the cervical lymph nodes but no evidence of metastatic disease in her chest, currently on palbociclib.  History of asthma since 1998.  States had no symptoms of asthma as child, born and raised in [place].  Moved to [place] in 1994 and noticed developing increased sinus symptoms and asthma symptoms in 1998. Triggers were dust in air and seasonal variation in Spring with flowering of cacti and orange trees.  Had 2-3 flares per year, sometime requiring prednisone.  Hospitalized twice- once in 2001 and then in 2014, each time was for ~ 24 hours, usually responded quickly to nebulizers.  Also has allergies to cats and dogs.  Moved back to [place] in Fall of 2017; since the move she states that her allergies and asthma have been under better control.  Using nasal steroids daily- has noted some nasal irritation with Flonase, is thinking about switching back to Nasocort.  Uses Claritin on a daily basis.  Does not use nasal saline rinses.  Does notice some exertional component, cold air also triggers an increase in difficulty breathing.  Was on Advair in the past but has not taken for some time.  Uses albuterol 2-3 times per week. Recent URI with some increase in nasal congestion but this is resolving.  Received flu vaccination this Fall.  No Tobacco use but second hand smoke exposure.  + FH of asthma in her father.    Last seen over three years ago.  Since her last visit her breathing has been worse in the past year.  Increased BUSH when walking flights of stairs or if walking fast; develops chest tightness, heavy chest, and gasping for air, + wheezing.  Uses albuterol inhaler and has a significant improvement in her symptoms.  Has been off of Advair for the past year, using albuterol every day as needed.  Albuterol helps almost  immediately.  No sputum production.  Denies any infections (pneumonia, bronchitis, or sinusitis) since her last visit.  Is using loratidine, Nasocort and Singulair for her allergies.  Weight has increased by ~ 50 pounds since her last visit.  Received 2 COVID vaccinations and two boosters (last booster 2-22).      The patient was seen and examined by Facundo Hay MD           Current Outpatient Medications   Medication     ACCU-CHEK GUIDE test strip     albuterol (PROAIR HFA/PROVENTIL HFA/VENTOLIN HFA) 108 (90 Base) MCG/ACT inhaler     albuterol (PROVENTIL) (2.5 MG/3ML) 0.083% neb solution     blood glucose (NO BRAND SPECIFIED) lancets standard     blood glucose monitoring (NO BRAND SPECIFIED) meter device kit     blood glucose monitoring (SOFTCLIX) lancets     Calcium Citrate-Vitamin D (CALCIUM + D PO)     clotrimazole (MYCELEX) 10 MG lozenge     dexamethasone (DECADRON) 1 MG tablet     dexamethasone (DECADRON) 1 MG tablet     fluticasone-salmeterol (ADVAIR-HFA) 115-21 MCG/ACT inhaler     letrozole (FEMARA) 2.5 MG tablet     letrozole (FEMARA) 2.5 MG tablet     levothyroxine (SYNTHROID/LEVOTHROID) 200 MCG tablet     levothyroxine (SYNTHROID/LEVOTHROID) 200 MCG tablet     loratadine (CLARITIN) 10 MG tablet     methylPREDNISolone (MEDROL) 32 MG tablet     mirtazapine (REMERON) 15 MG tablet     montelukast (SINGULAIR) 10 MG tablet     Multiple Vitamins-Minerals (MULTIVITAMIN ADULT PO)     oxybutynin (DITROPAN) 5 MG tablet     palbociclib (IBRANCE) 100 MG tablet     semaglutide (OZEMPIC, 0.25 OR 0.5 MG/DOSE,) 2 MG/1.5ML SOPN pen     semaglutide (OZEMPIC, 0.25 OR 0.5 MG/DOSE,) 2 MG/1.5ML SOPN pen     sertraline (ZOLOFT) 100 MG tablet     triamcinolone (NASACORT) 55 MCG/ACT inhaler     VITAMIN D, CHOLECALCIFEROL, PO     dexamethasone (DECADRON) 1 MG tablet     letrozole (FEMARA) 2.5 MG tablet     No current facility-administered medications for this visit.     Allergies   Allergen Reactions     Contrast Dye  "Shortness Of Breath     Swollen eyes, coughing, headache     Penicillins      Sulfa Drugs Shortness Of Breath     Seasonal Allergies      Past Medical History:   Diagnosis Date     Breast cancer (H)      Depression      Diabetes (H)      Hypothyroidism        No past surgical history on file.    Social History     Socioeconomic History     Marital status: Single     Spouse name: Not on file     Number of children: Not on file     Years of education: Not on file     Highest education level: Not on file   Occupational History     Not on file   Tobacco Use     Smoking status: Never Smoker     Smokeless tobacco: Never Used   Substance and Sexual Activity     Alcohol use: Not on file     Drug use: Not on file     Sexual activity: Not on file   Other Topics Concern     Not on file   Social History Narrative     Not on file     Social Determinants of Health     Financial Resource Strain: Not on file   Food Insecurity: Not on file   Transportation Needs: Not on file   Physical Activity: Not on file   Stress: Not on file   Social Connections: Not on file   Intimate Partner Violence: Not on file   Housing Stability: Not on file       FH: Father- asthma  ROS Pulmonary  A complete ROS was otherwise negative except as noted in the HPI.  Ht 1.753 m (5' 9\")   Wt 143.8 kg (317 lb)   BMI 46.81 kg/m    Exam:   GENERAL APPEARANCE: Well developed, well nourished, alert, and in no apparent distress.  EYES: PERRL, EOMI  HENT: Nasal mucosa with no edema and no hyperemia. No nasal polyps.  EARS: Canals clear, TMs normal  MOUTH: Oral mucosa is moist, without any lesions, no tonsillar enlargement, no oropharyngeal exudate.  NECK: supple, no masses, no thyromegaly.  LYMPHATICS: No significant axillary, cervical, or supraclavicular nodes.  RESP: Decreased air flow throughout.  No crackles. No rhonchi.  Wheezes mid to late on forced exhalation.  CV: Normal S1, S2, regular rhythm, normal rate. No murmur.  No rub. No gallop. No LE edema. "   ABDOMEN:  Bowel sounds normal, soft, nontender, no HSM or masses.   MS: extremities normal. No clubbing. No cyanosis.  SKIN: no rash on limited exam  NEURO: Mentation intact, speech normal, normal strength and tone, normal gait and stance  PSYCH: mentation appears normal. and affect normal/bright  Results:  Spirometry was personally reviewed in clinic  FVC 3.32 (83%), FEV-1 1.71 (54%), FEV-1/FVC 51%.   Post-bronchodilator FEV-1 increased by 23%.  Recent Results (from the past 168 hour(s))   Cortisol    Collection Time: 05/31/22 10:40 AM   Result Value Ref Range    Cortisol 2.2 (L) 4.0 - 22.0 ug/dL   Lipid Profile    Collection Time: 05/31/22 10:40 AM   Result Value Ref Range    Cholesterol 204 (H) <200 mg/dL    Triglycerides 137 <150 mg/dL    Direct Measure HDL 64 >=50 mg/dL    LDL Cholesterol Calculated 113 (H) <=100 mg/dL    Non HDL Cholesterol 140 (H) <130 mg/dL    Patient Fasting > 8hrs? Yes    COMPREHENSIVE METABOLIC PANEL    Collection Time: 05/31/22 10:40 AM   Result Value Ref Range    Sodium 138 133 - 144 mmol/L    Potassium 4.2 3.4 - 5.3 mmol/L    Chloride 105 94 - 109 mmol/L    Carbon Dioxide (CO2) 24 20 - 32 mmol/L    Anion Gap 9 3 - 14 mmol/L    Urea Nitrogen 16 7 - 30 mg/dL    Creatinine 0.94 0.52 - 1.04 mg/dL    Calcium 9.7 8.5 - 10.1 mg/dL    Glucose 148 (H) 70 - 99 mg/dL    Alkaline Phosphatase 85 40 - 150 U/L    AST 44 0 - 45 U/L    ALT 55 (H) 0 - 50 U/L    Protein Total 8.1 6.8 - 8.8 g/dL    Albumin 4.0 3.4 - 5.0 g/dL    Bilirubin Total 0.6 0.2 - 1.3 mg/dL    GFR Estimate 73 >60 mL/min/1.73m2   CEA    Collection Time: 05/31/22 10:40 AM   Result Value Ref Range    CEA <0.5 0.0 - 2.5 ug/L   Hemoglobin A1c    Collection Time: 05/31/22 10:40 AM   Result Value Ref Range    Hemoglobin A1C 6.3 (H) 0.0 - 5.6 %   T4 free    Collection Time: 05/31/22 10:40 AM   Result Value Ref Range    Free T4 1.37 0.76 - 1.46 ng/dL   TSH    Collection Time: 05/31/22 10:40 AM   Result Value Ref Range    TSH 0.50 0.40 -  4.00 mU/L   Ca27.29  breast tumor marker    Collection Time: 05/31/22 10:40 AM   Result Value Ref Range    CA 27-29 20 0 - 39 U/mL   CBC with platelets and differential    Collection Time: 05/31/22 10:40 AM   Result Value Ref Range    WBC Count 2.8 (L) 4.0 - 11.0 10e3/uL    RBC Count 4.20 3.80 - 5.20 10e6/uL    Hemoglobin 13.9 11.7 - 15.7 g/dL    Hematocrit 39.8 35.0 - 47.0 %    MCV 95 78 - 100 fL    MCH 33.1 (H) 26.5 - 33.0 pg    MCHC 34.9 31.5 - 36.5 g/dL    RDW 13.2 10.0 - 15.0 %    Platelet Count 170 150 - 450 10e3/uL    % Neutrophils 52 %    % Lymphocytes 35 %    % Monocytes 11 %    % Eosinophils 1 %    % Basophils 1 %    % Immature Granulocytes 0 %    NRBCs per 100 WBC 0 <1 /100    Absolute Neutrophils 1.4 (L) 1.6 - 8.3 10e3/uL    Absolute Lymphocytes 1.0 0.8 - 5.3 10e3/uL    Absolute Monocytes 0.3 0.0 - 1.3 10e3/uL    Absolute Eosinophils 0.0 0.0 - 0.7 10e3/uL    Absolute Basophils 0.0 0.0 - 0.2 10e3/uL    Absolute Immature Granulocytes 0.0 <=0.4 10e3/uL    Absolute NRBCs 0.0 10e3/uL   Cortisol Saliva    Collection Time: 05/31/22 10:48 AM   Result Value Ref Range    Cortisol Saliva 0.060 ug/dL   Cortisol Saliva    Collection Time: 05/31/22 10:48 AM   Result Value Ref Range    Cortisol Saliva 0.057 ug/dL   Albumin Random Urine Quantitative with Creat Ratio    Collection Time: 05/31/22  2:55 PM   Result Value Ref Range    Creatinine Urine mg/dL 225 mg/dL    Albumin Urine mg/L 12 mg/L    Albumin Urine mg/g Cr 5.33 0.00 - 25.00 mg/g Cr   General PFT Lab (Please always keep checked)    Collection Time: 06/07/22  7:22 AM   Result Value Ref Range    FVC-Pred 3.97 L    FVC-Pre 3.32 L    FVC-%Pred-Pre 83 %    FEV1-Pre 1.71 L    FEV1-%Pred-Pre 54 %    FEV1FVC-Pred 80 %    FEV1FVC-Pre 51 %    FEFMax-Pred 7.38 L/sec    FEFMax-Pre 5.97 L/sec    FEFMax-%Pred-Pre 80 %    FEF2575-Pred 2.90 L/sec    FEF2575-Pre 0.62 L/sec    LDM4518-%Pred-Pre 21 %    FEF2575-Post 1.21 L/sec    QAL8205-%Pred-Post 41 %    ExpTime-Pre 8.81 sec     FIFMax-Pre 5.39 L/sec    FEV1FEV6-Pred 82 %    FEV1FEV6-Pre 55 %       Assessment and plan:   53 YO with onset of asthma later in life in late 20's after environmental and allergen change with move to [place].  Now with moderate airflow obstruction on spirometry off Advair. Clearly with increased symptoms since she has been off Advair.  Discussed with her that it may take 2-3 weeks for improvement after restarting Advair.     1. Restart Advair  2. Continue albuterol MDI prn  3. Continue Nasocort, Singulair, loratidine  4. Advised to use nasal saline washes if has an increase in nasal drainage  5. Repeat spirometry at her next visit     RTC in 3 months.  Patient to call if she has any questions or concerns prior to her next clinic appointment        Answers for HPI/ROS submitted by the patient on 6/3/2022  General Symptoms: No  Skin Symptoms: No  HENT Symptoms: No  EYE SYMPTOMS: No  HEART SYMPTOMS: No  LUNG SYMPTOMS: Yes  INTESTINAL SYMPTOMS: No  URINARY SYMPTOMS: No  GYNECOLOGIC SYMPTOMS: No  BREAST SYMPTOMS: No  SKELETAL SYMPTOMS: No  BLOOD SYMPTOMS: No  NERVOUS SYSTEM SYMPTOMS: No  MENTAL HEALTH SYMPTOMS: No  Cough: No  Sputum or phlegm: No  Coughing up blood: No  Difficulty breating or shortness of breath: Yes  Snoring: Yes  Wheezing: Yes  Difficulty breathing on exertion: Yes  Nighttime Cough: No  Difficulty breathing when lying flat: Yes

## 2022-06-07 NOTE — PROGRESS NOTES
"Jade is a 52 year old who is being evaluated via a billable video visit.      How would you like to obtain your AVS? MyChart  If the video visit is dropped, the invitation should be resent by: Send to e-mail at: [email or web address]  Will anyone else be joining your video visit? No      Video Start Time: 1pm    Video-Visit Details    Type of service:  Video Visit    Video End Time:145pm    Originating Location (pt. Location): Home    Distant Location (provider location):  [facility]     Platform used for Video Visit: [employer]    Diabetes Self-Management Education & Support    Presents for:  Type 2 diabetes, follow up    SUBJECTIVE/OBJECTIVE:     Cultural Influences/Ethnic Background:  Not  or       Diabetes Symptoms & Complications:          Patient Problem List and Family Medical History reviewed for relevant medical history, current medical status, and diabetes risk factors.    Vitals:  There were no vitals taken for this visit.  Estimated body mass index is 46.81 kg/m  as calculated from the following:    Height as of an earlier encounter on 6/7/22: 1.753 m (5' 9\").    Weight as of an earlier encounter on 6/7/22: 143.8 kg (317 lb).   Last 3 BP:   BP Readings from Last 3 Encounters:   06/07/22 127/74   05/31/22 130/85   05/19/22 132/82       History   Smoking Status     Never Smoker   Smokeless Tobacco     Never Used       Labs:  Lab Results   Component Value Date    A1C 6.3 05/31/2022    A1C 5.8 06/09/2021     Lab Results   Component Value Date     05/31/2022    GLC 96 06/09/2021     Lab Results   Component Value Date     05/31/2022     Direct Measure HDL   Date Value Ref Range Status   05/31/2022 64 >=50 mg/dL Final   ]  GFR Estimate   Date Value Ref Range Status   05/31/2022 73 >60 mL/min/1.73m2 Final     Comment:     Effective December 21, 2021 eGFRcr in adults is calculated using the 2021 CKD-EPI creatinine equation which includes age and gender " (Diana ray al., [facility], DOI: 10.1056/TMWPjy4303350)   06/09/2021 65 >60 mL/min/[1.73_m2] Final     Comment:     Non  GFR Calc  Starting 12/18/2018, serum creatinine based estimated GFR (eGFR) will be   calculated using the Chronic Kidney Disease Epidemiology Collaboration   (CKD-EPI) equation.       GFR Estimate If Black   Date Value Ref Range Status   06/09/2021 76 >60 mL/min/[1.73_m2] Final     Comment:      GFR Calc  Starting 12/18/2018, serum creatinine based estimated GFR (eGFR) will be   calculated using the Chronic Kidney Disease Epidemiology Collaboration   (CKD-EPI) equation.       Lab Results   Component Value Date    CR 0.94 05/31/2022    CR 1.00 06/09/2021     No results found for: MICROALBUMIN    Healthy Eating:  More aware of late night eating and stopping at 8pm  Has lost 9 lbs on Ozempic     Being Active:  Is moving more now that the weather is nice. Walking near home.     Monitoring:  Average overall glucose 132 last seven days, 144 30 days average  Fasting 7 day average is 141 and post prandial is 128 and bedtime is 129    Taking Medications:  Diabetes Medication(s)     Incretin Mimetic Agents (GLP-1 Receptor Agonists)       semaglutide (OZEMPIC, 0.25 OR 0.5 MG/DOSE,) 2 MG/1.5ML SOPN pen    Inject 0.25 mg Subcutaneous every 7 days      Tolerating ozempic well    Healthy Coping:     Patient Activation Measure Survey Score:  NATALIIA Score (Last Two) 6/3/2022   NATALIIA Raw Score 30   Activation Score 56   NATALIIA Level 3       INTERVENTIONS:    Education provided today on:  AADE Self-Care Behaviors:  Reducing Risks: prevention, early diagnostic measures and treatment of complications, foot care, annual eye exam, lipids levels and goals and blood pressure and goals    Opportunities for ongoing education and support in diabetes-self management were discussed.    Pt verbalized understanding of concepts discussed and recommendations provided today.       Education Materials  Provided:  Goals for Your Diabetes Care      ASSESSMENT:    Patient's most recent   Lab Results   Component Value Date    A1C 6.3 05/31/2022    A1C 5.8 06/09/2021    is meeting goal of <7.0    PLAN  Continue testing glucose once daily  Use portion control  Exercise as able aiming for 30 minutes five days per week  Time Spent: 45 minutes  Encounter Type: Individual    Any diabetes medication dose changes were made via the CDE Protocol and Collaborative Practice Agreement with the patient's referring provider. A copy of this encounter was shared with the provider.

## 2022-06-07 NOTE — NURSING NOTE
Chief Complaint   Patient presents with     RECHECK     Return Asthma F/U     Medications reviewed and vital signs taken.   Do Mota, CMA

## 2022-06-08 LAB
EXPTIME-PRE: 8.81 SEC
FEF2575-%PRED-POST: 41 %
FEF2575-%PRED-PRE: 21 %
FEF2575-POST: 1.21 L/SEC
FEF2575-PRE: 0.62 L/SEC
FEF2575-PRED: 2.9 L/SEC
FEFMAX-%PRED-PRE: 80 %
FEFMAX-PRE: 5.97 L/SEC
FEFMAX-PRED: 7.38 L/SEC
FEV1-%PRED-PRE: 54 %
FEV1-PRE: 1.71 L
FEV1FEV6-PRE: 55 %
FEV1FEV6-PRED: 82 %
FEV1FVC-PRE: 51 %
FEV1FVC-PRED: 80 %
FIFMAX-PRE: 5.39 L/SEC
FVC-%PRED-PRE: 83 %
FVC-PRE: 3.32 L
FVC-PRED: 3.97 L

## 2022-06-21 DIAGNOSIS — E66.813 CLASS 3 SEVERE OBESITY DUE TO EXCESS CALORIES WITH SERIOUS COMORBIDITY AND BODY MASS INDEX (BMI) OF 45.0 TO 49.9 IN ADULT (H): ICD-10-CM

## 2022-06-21 DIAGNOSIS — E66.01 CLASS 3 SEVERE OBESITY DUE TO EXCESS CALORIES WITH SERIOUS COMORBIDITY AND BODY MASS INDEX (BMI) OF 45.0 TO 49.9 IN ADULT (H): ICD-10-CM

## 2022-06-21 DIAGNOSIS — R73.03 PREDIABETES: ICD-10-CM

## 2022-06-23 RX ORDER — BLOOD-GLUCOSE METER
EACH MISCELLANEOUS
Qty: 1 KIT | Refills: 0 | Status: SHIPPED | OUTPATIENT
Start: 2022-06-23

## 2022-06-27 ENCOUNTER — LAB (OUTPATIENT)
Dept: LAB | Facility: CLINIC | Age: 52
End: 2022-06-27
Payer: COMMERCIAL

## 2022-06-27 DIAGNOSIS — Z17.0 MALIGNANT NEOPLASM OF UPPER-OUTER QUADRANT OF LEFT BREAST IN FEMALE, ESTROGEN RECEPTOR POSITIVE (H): Primary | ICD-10-CM

## 2022-06-27 DIAGNOSIS — C50.412 MALIGNANT NEOPLASM OF UPPER-OUTER QUADRANT OF LEFT BREAST IN FEMALE, ESTROGEN RECEPTOR POSITIVE (H): Primary | ICD-10-CM

## 2022-06-27 DIAGNOSIS — R73.9 HYPERGLYCEMIA: ICD-10-CM

## 2022-06-27 DIAGNOSIS — E03.9 PRIMARY HYPOTHYROIDISM: ICD-10-CM

## 2022-06-27 LAB
ALBUMIN SERPL-MCNC: 4.1 G/DL (ref 3.4–5)
ALP SERPL-CCNC: 82 U/L (ref 40–150)
ALT SERPL W P-5'-P-CCNC: 66 U/L (ref 0–50)
ANION GAP SERPL CALCULATED.3IONS-SCNC: 7 MMOL/L (ref 3–14)
AST SERPL W P-5'-P-CCNC: 60 U/L (ref 0–45)
BASOPHILS # BLD AUTO: 0.1 10E3/UL (ref 0–0.2)
BASOPHILS NFR BLD AUTO: 2 %
BILIRUB SERPL-MCNC: 0.6 MG/DL (ref 0.2–1.3)
BUN SERPL-MCNC: 14 MG/DL (ref 7–30)
CALCIUM SERPL-MCNC: 9.7 MG/DL (ref 8.5–10.1)
CHLORIDE BLD-SCNC: 105 MMOL/L (ref 94–109)
CO2 SERPL-SCNC: 25 MMOL/L (ref 20–32)
CREAT SERPL-MCNC: 0.97 MG/DL (ref 0.52–1.04)
EOSINOPHIL # BLD AUTO: 0.1 10E3/UL (ref 0–0.7)
EOSINOPHIL NFR BLD AUTO: 3 %
ERYTHROCYTE [DISTWIDTH] IN BLOOD BY AUTOMATED COUNT: 13.2 % (ref 10–15)
GFR SERPL CREATININE-BSD FRML MDRD: 70 ML/MIN/1.73M2
GLUCOSE BLD-MCNC: 113 MG/DL (ref 70–99)
HBA1C MFR BLD: 6.4 % (ref 0–5.6)
HCT VFR BLD AUTO: 38.7 % (ref 35–47)
HGB BLD-MCNC: 14.1 G/DL (ref 11.7–15.7)
IMM GRANULOCYTES # BLD: 0 10E3/UL
IMM GRANULOCYTES NFR BLD: 0 %
LYMPHOCYTES # BLD AUTO: 1.3 10E3/UL (ref 0.8–5.3)
LYMPHOCYTES NFR BLD AUTO: 44 %
MCH RBC QN AUTO: 34.9 PG (ref 26.5–33)
MCHC RBC AUTO-ENTMCNC: 36.4 G/DL (ref 31.5–36.5)
MCV RBC AUTO: 96 FL (ref 78–100)
MONOCYTES # BLD AUTO: 0.4 10E3/UL (ref 0–1.3)
MONOCYTES NFR BLD AUTO: 13 %
NEUTROPHILS # BLD AUTO: 1.1 10E3/UL (ref 1.6–8.3)
NEUTROPHILS NFR BLD AUTO: 38 %
NRBC # BLD AUTO: 0 10E3/UL
NRBC BLD AUTO-RTO: 0 /100
PLATELET # BLD AUTO: 145 10E3/UL (ref 150–450)
POTASSIUM BLD-SCNC: 4.1 MMOL/L (ref 3.4–5.3)
PROT SERPL-MCNC: 8.3 G/DL (ref 6.8–8.8)
RBC # BLD AUTO: 4.04 10E6/UL (ref 3.8–5.2)
SODIUM SERPL-SCNC: 137 MMOL/L (ref 133–144)
TSH SERPL DL<=0.005 MIU/L-ACNC: 0.52 MU/L (ref 0.4–4)
WBC # BLD AUTO: 2.8 10E3/UL (ref 4–11)

## 2022-06-27 PROCEDURE — 80050 GENERAL HEALTH PANEL: CPT

## 2022-06-27 PROCEDURE — 86300 IMMUNOASSAY TUMOR CA 15-3: CPT

## 2022-06-27 PROCEDURE — 83036 HEMOGLOBIN GLYCOSYLATED A1C: CPT

## 2022-06-27 PROCEDURE — 82378 CARCINOEMBRYONIC ANTIGEN: CPT

## 2022-06-27 PROCEDURE — 36415 COLL VENOUS BLD VENIPUNCTURE: CPT

## 2022-06-28 ENCOUNTER — VIRTUAL VISIT (OUTPATIENT)
Dept: ONCOLOGY | Facility: CLINIC | Age: 52
End: 2022-06-28
Attending: PHYSICIAN ASSISTANT
Payer: COMMERCIAL

## 2022-06-28 DIAGNOSIS — C50.919 METASTATIC BREAST CANCER: Primary | ICD-10-CM

## 2022-06-28 LAB
CEA SERPL-MCNC: 1 NG/ML
Lab: NORMAL
PERFORMING LABORATORY: NORMAL
SPECIMEN STATUS: NORMAL
TEST NAME: NORMAL

## 2022-06-28 PROCEDURE — 99214 OFFICE O/P EST MOD 30 MIN: CPT | Mod: GT | Performed by: PHYSICIAN ASSISTANT

## 2022-06-28 NOTE — PROGRESS NOTES
Has been feeling generally well since she was last seen. Was started on ozempic at her last visit with endocrinology, this has made her bowels be a little looser but is managing ok.

## 2022-06-28 NOTE — PROGRESS NOTES
Jade is a 52 year old who is being evaluated via a billable video visit.    Jade Collins stated she is in the state of [place] for the visit today.    How would you like to obtain your AVS? Smitahart  If the video visit is dropped, the invitation should be resent by: Send to e-mail at: [email or web address]  Will anyone else be joining your video visit? No        Video-Visit Details    Video Start Time: 1:00pm    Type of service:  Video Visit    Video End Time:1:10    Originating Location (pt. Location): Home    Distant Location (provider location):  [facility]     Platform used for Video Visit: Sandeep Mena, Virtual Visit Facilitator          HISTORY OF PRESENT ILLNESS:  Jade is a 52 year old woman with metastatic breast cancer        Jade was diagnosed with breast cancer with a lump found in the upper-inner quadrant of the left breast.  She was diagnosed in 12/2013 in the [place] area and Dr. David Redd was her medical oncologist.  Biopsy of the tumor showed it to be ER positive, OK positive, and HER-2 negative.  We do not have any of the original pathology and we need to obtain those reports.  She underwent a lumpectomy performed by Dr. Tasha Segura who is a surgeon in the [place] area.  She also had a sentinel lymph node which was noted to be involved with tumor but there was no lymph node dissection done at that time.  [id number].  She subsequently underwent adjuvant chemotherapy with dose-dense AC for 4 cycles and Taxol for 12 weeks, completed 09/11/2014.        She then had radiation therapy post lumpectomy to the left breast, which was completed 11/23/2014 by Dr. Manley.        She was then begun on an aromatase inhibitor, the name of which she does not remember.  The aromatase inhibitor therapy was begun in 11/2014.  She was then switched within about a month or so to anastrozole and remained on anastrozole from 11/2014-02/2017.  She has never received tamoxifen.         She then moved to [place], [place] in 03/2017.  She saw a gynecologist there and underwent a IRIS/BSO by Dr. Zendejas.  She then also saw Dr. Linares in [place], [place], who is an oncologist.  His interpretation of the pathology report was that she had triple-negative breast cancer.  It is possible that she may have had low estrogen receptor positivity, but the anastrozole was then discontinued in 02/2017.  She then underwent the IRIS/BSO in 03/2017.  This was done laparoscopically.        She then remained off of all hormonal therapy and in 09/2017 moved to [place].  She remained off hormonal therapy and did not have Medical Oncology followup initially.        [email or web address] and noticed lymph nodes in the left neck.  She then went to see Dr. Norberto Brand who performed a PET/CT scan and the patient also underwent a brain MRI for staging.  The PET/CT scan performed 08/03/2018 showed in the neck there were several mildly metabolic active and large prominent left posterior cervical lymph nodes.  The largest is located posterior to the left sternocleidomastoid muscle and measures 1.5 x 1 cm in size.  This demonstrate modest FDG uptake.  The other lymph nodes are smaller and demonstrate mild FDG uptake.  The prominent prevascular and aortopulmonary lymph nodes were also seen on the contrast-enhanced scan were less well seen on the PET.  The largest lymph node visualized on the PET scan was 1.1 x 0.8 cm and demonstrated mild FDG uptake.  This was in the periaortic area just anterior to the aortic arch.  No lung nodules.  No lung infiltrates.  No pleural effusion.  There was no uptake of FDG in the bones.  In summary, there were mildly enlarged left posterior cervical lymph nodes, largest measuring 1.5 x 1.0 cm and mildly enlarged aorticopulmonary and prevascular lymph nodes that were noted on contrast-enhanced CT scan seen less well on the PET scan.  No evidence of metastatic disease in the abdomen and pelvis.  She  also underwent a brain MRI that showed no evidence of intracranial metastatic disease.  She underwent a biopsy of one of the lymph nodes in the left neck which showed metastatic adenocarcinoma consistent with breast origin, which was estrogen-receptor positive.  The tumor cells were positive for CK7, ER and AR consistent with metastatic breast carcinoma.  Estrogen receptor showed strong average nuclear intensity in 95% of carcinoma cells, progesterone receptor moderate average nuclear intensity in 70% of the carcinoma.  A GATA3 stain was apparently not performed.       TREATMENT HISTORY:  A. Tamoxifen  B. Letrozole and palbociclib started 9-18-18.  C. Pfizer COVID vaccination end of March, 2021 and 4-17.     INTERVAL HISTORY:   Has been feeling generally well since she was last seen. Was started on ozempic at her last visit with endocrinology, this has made her bowels be a little looser but is managing ok. No abdominal pain. No nausea or vomiting. No fevers or chills.     She continues to tolerate ibrance well without any significant effects. No fevers or cough, denies infectious symptoms    She has been working on eating healthy     No tylenol, alcohol, or new herbal supplements     Breathing has improved since starting back on advair     ROS  Patient denies the following: fevers, body aches, chills, headaches, vision changes, dizziness, chest pain, shortness of breath, nausea, vomiting, diarrhea, constipation, abdominal pain, rashes, bruising/bleeding, mouth sores, swelling or pain in the legs.       OBJECTIVE DATA:  Physical Exam:  General: Resting comfortably in no acute distress, appears well   Head: Normocephalic, atraumatic   EENT: No scleral icteris, EOMS intact.   Lung: Normal respiratory effort. Speaking fluently without shortness of breath. No audible wheezes or coughing.   Neuro: AAO x3. Moving upper extremities equally and symmetrically   Skin: Warm, dry, intact. No rashes, no suspicious lesions. No  petechia or bruising noted on visible skin. No cyanosis      The rest of a comprehensive physical examination is deferred due to PHE (public health emergency) video restrictions     Labs:   Most Recent 3 CBC's:  Recent Labs   Lab Test 06/27/22  1309 05/31/22  1040 05/27/22  1324   WBC 2.8* 2.8* 3.2*   HGB 14.1 13.9 13.9   MCV 96 95 96   * 170 162    Most Recent 3 BMP's:  Recent Labs   Lab Test 06/27/22  1309 05/31/22  1040 05/27/22  1324    138 136   POTASSIUM 4.1 4.2 4.5   CHLORIDE 105 105 102   CO2 25 24 22   BUN 14 16 12   CR 0.97 0.94 0.80   ANIONGAP 7 9 12   GIOVANNY 9.7 9.7 9.3   * 148* 169*    Most Recent 2 LFT's:  Recent Labs   Lab Test 06/27/22  1309 05/31/22  1040   AST 60* 44   ALT 66* 55*   ALKPHOS 82 85   BILITOTAL 0.6 0.6        ANC 6/27/22: 1.1    CEA 06/27/22: 1.0    [id number] 6/27/22: pending      I reviewed the above labs today.    Imaging:No new imaging      ASSESSMENT AND PLAN:  1. Recurrent, metastatic ER-positive, HER2 negative breast cancer: History of left breast cancer s/p lumpectomy and SNLB in 2014. S/p 4 cycles ddAC and 12 weeks of Taxol. On AI from 11/2014-2/2017. Recurrence on PET/CT scan performed 08/03/2018 and then biopsy to left posterior cervical lymph nodes, aorticopulmonary, and prevascular lymph nodes were positive. No evidence of metastatic disease in the abdomen and pelvis or brain.   - Started Ibrance and letrozole on 9/21/18.     - Repeat CT CAP and CT neck 08/29/2022.   - Labs are appropriate to proceed with next Ibrance cycle today. Neutropenic precautions discussed        2.  Diabetes:   - Working with endocrine, recently started on ozempic      3.  Depression and anxiety. Managed by PCP.      4.  Hypothyroid. Levothyroxine 200 mcg daily. TSH WNL on 06/27/22. Managed by endocrinology.      5. Asthma: Seen by Dr. Hay. Takes advair, montelukast, and albuterol as needed.     6. Elevated LFTs  Slightly elevated. No tylenol, alcohol, or new herbs. Does have  steatosis on recent imaging. Discussed lifestyle modification. Will continue to monitor      Patient will call in the interim with any questions or concerns. They voice understanding and agree with the above plan.         Mandie Encinas PA-C

## 2022-06-29 LAB — MAYO MISC RESULT: NORMAL

## 2022-07-18 ENCOUNTER — TELEPHONE (OUTPATIENT)
Dept: ONCOLOGY | Facility: CLINIC | Age: 52
End: 2022-07-18

## 2022-07-18 NOTE — TELEPHONE ENCOUNTER
Oral Chemotherapy Monitoring Program    Subjective/Objective:  Jade Collins is a 52 year old female contacted by phone for a follow-up visit for oral chemotherapy. Jade confirms taking the appropriate dose of Ibrance 100mg daily for 3 weeks on, 1 week off. Shares that her next cycle is due to start on 7/29/22. Denies new or worsening side effects, missed doses, medication changes or recent hospital or ED visits.     ORAL CHEMOTHERAPY 2/4/2022 2/16/2022 3/29/2022 3/29/2022 5/5/2022 5/16/2022 7/18/2022   Assessment Type Chart Review Refill Left Voicemail Incoming phone call;Quarterly Follow up Other Refill Quarterly Follow up   Diagnosis Code Breast Cancer Breast Cancer Breast Cancer Breast Cancer Breast Cancer Breast Cancer Breast Cancer   Providers Dr. Nova Bhatt   [facility]   Drug Name Ibrance (palbociclib) Ibrance (palbociclib) Ibrance (palbociclib) Ibrance (palbociclib) Ibrance (palbociclib) Ibrance (palbociclib) Ibrance (palbociclib)   Dose 100 mg 100 mg 100 mg 100 mg 100 mg 100 mg 100 mg   Current Schedule Daily Daily Daily Daily Daily Daily Daily   Cycle Details 3 weeks on, 1 week off 3 weeks on, 1 week off 3 weeks on, 1 week off 3 weeks on, 1 week off 3 weeks on, 1 week off 3 weeks on, 1 week off 3 weeks on, 1 week off   Start Date of Last Cycle - - - 3/4/2022 4/29/2022 4/29/2022 -   Planned next cycle start date - - - 4/1/2022 5/27/2022 5/27/2022 7/29/2022   Doses missed in last 2 weeks - - - 0 - - 0   Adherence Assessment - - - Adherent - - Adherent   Adverse Effects - - - No AE identified during assessment - - No AE identified during assessment   Nausea - - - - - - -   Pharmacist Intervention(nausea) - - - - - - -   Diarrhea - - - - - - -   Pharmacist Intervention(diarrhea) - - - - - - -   Oral Mucositis - - - - - - -   Pharmacist Intervention(oral mucositis) - - - - - - -  "  Fatigue - - - - - - -   Pharmacist Intervention(fatigue) - - - - - - -   Neutropenia - - - - - - -   Pharmacist Intervention(neutropenia) - - - - - - -   Intervention(s) - - - - - - -   Home BPs - - - - - - -   Any new drug interactions? - - - No - - No   Pharmacist Intervention? - - - - - - -   Intervention(s) - - - - - - -   Is the dose as ordered appropriate for the patient? - - - Yes - - Yes   Is the patient currently in pain? - - - - - - -   Does the patient feel the pain is currently being managed by a provider? - - - - - - -   Has the patient been assessed within the past 6 months for depression? - - - - - - -   Has the patient missed any days of school, work, or other routine activity? - - - - - - -   Since the last time we talked, have you been hospitalized or used the emergency room? - - - No - - No       Last PHQ-2 Score on record:   PHQ-2 ( 1999 Pfizer) 5/19/2022 9/18/2018   Q1: Little interest or pleasure in doing things 0 1   Q2: Feeling down, depressed or hopeless 2 1   PHQ-2 Score 2 2       Vitals:  BP:   BP Readings from Last 1 Encounters:   06/07/22 127/74     Wt Readings from Last 1 Encounters:   06/07/22 143.8 kg (317 lb)     Estimated body surface area is 2.65 meters squared as calculated from the following:    Height as of 6/7/22: 1.753 m (5' 9\").    Weight as of 6/7/22: 143.8 kg (317 lb).    Labs:  _  Result Component Current Result Ref Range   Sodium 137 (6/27/2022) 133 - 144 mmol/L     _  Result Component Current Result Ref Range   Potassium 4.1 (6/27/2022) 3.4 - 5.3 mmol/L     _  Result Component Current Result Ref Range   Calcium 9.7 (6/27/2022) 8.5 - 10.1 mg/dL     No results found for Mag within last 30 days.     No results found for Phos within last 30 days.     _  Result Component Current Result Ref Range   Albumin 4.1 (6/27/2022) 3.4 - 5.0 g/dL     _  Result Component Current Result Ref Range   Urea Nitrogen 14 (6/27/2022) 7 - 30 mg/dL     _  Result Component Current Result Ref " Range   Creatinine 0.97 (6/27/2022) 0.52 - 1.04 mg/dL     _  Result Component Current Result Ref Range   AST 60 (H) (6/27/2022) 0 - 45 U/L     _  Result Component Current Result Ref Range   ALT 66 (H) (6/27/2022) 0 - 50 U/L     _  Result Component Current Result Ref Range   Bilirubin Total 0.6 (6/27/2022) 0.2 - 1.3 mg/dL     _  Result Component Current Result Ref Range   WBC Count 2.8 (L) (6/27/2022) 4.0 - 11.0 10e3/uL     _  Result Component Current Result Ref Range   Hemoglobin 14.1 (6/27/2022) 11.7 - 15.7 g/dL     _  Result Component Current Result Ref Range   Platelet Count 145 (L) (6/27/2022) 150 - 450 10e3/uL     No results found for ANC within last 30 days.     _  Result Component Current Result Ref Range   Absolute Neutrophils 1.1 (L) (6/27/2022) 1.6 - 8.3 10e3/uL        Assessment/Plan:  Jade continues to tolerate therapy well. PDC=0.98, no adherence concerns. Continue Ibrance therapy as planned.     Follow-Up:  7/25: labs  7/26: appt with Mandie  10/14: quarterly assessment    Refill Due:  SP to deliver on 7/28      Saray Mueller, PharmD, BCACP  Hematology/Oncology Clinical Pharmacist  [facility]  [phone]

## 2022-07-25 ENCOUNTER — LAB (OUTPATIENT)
Dept: LAB | Facility: CLINIC | Age: 52
End: 2022-07-25
Payer: COMMERCIAL

## 2022-07-25 DIAGNOSIS — C50.919 METASTATIC BREAST CANCER: ICD-10-CM

## 2022-07-25 DIAGNOSIS — Z17.0 MALIGNANT NEOPLASM OF UPPER-OUTER QUADRANT OF LEFT BREAST IN FEMALE, ESTROGEN RECEPTOR POSITIVE (H): ICD-10-CM

## 2022-07-25 DIAGNOSIS — C50.412 MALIGNANT NEOPLASM OF UPPER-OUTER QUADRANT OF LEFT BREAST IN FEMALE, ESTROGEN RECEPTOR POSITIVE (H): ICD-10-CM

## 2022-07-25 LAB
ALBUMIN SERPL-MCNC: 4.1 G/DL (ref 3.4–5)
ALP SERPL-CCNC: 84 U/L (ref 40–150)
ALT SERPL W P-5'-P-CCNC: 35 U/L (ref 0–50)
ANION GAP SERPL CALCULATED.3IONS-SCNC: 7 MMOL/L (ref 3–14)
AST SERPL W P-5'-P-CCNC: 35 U/L (ref 0–45)
BASOPHILS # BLD AUTO: 0 10E3/UL (ref 0–0.2)
BASOPHILS NFR BLD AUTO: 1 %
BILIRUB SERPL-MCNC: 0.6 MG/DL (ref 0.2–1.3)
BUN SERPL-MCNC: 10 MG/DL (ref 7–30)
CALCIUM SERPL-MCNC: 9.9 MG/DL (ref 8.5–10.1)
CEA SERPL-MCNC: 1.1 NG/ML
CHLORIDE BLD-SCNC: 106 MMOL/L (ref 94–109)
CO2 SERPL-SCNC: 27 MMOL/L (ref 20–32)
CREAT SERPL-MCNC: 1.09 MG/DL (ref 0.52–1.04)
EOSINOPHIL # BLD AUTO: 0.1 10E3/UL (ref 0–0.7)
EOSINOPHIL NFR BLD AUTO: 2 %
ERYTHROCYTE [DISTWIDTH] IN BLOOD BY AUTOMATED COUNT: 13.3 % (ref 10–15)
GFR SERPL CREATININE-BSD FRML MDRD: 61 ML/MIN/1.73M2
GLUCOSE BLD-MCNC: 110 MG/DL (ref 70–99)
HCT VFR BLD AUTO: 40.5 % (ref 35–47)
HGB BLD-MCNC: 14 G/DL (ref 11.7–15.7)
IMM GRANULOCYTES # BLD: 0 10E3/UL
IMM GRANULOCYTES NFR BLD: 0 %
LYMPHOCYTES # BLD AUTO: 1.4 10E3/UL (ref 0.8–5.3)
LYMPHOCYTES NFR BLD AUTO: 50 %
MCH RBC QN AUTO: 33.3 PG (ref 26.5–33)
MCHC RBC AUTO-ENTMCNC: 34.6 G/DL (ref 31.5–36.5)
MCV RBC AUTO: 96 FL (ref 78–100)
MONOCYTES # BLD AUTO: 0.2 10E3/UL (ref 0–1.3)
MONOCYTES NFR BLD AUTO: 7 %
NEUTROPHILS # BLD AUTO: 1.1 10E3/UL (ref 1.6–8.3)
NEUTROPHILS NFR BLD AUTO: 40 %
NRBC # BLD AUTO: 0 10E3/UL
NRBC BLD AUTO-RTO: 0 /100
PLATELET # BLD AUTO: 150 10E3/UL (ref 150–450)
POTASSIUM BLD-SCNC: 4.4 MMOL/L (ref 3.4–5.3)
PROT SERPL-MCNC: 8.2 G/DL (ref 6.8–8.8)
RBC # BLD AUTO: 4.21 10E6/UL (ref 3.8–5.2)
SODIUM SERPL-SCNC: 140 MMOL/L (ref 133–144)
WBC # BLD AUTO: 2.8 10E3/UL (ref 4–11)

## 2022-07-25 PROCEDURE — 82378 CARCINOEMBRYONIC ANTIGEN: CPT

## 2022-07-25 PROCEDURE — 36415 COLL VENOUS BLD VENIPUNCTURE: CPT

## 2022-07-25 PROCEDURE — 86300 IMMUNOASSAY TUMOR CA 15-3: CPT

## 2022-07-25 PROCEDURE — 80053 COMPREHEN METABOLIC PANEL: CPT

## 2022-07-25 PROCEDURE — 85025 COMPLETE CBC W/AUTO DIFF WBC: CPT

## 2022-07-26 ENCOUNTER — VIRTUAL VISIT (OUTPATIENT)
Dept: ONCOLOGY | Facility: CLINIC | Age: 52
End: 2022-07-26
Attending: PHYSICIAN ASSISTANT
Payer: COMMERCIAL

## 2022-07-26 DIAGNOSIS — C50.919 METASTATIC BREAST CANCER: Primary | ICD-10-CM

## 2022-07-26 LAB
CANCER AG15-3 SERPL-ACNC: 17 U/ML
Lab: NORMAL
PERFORMING LABORATORY: NORMAL
SPECIMEN STATUS: NORMAL
TEST NAME: NORMAL

## 2022-07-26 PROCEDURE — G0463 HOSPITAL OUTPT CLINIC VISIT: HCPCS | Mod: PN,RTG | Performed by: PHYSICIAN ASSISTANT

## 2022-07-26 PROCEDURE — 99213 OFFICE O/P EST LOW 20 MIN: CPT | Mod: GT | Performed by: PHYSICIAN ASSISTANT

## 2022-07-26 NOTE — PROGRESS NOTES
Jade is a 52 year old who is being evaluated via a billable video visit.    Jade Collins stated she is in the state of [place] for the visit today.    How would you like to obtain your AVS? Smitahart  If the video visit is dropped, the invitation should be resent by: Send to e-mail at: [email or web address]  Will anyone else be joining your video visit? No        Video-Visit Details    Video Start Time: 1:24 PM    Type of service:  Video Visit    Video End Time:1:32 PM    Originating Location (pt. Location): Home    Distant Location (provider location):  [facility]     Platform used for Video Visit: Sandeep Mena, Virtual Visit Facilitator      HISTORY OF PRESENT ILLNESS:  Jade is a 52 year old woman with metastatic breast cancer        Jade was diagnosed with breast cancer with a lump found in the upper-inner quadrant of the left breast.  She was diagnosed in 12/2013 in the [place] area and Dr. David Redd was her medical oncologist.  Biopsy of the tumor showed it to be ER positive, NH positive, and HER-2 negative.  We do not have any of the original pathology and we need to obtain those reports.  She underwent a lumpectomy performed by Dr. Tasha Segura who is a surgeon in the [place] area.  She also had a sentinel lymph node which was noted to be involved with tumor but there was no lymph node dissection done at that time.  [id number].  She subsequently underwent adjuvant chemotherapy with dose-dense AC for 4 cycles and Taxol for 12 weeks, completed 09/11/2014.        She then had radiation therapy post lumpectomy to the left breast, which was completed 11/23/2014 by Dr. Manley.        She was then begun on an aromatase inhibitor, the name of which she does not remember.  The aromatase inhibitor therapy was begun in 11/2014.  She was then switched within about a month or so to anastrozole and remained on anastrozole from 11/2014-02/2017.  She has never received tamoxifen.         She then moved to [place], [place] in 03/2017.  She saw a gynecologist there and underwent a IRIS/BSO by Dr. Zendejas.  She then also saw Dr. Linares in [place], [place], who is an oncologist.  His interpretation of the pathology report was that she had triple-negative breast cancer.  It is possible that she may have had low estrogen receptor positivity, but the anastrozole was then discontinued in 02/2017.  She then underwent the IRIS/BSO in 03/2017.  This was done laparoscopically.        She then remained off of all hormonal therapy and in 09/2017 moved to [place].  She remained off hormonal therapy and did not have Medical Oncology followup initially.        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  She then went to see Dr. Norberto Brand who performed a PET/CT scan and the patient also underwent a brain MRI for staging.  The PET/CT scan performed 08/03/2018 showed in the neck there were several mildly metabolic active and large prominent left posterior cervical lymph nodes.  The largest is located posterior to the left sternocleidomastoid muscle and measures 1.5 x 1 cm in size.  This demonstrate modest FDG uptake.  The other lymph nodes are smaller and demonstrate mild FDG uptake.  The prominent prevascular and aortopulmonary lymph nodes were also seen on the contrast-enhanced scan were less well seen on the PET.  The largest lymph node visualized on the PET scan was 1.1 x 0.8 cm and demonstrated mild FDG uptake.  This was in the periaortic area just anterior to the aortic arch.  No lung nodules.  No lung infiltrates.  No pleural effusion.  There was no uptake of FDG in the bones.  In summary, there were mildly enlarged left posterior cervical lymph nodes, largest measuring 1.5 x 1.0 cm and mildly enlarged aorticopulmonary and prevascular lymph nodes that were noted on contrast-enhanced CT scan seen less well on the PET scan.  No evidence of metastatic disease in the abdomen and pelvis.  She  also underwent a brain MRI that showed no evidence of intracranial metastatic disease.  She underwent a biopsy of one of the lymph nodes in the left neck which showed metastatic adenocarcinoma consistent with breast origin, which was estrogen-receptor positive.  The tumor cells were positive for CK7, ER and MT consistent with metastatic breast carcinoma.  Estrogen receptor showed strong average nuclear intensity in 95% of carcinoma cells, progesterone receptor moderate average nuclear intensity in 70% of the carcinoma.  A GATA3 stain was apparently not performed.       TREATMENT HISTORY:  A. Tamoxifen  B. Letrozole and palbociclib started 9-18-18.  C. Pfizer COVID vaccination end of March, 2021 and 4-17.     INTERVAL HISTORY:   Jade is seen virtually today. She does not have any new aches or pain. No new lumps or bumps. No new cough or shortness of breath. No fevers or chills. Energy level has been normal. Bowels are moving regularly. She has been working on her diabetes and watching what she eats.     Will start back up on her ibrance on Friday. Is on her off week.     ROS  Patient denies the following: fevers, body aches, chills, headaches, vision changes, dizziness, chest pain, shortness of breath, nausea, vomiting, diarrhea, constipation, abdominal pain, rashes, bruising/bleeding, mouth sores, swelling or pain in the legs.       OBJECTIVE DATA:  Physical Exam:  General: Resting comfortably in no acute distress, appears well   Head: Normocephalic, atraumatic   EENT: No scleral icteris, EOMS intact.   Lung: Normal respiratory effort. Speaking fluently without shortness of breath. No audible wheezes or coughing.   Neuro: AAO x3. Moving upper extremities equally and symmetrically   Skin: Warm, dry, intact. No rashes, no suspicious lesions. No petechia or bruising noted on visible skin. No cyanosis      The rest of a comprehensive physical examination is deferred due to PHE (public health emergency) video  restrictions     Labs:   Most Recent 3 CBC's:  Recent Labs   Lab Test 07/25/22  1303 06/27/22  1309 05/31/22  1040   WBC 2.8* 2.8* 2.8*   HGB 14.0 14.1 13.9   MCV 96 96 95    145* 170    Most Recent 3 BMP's:  Recent Labs   Lab Test 07/25/22  1303 06/27/22  1309 05/31/22  1040    137 138   POTASSIUM 4.4 4.1 4.2   CHLORIDE 106 105 105   CO2 27 25 24   BUN 10 14 16   CR 1.09* 0.97 0.94   ANIONGAP 7 7 9   GIOVANNY 9.9 9.7 9.7   * 113* 148*    Most Recent 2 LFT's:  Recent Labs   Lab Test 07/25/22  1303 06/27/22  1309   AST 35 60*   ALT 35 66*   ALKPHOS 84 82   BILITOTAL 0.6 0.6        ANC 7/25/22: 1.1    CEA 06/27/22: 1.1    [id number] 6/27/22: pending      I reviewed the above labs today.    Imaging:No new imaging      ASSESSMENT AND PLAN:  1. Recurrent, metastatic ER-positive, HER2 negative breast cancer: History of left breast cancer s/p lumpectomy and SNLB in 2014. S/p 4 cycles ddAC and 12 weeks of Taxol. On AI from 11/2014-2/2017. Recurrence on PET/CT scan performed 08/03/2018 and then biopsy to left posterior cervical lymph nodes, aorticopulmonary, and prevascular lymph nodes were positive. No evidence of metastatic disease in the abdomen and pelvis or brain.   - Started Ibrance and letrozole on 9/21/18.     - Repeat CT CAP and CT neck 08/29/2022.   - Labs are appropriate to proceed with next Ibrance cycle today. Neutropenic precautions discussed   - Switch to CA 15-3, baseline today        2.  Diabetes:   - Working with endocrine, recently started on ozempic      3.  Depression and anxiety. Managed by PCP.      4.  Hypothyroid. Levothyroxine 200 mcg daily. TSH WNL on 06/27/22. Managed by endocrinology.      5. Asthma: Seen by Dr. Hay. Takes advair, montelukast, and albuterol as needed.     6. Elevated CR  - Push fluids and avoid NSAIDs  - Continue to monitor     Patient will call in the interim with any questions or concerns. They voice understanding and agree with the above plan.         Mandie  NATHANAEL Encinas

## 2022-07-26 NOTE — NURSING NOTE
Jade Collins verified meds and allergies are correct via patients eCheck-in. Patient confirms no changes at this time.    Unable to complete Onc screening due to time restriction  Juany Mena, Virtual Facilitator

## 2022-07-27 LAB — MAYO MISC RESULT: NORMAL

## 2022-08-18 DIAGNOSIS — Z17.0 MALIGNANT NEOPLASM OF UPPER-OUTER QUADRANT OF LEFT BREAST IN FEMALE, ESTROGEN RECEPTOR POSITIVE (H): Primary | ICD-10-CM

## 2022-08-18 DIAGNOSIS — C50.412 MALIGNANT NEOPLASM OF UPPER-OUTER QUADRANT OF LEFT BREAST IN FEMALE, ESTROGEN RECEPTOR POSITIVE (H): Primary | ICD-10-CM

## 2022-08-18 RX ORDER — LETROZOLE 2.5 MG/1
2.5 TABLET, FILM COATED ORAL DAILY
Qty: 28 TABLET | Refills: 2 | Status: SHIPPED | OUTPATIENT
Start: 2022-08-18

## 2022-08-19 ENCOUNTER — LAB (OUTPATIENT)
Dept: LAB | Facility: CLINIC | Age: 52
End: 2022-08-19
Payer: COMMERCIAL

## 2022-08-19 ENCOUNTER — HOSPITAL ENCOUNTER (OUTPATIENT)
Dept: CT IMAGING | Facility: CLINIC | Age: 52
Discharge: HOME | End: 2022-08-19
Attending: INTERNAL MEDICINE
Payer: COMMERCIAL

## 2022-08-19 DIAGNOSIS — Z17.0 MALIGNANT NEOPLASM OF UPPER-OUTER QUADRANT OF LEFT BREAST IN FEMALE, ESTROGEN RECEPTOR POSITIVE (H): ICD-10-CM

## 2022-08-19 DIAGNOSIS — E66.813 CLASS 3 SEVERE OBESITY DUE TO EXCESS CALORIES WITH SERIOUS COMORBIDITY AND BODY MASS INDEX (BMI) OF 45.0 TO 49.9 IN ADULT (H): ICD-10-CM

## 2022-08-19 DIAGNOSIS — C50.412 MALIGNANT NEOPLASM OF UPPER-OUTER QUADRANT OF LEFT BREAST IN FEMALE, ESTROGEN RECEPTOR POSITIVE (H): ICD-10-CM

## 2022-08-19 DIAGNOSIS — E66.01 CLASS 3 SEVERE OBESITY DUE TO EXCESS CALORIES WITH SERIOUS COMORBIDITY AND BODY MASS INDEX (BMI) OF 45.0 TO 49.9 IN ADULT (H): ICD-10-CM

## 2022-08-19 LAB
ALBUMIN SERPL BCG-MCNC: 4.4 G/DL (ref 3.5–5.2)
ALP SERPL-CCNC: 77 U/L (ref 35–104)
ALT SERPL W P-5'-P-CCNC: 37 U/L (ref 10–35)
ANION GAP SERPL CALCULATED.3IONS-SCNC: 10 MMOL/L (ref 7–15)
AST SERPL W P-5'-P-CCNC: 47 U/L (ref 10–35)
BASOPHILS # BLD MANUAL: 0 10E3/UL (ref 0–0.2)
BASOPHILS NFR BLD MANUAL: 2 %
BILIRUB SERPL-MCNC: 0.5 MG/DL
BUN SERPL-MCNC: 10.2 MG/DL (ref 6–20)
CALCIUM SERPL-MCNC: 9.7 MG/DL (ref 8.6–10)
CEA SERPL-MCNC: 1.1 NG/ML
CHLORIDE SERPL-SCNC: 104 MMOL/L (ref 98–107)
CREAT SERPL-MCNC: 1.1 MG/DL (ref 0.51–0.95)
DACRYOCYTES BLD QL SMEAR: SLIGHT
DEPRECATED HCO3 PLAS-SCNC: 23 MMOL/L (ref 22–29)
EOSINOPHIL # BLD MANUAL: 0 10E3/UL (ref 0–0.7)
EOSINOPHIL NFR BLD MANUAL: 1 %
ERYTHROCYTE [DISTWIDTH] IN BLOOD BY AUTOMATED COUNT: 13.6 % (ref 10–15)
GFR SERPL CREATININE-BSD FRML MDRD: 60 ML/MIN/1.73M2
GLUCOSE SERPL-MCNC: 136 MG/DL (ref 70–99)
HCT VFR BLD AUTO: 39.3 % (ref 35–47)
HGB BLD-MCNC: 13.4 G/DL (ref 11.7–15.7)
LYMPHOCYTES # BLD MANUAL: 0.8 10E3/UL (ref 0.8–5.3)
LYMPHOCYTES NFR BLD MANUAL: 33 %
MCH RBC QN AUTO: 33.4 PG (ref 26.5–33)
MCHC RBC AUTO-ENTMCNC: 34.1 G/DL (ref 31.5–36.5)
MCV RBC AUTO: 98 FL (ref 78–100)
MONOCYTES # BLD MANUAL: 0 10E3/UL (ref 0–1.3)
MONOCYTES NFR BLD MANUAL: 2 %
NEUTROPHILS # BLD MANUAL: 1.4 10E3/UL (ref 1.6–8.3)
NEUTROPHILS NFR BLD MANUAL: 62 %
PLAT MORPH BLD: ABNORMAL
PLATELET # BLD AUTO: 147 10E3/UL (ref 150–450)
POLYCHROMASIA BLD QL SMEAR: SLIGHT
POTASSIUM SERPL-SCNC: 4.4 MMOL/L (ref 3.4–5.3)
PROT SERPL-MCNC: 7.7 G/DL (ref 6.4–8.3)
RBC # BLD AUTO: 4.01 10E6/UL (ref 3.8–5.2)
RBC MORPH BLD: ABNORMAL
SODIUM SERPL-SCNC: 137 MMOL/L (ref 136–145)
WBC # BLD AUTO: 2.3 10E3/UL (ref 4–11)

## 2022-08-19 PROCEDURE — 250N000011 HC RX IP 250 OP 636: Performed by: INTERNAL MEDICINE

## 2022-08-19 PROCEDURE — 82378 CARCINOEMBRYONIC ANTIGEN: CPT

## 2022-08-19 PROCEDURE — 250N000009 HC RX 250: Performed by: INTERNAL MEDICINE

## 2022-08-19 PROCEDURE — 74177 CT ABD & PELVIS W/CONTRAST: CPT

## 2022-08-19 PROCEDURE — 36415 COLL VENOUS BLD VENIPUNCTURE: CPT

## 2022-08-19 PROCEDURE — 82310 ASSAY OF CALCIUM: CPT

## 2022-08-19 PROCEDURE — 70491 CT SOFT TISSUE NECK W/DYE: CPT

## 2022-08-19 PROCEDURE — 70491 CT SOFT TISSUE NECK W/DYE: CPT | Mod: 26 | Performed by: STUDENT IN AN ORGANIZED HEALTH CARE EDUCATION/TRAINING PROGRAM

## 2022-08-19 PROCEDURE — 85027 COMPLETE CBC AUTOMATED: CPT

## 2022-08-19 PROCEDURE — 71260 CT THORAX DX C+: CPT | Mod: 26 | Performed by: RADIOLOGY

## 2022-08-19 PROCEDURE — 85007 BL SMEAR W/DIFF WBC COUNT: CPT

## 2022-08-19 PROCEDURE — 82374 ASSAY BLOOD CARBON DIOXIDE: CPT

## 2022-08-19 PROCEDURE — 74177 CT ABD & PELVIS W/CONTRAST: CPT | Mod: 26 | Performed by: RADIOLOGY

## 2022-08-19 RX ORDER — IOPAMIDOL 755 MG/ML
125 INJECTION, SOLUTION INTRAVASCULAR ONCE
Status: COMPLETED | OUTPATIENT
Start: 2022-08-19 | End: 2022-08-19

## 2022-08-19 RX ADMIN — IOPAMIDOL 125 ML: 755 INJECTION, SOLUTION INTRAVENOUS at 13:26

## 2022-08-19 RX ADMIN — SODIUM CHLORIDE, PRESERVATIVE FREE 84 ML: 5 INJECTION INTRAVENOUS at 13:26

## 2022-08-21 NOTE — PROGRESS NOTES
HISTORY OF PRESENT ILLNESS:  Jade is a 52-year-old woman with metastatic breast cancer who comes to our clinic for recommendations for further treatment.  She is referred by Dr. Norberto Brand at [facility].  Jade would like to continue her care at the [facility].       Jade was diagnosed with breast cancer with a lump found in the upper-inner quadrant of the left breast.  She was diagnosed in 12/2013 in the [place] area and Dr. David Redd was her medical oncologist.  Biopsy of the tumor showed it to be ER positive, HI positive, and HER-2 negative.  We do not have any of the original pathology and we need to obtain those reports.  She underwent a lumpectomy performed by Dr. Tasha Segura who is a surgeon in the [place] area.  She also had a sentinel lymph node which was noted to be involved with tumor but there was no lymph node dissection done at that time.  [id number].  She subsequently underwent adjuvant chemotherapy with dose-dense AC for 4 cycles and Taxol for 12 weeks, completed 09/11/2014.        She then had radiation therapy post lumpectomy to the left breast, which was completed 11/23/2014 by Dr. Manley.        She was then begun on an aromatase inhibitor, the name of which she does not remember.  The aromatase inhibitor therapy was begun in 11/2014.  She was then switched within about a month or so to anastrozole and remained on anastrozole from 11/2014-02/2017.  She has never received tamoxifen.        She then moved to [place], [place] in 03/2017.  She saw a gynecologist there and underwent a IRIS/BSO by Dr. Zendejas.  She then also saw Dr. Linares in [place], [place], who is an oncologist.  His interpretation of the pathology report was that she had triple-negative breast cancer.  It is possible that she may have had low estrogen receptor positivity, but the anastrozole was then discontinued in 02/2017.  She then underwent the IRIS/BSO in 03/2017.  This was done laparoscopically.        She then  remained off of all hormonal therapy and in 09/2017 moved to [place].  She remained off hormonal therapy and did not have Medical Oncology followup initially.        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  She then went to see Dr. Norberto Brand who performed a PET/CT scan and the patient also underwent a brain MRI for staging.  The PET/CT scan performed 08/03/2018 showed in the neck there were several mildly metabolic active and large prominent left posterior cervical lymph nodes.  The largest is located posterior to the left sternocleidomastoid muscle and measures 1.5 x 1 cm in size.  This demonstrate modest FDG uptake.  The other lymph nodes are smaller and demonstrate mild FDG uptake.  The prominent prevascular and aortopulmonary lymph nodes were also seen on the contrast-enhanced scan were less well seen on the PET.  The largest lymph node visualized on the PET scan was 1.1 x 0.8 cm and demonstrated mild FDG uptake.  This was in the periaortic area just anterior to the aortic arch.  No lung nodules.  No lung infiltrates.  No pleural effusion.  There was no uptake of FDG in the bones.  In summary, there were mildly enlarged left posterior cervical lymph nodes, largest measuring 1.5 x 1.0 cm and mildly enlarged aorticopulmonary and prevascular lymph nodes that were noted on contrast-enhanced CT scan seen less well on the PET scan.  No evidence of metastatic disease in the abdomen and pelvis.  She also underwent a brain MRI that showed no evidence of intracranial metastatic disease.  She underwent a biopsy of one of the lymph nodes in the left neck which showed metastatic adenocarcinoma consistent with breast origin, which was estrogen-receptor positive.  The tumor cells were positive for CK7, ER and CA consistent with metastatic breast carcinoma.  Estrogen receptor showed strong average nuclear intensity in 95% of carcinoma cells, progesterone receptor moderate average  nuclear intensity in 70% of the carcinoma.  A GATA3 stain was apparently not performed.       TREATMENT HISTORY:  A. Tamoxifen  B. Letrozole and palbociclib started 9-18-18.  C. Pfizer COVID vaccination end of March, 2021 and 4-17.     INTERVAL HISTORY:  Jade returns to clinic and is feeling quite well.  She has no pain, no fatigue.  She has depression and anxiety, which are well-controlled with medication.  She continues on sertraline and has mirtazapine, which is helping with sleep.  She has had no difficulties tolerating both of these medications together.  She has had COVID vaccination x4.     REVIEW OF SYSTEMS:   A 10-point review of systems is entirely negative.  She has lost about 7 pounds or so now that she has an exercise bike and has been doing some workouts at home. She takes vitamin D3 as well as calcium.  She has had COVID vaccination x4.     She does have thyroid replacement and is following up with Endocrinology for thyroid replacement as well as for type 2 diabetes, managed with diet.        PHYSICAL EXAMINATION:    GENERAL:  Jade appeared generally well.  No alopecia.  VITAL SIGNS: /83   Pulse 92   Temp 97.5  F (36.4  C) (Oral)   Resp 18   Wt 142.1 kg (313 lb 4.8 oz)   SpO2 95%   BMI 46.27 kg/m    HEENT:  Oropharynx is without lesions.  LYMPH:  There is no palpable cervical, supraclavicular, subclavicular or axillary lymphadenopathy.  BREASTS:  Exam is not performed today.  LUNGS:  Clear to percussion and auscultation.  HEART:  Regular rate and rhythm.  S1, S2.  ABDOMEN:  Soft, nontender, consistent with increased body mass index.  EXTREMITIES:  With trace edema at the ankles.  PSYCH:  Mood was anxious and affect was appropriate.     LABORATORY DATA:  The creatinine has been trending up slightly and we will continue to watch that.  Otherwise, the CMP was within normal limits.  Spot glucose elevated at 136 and CBC showed a WBC of 2.3, hemoglobin 13.4 and platelets 147,000.      ASSESSMENT  AND PLAN:  1. Recurrent, metastatic ER-positive, HER2 negative breast cancer: History of left breast cancer s/p lumpectomy and SNLB in 2014. S/p 4 cycles ddAC and 12 weeks of Taxol. On AI from 11/2014-2/2017. Recurrence on PET/CT scan performed 08/03/2018 and then biopsy to left posterior cervical lymph nodes, aorticopulmonary, and prevascular lymph nodes were positive. No evidence of metastatic disease in the abdomen and pelvis or brain. Started Ibrance and letrozole on 9/21/18. Tolerating well aside from some hot flashes and mild joint stiffness.  She continues on Ibrance and letrozole and is now at more than 4 years with stable disease.  2.  She is very pleased that her disease has remained stable.  Jade continues to do well on letrozole and palbociclib.  She has no new complaints today.  3. Slowly rising serum creatinine.  We will recheck in 1 month.  If it continues to rise, we will get a renal ultrasound.  follow-up will be with SONIA monthly and with me in 3 months with a CT scan of the neck, chest, abdomen and pelvis.very pleased with this.  She has no identified bone metastases and therefore is not on a bone targeted agent.  3.  Followup plan.  We will see Jade monthly and obtain a CT scan of chest, abdomen and pelvis in 3 months.  4.  Weight control.  She decided against bariatric surgery and will concentrate on diet and exercise. She is on Ozempic and her weight is decreased from 326 to 315 pounds.  5.  Imaging and interval.  She would like to perform imaging on a 3 month basis.  I think this is reasonable. Allergy to contrast and she needs premedication and needs CT CAP and CT neck.   6.  Diet and exercise. We also discussed eating less sugar and getting 150 minutes of exercise per week.  She will try to incorporate these recommendations into her lifestyle.  PCP Dr. Dr. Wesly Pate at [facility].  7.  Depression and anxiety. She saw Dr. Pate her new PCP and her regimen was changed to add buspirone 7.5  mg twice daily to sertraline 200 mg once per added. Lorazepam prn anxiety was added.  She has improvement of her symptoms of depression and anxiety.   8.  Hypothyroid. Levothyroxine 0.175 mg per day.   She needs to go back to her PCP to see if this needs to be increased.   9. Asthma: Seen by Dr. Hay. Takes advair, montelukast, and albuterol as needed, noticing some slightly worsening symptoms lately. Meeting with WALT Hay to discuss.  10. No bone metastases so no bone agent.   11. Follow up.  Follow up with SONIA September 20, October 18, and with me November 15 with CT neck and CAP November 14.  CBC, CMP,  and CEA all visits.  Methylprednisolone premed for contrast.      Thank you for allowing us to participate in this patient's care.      Sincerely,      Wilfredo Bhatt MD  Professor  [facility]  [phone]            I spent 30 minutes with the patient more than 50% of which was in counseling and coordination of care.

## 2022-08-23 ENCOUNTER — ONCOLOGY VISIT (OUTPATIENT)
Dept: ONCOLOGY | Facility: CLINIC | Age: 52
End: 2022-08-23
Attending: INTERNAL MEDICINE
Payer: COMMERCIAL

## 2022-08-23 VITALS
RESPIRATION RATE: 18 BRPM | BODY MASS INDEX: 46.27 KG/M2 | TEMPERATURE: 97.5 F | SYSTOLIC BLOOD PRESSURE: 125 MMHG | HEART RATE: 92 BPM | WEIGHT: 293 LBS | OXYGEN SATURATION: 95 % | DIASTOLIC BLOOD PRESSURE: 83 MMHG

## 2022-08-23 DIAGNOSIS — C50.412 MALIGNANT NEOPLASM OF UPPER-OUTER QUADRANT OF LEFT BREAST IN FEMALE, ESTROGEN RECEPTOR POSITIVE (H): ICD-10-CM

## 2022-08-23 DIAGNOSIS — Z17.0 MALIGNANT NEOPLASM OF UPPER-OUTER QUADRANT OF LEFT BREAST IN FEMALE, ESTROGEN RECEPTOR POSITIVE (H): ICD-10-CM

## 2022-08-23 PROCEDURE — 99214 OFFICE O/P EST MOD 30 MIN: CPT | Performed by: INTERNAL MEDICINE

## 2022-08-23 PROCEDURE — G0463 HOSPITAL OUTPT CLINIC VISIT: HCPCS

## 2022-08-23 RX ORDER — MIRTAZAPINE 15 MG/1
TABLET, FILM COATED ORAL
Qty: 90 TABLET | Refills: 3 | Status: SHIPPED | OUTPATIENT
Start: 2022-08-23

## 2022-08-23 RX ORDER — DEXAMETHASONE 1 MG
TABLET ORAL
Qty: 1 TABLET | Refills: 0 | OUTPATIENT
Start: 2022-08-23

## 2022-08-23 ASSESSMENT — PAIN SCALES - GENERAL: PAINLEVEL: NO PAIN (0)

## 2022-08-23 NOTE — LETTER
8/23/2022         RE: Jade Collins  [address]  [facility] [address]        Dear Colleague,    Thank you for referring your patient, Jade Collins, to the [facility]. Please see a copy of my visit note below.    HISTORY OF PRESENT ILLNESS:  Jade is a 52-year-old woman with metastatic breast cancer who comes to our clinic for recommendations for further treatment.  She is referred by Dr. Norberto Brand at [facility].  Jade would like to continue her care at the [facility].       Jade was diagnosed with breast cancer with a lump found in the upper-inner quadrant of the left breast.  She was diagnosed in 12/2013 in the [place] area and Dr. David Redd was her medical oncologist.  Biopsy of the tumor showed it to be ER positive, [place] positive, and HER-2 negative.  We do not have any of the original pathology and we need to obtain those reports.  She underwent a lumpectomy performed by Dr. Tasha Segura who is a surgeon in the [place] area.  She also had a sentinel lymph node which was noted to be involved with tumor but there was no lymph node dissection done at that time.  [id number].  She subsequently underwent adjuvant chemotherapy with dose-dense AC for 4 cycles and Taxol for 12 weeks, completed 09/11/2014.        She then had radiation therapy post lumpectomy to the left breast, which was completed 11/23/2014 by Dr. Manley.        She was then begun on an aromatase inhibitor, the name of which she does not remember.  The aromatase inhibitor therapy was begun in 11/2014.  She was then switched within about a month or so to anastrozole and remained on anastrozole from 11/2014-02/2017.  She has never received tamoxifen.        She then moved to [place], [place] in 03/2017.  She saw a gynecologist there and underwent a IRIS/BSO by Dr. Zendejas.  She then also saw Dr. Linares in [place], [place], who is an oncologist.  His interpretation of the pathology report was  that she had triple-negative breast cancer.  It is possible that she may have had low estrogen receptor positivity, but the anastrozole was then discontinued in 02/2017.  She then underwent the IRIS/BSO in 03/2017.  This was done laparoscopically.        She then remained off of all hormonal therapy and in 09/2017 moved to [place].  She remained off hormonal therapy and did not have Medical Oncology followup initially.        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  She then went to see Dr. Norberto Brand who performed a PET/CT scan and the patient also underwent a brain MRI for staging.  The PET/CT scan performed 08/03/2018 showed in the neck there were several mildly metabolic active and large prominent left posterior cervical lymph nodes.  The largest is located posterior to the left sternocleidomastoid muscle and measures 1.5 x 1 cm in size.  This demonstrate modest FDG uptake.  The other lymph nodes are smaller and demonstrate mild FDG uptake.  The prominent prevascular and aortopulmonary lymph nodes were also seen on the contrast-enhanced scan were less well seen on the PET.  The largest lymph node visualized on the PET scan was 1.1 x 0.8 cm and demonstrated mild FDG uptake.  This was in the periaortic area just anterior to the aortic arch.  No lung nodules.  No lung infiltrates.  No pleural effusion.  There was no uptake of FDG in the bones.  In summary, there were mildly enlarged left posterior cervical lymph nodes, largest measuring 1.5 x 1.0 cm and mildly enlarged aorticopulmonary and prevascular lymph nodes that were noted on contrast-enhanced CT scan seen less well on the PET scan.  No evidence of metastatic disease in the abdomen and pelvis.  She also underwent a brain MRI that showed no evidence of intracranial metastatic disease.  She underwent a biopsy of one of the lymph nodes in the left neck which showed metastatic adenocarcinoma consistent with breast  origin, which was estrogen-receptor positive.  The tumor cells were positive for CK7, ER and DE consistent with metastatic breast carcinoma.  Estrogen receptor showed strong average nuclear intensity in 95% of carcinoma cells, progesterone receptor moderate average nuclear intensity in 70% of the carcinoma.  A GATA3 stain was apparently not performed.       TREATMENT HISTORY:  A. Tamoxifen  B. Letrozole and palbociclib started 9-18-18.  C. Pfizer COVID vaccination end of March, 2021 and 4-17.     INTERVAL HISTORY:  Jade returns to clinic and is feeling quite well.  She has no pain, no fatigue.  She has depression and anxiety, which are well-controlled with medication.  She continues on sertraline and has mirtazapine, which is helping with sleep.  She has had no difficulties tolerating both of these medications together.  She has had COVID vaccination x4.     REVIEW OF SYSTEMS:   A 10-point review of systems is entirely negative.  She has lost about 7 pounds or so now that she has an exercise bike and has been doing some workouts at home. She takes vitamin D3 as well as calcium.  She has had COVID vaccination x4.     She does have thyroid replacement and is following up with Endocrinology for thyroid replacement as well as for type 2 diabetes, managed with diet.        PHYSICAL EXAMINATION:    GENERAL:  Jade appeared generally well.  No alopecia.  VITAL SIGNS: /83   Pulse 92   Temp 97.5  F (36.4  C) (Oral)   Resp 18   Wt 142.1 kg (313 lb 4.8 oz)   SpO2 95%   BMI 46.27 kg/m    HEENT:  Oropharynx is without lesions.  LYMPH:  There is no palpable cervical, supraclavicular, subclavicular or axillary lymphadenopathy.  BREASTS:  Exam is not performed today.  LUNGS:  Clear to percussion and auscultation.  HEART:  Regular rate and rhythm.  S1, S2.  ABDOMEN:  Soft, nontender, consistent with increased body mass index.  EXTREMITIES:  With trace edema at the ankles.  PSYCH:  Mood was anxious and affect was  appropriate.     LABORATORY DATA:  The creatinine has been trending up slightly and we will continue to watch that.  Otherwise, the CMP was within normal limits.  Spot glucose elevated at 136 and CBC showed a WBC of 2.3, hemoglobin 13.4 and platelets 147,000.      ASSESSMENT AND PLAN:  1. Recurrent, metastatic ER-positive, HER2 negative breast cancer: History of left breast cancer s/p lumpectomy and SNLB in 2014. S/p 4 cycles ddAC and 12 weeks of Taxol. On AI from 11/2014-2/2017. Recurrence on PET/CT scan performed 08/03/2018 and then biopsy to left posterior cervical lymph nodes, aorticopulmonary, and prevascular lymph nodes were positive. No evidence of metastatic disease in the abdomen and pelvis or brain. Started Ibrance and letrozole on 9/21/18. Tolerating well aside from some hot flashes and mild joint stiffness.  She continues on Ibrance and letrozole and is now at more than 4 years with stable disease.  2.  She is very pleased that her disease has remained stable.  Jade continues to do well on letrozole and palbociclib.  She has no new complaints today.  3. Slowly rising serum creatinine.  We will recheck in 1 month.  If it continues to rise, we will get a renal ultrasound.  follow-up will be with SONIA monthly and with me in 3 months with a CT scan of the neck, chest, abdomen and pelvis.very pleased with this.  She has no identified bone metastases and therefore is not on a bone targeted agent.  3.  Followup plan.  We will see Jade monthly and obtain a CT scan of chest, abdomen and pelvis in 3 months.  4.  Weight control.  She decided against bariatric surgery and will concentrate on diet and exercise. She is on Ozempic and her weight is decreased from 326 to 315 pounds.  5.  Imaging and interval.  She would like to perform imaging on a 3 month basis.  I think this is reasonable. Allergy to contrast and she needs premedication and needs CT CAP and CT neck.   6.  Diet and exercise. We also discussed eating  less sugar and getting 150 minutes of exercise per week.  She will try to incorporate these recommendations into her lifestyle.  PCP . Dr. Wesly Pate at [facility].  7.  Depression and anxiety. She saw Dr. Pate her new PCP and her regimen was changed to add buspirone 7.5 mg twice daily to sertraline 200 mg once per added. Lorazepam prn anxiety was added.  She has improvement of her symptoms of depression and anxiety.   8.  Hypothyroid. Levothyroxine 0.175 mg per day.   She needs to go back to her PCP to see if this needs to be increased.   9. Asthma: Seen by Dr. Hay. Takes advair, montelukast, and albuterol as needed, noticing some slightly worsening symptoms lately. Meeting with WALT Hay to discuss.  10. No bone metastases so no bone agent.   11. Follow up.  Follow up with SONIA September 20, October 18, and with me November 15 with CT neck and CAP November 14.  CBC, CMP,  and CEA all visits.  Methylprednisolone premed for contrast.      Thank you for allowing us to participate in this patient's care.      I spent 30 minutes with the patient more than 50% of which was in counseling and coordination of care.          Again, thank you for allowing me to participate in the care of your patient.      Sincerely,    Wilfredo Bhatt MD

## 2022-08-23 NOTE — NURSING NOTE
"Oncology Rooming Note    August 23, 2022 12:41 PM   Jade Collins is a 52 year old female who presents for:    Chief Complaint   Patient presents with     Oncology Clinic Visit     Pt is here for a rtn for Breast Cancer     Initial Vitals: Blood Pressure 125/83   Pulse 92   Temperature 97.5  F (36.4  C) (Oral)   Respiration 18   Weight 142.1 kg (313 lb 4.8 oz)   Oxygen Saturation 95%   Body Mass Index 46.27 kg/m   Estimated body mass index is 46.27 kg/m  as calculated from the following:    Height as of 6/7/22: 1.753 m (5' 9\").    Weight as of this encounter: 142.1 kg (313 lb 4.8 oz). Body surface area is 2.63 meters squared.  No Pain (0) Comment: Data Unavailable   No LMP recorded. Patient is postmenopausal.  Allergies reviewed: Yes  Medications reviewed: Yes    Medications: Medication refills not needed today.  Pharmacy name entered into [employer]:    [facility] - [place], [place] - [address] [id number] - [place], [place] - [address] AT [facility]    Clinical concerns: none       Sis Morris MA            "

## 2022-08-27 RX ORDER — METHYLPREDNISOLONE 32 MG/1
TABLET ORAL
Qty: 2 TABLET | Refills: 0 | Status: SHIPPED | OUTPATIENT
Start: 2022-08-27

## 2022-09-18 ENCOUNTER — HEALTH MAINTENANCE LETTER (OUTPATIENT)
Age: 52
End: 2022-09-18

## 2022-09-19 ENCOUNTER — LAB (OUTPATIENT)
Dept: LAB | Facility: CLINIC | Age: 52
End: 2022-09-19
Payer: COMMERCIAL

## 2022-09-19 DIAGNOSIS — C50.412 MALIGNANT NEOPLASM OF UPPER-OUTER QUADRANT OF LEFT BREAST IN FEMALE, ESTROGEN RECEPTOR POSITIVE (H): ICD-10-CM

## 2022-09-19 DIAGNOSIS — Z17.0 MALIGNANT NEOPLASM OF UPPER-OUTER QUADRANT OF LEFT BREAST IN FEMALE, ESTROGEN RECEPTOR POSITIVE (H): ICD-10-CM

## 2022-09-19 LAB
ALBUMIN SERPL-MCNC: 3.9 G/DL (ref 3.4–5)
ALP SERPL-CCNC: 75 U/L (ref 40–150)
ALT SERPL W P-5'-P-CCNC: 41 U/L (ref 0–50)
ANION GAP SERPL CALCULATED.3IONS-SCNC: 4 MMOL/L (ref 3–14)
AST SERPL W P-5'-P-CCNC: 34 U/L (ref 0–45)
BASOPHILS # BLD AUTO: 0.1 10E3/UL (ref 0–0.2)
BASOPHILS NFR BLD AUTO: 2 %
BILIRUB SERPL-MCNC: 0.6 MG/DL (ref 0.2–1.3)
BUN SERPL-MCNC: 10 MG/DL (ref 7–30)
CALCIUM SERPL-MCNC: 9.4 MG/DL (ref 8.5–10.1)
CEA SERPL-MCNC: 1.1 NG/ML
CHLORIDE BLD-SCNC: 109 MMOL/L (ref 94–109)
CO2 SERPL-SCNC: 26 MMOL/L (ref 20–32)
CREAT SERPL-MCNC: 0.92 MG/DL (ref 0.52–1.04)
EOSINOPHIL # BLD AUTO: 0.1 10E3/UL (ref 0–0.7)
EOSINOPHIL NFR BLD AUTO: 3 %
ERYTHROCYTE [DISTWIDTH] IN BLOOD BY AUTOMATED COUNT: 13.7 % (ref 10–15)
GFR SERPL CREATININE-BSD FRML MDRD: 75 ML/MIN/1.73M2
GLUCOSE BLD-MCNC: 128 MG/DL (ref 70–99)
HCT VFR BLD AUTO: 38 % (ref 35–47)
HGB BLD-MCNC: 13.2 G/DL (ref 11.7–15.7)
IMM GRANULOCYTES # BLD: 0 10E3/UL
IMM GRANULOCYTES NFR BLD: 0 %
LYMPHOCYTES # BLD AUTO: 1.1 10E3/UL (ref 0.8–5.3)
LYMPHOCYTES NFR BLD AUTO: 43 %
MCH RBC QN AUTO: 34.2 PG (ref 26.5–33)
MCHC RBC AUTO-ENTMCNC: 34.7 G/DL (ref 31.5–36.5)
MCV RBC AUTO: 98 FL (ref 78–100)
MONOCYTES # BLD AUTO: 0.2 10E3/UL (ref 0–1.3)
MONOCYTES NFR BLD AUTO: 7 %
NEUTROPHILS # BLD AUTO: 1.1 10E3/UL (ref 1.6–8.3)
NEUTROPHILS NFR BLD AUTO: 45 %
NRBC # BLD AUTO: 0 10E3/UL
NRBC BLD AUTO-RTO: 0 /100
PLATELET # BLD AUTO: 132 10E3/UL (ref 150–450)
POTASSIUM BLD-SCNC: 4 MMOL/L (ref 3.4–5.3)
PROT SERPL-MCNC: 7.7 G/DL (ref 6.8–8.8)
RBC # BLD AUTO: 3.86 10E6/UL (ref 3.8–5.2)
SODIUM SERPL-SCNC: 139 MMOL/L (ref 133–144)
WBC # BLD AUTO: 2.4 10E3/UL (ref 4–11)

## 2022-09-19 PROCEDURE — 80053 COMPREHEN METABOLIC PANEL: CPT

## 2022-09-19 PROCEDURE — 86300 IMMUNOASSAY TUMOR CA 15-3: CPT | Mod: 90

## 2022-09-19 PROCEDURE — 85025 COMPLETE CBC W/AUTO DIFF WBC: CPT

## 2022-09-19 PROCEDURE — 82378 CARCINOEMBRYONIC ANTIGEN: CPT

## 2022-09-19 PROCEDURE — 36415 COLL VENOUS BLD VENIPUNCTURE: CPT

## 2022-09-19 NOTE — PROGRESS NOTES
HISTORY OF PRESENT ILLNESS:  Jade is a 52 year old woman with metastatic breast cancer        Jade was diagnosed with breast cancer with a lump found in the upper-inner quadrant of the left breast.  She was diagnosed in 12/2013 in the [place] area and Dr. David Redd was her medical oncologist.  Biopsy of the tumor showed it to be ER positive, CT positive, and HER-2 negative.  We do not have any of the original pathology and we need to obtain those reports.  She underwent a lumpectomy performed by Dr. Tasha Segura who is a surgeon in the [place] area.  She also had a sentinel lymph node which was noted to be involved with tumor but there was no lymph node dissection done at that time.  [id number].  She subsequently underwent adjuvant chemotherapy with dose-dense AC for 4 cycles and Taxol for 12 weeks, completed 09/11/2014.        She then had radiation therapy post lumpectomy to the left breast, which was completed 11/23/2014 by Dr. Manley.        She was then begun on an aromatase inhibitor, the name of which she does not remember.  The aromatase inhibitor therapy was begun in 11/2014.  She was then switched within about a month or so to anastrozole and remained on anastrozole from 11/2014-02/2017.  She has never received tamoxifen.        She then moved to [place], [place] in 03/2017.  She saw a gynecologist there and underwent a IRIS/BSO by Dr. Zendejas.  She then also saw Dr. Linares in [place], [place], who is an oncologist.  His interpretation of the pathology report was that she had triple-negative breast cancer.  It is possible that she may have had low estrogen receptor positivity, but the anastrozole was then discontinued in 02/2017.  She then underwent the IRIS/BSO in 03/2017.  This was done laparoscopically.        She then remained off of all hormonal therapy and in 09/2017 moved to [place].  She remained off hormonal therapy and did not have Medical Oncology followup initially.        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  She then went to see Dr. Norberto Brand who performed a PET/CT scan and the patient also underwent a brain MRI for staging.  The PET/CT scan performed 08/03/2018 showed in the neck there were several mildly metabolic active and large prominent left posterior cervical lymph nodes.  The largest is located posterior to the left sternocleidomastoid muscle and measures 1.5 x 1 cm in size.  This demonstrate modest FDG uptake.  The other lymph nodes are smaller and demonstrate mild FDG uptake.  The prominent prevascular and aortopulmonary lymph nodes were also seen on the contrast-enhanced scan were less well seen on the PET.  The largest lymph node visualized on the PET scan was 1.1 x 0.8 cm and demonstrated mild FDG uptake.  This was in the periaortic area just anterior to the aortic arch.  No lung nodules.  No lung infiltrates.  No pleural effusion.  There was no uptake of FDG in the bones.  In summary, there were mildly enlarged left posterior cervical lymph nodes, largest measuring 1.5 x 1.0 cm and mildly enlarged aorticopulmonary and prevascular lymph nodes that were noted on contrast-enhanced CT scan seen less well on the PET scan.  No evidence of metastatic disease in the abdomen and pelvis.  She also underwent a brain MRI that showed no evidence of intracranial metastatic disease.  She underwent a biopsy of one of the lymph nodes in the left neck which showed metastatic adenocarcinoma consistent with breast origin, which was estrogen-receptor positive.  The tumor cells were positive for CK7, ER and AL consistent with metastatic breast carcinoma.  Estrogen receptor showed strong average nuclear intensity in 95% of carcinoma cells, progesterone receptor moderate average nuclear intensity in 70% of the carcinoma.  A GATA3 stain was apparently not performed.       TREATMENT HISTORY:  A. Tamoxifen  B. Letrozole and palbociclib started 9-18-18.  C. Pfizer COVID vaccination  end of March, 2021 and 4-17.     INTERVAL HISTORY:  Jade is feeling well. She has no new concerns today. She continues to have some mild fatigue but is otherwise feeling well. She is sleeping okay. Takes ibrance, letrozole, and melatonin at night. She has been more active and her weight has been improving. She is walking around the apartment complex with her cat as well as using a stationary bike 3x per week. She continues on Ozempic. Sugars are still a little high. Still struggling a little with diet but she has cut back on how often she is going to the drive thru.     No fevers/chills or infections. Some seasonal allergies. Asthma has been okay. Does not feel as SOB walking. Walking better. No lumps/bumps.     She has been more anxious about coming downtown with finding parking and with the up tick in crime. She is working on transferring care to [facility]. She has an appointment with Dr. Montana this Friday.       OBJECTIVE DATA:  Physical Exam:  General: Resting comfortably in no acute distress, appears well   Head: Normocephalic, atraumatic   EENT: No scleral icteris, EOMS intact.   Lung: Normal respiratory effort. Speaking fluently without shortness of breath. No audible wheezes or coughing.   Neuro: AAO x3. Moving upper extremities equally and symmetrically   Skin: Warm, dry, intact. No rashes, no suspicious lesions. No petechia or bruising noted on visible skin. No cyanosis      The rest of a comprehensive physical examination is deferred due to PHE (public health emergency) video restrictions     Labs:   Most Recent 3 CBC's:  Recent Labs   Lab Test 09/19/22  1054 08/19/22  1312 07/25/22  1303   WBC 2.4* 2.3* 2.8*   HGB 13.2 13.4 14.0   MCV 98 98 96   * 147* 150    Most Recent 3 BMP's:  Recent Labs   Lab Test 09/19/22  1054 08/19/22  1312 07/25/22  1303    137 140   POTASSIUM 4.0 4.4 4.4   CHLORIDE 109 104 106   CO2 26 23 27   BUN 10 10.2 10   CR 0.92 1.10* 1.09*   ANIONGAP 4 10 7    GIOVANNY 9.4 9.7 9.9   * 136* 110*    Most Recent 2 LFT's:  Recent Labs   Lab Test 09/19/22  1054 08/19/22  1312   AST 34 47*   ALT 41 37*   ALKPHOS 75 77   BILITOTAL 0.6 0.5        I reviewed the above labs today.    Imaging:No new imaging      ASSESSMENT AND PLAN:  1. Recurrent, metastatic ER-positive, HER2 negative breast cancer: History of left breast cancer s/p lumpectomy and SNLB in 2014. S/p 4 cycles ddAC and 12 weeks of Taxol. On AI from 11/2014-2/2017. Recurrence on PET/CT scan performed 08/03/2018 and then biopsy to left posterior cervical lymph nodes, aorticopulmonary, and prevascular lymph nodes were positive. No evidence of metastatic disease in the abdomen and pelvis or brain.   - Started Ibrance and letrozole on 9/21/18.     - CT CAP and CT neck 08/19/2022 showed stable disease   - Labs are appropriate to proceed with next Ibrance cycle today.      2.  Diabetes:   - Working with endocrine, recently started on ozempic      3.  Depression and anxiety. Managed by PCP.      4.  Hypothyroid. Levothyroxine 200 mcg daily. TSH WNL on 06/27/22. Managed by endocrinology.      5. Asthma: Seen by Dr. Hay. Takes advair, montelukast, and albuterol as needed. Has been well controlled.     35minutes spent on the date of the encounter doing chart review, review of test results, interpretation of tests, patient visit and documentation     Cecilia Goins PA-C

## 2022-09-20 ENCOUNTER — VIRTUAL VISIT (OUTPATIENT)
Dept: ONCOLOGY | Facility: CLINIC | Age: 52
End: 2022-09-20
Attending: PHYSICIAN ASSISTANT
Payer: COMMERCIAL

## 2022-09-20 DIAGNOSIS — C50.919 METASTATIC BREAST CANCER: Primary | ICD-10-CM

## 2022-09-20 PROCEDURE — 99214 OFFICE O/P EST MOD 30 MIN: CPT | Mod: GT | Performed by: PHYSICIAN ASSISTANT

## 2022-09-20 PROCEDURE — G0463 HOSPITAL OUTPT CLINIC VISIT: HCPCS | Mod: PN,RTG | Performed by: PHYSICIAN ASSISTANT

## 2022-09-20 NOTE — LETTER
9/20/2022         RE: Jade Collins  [address]  [place] [address]        Dear Colleague,    Thank you for referring your patient, Jade Collins, to the [facility]. Please see a copy of my visit note below.          HISTORY OF PRESENT ILLNESS:  Jade is a 52 year old woman with metastatic breast cancer        Jade was diagnosed with breast cancer with a lump found in the upper-inner quadrant of the left breast.  She was diagnosed in 12/2013 in the [place] area and Dr. David Redd was her medical oncologist.  Biopsy of the tumor showed it to be ER positive, CT positive, and HER-2 negative.  We do not have any of the original pathology and we need to obtain those reports.  She underwent a lumpectomy performed by Dr. Tasha Segura who is a surgeon in the [place] area.  She also had a sentinel lymph node which was noted to be involved with tumor but there was no lymph node dissection done at that time.  [id number].  She subsequently underwent adjuvant chemotherapy with dose-dense AC for 4 cycles and Taxol for 12 weeks, completed 09/11/2014.        She then had radiation therapy post lumpectomy to the left breast, which was completed 11/23/2014 by Dr. Manley.        She was then begun on an aromatase inhibitor, the name of which she does not remember.  The aromatase inhibitor therapy was begun in 11/2014.  She was then switched within about a month or so to anastrozole and remained on anastrozole from 11/2014-02/2017.  She has never received tamoxifen.        She then moved to [place], [place] in 03/2017.  She saw a gynecologist there and underwent a IRIS/BSO by Dr. Zendejas.  She then also saw Dr. Linares in [place], [place], who is an oncologist.  His interpretation of the pathology report was that she had triple-negative breast cancer.  It is possible that she may have had low estrogen receptor positivity, but the anastrozole was then discontinued in 02/2017.  She then  underwent the IRIS/BSO in 03/2017.  This was done laparoscopically.        She then remained off of all hormonal therapy and in 09/2017 moved to [place].  She remained off hormonal therapy and did not have Medical Oncology followup initially.        She was driving for [employer] and noticed lymph nodes in the left neck.  She then went to see Dr. Norberto Brand who performed a PET/CT scan and the patient also underwent a brain MRI for staging.  The PET/CT scan performed 08/03/2018 showed in the neck there were several mildly metabolic active and large prominent left posterior cervical lymph nodes.  The largest is located posterior to the left sternocleidomastoid muscle and measures 1.5 x 1 cm in size.  This demonstrate modest FDG uptake.  The other lymph nodes are smaller and demonstrate mild FDG uptake.  The prominent prevascular and aortopulmonary lymph nodes were also seen on the contrast-enhanced scan were less well seen on the PET.  The largest lymph node visualized on the PET scan was 1.1 x 0.8 cm and demonstrated mild FDG uptake.  This was in the periaortic area just anterior to the aortic arch.  No lung nodules.  No lung infiltrates.  No pleural effusion.  There was no uptake of FDG in the bones.  In summary, there were mildly enlarged left posterior cervical lymph nodes, largest measuring 1.5 x 1.0 cm and mildly enlarged aorticopulmonary and prevascular lymph nodes that were noted on contrast-enhanced CT scan seen less well on the PET scan.  No evidence of metastatic disease in the abdomen and pelvis.  She also underwent a brain MRI that showed no evidence of intracranial metastatic disease.  She underwent a biopsy of one of the lymph nodes in the left neck which showed metastatic adenocarcinoma consistent with breast origin, which was estrogen-receptor positive.  The tumor cells were positive for CK7, ER and HI consistent with metastatic breast carcinoma.  Estrogen receptor showed strong average nuclear intensity  in 95% of carcinoma cells, progesterone receptor moderate average nuclear intensity in 70% of the carcinoma.  A GATA3 stain was apparently not performed.       TREATMENT HISTORY:  A. Tamoxifen  B. Letrozole and palbociclib started 9-18-18.  C. Pfizer COVID vaccination end of March, 2021 and 4-17.     INTERVAL HISTORY:  Jade is feeling well. She has no new concerns today. She continues to have some mild fatigue but is otherwise feeling well. She is sleeping okay. Takes ibrance, letrozole, and melatonin at night. She has been more active and her weight has been improving. She is walking around the apartment complex with her cat as well as using a stationary bike 3x per week. She continues on Ozempic. Sugars are still a little high. Still struggling a little with diet but she has cut back on how often she is going to the drive thru.     No fevers/chills or infections. Some seasonal allergies. Asthma has been okay. Does not feel as SOB walking. Walking better. No lumps/bumps.     She has been more anxious about coming downtown with finding parking and with the up tick in crime. She is working on transferring care to [facility]. She has an appointment with Dr. Montana this Friday.       OBJECTIVE DATA:  Physical Exam:  General: Resting comfortably in no acute distress, appears well   Head: Normocephalic, atraumatic   EENT: No scleral icteris, EOMS intact.   Lung: Normal respiratory effort. Speaking fluently without shortness of breath. No audible wheezes or coughing.   Neuro: AAO x3. Moving upper extremities equally and symmetrically   Skin: Warm, dry, intact. No rashes, no suspicious lesions. No petechia or bruising noted on visible skin. No cyanosis      The rest of a comprehensive physical examination is deferred due to PHE (public health emergency) video restrictions     Labs:   Most Recent 3 CBC's:  Recent Labs   Lab Test 09/19/22  1054 08/19/22  1312 07/25/22  1303   WBC 2.4* 2.3* 2.8*   HGB 13.2 13.4 14.0    MCV 98 98 96   * 147* 150    Most Recent 3 BMP's:  Recent Labs   Lab Test 09/19/22  1054 08/19/22  1312 07/25/22  1303    137 140   POTASSIUM 4.0 4.4 4.4   CHLORIDE 109 104 106   CO2 26 23 27   BUN 10 10.2 10   CR 0.92 1.10* 1.09*   ANIONGAP 4 10 7   GIOVANNY 9.4 9.7 9.9   * 136* 110*    Most Recent 2 LFT's:  Recent Labs   Lab Test 09/19/22  1054 08/19/22  1312   AST 34 47*   ALT 41 37*   ALKPHOS 75 77   BILITOTAL 0.6 0.5        I reviewed the above labs today.    Imaging:No new imaging      ASSESSMENT AND PLAN:  1. Recurrent, metastatic ER-positive, HER2 negative breast cancer: History of left breast cancer s/p lumpectomy and SNLB in 2014. S/p 4 cycles ddAC and 12 weeks of Taxol. On AI from 11/2014-2/2017. Recurrence on PET/CT scan performed 08/03/2018 and then biopsy to left posterior cervical lymph nodes, aorticopulmonary, and prevascular lymph nodes were positive. No evidence of metastatic disease in the abdomen and pelvis or brain.   - Started Ibrance and letrozole on 9/21/18.     - CT CAP and CT neck 08/19/2022 showed stable disease   - Labs are appropriate to proceed with next Ibrance cycle today.      2.  Diabetes:   - Working with endocrine, recently started on ozempic      3.  Depression and anxiety. Managed by PCP.      4.  Hypothyroid. Levothyroxine 200 mcg daily. TSH WNL on 06/27/22. Managed by endocrinology.      5. Asthma: Seen by Dr. Hay. Takes advair, montelukast, and albuterol as needed. Has been well controlled.     35minutes spent on the date of the encounter doing chart review, review of test results, interpretation of tests, patient visit and documentation     Cecilia Goins PA-C          Jade is a 52 year old who is being evaluated via a billable video visit.      How would you like to obtain your AVS? MyChart  If the video visit is dropped, the invitation should be resent by: Send to e-mail at: [email or web address]  Will anyone else be joining your video visit?  No        Video-Visit Details    Video Start Time: 3:35 pm    Type of service:  Video Visit    Video End Time:4:04 PM    Originating Location (pt. Location): Home    Distant Location (provider location):  [facility]     Platform used for Video Visit: Sandeep      Again, thank you for allowing me to participate in the care of your patient.        Sincerely,        Cecilia Goins PA-C

## 2022-09-20 NOTE — PROGRESS NOTES
Jade is a 52 year old who is being evaluated via a billable video visit.      How would you like to obtain your AVS? MyChart  If the video visit is dropped, the invitation should be resent by: Send to e-mail at: [email or web address]  Will anyone else be joining your video visit? No        Video-Visit Details    Video Start Time: 3:35 pm    Type of service:  Video Visit    Video End Time:4:04 PM    Originating Location (pt. Location): Home    Distant Location (provider location):  [facility]     Platform used for Video Visit: Smile

## 2022-09-21 LAB — CANCER AG27-29 SERPL-ACNC: 18.6 U/ML

## 2022-09-24 ENCOUNTER — TELEPHONE (OUTPATIENT)
Dept: ONCOLOGY | Facility: CLINIC | Age: 52
End: 2022-09-24

## 2022-09-24 NOTE — TELEPHONE ENCOUNTER
I called Jade to see if we can address any concerns that she has.  She voiced that she was worried about driving in the [place] and wanted an oncologist closer to home. I left my phone number and asked her to call me to see how we can address her concerns.     Wilfredo Bhatt MD

## 2022-09-28 ENCOUNTER — TELEPHONE (OUTPATIENT)
Dept: ONCOLOGY | Facility: CLINIC | Age: 52
End: 2022-09-28

## 2022-09-28 NOTE — TELEPHONE ENCOUNTER
Free Drug Application Initiated  Medication: Ibrance  Sponsor: [employer]  Phone #: [phone]  Fax #: [phone]  Additional Information: emailed jose to Nova        Thank you,    Jaleesa Winslow  Oncology Pharmacy Liaison II  [email or web address]  Phone: [phone]  Fax: [phone]

## 2022-10-03 ENCOUNTER — MYC MEDICAL ADVICE (OUTPATIENT)
Dept: OPHTHALMOLOGY | Facility: CLINIC | Age: 52
End: 2022-10-03

## 2022-10-11 NOTE — ORAL ONC MGMT
[facility] Oral Chemotherapy Monitoring Program    Thank you for the opportunity to be a part in the care of this patient's oral chemotherapy. The oncology pharmacy will no longer be following this patient for oral chemotherapy. If there are any questions or the plan changes, feel free to contact us.    ORAL CHEMOTHERAPY 3/29/2022 3/29/2022 5/5/2022 5/16/2022 7/18/2022 8/18/2022 10/11/2022   Assessment Type Left Voicemail Incoming phone call;Quarterly Follow up Other Refill Quarterly Follow up Refill Discontinuation   Stop Date - - - - - - 10/11/2022   Reason for Discontinuation - - - - - - Patient seeking care elsewhere   Diagnosis Code Breast Cancer Breast Cancer Breast Cancer Breast Cancer Breast Cancer Breast Cancer Breast Cancer   Providers Dr. [place] Bhatt   [facility]   Drug Name Ibrance (palbociclib) Ibrance (palbociclib) Ibrance (palbociclib) Ibrance (palbociclib) Ibrance (palbociclib) Ibrance (palbociclib) Ibrance (palbociclib)   Dose 100 mg 100 mg 100 mg 100 mg 100 mg 100 mg 100 mg   Current Schedule Daily Daily Daily Daily Daily Daily Daily   Cycle Details 3 weeks on, 1 week off 3 weeks on, 1 week off 3 weeks on, 1 week off 3 weeks on, 1 week off 3 weeks on, 1 week off 3 weeks on, 1 week off 3 weeks on, 1 week off   Start Date of Last Cycle - 3/4/2022 4/29/2022 4/29/2022 - - -   Planned next cycle start date - 4/1/2022 5/27/2022 5/27/2022 7/29/2022 8/26/2022 -   Doses missed in last 2 weeks - 0 - - 0 - -   Adherence Assessment - Adherent - - Adherent - -   Adverse Effects - No AE identified during assessment - - No AE identified during assessment - -   Nausea - - - - - - -   Pharmacist Intervention(nausea) - - - - - - -   Diarrhea - - - - - - -   Pharmacist Intervention(diarrhea) - - - - - - -   Oral Mucositis - - - - - - -   Pharmacist  Intervention(oral mucositis) - - - - - - -   Fatigue - - - - - - -   Pharmacist Intervention(fatigue) - - - - - - -   Neutropenia - - - - - - -   Pharmacist Intervention(neutropenia) - - - - - - -   Intervention(s) - - - - - - -   Home BPs - - - - - - -   Any new drug interactions? - No - - No - -   Pharmacist Intervention? - - - - - - -   Intervention(s) - - - - - - -   Is the dose as ordered appropriate for the patient? - Yes - - Yes - -   Is the patient currently in pain? - - - - - - -   Does the patient feel the pain is currently being managed by a provider? - - - - - - -   Has the patient been assessed within the past 6 months for depression? - - - - - - -   Has the patient missed any days of school, work, or other routine activity? - - - - - - -   Since the last time we talked, have you been hospitalized or used the emergency room? - No - - No - -     Jose Swain, PharmD  Hematology/Oncology Clinical Pharmacist  [facility]  [facility]

## 2022-10-13 ENCOUNTER — TELEPHONE (OUTPATIENT)
Dept: ONCOLOGY | Facility: CLINIC | Age: 52
End: 2022-10-13

## 2022-10-13 NOTE — TELEPHONE ENCOUNTER
I called Dr. Shivani Montana about Jade to transfer care.  Jade agreed to this call.     Recurrent, metastatic ER-positive, HER2 negative breast cancer: History of left breast cancer s/p lumpectomy and SNLB in 2014. S/p 4 cycles ddAC and 12 weeks of Taxol. On AI from 11/2014-2/2017. Recurrence on PET/CT scan performed 08/03/2018 and then biopsy to left posterior cervical lymph nodes, aorticopulmonary, and prevascular lymph nodes were positive. No evidence of metastatic disease in the abdomen and pelvis or brain. Started Ibrance and letrozole on 9/21/18. Tolerating well aside from some hot flashes and mild joint stiffness.  She continues on Ibrance and letrozole and is now at more than 4 years with stable disease.    TREATMENT HISTORY:  A. Tamoxifen  B. Letrozole and palbociclib started 9-18-18.  C. Pfizer COVID vaccination end of March, 2021 and 4-17.     I spoke with Dr. Haji and handed off care.  All of her questions were answered.    Wilfredo Bhatt MD

## 2023-01-20 NOTE — NURSING NOTE
Chief Complaint   Patient presents with     Blood Draw     Labs drawn via  by RN in lab. VS taken. Patient checked in for next appt.     Labs collected from venipuncture by RN. Vitals taken. Checked in for appointment.    Carly Martines RN     1

## 2023-01-28 ENCOUNTER — HEALTH MAINTENANCE LETTER (OUTPATIENT)
Age: 53
End: 2023-01-28

## 2023-03-16 NOTE — PROGRESS NOTES
Oncology/Hematology Visit Note  Feb 19, 2019    Reason for Visit: follow up of recurrent metastatic ER-positive HER2 negative breast cancer    History of Present Illness: Jade Collins is a 48 year old female with a history of left breast cancer diagnosed in 12/2013 in [place] area and Dr. David Redd was her medical oncologist. Biopsy showed tumor to be ER positive, FL positive and HER2 negative. She underwent lumpectomy and SLNB by Dr. Tasha Segura in [place]. [place]demona lymph node was noted to be involved with tumor but no lymph node dissection done at that time. She subsequently underwent adjuvant ddAC x 4 cycles and Taxol x 12 weeks, competed in 9/11/2014. She had post lumpectomy radiation completed in 11/23/2014 by Dr. Manley. She was begun on an AI in 11/2014, but does not recall name.     She was then switched within about a month or so to anastrozole and remained on anastrozole from 11/2014-02/2017.  She has never received tamoxifen. She then moved to [place], [place] in 03/2017.  She saw a gynecologist there and underwent a IRIS/BSO by Dr. Zendejas.  She then also saw Dr. Linares in [place], [place], who is an oncologist.  His interpretation of the pathology report was that she had triple-negative breast cancer.  It is possible that she may have had low estrogen receptor positivity, but the anastrozole was then discontinued in 02/2017.  She then underwent the IRIS/BSO in 03/2017.  This was done laparoscopically.        She then remained off of all hormonal therapy and in 09/2017 moved to [place].  She remained off hormonal therapy and did not have Medical Oncology followup initially.        She was driving for [email or web address] and noticed lymph nodes in the left neck about 8 weeks ago.  She then went to see Dr. Norberto Brand who performed a PET/CT scan and the patient also underwent a brain MRI for staging.  The PET/CT scan performed 08/03/2018 which showed mildly enlarged left posterior cervical lymph nodes, largest measuring  1.5 x 1.0 cm and mildly enlarged aorticopulmonary and prevascular lymph nodes that were noted on contrast-enhanced CT scan seen less well on the PET scan.  No evidence of metastatic disease in the abdomen and pelvis.  She also underwent a brain MRI that showed no evidence of intracranial metastatic disease.  She underwent a biopsy of one of the lymph nodes in the left neck which showed metastatic adenocarcinoma consistent with breast origin, which was estrogen-receptor positive.  The tumor cells were positive for CK7, ER and AR consistent with metastatic breast carcinoma.  Estrogen receptor showed strong average nuclear intensity in 95% of carcinoma cells, progesterone receptor moderate average nuclear intensity in 70% of the carcinoma.  A GATA3 stain was apparently not performed.     She started Ibrance on 9/21 and letrozole on 9/23. She had improved disease on restaging on 11/26. Please see Dr. Bhatt's previous notes for further details on the patient's history. She comes in today for routine follow up.    Interval History:  Jade returns to clinic today. She saw Dr. Hay and was started on Nasocort and Advair. Advair has been helping and she has less wheezing. She has an intermittent, productive cough and some sinus congestion. No fevers, chills or infectious concerns.     No bone aches recently. Continues to have some hotflashes and night sweats on letrozole. Also has some vaginal dryness. Itching in left hip resolved. She gets occasional pains in left breast when she eats sugar or caffeine. She does have some fatigue. She sometimes takes naps or drives less for LYFT due to fatigue. She is on Zoloft for depression and anxiety and seeing Sari Holliday. She states she has about episodes of anxiety--palpitations, shakiness, anger, about twice weekly.    No rashes. No nausea, appetite good. Alternating constipation/diarrhea. No urinary concerns.    She restarted Ibrance on 2/8. She is currently day 12 of  Ibrance.    Review of Systems:  Patient denies any of the following except if noted above: fevers, chills, chest pain, dyspnea,  abdominal pain, dysuria.    Current Outpatient Medications   Medication Sig Dispense Refill     ACETAMINOPHEN PO Take 650 mg by mouth every 4 hours as needed for pain       albuterol (PROAIR HFA/PROVENTIL HFA/VENTOLIN HFA) 108 (90 Base) MCG/ACT inhaler Inhale 2 puffs into the lungs every 6 hours 1 Inhaler 4     albuterol (PROAIR HFA/PROVENTIL HFA/VENTOLIN HFA) 108 (90 Base) MCG/ACT inhaler Inhale 2 puffs into the lungs every 6 hours as needed for shortness of breath / dyspnea or wheezing       albuterol (PROVENTIL) (2.5 MG/3ML) 0.083% neb solution Take 1 vial (2.5 mg) by nebulization every 6 hours as needed for shortness of breath / dyspnea or wheezing 30 vial 0     calcium carbonate (TUMS) 500 MG chewable tablet Take 1 chew tab by mouth as needed for heartburn       Calcium Citrate-Vitamin D (CALCIUM + D PO) Take 250 mg by mouth 2 times daily        fluticasone (FLONASE) 50 MCG/ACT spray Spray 1-2 sprays into both nostrils daily (Patient not taking: Reported on 2/5/2019) 1 Bottle 3     fluticasone-salmeterol (AIRDUO RESPICLICK) 113-14 MCG/ACT inhaler Inhale 1 puff into the lungs 2 times daily 1 Inhaler 11     IBUPROFEN PO Take 200 mg by mouth every 4 hours as needed for moderate pain       letrozole (FEMARA) 2.5 MG tablet Take 1 tablet (2.5 mg) by mouth daily for 28 days 28 tablet 0     letrozole (FEMARA) 2.5 MG tablet Take 1 tablet (2.5 mg) by mouth daily for 28 days 28 tablet 0     levothyroxine (SYNTHROID/LEVOTHROID) 200 MCG tablet Take 200 mcg by mouth       loratadine (CLARITIN) 10 MG tablet Take 1 tablet (10 mg) by mouth daily 90 tablet 3     methylPREDNISolone (MEDROL) 16 MG tablet Take 32 mg by mouth 12 hours before the procedure and repeat 32 mg by mouth 2 hours before the procedure to prevent contrast reaction. (Patient not taking: Reported on 2/5/2019) 4 tablet 0     Multiple  Vitamins-Minerals (MULTIVITAMIN ADULT PO)        ondansetron (ZOFRAN ODT) 8 MG ODT tab Take 1 tablet (8 mg) by mouth 3 times daily as needed for nausea 30 tablet 1     palbociclib (IBRANCE) 125 MG capsule CHEMO Take 1 capsule (125 mg) by mouth daily with food Take for 21 days, then 7 days off. Avoid grapefruit. Do not open/crush/chew capsule. 21 capsule 0     prochlorperazine (COMPAZINE) 10 MG tablet Take 1 tablet (10 mg) by mouth every 6 hours as needed for nausea or vomiting 30 tablet 0     sertraline (ZOLOFT) 100 MG tablet TAKE 1 AND 1/2 TABLETS(150 MG) BY MOUTH EVERY DAY       triamcinolone (NASACORT) 55 MCG/ACT inhaler Spray 1 spray into both nostrils daily        VITAMIN D, CHOLECALCIFEROL, PO Take 2,000 Units by mouth daily          Physical Examination:  General: The patient is a pleasant female in no acute distress.  /72 (BP Location: Right arm, Patient Position: Sitting, Cuff Size: Adult Large)   Pulse 72   Temp 97.6  F (36.4  C) (Oral)   Resp 16   Wt 129.5 kg (285 lb 8 oz)   SpO2 94%   BMI 42.78 kg/m    Wt Readings from Last 10 Encounters:   02/19/19 129.5 kg (285 lb 8 oz)   02/05/19 127.9 kg (282 lb)   01/22/19 128.1 kg (282 lb 4.8 oz)   01/09/19 125.7 kg (277 lb 1.6 oz)   12/24/18 125.5 kg (276 lb 11.2 oz)   11/27/18 125.6 kg (276 lb 14.4 oz)   11/13/18 124.3 kg (274 lb)   10/16/18 122.5 kg (270 lb)   10/01/18 121.6 kg (268 lb)   09/18/18 121.8 kg (268 lb 8 oz)     HEENT: EOMI, PERRL. Sclerae are anicteric. Oral mucosa is pink and moist with no lesions or thrush.   Lymph: No palpable cervical, supraclavicular, subclavicular or axillary lymphadenopathy.  Heart: Regular rate and rhythm.   Lungs: Clear to auscultation bilaterally. Rare expiratory wheezing at base. Normal work of breathing.   Breast: Deferred today   Abdomen: Bowel sounds present, soft, nontender with no palpable hepatosplenomegaly or masses.   Extremities: No lower extremity edema noted bilaterally.   Neuro: Cranial nerves II  through XII are grossly intact.  Skin: No rashes, petechiae, or bruising noted on exposed skin. Some horizontal ridges with darker coloring at base of nails.     Laboratory Data:   Results for ANAHI DOBSON (MRN [id number]) as of 2/19/2019 13:49   2/19/2019 12:53   Sodium 137   Potassium 4.2   Chloride 106   Carbon Dioxide 23   Urea Nitrogen 15   Creatinine 1.06 (H)   GFR Estimate 62   GFR Estimate If Black 71   Calcium 8.9   Anion Gap 8   Albumin 3.7   Protein Total 7.4   Bilirubin Total 0.4   Alkaline Phosphatase 67   ALT 26   AST 20   Glucose 111 (H)   WBC 2.9 (L)   Hemoglobin 12.5   Hematocrit 36.9   Platelet Count 225   RBC Count 3.81   MCV 97   MCH 32.8   MCHC 33.9   RDW 13.7   Diff Method Manual Differential   % Neutrophils 56.6   % Lymphocytes 31.0   % Monocytes 5.3   % Eosinophils 5.3   % Basophils 1.8   Absolute Neutrophil 1.6   Absolute Lymphocytes 0.9   Absolute Monocytes 0.2   Absolute Eosinophils 0.2   Absolute Basophils 0.1       Assessment and Plan:    Recurrent, metastatic ER-positive, HER2 negative breast cancer: History of left breast cancer s/p lumpectomy and SNLB in 2014. S/p 4 cycles ddAC and 12 weeks of Taxol. On AI from 11/2014-2/2017. Recurrence on PET/CT scan performed 08/03/2018 and then biopsy to left posterior cervical lymph nodes, aorticopulmonary, and prevascular lymph nodes. No evidence of metastatic disease in the abdomen and pelvis or brain. Started Ibrance and letrozole on 9/21/18. Tolerating well aside from some hot flashes and mild joint stiffness. Re imaging in January showed improved disease. Now mid-cycle 6. Her ANC is 1.6. Okay to continue as she is mid-cycle. Will schedule labs on 3/6 during her week off while she is here for another appointment. She will be due to start C7 on 3/8. Will adjust follow up to be during her week off and move CT accordingly. She has methylpred to take prior due to history of reaction to IV contrast.     Post-nasal drip, congestion: Now on Nasacort  per Dr. Hay. Also recommended nasal saline washes daily to BID.     Asthma: Seen by Dr. Hay and started on Advair with improvement of symptoms. Also has albuterol prn    Nausea: Improved. Antiemetics prn.    Bone health: bone targeting agents are not required as she has no evidence of bone metastases. Continue calcium and vitamin D.     Evelina Garcia PA-C  [facility] [address] [id number] [phone]                   Muscle Hinge Flap Text: The defect edges were debeveled with a #15 scalpel blade.  Given the size, depth and location of the defect and the proximity to free margins a muscle hinge flap was deemed most appropriate.  Using a sterile surgical marker, an appropriate hinge flap was drawn incorporating the defect. The area thus outlined was incised with a #15 scalpel blade.  The skin margins were undermined to an appropriate distance in all directions utilizing iris scissors.

## 2023-04-03 PROBLEM — C50.919 PRIMARY MALIGNANT NEOPLASM OF BREAST WITH METASTASIS (H): Status: ACTIVE | Noted: 2020-11-18

## 2023-05-07 ENCOUNTER — HEALTH MAINTENANCE LETTER (OUTPATIENT)
Age: 53
End: 2023-05-07

## 2023-05-17 NOTE — TELEPHONE ENCOUNTER
Methylprednisolone refill   Last prescribing provider: Mandie Almeida     Last clinic visit date: 4/27/22 Mandie almeida     Any missed appointments or no-shows since last clinic visit?: No     Recommendations for requested medication (if none, N/A): N/A    Next clinic visit date: 5/31/22 Dr Bhatt     Any other pertinent information (if none, N/A): N/A   No Yes

## 2023-06-04 ENCOUNTER — HEALTH MAINTENANCE LETTER (OUTPATIENT)
Age: 53
End: 2023-06-04

## 2023-07-11 NOTE — ORAL ONC MGMT
Oral Chemotherapy Monitoring Program    Subjective/Objective:  Jade Collins is a 50 year old female contacted by phone for a follow-up visit for oral chemotherapy.   Confirms taking letrozole 2.5 mg daily and Ibrance 100 mg daily for 21 days on and 7 days off. Next cycle to start 12/25. Denies missed doses. Denies hospitalizations and ED visits. She notes that she has started buspirone 7.5 mg BID scheduled for anxiety and lorazepam 1 mg prn for past 2 weeks. She sometimes has bone aches for an hour after taking letrozole so she takes at night to sleep through it. Denies flushing, nausea, diarrhea, vomiting, and fatigue.     ORAL CHEMOTHERAPY 7/17/2020 9/18/2020 10/20/2020 10/22/2020 11/13/2020 12/22/2020 12/23/2020   Assessment Type - - Lab Monitoring Quarterly Follow up Refill Refill Monthly Follow up   Diagnosis Code - - Breast Cancer Breast Cancer Breast Cancer Breast Cancer Breast Cancer   Providers - - Nova Bhatt   [facility]   Drug Name Ibrance (Palbociclib) Ibrance (Palbociclib) Ibrance (Palbociclib) Ibrance (Palbociclib) Ibrance (Palbociclib) Ibrance (Palbociclib) Ibrance (Palbociclib)   Dose - - 100 mg 100 mg 100 mg 100 mg 100 mg   Current Schedule Daily Daily Daily Daily Daily Daily Daily   Cycle Details 3 weeks on 1 week off Continuous 3 weeks on, 1 week off 3 weeks on, 1 week off 3 weeks on, 1 week off 3 weeks on, 1 week off 3 weeks on, 1 week off   Start Date of Last Cycle 7/17/2020 8/14/2020 9/25/2020 9/25/2020 - - -   Planned next cycle start date 8/14/2020 9/18/2020 10/23/2020 10/23/2020 - - 12/25/2020   Doses missed in last 2 weeks 0 0 - 0 - - 0   Adherence Assessment Adherent Adherent - Adherent - - Adherent   Adverse Effects Decreased neutrophil count No AE identified during assessment - No AE identified during assessment - - No AE identified during assessment   Nausea - - - - - - -   Pharmacist Intervention(nausea)  "- - - - - - -   Diarrhea - - - - - - -   Pharmacist Intervention(diarrhea) - - - - - - -   Oral Mucositis - - - - - - -   Pharmacist Intervention(oral mucositis) - - - - - - -   Fatigue - - - - - - -   Pharmacist Intervention(fatigue) - - - - - - -   Neutropenia Resolved due to intervention - - - - - -   Pharmacist Intervention(neutropenia) - - - - - - -   Intervention(s) - - - - - - -   Home BPs - - - - - - -   Any new drug interactions? No No - No - - Yes   Pharmacist Intervention? - - - - - - Yes   Intervention(s) - - - - - - Patient Education   Is the dose as ordered appropriate for the patient? Yes Yes - Yes - - Yes   Is the patient currently in pain? - - - - - - No   Does the patient feel the pain is currently being managed by a provider? - - - - - - -   Has the patient been assessed within the past 6 months for depression? - - - - - - -   Has the patient missed any days of school, work, or other routine activity? No No - - - - -       Last PHQ-2 Score on record:   PHQ-2 ( 1999 [employer]) 9/18/2018   Q1: Little interest or pleasure in doing things 1   Q2: Feeling down, depressed or hopeless 1   PHQ-2 Score 2       Vitals:  BP:   BP Readings from Last 1 Encounters:   06/09/20 138/85     Wt Readings from Last 1 Encounters:   06/09/20 138.6 kg (305 lb 9.6 oz)     Estimated body surface area is 2.58 meters squared as calculated from the following:    Height as of 3/12/20: 1.727 m (5' 7.99\").    Weight as of 6/9/20: 138.6 kg (305 lb 9.6 oz).    Labs:  _  Result Component Current Result Ref Range   Sodium 141 (12/22/2020) 133 - 144 mmol/L     _  Result Component Current Result Ref Range   Potassium 4.0 (12/22/2020) 3.4 - 5.3 mmol/L     _  Result Component Current Result Ref Range   Calcium 9.9 (12/22/2020) 8.5 - 10.1 mg/dL     No results found for Mag within last 30 days.     No results found for Phos within last 30 days.     _  Result Component Current Result Ref Range   Albumin 4.2 (12/22/2020) 3.4 - 5.0 g/dL "     _  Result Component Current Result Ref Range   Urea Nitrogen 10 (12/22/2020) 7 - 30 mg/dL     _  Result Component Current Result Ref Range   Creatinine 1.09 (H) (12/22/2020) 0.52 - 1.04 mg/dL     _  Result Component Current Result Ref Range   AST 23 (12/22/2020) 0 - 45 U/L     _  Result Component Current Result Ref Range   ALT 36 (12/22/2020) 0 - 50 U/L     _  Result Component Current Result Ref Range   Bilirubin Total 0.5 (12/22/2020) 0.2 - 1.3 mg/dL     _  Result Component Current Result Ref Range   WBC 2.9 (L) (12/22/2020) 4.0 - 11.0 10e9/L     _  Result Component Current Result Ref Range   Hemoglobin 13.4 (12/22/2020) 11.7 - 15.7 g/dL     _  Result Component Current Result Ref Range   Platelet Count 145 (L) (12/22/2020) 150 - 450 10e9/L     _  Result Component Current Result Ref Range   Absolute Neutrophil 1.4 (L) (12/22/2020) 1.6 - 8.3 10e9/L         Assessment/Plan:  Jade is starting buspirone 7.5 mg BID and lorazepam 1 mg prn for anxiety. Ibrance can increase the concentration of 3A4 substrates such as buspirone and patient has been on scheduled buspirone for 2 weeks now. She notes no adverse effects but may require closer monitoring for potential buspirone dose decrease. Labs WNL and ANC up after holding for a week at end of November. Otherwise tolerating medications with no major side effects.   -Patient education on buspirone side effects and to report to team if new or worsening symptoms develop (drowsiness, dizziness)   -Labs reviewed with patient from 12/22 (ANC 1.4, [id number], CEA 0.5)  -Continue Ibrance 100 mg daily for 21 days off and 7 days off and letrozole 2.5 mg daily    Follow-Up:  12/24 Appt with Dr. Bhatt  1/15 Monthly assessment   1/19 Labs    Refill Due:  SP to deliver 12/24   Next cycle starts 12/25    Carmen Peck, PharmD  PGY-2 Oncology Pharmacy Resident   [facility]  [phone]     Clindamycin Pregnancy And Lactation Text: This medication can be used in pregnancy if certain situations. Clindamycin is also present in breast milk.

## 2023-08-04 DIAGNOSIS — E03.9 HYPOTHYROIDISM, UNSPECIFIED TYPE: ICD-10-CM

## 2023-08-09 RX ORDER — LEVOTHYROXINE SODIUM 200 UG/1
TABLET ORAL
Qty: 90 TABLET | Refills: 3 | OUTPATIENT
Start: 2023-08-09

## 2023-10-05 NOTE — LETTER
"10/3/2019       RE: Jade Collins  [address]  [facility] [address]     Dear Colleague,    Thank you for referring your patient, Jade Collins, to the [facility] at [facility]. Please see a copy of my visit note below.    Palliative Care Clinical Social Work Return Appointment    PLEASE NOTE:  THIS IS A MENTAL HEALTH NOTE.  OTHER PROVIDERS VIEWING THIS NOTE SHOULD USE THIS ONLY FOR UNDERSTANDING THE CONTEXT OF THE PATIENT S EXPERIENCE.  TOPICS DESCRIBED IN THIS NOTE SHOULD NOT BE REFERENCED TO THE PATIENT BY MEDICAL PROVIDERS.    Jade is a 49 year old woman with metastatic breast cancer, seen today at [facility] for a return psychotherapy appointment to address PTSD, recurrent major depression, and anxiety as these relate to coping with illness and treatment.    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 She reflects on how despite the bad things about having cancer, she has been able to find a positive aspect of living with cancer in the feelings of connection with others going through cancer also. 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 She wonders about roles for [employer].    Mental Health (thoughts, feelings, actions, coping, and symptoms):   Jade reflects on the benefits she has found in doing NET, especially \"seeing the big picture\" of her life, and awareness of how much she has been able to come through, so increased " Have Your Spot(S) Been Treated In The Past?: has not been treated "confidence in her own strength to face challenges as they come. We review narrative and I provide written and digital copies for her use as she chooses. Overall, she describes feeling hopeful, and less stressed due to inheritance from her dad. New apartment is good and she has enjoyed getting it set up. 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 She reflects on a wide range of ideas and options. She plans to connect with  at [employer] also for ideas and to plan next steps.    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    Relationships: Sister planning to buy house in [place], has given notice at her job in [place]. Jade not dating recently but has been using FB dating jose. Reflects on her goal to be selective and slow, as well as openly sharing on her profile that she has a terminal illness. She plans to avoid having a martinez come to her apartment at all until several months into dating. She enjoys connected with friend Lizbeth here and with others at [employer]. She talks with her mom once a week, and her mom is defended about accepting financial " Hpi Title: Evaluation of Skin Lesions help. Otherwise they are getting along well. She talks with her sister daily.     End of Life and Advance Care Planning: Not focus today.    Main therapeutic interventions provided this session include:  Psychoeducation, facilitating processing of feelings and thoughts, structured problem solving, grief counseling, and Narrative Exposure Therapy - closure session    Plan:  Will return for psychotherapy with palliative care focus if needed going forward.      Time spent with patient/family:  70 minutes (Start 3:05pm, end 4:30pm)  Narrative Exposure Therapy, longer session      Palliative Care Counseling Treatment Plan    Client's Name: Jade Collins  YOB: 1970    Date: 1/9/2019    Initial DSM5 Diagnoses:   296.32 Major Depressive Disorder, Recurrent Episode, Moderate With anxious distress  309.81 (F43.10) Posttraumatic Stress Disorder (includes Posttraumatic Stress Dsiorder for Children 6 Years and Younger)  With dissociative symptoms      Date to review plan (90 days usually): 3/20/19    Referral / Collaboration:  .Referral to another professional/service is not indicated at this time.    Anticipated number of sessions to complete episode of care:  10         Treatment Goal(s)  Date Goal Dates  Reviewed Status   12/20/2018   Goal 1:  Client will decrease PTSD symptoms.      Continued   12/20/2018   Objective #1A (Client Action)  Client will Increase understanding of PTSD/ASD and Process trauma history in safe counseling environment using Narrative Exposure Therapy.    Intervention(s)  Therapist will Provide psychoeducation and Narrative Exposure Therapy .    Continued                   Date Goal Dates  Reviewed Status   12/20/2018   Goal 2:  Client will address illness related stressors.    Continued   12/20/2018   Objective #2A (Client Action)  Client will Communicate effectively with family/friends re needs and Communicate effectively with medical provider re needed information.    Intervention(s)  (Therapist Action)  Therapist will Provide psychoeducation, Facilitate couples/family therapy session(s) and Facilitate structured problem solving.    Continued   12/20/2018   Objective #2B  Client will Identify effective coping strategies and Use/practice relaxation strategies.    Intervention(s)  Therapist will Provide psychoeducation, Provide behavioral intervention training, Facilitate processing of thoughts and feelings and Facilitate structured problem solving.    Continued   12/20/2018     Objective #2C  Client will Complete health care directive and/or POLST form.    Intervention(s)  Therapist will Provide advance care planning education.    Continued       Date Goal Dates  Reviewed Status   1/9/2019   Goal 3:  Client will effectively manage recurrent major depressive disorder in light of increased stressors.    Continued   1/9/2019   Objective #3A (Client Action)  Client will Identify activities that provide comfort and/or pleasure, Participate in activities that provide comfort and/or pleasure and Identify an activity that would provide meaning/contribute to feeling of self worth.    Intervention(s) (Therapist Action)  Therapist will Provide psychoeducation, Facilitate processing of thoughts and feelings and Facilitate structured problem solving.    Contined   1/9/2019   Objective #3B  Client will Effectively communicate needs to others, Identify a crisis plan for suicidal ideation and Identify triggers/early signs of depressed mood.    Intervention(s)  Therapist will Provide psychoeducation, Facilitate processing of thoughts and feelings, Facilitate structured problem solving and facilitate safety planning as needed..    Continued               Client has contributed regarding goals and concerns, but has not reviewed the treatment plan. Plan to review at future session.    Lisa Holliday, MSLILLI, [facility]      DO NOT SEND ANY LETTERS    Again, thank you for allowing me to participate in the care of your  patient.      Sincerely,    SANDRA CottrellSW

## 2023-10-08 ENCOUNTER — HEALTH MAINTENANCE LETTER (OUTPATIENT)
Age: 53
End: 2023-10-08

## 2023-12-11 DIAGNOSIS — Z17.0 MALIGNANT NEOPLASM OF UPPER-OUTER QUADRANT OF LEFT BREAST IN FEMALE, ESTROGEN RECEPTOR POSITIVE (H): ICD-10-CM

## 2023-12-11 DIAGNOSIS — C50.412 MALIGNANT NEOPLASM OF UPPER-OUTER QUADRANT OF LEFT BREAST IN FEMALE, ESTROGEN RECEPTOR POSITIVE (H): ICD-10-CM

## 2023-12-12 RX ORDER — MIRTAZAPINE 15 MG/1
TABLET, FILM COATED ORAL
Qty: 90 TABLET | Refills: 3 | OUTPATIENT
Start: 2023-12-12

## 2023-12-17 ENCOUNTER — HEALTH MAINTENANCE LETTER (OUTPATIENT)
Age: 53
End: 2023-12-17

## 2023-12-28 ENCOUNTER — MYC REFILL (OUTPATIENT)
Dept: ENDOCRINOLOGY | Facility: CLINIC | Age: 53
End: 2023-12-28
Payer: COMMERCIAL

## 2023-12-28 DIAGNOSIS — E11.9 TYPE 2 DIABETES MELLITUS WITHOUT COMPLICATION, WITHOUT LONG-TERM CURRENT USE OF INSULIN (H): ICD-10-CM

## 2023-12-28 DIAGNOSIS — E66.01 CLASS 3 SEVERE OBESITY DUE TO EXCESS CALORIES WITH SERIOUS COMORBIDITY AND BODY MASS INDEX (BMI) OF 45.0 TO 49.9 IN ADULT (H): ICD-10-CM

## 2023-12-28 DIAGNOSIS — E66.813 CLASS 3 SEVERE OBESITY DUE TO EXCESS CALORIES WITH SERIOUS COMORBIDITY AND BODY MASS INDEX (BMI) OF 45.0 TO 49.9 IN ADULT (H): ICD-10-CM

## 2023-12-29 ENCOUNTER — TELEPHONE (OUTPATIENT)
Dept: PULMONOLOGY | Facility: CLINIC | Age: 53
End: 2023-12-29
Payer: COMMERCIAL

## 2023-12-29 RX ORDER — SEMAGLUTIDE 1.34 MG/ML
0.25 INJECTION, SOLUTION SUBCUTANEOUS
Qty: 1.5 ML | Refills: 3 | OUTPATIENT
Start: 2023-12-29

## 2023-12-29 NOTE — TELEPHONE ENCOUNTER
GLP-1 Agonists Protocol Failed 12/28/2023 07:17 PM   Protocol Details  HgbA1C in past 3 or 6 months    Normal serum creatinine on file in past 12 months    Recent (6 mo) or future (30 days) visit within the authorizing provider's specialty

## 2023-12-29 NOTE — TELEPHONE ENCOUNTER
Patient Contacted for the patient to call back and schedule the following:    Appointment type: RPULM  Provider: JONO  Return date: 05/2024  Specialty phone number: [phone]  Additional appointment(s) needed: N/A  Additonal Notes: Appointment needing to be made for refills, pt not sure if appt with Dr. Hay is needed, will call back to schedule when pt is able

## 2024-02-25 ENCOUNTER — HEALTH MAINTENANCE LETTER (OUTPATIENT)
Age: 54
End: 2024-02-25

## 2024-06-24 ENCOUNTER — MYC MEDICAL ADVICE (OUTPATIENT)
Dept: INTERVENTIONAL RADIOLOGY/VASCULAR | Facility: CLINIC | Age: 54
End: 2024-06-24
Payer: COMMERCIAL

## 2024-07-03 ENCOUNTER — APPOINTMENT (OUTPATIENT)
Dept: INTERVENTIONAL RADIOLOGY/VASCULAR | Facility: CLINIC | Age: 54
End: 2024-07-03
Attending: INTERNAL MEDICINE
Payer: COMMERCIAL

## 2024-07-03 ENCOUNTER — HOSPITAL ENCOUNTER (OUTPATIENT)
Facility: CLINIC | Age: 54
Discharge: HOME | End: 2024-07-03
Attending: RADIOLOGY | Admitting: INTERNAL MEDICINE
Payer: COMMERCIAL

## 2024-07-03 VITALS
SYSTOLIC BLOOD PRESSURE: 115 MMHG | HEART RATE: 90 BPM | OXYGEN SATURATION: 98 % | DIASTOLIC BLOOD PRESSURE: 73 MMHG | TEMPERATURE: 98.6 F | RESPIRATION RATE: 14 BRPM

## 2024-07-03 DIAGNOSIS — C50.919 INVASIVE DUCTAL CARCINOMA OF BREAST (H): ICD-10-CM

## 2024-07-03 LAB
ATRIAL RATE - MUSE: 88 BPM
DIASTOLIC BLOOD PRESSURE - MUSE: NORMAL MMHG
ERYTHROCYTE [DISTWIDTH] IN BLOOD BY AUTOMATED COUNT: 12.9 % (ref 10–15)
HCT VFR BLD AUTO: 39.2 % (ref 35–47)
HGB BLD-MCNC: 13.3 G/DL (ref 11.7–15.7)
INTERPRETATION ECG - MUSE: NORMAL
MCH RBC QN AUTO: 32.6 PG (ref 26.5–33)
MCHC RBC AUTO-ENTMCNC: 33.9 G/DL (ref 31.5–36.5)
MCV RBC AUTO: 96 FL (ref 78–100)
P AXIS - MUSE: 51 DEGREES
PLATELET # BLD AUTO: 254 10E3/UL (ref 150–450)
PR INTERVAL - MUSE: 142 MS
QRS DURATION - MUSE: 92 MS
QT - MUSE: 364 MS
QTC - MUSE: 440 MS
R AXIS - MUSE: 43 DEGREES
RBC # BLD AUTO: 4.08 10E6/UL (ref 3.8–5.2)
SYSTOLIC BLOOD PRESSURE - MUSE: NORMAL MMHG
T AXIS - MUSE: 56 DEGREES
VENTRICULAR RATE- MUSE: 88 BPM
WBC # BLD AUTO: 5.1 10E3/UL (ref 4–11)

## 2024-07-03 PROCEDURE — C1788 PORT, INDWELLING, IMP: HCPCS

## 2024-07-03 PROCEDURE — 99152 MOD SED SAME PHYS/QHP 5/>YRS: CPT

## 2024-07-03 PROCEDURE — 250N000009 HC RX 250: Performed by: RADIOLOGY

## 2024-07-03 PROCEDURE — 93005 ELECTROCARDIOGRAM TRACING: CPT

## 2024-07-03 PROCEDURE — C1894 INTRO/SHEATH, NON-LASER: HCPCS

## 2024-07-03 PROCEDURE — 999N000054 HC STATISTIC EKG NON-CHARGEABLE

## 2024-07-03 PROCEDURE — 36415 COLL VENOUS BLD VENIPUNCTURE: CPT | Performed by: NURSE PRACTITIONER

## 2024-07-03 PROCEDURE — 36561 INSERT TUNNELED CV CATH: CPT

## 2024-07-03 PROCEDURE — 250N000011 HC RX IP 250 OP 636: Performed by: NURSE PRACTITIONER

## 2024-07-03 PROCEDURE — 85027 COMPLETE CBC AUTOMATED: CPT | Performed by: NURSE PRACTITIONER

## 2024-07-03 PROCEDURE — 250N000009 HC RX 250: Performed by: NURSE PRACTITIONER

## 2024-07-03 PROCEDURE — 250N000011 HC RX IP 250 OP 636: Performed by: RADIOLOGY

## 2024-07-03 RX ORDER — NALOXONE HYDROCHLORIDE 0.4 MG/ML
0.2 INJECTION, SOLUTION INTRAMUSCULAR; INTRAVENOUS; SUBCUTANEOUS
Status: DISCONTINUED | OUTPATIENT
Start: 2024-07-03 | End: 2024-07-03 | Stop reason: HOSPADM

## 2024-07-03 RX ORDER — NALOXONE HYDROCHLORIDE 0.4 MG/ML
0.4 INJECTION, SOLUTION INTRAMUSCULAR; INTRAVENOUS; SUBCUTANEOUS
Status: DISCONTINUED | OUTPATIENT
Start: 2024-07-03 | End: 2024-07-03 | Stop reason: HOSPADM

## 2024-07-03 RX ORDER — CLINDAMYCIN PHOSPHATE 900 MG/50ML
900 INJECTION, SOLUTION INTRAVENOUS
Status: COMPLETED | OUTPATIENT
Start: 2024-07-03 | End: 2024-07-03

## 2024-07-03 RX ORDER — LIDOCAINE HYDROCHLORIDE AND EPINEPHRINE 10; 10 MG/ML; UG/ML
0-20 INJECTION, SOLUTION INFILTRATION; PERINEURAL ONCE
Status: COMPLETED | OUTPATIENT
Start: 2024-07-03 | End: 2024-07-03

## 2024-07-03 RX ORDER — HEPARIN SODIUM (PORCINE) LOCK FLUSH IV SOLN 100 UNIT/ML 100 UNIT/ML
5 SOLUTION INTRAVENOUS EVERY 8 HOURS
Status: DISCONTINUED | OUTPATIENT
Start: 2024-07-03 | End: 2024-07-03 | Stop reason: HOSPADM

## 2024-07-03 RX ORDER — FLUMAZENIL 0.1 MG/ML
0.2 INJECTION, SOLUTION INTRAVENOUS
Status: DISCONTINUED | OUTPATIENT
Start: 2024-07-03 | End: 2024-07-03 | Stop reason: HOSPADM

## 2024-07-03 RX ORDER — FENTANYL CITRATE 50 UG/ML
25-50 INJECTION, SOLUTION INTRAMUSCULAR; INTRAVENOUS EVERY 5 MIN PRN
Status: DISCONTINUED | OUTPATIENT
Start: 2024-07-03 | End: 2024-07-03 | Stop reason: HOSPADM

## 2024-07-03 RX ADMIN — LIDOCAINE HYDROCHLORIDE AND EPINEPHRINE 10 ML: 10; 10 INJECTION, SOLUTION INFILTRATION; PERINEURAL at 09:04

## 2024-07-03 RX ADMIN — MIDAZOLAM 1 MG: 1 INJECTION INTRAMUSCULAR; INTRAVENOUS at 08:58

## 2024-07-03 RX ADMIN — FENTANYL CITRATE 50 MCG: 50 INJECTION, SOLUTION INTRAMUSCULAR; INTRAVENOUS at 09:02

## 2024-07-03 RX ADMIN — MIDAZOLAM 1 MG: 1 INJECTION INTRAMUSCULAR; INTRAVENOUS at 09:02

## 2024-07-03 RX ADMIN — LIDOCAINE HYDROCHLORIDE 8 ML: 10 INJECTION, SOLUTION EPIDURAL; INFILTRATION; INTRACAUDAL; PERINEURAL at 09:04

## 2024-07-03 RX ADMIN — CLINDAMYCIN PHOSPHATE 900 MG: 900 INJECTION, SOLUTION INTRAVENOUS at 08:48

## 2024-07-03 RX ADMIN — FENTANYL CITRATE 50 MCG: 50 INJECTION, SOLUTION INTRAMUSCULAR; INTRAVENOUS at 08:57

## 2024-07-03 RX ADMIN — HEPARIN 5 ML: 100 SYRINGE at 09:05

## 2024-07-03 ASSESSMENT — ACTIVITIES OF DAILY LIVING (ADL)
ADLS_ACUITY_SCORE: 35

## 2024-07-03 NOTE — IR NOTE
Port placement performed without complication.  Pt tolerated procedure well.  Post procedure recovery without complication.  Discharge instructions reviewed with the pt and friend.  Pt discharged home

## 2024-07-03 NOTE — PRE-PROCEDURE
Interventional Radiology Pre-Procedure Sedation Assessment   Time of Assessment: 8:12 AM    Expected Level: Moderate Sedation    Indication: Sedation is required for the following type of Procedure: Port placement    Sedation and procedural consent: Risks, benefits and alternatives were discussed with Patient    PO Intake: Appropriately NPO for procedure    ASA Class: Class 2 - MILD SYSTEMIC DISEASE, NO ACUTE PROBLEMS, NO FUNCTIONAL LIMITATIONS.    Mallampati: Grade 2:  Soft palate, base of uvula, tonsillar pillars, and portion of posterior pharyngeal wall visible    Lungs: Lungs Clear with good breath sounds bilaterally    Heart: Normal heart sounds and rate    History and physical reviewed and no updates needed. I have reviewed the lab findings, diagnostic data, medications, and the plan for sedation. I have determined this patient to be an appropriate candidate for the planned sedation and procedure and have reassessed the patient IMMEDIATELY PRIOR to sedation and procedure.    Steve Hyman MD

## 2024-07-03 NOTE — DISCHARGE INSTRUCTIONS
Port Placement Procedure Discharge Instructions:  You had a port placed. A port is a small medical device that is placed under the skin and is connected to a vein with a catheter (thin, flexible tube). Ports can be used to administer IV medications (including chemotherapy), fluids or blood products or for blood lab draws. Please follow the below instructions after your procedure:    Care Instructions:  - If you received sedation for your procedure, do not drive or operate heavy machinery for the rest of the day.  - You may shower beginning tomorrow (post procedure day #1). Do not scrub site until well healed, pat dry gently with a towel.  - You likely have skin adhesive over your port site. Skin adhesive works like a bandage to keep the site covered and protected. Do not use antibiotic ointment or creams/lotions over adhesive as it can break it down. The skin adhesive will peel off on its own (typically in 5-14 days).  - Avoid submerging the port site under water (ex: tub baths, lakes, hot tubs and pools) for 10 days or until your site is well healed.  - You may have some discomfort, minimal swelling, redness and/or bruising at your port site/procedure site. You may take over the counter pain medication for discomfort (follow the package directions) or apply an ice pack wrapped in a towel over the site (rotating 20 minutes with ice pack on and 20 minutes with ice pack off) for comfort as needed. It can take several days for these to resolve.  - Avoid heavy lifting (greater than 10 pounds) and strenuous activities for 2-3 days following your procedure.  - If you experience significant bleeding at site, apply pressure with hands above the clavicle bone, sit upright and seek immediate medical assistance.  - Ports need to be flushed approximately every 4 weeks, if not being used more frequently. Follow up with the provider who ordered your port placement for further instructions for this.    If you experience the  following seek medical evaluation:  - Uncontrolled bleeding from port site  - Fever (greater than 100 )  - Purulent (yellow/green/foul smelling) drainage from port insertion site  - Increasing pain at port site  - Increasing redness at port site  - Increasing swelling    INTERVENTIONAL RADIOLOGY DEPARTMENT  Procedure Physician: Boogie      Date of procedure: 7/3/2024  Telephone Numbers:  [phone]    Monday-Friday 7:30 am to 4:00 pm    IF YOU ARE EXPERIENCING A MEDICAL EMERGENCY PLEASE CALL [phone]

## 2024-07-03 NOTE — IR NOTE
Post Procedure Summary:  Prior to the start of the procedure and with procedural staff participation, I verbally confirmed the patient s identity using two indicators, relevant allergies, that the procedure was appropriate and matched the consent or emergent situation, and that the correct equipment/implants were available. Immediately prior to starting the procedure I conducted the Time Out with the procedural staff and re-confirmed the patient s name, procedure, and site/side. (The Joint Commission universal protocol was followed.)  Yes                                        Sedatives: Fentanyl and Midazolam (Versed)     Vital signs, airway and pulse oximetry were monitored and remained stable throughout the procedure and sedation was maintained until the procedure was complete.  The patient was monitored by staff until sedation discharge criteria were met.     Patient tolerance: Patient tolerated the procedure well with no immediate complications.     Time of sedation in minutes: 10 Minutes minutes from beginning to end of physician one to one monitoring

## 2024-07-14 ENCOUNTER — HEALTH MAINTENANCE LETTER (OUTPATIENT)
Age: 54
End: 2024-07-14

## 2024-12-01 ENCOUNTER — HEALTH MAINTENANCE LETTER (OUTPATIENT)
Age: 54
End: 2024-12-01

## 2025-03-15 ENCOUNTER — HEALTH MAINTENANCE LETTER (OUTPATIENT)
Age: 55
End: 2025-03-15

## 2025-05-17 ENCOUNTER — HEALTH MAINTENANCE LETTER (OUTPATIENT)
Age: 55
End: 2025-05-17

## 2025-06-28 ENCOUNTER — HEALTH MAINTENANCE LETTER (OUTPATIENT)
Age: 55
End: 2025-06-28

## 2025-07-19 ENCOUNTER — HEALTH MAINTENANCE LETTER (OUTPATIENT)
Age: 55
End: 2025-07-19

## (undated) RX ORDER — ALBUTEROL SULFATE 0.83 MG/ML
SOLUTION RESPIRATORY (INHALATION)
Status: DISPENSED
Start: 2022-06-07

## (undated) RX ORDER — CLINDAMYCIN PHOSPHATE 900 MG/50ML
INJECTION, SOLUTION INTRAVENOUS
Status: DISPENSED
Start: 2024-07-03

## (undated) RX ORDER — ALBUTEROL SULFATE 0.83 MG/ML
SOLUTION RESPIRATORY (INHALATION)
Status: DISPENSED
Start: 2019-04-09

## (undated) RX ORDER — HEPARIN SODIUM (PORCINE) LOCK FLUSH IV SOLN 100 UNIT/ML 100 UNIT/ML
SOLUTION INTRAVENOUS
Status: DISPENSED
Start: 2024-07-03

## (undated) RX ORDER — LIDOCAINE HYDROCHLORIDE 10 MG/ML
INJECTION, SOLUTION EPIDURAL; INFILTRATION; INTRACAUDAL; PERINEURAL
Status: DISPENSED
Start: 2024-07-03

## (undated) RX ORDER — LIDOCAINE HYDROCHLORIDE AND EPINEPHRINE 10; 10 MG/ML; UG/ML
INJECTION, SOLUTION INFILTRATION; PERINEURAL
Status: DISPENSED
Start: 2024-07-03

## (undated) RX ORDER — FENTANYL CITRATE 50 UG/ML
INJECTION, SOLUTION INTRAMUSCULAR; INTRAVENOUS
Status: DISPENSED
Start: 2024-07-03